# Patient Record
Sex: FEMALE | Race: WHITE | Employment: UNEMPLOYED | URBAN - METROPOLITAN AREA
[De-identification: names, ages, dates, MRNs, and addresses within clinical notes are randomized per-mention and may not be internally consistent; named-entity substitution may affect disease eponyms.]

---

## 2017-01-04 ENCOUNTER — OFFICE VISIT (OUTPATIENT)
Dept: INTERNAL MEDICINE CLINIC | Age: 62
End: 2017-01-04

## 2017-01-04 VITALS — SYSTOLIC BLOOD PRESSURE: 130 MMHG | DIASTOLIC BLOOD PRESSURE: 88 MMHG

## 2017-01-04 DIAGNOSIS — R05.8 NOCTURNAL COUGH: ICD-10-CM

## 2017-01-04 DIAGNOSIS — J06.9 ACUTE URI: Primary | ICD-10-CM

## 2017-01-04 RX ORDER — AZITHROMYCIN 250 MG/1
TABLET, FILM COATED ORAL
Qty: 6 TAB | Refills: 0 | Status: SHIPPED | OUTPATIENT
Start: 2017-01-04 | End: 2017-02-16 | Stop reason: ALTCHOICE

## 2017-01-04 RX ORDER — CODEINE PHOSPHATE AND GUAIFENESIN 10; 100 MG/5ML; MG/5ML
5 SOLUTION ORAL
Qty: 118 ML | Refills: 0 | Status: SHIPPED | OUTPATIENT
Start: 2017-01-04 | End: 2017-02-16 | Stop reason: ALTCHOICE

## 2017-01-25 ENCOUNTER — OFFICE VISIT (OUTPATIENT)
Dept: SURGERY | Age: 62
End: 2017-01-25

## 2017-01-25 VITALS
HEIGHT: 60 IN | SYSTOLIC BLOOD PRESSURE: 118 MMHG | DIASTOLIC BLOOD PRESSURE: 80 MMHG | WEIGHT: 165 LBS | RESPIRATION RATE: 18 BRPM | BODY MASS INDEX: 32.39 KG/M2 | TEMPERATURE: 97.5 F | HEART RATE: 80 BPM

## 2017-01-25 DIAGNOSIS — Z98.84 LAP-BAND SURGERY STATUS: Primary | ICD-10-CM

## 2017-01-25 DIAGNOSIS — K21.9 GASTROESOPHAGEAL REFLUX DISEASE, ESOPHAGITIS PRESENCE NOT SPECIFIED: ICD-10-CM

## 2017-01-29 ENCOUNTER — HOSPITAL ENCOUNTER (EMERGENCY)
Age: 62
Discharge: HOME | End: 2017-01-29
Attending: EMERGENCY MEDICINE
Payer: COMMERCIAL

## 2017-01-29 ENCOUNTER — APPOINTMENT (OUTPATIENT)
Dept: GENERAL RADIOLOGY | Age: 62
End: 2017-01-29
Attending: PHYSICIAN ASSISTANT
Payer: COMMERCIAL

## 2017-01-29 VITALS
WEIGHT: 160 LBS | SYSTOLIC BLOOD PRESSURE: 145 MMHG | RESPIRATION RATE: 12 BRPM | BODY MASS INDEX: 31.41 KG/M2 | OXYGEN SATURATION: 100 % | HEIGHT: 60 IN | TEMPERATURE: 98.1 F | DIASTOLIC BLOOD PRESSURE: 86 MMHG | HEART RATE: 71 BPM

## 2017-01-29 DIAGNOSIS — R07.9 ACUTE CHEST PAIN: Primary | ICD-10-CM

## 2017-01-29 LAB
ALBUMIN SERPL BCP-MCNC: 3.6 G/DL (ref 3.4–5)
ALBUMIN/GLOB SERPL: 1.1 {RATIO} (ref 0.8–1.7)
ALP SERPL-CCNC: 67 U/L (ref 45–117)
ALT SERPL-CCNC: 18 U/L (ref 13–56)
ANION GAP BLD CALC-SCNC: 9 MMOL/L (ref 3–18)
AST SERPL W P-5'-P-CCNC: 11 U/L (ref 15–37)
ATRIAL RATE: 80 BPM
BASOPHILS # BLD AUTO: 0.1 K/UL (ref 0–0.06)
BASOPHILS # BLD: 1 % (ref 0–2)
BILIRUB SERPL-MCNC: 0.3 MG/DL (ref 0.2–1)
BUN SERPL-MCNC: 19 MG/DL (ref 7–18)
BUN/CREAT SERPL: 21 (ref 12–20)
CALCIUM SERPL-MCNC: 8.7 MG/DL (ref 8.5–10.1)
CALCULATED P AXIS, ECG09: 70 DEGREES
CALCULATED R AXIS, ECG10: 68 DEGREES
CALCULATED T AXIS, ECG11: 49 DEGREES
CHLORIDE SERPL-SCNC: 104 MMOL/L (ref 100–108)
CK MB CFR SERPL CALC: 0.7 % (ref 0–4)
CK MB CFR SERPL CALC: 0.8 % (ref 0–4)
CK MB SERPL-MCNC: 0.9 NG/ML (ref 0.5–3.6)
CK MB SERPL-MCNC: 0.9 NG/ML (ref 0.5–3.6)
CK SERPL-CCNC: 114 U/L (ref 26–192)
CK SERPL-CCNC: 125 U/L (ref 26–192)
CO2 SERPL-SCNC: 27 MMOL/L (ref 21–32)
CREAT SERPL-MCNC: 0.9 MG/DL (ref 0.6–1.3)
DIAGNOSIS, 93000: NORMAL
DIFFERENTIAL METHOD BLD: ABNORMAL
EOSINOPHIL # BLD: 0.2 K/UL (ref 0–0.4)
EOSINOPHIL NFR BLD: 3 % (ref 0–5)
ERYTHROCYTE [DISTWIDTH] IN BLOOD BY AUTOMATED COUNT: 12.7 % (ref 11.6–14.5)
GLOBULIN SER CALC-MCNC: 3.3 G/DL (ref 2–4)
GLUCOSE SERPL-MCNC: 94 MG/DL (ref 74–99)
HCT VFR BLD AUTO: 36 % (ref 35–45)
HGB BLD-MCNC: 12.2 G/DL (ref 12–16)
LIPASE SERPL-CCNC: 211 U/L (ref 73–393)
LYMPHOCYTES # BLD AUTO: 38 % (ref 21–52)
LYMPHOCYTES # BLD: 2.6 K/UL (ref 0.9–3.6)
MAGNESIUM SERPL-MCNC: 2.2 MG/DL (ref 1.8–2.4)
MCH RBC QN AUTO: 28.8 PG (ref 24–34)
MCHC RBC AUTO-ENTMCNC: 33.9 G/DL (ref 31–37)
MCV RBC AUTO: 85.1 FL (ref 74–97)
MONOCYTES # BLD: 0.6 K/UL (ref 0.05–1.2)
MONOCYTES NFR BLD AUTO: 9 % (ref 3–10)
NEUTS SEG # BLD: 3.3 K/UL (ref 1.8–8)
NEUTS SEG NFR BLD AUTO: 49 % (ref 40–73)
P-R INTERVAL, ECG05: 150 MS
PLATELET # BLD AUTO: 258 K/UL (ref 135–420)
PMV BLD AUTO: 10 FL (ref 9.2–11.8)
POTASSIUM SERPL-SCNC: 3.8 MMOL/L (ref 3.5–5.5)
PROT SERPL-MCNC: 6.9 G/DL (ref 6.4–8.2)
Q-T INTERVAL, ECG07: 362 MS
QRS DURATION, ECG06: 84 MS
QTC CALCULATION (BEZET), ECG08: 417 MS
RBC # BLD AUTO: 4.23 M/UL (ref 4.2–5.3)
SODIUM SERPL-SCNC: 140 MMOL/L (ref 136–145)
TROPONIN I SERPL-MCNC: <0.02 NG/ML (ref 0–0.04)
TROPONIN I SERPL-MCNC: <0.02 NG/ML (ref 0–0.04)
VENTRICULAR RATE, ECG03: 80 BPM
WBC # BLD AUTO: 6.7 K/UL (ref 4.6–13.2)

## 2017-01-29 PROCEDURE — 82550 ASSAY OF CK (CPK): CPT | Performed by: PHYSICIAN ASSISTANT

## 2017-01-29 PROCEDURE — 74011250636 HC RX REV CODE- 250/636: Performed by: EMERGENCY MEDICINE

## 2017-01-29 PROCEDURE — 74011250637 HC RX REV CODE- 250/637: Performed by: EMERGENCY MEDICINE

## 2017-01-29 PROCEDURE — 93005 ELECTROCARDIOGRAM TRACING: CPT

## 2017-01-29 PROCEDURE — 99284 EMERGENCY DEPT VISIT MOD MDM: CPT

## 2017-01-29 PROCEDURE — 71020 XR CHEST PA LAT: CPT

## 2017-01-29 PROCEDURE — 80053 COMPREHEN METABOLIC PANEL: CPT | Performed by: PHYSICIAN ASSISTANT

## 2017-01-29 PROCEDURE — 83690 ASSAY OF LIPASE: CPT | Performed by: PHYSICIAN ASSISTANT

## 2017-01-29 PROCEDURE — 83735 ASSAY OF MAGNESIUM: CPT | Performed by: PHYSICIAN ASSISTANT

## 2017-01-29 PROCEDURE — 96375 TX/PRO/DX INJ NEW DRUG ADDON: CPT

## 2017-01-29 PROCEDURE — 85025 COMPLETE CBC W/AUTO DIFF WBC: CPT | Performed by: PHYSICIAN ASSISTANT

## 2017-01-29 PROCEDURE — 96374 THER/PROPH/DIAG INJ IV PUSH: CPT

## 2017-01-29 RX ORDER — KETOROLAC TROMETHAMINE 30 MG/ML
30 INJECTION, SOLUTION INTRAMUSCULAR; INTRAVENOUS
Status: COMPLETED | OUTPATIENT
Start: 2017-01-29 | End: 2017-01-29

## 2017-01-29 RX ORDER — ONDANSETRON 2 MG/ML
4 INJECTION INTRAMUSCULAR; INTRAVENOUS
Status: COMPLETED | OUTPATIENT
Start: 2017-01-29 | End: 2017-01-29

## 2017-01-29 RX ORDER — GUAIFENESIN 100 MG/5ML
324 LIQUID (ML) ORAL
Status: COMPLETED | OUTPATIENT
Start: 2017-01-29 | End: 2017-01-29

## 2017-01-29 RX ADMIN — KETOROLAC TROMETHAMINE 30 MG: 30 INJECTION, SOLUTION INTRAMUSCULAR at 19:30

## 2017-01-29 RX ADMIN — ONDANSETRON 4 MG: 2 INJECTION INTRAMUSCULAR; INTRAVENOUS at 18:16

## 2017-01-29 RX ADMIN — ASPIRIN 81 MG 324 MG: 81 TABLET ORAL at 18:16

## 2017-01-29 NOTE — DISCHARGE INSTRUCTIONS
Chest Pain: Care Instructions  Your Care Instructions  There are many things that can cause chest pain. Some are not serious and will get better on their own in a few days. But some kinds of chest pain need more testing and treatment. Your doctor may have recommended a follow-up visit in the next 8 to 12 hours. If you are not getting better, you may need more tests or treatment. Even though your doctor has released you, you still need to watch for any problems. The doctor carefully checked you, but sometimes problems can develop later. If you have new symptoms or if your symptoms do not get better, get medical care right away. If you have worse or different chest pain or pressure that lasts more than 5 minutes or you passed out (lost consciousness), call 911 or seek other emergency help right away. A medical visit is only one step in your treatment. Even if you feel better, you still need to do what your doctor recommends, such as going to all suggested follow-up appointments and taking medicines exactly as directed. This will help you recover and help prevent future problems. How can you care for yourself at home? · Rest until you feel better. · Take your medicine exactly as prescribed. Call your doctor if you think you are having a problem with your medicine. · Do not drive after taking a prescription pain medicine. When should you call for help? Call 911 if:  · You passed out (lost consciousness). · You have severe difficulty breathing. · You have symptoms of a heart attack. These may include:  ¨ Chest pain or pressure, or a strange feeling in your chest.  ¨ Sweating. ¨ Shortness of breath. ¨ Nausea or vomiting. ¨ Pain, pressure, or a strange feeling in your back, neck, jaw, or upper belly or in one or both shoulders or arms. ¨ Lightheadedness or sudden weakness. ¨ A fast or irregular heartbeat.   After you call 911, the  may tell you to chew 1 adult-strength or 2 to 4 low-dose aspirin. Wait for an ambulance. Do not try to drive yourself. Call your doctor today if:  · You have any trouble breathing. · Your chest pain gets worse. · You are dizzy or lightheaded, or you feel like you may faint. · You are not getting better as expected. · You are having new or different chest pain. Where can you learn more? Go to http://linda-slava.info/. Enter A120 in the search box to learn more about \"Chest Pain: Care Instructions. \"  Current as of: May 27, 2016  Content Version: 11.1  © 3912-5469 [employer]. Care instructions adapted under license by [employer] (which disclaims liability or warranty for this information). If you have questions about a medical condition or this instruction, always ask your healthcare professional. [address] any warranty or liability for your use of this information.

## 2017-01-29 NOTE — ED PROVIDER NOTES
HPI Comments: 4:44 PM Katie Hanks is a 63 y.o. female with a history of GERD, COPD, HTN, and PAD who presents to the ED complaining of chest pain onset this morning. Patient states she had some discomfort in her chest and back last night. Patient describes the pain as a \"tightening\" in the center of her chest and notes that it radiates to the back and gets worse when she moves after being still for a long period of time. Patient denies radiation to the jaw or arm, SOB, vomiting, cough, congestion, or any other symptoms. Denies leg swelling, recent travel, HRT or hx of VTE. Patient states she has a family history of cardiac problems in her sisters and parents. There are no other concerns at this time. PCP: Vi Ramirez MD    The history is provided by the patient. Past Medical History:   Diagnosis Date    Aorto-iliac duplex 09/17/2015     Patent abdom aorta endovascular graft w/o leak. Mod stenosis suggested in right limb.  Calculus of kidney 2014     stent/kidney     Carotid duplex 09/17/2015     Mild < 50% bilateral plaquing.  Cataract, left     Chronic obstructive pulmonary disease ([address]     Echocardiogram 11/04/2014     EF 56%. No WMA. Mild LAE. Echo is within normal limits.  Esophageal reflux     Generalized osteoarthrosis, involving multiple sites     GERD (gastroesophageal reflux disease)     Gout     Hypercholesteremia     Hypertension      resolved    Lower extremity arterial testing 03/27/2015     Normal perfusion at rest and w/treadmill bilaterally.   R KORI 1.05.  L KORI 1.03.    Murmur      patient reports that she is aware- has been told in the past    Nausea & vomiting     Renal artery stenosis (HCC)     Subclinical hyperthyroidism 8/2015    Unspecified sleep apnea      resolved       Past Surgical History:   Procedure Laterality Date    Hx appendectomy      Hx hysterectomy      Hx cholecystectomy      Hx gastric bypass       lap band 2012    Hx other surgical  10/27/14      Bilateral open femoral exposures, Placement of catheter and sheath in the aorta Endo repair of aortic aneurysm with modular bifurcated device  Interpretation of angiography, intravascular ultrasound (IVUS) catheter    Hx bladder suspension      Hx other surgical  10/27/2014     Endurant II abdominal stent graft implant    Adjustment gastric band N/A 11/10/2016     MAKAYLA Cullen    Hx urological       stent    Vascular surgery procedure unlist       surgery for abdominal aneurysm    Hx heent Right 1990s, 2016     Ear sx         Family History:   Problem Relation Age of Onset    Cancer Mother     Diabetes Father     Alcohol abuse Father     Heart Disease Maternal Grandmother     Alzheimer Maternal Grandmother     Diabetes Sister        Social History     Social History    Marital status:      Spouse name: N/A    Number of children: N/A    Years of education: N/A     Occupational History    Not on file. Social History Main Topics    Smoking status: Former Smoker     Packs/day: 0.00     Years: 0.00     Types: Cigarettes     Quit date: 1/25/2016    Smokeless tobacco: Never Used    Alcohol use No    Drug use: No    Sexual activity: Yes     Birth control/ protection: Surgical     Other Topics Concern    Not on file     Social History Narrative         ALLERGIES: Pcn [penicillins]; Tetracycline; and Sulfabenzamide    Review of Systems   Constitutional: Negative. Negative for appetite change and chills. HENT: Negative. Negative for congestion, sore throat and trouble swallowing. Eyes: Negative. Negative for visual disturbance. Respiratory: Negative. Negative for cough, shortness of breath and wheezing. Cardiovascular: Positive for chest pain. Negative for leg swelling. Gastrointestinal: Positive for nausea. Negative for blood in stool, diarrhea and vomiting. Genitourinary: Negative.   Negative for difficulty urinating, dysuria, frequency and vaginal discharge. Musculoskeletal: Positive for back pain and myalgias (shoulder pain, leg pain). Skin: Negative. Negative for rash and wound. Neurological: Negative. Negative for syncope, speech difficulty and light-headedness. Psychiatric/Behavioral: Negative. Negative for hallucinations, self-injury and suicidal ideas. All other systems reviewed and are negative. Vitals:    01/29/17 1715 01/29/17 1730 01/29/17 1745 01/29/17 1800   BP: 159/72 (!) 116/93 145/77 157/71   Pulse:  72 71 66   Resp:  17 15 13   Temp:       SpO2:  (!) 83% 96%    Weight:       Height:                Physical Exam   Constitutional: She is oriented to person, place, and time. She appears well-developed and well-nourished. No distress. HENT:   Head: Normocephalic and atraumatic. Mouth/Throat: Oropharynx is clear and moist.   Eyes: Conjunctivae and EOM are normal. Pupils are equal, round, and reactive to light. No scleral icterus. Neck: Trachea normal. Neck supple. No JVD present. No thyromegaly present. Cardiovascular: Normal rate, regular rhythm, S1 normal and S2 normal.  Exam reveals no gallop and no friction rub. No murmur heard. Pulmonary/Chest: Effort normal and breath sounds normal. No accessory muscle usage. No respiratory distress. She exhibits tenderness (anterior chest wall). Abdominal: Soft. Normal appearance. She exhibits no distension. There is no tenderness. There is no rigidity, no rebound and no guarding. Musculoskeletal: Normal range of motion. She exhibits no edema or tenderness. Neurological: She is alert and oriented to person, place, and time. She has normal strength. No cranial nerve deficit or sensory deficit. Coordination normal.   Skin: Skin is warm and intact. No rash noted. Psychiatric: She has a normal mood and affect. Her speech is normal and behavior is normal.   Vitals reviewed.        MDM  Number of Diagnoses or Management Options  Acute chest pain:   Diagnosis management comments: Antonieta Guan is a 63 y.o. Female coming in with CP since last night. Multiple RF for CAD, no previous cardiac w/u. EKG reassuring. Will get w/u to r/o ACS. Normal HR, O2 sats, RR and low risk for VTE. No suspicion for PE and no further w/u for PE indicated at this time. ED Course       Procedures    Vitals:  Patient Vitals for the past 12 hrs:   Temp Pulse Resp BP SpO2   01/29/17 1800 - 66 13 157/71 -   01/29/17 1745 - 71 15 145/77 96 %   01/29/17 1730 - 72 17 (!) 116/93 (!) 83 %   01/29/17 1715 - - - 159/72 -   01/29/17 1700 - 75 16 161/78 97 %   01/29/17 1645 - 86 14 150/72 98 %   01/29/17 1629 98.1 °F (36.7 °C) 85 18 134/87 97 %   97% on RA, indicating adequate oxygenation. Medications ordered:   Medications   aspirin chewable tablet 324 mg (324 mg Oral Given 1/29/17 1816)   ondansetron (ZOFRAN) injection 4 mg (4 mg IntraVENous Given 1/29/17 1816)         Lab findings:  Recent Results (from the past 12 hour(s))   EKG, 12 LEAD, INITIAL    Collection Time: 01/29/17  4:39 PM   Result Value Ref Range    Ventricular Rate 80 BPM    Atrial Rate 80 BPM    P-R Interval 150 ms    QRS Duration 84 ms    Q-T Interval 362 ms    QTC Calculation (Bezet) 417 ms    Calculated P Axis 70 degrees    Calculated R Axis 68 degrees    Calculated T Axis 49 degrees    Diagnosis       Normal sinus rhythm with sinus arrhythmia  Normal ECG  When compared with ECG of 22-APR-2016 10:03,  No significant change was found     CBC WITH AUTOMATED DIFF    Collection Time: 01/29/17  4:50 PM   Result Value Ref Range    WBC 6.7 4.6 - 13.2 K/uL    RBC 4.23 4.20 - 5.30 M/uL    HGB 12.2 12.0 - 16.0 g/dL    HCT 36.0 35.0 - 45.0 %    MCV 85.1 74.0 - 97.0 FL    MCH 28.8 24.0 - 34.0 PG    MCHC 33.9 31.0 - 37.0 g/dL    RDW 12.7 11.6 - 14.5 %    PLATELET 668 725 - 069 K/uL    MPV 10.0 9.2 - 11.8 FL    NEUTROPHILS 49 40 - 73 %    LYMPHOCYTES 38 21 - 52 %    MONOCYTES 9 3 - 10 %    EOSINOPHILS 3 0 - 5 %    BASOPHILS 1 0 - 2 %    ABS. NEUTROPHILS 3.3 1.8 - 8.0 K/UL    ABS. LYMPHOCYTES 2.6 0.9 - 3.6 K/UL    ABS. MONOCYTES 0.6 0.05 - 1.2 K/UL    ABS. EOSINOPHILS 0.2 0.0 - 0.4 K/UL    ABS. BASOPHILS 0.1 (H) 0.0 - 0.06 K/UL    DF AUTOMATED     METABOLIC PANEL, COMPREHENSIVE    Collection Time: 01/29/17  4:50 PM   Result Value Ref Range    Sodium 140 136 - 145 mmol/L    Potassium 3.8 3.5 - 5.5 mmol/L    Chloride 104 100 - 108 mmol/L    CO2 27 21 - 32 mmol/L    Anion gap 9 3.0 - 18 mmol/L    Glucose 94 74 - 99 mg/dL    BUN 19 (H) 7.0 - 18 MG/DL    Creatinine 0.90 0.6 - 1.3 MG/DL    BUN/Creatinine ratio 21 (H) 12 - 20      GFR est AA >60 >60 ml/min/1.73m2    GFR est non-AA >60 >60 ml/min/1.73m2    Calcium 8.7 8.5 - 10.1 MG/DL    Bilirubin, total 0.3 0.2 - 1.0 MG/DL    ALT 18 13 - 56 U/L    AST 11 (L) 15 - 37 U/L    Alk. phosphatase 67 45 - 117 U/L    Protein, total 6.9 6.4 - 8.2 g/dL    Albumin 3.6 3.4 - 5.0 g/dL    Globulin 3.3 2.0 - 4.0 g/dL    A-G Ratio 1.1 0.8 - 1.7     CARDIAC PANEL,(CK, CKMB & TROPONIN)    Collection Time: 01/29/17  4:50 PM   Result Value Ref Range     26 - 192 U/L    CK - MB 0.9 0.5 - 3.6 ng/ml    CK-MB Index 0.7 0.0 - 4.0 %    Troponin-I, Qt. <0.02 0.0 - 0.045 NG/ML   MAGNESIUM    Collection Time: 01/29/17  4:50 PM   Result Value Ref Range    Magnesium 2.2 1.8 - 2.4 mg/dL   LIPASE    Collection Time: 01/29/17  4:50 PM   Result Value Ref Range    Lipase 211 73 - 393 U/L       EKG interpretation by ED Physician:    4:55 PM Sinus rhythm rate 80 bpm. No ischemic changes. Interpreted by Dr. Jennie Bonilla. X-Ray, CT or other radiology findings or impressions:  XR CHEST PA LAT       No acute process. Progress notes, Consult notes or additional Procedure notes:   Patient has remained hemodynamically stable, patient care transferred to Dr. Fran Bailey pending second set of cardiac enzymes, re-evaluation and disposition. Disposition:  Diagnosis:   1.  Acute chest pain        Disposition: Pending    Follow-up Information     None Patient's Medications   Start Taking    No medications on file   Continue Taking    AZELASTINE HCL (AZELASTINE OP)    Apply 1 Drop to eye daily. AZITHROMYCIN (ZITHROMAX) 250 MG TABLET    Take 2 tablets today, then take 1 tablet daily    CLOPIDOGREL (PLAVIX) 75 MG TABLET    Take 1 Tab by mouth daily. DALIRESP TAB TABLET    Take 500 mcg by mouth daily. DICLOFENAC (VOLTAREN) 1 % GEL    Apply 2 g to affected area four (4) times daily. DOCUSATE SODIUM (COLACE) 100 MG CAPSULE    Take 100 mg by mouth two (2) times a day. FLUTICASONE-SALMETEROL (ADVAIR DISKUS) 250-50 MCG/DOSE DISKUS INHALER    Take 1 Puff by inhalation daily. GUAIFENESIN-CODEINE (ROBITUSSIN AC) 100-10 MG/5 ML SOLUTION    Take 5 mL by mouth three (3) times daily as needed for Cough. Max Daily Amount: 15 mL. Do not drive if taking this medication    HYDROCODONE-ACETAMINOPHEN (NORCO) 7.5-325 MG PER TABLET    1 tablet every four hours as needed for pain    LANSOPRAZOLE (PREVACID) 30 MG CAPSULE    Take 1 Cap by mouth daily. MONTELUKAST (SINGULAIR) 10 MG TABLET    Take 1 Tab by mouth daily. ONDANSETRON (ZOFRAN ODT) 4 MG DISINTEGRATING TABLET    PLACE 1 TABLET ON TONGUE TWICE A DAY IF NAUSEA    PRAVASTATIN (PRAVACHOL) 20 MG TABLET    Take 20 mg by mouth daily. SERTRALINE (ZOLOFT) 50 MG TABLET    50 mg po daily    SPIRIVA WITH HANDIHALER 18 MCG INHALATION CAPSULE    Take 1 Cap by inhalation daily. These Medications have changed    No medications on file   Stop Taking    No medications on file     Scribe Attestation:    Richard Turner, scribing for and in the presence of Ning Fuchs MD January 29, 2017 at 4:36 PM     Physician Attestation:   I personally performed the services described in this documentation, reviewed and edited the documentation which was dictated to the scribe in my presence, and it accurately records my words and actions.  Ning Fuchs MD  January 29, 2017 at 4:36 PM     Signed by: Abbie Patel Isacc Rodriguez, January 29, 2017,  4:36 PM

## 2017-01-30 LAB
ATRIAL RATE: 63 BPM
CALCULATED P AXIS, ECG09: 56 DEGREES
CALCULATED R AXIS, ECG10: 47 DEGREES
CALCULATED T AXIS, ECG11: 52 DEGREES
DIAGNOSIS, 93000: NORMAL
P-R INTERVAL, ECG05: 140 MS
Q-T INTERVAL, ECG07: 404 MS
QRS DURATION, ECG06: 80 MS
QTC CALCULATION (BEZET), ECG08: 413 MS
VENTRICULAR RATE, ECG03: 63 BPM

## 2017-01-30 NOTE — ED NOTES
I have reviewed discharge instructions with the patient. The patient verbalized understanding. Patient looks comfortable, left ED in stable condition. Vital signs stable at this time. Patient states she continues to have pressure to chest, no pain at this time. Denies any new complaints at this time.

## 2017-01-30 NOTE — ED NOTES
Received nursing report from [facility]. Patient ambulated to bathroom, steady gait noted. Complaining of chest pressure. Will administer pain medication upon return.

## 2017-01-30 NOTE — ED NOTES
ADDENDUM      Patient's care was signed over to me at 1900 on 1/29/17. Patient's care signed over to me pending second set cardiac enzymes. Pt resting comfortably. Respirations nonlabored. Work up reviewed and second set enzymes wnl. I have instructed pt to follow up with cardiology and pcp. Return precautions discussed. Patient stated verbal understanding and agrees with course and plan. IMPRESSION:   1.  Acute chest pain        DISPO: Discharged home in stable condition      Frantz Mejia MD

## 2017-01-30 NOTE — ED NOTES
Bedside and Verbal shift change report [facility] (oncoming nurse) by Chelsi Zhao RN (offgoing nurse). Report included the following information SBAR, Kardex, Intake/Output and MAR.

## 2017-02-03 RX ORDER — HYDROCODONE BITARTRATE AND ACETAMINOPHEN 7.5; 325 MG/1; MG/1
TABLET ORAL
Qty: 100 TAB | Refills: 0 | Status: SHIPPED | OUTPATIENT
Start: 2017-02-03 | End: 2017-03-02 | Stop reason: SDUPTHER

## 2017-02-03 NOTE — TELEPHONE ENCOUNTER
Requested Prescriptions     Pending Prescriptions Disp Refills    HYDROcodone-acetaminophen (NORCO) 7.5-325 mg per tablet 100 Tab 0     Si tablet every four hours as needed for pain     She would like to have by noon, leaving to go out of town today.

## 2017-02-06 ENCOUNTER — ANESTHESIA EVENT (OUTPATIENT)
Dept: ENDOSCOPY | Age: 62
End: 2017-02-06
Payer: COMMERCIAL

## 2017-02-07 ENCOUNTER — HOSPITAL ENCOUNTER (OUTPATIENT)
Age: 62
Setting detail: OUTPATIENT SURGERY
Discharge: HOME | End: 2017-02-07
Attending: SURGERY | Admitting: SURGERY
Payer: COMMERCIAL

## 2017-02-07 ENCOUNTER — ANESTHESIA (OUTPATIENT)
Dept: ENDOSCOPY | Age: 62
End: 2017-02-07
Payer: COMMERCIAL

## 2017-02-07 ENCOUNTER — SURGERY (OUTPATIENT)
Age: 62
End: 2017-02-07

## 2017-02-07 VITALS
OXYGEN SATURATION: 100 % | TEMPERATURE: 97 F | SYSTOLIC BLOOD PRESSURE: 133 MMHG | DIASTOLIC BLOOD PRESSURE: 84 MMHG | WEIGHT: 163.4 LBS | BODY MASS INDEX: 32.08 KG/M2 | HEART RATE: 85 BPM | RESPIRATION RATE: 12 BRPM | HEIGHT: 60 IN

## 2017-02-07 PROCEDURE — 76040000019: Performed by: SURGERY

## 2017-02-07 PROCEDURE — 74011000250 HC RX REV CODE- 250

## 2017-02-07 PROCEDURE — 77030008565 HC TBNG SUC IRR ERBE -B: Performed by: SURGERY

## 2017-02-07 PROCEDURE — 77030019988 HC FCPS ENDOSC DISP BSC -B: Performed by: SURGERY

## 2017-02-07 PROCEDURE — 76060000031 HC ANESTHESIA FIRST 0.5 HR: Performed by: SURGERY

## 2017-02-07 PROCEDURE — 74011000250 HC RX REV CODE- 250: Performed by: NURSE ANESTHETIST, CERTIFIED REGISTERED

## 2017-02-07 PROCEDURE — 88342 IMHCHEM/IMCYTCHM 1ST ANTB: CPT | Performed by: SURGERY

## 2017-02-07 PROCEDURE — 77030018846 HC SOL IRR STRL H20 ICUM -A: Performed by: SURGERY

## 2017-02-07 PROCEDURE — 74011250636 HC RX REV CODE- 250/636: Performed by: NURSE ANESTHETIST, CERTIFIED REGISTERED

## 2017-02-07 PROCEDURE — 88305 TISSUE EXAM BY PATHOLOGIST: CPT | Performed by: SURGERY

## 2017-02-07 PROCEDURE — 74011250636 HC RX REV CODE- 250/636

## 2017-02-07 RX ORDER — SODIUM CHLORIDE 0.9 % (FLUSH) 0.9 %
5-10 SYRINGE (ML) INJECTION AS NEEDED
Status: DISCONTINUED | OUTPATIENT
Start: 2017-02-07 | End: 2017-02-07 | Stop reason: HOSPADM

## 2017-02-07 RX ORDER — LIDOCAINE HYDROCHLORIDE 20 MG/ML
INJECTION, SOLUTION EPIDURAL; INFILTRATION; INTRACAUDAL; PERINEURAL AS NEEDED
Status: DISCONTINUED | OUTPATIENT
Start: 2017-02-07 | End: 2017-02-07 | Stop reason: HOSPADM

## 2017-02-07 RX ORDER — SODIUM CHLORIDE 0.9 % (FLUSH) 0.9 %
5-10 SYRINGE (ML) INJECTION EVERY 8 HOURS
Status: DISCONTINUED | OUTPATIENT
Start: 2017-02-07 | End: 2017-02-07 | Stop reason: HOSPADM

## 2017-02-07 RX ORDER — NALOXONE HYDROCHLORIDE 0.4 MG/ML
0.1 INJECTION, SOLUTION INTRAMUSCULAR; INTRAVENOUS; SUBCUTANEOUS AS NEEDED
Status: DISCONTINUED | OUTPATIENT
Start: 2017-02-07 | End: 2017-02-07 | Stop reason: HOSPADM

## 2017-02-07 RX ORDER — PROPOFOL 10 MG/ML
INJECTION, EMULSION INTRAVENOUS AS NEEDED
Status: DISCONTINUED | OUTPATIENT
Start: 2017-02-07 | End: 2017-02-07 | Stop reason: HOSPADM

## 2017-02-07 RX ORDER — SODIUM CHLORIDE, SODIUM LACTATE, POTASSIUM CHLORIDE, CALCIUM CHLORIDE 600; 310; 30; 20 MG/100ML; MG/100ML; MG/100ML; MG/100ML
75 INJECTION, SOLUTION INTRAVENOUS CONTINUOUS
Status: DISCONTINUED | OUTPATIENT
Start: 2017-02-07 | End: 2017-02-07 | Stop reason: HOSPADM

## 2017-02-07 RX ORDER — FAMOTIDINE 10 MG/ML
20 INJECTION INTRAVENOUS ONCE
Status: COMPLETED | OUTPATIENT
Start: 2017-02-07 | End: 2017-02-07

## 2017-02-07 RX ORDER — ONDANSETRON 2 MG/ML
INJECTION INTRAMUSCULAR; INTRAVENOUS AS NEEDED
Status: DISCONTINUED | OUTPATIENT
Start: 2017-02-07 | End: 2017-02-07 | Stop reason: HOSPADM

## 2017-02-07 RX ORDER — GLYCOPYRROLATE 0.2 MG/ML
INJECTION INTRAMUSCULAR; INTRAVENOUS AS NEEDED
Status: DISCONTINUED | OUTPATIENT
Start: 2017-02-07 | End: 2017-02-07 | Stop reason: HOSPADM

## 2017-02-07 RX ORDER — SODIUM CHLORIDE, SODIUM LACTATE, POTASSIUM CHLORIDE, CALCIUM CHLORIDE 600; 310; 30; 20 MG/100ML; MG/100ML; MG/100ML; MG/100ML
50 INJECTION, SOLUTION INTRAVENOUS CONTINUOUS
Status: DISCONTINUED | OUTPATIENT
Start: 2017-02-07 | End: 2017-02-07 | Stop reason: HOSPADM

## 2017-02-07 RX ADMIN — SODIUM CHLORIDE, SODIUM LACTATE, POTASSIUM CHLORIDE, AND CALCIUM CHLORIDE 50 ML/HR: 600; 310; 30; 20 INJECTION, SOLUTION INTRAVENOUS at 06:55

## 2017-02-07 RX ADMIN — ONDANSETRON 4 MG: 2 INJECTION INTRAMUSCULAR; INTRAVENOUS at 07:30

## 2017-02-07 RX ADMIN — PROPOFOL 100 MG: 10 INJECTION, EMULSION INTRAVENOUS at 07:45

## 2017-02-07 RX ADMIN — Medication 10 ML: at 06:56

## 2017-02-07 RX ADMIN — LIDOCAINE HYDROCHLORIDE 40 MG: 20 INJECTION, SOLUTION EPIDURAL; INFILTRATION; INTRACAUDAL; PERINEURAL at 07:45

## 2017-02-07 RX ADMIN — PROPOFOL 30 MG: 10 INJECTION, EMULSION INTRAVENOUS at 07:56

## 2017-02-07 RX ADMIN — PROPOFOL 50 MG: 10 INJECTION, EMULSION INTRAVENOUS at 07:50

## 2017-02-07 RX ADMIN — FAMOTIDINE 20 MG: 10 INJECTION, SOLUTION INTRAVENOUS at 06:55

## 2017-02-07 RX ADMIN — GLYCOPYRROLATE 0.2 MG: 0.2 INJECTION INTRAMUSCULAR; INTRAVENOUS at 07:30

## 2017-02-07 NOTE — PROGRESS NOTES
Hernia Evaluation      Subjective:     Sanket Currie is a 63 y.o. female with a history of lap banding over 5 years ago. She is having severe reflux despite full deflation of the band. Her symptoms are persistent and worsening for the last year. Patient Active Problem List    Diagnosis Date Noted    LAP-BAND surgery status 05/26/2016    Dysarthria 02/28/2016    Renal infarction ([address] 10/07/2015    Generalized osteoarthrosis, involving multiple sites     Esophageal reflux     Subclinical hyperthyroidism 08/28/2015    Essential hypertension 08/25/2015    Hyperlipidemia 08/25/2015    AAA (abdominal aortic aneurysm) ([address] 10/14/2014     Past Medical History   Diagnosis Date    Aorto-iliac duplex 09/17/2015     Patent abdom aorta endovascular graft w/o leak. Mod stenosis suggested in right limb.  Calculus of kidney 2014     stent/kidney     Carotid duplex 09/17/2015     Mild < 50% bilateral plaquing.  Cataract, left     Chronic obstructive pulmonary disease ([address]     Echocardiogram 11/04/2014     EF 56%. No WMA. Mild LAE. Echo is within normal limits.  Esophageal reflux     Generalized osteoarthrosis, involving multiple sites     GERD (gastroesophageal reflux disease)     Gout     Hypercholesteremia     Hypertension      resolved    Lower extremity arterial testing 03/27/2015     Normal perfusion at rest and w/treadmill bilaterally.   R KORI 1.05.  L KORI 1.03.    Murmur      patient reports that she is aware- has been told in the past    Nausea & vomiting     Renal artery stenosis ([address]     Subclinical hyperthyroidism 8/2015    Unspecified sleep apnea      resolved      Past Surgical History   Procedure Laterality Date    Hx appendectomy      Hx hysterectomy      Hx cholecystectomy      Hx gastric bypass       lap band 2012    Hx other surgical  10/27/14      Bilateral open femoral exposures, Placement of catheter and sheath in the aorta Endo repair of aortic aneurysm with modular bifurcated device  Interpretation of angiography, intravascular ultrasound (IVUS) catheter    Hx bladder suspension      Hx other surgical  10/27/2014     Endurant II abdominal stent graft implant    Adjustment gastric band N/A 11/10/2016     MAKAYLA Cullen    Hx urological       stent    Vascular surgery procedure unlist       surgery for abdominal aneurysm    Hx heent Right 1990s, 2016     Ear sx      Social History   Substance Use Topics    Smoking status: Former Smoker     Packs/day: 0.00     Years: 0.00     Types: Cigarettes     Quit date: 1/25/2016    Smokeless tobacco: Never Used    Alcohol use No      Family History   Problem Relation Age of Onset    Cancer Mother     Diabetes Father     Alcohol abuse Father     Heart Disease Maternal Grandmother     Alzheimer Maternal Grandmother     Diabetes Sister       No current facility-administered medications for this visit. No current outpatient prescriptions on file.      Facility-Administered Medications Ordered in Other Visits   Medication Dose Route Frequency    sodium chloride (NS) flush 5-10 mL  5-10 mL IntraVENous Q8H    sodium chloride (NS) flush 5-10 mL  5-10 mL IntraVENous PRN    lactated ringers infusion  50 mL/hr IntraVENous CONTINUOUS      Allergies   Allergen Reactions    Pcn [Penicillins] Anaphylaxis    Tetracycline Anaphylaxis    Sulfabenzamide Hives        Review of Systems:  Positive in BOLD    CONST: Fever, weight loss, fatigue or chills  GI: Nausea, vomiting, abdominal pain, change in bowel habits, hematochezia, melena, and GERD   INTEG: Dermatitis, abnormal moles  HEENT: Recent changes in vision, vertigo, epistaxis, dysphagia and hoarseness  CV: Chest pain, palpitations, HTN, edema and varicosities  RESP: Cough, shortness of breath, wheezing, hemoptysis, snoring and reactive airway disease  : Hematuria, dysuria, frequency, urgency, nocturia and stress urinary incontinence   MS: Weakness, joint pain and arthritis  ENDO: Diabetes, thyroid disease, polyuria, polydipsia, polyphagia, poor wound healing, heat intolerance, cold intolerance  LYMPH/HEME: Anemia, bruising and history of blood transfusions  NEURO: Dizziness, headache, fainting, seizures and stroke  PSYCH: Anxiety and depression    Objective:     Visit Vitals    /80    Pulse 80    Temp 97.5 °F (36.4 °C)    Resp 18    Ht 5' (1.524 m)    Wt 74.8 kg (165 lb)    BMI 32.22 kg/m2       Physical Exam:      GENERAL: alert, cooperative, no distress, appears stated age  EYE:conjunctivae and sclerae normal, pupils equal, round, reactive to light, extraocular movements intact without nystagmus  THROAT & NECK: no erythema or exudates noted and neck supple and symmetrical; no palpable masses  LUNG: clear to auscultation bilaterally  HEART: Regular rate and rhythm  ABDOMEN:abdomen is soft without significant tenderness, masses, organomegaly or guarding - healed scars  EXTREMITIES:  extremities normal, atraumatic, no cyanosis or edema  SKIN: Normal.    Imaging and Lab Review:     UGI  - esophageal dilation but no obstruction from band    Assessment:   Lap band status and esophageal dysfunction  Patient has significant symptoms and wishes to proceed with surgical intervention. Plan:   I explained the indications forEGD as well as the alternatives. I discussed the potential risks, benefits, and likely outcomes of this course of care, including but not limited to bleeding,  injury to surrounding structures, perforation, failure to improve or even worsen her pain, anesthetic risks and imponderables to include death. The patient indicates understanding of the risks and wishes to proceed.       Signed By: Trevon Powell MD     February 7, 2017

## 2017-02-07 NOTE — DISCHARGE INSTRUCTIONS
Upper GI Endoscopy: What to Expect at [address] [place] [address]  After you have an endoscopy, you will stay at the hospital or clinic for 1 to 2 hours. This will allow the medicine to wear off. You will be able to go home after your doctor or nurse checks to make sure you are not having any problems. You may have to stay overnight if you had treatment during the test. You may have a sore throat for a day or two after the test.  This care sheet gives you a general idea about what to expect after the test.  How can you care for yourself at home? Activity  · Rest as much as you need to after you go home. · You should be able to go back to your usual activities the day after the test.  Diet  · Follow your doctor's directions for eating after the test.  · Drink plenty of fluids (unless your doctor has told you not to). Medications  · If you have a sore throat the day after the test, use an over-the-counter spray to numb your throat. Follow-up care is a key part of your treatment and safety. Be sure to make and go to all appointments, and call your doctor if you are having problems. It's also a good idea to know your test results and keep a list of the medicines you take. When should you call for help? Call 911 anytime you think you may need emergency care. For example, call if:  · You passed out (lost consciousness). · You cough up blood. · You vomit blood or what looks like coffee grounds. · You pass maroon or very bloody stools. Call your doctor now or seek immediate medical care if:  · You have trouble swallowing. · You have belly pain. · Your stools are black and tarlike or have streaks of blood. · You are sick to your stomach or cannot keep fluids down. Watch closely for changes in your health, and be sure to contact your doctor if:  · Your throat still hurts after a day or two. · You do not get better as expected. Where can you learn more? Go to http://linda-slava.info/.   Enter L068 in the search box to learn more about \"Upper GI Endoscopy: What to Expect at Home. \"  Current as of: August 9, 2016  Content Version: 11.1  © 7567-0869 [employer]. Care instructions adapted under license by [employer] (which disclaims liability or warranty for this information). If you have questions about a medical condition or this instruction, always ask your healthcare professional. [address] any warranty or liability for your use of this information. Gastritis: Care Instructions  Your Care Instructions    Gastritis is a sore and upset stomach. It happens when something irritates the stomach lining. Many things can cause it. These include an infection such as the flu or something you ate or drank. Medicines or a sore on the lining of the stomach (ulcer) also can cause it. Your belly may bloat and ache. You may belch, vomit, and feel sick to your stomach. You should be able to relieve the problem by taking medicine. And it may help to change your diet. If gastritis lasts, your doctor may prescribe medicine. Follow-up care is a key part of your treatment and safety. Be sure to make and go to all appointments, and call your doctor if you are having problems. It's also a good idea to know your test results and keep a list of the medicines you take. How can you care for yourself at home? · If your doctor prescribed antibiotics, take them as directed. Do not stop taking them just because you feel better. You need to take the full course of antibiotics. · Be safe with medicines. If your doctor prescribed medicine to decrease stomach acid, take it as directed. Call your doctor if you think you are having a problem with your medicine.   · Do not take any other medicine, including over-the-counter pain relievers, without talking to your doctor first.  · If your doctor recommends over-the-counter medicine to reduce stomach acid, such as Pepcid AC, Prilosec, Tagamet HB, or Zantac 75, follow the directions on the label. · Drink plenty of fluids (enough so that your urine is light yellow or clear like water) to prevent dehydration. Choose water and other caffeine-free clear liquids. If you have kidney, heart, or liver disease and have to limit fluids, talk with your doctor before you increase the amount of fluids you drink. · Limit how much alcohol you drink. · Avoid coffee, tea, cola drinks, chocolate, and other foods with caffeine. They increase stomach acid. When should you call for help? Call 911 anytime you think you may need emergency care. For example, call if:  · You vomit blood or what looks like coffee grounds. · You pass maroon or very bloody stools. Call your doctor now or seek immediate medical care if:  · You start breathing fast and have not produced urine in the last 8 hours. · You cannot keep fluids down. Watch closely for changes in your health, and be sure to contact your doctor if:  · You do not get better as expected. Where can you learn more? Go to http://linda-slava.info/. Enter [id number] in the search box to learn more about \"Gastritis: Care Instructions. \"  Current as of: August 9, 2016  Content Version: 11.1  © 1821-1519 [employer]. Care instructions adapted under license by [employer] (which disclaims liability or warranty for this information). If you have questions about a medical condition or this instruction, always ask your healthcare professional. [place] [address] any warranty or liability for your use of this information.     DISCHARGE SUMMARY from Nurse    The following personal items are in your possession at time of discharge:    Dental Appliances: Uppers (given to sister)  Visual Aid: None                    PATIENT INSTRUCTIONS:    After general anesthesia or intravenous sedation, for 24 hours or while taking prescription Narcotics:  · Limit your activities  · Do not drive and operate hazardous machinery  · Do not make important personal or business decisions  · Do  not drink alcoholic beverages  · If you have not urinated within 8 hours after discharge, please contact your surgeon on call. Report the following to your surgeon:  · Excessive pain, swelling, redness or odor of or around the surgical area  · Temperature over 100.5  · Nausea and vomiting lasting longer than 4 hours or if unable to take medications  · Any signs of decreased circulation or nerve impairment to extremity: change in color, persistent  numbness, tingling, coldness or increase pain  · Any questions    *  Please give a list of your current medications to your Primary Care Provider. *  Please update this list whenever your medications are discontinued, doses are      changed, or new medications (including over-the-counter products) are added. *  Please carry medication information at all times in case of emergency situations. These are general instructions for a healthy lifestyle:    No smoking/ No tobacco products/ Avoid exposure to second hand smoke    Surgeon General's Warning:  Quitting smoking now greatly reduces serious risk to your health. Obesity, smoking, and sedentary lifestyle greatly increases your risk for illness    A healthy diet, regular physical exercise & weight monitoring are important for maintaining a healthy lifestyle    You may be retaining fluid if you have a history of heart failure or if you experience any of the following symptoms:  Weight gain of 3 pounds or more overnight or 5 pounds in a week, increased swelling in our hands or feet or shortness of breath while lying flat in bed. Please call your doctor as soon as you notice any of these symptoms; do not wait until your next office visit.     Recognize signs and symptoms of STROKE:    F-face looks uneven    A-arms unable to move or move unevenly    S-speech slurred or non-existent    T-time-call 911 as soon as signs and symptoms begin-DO NOT go       Back to bed or wait to see if you get better-TIME IS BRAIN. Warning Signs of HEART ATTACK     Call 911 if you have these symptoms:   Chest discomfort. Most heart attacks involve discomfort in the center of the chest that lasts more than a few minutes, or that goes away and comes back. It can feel like uncomfortable pressure, squeezing, fullness, or pain.  Discomfort in other areas of the upper body. Symptoms can include pain or discomfort in one or both arms, the back, neck, jaw, or stomach.  Shortness of breath with or without chest discomfort.  Other signs may include breaking out in a cold sweat, nausea, or lightheadedness. Don't wait more than five minutes to call 911 - MINUTES MATTER! Fast action can save your life. Calling 911 is almost always the fastest way to get lifesaving treatment. Emergency Medical Services staff can begin treatment when they arrive -- up to an hour sooner than if someone gets to the hospital by car. The discharge information has been reviewed with the patient. The patient verbalized understanding. Discharge medications reviewed with the patient and appropriate educational materials and side effects teaching were provided.

## 2017-02-07 NOTE — IP AVS SNAPSHOT
[address] Patient: Nacho Fischer MRN: [id number] You are allergic to the following Allergen Reactions Pcn (Penicillins) Anaphylaxis Tetracycline Anaphylaxis Sulfabenzamide Hives Recent Documentation Height Weight Breastfeeding? BMI OB Status Smoking Status 1.524 m 74.1 kg No 31.91 kg/m2 Hysterectomy Former Smoker Emergency Contacts Name Discharge Info Relation Home Work Mobile [facility] DISCHARGE CAREGIVER [3] Child [2] [phone] About your hospitalization You were admitted on:  February 7, 2017 You last received care in the:  [facility] PAC[facility] You were discharged on:  February 7, 2017 Unit phone number:  [phone] Why you were hospitalized Your primary diagnosis was:  Not on File Providers Seen During Your Hospitalizations Provider Role Specialty Primary office phone Mini Fatima MD Attending Provider General Surgery [phone] Your Primary Care Physician (PCP) Primary Care Physician Office Phone Office Fax [phone] [place] [address] [phone] Follow-up Information Follow up With Details Comments Contact Info Ping Duncan MD   [address] [facility] [address] [phone] Mini Fatima MD Schedule an appointment as soon as possible for a visit in 2 week(s)  [address] [place] [address] [address]  [facility] [place] [address] 
[phone] Your Appointments Thursday February 16, 2017 10:20 AM EST Follow Up with Yenny Schumacher MD  
[facility] [address] ([facility] [address] [address] [address] [phone] Thursday February 23, 2017  9:15 AM EST  
POST OP with MD Sophia Jones [facility]) [address] [facility] Diana Parrish [address] [facility]  
[phone] Wednesday March 22, 2017  9:00 AM EDT Follow Up with Celso Sawant NP  
[facility] [place] ([facility]) [address] [facility], [address] [place] [address] [phone] Current Discharge Medication List  
  
CONTINUE these medications which have NOT CHANGED Dose & Instructions Dispensing Information Comments Morning Noon Evening Bedtime AZELASTINE OP Your next dose is: Today, Tomorrow Other:  _________ Dose:  1 Drop Apply 1 Drop to eye daily. Refills:  0  
     
   
   
   
  
 azithromycin 250 mg tablet Commonly known as:  Era Brain Your next dose is: Today, Tomorrow Other:  _________ Take 2 tablets today, then take 1 tablet daily Quantity:  6 Tab Refills:  0  
     
   
   
   
  
 clopidogrel 75 mg Tab Commonly known as:  PLAVIX Your next dose is: Today, Tomorrow Other:  _________ Dose:  75 mg Take 1 Tab by mouth daily. Quantity:  90 Tab Refills:  3 COLACE 100 mg capsule Generic drug:  docusate sodium Your next dose is: Today, Tomorrow Other:  _________ Dose:  100 mg Take 100 mg by mouth two (2) times a day. Refills:  0  
     
   
   
   
  
 DALIRESP Tab tablet Generic drug:  roflumilast  
   
Your next dose is: Today, Tomorrow Other:  _________ Dose:  500 mcg Take 500 mcg by mouth daily. Refills:  0  
     
   
   
   
  
 diclofenac 1 % Gel Commonly known as:  VOLTAREN Your next dose is: Today, Tomorrow Other:  _________ Dose:  2 g Apply 2 g to affected area four (4) times daily. Quantity:  1 Each Refills:  0  
     
   
   
   
  
 fluticasone-salmeterol 250-50 mcg/dose diskus inhaler Commonly known as:  ADVAIR DISKUS  
   
 Your next dose is: Today, Tomorrow Other:  _________ Dose:  1 Puff Take 1 Puff by inhalation daily. Quantity:  1 Inhaler Refills:  5  
     
   
   
   
  
 guaiFENesin-codeine 100-10 mg/5 mL solution Commonly known as:  ROBITUSSIN AC Your next dose is: Today, Tomorrow Other:  _________ Dose:  5 mL Take 5 mL by mouth three (3) times daily as needed for Cough. Max Daily Amount: 15 mL. Do not drive if taking this medication Quantity:  118 mL Refills:  0 HYDROcodone-acetaminophen 7.5-325 mg per tablet Commonly known as:  David Julissa Your next dose is: Today, Tomorrow Other:  _________  
   
   
 1 tablet every four hours as needed for pain Quantity:  100 Tab Refills:  0 Please note:  New strength and instructions  
    
   
   
   
  
 lansoprazole 30 mg capsule Commonly known as:  PREVACID Your next dose is: Today, Tomorrow Other:  _________ Dose:  30 mg Take 1 Cap by mouth daily. Quantity:  90 Cap Refills:  3  
     
   
   
   
  
 montelukast 10 mg tablet Commonly known as:  SINGULAIR Your next dose is: Today, Tomorrow Other:  _________ Dose:  10 mg Take 1 Tab by mouth daily. Quantity:  90 Tab Refills:  3  
     
   
   
   
  
 ondansetron 4 mg disintegrating tablet Commonly known as:  ZOFRAN ODT Your next dose is: Today, Tomorrow Other:  _________ PLACE 1 TABLET ON TONGUE TWICE A DAY IF NAUSEA Quantity:  60 Tab Refills:  0  
     
   
   
   
  
 pravastatin 20 mg tablet Commonly known as:  PRAVACHOL Your next dose is: Today, Tomorrow Other:  _________ Dose:  20 mg Take 20 mg by mouth daily. Refills:  0  
     
   
   
   
  
 sertraline 50 mg tablet Commonly known as:  ZOLOFT Your next dose is: Today, Tomorrow Other:  _________ 50 mg po daily Quantity:  90 Tab Refills:  3 SPIRIVA WITH HANDIHALER 18 mcg inhalation capsule Generic drug:  tiotropium Your next dose is: Today, Tomorrow Other:  _________ Dose:  1 Cap Take 1 Cap by inhalation daily. Refills:  0 Discharge Instructions Upper GI Endoscopy: What to Expect at [facility] Your Recovery After you have an endoscopy, you will stay at the hospital or clinic for 1 to 2 hours. This will allow the medicine to wear off. You will be able to go home after your doctor or nurse checks to make sure you are not having any problems. You may have to stay overnight if you had treatment during the test. You may have a sore throat for a day or two after the test. 
This care sheet gives you a general idea about what to expect after the test. 
How can you care for yourself at home? Activity · Rest as much as you need to after you go home. · You should be able to go back to your usual activities the day after the test. 
Diet · Follow your doctor's directions for eating after the test. 
· Drink plenty of fluids (unless your doctor has told you not to). Medications · If you have a sore throat the day after the test, use an over-the-counter spray to numb your throat. Follow-up care is a key part of your treatment and safety. Be sure to make and go to all appointments, and call your doctor if you are having problems. It's also a good idea to know your test results and keep a list of the medicines you take. When should you call for help? Call 911 anytime you think you may need emergency care. For example, call if: 
· You passed out (lost consciousness). · You cough up blood. · You vomit blood or what looks like coffee grounds. · You pass maroon or very bloody stools. Call your doctor now or seek immediate medical care if: 
· You have trouble swallowing. · You have belly pain. · Your stools are black and tarlike or have streaks of blood. · You are sick to your stomach or cannot keep fluids down. Watch closely for changes in your health, and be sure to contact your doctor if: 
· Your throat still hurts after a day or two. · You do not get better as expected. Where can you learn more? Go to http://linda-slava.info/. Enter [phone] in the search box to learn more about \"Upper GI Endoscopy: What to Expect at Home. \" Current as of: August 9, 2016 Content Version: 11.1 © 4940-4099 [employer]. Care instructions adapted under license by [employer] (which disclaims liability or warranty for this information). If you have questions about a medical condition or this instruction, always ask your healthcare professional. [address] any warranty or liability for your use of this information. Gastritis: Care Instructions Your Care Instructions Gastritis is a sore and upset stomach. It happens when something irritates the stomach lining. Many things can cause it. These include an infection such as the flu or something you ate or drank. Medicines or a sore on the lining of the stomach (ulcer) also can cause it. Your belly may bloat and ache. You may belch, vomit, and feel sick to your stomach. You should be able to relieve the problem by taking medicine. And it may help to change your diet. If gastritis lasts, your doctor may prescribe medicine. Follow-up care is a key part of your treatment and safety. Be sure to make and go to all appointments, and call your doctor if you are having problems. It's also a good idea to know your test results and keep a list of the medicines you take. How can you care for yourself at home? · If your doctor prescribed antibiotics, take them as directed. Do not stop taking them just because you feel better. You need to take the full course of antibiotics. · Be safe with medicines. If your doctor prescribed medicine to decrease stomach acid, take it as directed. Call your doctor if you think you are having a problem with your medicine. · Do not take any other medicine, including over-the-counter pain relievers, without talking to your doctor first. 
· If your doctor recommends over-the-counter medicine to reduce stomach acid, such as Pepcid AC, Prilosec, Tagamet HB, or Zantac 75, follow the directions on the label. · Drink plenty of fluids (enough so that your urine is light yellow or clear like water) to prevent dehydration. Choose water and other caffeine-free clear liquids. If you have kidney, heart, or liver disease and have to limit fluids, talk with your doctor before you increase the amount of fluids you drink. · Limit how much alcohol you drink. · Avoid coffee, tea, cola drinks, chocolate, and other foods with caffeine. They increase stomach acid. When should you call for help? Call 911 anytime you think you may need emergency care. For example, call if: 
· You vomit blood or what looks like coffee grounds. · You pass maroon or very bloody stools. Call your doctor now or seek immediate medical care if: 
· You start breathing fast and have not produced urine in the last 8 hours. · You cannot keep fluids down. Watch closely for changes in your health, and be sure to contact your doctor if: 
· You do not get better as expected. Where can you learn more? Go to http://linda-slava.info/. Enter [id number] in the search box to learn more about \"Gastritis: Care Instructions. \" Current as of: August 9, 2016 Content Version: 11.1 © 8418-3621 [employer]. Care instructions adapted under license by [employer] (which disclaims liability or warranty for this information).  If you have questions about a medical condition or this instruction, always ask your healthcare professional. [employer] disclaims any warranty or liability for your use of this information. DISCHARGE SUMMARY from Nurse The following personal items are in your possession at time of discharge: 
 
Dental Appliances: Uppers (given to sister) Visual Aid: None PATIENT INSTRUCTIONS: 
 
 
F-face looks uneven A-arms unable to move or move unevenly S-speech slurred or non-existent T-time-call 911 as soon as signs and symptoms begin-DO NOT go Back to bed or wait to see if you get better-TIME IS BRAIN. Warning Signs of HEART ATTACK Call 911 if you have these symptoms: 
? Chest discomfort. Most heart attacks involve discomfort in the center of the chest that lasts more than a few minutes, or that goes away and comes back. It can feel like uncomfortable pressure, squeezing, fullness, or pain. ? Discomfort in other areas of the upper body. Symptoms can include pain or discomfort in one or both arms, the back, neck, jaw, or stomach. ? Shortness of breath with or without chest discomfort. ? Other signs may include breaking out in a cold sweat, nausea, or lightheadedness. Don't wait more than five minutes to call [address]! Fast action can save your life. Calling 911 is almost always the fastest way to get lifesaving treatment. Emergency Medical Services staff can begin treatment when they arrive  up to an hour sooner than if someone gets to the hospital by car. The discharge information has been reviewed with the patient. The patient verbalized understanding. Discharge medications reviewed with the patient and appropriate educational materials and side effects teaching were provided. Discharge Orders None General Information Please provide this summary of care documentation to your next provider.  
  
  
    
    
 Patient Signature: ____________________________________________________________ Date:  ____________________________________________________________  
  
[place] [facility] Provider Signature:  ____________________________________________________________ Date:  ____________________________________________________________

## 2017-02-07 NOTE — ANESTHESIA POSTPROCEDURE EVALUATION
Post-Anesthesia Evaluation and Assessment    Patient: Sonia Starr MRN: [id number]  SSN: [id number]    YOB: 1955  Age: 63 y.o. Sex: female       Cardiovascular Function/Vital Signs  Visit Vitals    /84    Pulse 85    Temp 36.1 °C (97 °F)    Resp 12    Ht 5' (1.524 m)    Wt 74.1 kg (163 lb 6.4 oz)    SpO2 100%    Breastfeeding No    BMI 31.91 kg/m2       Patient is status post MAC anesthesia for Procedure(s):  ESOPHAGOGASTRODUODENOSCOPY (EGD) w/ biopsies. Nausea/Vomiting: None    Postoperative hydration reviewed and adequate. Pain:  Pain Scale 1: Visual (02/07/17 0825)  Pain Intensity 1: 0 (02/07/17 0825)   Managed    Neurological Status: At baseline    Mental Status and Level of Consciousness: Arousable    Pulmonary Status:   O2 Device: Room air (02/07/17 0825)   Adequate oxygenation and airway patent    Complications related to anesthesia: None    Post-anesthesia assessment completed.  No concerns    Signed By: Kelsey Rosas MD     February 7, 2017

## 2017-02-07 NOTE — INTERVAL H&P NOTE
H&P Update:  Jazz Sykes was seen and examined. History and physical has been reviewed. The patient has been examined.  There have been no significant clinical changes since the completion of the originally dated History and Physical.    Signed By: Terrance Rivera MD     February 7, 2017 7:35 AM

## 2017-02-07 NOTE — H&P (VIEW-ONLY)
Hernia Evaluation      Subjective:     Lilia Dang is a 63 y.o. female with a history of lap banding over 5 years ago. She is having severe reflux despite full deflation of the band. Her symptoms are persistent and worsening for the last year. Patient Active Problem List    Diagnosis Date Noted    LAP-BAND surgery status 05/26/2016    Dysarthria 02/28/2016    Renal infarction ([address] 10/07/2015    Generalized osteoarthrosis, involving multiple sites     Esophageal reflux     Subclinical hyperthyroidism 08/28/2015    Essential hypertension 08/25/2015    Hyperlipidemia 08/25/2015    AAA (abdominal aortic aneurysm) ([address] 10/14/2014     Past Medical History   Diagnosis Date    Aorto-iliac duplex 09/17/2015     Patent abdom aorta endovascular graft w/o leak. Mod stenosis suggested in right limb.  Calculus of kidney 2014     stent/kidney     Carotid duplex 09/17/2015     Mild < 50% bilateral plaquing.  Cataract, left     Chronic obstructive pulmonary disease ([address]     Echocardiogram 11/04/2014     EF 56%. No WMA. Mild LAE. Echo is within normal limits.  Esophageal reflux     Generalized osteoarthrosis, involving multiple sites     GERD (gastroesophageal reflux disease)     Gout     Hypercholesteremia     Hypertension      resolved    Lower extremity arterial testing 03/27/2015     Normal perfusion at rest and w/treadmill bilaterally.   R KORI 1.05.  L KORI 1.03.    Murmur      patient reports that she is aware- has been told in the past    Nausea & vomiting     Renal artery stenosis ([address]     Subclinical hyperthyroidism 8/2015    Unspecified sleep apnea      resolved      Past Surgical History   Procedure Laterality Date    Hx appendectomy      Hx hysterectomy      Hx cholecystectomy      Hx gastric bypass       lap band 2012    Hx other surgical  10/27/14      Bilateral open femoral exposures, Placement of catheter and sheath in the aorta Endo repair of aortic aneurysm with modular bifurcated device  Interpretation of angiography, intravascular ultrasound (IVUS) catheter    Hx bladder suspension      Hx other surgical  10/27/2014     Endurant II abdominal stent graft implant    Adjustment gastric band N/A 11/10/2016     MAKAYLA Cullen    Hx urological       stent    Vascular surgery procedure unlist       surgery for abdominal aneurysm    Hx heent Right 1990s, 2016     Ear sx      Social History   Substance Use Topics    Smoking status: Former Smoker     Packs/day: 0.00     Years: 0.00     Types: Cigarettes     Quit date: 1/25/2016    Smokeless tobacco: Never Used    Alcohol use No      Family History   Problem Relation Age of Onset    Cancer Mother     Diabetes Father     Alcohol abuse Father     Heart Disease Maternal Grandmother     Alzheimer Maternal Grandmother     Diabetes Sister       No current facility-administered medications for this visit. No current outpatient prescriptions on file.      Facility-Administered Medications Ordered in Other Visits   Medication Dose Route Frequency    sodium chloride (NS) flush 5-10 mL  5-10 mL IntraVENous Q8H    sodium chloride (NS) flush 5-10 mL  5-10 mL IntraVENous PRN    lactated ringers infusion  50 mL/hr IntraVENous CONTINUOUS      Allergies   Allergen Reactions    Pcn [Penicillins] Anaphylaxis    Tetracycline Anaphylaxis    Sulfabenzamide Hives        Review of Systems:  Positive in BOLD    CONST: Fever, weight loss, fatigue or chills  GI: Nausea, vomiting, abdominal pain, change in bowel habits, hematochezia, melena, and GERD   INTEG: Dermatitis, abnormal moles  HEENT: Recent changes in vision, vertigo, epistaxis, dysphagia and hoarseness  CV: Chest pain, palpitations, HTN, edema and varicosities  RESP: Cough, shortness of breath, wheezing, hemoptysis, snoring and reactive airway disease  : Hematuria, dysuria, frequency, urgency, nocturia and stress urinary incontinence   MS: Weakness, joint pain and arthritis  ENDO: Diabetes, thyroid disease, polyuria, polydipsia, polyphagia, poor wound healing, heat intolerance, cold intolerance  LYMPH/HEME: Anemia, bruising and history of blood transfusions  NEURO: Dizziness, headache, fainting, seizures and stroke  PSYCH: Anxiety and depression    Objective:     Visit Vitals    /80    Pulse 80    Temp 97.5 °F (36.4 °C)    Resp 18    Ht 5' (1.524 m)    Wt 74.8 kg (165 lb)    BMI 32.22 kg/m2       Physical Exam:      GENERAL: alert, cooperative, no distress, appears stated age  EYE:conjunctivae and sclerae normal, pupils equal, round, reactive to light, extraocular movements intact without nystagmus  THROAT & NECK: no erythema or exudates noted and neck supple and symmetrical; no palpable masses  LUNG: clear to auscultation bilaterally  HEART: Regular rate and rhythm  ABDOMEN:abdomen is soft without significant tenderness, masses, organomegaly or guarding - healed scars  EXTREMITIES:  extremities normal, atraumatic, no cyanosis or edema  SKIN: Normal.    Imaging and Lab Review:     UGI  - esophageal dilation but no obstruction from band    Assessment:   Lap band status and esophageal dysfunction  Patient has significant symptoms and wishes to proceed with surgical intervention. Plan:   I explained the indications forEGD as well as the alternatives. I discussed the potential risks, benefits, and likely outcomes of this course of care, including but not limited to bleeding,  injury to surrounding structures, perforation, failure to improve or even worsen her pain, anesthetic risks and imponderables to include death. The patient indicates understanding of the risks and wishes to proceed.       Signed By: Kelly Wasserman MD     February 7, 2017

## 2017-02-07 NOTE — ANESTHESIA PREPROCEDURE EVALUATION
Anesthetic History     PONV          Review of Systems / Medical History  Patient summary reviewed, nursing notes reviewed and pertinent labs reviewed    Pulmonary    COPD: moderate          Pertinent negatives: No sleep apnea     Neuro/Psych              Cardiovascular    Hypertension: well controlled                Comments: AAA, s/p repair. GI/Hepatic/Renal     GERD: poorly controlled          Comments: dysphagia Endo/Other      Hypothyroidism: well controlled  Arthritis  Pertinent negatives: No blood dyscrasia   Other Findings   Comments: Current Smoker? NO       Elective Surgery? Yes       Abstained from smoking 24 hours prior to anesthesia? N/A    Risk Factors for Postoperative nausea/vomiting:       History of postoperative nausea/vomiting? YES       Female? YES       Motion sickness? NO       Intended opioid administration for postoperative analgesia?   NO         Physical Exam    Airway  Mallampati: III  TM Distance: > 6 cm  Neck ROM: normal range of motion   Mouth opening: Normal     Cardiovascular    Rhythm: regular  Rate: normal         Dental    Dentition: Poor dentition     Pulmonary  Breath sounds clear to auscultation               Abdominal  GI exam deferred       Other Findings            Anesthetic Plan    ASA: 3  Anesthesia type: MAC          Induction: Intravenous  Anesthetic plan and risks discussed with: Patient

## 2017-02-16 ENCOUNTER — OFFICE VISIT (OUTPATIENT)
Dept: CARDIOLOGY CLINIC | Age: 62
End: 2017-02-16

## 2017-02-16 VITALS
WEIGHT: 166 LBS | HEART RATE: 84 BPM | HEIGHT: 60 IN | BODY MASS INDEX: 32.59 KG/M2 | OXYGEN SATURATION: 98 % | DIASTOLIC BLOOD PRESSURE: 72 MMHG | SYSTOLIC BLOOD PRESSURE: 118 MMHG

## 2017-02-16 DIAGNOSIS — I10 ESSENTIAL HYPERTENSION: ICD-10-CM

## 2017-02-16 DIAGNOSIS — R01.1 HEART MURMUR: Primary | ICD-10-CM

## 2017-02-16 DIAGNOSIS — I73.9 PVD (PERIPHERAL VASCULAR DISEASE) (HCC): ICD-10-CM

## 2017-02-16 DIAGNOSIS — R07.9 CHEST PAIN, UNSPECIFIED TYPE: ICD-10-CM

## 2017-02-16 DIAGNOSIS — E78.5 HYPERLIPIDEMIA, UNSPECIFIED HYPERLIPIDEMIA TYPE: ICD-10-CM

## 2017-02-16 NOTE — PROGRESS NOTES
1. Have you been to the ER, urgent care clinic since your last visit? Hospitalized since your last visit? No    2. Have you seen or consulted any other health care providers outside of the [address] [place] [address] since your last visit? Include any pap smears or colon screening.  No

## 2017-02-16 NOTE — MR AVS SNAPSHOT
Visit Information Date & Time Provider Department Dept. Phone Encounter #  
 2/16/2017 10:20 AM Erinn Stinson MD [facility] [address] [id number] Your Appointments 2/23/2017  9:15 AM  
POST OP with Fabian Poe MD  
[facility] [address]) Appt Note: post op band removal  
 [address] [place] [address], [address] [phone]  
  
    
 3/3/2017  8:40 AM  
Follow Up with Erinn Stinson MD  
[facility] ([address]) Appt Note: f/up after all testing. Julianne [address] [place], [place] [id number] [phone] Pedro Tellez  
  
    
 3/22/2017  9:00 AM  
Follow Up with Shaina Quiles NP  
[facility] [place] ([facility]) Appt Note: 5 mo f/u  
 [address], [address] [place] [address]  
  
   
 [address], [address] [place] [address]  
  
    
 10/27/2017  8:00 AM  
PROCEDURE with [facility]  
[facility] ([facility] SCHEDUL[facility]) Appt Note: evar 1 yr [place] [address] [address]  
[phone] [address] [address]  
  
    
 11/9/2017 10:00 AM  
Follow Up with AMKAYLA Sanon  
[facility] ([facility] SCHEDULING) [address] [facility] [address] [place] [address]  
[phone]  
  
   
 [facility] [address]  
  
    
 11/27/2017 10:10 AM  
Nurse Visit with [facility] ([facility]) Appt Note: Return in about 1 year (around 12/5/2017) for UA  
 [address] 
[address] [place] [address]  
[phone]  
  
   
 [address] [place] [address]  
  
    
  
 12/5/2017 10:00 AM  
Any with Olivia Kelly MD  
 [facility] ([facility]) Appt Note: Return in about 1 year [address] [place] [address]  
[address] [facility] [address] [place] [address] Upcoming Health Maintenance Date Due ZOSTER VACCINE AGE 60> 2/11/2017 FOBT Q 1 YEAR AGE 50-75 7/1/2017 BREAST CANCER SCRN MAMMOGRAM 6/1/2018 PAP AKA CERVICAL CYTOLOGY 8/12/2019 DTaP/Tdap/Td series (2 - Td) 9/1/2026 Allergies as of 2/16/2017  Review Complete On: 2/16/2017 By: Erin Evans LPN Severity Noted Reaction Type Reactions Pcn [Penicillins] High 09/18/2014    Anaphylaxis Tetracycline High 09/18/2014    Anaphylaxis Sulfabenzamide  09/18/2014    Hives Current Immunizations  Reviewed on 9/1/2016 Name Date Influenza Vaccine (Quad) PF 9/1/2016 Pneumococcal Vaccine (Unspecified Type) 3/5/2013 Tdap 9/1/2016 Not reviewed this visit You Were Diagnosed With   
  
 Codes Comments Heart murmur    -  Primary ICD-10-CM: R01.1 ICD-9-CM: 785.2 Essential hypertension     ICD-10-CM: I10 
ICD-9-CM: 401.9 Hyperlipidemia, unspecified hyperlipidemia type     ICD-10-CM: E78.5 ICD-9-CM: 272.4 Chest pain, unspecified type     ICD-10-CM: R07.9 ICD-9-CM: 786.50 PVD (peripheral vascular disease) ([facility]     ICD-10-CM: I73.9 ICD-9-CM: 443. 9 Vitals BP Pulse Height(growth percentile) Weight(growth percentile) SpO2 BMI  
 118/72 84 5' (1.524 m) 166 lb (75.3 kg) 98% 32.42 kg/m2 OB Status Smoking Status Hysterectomy Former Smoker Vitals History BMI and BSA Data Body Mass Index Body Surface Area  
 32.42 kg/m 2 1.79 m 2 Preferred Pharmacy Pharmacy Name Phone [address] [address] [facility] 530 [phone] Your Updated Medication List  
  
   
This list is accurate as of: 2/16/17 11:05 AM.  Always use your most recent med list.  
  
  
  
 AZELASTINE OP Apply 1 Drop to eye daily. clopidogrel 75 mg Tab Commonly known as:  PLAVIX Take 1 Tab by mouth daily. COLACE 100 mg capsule Generic drug:  docusate sodium Take 100 mg by mouth two (2) times a day. DALIRESP Tab tablet Generic drug:  roflumilast  
Take 500 mcg by mouth daily. diclofenac 1 % Gel Commonly known as:  VOLTAREN Apply 2 g to affected area four (4) times daily. fluticasone-salmeterol 250-50 mcg/dose diskus inhaler Commonly known as:  ADVAIR DISKUS Take 1 Puff by inhalation daily. HYDROcodone-acetaminophen 7.5-325 mg per tablet Commonly known as:  NORCO  
1 tablet every four hours as needed for pain  
  
 lansoprazole 30 mg capsule Commonly known as:  PREVACID Take 1 Cap by mouth daily. montelukast 10 mg tablet Commonly known as:  SINGULAIR Take 1 Tab by mouth daily. ondansetron 4 mg disintegrating tablet Commonly known as:  ZOFRAN ODT PLACE 1 TABLET ON TONGUE TWICE A DAY IF NAUSEA  
  
 pravastatin 20 mg tablet Commonly known as:  PRAVACHOL Take 20 mg by mouth daily. sertraline 50 mg tablet Commonly known as:  ZOLOFT  
50 mg po daily We Performed the Following AMB POC EKG ROUTINE W/ 12 LEADS, INTER & REP [74129 CPT(R)] To-Do List   
 02/16/2017 ECG:  CARDIAC SPECT REST AND STRESS   
  
 02/16/2017 ECG:  NUCLEAR STRESS TEST   
  
 02/27/2017 7:15 AM  
  Appointment with [address] [place] [address] at [facility] NON-INVASIVE CARD ([phone]) This is a 2-part test which takes approximately 4 hours to complete. Please see part 2 of exam below for full instructions 02/27/2017 7:45 AM  
  Appointment with [facility] NUC CARD/TREADMILL ROOM at [facility] NON-INVASIVE CARD ([phone]) 1-No eating or coffee after midnight  2-Do not take diabetic meds (bring with) 3-Please take all other meds unless specified by cardiology Referral Information Referral ID Referred By Referred To [id number] Maurice Velez Not Available Visits Status Start Date End Date 1 New Request 2/16/17 2/16/18 If your referral has a status of pending review or denied, additional information will be sent to support the outcome of this decision. Referral ID Referred By Referred To  
 [id number] Maurice Agustin Not Available Visits Status Start Date End Date 1 New Request 2/16/17 2/16/18 If your referral has a status of pending review or denied, additional information will be sent to support the outcome of this decision. Please provide this summary of care documentation to your next provider. Your primary care clinician is listed as Alton Kirk. If you have any questions after today's visit, please call [phone].

## 2017-02-20 NOTE — PROGRESS NOTES
PATIENT NAME: George Orlando         63 y.o.      1955              DATE:2/16/2017    REASON FOR VISIT: Chest pain    HISTORY OF PRESENT ILLNESS: Seen in an emergency room recently because of chest discomfort. Substernal tightness radiating to the back. This was not accompanied by dyspnea or diaphoresis. It persisted for several minutes at a time and recurred. She was evaluated in the emergency room with an EKG and enzymes and released. The patient is status post endovascular repair of an aortic aneurysm. There is a stenosis in the right limb of the prosthesis. Her activity is limited by claudication in the right lower extremity. The patient has experienced substernal tightness intermittently over the past few months in association with \"colds\". It is unclear as to whether the chest tightness is associated with exertion. There is some shortness of breath on exertion. Denies orthopnea and paroxysmal nocturnal dyspnea. Denies palpitation, syncope, presyncope. PAST MEDICAL HISTORY:   Past Medical History: Aorto-iliac duplex                              09/17/2015      Comment:Patent abdom aorta endovascular graft w/o leak. Mod stenosis suggested in right limb. Calculus of kidney                              2014            Comment:stent/kidney     Carotid duplex                                  09/17/2015      Comment:Mild < 50% bilateral plaquing. Cataract, left                                                Chronic obstructive pulmonary disease ([address]                   Echocardiogram                                  11/04/2014      Comment:EF 56%. No WMA. Mild LAE. Echo is within                normal limits.     Esophageal reflux                                             Generalized osteoarthrosis, involving multiple*               GERD (gastroesophageal reflux disease)                        Gout Hypercholesteremia                                            Hypertension                                                    Comment:resolved    Lower extremity arterial testing                03/27/2015      Comment:Normal perfusion at rest and w/treadmill                bilaterally.   R KORI 1.05.  L KORI 1.03.    Murmur                                                          Comment:patient reports that she is aware- has been                told in the past    Nausea & vomiting                                             Renal artery stenosis ([address]                                   Subclinical hyperthyroidism                     8/2015        Unspecified sleep apnea                                         Comment:resolved    PAST SURGICAL HISTORY:   Past Surgical History:    HX APPENDECTOMY                                                HX HYSTERECTOMY                                                HX CHOLECYSTECTOMY                                             HX GASTRIC BYPASS                                                Comment:lap band 2012    HX OTHER SURGICAL                                10/27/14        Comment: Bilateral open femoral exposures, Placement of               catheter and sheath in the aorta Endo repair of               aortic aneurysm with modular bifurcated device                Interpretation of angiography, intravascular                ultrasound (IVUS) catheter    HX BLADDER SUSPENSION                                          HX OTHER SURGICAL                                10/27/2014      Comment:Endurant II abdominal stent graft implant    ADJUSTMENT GASTRIC BAND                         N/A 11/10/2016      Comment:MAKAYLA Cullen    HX UROLOGICAL                                                    Comment:stent    VASCULAR SURGERY PROCEDURE UNLIST                                Comment:surgery for abdominal aneurysm    HX HEENT                                        Right 1990s, 20* Comment:Ear sx      SOCIAL HISTORY:  Social History    Marital status:             Spouse name:                       Years of education:                 Number of children:               Social History Main Topics    Smoking status: Former Smoker                                                                Packs/day: 0.00      Years: 0.00           Types: Cigarettes       Quit date: 1/25/2016    Smokeless status: Never Used                        Alcohol use: No              Drug use: No              Sexual activity: Yes                     Birth control/protection: Surgical        ALLERGIES:    -- Pcn [Penicillins] -- Anaphylaxis   -- Tetracycline -- Anaphylaxis   -- Sulfabenzamide -- Hives     CURRENT MEDICATIONS:   Current Outpatient Prescriptions:  HYDROcodone-acetaminophen (NORCO) 7.5-325 mg per tablet, 1 tablet every four hours as needed for pain  DALIRESP tab tablet, Take 500 mcg by mouth daily. docusate sodium (COLACE) 100 mg capsule, Take 100 mg by mouth two (2) times a day. AZELASTINE HCL (AZELASTINE OP), Apply 1 Drop to eye daily. diclofenac (VOLTAREN) 1 % gel, Apply 2 g to affected area four (4) times daily. pravastatin (PRAVACHOL) 20 mg tablet, Take 20 mg by mouth daily. ondansetron (ZOFRAN ODT) 4 mg disintegrating tablet, PLACE 1 TABLET ON TONGUE TWICE A DAY IF NAUSEA  sertraline (ZOLOFT) 50 mg tablet, 50 mg po daily  montelukast (SINGULAIR) 10 mg tablet, Take 1 Tab by mouth daily. lansoprazole (PREVACID) 30 mg capsule, Take 1 Cap by mouth daily. clopidogrel (PLAVIX) 75 mg tablet, Take 1 Tab by mouth daily. fluticasone-salmeterol (ADVAIR DISKUS) 250-50 mcg/dose diskus inhaler, Take 1 Puff by inhalation daily. No current facility-administered medications for this visit.        REVIEW of SYSTEMS:History obtained from chart review and the patient  General ROS: negative for - weight gain or weight loss  Hematological and Lymphatic ROS: negative for - bleeding problems  Respiratory ROS: Please see history of present illness  Cardiovascular ROS: Please see history of present illness  Gastrointestinal ROS: no abdominal pain, change in bowel habits, or black or bloody stools  Neurological ROS: no TIA or stroke symptoms     PHYSICAL EXAMINATION:   /72  Pulse 84  Ht 5' (1.524 m)  Wt 75.3 kg (166 lb)  SpO2 98%  BMI 32.42 kg/m2  BP Readings from Last 3 Encounters:  02/16/17 : 118/72  02/07/17 : 133/84  01/29/17 : 145/86    Pulse Readings from Last 3 Encounters:  02/16/17 : 84  02/07/17 : 85  01/29/17 : 71    Wt Readings from Last 3 Encounters:  02/16/17 : 75.3 kg (166 lb)  02/07/17 : 74.1 kg (163 lb 6.4 oz)  01/29/17 : 72.6 kg (160 lb)    General well-developed white female no apparent distress. HEENT: Sclera clear. Mucous membranes pink and moist.  Neck: No jugular venous distention. Carotid upstrokes 2+ with left carotid bruit. Chest: Clear to percussion and auscultation. Heart: PMI not palpable. Regular rhythm. No murmur or gallop. Abdomen: Nontender without masses or organomegaly. Extremities: No edema. .  Skin: Warm and dry. No stasis changes. Neuro: Alert, oriented, speech WNL, no facial asymmetry. Gait WNL. EKG: Within normal limits    IMPRESSION:   Chest pain unknown etiology  Exertional shortness of breath unknown etiology  Risk factors for coronary artery disease to include hypertension, hyperlipidemia, and recent history of tobacco abuse  Peripheral vascular disease with claudication    PLAN:  The patient will not be able to ambulate on a treadmill. Pharmacologic stress testing with Cardiolite. The diagnoses and plan were discussed with patient. All questions answered. Plan of care agreed to by all concerned. Liz Donovan.  MD Brandy       ,

## 2017-02-23 ENCOUNTER — OFFICE VISIT (OUTPATIENT)
Dept: SURGERY | Age: 62
End: 2017-02-23

## 2017-02-23 VITALS
TEMPERATURE: 97.7 F | RESPIRATION RATE: 20 BRPM | DIASTOLIC BLOOD PRESSURE: 72 MMHG | HEIGHT: 60 IN | SYSTOLIC BLOOD PRESSURE: 118 MMHG | WEIGHT: 167 LBS | HEART RATE: 80 BPM | BODY MASS INDEX: 32.79 KG/M2

## 2017-02-23 DIAGNOSIS — K21.9 GASTROESOPHAGEAL REFLUX DISEASE WITHOUT ESOPHAGITIS: ICD-10-CM

## 2017-02-23 DIAGNOSIS — E66.09 NON MORBID OBESITY DUE TO EXCESS CALORIES: ICD-10-CM

## 2017-02-23 DIAGNOSIS — Z98.84 LAP-BAND SURGERY STATUS: Primary | ICD-10-CM

## 2017-02-23 NOTE — PROGRESS NOTES
Bandar Martinez is a 63 y.o. female who presents today with   Chief Complaint   Patient presents with    Morbid Obesity     Pt here to follow up on EGD 2/7/2017                1. Have you been to the ER, urgent care clinic since your last visit? Hospitalized since your last visit? No    2. Have you seen or consulted any other health care providers outside of the [facility] since your last visit? Include any pap smears or colon screening.  No

## 2017-02-23 NOTE — MR AVS SNAPSHOT
Visit Information Date & Time Provider Department Dept. Phone Encounter #  
 2/23/2017 10:15 AM Sonya Vasquez MD [facility] [phone] [id number] Your Appointments 3/3/2017  8:40 AM  
Follow Up with Merlinda Forget, MD  
[facility] Appt Note: f/up after all testing. [place] [address] [address] [address] [phone] [place]  [id number]  
  
    
 3/7/2017  1:00 PM  
Follow Up with FARIBA Cowart 33 ([address]) Appt Note: Bariatric f/up [address] [facility] [address] [address]  
  
    
 3/22/2017  9:00 AM  
Follow Up with Myranda Westbrook NP  
[facility] INTER[facility]ICINE OF [place] ([address]) Appt Note: 5 mo f/u  
 [address] [place] [facility] [address]  
  
   
 [address] [place] [place] [address]  
  
    
 10/27/2017  8:00 AM  
PROCEDURE with BSVVS IMAGING 2  
[facility] (FERNANDO SCHEDULING) Appt Note: evar 3 yr [place] [address] [address]  
[phone] [address] [address]  
  
    
 11/9/2017 10:00 AM  
Follow Up with MAKAYLA Talavera  
[facility] (FERNANDO SCHEDULING) [address] [address]  
[phone]  
  
   
 [facility] [address]  
  
    
 11/27/2017 10:10 AM  
[facility] with [facility] N[facility]SE [facility] ([address]) Appt Note: Return in about 1 year (around 12/5/2017) for UA  
 [address] 
[address] [facility] [address]  
[phone]  
  
   
 [address] [facility] [address] [facility] [address]  
  
    
  
 12/5/2017 10:00 AM  
 Any with Madie Pena MD  
[facility] ([address]) Appt Note: Return in about 1 year [address] [place] [address]  
[address] [facility] [address] [place] [address] Upcoming Health Maintenance Date Due ZOSTER VACCINE AGE 60> 2/11/2017 FOBT Q 1 YEAR AGE 50-75 7/1/2017 BREAST CANCER SCRN MAMMOGRAM 6/1/2018 PAP AKA CERVICAL CYTOLOGY 8/12/2019 DTaP/Tdap/Td series (2 - Td) 9/1/2026 Allergies as of 2/23/2017  Review Complete On: 2/20/2017 By: Tanesha Vernon MD  
  
 Severity Noted Reaction Type Reactions Pcn [Penicillins] High 09/18/2014    Anaphylaxis Tetracycline High 09/18/2014    Anaphylaxis Sulfabenzamide  09/18/2014    Hives Current Immunizations  Reviewed on 9/1/2016 Name Date Influenza Vaccine (Quad) PF 9/1/2016 Pneumococcal Vaccine (Unspecified Type) 3/5/2013 Tdap 9/1/2016 Not reviewed this visit You Were Diagnosed With   
  
 Codes Comments LAP-BAND surgery status    -  Primary ICD-10-CM: F71.33 ICD-9-CM: V45.86 Gastroesophageal reflux disease without esophagitis     ICD-10-CM: K21.9 ICD-9-CM: 530.81 Non morbid obesity due to excess calories     ICD-10-CM: E66.09 
ICD-9-CM: 278.00 Vitals BP  
  
  
  
  
  
 118/72 (BP 1 Location: Left arm, BP Patient Position: At rest) BMI and BSA Data Body Mass Index Body Surface Area  
 32.61 kg/m 2 1.79 m 2 Preferred Pharmacy Pharmacy Name Phone [address] [facility] [phone] Your Updated Medication List  
  
   
This list is accurate as of: 2/23/17 10:28 AM.  Always use your most recent med list.  
  
  
  
  
 AZELASTINE OP Apply 1 Drop to eye daily. clopidogrel 75 mg Tab Commonly known as:  PLAVIX Take 1 Tab by mouth daily. COLACE 100 mg capsule Generic drug:  docusate sodium Take 100 mg by mouth two (2) times a day. DALIRESP Tab tablet Generic drug:  roflumilast  
Take 500 mcg by mouth daily. diclofenac 1 % Gel Commonly known as:  VOLTAREN Apply 2 g to affected area four (4) times daily. fluticasone-salmeterol 250-50 mcg/dose diskus inhaler Commonly known as:  ADVAIR DISKUS Take 1 Puff by inhalation daily. HYDROcodone-acetaminophen 7.5-325 mg per tablet Commonly known as:  NORCO  
1 tablet every four hours as needed for pain  
  
 lansoprazole 30 mg capsule Commonly known as:  PREVACID Take 1 Cap by mouth daily. montelukast 10 mg tablet Commonly known as:  SINGULAIR Take 1 Tab by mouth daily. ondansetron 4 mg disintegrating tablet Commonly known as:  ZOFRAN ODT PLACE 1 TABLET ON TONGUE TWICE A DAY IF NAUSEA  
  
 pravastatin 20 mg tablet Commonly known as:  PRAVACHOL Take 20 mg by mouth daily. sertraline 50 mg tablet Commonly known as:  ZOLOFT  
50 mg po daily To-Do List   
 02/27/2017 7:15 AM  
  Appointment with [address] [place] [address] at [facility] NON-INVASIVE [address] [place] [address] ([phone]) This is a 2-part test which takes approximately 4 hours to complete. Please see part 2 of exam below for full instructions 02/27/2017 7:45 AM  
  Appointment with [facility] NUC CARD/TREADMILL ROOM at [facility] NON-INVASIVE CARD ([phone]) 1-No eating or coffee after midnight  2-Do not take diabetic meds (bring with) 3-Please take all other meds unless specified by cardiology Please provide this summary of care documentation to your next provider. Your primary care clinician is listed as Nicholas Bowens. If you have any questions after today's visit, please call [phone].

## 2017-02-23 NOTE — PROGRESS NOTES
Subjective:      Roge Louie is a 63 y.o. female is now 2 weeks status post EGD for band evaluation after persistent reflux after band deflation. Doing well overall. Currently on a stage 4 diet without difficulty. EGD was normal without slip or erosion. She is having less dysphagia but is regaining weight. She is interested in re-inflation of the band versus medical weight loss options. Weight Loss Metrics 2/23/2017 2/16/2017 2/7/2017 1/29/2017 1/25/2017 1/25/2017 12/5/2016   Pre op / Initial Wt - - - - 165 - -   Today's Wt 167 lb 166 lb 163 lb 6.4 oz 160 lb - 165 lb 157 lb   BMI 32.61 kg/m2 32.42 kg/m2 31.91 kg/m2 31.25 kg/m2 - 32.22 kg/m2 29.66 kg/m2   Ideal Body Wt - - - - 120 - -   Excess Body Wt - - - - 45 - -   Goal Wt - - - - 129 - -   Wt loss to date - - - - 0 - -   % Wt Loss - - - - 0 - -   80% EBW - - - - 36 - -       Body mass index is 32.61 kg/(m^2). Comorbidities:    Hypertension: not applicable  Diabetes: not applicable  Obstructive Sleep Apnea: not applicable  Hyperlipidemia: not applicable  Stress Urinary Incontinence: not applicable  Gastroesophageal Reflux: improved  Weight related arthropathy:not applicable        Past Medical History:   Diagnosis Date    Aorto-iliac duplex 09/17/2015    Patent abdom aorta endovascular graft w/o leak. Mod stenosis suggested in right limb.  Calculus of kidney 2014    stent/kidney     Carotid duplex 09/17/2015    Mild < 50% bilateral plaquing.  Cataract, left     Chronic obstructive pulmonary disease ([address]     Echocardiogram 11/04/2014    EF 56%. No WMA. Mild LAE. Echo is within normal limits.  Esophageal reflux     Generalized osteoarthrosis, involving multiple sites     GERD (gastroesophageal reflux disease)     Gout     Hypercholesteremia     Hypertension     resolved    Lower extremity arterial testing 03/27/2015    Normal perfusion at rest and w/treadmill bilaterally.   R KORI 1.05.  L KORI 1.03.    Murmur     patient reports that she is aware- has been told in the past    Nausea & vomiting     Renal artery stenosis ([address]     Subclinical hyperthyroidism 8/2015    Unspecified sleep apnea     resolved       Past Surgical History:   Procedure Laterality Date    ADJUSTMENT GASTRIC BAND N/A 11/10/2016    MAKAYLA Cullen    HX APPENDECTOMY      HX BLADDER SUSPENSION      HX CHOLECYSTECTOMY      HX GASTRIC BYPASS      lap band 2012    HX HEENT Right 1990s, 2016    Ear sx    HX HYSTERECTOMY      HX OTHER SURGICAL  10/27/14     Bilateral open femoral exposures, Placement of catheter and sheath in the aorta Endo repair of aortic aneurysm with modular bifurcated device  Interpretation of angiography, intravascular ultrasound (IVUS) catheter    HX OTHER SURGICAL  10/27/2014    Endurant II abdominal stent graft implant    HX UROLOGICAL      stent    VASCULAR SURGERY PROCEDURE UNLIST      surgery for abdominal aneurysm       Current Outpatient Prescriptions   Medication Sig Dispense Refill    HYDROcodone-acetaminophen (NORCO) 7.5-325 mg per tablet 1 tablet every four hours as needed for pain 100 Tab 0    DALIRESP tab tablet Take 500 mcg by mouth daily.  docusate sodium (COLACE) 100 mg capsule Take 100 mg by mouth two (2) times a day.  AZELASTINE HCL (AZELASTINE OP) Apply 1 Drop to eye daily.  diclofenac (VOLTAREN) 1 % gel Apply 2 g to affected area four (4) times daily. 1 Each 0    pravastatin (PRAVACHOL) 20 mg tablet Take 20 mg by mouth daily.  ondansetron (ZOFRAN ODT) 4 mg disintegrating tablet PLACE 1 TABLET ON TONGUE TWICE A DAY IF NAUSEA 60 Tab 0    sertraline (ZOLOFT) 50 mg tablet 50 mg po daily 90 Tab 3    montelukast (SINGULAIR) 10 mg tablet Take 1 Tab by mouth daily. 90 Tab 3    lansoprazole (PREVACID) 30 mg capsule Take 1 Cap by mouth daily. 90 Cap 3    clopidogrel (PLAVIX) 75 mg tablet Take 1 Tab by mouth daily.  90 Tab 3    fluticasone-salmeterol (ADVAIR DISKUS) 250-50 mcg/dose diskus inhaler Take 1 Puff by inhalation daily. 1 Inhaler 5       Allergies   Allergen Reactions    Pcn [Penicillins] Anaphylaxis    Tetracycline Anaphylaxis    Sulfabenzamide Hives         Objective:     Visit Vitals    /72 (BP 1 Location: Left arm, BP Patient Position: At rest)    Pulse 80    Temp 97.7 °F (36.5 °C) (Oral)    Resp 20    Ht 5' (1.524 m)    Wt 75.8 kg (167 lb)    BMI 32.61 kg/m2       General:  alert, cooperative, no distress, appears stated age   Chest: no accessory muscle use   Cor:   Regular rate and rhythm   Abdomen: soft, bowel sounds active, non-tender           Labs: None    Assessment:     Band related reflux without active esophagitis or dilation at this time. Plan:     After discussing option, she leans toward trying medical weight loss options first and then if not effective, re-inflating the band after that. I will have her see Latonya to work on those issues including band inflation if needed.

## 2017-02-27 ENCOUNTER — HOSPITAL ENCOUNTER (OUTPATIENT)
Dept: NON INVASIVE DIAGNOSTICS | Age: 62
Discharge: HOME | End: 2017-02-27

## 2017-02-27 DIAGNOSIS — I73.9 PVD (PERIPHERAL VASCULAR DISEASE) (HCC): ICD-10-CM

## 2017-02-27 DIAGNOSIS — R07.9 CHEST PAIN, UNSPECIFIED TYPE: ICD-10-CM

## 2017-03-02 RX ORDER — HYDROCODONE BITARTRATE AND ACETAMINOPHEN 7.5; 325 MG/1; MG/1
TABLET ORAL
Qty: 100 TAB | Refills: 0 | Status: SHIPPED | OUTPATIENT
Start: 2017-03-02 | End: 2017-04-10 | Stop reason: SDUPTHER

## 2017-03-02 NOTE — TELEPHONE ENCOUNTER
Requested Prescriptions     Pending Prescriptions Disp Refills    HYDROcodone-acetaminophen (NORCO) 7.5-325 mg per tablet 100 Tab 0     Si tablet every four hours as needed for pain

## 2017-03-03 ENCOUNTER — HOSPITAL ENCOUNTER (OUTPATIENT)
Dept: NON INVASIVE DIAGNOSTICS | Age: 62
Discharge: HOME | End: 2017-03-03
Payer: COMMERCIAL

## 2017-03-03 LAB
ATTENDING PHYSICIAN, CST07: NORMAL
DIAGNOSIS, 93000: NORMAL
DUKE TM SCORE RESULT, CST14: NORMAL
DUKE TREADMILL SCORE, CST13: NORMAL
ECG INTERP BEFORE EX, CST11: NORMAL
ECG INTERP DURING EX, CST12: NORMAL
FUNCTIONAL CAPACITY, CST17: NORMAL
KNOWN CARDIAC CONDITION, CST08: NORMAL
MAX. DIASTOLIC BP, CST04: 82 MMHG
MAX. HEART RATE, CST05: 123 BPM
MAX. SYSTOLIC BP, CST03: 140 MMHG
OVERALL BP RESPONSE TO EXERCISE, CST16: NORMAL
OVERALL HR RESPONSE TO EXERCISE, CST15: NORMAL
PEAK EX METS, CST10: 1.6 METS
PROTOCOL NAME, CST01: NORMAL
TEST INDICATION, CST09: NORMAL

## 2017-03-03 PROCEDURE — 74011250636 HC RX REV CODE- 250/636

## 2017-03-03 PROCEDURE — 78452 HT MUSCLE IMAGE SPECT MULT: CPT

## 2017-03-03 PROCEDURE — 93017 CV STRESS TEST TRACING ONLY: CPT

## 2017-03-03 PROCEDURE — A9500 TC99M SESTAMIBI: HCPCS

## 2017-03-03 RX ORDER — SODIUM CHLORIDE 9 MG/ML
250 INJECTION, SOLUTION INTRAVENOUS ONCE
Status: COMPLETED | OUTPATIENT
Start: 2017-03-03 | End: 2017-03-03

## 2017-03-03 RX ADMIN — REGADENOSON 0.4 MG: 0.08 INJECTION, SOLUTION INTRAVENOUS at 08:15

## 2017-03-03 RX ADMIN — SODIUM CHLORIDE 250 ML: 900 INJECTION, SOLUTION INTRAVENOUS at 08:10

## 2017-03-03 NOTE — PROGRESS NOTES
Patient was given 10.59 mCi of Sestamibi for the Resting pictures. Patient received 0.4 mg of Lexiscan for the exercise portion of the Stress test. Patient was then given 33 mCi of Sestamibi for the Stress pictures. Armband was removed and disposed of before the patient left.

## 2017-03-03 NOTE — PROCEDURES
[address] STRESS    Name:  Suzie Sauceda  MR#:  [id number]  :  1955  Account #:  [de-identified]  Date of Adm:  2017  Date of Service:  2017      PROCEDURE: Pharmacological cardiac nuclear stress test.    INDICATIONS: Chest pain. BASELINE ELECTROCARDIOGRAM: Sinus rhythm, normal EKG. PROTOCOL: The patient was given Lexiscan infusion through a right  antecubital IV; 10 mCi of sestamibi was injected before rest and 33  mCi of sestamibi was injected before stress. Gated processing was  performed. Resting blood pressure 124/70 mmHg, increased to 140/82  mmHg. Gated processing was performed. ELECTROCARDIOGRAM FINDINGS: No changes beyond baseline,  no arrhythmias. NUCLEAR FINDINGS: Left ventricular systolic function is normal and  calculated at 66%. No transient ischemic dilatation or regional wall  motion abnormalities. There is no obvious ischemia based on  perfusion imaging. No evidence of previous infarct. IMPRESSION  1. Low risk pharmacological cardiac nuclear stress test.  2. Normal left ventricular systolic function calculated at 66%. No  regional wall motion abnormalities. 3. No evidence of ischemia or previous infarct based on perfusion  imaging.   4. Normal EKG portion of stress test.        NORMAN Pritchard Junior, MP  D:  2017   11:06  T:  2017   17:43  Job #:  [id number]

## 2017-03-06 NOTE — PROGRESS NOTES
Per last office note:    IMPRESSION:   Chest pain unknown etiology  Exertional shortness of breath unknown etiology  Risk factors for coronary artery disease to include hypertension, hyperlipidemia, and recent history of tobacco abuse  Peripheral vascular disease with claudication     PLAN:  The patient will not be able to ambulate on a treadmill. Pharmacologic stress testing with Cardiolite.

## 2017-03-07 ENCOUNTER — OFFICE VISIT (OUTPATIENT)
Dept: SURGERY | Age: 62
End: 2017-03-07

## 2017-03-07 VITALS
BODY MASS INDEX: 32.98 KG/M2 | WEIGHT: 168 LBS | OXYGEN SATURATION: 99 % | SYSTOLIC BLOOD PRESSURE: 128 MMHG | TEMPERATURE: 97.4 F | HEIGHT: 60 IN | HEART RATE: 77 BPM | DIASTOLIC BLOOD PRESSURE: 80 MMHG | RESPIRATION RATE: 17 BRPM

## 2017-03-07 DIAGNOSIS — Z98.84 LAP-BAND SURGERY STATUS: ICD-10-CM

## 2017-03-07 DIAGNOSIS — E66.9 OBESITY (BMI 30.0-34.9): ICD-10-CM

## 2017-03-07 DIAGNOSIS — K21.9 GASTROESOPHAGEAL REFLUX DISEASE WITHOUT ESOPHAGITIS: Primary | ICD-10-CM

## 2017-03-07 NOTE — PROGRESS NOTES
Patient explains she needs help with her weight loss journey. Patient does dry heave and gag occasionally while eating. She does get to the point where she feels full. She is not taking any vitamins or supplements. She states she has never done well with taking them. She has gained 1 pound since her last visit.

## 2017-03-07 NOTE — MR AVS SNAPSHOT
Visit Information Date & Time Provider Department Dept. Phone Encounter #  
 3/7/2017  1:00 PM Latonya Soto PA-C [address] [id number] Your Appointments 3/10/2017  9:40 AM  
Follow Up with Jaye Fleischer, MD  
[facility] ([facility]) Appt Note: f/up after all testing.; f/up after all testing/Test date on 03.03.17  
 [address] [address] [address] [id number] [phone] Pedro Tellez  
  
    
 3/22/2017  9:00 AM  
Follow Up with Hunter Drew NP  
[facility] MEDICINE OF [place] ([facility]) Appt Note: 5 mo f/u  
 [address] [facility]  
[phone]  
  
   
 [address] [facility] [address]  
  
    
 3/28/2017  1:30 PM  
Follow Up with FARIBA Lozada [facility] ([facility]) Appt Note: 3wk F/U  
 [address] [facility] [address] [facility]  
  
    
 10/27/2017  8:00 AM  
PROCEDURE with BSVVS IMAGING 2  
[facility] (FERNANDO SCHEDULING) Appt Note: evar 3 yr [place] [address] [address]  
[phone] [address] [address]  
  
    
 11/9/2017 10:00 AM  
Follow Up with MAKAYLA Rosario  
[facility] (FERNANDO SCHEDULING) [address] [facility] [address]  
[phone]  
  
   
 [facility] [address]  
  
    
 11/27/2017 10:10 AM  
Nurse Visit with [facility] WB NURSE [facility] ([facility]) Appt Note: Return in about 1 year (around 12/5/2017) for UA  
 [address] 
[address] [place] [address]  
[address] [place] [address] 12/5/2017 10:00 AM  
Any with Cr Washington MD  
[facility] ([facility]) Appt Note: Return in about 1 year [address] [place] [address]  
[address] [place] [address] Upcoming Health Maintenance Date Due ZOSTER VACCINE AGE 60> 2/11/2017 FOBT Q 1 YEAR AGE 50-75 7/1/2017 BREAST CANCER SCRN MAMMOGRAM 6/1/2018 PAP AKA CERVICAL CYTOLOGY 8/12/2019 DTaP/Tdap/Td series (2 - Td) 9/1/2026 Allergies as of 3/7/2017  Review Complete On: 3/7/2017 By: Laury Angelo PA-C Severity Noted Reaction Type Reactions Pcn [Penicillins] High 09/18/2014    Anaphylaxis Tetracycline High 09/18/2014    Anaphylaxis Sulfabenzamide  09/18/2014    Hives Current Immunizations  Reviewed on 9/1/2016 Name Date Influenza Vaccine (Quad) PF 9/1/2016 Pneumococcal Vaccine (Unspecified Type) 3/5/2013 Tdap 9/1/2016 Not reviewed this visit You Were Diagnosed With   
  
 Codes Comments Gastroesophageal reflux disease without esophagitis    -  Primary ICD-10-CM: K21.9 ICD-9-CM: 530.81 LAP-BAND surgery status     ICD-10-CM: Z98.84 ICD-9-CM: V45.86 Obesity (BMI 30.0-34.9)     ICD-10-CM: D99.2 ICD-9-CM: 278.00 Vitals BP Pulse Temp Resp Height(growth percentile) Weight(growth percentile) 128/80 (BP 1 Location: Left arm, BP Patient Position: Sitting) 77 97.4 °F (36.3 °C) (Oral) 17 5' (1.524 m) 168 lb (76.2 kg) SpO2 BMI OB Status Smoking Status 99% 32.81 kg/m2 Hysterectomy Former Smoker Vitals History BMI and BSA Data Body Mass Index Body Surface Area  
 32.81 kg/m 2 1.8 m 2 Preferred Pharmacy Pharmacy Name Phone [address] [facility] [phone] Your Updated Medication List  
  
   
This list is accurate as of: 3/7/17  1:52 PM.  Always use your most recent med list.  
  
  
 AZELASTINE OP Apply 1 Drop to eye daily. clopidogrel 75 mg Tab Commonly known as:  PLAVIX Take 1 Tab by mouth daily. COLACE 100 mg capsule Generic drug:  docusate sodium Take 100 mg by mouth two (2) times a day. DALIRESP Tab tablet Generic drug:  roflumilast  
Take 500 mcg by mouth daily. diclofenac 1 % Gel Commonly known as:  VOLTAREN Apply 2 g to affected area four (4) times daily. fluticasone-salmeterol 250-50 mcg/dose diskus inhaler Commonly known as:  ADVAIR DISKUS Take 1 Puff by inhalation daily. HYDROcodone-acetaminophen 7.5-325 mg per tablet Commonly known as:  NORCO  
1 tablet every four hours as needed for pain  
  
 lansoprazole 30 mg capsule Commonly known as:  PREVACID Take 1 Cap by mouth daily. montelukast 10 mg tablet Commonly known as:  SINGULAIR Take 1 Tab by mouth daily. ondansetron 4 mg disintegrating tablet Commonly known as:  ZOFRAN ODT PLACE 1 TABLET ON TONGUE TWICE A DAY IF NAUSEA  
  
 pravastatin 20 mg tablet Commonly known as:  PRAVACHOL Take 20 mg by mouth daily. sertraline 50 mg tablet Commonly known as:  ZOLOFT  
50 mg po daily Patient Instructions Please order Premier Protein pre-made shakes or tub of powder you mix with milk or water. Drink 2-3 shakes per day + small meal of meat and veggies only. Drink plenty of water between meals. Return for weight check in 3 weeks, sooner if needed [address] [facility] [place]- Please provide this summary of care documentation to your next provider. Your primary care clinician is listed as Mansoor Lopez. If you have any questions after today's visit, please call [phone].

## 2017-03-07 NOTE — PATIENT INSTRUCTIONS
Please order Premier Protein pre-made shakes or tub of powder you mix with milk or water. Drink 2-3 shakes per day + small meal of meat and veggies only. Drink plenty of water between meals.   Return for weight check in 3 weeks, sooner if needed  Latonya Guzman PA-C

## 2017-03-08 ENCOUNTER — TELEPHONE (OUTPATIENT)
Dept: INTERNAL MEDICINE CLINIC | Age: 62
End: 2017-03-08

## 2017-03-08 DIAGNOSIS — K21.9 GASTROESOPHAGEAL REFLUX DISEASE WITHOUT ESOPHAGITIS: Primary | ICD-10-CM

## 2017-03-08 NOTE — TELEPHONE ENCOUNTER
Patient states she is a member of the [employer] and is interested in taking classes for arthritis, but they require a letter from her PCP stating she does have arthritis.

## 2017-03-09 DIAGNOSIS — M15.9 GENERALIZED OSTEOARTHROSIS, INVOLVING MULTIPLE SITES: Primary | ICD-10-CM

## 2017-03-09 NOTE — TELEPHONE ENCOUNTER
Called and left message on personal voice mail informing that letter has been completed for patient and is available for  or can be faxed if patient returns call with a fax number she would like for it to go to.

## 2017-03-10 ENCOUNTER — OFFICE VISIT (OUTPATIENT)
Dept: CARDIOLOGY CLINIC | Age: 62
End: 2017-03-10

## 2017-03-10 VITALS
WEIGHT: 169 LBS | HEART RATE: 77 BPM | HEIGHT: 60 IN | DIASTOLIC BLOOD PRESSURE: 88 MMHG | SYSTOLIC BLOOD PRESSURE: 132 MMHG | OXYGEN SATURATION: 97 % | BODY MASS INDEX: 33.18 KG/M2

## 2017-03-10 DIAGNOSIS — R07.2 PRECORDIAL PAIN: Primary | ICD-10-CM

## 2017-03-10 DIAGNOSIS — E78.5 HYPERLIPIDEMIA, UNSPECIFIED HYPERLIPIDEMIA TYPE: ICD-10-CM

## 2017-03-10 DIAGNOSIS — I10 ESSENTIAL HYPERTENSION: ICD-10-CM

## 2017-03-10 DIAGNOSIS — I77.9 RIGHT-SIDED CAROTID ARTERY DISEASE (HCC): ICD-10-CM

## 2017-03-10 DIAGNOSIS — I73.9 PERIPHERAL VASCULAR DISEASE (HCC): ICD-10-CM

## 2017-03-10 NOTE — MR AVS SNAPSHOT
Visit Information Date & Time Provider Department Dept. Phone Encounter #  
 3/10/2017  9:40 AM Min Coleman MD [facility] [id number] Follow-up Instructions Return if symptoms worsen or fail to improve. Your Appointments 3/22/2017  9:00 AM  
Follow Up with Rob Valdez NP  
[facility] INTERNAL MEDICINE OF [place] ([facility]) Appt Note: 5 mo f/u  
 [address], [address] [facility]  
[phone]  
  
   
 [address], [address] [facility] [address]  
  
    
 3/28/2017  1:30 PM  
Follow Up with FARIBA [facility] ([facility]) Appt Note: 3wk F/U  
 [address] [facility] [address] [facility]  
  
    
 10/27/2017  8:00 AM  
PROCEDURE with BSVVS IMAGING 2  
[facility] (FERNANDO SCHEDULING) Appt Note: evar 1 yr [place] [address] [address]  
[phone] [address] [address]  
  
    
 11/9/2017 10:00 AM  
Follow Up with MAKAYLA Blas  
[facility] (FERNANDO SCHEDULING) [address] [address]  
[phone]  
  
   
 [facility] [address]  
  
    
 11/27/2017 10:10 AM  
Nurse Visit with [facility] WB NURSE [facility] ([facility]) Appt Note: Return in about 1 year (around 12/5/2017) for UA  
 [address] 
[address] [facility] [address]  
[phone]  
  
   
 [address] [facility] [address] [place] [address]  
  
    
  
 12/5/2017 10:00 AM  
Any with Bassam Souza MD  
[facility] ([facility]) Appt Note: Return in about 1 year [address] [facility] [address] [facility] [address]  
[address] [facility] [address] Upcoming Health Maintenance Date Due ZOSTER VACCINE AGE 60> 2/11/2017 FOBT Q 1 YEAR AGE 50-75 7/1/2017 BREAST CANCER SCRN MAMMOGRAM 6/1/2018 PAP AKA CERVICAL CYTOLOGY 8/12/2019 DTaP/Tdap/Td series (2 - Td) 9/1/2026 Allergies as of 3/10/2017  Review Complete On: 3/10/2017 By: Marcelina Crowley MD  
  
 Severity Noted Reaction Type Reactions Pcn [Penicillins] High 09/18/2014    Anaphylaxis Tetracycline High 09/18/2014    Anaphylaxis Sulfabenzamide  09/18/2014    Hives Current Immunizations  Reviewed on 9/1/2016 Name Date Influenza Vaccine (Quad) PF 9/1/2016 Pneumococcal Vaccine (Unspecified Type) 3/5/2013 Tdap 9/1/2016 Not reviewed this visit You Were Diagnosed With   
  
 Codes Comments Essential hypertension    -  Primary ICD-10-CM: I10 
ICD-9-CM: 401.9 Hyperlipidemia, unspecified hyperlipidemia type     ICD-10-CM: E78.5 ICD-9-CM: 272.4 Renal infarction ([facility] [address]     ICD-10-CM: N28.0 ICD-9-CM: 593.81 Vitals BP Pulse Height(growth percentile) Weight(growth percentile) SpO2 BMI  
 132/88 (BP 1 Location: Left arm, BP Patient Position: Sitting) 77 5' (1.524 m) 169 lb (76.7 kg) 97% 33.01 kg/m2 OB Status Smoking Status Hysterectomy Former Smoker Vitals History BMI and BSA Data Body Mass Index Body Surface Area 33.01 kg/m 2 1.8 m 2 Preferred Pharmacy Pharmacy Name Phone [facility] [address] [facility] [phone] Your Updated Medication List  
  
   
This list is accurate as of: 3/10/17 10:08 AM.  Always use your most recent med list.  
  
  
  
  
 AZELASTINE OP Apply 1 Drop to eye daily. clopidogrel 75 mg Tab Commonly known as:  PLAVIX Take 1 Tab by mouth daily. COLACE 100 mg capsule Generic drug:  docusate sodium Take 100 mg by mouth two (2) times a day. DALIRESP Tab tablet Generic drug:  roflumilast  
 Take 500 mcg by mouth daily. diclofenac 1 % Gel Commonly known as:  VOLTAREN Apply 2 g to affected area four (4) times daily. fluticasone-salmeterol 250-50 mcg/dose diskus inhaler Commonly known as:  ADVAIR DISKUS Take 1 Puff by inhalation daily. HYDROcodone-acetaminophen 7.5-325 mg per tablet Commonly known as:  NORCO  
1 tablet every four hours as needed for pain  
  
 lansoprazole 30 mg capsule Commonly known as:  PREVACID Take 1 Cap by mouth daily. montelukast 10 mg tablet Commonly known as:  SINGULAIR Take 1 Tab by mouth daily. ondansetron 4 mg disintegrating tablet Commonly known as:  ZOFRAN ODT PLACE 1 TABLET ON TONGUE TWICE A DAY IF NAUSEA  
  
 pravastatin 20 mg tablet Commonly known as:  PRAVACHOL Take 20 mg by mouth daily. sertraline 50 mg tablet Commonly known as:  ZOLOFT  
50 mg po daily We Performed the Following AMB POC EKG ROUTINE W/ 12 LEADS, INTER & REP [83897 CPT(R)] Follow-up Instructions Return if symptoms worsen or fail to improve. Please provide this summary of care documentation to your next provider. Your primary care clinician is listed as Mansoor Lopez. If you have any questions after today's visit, please call [phone].

## 2017-03-10 NOTE — PROGRESS NOTES
1. Have you been to the ER, urgent care clinic since your last visit? Hospitalized since your last visit? No    2. Have you seen or consulted any other health care providers outside of the [address] [place] [address] since your last visit? Include any pap smears or colon screening.  Yes When: 3/9/2017 Dr. Lorena Reynolds orthopedics hip pain

## 2017-03-10 NOTE — PROGRESS NOTES
PATIENT NAME: Katie Hanks         63 y.o.      1955              DATE:3/10/2017    REASON FOR VISIT: Chest pain    HISTORY OF PRESENT ILLNESS: The patient's Cardiolite scan was negative for ischemia. She has not experienced chest tightness since her last visit. The chest tightness is not necessarily related to exertion. The patient has peripheral vascular disease with claudication. Has carotid artery disease. She is followed regularly by a vascular surgeon for these problems. PAST MEDICAL HISTORY:   Past Medical History:  09/17/2015: Aorto-iliac duplex      Comment: Patent abdom aorta endovascular graft w/o                leak. Mod stenosis suggested in right limb. 2014: Calculus of kidney      Comment: stent/kidney   09/17/2015: Carotid duplex      Comment: Mild < 50% bilateral plaquing. No date: Cataract, left  No date: Chronic obstructive pulmonary disease ([facility] [address]  11/04/2014: Echocardiogram      Comment: EF 56%. No WMA. Mild LAE. Echo is within                normal limits. No date: Esophageal reflux  No date: Generalized osteoarthrosis, involving multiple*  No date: GERD (gastroesophageal reflux disease)  No date: Gout  No date: Hypercholesteremia  No date: Hypertension      Comment: resolved  03/27/2015: Lower extremity arterial testing      Comment: Normal perfusion at rest and w/treadmill                bilaterally. R KORI 1.05.  L KORI 1.03.   No date: Murmur      Comment: patient reports that she is aware- has been                told in the past  No date: Nausea & vomiting  No date: Renal artery stenosis ([facility] [address]  8/2015: Subclinical hyperthyroidism  No date: Unspecified sleep apnea      Comment: resolved    PAST SURGICAL HISTORY:   Past Surgical History:  11/10/2016: ADJUSTMENT GASTRIC BAND N/A      Comment: MAKAYLA Cullen  No date: HX APPENDECTOMY  No date: HX BLADDER SUSPENSION  No date: HX CHOLECYSTECTOMY  No date: HX GASTRIC BYPASS      Comment: lap band 2012 1990s, 2016: HX HEENT Right      Comment: Ear sx  No date: HX HYSTERECTOMY  10/27/14: HX OTHER SURGICAL      Comment:  Bilateral open femoral exposures, Placement                of catheter and sheath in the aorta Endo repair               of aortic aneurysm with modular bifurcated                device  Interpretation of angiography,                intravascular ultrasound (IVUS) catheter  10/27/2014: HX OTHER SURGICAL      Comment: Endurant II abdominal stent graft implant  No date: HX UROLOGICAL      Comment: stent  No date: VASCULAR SURGERY PROCEDURE UNLIST      Comment: surgery for abdominal aneurysm      SOCIAL HISTORY:  Social History    Marital status:             Spouse name:                       Years of education:                 Number of children:               Social History Main Topics    Smoking status: Former Smoker                                                                Packs/day: 0.00      Years: 0.00           Types: Cigarettes       Quit date: 1/25/2016    Smokeless status: Never Used                        Alcohol use: No              Drug use: No              Sexual activity: Yes                     Birth control/protection: Surgical        ALLERGIES:    -- Pcn [Penicillins] -- Anaphylaxis   -- Tetracycline -- Anaphylaxis   -- Sulfabenzamide -- Hives     CURRENT MEDICATIONS:   Current Outpatient Prescriptions:  HYDROcodone-acetaminophen (NORCO) 7.5-325 mg per tablet, 1 tablet every four hours as needed for pain  DALIRESP tab tablet, Take 500 mcg by mouth daily. docusate sodium (COLACE) 100 mg capsule, Take 100 mg by mouth two (2) times a day. AZELASTINE HCL (AZELASTINE OP), Apply 1 Drop to eye daily. diclofenac (VOLTAREN) 1 % gel, Apply 2 g to affected area four (4) times daily. pravastatin (PRAVACHOL) 20 mg tablet, Take 20 mg by mouth daily.   ondansetron (ZOFRAN ODT) 4 mg disintegrating tablet, PLACE 1 TABLET ON TONGUE TWICE A DAY IF NAUSEA  sertraline (ZOLOFT) 50 mg tablet, 50 mg po daily  montelukast (SINGULAIR) 10 mg tablet, Take 1 Tab by mouth daily. lansoprazole (PREVACID) 30 mg capsule, Take 1 Cap by mouth daily. clopidogrel (PLAVIX) 75 mg tablet, Take 1 Tab by mouth daily. fluticasone-salmeterol (ADVAIR DISKUS) 250-50 mcg/dose diskus inhaler, Take 1 Puff by inhalation daily. No current facility-administered medications for this visit. REVIEW of SYSTEMS:Respiratory ROS: Please see history of present illness  Cardiovascular ROS: Please see history of present illness     PHYSICAL EXAMINATION:   /88 (BP 1 Location: Left arm, BP Patient Position: Sitting)  Pulse 77  Ht 5' (1.524 m)  Wt 76.7 kg (169 lb)  SpO2 97%  BMI 33.01 kg/m2  BP Readings from Last 3 Encounters:  03/10/17 : 132/88  03/07/17 : 128/80  02/23/17 : 118/72    Pulse Readings from Last 3 Encounters:  03/10/17 : 77  03/07/17 : 77  02/23/17 : 80    Wt Readings from Last 3 Encounters:  03/10/17 : 76.7 kg (169 lb)  03/07/17 : 76.2 kg (168 lb)  02/23/17 : 75.8 kg (167 lb)    Neck: No jugular venous distention. Right carotid bruit. Chest: Clear to auscultation. Heart: Regular rhythm no gallop. Extremities: No edema    EKG: Within normal limits    IMPRESSION:   Chest discomfort noncardiac. No evidence for myocardial ischemia on recent Cardiolite scan. Peripheral vascular disease  Carotid artery disease, asymptomatic    PLAN:  Patient does not require additional cardiac testing. Return to office as needed    The diagnoses and plan were discussed with patient. All questions answered. Plan of care agreed to by all concerned. Liz Donovan.  MD Brandy       ,

## 2017-03-13 NOTE — PROGRESS NOTES
Pt here to discuss weight loss options now that her insurance has declined Lap-Band explant x2. She is still considering self pay for this, but in the meantime has been continuing to gain weight as band is drained. She desires assistance with weight loss. She is unable to tolerate dense meats without nausea and vomiting. Past Medical History:   Diagnosis Date    Aorto-iliac duplex 09/17/2015    Patent abdom aorta endovascular graft w/o leak. Mod stenosis suggested in right limb.  Calculus of kidney 2014    stent/kidney     Carotid duplex 09/17/2015    Mild < 50% bilateral plaquing.  Cataract, left     Chronic obstructive pulmonary disease ([facility] [address]     Echocardiogram 11/04/2014    EF 56%. No WMA. Mild LAE. Echo is within normal limits.  Esophageal reflux     Generalized osteoarthrosis, involving multiple sites     GERD (gastroesophageal reflux disease)     Gout     Hypercholesteremia     Hypertension     resolved    Lower extremity arterial testing 03/27/2015    Normal perfusion at rest and w/treadmill bilaterally. R KORI 1.05.  L KORI 1.03.    Murmur     patient reports that she is aware- has been told in the past    Nausea & vomiting     Renal artery stenosis (HCC)     Subclinical hyperthyroidism 8/2015    Unspecified sleep apnea     resolved     Current Outpatient Prescriptions:     HYDROcodone-acetaminophen (NORCO) 7.5-325 mg per tablet, 1 tablet every four hours as needed for pain, Disp: 100 Tab, Rfl: 0    DALIRESP tab tablet, Take 500 mcg by mouth daily. , Disp: , Rfl:     docusate sodium (COLACE) 100 mg capsule, Take 100 mg by mouth two (2) times a day., Disp: , Rfl:     AZELASTINE HCL (AZELASTINE OP), Apply 1 Drop to eye daily. , Disp: , Rfl:     diclofenac (VOLTAREN) 1 % gel, Apply 2 g to affected area four (4) times daily. , Disp: 1 Each, Rfl: 0    pravastatin (PRAVACHOL) 20 mg tablet, Take 20 mg by mouth daily. , Disp: , Rfl:     ondansetron (ZOFRAN ODT) 4 mg disintegrating tablet, PLACE 1 TABLET ON TONGUE TWICE A DAY IF NAUSEA, Disp: 60 Tab, Rfl: 0    sertraline (ZOLOFT) 50 mg tablet, 50 mg po daily, Disp: 90 Tab, Rfl: 3    montelukast (SINGULAIR) 10 mg tablet, Take 1 Tab by mouth daily. , Disp: 90 Tab, Rfl: 3    lansoprazole (PREVACID) 30 mg capsule, Take 1 Cap by mouth daily. , Disp: 90 Cap, Rfl: 3    clopidogrel (PLAVIX) 75 mg tablet, Take 1 Tab by mouth daily. , Disp: 90 Tab, Rfl: 3    fluticasone-salmeterol (ADVAIR DISKUS) 250-50 mcg/dose diskus inhaler, Take 1 Puff by inhalation daily. , Disp: 1 Inhaler, Rfl: 5   Weight Metrics 3/10/2017 3/7/2017 2/23/2017 2/16/2017 2/7/2017 1/29/2017 1/25/2017   Weight 169 lb 168 lb 167 lb 166 lb 163 lb 6.4 oz 160 lb 165 lb   BMI 33.01 kg/m2 32.81 kg/m2 32.61 kg/m2 32.42 kg/m2 31.91 kg/m2 31.25 kg/m2 32.22 kg/m2     Visit Vitals    /80 (BP 1 Location: Left arm, BP Patient Position: Sitting)    Pulse 77    Temp 97.4 °F (36.3 °C) (Oral)    Resp 17    Ht 5' (1.524 m)    Wt 76.2 kg (168 lb)    SpO2 99%    BMI 32.81 kg/m2     Appears well, teary    A/P: continued weight regain s/p Lap-Band, not currently functional. Pt may be pharmacological candidate in future, but for now agrees to start partial meals replacements (LCD). Rec start Premier protein shakes 2-3/day + 1 meal of soft protein/veggies. Will explore self pay for band explant only.   Counseling>50% of 25 minute visit  F/u 1 month, sooner prn  Latonya Guzman PA-C

## 2017-03-22 ENCOUNTER — OFFICE VISIT (OUTPATIENT)
Dept: INTERNAL MEDICINE CLINIC | Age: 62
End: 2017-03-22

## 2017-03-22 VITALS
HEIGHT: 60 IN | DIASTOLIC BLOOD PRESSURE: 76 MMHG | TEMPERATURE: 97.7 F | OXYGEN SATURATION: 97 % | RESPIRATION RATE: 18 BRPM | WEIGHT: 170 LBS | HEART RATE: 89 BPM | BODY MASS INDEX: 33.38 KG/M2 | SYSTOLIC BLOOD PRESSURE: 120 MMHG

## 2017-03-22 DIAGNOSIS — E78.2 MIXED HYPERLIPIDEMIA: ICD-10-CM

## 2017-03-22 DIAGNOSIS — E66.9 OBESITY (BMI 30.0-34.9): ICD-10-CM

## 2017-03-22 DIAGNOSIS — M54.40 LOW BACK PAIN WITH SCIATICA, SCIATICA LATERALITY UNSPECIFIED, UNSPECIFIED BACK PAIN LATERALITY, UNSPECIFIED CHRONICITY: ICD-10-CM

## 2017-03-22 DIAGNOSIS — I10 ESSENTIAL HYPERTENSION: Primary | ICD-10-CM

## 2017-03-22 DIAGNOSIS — Z23 ENCOUNTER FOR IMMUNIZATION: ICD-10-CM

## 2017-03-22 DIAGNOSIS — I10 ESSENTIAL HYPERTENSION: ICD-10-CM

## 2017-03-22 NOTE — MR AVS SNAPSHOT
Visit Information Date & Time Provider Department Dept. Phone Encounter #  
 3/22/2017  9:00 AM Kristine Godoy NP [facility] INTERNAL MEDICINE OF [address] [id number] Follow-up Instructions Return in about 4 months (around 7/22/2017), or if symptoms worsen or fail to improve. Your Appointments 3/28/2017  1:30 PM  
Follow Up with FARIBA [facility] ([address]) Appt Note: 3wk F/U  
 [address] [facility] hospital [address] [facility]  
  
    
 10/27/2017  8:00 AM  
PROCEDURE with BSVVS IMAGING 2  
[facility] (FERNANDO SCHEDULING) Appt Note: evar 3 yr [place] [address] [address]  
[phone] [address] [address]  
  
    
 11/9/2017 10:00 AM  
Follow Up with MAKAYLA Munoz  
[facility] (FERNANDO SCHEDULING) [address] [facility] [address] [facility] [address]  
[phone]  
  
   
 [address] [address]  
  
    
 11/27/2017 10:10 AM  
Nurse Visit with [facility] WB NURSE [facility] ([address] [place] [address]) Appt Note: Return in about 1 year (around 12/5/2017) for UA  
 [address] 
[address] [place] [address]  
[phone]  
  
   
 [address] [facility] [address] [place] [address]  
  
    
  
 12/5/2017 10:00 AM  
Any with Diane Matthews MD  
[facility] ([address] [place] [address]) Appt Note: Return in about 1 year [address] [facility] [address] [place] [address]  
[address] [place] [address] Upcoming Health Maintenance Date Due ZOSTER VACCINE AGE 60> 2/11/2017 FOBT Q 1 YEAR AGE 50-75 7/1/2017 BREAST CANCER SCRN MAMMOGRAM 6/1/2018 PAP AKA CERVICAL CYTOLOGY 8/12/2019 DTaP/Tdap/Td series (2 - Td) 9/1/2026 Allergies as of 3/22/2017  Review Complete On: 3/22/2017 By: Terry Ortiz NP Severity Noted Reaction Type Reactions Pcn [Penicillins] High 09/18/2014    Anaphylaxis Tetracycline High 09/18/2014    Anaphylaxis Sulfabenzamide  09/18/2014    Hives Current Immunizations  Reviewed on 9/1/2016 Name Date Influenza Vaccine (Quad) PF 9/1/2016 Pneumococcal Vaccine (Unspecified Type) 3/5/2013 Tdap 9/1/2016 Not reviewed this visit You Were Diagnosed With   
  
 Codes Comments Essential hypertension    -  Primary ICD-10-CM: I10 
ICD-9-CM: 401.9 Mixed hyperlipidemia     ICD-10-CM: E78.2 ICD-9-CM: 272.2 Obesity (BMI 30.0-34.9)     ICD-10-CM: D77.3 ICD-9-CM: 278.00 Encounter for immunization     ICD-10-CM: I25 ICD-9-CM: V03.89 Low back pain with sciatica, sciatica laterality unspecified, unspecified back pain laterality, unspecified chronicity     ICD-10-CM: M54.40 ICD-9-CM: 724.3 Vitals BP Pulse Temp Resp Height(growth percentile) 120/76 (BP 1 Location: Left arm, BP Patient Position: Sitting) 89 97.7 °F (36.5 °C) (Tympanic) 18 5' (1.524 m) Weight(growth percentile) SpO2 BMI OB Status Smoking Status 170 lb (77.1 kg) 97% 33.2 kg/m2 Hysterectomy Former Smoker BMI and BSA Data Body Mass Index Body Surface Area  
 33.2 kg/m 2 1.81 m 2 Preferred Pharmacy Pharmacy Name Phone [address] [facility] [phone] Your Updated Medication List  
  
   
This list is accurate as of: 3/22/17 10:33 AM.  Always use your most recent med list.  
  
  
  
  
 AZELASTINE OP Apply 1 Drop to eye daily. clopidogrel 75 mg Tab Commonly known as:  PLAVIX Take 1 Tab by mouth daily. COLACE 100 mg capsule Generic drug:  docusate sodium Take 100 mg by mouth two (2) times a day. DALIRESP Tab tablet Generic drug:  roflumilast  
 Take 500 mcg by mouth daily. diclofenac 1 % Gel Commonly known as:  VOLTAREN Apply 2 g to affected area four (4) times daily. fluticasone-salmeterol 250-50 mcg/dose diskus inhaler Commonly known as:  ADVAIR DISKUS Take 1 Puff by inhalation daily. HYDROcodone-acetaminophen 7.5-325 mg per tablet Commonly known as:  NORCO  
1 tablet every four hours as needed for pain  
  
 lansoprazole 30 mg capsule Commonly known as:  PREVACID Take 1 Cap by mouth daily. montelukast 10 mg tablet Commonly known as:  SINGULAIR Take 1 Tab by mouth daily. ondansetron 4 mg disintegrating tablet Commonly known as:  ZOFRAN ODT PLACE 1 TABLET ON TONGUE TWICE A DAY IF NAUSEA  
  
 pravastatin 20 mg tablet Commonly known as:  PRAVACHOL Take 20 mg by mouth daily. sertraline 50 mg tablet Commonly known as:  ZOLOFT  
50 mg po daily  
  
 varicella zoster vacine live 19,400 unit/0.65 mL Susr injection Commonly known as:  ZOSTAVAX  
1 Vial by SubCUTAneous route once for 1 dose. Prescriptions Printed Refills  
 varicella zoster vacine live (ZOSTAVAX) 19,400 unit/0.65 mL susr injection 0 Si Vial by SubCUTAneous route once for 1 dose. Class: Print Route: SubCUTAneous We Performed the Following REFERRAL TO ORTHOPEDIC SURGERY [REF62 Custom] Comments:  
 Please evaluate patient for chronic lumbar pain. Follow-up Instructions Return in about 4 months (around 2017), or if symptoms worsen or fail to improve. To-Do List   
 2017 Lab:  TSH 3RD GENERATION Referral Information Referral ID Referred By Referred To  
  
 [id number] [address] Phone: [phone] Fax: [phone] Visits Status Start Date End Date 1 New Request 3/22/17 3/22/18 If your referral has a status of pending review or denied, additional information will be sent to support the outcome of this decision. Please provide this summary of care documentation to your next provider. Your primary care clinician is listed as Adolph Hogan. If you have any questions after today's visit, please call [phone].

## 2017-03-22 NOTE — PROGRESS NOTES
Niall Carter is a 63 y.o. female presenting today for Well Woman (5 month follow )  . HPI:  Niall Carter presents to the office today for hyperlipidemia and hypertension follow-up care. Patient states she continues to have back pain with radiculopathy with decrease ROM and painful ambulation. She has had multiple steroid injection without success and at this point does not want any more injections. Patient continues to complain of abdominal pain secondary to failed gastric band. Patient was scheduled for repair of Lap Band but the surgery was not approved by the insurance company. Patient is very concerned about her inability to loose weight despite her diet and exercise plan. Review of Systems   Constitutional: Negative for malaise/fatigue and weight loss. HENT: Negative for congestion. Respiratory: Negative for cough, sputum production and shortness of breath. Cardiovascular: Negative for chest pain, palpitations and leg swelling. Gastrointestinal: Positive for abdominal pain (intermittent). Negative for constipation, diarrhea and nausea. Musculoskeletal: Positive for back pain (lumbar region). Neurological: Negative for headaches. Allergies   Allergen Reactions    Pcn [Penicillins] Anaphylaxis    Tetracycline Anaphylaxis    Sulfabenzamide Hives       Current Outpatient Prescriptions   Medication Sig Dispense Refill    varicella zoster vacine live (ZOSTAVAX) 19,400 unit/0.65 mL susr injection 1 Vial by SubCUTAneous route once for 1 dose. 0.65 mL 0    HYDROcodone-acetaminophen (NORCO) 7.5-325 mg per tablet 1 tablet every four hours as needed for pain 100 Tab 0    DALIRESP tab tablet Take 500 mcg by mouth daily.  docusate sodium (COLACE) 100 mg capsule Take 100 mg by mouth two (2) times a day.  AZELASTINE HCL (AZELASTINE OP) Apply 1 Drop to eye daily.  diclofenac (VOLTAREN) 1 % gel Apply 2 g to affected area four (4) times daily.  1 Each 0    pravastatin (PRAVACHOL) 20 mg tablet Take 20 mg by mouth daily.  ondansetron (ZOFRAN ODT) 4 mg disintegrating tablet PLACE 1 TABLET ON TONGUE TWICE A DAY IF NAUSEA 60 Tab 0    sertraline (ZOLOFT) 50 mg tablet 50 mg po daily 90 Tab 3    montelukast (SINGULAIR) 10 mg tablet Take 1 Tab by mouth daily. 90 Tab 3    lansoprazole (PREVACID) 30 mg capsule Take 1 Cap by mouth daily. 90 Cap 3    clopidogrel (PLAVIX) 75 mg tablet Take 1 Tab by mouth daily. 90 Tab 3    fluticasone-salmeterol (ADVAIR DISKUS) 250-50 mcg/dose diskus inhaler Take 1 Puff by inhalation daily. 1 Inhaler 5       Past Medical History:   Diagnosis Date    Aorto-iliac duplex 09/17/2015    Patent abdom aorta endovascular graft w/o leak. Mod stenosis suggested in right limb.  Calculus of kidney 2014    stent/kidney     Carotid duplex 09/17/2015    Mild < 50% bilateral plaquing.  Cataract, left     Chronic obstructive pulmonary disease ([address]     Echocardiogram 11/04/2014    EF 56%. No WMA. Mild LAE. Echo is within normal limits.  Esophageal reflux     Generalized osteoarthrosis, involving multiple sites     GERD (gastroesophageal reflux disease)     Gout     Hypercholesteremia     Hypertension     resolved    Lower extremity arterial testing 03/27/2015    Normal perfusion at rest and w/treadmill bilaterally.   R KORI 1.05.  L KORI 1.03.    Murmur     patient reports that she is aware- has been told in the past    Nausea & vomiting     Renal artery stenosis ([address]     Subclinical hyperthyroidism 8/2015    Unspecified sleep apnea     resolved       Past Surgical History:   Procedure Laterality Date    ADJUSTMENT GASTRIC BAND N/A 11/10/2016    MAKAYLA Cullen    HX APPENDECTOMY      HX BLADDER SUSPENSION      HX CHOLECYSTECTOMY      HX GASTRIC BYPASS      lap band 2012    HX HEENT Right 1990s, 2016    Ear sx    HX HYSTERECTOMY      HX OTHER SURGICAL  10/27/14     Bilateral open femoral exposures, Placement of catheter and sheath in the aorta Endo repair of aortic aneurysm with modular bifurcated device  Interpretation of angiography, intravascular ultrasound (IVUS) catheter    HX OTHER SURGICAL  10/27/2014    Endurant II abdominal stent graft implant    HX UROLOGICAL      stent    VASCULAR SURGERY PROCEDURE UNLIST      surgery for abdominal aneurysm       Social History     Social History    Marital status:      Spouse name: N/A    Number of children: N/A    Years of education: N/A     Occupational History    Not on file. Social History Main Topics    Smoking status: Former Smoker     Packs/day: 0.00     Years: 0.00     Types: Cigarettes     Quit date: 1/25/2016    Smokeless tobacco: Never Used    Alcohol use No    Drug use: No    Sexual activity: Yes     Birth control/ protection: Surgical     Other Topics Concern    Not on file     Social History Narrative       Patient does not have an advanced directive on file    Vitals:    03/22/17 0920   BP: 120/76   Pulse: 89   Resp: 18   Temp: 97.7 °F (36.5 °C)   TempSrc: Tympanic   SpO2: 97%   Weight: 170 lb (77.1 kg)   Height: 5' (1.524 m)   PainSc:   7   PainLoc: Back       Physical Exam   Constitutional: No distress. Cardiovascular: Normal rate, regular rhythm and normal heart sounds. No murmur heard. Pulmonary/Chest: Effort normal and breath sounds normal. No respiratory distress. Abdominal: Soft. Bowel sounds are normal. She exhibits no distension. Musculoskeletal: She exhibits tenderness (lumbar 4-5 and right hip area). She exhibits no edema. Lymphadenopathy:     She has no cervical adenopathy. Neurological: She is alert. Skin: Skin is warm. Nursing note and vitals reviewed.       Hospital Outpatient Visit on 03/03/2017   Component Date Value Ref Range Status    Test indication 03/03/2017 Chest Pain   Preliminary    Overall HR response to exercise 03/03/2017 appropriate   Preliminary    Overall BP response to exercise 03/03/2017 normal resting BP - appropriate response   Preliminary    Max. Systolic BP 45/52/6175 950  mmHg Preliminary    Max. Diastolic BP 62/56/5077 82  mmHg Preliminary    Max. Heart rate 03/03/2017 123  BPM Preliminary    Peak Ex METs 03/03/2017 1.6  METS Preliminary    Protocol name 03/03/2017 [facility]       Preliminary   Admission on 01/29/2017, Discharged on 01/29/2017   Component Date Value Ref Range Status    WBC 01/29/2017 6.7  4.6 - 13.2 K/uL Final    RBC 01/29/2017 4.23  4.20 - 5.30 M/uL Final    HGB 01/29/2017 12.2  12.0 - 16.0 g/dL Final    HCT 01/29/2017 36.0  35.0 - 45.0 % Final    MCV 01/29/2017 85.1  74.0 - 97.0 FL Final    MCH 01/29/2017 28.8  24.0 - 34.0 PG Final    MCHC 01/29/2017 33.9  31.0 - 37.0 g/dL Final    RDW 01/29/2017 12.7  11.6 - 14.5 % Final    PLATELET 60/23/1058 793  135 - 420 K/uL Final    MPV 01/29/2017 10.0  9.2 - 11.8 FL Final    NEUTROPHILS 01/29/2017 49  40 - 73 % Final    LYMPHOCYTES 01/29/2017 38  21 - 52 % Final    MONOCYTES 01/29/2017 9  3 - 10 % Final    EOSINOPHILS 01/29/2017 3  0 - 5 % Final    BASOPHILS 01/29/2017 1  0 - 2 % Final    ABS. NEUTROPHILS 01/29/2017 3.3  1.8 - 8.0 K/UL Final    ABS. LYMPHOCYTES 01/29/2017 2.6  0.9 - 3.6 K/UL Final    ABS. MONOCYTES 01/29/2017 0.6  0.05 - 1.2 K/UL Final    ABS. EOSINOPHILS 01/29/2017 0.2  0.0 - 0.4 K/UL Final    ABS.  BASOPHILS 01/29/2017 0.1* 0.0 - 0.06 K/UL Final    DF 01/29/2017 AUTOMATED    Final    Sodium 01/29/2017 140  136 - 145 mmol/L Final    Potassium 01/29/2017 3.8  3.5 - 5.5 mmol/L Final    Chloride 01/29/2017 104  100 - 108 mmol/L Final    CO2 01/29/2017 27  21 - 32 mmol/L Final    Anion gap 01/29/2017 9  3.0 - 18 mmol/L Final    Glucose 01/29/2017 94  74 - 99 mg/dL Final    BUN 01/29/2017 19* 7.0 - 18 MG/DL Final    Creatinine 01/29/2017 0.90  0.6 - 1.3 MG/DL Final    BUN/Creatinine ratio 01/29/2017 21* 12 - 20   Final    GFR est AA 01/29/2017 >60  >60 ml/min/1.73m2 Final    GFR est non-AA 01/29/2017 >60  >60 ml/min/1.73m2 Final    Comment: (NOTE)  Estimated GFR is calculated using the Modification of Diet in Renal   Disease (MDRD) Study equation, reported for both  Americans   (GFRAA) and non- Americans (GFRNA), and normalized to 1.73m2   body surface area. The physician must decide which value applies to   the patient. The MDRD study equation should only be used in   individuals age 25 or older. It has not been validated for the   following: pregnant women, patients with serious comorbid conditions,   or on certain medications, or persons with extremes of body size,   muscle mass, or nutritional status.  Calcium 01/29/2017 8.7  8.5 - 10.1 MG/DL Final    Bilirubin, total 01/29/2017 0.3  0.2 - 1.0 MG/DL Final    ALT (SGPT) 01/29/2017 18  13 - 56 U/L Final    AST (SGOT) 01/29/2017 11* 15 - 37 U/L Final    Alk. phosphatase 01/29/2017 67  45 - 117 U/L Final    Protein, total 01/29/2017 6.9  6.4 - 8.2 g/dL Final    Albumin 01/29/2017 3.6  3.4 - 5.0 g/dL Final    Globulin 01/29/2017 3.3  2.0 - 4.0 g/dL Final    A-G Ratio 01/29/2017 1.1  0.8 - 1.7   Final    CK 01/29/2017 125  26 - 192 U/L Final    CK - MB 01/29/2017 0.9  0.5 - 3.6 ng/ml Final    CK-MB Index 01/29/2017 0.7  0.0 - 4.0 % Final    Troponin-I, Qt. 01/29/2017 <0.02  0.0 - 0.045 NG/ML Final    Comment: The presence of detectable troponin above the reference range indicates myocardial injury which may be due to ischemia, myocarditis, trauma, etc.  Clinical correlation is necessary to establish the significance of this finding. Sequential testing is recommended to determine if the typical rise and fall of cTnI is demonstrated. Note:  Cardiac troponin I has a relatively long half life and may be present well after the CK MB has returned to baseline. The reference range is based on the 99th percentile of the referent population.       Ventricular Rate 01/29/2017 80  BPM Final    Atrial Rate 01/29/2017 80  BPM Final  P-R Interval 01/29/2017 150  ms Final    QRS Duration 01/29/2017 84  ms Final    Q-T Interval 01/29/2017 362  ms Final    QTC Calculation (Bezet) 01/29/2017 417  ms Final    Calculated P Axis 01/29/2017 70  degrees Final    Calculated R Axis 01/29/2017 68  degrees Final    Calculated T Axis 01/29/2017 49  degrees Final    Diagnosis 01/29/2017    Final                    Value:Normal sinus rhythm with sinus arrhythmia  Normal ECG  When compared with ECG of 22-APR-2016 10:03,  No significant change was found  Confirmed by Kaylan Carlson MD, Bernice Land ([id number]) on 1/29/2017 7:09:40 PM      Magnesium 01/29/2017 2.2  1.8 - 2.4 mg/dL Final    Lipase 01/29/2017 211  73 - 393 U/L Final    Ventricular Rate 01/29/2017 63  BPM Final    Atrial Rate 01/29/2017 63  BPM Final    P-R Interval 01/29/2017 140  ms Final    QRS Duration 01/29/2017 80  ms Final    Q-T Interval 01/29/2017 404  ms Final    QTC Calculation (Bezet) 01/29/2017 413  ms Final    Calculated P Axis 01/29/2017 56  degrees Final    Calculated R Axis 01/29/2017 47  degrees Final    Calculated T Axis 01/29/2017 52  degrees Final    Diagnosis 01/29/2017    Final                    Value:Normal sinus rhythm  Low voltage QRS  Borderline ECG  When compared with ECG of 29-JAN-2017 16:39,  No significant change was found  Confirmed by Vikas Woods ([id number]) on 1/30/2017 3:18:09 PM      CK 01/29/2017 114  26 - 192 U/L Final    CK - MB 01/29/2017 0.9  0.5 - 3.6 ng/ml Final    CK-MB Index 01/29/2017 0.8  0.0 - 4.0 % Final    Troponin-I, Qt. 01/29/2017 <0.02  0.0 - 0.045 NG/ML Final    Comment: The presence of detectable troponin above the reference range indicates myocardial injury which may be due to ischemia, myocarditis, trauma, etc.  Clinical correlation is necessary to establish the significance of this finding. Sequential testing is recommended to determine if the typical rise and fall of cTnI is demonstrated.   Note:  Cardiac troponin I has a relatively long half life and may be present well after the CK MB has returned to baseline. The reference range is based on the 99th percentile of the referent population. .No results found for any visits on 03/22/17. Assessment / Plan:      ICD-10-CM ICD-9-CM    1. Essential hypertension I10 401.9 TSH 3RD GENERATION   2. Mixed hyperlipidemia E78.2 272.2 TSH 3RD GENERATION   3. Obesity (BMI 30.0-34. 9) E66.9 278.00 TSH 3RD GENERATION   4. Encounter for immunization Z23 V03.89 varicella zoster vacine live (ZOSTAVAX) 19,400 unit/0.65 mL susr injection   5. Low back pain with sciatica, sciatica laterality unspecified, unspecified back pain laterality, unspecified chronicity M54.40 724.3 REFERRAL TO ORTHOPEDIC SURGERY     B/p @ goal  Lipids- continue Pravastatin  Fasting labs next ov  Zostavax rx given  Referral to the Ortho for chronic back pain   Weight gain- TSH ordered    Follow-up Disposition:  Return in about 4 months (around 7/22/2017), or if symptoms worsen or fail to improve. I asked the patient if she  had any questions and answered her  questions.   The patient stated that she understands the treatment plan and agrees with the treatment plan

## 2017-03-22 NOTE — PROGRESS NOTES
Patient presents for   Chief Complaint   Patient presents with    Well Woman     5 month follow      Fall risk assessment was not indicated. Depression screening was not indicated Follow up questions were not indicated. 1. Have you been to the ER, urgent care clinic since your last visit? Hospitalized since your last visit? No    2. Have you seen or consulted any other health care providers outside of the [facility] [place] since your last visit? Include any pap smears or colon screening.  No

## 2017-03-23 LAB — TSH SERPL DL<=0.005 MIU/L-ACNC: 0.42 UIU/ML (ref 0.45–4.5)

## 2017-03-28 ENCOUNTER — OFFICE VISIT (OUTPATIENT)
Dept: SURGERY | Age: 62
End: 2017-03-28

## 2017-03-28 VITALS
DIASTOLIC BLOOD PRESSURE: 68 MMHG | SYSTOLIC BLOOD PRESSURE: 138 MMHG | WEIGHT: 170 LBS | HEART RATE: 86 BPM | OXYGEN SATURATION: 99 % | TEMPERATURE: 97.8 F | BODY MASS INDEX: 33.38 KG/M2 | RESPIRATION RATE: 16 BRPM | HEIGHT: 60 IN

## 2017-03-28 DIAGNOSIS — E66.9 OBESITY (BMI 30.0-34.9): ICD-10-CM

## 2017-03-28 DIAGNOSIS — E16.2 HYPOGLYCEMIA: ICD-10-CM

## 2017-03-28 DIAGNOSIS — Z98.84 LAP-BAND SURGERY STATUS: ICD-10-CM

## 2017-03-28 DIAGNOSIS — R53.82 CHRONIC FATIGUE: ICD-10-CM

## 2017-03-28 DIAGNOSIS — K21.9 GASTROESOPHAGEAL REFLUX DISEASE WITHOUT ESOPHAGITIS: Primary | ICD-10-CM

## 2017-03-28 NOTE — MR AVS SNAPSHOT
Visit Information Date & Time Provider Department Dept. Phone Encounter #  
 3/28/2017  1:30 PM Latonya Louie PA-C [address] [id number] Your Appointments 4/13/2017  8:55 AM  
New Patient with Karie Palmer MD  
[address] [facility], [address] [facility] ([facility]) Appt Note: LUMBAR PAIN  
 [address] [place] [address]  
[address]  
  
    
 6/30/2017  1:00 PM  
Follow Up with FARIBA [facility] ([facility]) Appt Note: 3 month f/up [address] [facility] [address] [facility]  
  
    
 10/27/2017  8:00 AM  
PROCEDURE with [facility]  
[facility] ([facility]) Appt Note: evar 3 yr [place] [address] [address]  
[phone] [address] [address]  
  
    
 11/9/2017 10:00 AM  
Follow Up with MAKAYLA Kearney  
[facility] ([facility]ED[facility]) [address] [place] [address]  
[phone]  
  
   
 [facility] [address]  
  
    
 11/27/2017 10:10 AM  
Nurse Visit with [facility] N[facility]SE [facility] ([facility]) Appt Note: Return in about 1 year (around 12/5/2017) for UA  
 [address] 
[address] [place] [address]  
[phone]  
  
   
 [address] [place] [address]  
  
    
  
 12/5/2017 10:00 AM  
Any with Javier Almazan MD  
[facility] ([facility]) Appt Note: Return in about 1 year [address] [place] [address]  
[address] [place] [address] Upcoming Health Maintenance  Date Due  
 ZOSTER VACCINE AGE 60> 2/11/2017 FOBT Q 1 YEAR AGE 50-75 7/1/2017 BREAST CANCER SCRN MAMMOGRAM 6/1/2018 PAP AKA CERVICAL CYTOLOGY 8/12/2019 DTaP/Tdap/Td series (2 - Td) 9/1/2026 Allergies as of 3/28/2017  Review Complete On: 3/28/2017 By: Kecia Wilburn PA-C Severity Noted Reaction Type Reactions Pcn [Penicillins] High 09/18/2014    Anaphylaxis Tetracycline High 09/18/2014    Anaphylaxis Sulfabenzamide  09/18/2014    Hives Current Immunizations  Reviewed on 9/1/2016 Name Date Influenza Vaccine (Quad) PF 9/1/2016 Pneumococcal Vaccine (Unspecified Type) 3/5/2013 Tdap 9/1/2016 Not reviewed this visit You Were Diagnosed With   
  
 Codes Comments Gastroesophageal reflux disease without esophagitis    -  Primary ICD-10-CM: K21.9 ICD-9-CM: 530.81 Obesity (BMI 30.0-34.9)     ICD-10-CM: Q79.2 ICD-9-CM: 278.00 LAP-BAND surgery status     ICD-10-CM: Z98.84 ICD-9-CM: V45.86 Hypoglycemia     ICD-10-CM: E16.2 ICD-9-CM: 551. 2 Chronic fatigue     ICD-10-CM: R53.82 
ICD-9-CM: 780.79 Vitals BP Pulse Temp Resp Height(growth percentile) Weight(growth percentile)  
 138/68 (BP 1 Location: Left arm, BP Patient Position: Sitting) 86 97.8 °F (36.6 °C) (Oral) 16 5' (1.524 m) 170 lb (77.1 kg) SpO2 BMI OB Status Smoking Status 99% 33.2 kg/m2 Hysterectomy Former Smoker BMI and BSA Data Body Mass Index Body Surface Area  
 33.2 kg/m 2 1.81 m 2 Preferred Pharmacy Pharmacy Name Phone [facility] [address] [facility] [phone] Your Updated Medication List  
  
   
This list is accurate as of: 3/28/17  2:38 PM.  Always use your most recent med list.  
  
  
  
  
 AZELASTINE OP Apply 1 Drop to eye daily. clopidogrel 75 mg Tab Commonly known as:  PLAVIX Take 1 Tab by mouth daily. COLACE 100 mg capsule Generic drug:  docusate sodium Take 100 mg by mouth two (2) times a day. DALIRESP Tab tablet Generic drug:  roflumilast  
Take 500 mcg by mouth daily. diclofenac 1 % Gel Commonly known as:  VOLTAREN Apply 2 g to affected area four (4) times daily. fluticasone-salmeterol 250-50 mcg/dose diskus inhaler Commonly known as:  ADVAIR DISKUS Take 1 Puff by inhalation daily. HYDROcodone-acetaminophen 7.5-325 mg per tablet Commonly known as:  NORCO  
1 tablet every four hours as needed for pain  
  
 lansoprazole 30 mg capsule Commonly known as:  PREVACID Take 1 Cap by mouth daily. montelukast 10 mg tablet Commonly known as:  SINGULAIR Take 1 Tab by mouth daily. ondansetron 4 mg disintegrating tablet Commonly known as:  ZOFRAN ODT PLACE 1 TABLET ON TONGUE TWICE A DAY IF NAUSEA  
  
 pravastatin 20 mg tablet Commonly known as:  PRAVACHOL Take 20 mg by mouth daily. sertraline 50 mg tablet Commonly known as:  ZOLOFT  
50 mg po daily To-Do List   
 03/28/2017 Lab:  CBC WITH AUTOMATED DIFF   
  
 03/28/2017 Lab:  FERRITIN   
  
 03/28/2017 Lab:  INSULIN   
  
 03/28/2017 Lab:  IRON   
  
 03/28/2017 Lab:  METABOLIC PANEL, COMPREHENSIVE   
  
 03/28/2017 Lab:  VITAMIN B1, WHOLE BLOOD   
  
 03/28/2017 Lab:  VITAMIN B12 & FOLATE   
  
 03/28/2017 Lab:  VITAMIN D, 25 HYDROXY Please provide this summary of care documentation to your next provider. Your primary care clinician is listed as Mansoor Lopez. If you have any questions after today's visit, please call [phone].

## 2017-03-29 ENCOUNTER — TELEPHONE (OUTPATIENT)
Dept: INTERNAL MEDICINE CLINIC | Age: 62
End: 2017-03-29

## 2017-03-29 NOTE — PROGRESS NOTES
Pt with Lap-Band, drained due to esophageal dysmotility and food intolerance, desires explant but denied by her insurance plan. She is seen today for weight check as she has been distressed by recent weight gain. She has been on LCD with 2-3 protein shakes/day + 1 meal of chicken and salads. She states that she has been following the plan but she has gained 2lbs since last visit. She reports feeling \"sick\" on the protein shakes, with drops in BS to 50's-pt has glucometer at home despite no h/o DM. She \"drinks orange juice and eats peanut butter\" to bring blood sugar up. She does not wish to continue protein drinks at this time. She has not had bariatric labs in several months. Past Medical History:   Diagnosis Date    Aorto-iliac duplex 09/17/2015    Patent abdom aorta endovascular graft w/o leak. Mod stenosis suggested in right limb.  Calculus of kidney 2014    stent/kidney     Carotid duplex 09/17/2015    Mild < 50% bilateral plaquing.  Cataract, left     Chronic obstructive pulmonary disease ([address]     Echocardiogram 11/04/2014    EF 56%. No WMA. Mild LAE. Echo is within normal limits.  Esophageal reflux     Generalized osteoarthrosis, involving multiple sites     GERD (gastroesophageal reflux disease)     Gout     Hypercholesteremia     Hypertension     resolved    Lower extremity arterial testing 03/27/2015    Normal perfusion at rest and w/treadmill bilaterally. R KORI 1.05.  L KORI 1.03.    Murmur     patient reports that she is aware- has been told in the past    Nausea & vomiting     Renal artery stenosis (HCC)     Subclinical hyperthyroidism 8/2015    Unspecified sleep apnea     resolved     Current Outpatient Prescriptions on File Prior to Visit   Medication Sig Dispense Refill    HYDROcodone-acetaminophen (NORCO) 7.5-325 mg per tablet 1 tablet every four hours as needed for pain 100 Tab 0    DALIRESP tab tablet Take 500 mcg by mouth daily.       docusate sodium (COLACE) 100 mg capsule Take 100 mg by mouth two (2) times a day.  AZELASTINE HCL (AZELASTINE OP) Apply 1 Drop to eye daily.  diclofenac (VOLTAREN) 1 % gel Apply 2 g to affected area four (4) times daily. 1 Each 0    pravastatin (PRAVACHOL) 20 mg tablet Take 20 mg by mouth daily.  ondansetron (ZOFRAN ODT) 4 mg disintegrating tablet PLACE 1 TABLET ON TONGUE TWICE A DAY IF NAUSEA 60 Tab 0    sertraline (ZOLOFT) 50 mg tablet 50 mg po daily 90 Tab 3    montelukast (SINGULAIR) 10 mg tablet Take 1 Tab by mouth daily. 90 Tab 3    lansoprazole (PREVACID) 30 mg capsule Take 1 Cap by mouth daily. 90 Cap 3    clopidogrel (PLAVIX) 75 mg tablet Take 1 Tab by mouth daily. 90 Tab 3    fluticasone-salmeterol (ADVAIR DISKUS) 250-50 mcg/dose diskus inhaler Take 1 Puff by inhalation daily. 1 Inhaler 5     No current facility-administered medications on file prior to visit. Weight Metrics 3/28/2017 3/22/2017 3/10/2017 3/7/2017 2/23/2017 2/16/2017 2/7/2017   Weight 170 lb 170 lb 169 lb 168 lb 167 lb 166 lb 163 lb 6.4 oz   BMI 33.2 kg/m2 33.2 kg/m2 33.01 kg/m2 32.81 kg/m2 32.61 kg/m2 32.42 kg/m2 31.91 kg/m2     Visit Vitals    /68 (BP 1 Location: Left arm, BP Patient Position: Sitting)    Pulse 86    Temp 97.8 °F (36.6 °C) (Oral)    Resp 16    Ht 5' (1.524 m)    Wt 77.1 kg (170 lb)    SpO2 99%    BMI 33.2 kg/m2     Appears well    A/P: weight gain, h/o obstructive sx with Lap-Band, desires explant but no insurance coverage--reviewed self pay costs of band explant--prohibitive to pt. Pt with hypoglycemia of unknown etiology on LCD--advise change to meats/veggies only.  Will send for full set bariatric labs today  Consider endocrine referral  Counseling>50% of 30 minute visit  F/u 2-3 months, sooner prn  Latonya Guzman PA-C

## 2017-03-31 ENCOUNTER — HOSPITAL ENCOUNTER (OUTPATIENT)
Dept: LAB | Age: 62
Discharge: HOME | End: 2017-03-31

## 2017-03-31 PROCEDURE — 99001 SPECIMEN HANDLING PT-LAB: CPT | Performed by: PHYSICIAN ASSISTANT

## 2017-04-03 LAB
25(OH)D3+25(OH)D2 SERPL-MCNC: 26.6 NG/ML (ref 30–100)
ALBUMIN SERPL-MCNC: 4.3 G/DL (ref 3.6–4.8)
ALBUMIN/GLOB SERPL: 1.9 {RATIO} (ref 1.2–2.2)
ALP SERPL-CCNC: 70 IU/L (ref 39–117)
ALT SERPL-CCNC: 10 IU/L (ref 0–32)
AST SERPL-CCNC: 16 IU/L (ref 0–40)
BASOPHILS # BLD AUTO: 0.1 X10E3/UL (ref 0–0.2)
BASOPHILS NFR BLD AUTO: 1 %
BILIRUB SERPL-MCNC: 0.5 MG/DL (ref 0–1.2)
BUN SERPL-MCNC: 12 MG/DL (ref 8–27)
BUN/CREAT SERPL: 17 (ref 11–26)
CALCIUM SERPL-MCNC: 9.2 MG/DL (ref 8.7–10.3)
CHLORIDE SERPL-SCNC: 103 MMOL/L (ref 96–106)
CO2 SERPL-SCNC: 23 MMOL/L (ref 18–29)
CREAT SERPL-MCNC: 0.69 MG/DL (ref 0.57–1)
EOSINOPHIL # BLD AUTO: 0.1 X10E3/UL (ref 0–0.4)
EOSINOPHIL NFR BLD AUTO: 3 %
ERYTHROCYTE [DISTWIDTH] IN BLOOD BY AUTOMATED COUNT: 14.2 % (ref 12.3–15.4)
FERRITIN SERPL-MCNC: 50 NG/ML (ref 15–150)
FOLATE SERPL-MCNC: >20 NG/ML
GLOBULIN SER CALC-MCNC: 2.3 G/DL (ref 1.5–4.5)
GLUCOSE SERPL-MCNC: 97 MG/DL (ref 65–99)
HCT VFR BLD AUTO: 38.3 % (ref 34–46.6)
HGB BLD-MCNC: 13 G/DL (ref 11.1–15.9)
IMM GRANULOCYTES # BLD: 0 X10E3/UL (ref 0–0.1)
IMM GRANULOCYTES NFR BLD: 0 %
INSULIN SERPL-ACNC: 8.7 UIU/ML (ref 2.6–24.9)
IRON SERPL-MCNC: 74 UG/DL (ref 27–159)
LYMPHOCYTES # BLD AUTO: 1.5 X10E3/UL (ref 0.7–3.1)
LYMPHOCYTES NFR BLD AUTO: 27 %
MCH RBC QN AUTO: 28.4 PG (ref 26.6–33)
MCHC RBC AUTO-ENTMCNC: 33.9 G/DL (ref 31.5–35.7)
MCV RBC AUTO: 84 FL (ref 79–97)
MONOCYTES # BLD AUTO: 0.4 X10E3/UL (ref 0.1–0.9)
MONOCYTES NFR BLD AUTO: 7 %
NEUTROPHILS # BLD AUTO: 3.5 X10E3/UL (ref 1.4–7)
NEUTROPHILS NFR BLD AUTO: 62 %
PLATELET # BLD AUTO: 285 X10E3/UL (ref 150–379)
POTASSIUM SERPL-SCNC: 4.2 MMOL/L (ref 3.5–5.2)
PROT SERPL-MCNC: 6.6 G/DL (ref 6–8.5)
RBC # BLD AUTO: 4.57 X10E6/UL (ref 3.77–5.28)
SODIUM SERPL-SCNC: 143 MMOL/L (ref 134–144)
VIT B1 BLD-SCNC: 141.2 NMOL/L (ref 66.5–200)
VIT B12 SERPL-MCNC: 602 PG/ML (ref 211–946)
WBC # BLD AUTO: 5.6 X10E3/UL (ref 3.4–10.8)

## 2017-04-07 RX ORDER — CODEINE PHOSPHATE AND GUAIFENESIN 10; 100 MG/5ML; MG/5ML
SOLUTION ORAL
Qty: 240 ML | Refills: 0 | Status: SHIPPED | OUTPATIENT
Start: 2017-04-07 | End: 2017-06-30 | Stop reason: ALTCHOICE

## 2017-04-07 RX ORDER — AZITHROMYCIN 250 MG/1
TABLET, FILM COATED ORAL
Qty: 6 TAB | Refills: 0 | Status: SHIPPED | OUTPATIENT
Start: 2017-04-07 | End: 2017-04-12

## 2017-04-07 RX ORDER — PREDNISONE 20 MG/1
TABLET ORAL
Qty: 8 TAB | Refills: 0 | Status: SHIPPED | OUTPATIENT
Start: 2017-04-07 | End: 2017-05-10 | Stop reason: ALTCHOICE

## 2017-04-07 NOTE — TELEPHONE ENCOUNTER
Started with christen HERNANDEZ 3 days ago, wants Dr. Cornelio Craig to call in steroids, antibiotic, and cough syrup with codeine to [facility] on [address].

## 2017-04-10 NOTE — TELEPHONE ENCOUNTER
Requested Prescriptions     Pending Prescriptions Disp Refills    HYDROcodone-acetaminophen (NORCO) 7.5-325 mg per tablet 100 Tab 0     Si tablet every four hours as needed for pain    [phone]

## 2017-04-12 RX ORDER — HYDROCODONE BITARTRATE AND ACETAMINOPHEN 7.5; 325 MG/1; MG/1
TABLET ORAL
Qty: 100 TAB | Refills: 0 | Status: SHIPPED | OUTPATIENT
Start: 2017-04-12 | End: 2017-05-18 | Stop reason: SDUPTHER

## 2017-04-13 ENCOUNTER — OFFICE VISIT (OUTPATIENT)
Dept: ORTHOPEDIC SURGERY | Age: 62
End: 2017-04-13

## 2017-04-13 ENCOUNTER — TELEPHONE (OUTPATIENT)
Dept: INTERNAL MEDICINE CLINIC | Age: 62
End: 2017-04-13

## 2017-04-13 VITALS
OXYGEN SATURATION: 100 % | BODY MASS INDEX: 33.38 KG/M2 | HEIGHT: 60 IN | HEART RATE: 75 BPM | DIASTOLIC BLOOD PRESSURE: 84 MMHG | SYSTOLIC BLOOD PRESSURE: 147 MMHG | RESPIRATION RATE: 18 BRPM | TEMPERATURE: 97.8 F | WEIGHT: 170 LBS

## 2017-04-13 DIAGNOSIS — M25.551 RIGHT HIP PAIN: ICD-10-CM

## 2017-04-13 DIAGNOSIS — M54.16 LUMBAR NEURITIS: ICD-10-CM

## 2017-04-13 DIAGNOSIS — M46.1 SACROILIITIS (HCC): Primary | ICD-10-CM

## 2017-04-13 DIAGNOSIS — M47.816 SPONDYLOSIS OF LUMBAR REGION WITHOUT MYELOPATHY OR RADICULOPATHY: ICD-10-CM

## 2017-04-13 RX ORDER — TOPIRAMATE 25 MG/1
TABLET ORAL
Qty: 90 TAB | Refills: 1 | Status: SHIPPED | OUTPATIENT
Start: 2017-04-13 | End: 2017-06-30 | Stop reason: ALTCHOICE

## 2017-04-13 NOTE — MR AVS SNAPSHOT
Visit Information Date & Time Provider Department Dept. Phone Encounter #  
 4/13/2017  8:55 AM Ibeth Edmond MD [facility] [phone] [id number] Follow-up Instructions Return in about 4 weeks (around 5/11/2017). Your Appointments 6/30/2017  1:00 PM  
Follow Up with FARIBA Aguilar 33 ([facility]) Appt Note: 3 month f/up [address] [facility] access hospital [address] [facility]  
  
    
 10/27/2017  8:00 AM  
PROCEDURE with [facility] IMAGING 2  
[facility] (FERNANDO SCHEDULING) Appt Note: evar 1 yr [place] [address] [address]  
[phone] [address] [address]  
  
    
 11/9/2017 10:00 AM  
Follow Up with [facility]LA [facility] ([facility]) Appt Note: 1 yr fu after endograft at [facility] on 10/27/17 with prep; MAILED APPT CARDS AND PREP TO PATIENT; UNABLE TO REACH VERBALLY TO GIVE APPT DATES; .  
 [address] [phone] [place] [address]  
  
    
 11/27/2017 10:10 AM  
Nurse Visit with [facility] ([facility]) Appt Note: Return in about 1 year (around 12/5/2017) for UA  
 [address] 
[address] [place] [address]  
[phone]  
  
   
 [address] [place] [address]  
  
    
  
 12/5/2017 10:00 AM  
Any with Jordan Frank MD  
[facility] ([facility]) Appt Note: Return in about 1 year [address] [place] [address]  
[address] [facility] [address] [place] [address] Upcoming Health Maintenance Date Due ZOSTER VACCINE AGE 60> 2/11/2017 FOBT Q 1 YEAR AGE 50-75 7/1/2017 BREAST CANCER SCRN MAMMOGRAM 6/1/2018 PAP AKA CERVICAL CYTOLOGY 8/12/2019 DTaP/Tdap/Td series (2 - Td) 9/1/2026 Allergies as of 4/13/2017  Review Complete On: 4/13/2017 By: Zina Villasenor MD  
  
 Severity Noted Reaction Type Reactions Pcn [Penicillins] High 09/18/2014    Anaphylaxis Tetracycline High 09/18/2014    Anaphylaxis Sulfabenzamide  09/18/2014    Hives Current Immunizations  Reviewed on 9/1/2016 Name Date Influenza Vaccine (Quad) PF 9/1/2016 Pneumococcal Vaccine (Unspecified Type) 3/5/2013 Tdap 9/1/2016 Not reviewed this visit You Were Diagnosed With   
  
 Codes Comments [facility] ([facility]    -  Primary ICD-10-CM: M46.1 ICD-9-CM: 720.2 Right hip pain     ICD-10-CM: M25.551 ICD-9-CM: 719.45 Spondylosis of lumbar region without myelopathy or radiculopathy     ICD-10-CM: M47.816 ICD-9-CM: 721.3 Lumbar neuritis     ICD-10-CM: M54.16 
ICD-9-CM: 724.4 Vitals BP Pulse Temp Resp Height(growth percentile) Weight(growth percentile) 147/84 75 97.8 °F (36.6 °C) (Oral) 18 5' (1.524 m) 170 lb (77.1 kg) SpO2 BMI OB Status Smoking Status 100% 33.2 kg/m2 Hysterectomy Former Smoker BMI and BSA Data Body Mass Index Body Surface Area  
 33.2 kg/m 2 1.81 m 2 Preferred Pharmacy Pharmacy Name Phone [address] [facility] [phone] Your Updated Medication List  
  
   
This list is accurate as of: 4/13/17  9:32 AM.  Always use your most recent med list.  
  
  
  
  
 AZELASTINE OP Apply 1 Drop to eye daily. clopidogrel 75 mg Tab Commonly known as:  PLAVIX Take 1 Tab by mouth daily. COLACE 100 mg capsule Generic drug:  docusate sodium Take 100 mg by mouth two (2) times a day. DALIRESP Tab tablet Generic drug:  roflumilast  
Take 500 mcg by mouth daily. diclofenac 1 % Gel Commonly known as:  VOLTAREN Apply 2 g to affected area four (4) times daily. fluticasone-salmeterol 250-50 mcg/dose diskus inhaler Commonly known as:  ADVAIR DISKUS Take 1 Puff by inhalation daily. guaiFENesin-codeine 100-10 mg/5 mL solution Commonly known as:  Sean Milder Take 2 tsp by mouth every 6 hours as needed for cough HYDROcodone-acetaminophen 7.5-325 mg per tablet Commonly known as:  NORCO  
1 tablet every four hours as needed for pain  
  
 lansoprazole 30 mg capsule Commonly known as:  PREVACID Take 1 Cap by mouth daily. montelukast 10 mg tablet Commonly known as:  SINGULAIR Take 1 Tab by mouth daily. ondansetron 4 mg disintegrating tablet Commonly known as:  ZOFRAN ODT PLACE 1 TABLET ON TONGUE TWICE A DAY IF NAUSEA  
  
 pravastatin 20 mg tablet Commonly known as:  PRAVACHOL Take 20 mg by mouth daily. predniSONE 20 mg tablet Commonly known as:  DELTASONE  
2 tabs daily x 2 days, 1 tab daily x 2 days, 1/2 tab daily x 4 days  
  
 sertraline 50 mg tablet Commonly known as:  ZOLOFT  
50 mg po daily  
  
 topiramate 25 mg tablet Commonly known as:  TOPAMAX TAKE 3 PO Q HS  
  
  
  
  
Prescriptions Sent to Pharmacy Refills  
 topiramate (TOPAMAX) 25 mg tablet 1 Sig: TAKE 3 PO Q HS  
 Class: Normal  
 Pharmacy: [facility] [address] [place] [address] #: [phone] Follow-up Instructions Return in about 4 weeks (around 5/11/2017). Please provide this summary of care documentation to your next provider. Your primary care clinician is listed as Mundo García. If you have any questions after today's visit, please call [phone].
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
## 2017-05-03 ENCOUNTER — OFFICE VISIT (OUTPATIENT)
Dept: INTERNAL MEDICINE CLINIC | Age: 62
End: 2017-05-03

## 2017-05-03 VITALS
HEIGHT: 60 IN | SYSTOLIC BLOOD PRESSURE: 138 MMHG | TEMPERATURE: 98.1 F | OXYGEN SATURATION: 99 % | WEIGHT: 170 LBS | DIASTOLIC BLOOD PRESSURE: 80 MMHG | RESPIRATION RATE: 18 BRPM | HEART RATE: 92 BPM | BODY MASS INDEX: 33.38 KG/M2

## 2017-05-03 DIAGNOSIS — I10 ESSENTIAL HYPERTENSION: ICD-10-CM

## 2017-05-03 DIAGNOSIS — R10.9 RIGHT FLANK PAIN: Primary | ICD-10-CM

## 2017-05-03 DIAGNOSIS — R31.29 HEMATURIA, MICROSCOPIC: ICD-10-CM

## 2017-05-03 NOTE — MR AVS SNAPSHOT
Visit Information Date & Time Provider Department Dept. Phone Encounter #  
 5/3/2017 11:45 AM Liss Erickson NP [facility] INTERNAL MEDICINE OF [place] [phone] [id number] Follow-up Instructions Return in about 1 week (around 5/10/2017), or if symptoms worsen or fail to improve. Follow-up and Disposition History Your Appointments 5/10/2017  9:30 AM  
Follow Up with Liss Erickson NP  
[facility] INTERNAL MEDICINE OF [place] ([address] [place] [address]) Appt Note: 1 week/f/u CT  
 [address] [place] [address]  
  
   
 [address] [place] [address] 5/25/2017  9:20 AM  
Follow Up with Andrew Andino MD  
[facility] ([address] [place] [address]) Appt Note: 1 mo f/u low back [address] [place] [address]  
[address]  
  
    
 6/30/2017  1:00 PM  
Follow Up with MAKAYLA [facility] [place] [address] ([address] [place] [address]) Appt Note: 3 month f/up [address] [place] [address] Drive [facility] 240 [facility] [address]  [facility]  
  
    
 10/27/2017  8:00 AM  
PROCEDURE with [facility] IMAGING 2 [facility] ([address] [place] [address]) Appt Note: evar 1 yr [place]; MAILED APPT CARDS AND PREP TO PATIENT; UNABLE TO REACH VERBALLY TO GIVE APPT DATES; .  
 [address] [place], [place] [address] [address] [facility] [address]  
[phone] [address] [address] [facility]  
  
    
 11/9/2017 10:00 AM  
Follow Up with MAKAYLA [facility] ([address] [place] [address]) Appt Note: 1 yr fu after endograft at [facility] on 10/27/17 with prep; MAILED APPT CARDS AND PREP TO PATIENT; UNABLE TO REACH VERBALLY TO GIVE APPT DATES; .  
 [facility] [address] [place] [address]  
[phone] [address] [place] [address]  
  
    
 11/27/2017 10:10 AM  
Nurse Visit with [facility] ([facility]) Appt Note: Return in about 1 year (around 12/5/2017) for UA  
 [address] 
[address] [facility] [id number] [phone]  
  
   
 [address] [facility] [address]  
  
    
  
 12/5/2017 10:00 AM  
Any with Slime Laird MD  
[facility] ([facility]) Appt Note: Return in about 1 year [address] [facility] [address]  
[address] [facility] [address] [facility] [address] Upcoming Health Maintenance Date Due ZOSTER VACCINE AGE 60> 2/11/2017 FOBT Q 1 YEAR AGE 50-75 7/1/2017 INFLUENZA AGE 9 TO ADULT 8/1/2017 BREAST CANCER SCRN MAMMOGRAM 6/1/2018 PAP AKA CERVICAL CYTOLOGY 8/12/2019 DTaP/Tdap/Td series (2 - Td) 9/1/2026 Allergies as of 5/3/2017  Review Complete On: 5/3/2017 By: Lai Ponce NP Severity Noted Reaction Type Reactions Pcn [Penicillins] High 09/18/2014    Anaphylaxis Tetracycline High 09/18/2014    Anaphylaxis Sulfabenzamide  09/18/2014    Hives Current Immunizations  Reviewed on 9/1/2016 Name Date Influenza Vaccine (Quad) PF 9/1/2016 Pneumococcal Vaccine (Unspecified Type) 3/5/2013 Tdap 9/1/2016 Not reviewed this visit You Were Diagnosed With   
  
 Codes Comments Right flank pain    -  Primary ICD-10-CM: R10.9 ICD-9-CM: 789.09 Hematuria, microscopic     ICD-10-CM: R31.29 ICD-9-CM: 599.72 Essential hypertension     ICD-10-CM: I10 
ICD-9-CM: 401.9 Vitals BP Pulse Temp Resp Height(growth percentile) 138/80 (BP 1 Location: Left arm, BP Patient Position: Sitting) 92 98.1 °F (36.7 °C) (Tympanic) 18 5' (1.524 m) Weight(growth percentile) SpO2 BMI OB Status Smoking Status 170 lb (77.1 kg) 99% 33.2 kg/m2 Hysterectomy Former Smoker BMI and BSA Data Body Mass Index Body Surface Area  
 33.2 kg/m 2 1.81 m 2 Preferred Pharmacy Pharmacy Name Phone [address] [facility] [phone] Your Updated Medication List  
  
   
This list is accurate as of: 5/3/17 12:39 PM.  Always use your most recent med list.  
  
  
  
  
 AZELASTINE OP Apply 1 Drop to eye daily. clopidogrel 75 mg Tab Commonly known as:  PLAVIX Take 1 Tab by mouth daily. COLACE 100 mg capsule Generic drug:  docusate sodium Take 100 mg by mouth two (2) times a day. DALIRESP Tab tablet Generic drug:  roflumilast  
Take 500 mcg by mouth daily. diclofenac 1 % Gel Commonly known as:  VOLTAREN Apply 2 g to affected area four (4) times daily. fluticasone-salmeterol 250-50 mcg/dose diskus inhaler Commonly known as:  ADVAIR DISKUS Take 1 Puff by inhalation daily. guaiFENesin-codeine 100-10 mg/5 mL solution Commonly known as:  Rulon Kaminski Take 2 tsp by mouth every 6 hours as needed for cough HYDROcodone-acetaminophen 7.5-325 mg per tablet Commonly known as:  NORCO  
1 tablet every four hours as needed for pain  
  
 lansoprazole 30 mg capsule Commonly known as:  PREVACID Take 1 Cap by mouth daily. montelukast 10 mg tablet Commonly known as:  SINGULAIR Take 1 Tab by mouth daily. ondansetron 4 mg disintegrating tablet Commonly known as:  ZOFRAN ODT PLACE 1 TABLET ON TONGUE TWICE A DAY IF NAUSEA  
  
 pravastatin 20 mg tablet Commonly known as:  PRAVACHOL Take 20 mg by mouth daily. predniSONE 20 mg tablet Commonly known as:  DELTASONE  
2 tabs daily x 2 days, 1 tab daily x 2 days, 1/2 tab daily x 4 days  
  
 sertraline 50 mg tablet Commonly known as:  ZOLOFT  
50 mg po daily  
  
 topiramate 25 mg tablet Commonly known as:  TOPAMAX TAKE 3 PO Q HS  
  
  
 Follow-up Instructions Return in about 1 week (around 5/10/2017), or if symptoms worsen or fail to improve. To-Do List   
 05/03/2017 Imaging:  CT ABD PELV WO CONT   
  
 05/04/2017 2:30 PM  
  Appointment with [facility] RM 2 at [facility] RAD [address] ([phone]) ORAL CONTRAST/PREP  No oral contrast is needed for this exam.  DIET RESTRICTIONS  Nothing to eat or drink, 4 hours prior to study  May have water to take meds  GENERAL INSTRUCTIONS  If you were given premedications for IV contrast to take prior to having your study, please arrange to have someone drive you to your appointment. If you have had a creatinine level drawn within the past 30 days, please bring most recent results to your appt. MEDICATIONS  Bring a complete list of all medications you are currently taking to include prescriptions, over-the-counter meds, herbals, vitamins & any dietary supplements. RELATED STUDY INFORMATION  Bring any films, CDs, and reports related with you on the day of your exam.  This only includes studies done outside of [address] [place] [address], [place] [address], [place], and [facility]. QUESTIONS  Notify the CT Department if you have any questions concerning your study. [place] - [phone] [facility] - [phone] Referral Information Referral ID Referred By Referred To  
  
 [id number] Chris LEDEZMA Not Available Visits Status Start Date End Date 1 New Request 5/3/17 5/3/18 If your referral has a status of pending review or denied, additional information will be sent to support the outcome of this decision. Please provide this summary of care documentation to your next provider. Your primary care clinician is listed as Joleen Taveras. If you have any questions after today's visit, please call [phone].

## 2017-05-03 NOTE — PROGRESS NOTES
Blue Haider is a 63 y.o. female presenting today for Side Pain (right side)  . HPI:  Blue Haider presents to the office today for right flank pain x 2-3 weeks with urinary symptoms. Patient noted when she is voiding she thinks she done but she continues to dribble. She noted she has a right renal stent x 2 years. Review of Systems   Respiratory: Negative for cough. Cardiovascular: Negative for chest pain and palpitations. Genitourinary: Positive for flank pain and frequency. Negative for dysuria, hematuria and urgency. Allergies   Allergen Reactions    Pcn [Penicillins] Anaphylaxis    Tetracycline Anaphylaxis    Sulfabenzamide Hives       Current Outpatient Prescriptions   Medication Sig Dispense Refill    topiramate (TOPAMAX) 25 mg tablet TAKE 3 PO Q HS 90 Tab 1    HYDROcodone-acetaminophen (NORCO) 7.5-325 mg per tablet 1 tablet every four hours as needed for pain 100 Tab 0    DALIRESP tab tablet Take 500 mcg by mouth daily.  docusate sodium (COLACE) 100 mg capsule Take 100 mg by mouth two (2) times a day.  AZELASTINE HCL (AZELASTINE OP) Apply 1 Drop to eye daily.  diclofenac (VOLTAREN) 1 % gel Apply 2 g to affected area four (4) times daily. 1 Each 0    pravastatin (PRAVACHOL) 20 mg tablet Take 20 mg by mouth daily.  ondansetron (ZOFRAN ODT) 4 mg disintegrating tablet PLACE 1 TABLET ON TONGUE TWICE A DAY IF NAUSEA 60 Tab 0    sertraline (ZOLOFT) 50 mg tablet 50 mg po daily 90 Tab 3    montelukast (SINGULAIR) 10 mg tablet Take 1 Tab by mouth daily. 90 Tab 3    lansoprazole (PREVACID) 30 mg capsule Take 1 Cap by mouth daily. 90 Cap 3    clopidogrel (PLAVIX) 75 mg tablet Take 1 Tab by mouth daily. 90 Tab 3    fluticasone-salmeterol (ADVAIR DISKUS) 250-50 mcg/dose diskus inhaler Take 1 Puff by inhalation daily.  1 Inhaler 5    guaiFENesin-codeine (CHERATUSSIN AC) 100-10 mg/5 mL solution Take 2 tsp by mouth every 6 hours as needed for cough 240 mL 0    predniSONE (DELTASONE) 20 mg tablet 2 tabs daily x 2 days, 1 tab daily x 2 days, 1/2 tab daily x 4 days 8 Tab 0       Past Medical History:   Diagnosis Date    Aorto-iliac duplex 09/17/2015    Patent abdom aorta endovascular graft w/o leak. Mod stenosis suggested in right limb.  Calculus of kidney 2014    stent/kidney     Carotid duplex 09/17/2015    Mild < 50% bilateral plaquing.  Cataract, left     Chronic obstructive pulmonary disease ([address]     Echocardiogram 11/04/2014    EF 56%. No WMA. Mild LAE. Echo is within normal limits.  Esophageal reflux     Generalized osteoarthrosis, involving multiple sites     GERD (gastroesophageal reflux disease)     Gout     Hypercholesteremia     Hypertension     resolved    Lower extremity arterial testing 03/27/2015    Normal perfusion at rest and w/treadmill bilaterally.   R KORI 1.05.  L KORI 1.03.    Murmur     patient reports that she is aware- has been told in the past    Nausea & vomiting     Renal artery stenosis ([address]     Subclinical hyperthyroidism 8/2015    Unspecified sleep apnea     resolved       Past Surgical History:   Procedure Laterality Date    ADJUSTMENT GASTRIC BAND N/A 11/10/2016    MAKAYLA Cullen    HX APPENDECTOMY      HX BLADDER SUSPENSION      HX CHOLECYSTECTOMY      HX GASTRIC BYPASS      lap band 2012    HX HEENT Right 1990s, 2016    Ear sx    HX HYSTERECTOMY      HX OTHER SURGICAL  10/27/14     Bilateral open femoral exposures, Placement of catheter and sheath in the aorta Endo repair of aortic aneurysm with modular bifurcated device  Interpretation of angiography, intravascular ultrasound (IVUS) catheter    HX OTHER SURGICAL  10/27/2014    Endurant II abdominal stent graft implant    HX UROLOGICAL      stent    VASCULAR SURGERY PROCEDURE UNLIST      surgery for abdominal aneurysm       Social History     Social History    Marital status:      Spouse name: N/A    Number of children: N/A    Years of education: N/A     Occupational History    Not on file. Social History Main Topics    Smoking status: Former Smoker     Packs/day: 0.00     Years: 0.00     Types: Cigarettes     Quit date: 1/25/2016    Smokeless tobacco: Never Used    Alcohol use No    Drug use: No    Sexual activity: Yes     Birth control/ protection: Surgical     Other Topics Concern    Not on file     Social History Narrative       Patient does not have an advanced directive on file    Vitals:    05/03/17 1154   BP: 138/80   Pulse: 92   Resp: 18   Temp: 98.1 °F (36.7 °C)   TempSrc: Tympanic   SpO2: 99%   Weight: 170 lb (77.1 kg)   Height: 5' (1.524 m)   PainSc:   8   PainLoc: Flank       Physical Exam   Constitutional: No distress. Cardiovascular: Normal rate, regular rhythm and normal heart sounds. Pulmonary/Chest: Effort normal and breath sounds normal.   Abdominal: Soft. Bowel sounds are normal.       Office Visit on 03/28/2017   Component Date Value Ref Range Status    WBC 03/31/2017 5.6  3.4 - 10.8 x10E3/uL Final    RBC 03/31/2017 4.57  3.77 - 5.28 x10E6/uL Final    HGB 03/31/2017 13.0  11.1 - 15.9 g/dL Final    HCT 03/31/2017 38.3  34.0 - 46.6 % Final    MCV 03/31/2017 84  79 - 97 fL Final    MCH 03/31/2017 28.4  26.6 - 33.0 pg Final    MCHC 03/31/2017 33.9  31.5 - 35.7 g/dL Final    RDW 03/31/2017 14.2  12.3 - 15.4 % Final    PLATELET 35/70/0001 705  150 - 379 x10E3/uL Final    NEUTROPHILS 03/31/2017 62  % Final    Lymphocytes 03/31/2017 27  % Final    MONOCYTES 03/31/2017 7  % Final    EOSINOPHILS 03/31/2017 3  % Final    BASOPHILS 03/31/2017 1  % Final    ABS. NEUTROPHILS 03/31/2017 3.5  1.4 - 7.0 x10E3/uL Final    Abs Lymphocytes 03/31/2017 1.5  0.7 - 3.1 x10E3/uL Final    ABS. MONOCYTES 03/31/2017 0.4  0.1 - 0.9 x10E3/uL Final    ABS. EOSINOPHILS 03/31/2017 0.1  0.0 - 0.4 x10E3/uL Final    ABS.  BASOPHILS 03/31/2017 0.1  0.0 - 0.2 x10E3/uL Final    IMMATURE GRANULOCYTES 03/31/2017 0  % Final    ABS. IMM. GRANS. 03/31/2017 0.0  0.0 - 0.1 x10E3/uL Final    Glucose 03/31/2017 97  65 - 99 mg/dL Final    BUN 03/31/2017 12  8 - 27 mg/dL Final    Creatinine 03/31/2017 0.69  0.57 - 1.00 mg/dL Final    GFR est non-AA 03/31/2017 95  >59 mL/min/1.73 Final    GFR est AA 03/31/2017 109  >59 mL/min/1.73 Final    BUN/Creatinine ratio 03/31/2017 17  11 - 26 Final                  **Please note reference interval change**    Sodium 03/31/2017 143  134 - 144 mmol/L Final    Potassium 03/31/2017 4.2  3.5 - 5.2 mmol/L Final    Chloride 03/31/2017 103  96 - 106 mmol/L Final    CO2 03/31/2017 23  18 - 29 mmol/L Final    Calcium 03/31/2017 9.2  8.7 - 10.3 mg/dL Final    Protein, total 03/31/2017 6.6  6.0 - 8.5 g/dL Final    Albumin 03/31/2017 4.3  3.6 - 4.8 g/dL Final    GLOBULIN, TOTAL 03/31/2017 2.3  1.5 - 4.5 g/dL Final    A-G Ratio 03/31/2017 1.9  1.2 - 2.2 Final                  **Please note reference interval change**    Bilirubin, total 03/31/2017 0.5  0.0 - 1.2 mg/dL Final    Alk. phosphatase 03/31/2017 70  39 - 117 IU/L Final    AST (SGOT) 03/31/2017 16  0 - 40 IU/L Final    ALT (SGPT) 03/31/2017 10  0 - 32 IU/L Final    Vitamin B12 03/31/2017 602  211 - 946 pg/mL Final    Folate 03/31/2017 >20.0  >3.0 ng/mL Final    Comment: A serum folate concentration of less than 3.1 ng/mL is  considered to represent clinical deficiency.  Vitamin B1 03/31/2017 141.2  66.5 - 200.0 nmol/L Final    VITAMIN D, 25-HYDROXY 03/31/2017 26.6* 30.0 - 100.0 ng/mL Final    Comment: Vitamin D deficiency has been defined by the [facility] practice guideline as a  level of serum 25-OH vitamin D less than 20 ng/mL (1,2). The Endocrine Society went on to further define vitamin D  insufficiency as a level between 21 and 29 ng/mL (2). 1. IOM ([place] of [employer]). 2010. Dietary reference     intakes for calcium and D. [employer]: The     [employer].   2. Alex PALACIOS, Sheyla BRADSHAW, Jaqueline WILSON, et al.     Evaluation, treatment, and prevention of vitamin D     deficiency: an Endocrine Society clinical practice     guideline. JCEM. 2011 Jul; 96(7):1911-30.  Iron 03/31/2017 74  27 - 159 ug/dL Final    Ferritin 03/31/2017 50  15 - 150 ng/mL Final    Insulin 03/31/2017 8.7  2.6 - 24.9 uIU/mL Final   Office Visit on 03/22/2017   Component Date Value Ref Range Status    TSH 03/22/2017 0.416* 0.450 - 4.500 uIU/mL Final   Hospital Outpatient Visit on 03/03/2017   Component Date Value Ref Range Status    Test indication 03/03/2017 Chest Pain   Preliminary    Overall HR response to exercise 03/03/2017 appropriate   Preliminary    Overall BP response to exercise 03/03/2017 normal resting BP - appropriate response   Preliminary    Max. Systolic BP 97/23/9960 050  mmHg Preliminary    Max. Diastolic BP 46/22/8779 82  mmHg Preliminary    Max. Heart rate 03/03/2017 123  BPM Preliminary    Peak Ex METs 03/03/2017 1.6  METS Preliminary    Protocol name 03/03/2017 [facility]       Preliminary       . No results found for any visits on 05/03/17. Assessment / Plan:      ICD-10-CM ICD-9-CM    1. Right flank pain R10.9 789.09 CT ABD PELV WO CONT   2. Hematuria, microscopic R31.29 599.72 CT ABD PELV WO CONT   3. Essential hypertension I10 401.9      POC UA- Hematuria 1+  HTN- Controlled  CT- r/o kidney stone  F/u in 1 week      Follow-up Disposition:  Return in about 1 week (around 5/10/2017), or if symptoms worsen or fail to improve. I asked the patient if she  had any questions and answered her  questions.   The patient stated that she understands the treatment plan and agrees with the treatment plan

## 2017-05-04 ENCOUNTER — HOSPITAL ENCOUNTER (OUTPATIENT)
Dept: ULTRASOUND IMAGING | Age: 62
Discharge: HOME | End: 2017-05-04
Attending: NURSE PRACTITIONER
Payer: COMMERCIAL

## 2017-05-04 DIAGNOSIS — R10.9 RIGHT FLANK PAIN: ICD-10-CM

## 2017-05-04 DIAGNOSIS — R10.9 RIGHT FLANK PAIN: Primary | ICD-10-CM

## 2017-05-04 PROCEDURE — 76770 US EXAM ABDO BACK WALL COMP: CPT

## 2017-05-04 NOTE — PROGRESS NOTES
Please let patient know the xray results were negative for stone, mass or excess fluid in the kidney

## 2017-05-09 ENCOUNTER — TELEPHONE (OUTPATIENT)
Dept: INTERNAL MEDICINE CLINIC | Age: 62
End: 2017-05-09

## 2017-05-09 NOTE — TELEPHONE ENCOUNTER
----- Message from Jeff Condon NP sent at 5/4/2017  2:28 PM EDT -----  Please let patient know the xray results were negative for stone, mass or excess fluid in the kidney

## 2017-05-09 NOTE — TELEPHONE ENCOUNTER
Contacted Charan Villalpando and informed Her of lab results per Rosmery Mayberry NP's request. Charan Villalpando expressed understanding.

## 2017-05-10 ENCOUNTER — OFFICE VISIT (OUTPATIENT)
Dept: INTERNAL MEDICINE CLINIC | Age: 62
End: 2017-05-10

## 2017-05-10 VITALS
TEMPERATURE: 98.2 F | BODY MASS INDEX: 33.57 KG/M2 | WEIGHT: 171 LBS | RESPIRATION RATE: 18 BRPM | HEART RATE: 79 BPM | HEIGHT: 60 IN | OXYGEN SATURATION: 99 % | DIASTOLIC BLOOD PRESSURE: 78 MMHG | SYSTOLIC BLOOD PRESSURE: 140 MMHG

## 2017-05-10 DIAGNOSIS — M54.50 LUMBOSACRAL PAIN: Primary | ICD-10-CM

## 2017-05-10 DIAGNOSIS — K21.9 GASTROESOPHAGEAL REFLUX DISEASE, ESOPHAGITIS PRESENCE NOT SPECIFIED: ICD-10-CM

## 2017-05-10 RX ORDER — PREDNISONE 20 MG/1
TABLET ORAL
Qty: 8 TAB | Refills: 0 | Status: SHIPPED | OUTPATIENT
Start: 2017-05-10 | End: 2017-09-13

## 2017-05-10 RX ORDER — RANITIDINE 150 MG/1
150 TABLET, FILM COATED ORAL 2 TIMES DAILY
Qty: 60 TAB | Refills: 1 | Status: SHIPPED | OUTPATIENT
Start: 2017-05-10 | End: 2017-07-05 | Stop reason: SDUPTHER

## 2017-05-10 NOTE — PROGRESS NOTES
Shabana Jacobsen is a 63 y.o. female presenting today for Flank Pain (follow up CT)  . HPI:  Shabana Jacobsen presents to the office today for right flank pain. Patient states the right flank pain has improved by she has been have severe right lumbosacral pain without radiculopathy. She also noted increased epigastric pain and burning sensation. She states she has been eating TUMS several times/day. Review of Systems   Constitutional: Negative for chills and fever. Respiratory: Negative for cough. Cardiovascular: Negative for chest pain and palpitations. Genitourinary: Negative for flank pain. Musculoskeletal: Positive for back pain (lumbosacral). Allergies   Allergen Reactions    Pcn [Penicillins] Anaphylaxis    Tetracycline Anaphylaxis    Sulfabenzamide Hives       Current Outpatient Prescriptions   Medication Sig Dispense Refill    predniSONE (DELTASONE) 20 mg tablet 2 tabs daily x 2 days, 1 tab daily x 2 days, 1/2 tab daily x 4 days 8 Tab 0    raNITIdine (ZANTAC) 150 mg tablet Take 1 Tab by mouth two (2) times a day. 60 Tab 1    topiramate (TOPAMAX) 25 mg tablet TAKE 3 PO Q HS 90 Tab 1    HYDROcodone-acetaminophen (NORCO) 7.5-325 mg per tablet 1 tablet every four hours as needed for pain 100 Tab 0    guaiFENesin-codeine (CHERATUSSIN AC) 100-10 mg/5 mL solution Take 2 tsp by mouth every 6 hours as needed for cough 240 mL 0    DALIRESP tab tablet Take 500 mcg by mouth daily.  docusate sodium (COLACE) 100 mg capsule Take 100 mg by mouth two (2) times a day.  AZELASTINE HCL (AZELASTINE OP) Apply 1 Drop to eye daily.  diclofenac (VOLTAREN) 1 % gel Apply 2 g to affected area four (4) times daily. 1 Each 0    pravastatin (PRAVACHOL) 20 mg tablet Take 20 mg by mouth daily.       ondansetron (ZOFRAN ODT) 4 mg disintegrating tablet PLACE 1 TABLET ON TONGUE TWICE A DAY IF NAUSEA 60 Tab 0    sertraline (ZOLOFT) 50 mg tablet 50 mg po daily 90 Tab 3    montelukast (SINGULAIR) 10 mg tablet Take 1 Tab by mouth daily. 90 Tab 3    lansoprazole (PREVACID) 30 mg capsule Take 1 Cap by mouth daily. 90 Cap 3    clopidogrel (PLAVIX) 75 mg tablet Take 1 Tab by mouth daily. 90 Tab 3    fluticasone-salmeterol (ADVAIR DISKUS) 250-50 mcg/dose diskus inhaler Take 1 Puff by inhalation daily. 1 Inhaler 5       Past Medical History:   Diagnosis Date    Aorto-iliac duplex 09/17/2015    Patent abdom aorta endovascular graft w/o leak. Mod stenosis suggested in right limb.  Calculus of kidney 2014    stent/kidney     Carotid duplex 09/17/2015    Mild < 50% bilateral plaquing.  Cataract, left     Chronic obstructive pulmonary disease ([address]     Echocardiogram 11/04/2014    EF 56%. No WMA. Mild LAE. Echo is within normal limits.  Esophageal reflux     Generalized osteoarthrosis, involving multiple sites     GERD (gastroesophageal reflux disease)     Gout     Hypercholesteremia     Hypertension     resolved    Lower extremity arterial testing 03/27/2015    Normal perfusion at rest and w/treadmill bilaterally.   R KORI 1.05.  L KORI 1.03.    Murmur     patient reports that she is aware- has been told in the past    Nausea & vomiting     Renal artery stenosis ([address]     Subclinical hyperthyroidism 8/2015    Unspecified sleep apnea     resolved       Past Surgical History:   Procedure Laterality Date    ADJUSTMENT GASTRIC BAND N/A 11/10/2016    MAKAYLA Cullen    HX APPENDECTOMY      HX BLADDER SUSPENSION      HX CHOLECYSTECTOMY      HX GASTRIC BYPASS      lap band 2012    HX HEENT Right 1990s, 2016    Ear sx    HX HYSTERECTOMY      HX OTHER SURGICAL  10/27/14     Bilateral open femoral exposures, Placement of catheter and sheath in the aorta Endo repair of aortic aneurysm with modular bifurcated device  Interpretation of angiography, intravascular ultrasound (IVUS) catheter    HX OTHER SURGICAL  10/27/2014    Endurant II abdominal stent graft implant    HX UROLOGICAL stent    VASCULAR SURGERY PROCEDURE UNLIST      surgery for abdominal aneurysm       Social History     Social History    Marital status:      Spouse name: N/A    Number of children: N/A    Years of education: N/A     Occupational History    Not on file. Social History Main Topics    Smoking status: Former Smoker     Packs/day: 0.00     Years: 0.00     Types: Cigarettes     Quit date: 1/25/2016    Smokeless tobacco: Never Used    Alcohol use No    Drug use: No    Sexual activity: Yes     Birth control/ protection: Surgical     Other Topics Concern    Not on file     Social History Narrative       Patient does not have an advanced directive on file    Vitals:    05/10/17 0933   BP: 140/78   Pulse: 79   Resp: 18   Temp: 98.2 °F (36.8 °C)   TempSrc: Tympanic   SpO2: 99%   Weight: 171 lb (77.6 kg)   Height: 5' (1.524 m)   PainSc:   6   PainLoc: Flank       Physical Exam   Constitutional: No distress. Cardiovascular: Normal rate and regular rhythm. No murmur heard. Pulmonary/Chest: Effort normal and breath sounds normal.   Abdominal: Soft. Musculoskeletal: She exhibits tenderness (right sacral s1-s2 region). She exhibits no edema. Nursing note and vitals reviewed. Office Visit on 03/28/2017   Component Date Value Ref Range Status    WBC 03/31/2017 5.6  3.4 - 10.8 x10E3/uL Final    RBC 03/31/2017 4.57  3.77 - 5.28 x10E6/uL Final    HGB 03/31/2017 13.0  11.1 - 15.9 g/dL Final    HCT 03/31/2017 38.3  34.0 - 46.6 % Final    MCV 03/31/2017 84  79 - 97 fL Final    MCH 03/31/2017 28.4  26.6 - 33.0 pg Final    MCHC 03/31/2017 33.9  31.5 - 35.7 g/dL Final    RDW 03/31/2017 14.2  12.3 - 15.4 % Final    PLATELET 46/48/9225 677  150 - 379 x10E3/uL Final    NEUTROPHILS 03/31/2017 62  % Final    Lymphocytes 03/31/2017 27  % Final    MONOCYTES 03/31/2017 7  % Final    EOSINOPHILS 03/31/2017 3  % Final    BASOPHILS 03/31/2017 1  % Final    ABS.  NEUTROPHILS 03/31/2017 3.5  1.4 - 7.0 x10E3/uL Final    Abs Lymphocytes 03/31/2017 1.5  0.7 - 3.1 x10E3/uL Final    ABS. MONOCYTES 03/31/2017 0.4  0.1 - 0.9 x10E3/uL Final    ABS. EOSINOPHILS 03/31/2017 0.1  0.0 - 0.4 x10E3/uL Final    ABS. BASOPHILS 03/31/2017 0.1  0.0 - 0.2 x10E3/uL Final    IMMATURE GRANULOCYTES 03/31/2017 0  % Final    ABS. IMM. GRANS. 03/31/2017 0.0  0.0 - 0.1 x10E3/uL Final    Glucose 03/31/2017 97  65 - 99 mg/dL Final    BUN 03/31/2017 12  8 - 27 mg/dL Final    Creatinine 03/31/2017 0.69  0.57 - 1.00 mg/dL Final    GFR est non-AA 03/31/2017 95  >59 mL/min/1.73 Final    GFR est AA 03/31/2017 109  >59 mL/min/1.73 Final    BUN/Creatinine ratio 03/31/2017 17  11 - 26 Final                  **Please note reference interval change**    Sodium 03/31/2017 143  134 - 144 mmol/L Final    Potassium 03/31/2017 4.2  3.5 - 5.2 mmol/L Final    Chloride 03/31/2017 103  96 - 106 mmol/L Final    CO2 03/31/2017 23  18 - 29 mmol/L Final    Calcium 03/31/2017 9.2  8.7 - 10.3 mg/dL Final    Protein, total 03/31/2017 6.6  6.0 - 8.5 g/dL Final    Albumin 03/31/2017 4.3  3.6 - 4.8 g/dL Final    GLOBULIN, TOTAL 03/31/2017 2.3  1.5 - 4.5 g/dL Final    A-G Ratio 03/31/2017 1.9  1.2 - 2.2 Final                  **Please note reference interval change**    Bilirubin, total 03/31/2017 0.5  0.0 - 1.2 mg/dL Final    Alk. phosphatase 03/31/2017 70  39 - 117 IU/L Final    AST (SGOT) 03/31/2017 16  0 - 40 IU/L Final    ALT (SGPT) 03/31/2017 10  0 - 32 IU/L Final    Vitamin B12 03/31/2017 602  211 - 946 pg/mL Final    Folate 03/31/2017 >20.0  >3.0 ng/mL Final    Comment: A serum folate concentration of less than 3.1 ng/mL is  considered to represent clinical deficiency.       Vitamin B1 03/31/2017 141.2  66.5 - 200.0 nmol/L Final    VITAMIN D, 25-HYDROXY 03/31/2017 26.6* 30.0 - 100.0 ng/mL Final    Comment: Vitamin D deficiency has been defined by the [facility] practice guideline as a  level of serum 25-OH vitamin D less than 20 ng/mL (1,2). The Endocrine Society went on to further define vitamin D  insufficiency as a level between 21 and 29 ng/mL (2). 1. IOM ([place] of [employer]). 2010. Dietary reference     intakes for calcium and D. [facility]: The     [employer]. 2. Alex MF, Sheyla NC, Jaqueline WILSON, et al.     Evaluation, treatment, and prevention of vitamin D     deficiency: an Endocrine Society clinical practice     guideline. JCEM. 2011 Jul; 96(2):1911-30.  Iron 03/31/2017 74  27 - 159 ug/dL Final    Ferritin 03/31/2017 50  15 - 150 ng/mL Final    Insulin 03/31/2017 8.7  2.6 - 24.9 uIU/mL Final   Office Visit on 03/22/2017   Component Date Value Ref Range Status    TSH 03/22/2017 0.416* 0.450 - 4.500 uIU/mL Final   Hospital Outpatient Visit on 03/03/2017   Component Date Value Ref Range Status    Test indication 03/03/2017 Chest Pain   Preliminary    Overall HR response to exercise 03/03/2017 appropriate   Preliminary    Overall BP response to exercise 03/03/2017 normal resting BP - appropriate response   Preliminary    Max. Systolic BP 85/30/0900 963  mmHg Preliminary    Max. Diastolic BP 10/35/7579 82  mmHg Preliminary    Max. Heart rate 03/03/2017 123  BPM Preliminary    Peak Ex METs 03/03/2017 1.6  METS Preliminary    Protocol name 03/03/2017 [facility]       Preliminary       . No results found for any visits on 05/10/17. Assessment / Plan:      ICD-10-CM ICD-9-CM    1. Lumbosacral pain M54.5 724.2 predniSONE (DELTASONE) 20 mg tablet     724.6    2. Gastroesophageal reflux disease, esophagitis presence not specified K21.9 530.81 raNITIdine (ZANTAC) 150 mg tablet     Prednisone rx  Zantac rx   Avoid using TUMS (hx of stones)  F/u if no improvemetn    Follow-up Disposition:  Return if symptoms worsen or fail to improve. I asked the patient if she  had any questions and answered her  questions.   The patient stated that she understands the treatment plan and agrees with the treatment plan

## 2017-05-17 ENCOUNTER — OFFICE VISIT (OUTPATIENT)
Dept: INTERNAL MEDICINE CLINIC | Age: 62
End: 2017-05-17

## 2017-05-17 VITALS
RESPIRATION RATE: 16 BRPM | OXYGEN SATURATION: 99 % | HEART RATE: 78 BPM | SYSTOLIC BLOOD PRESSURE: 132 MMHG | WEIGHT: 171 LBS | TEMPERATURE: 97.3 F | BODY MASS INDEX: 33.57 KG/M2 | DIASTOLIC BLOOD PRESSURE: 76 MMHG | HEIGHT: 60 IN

## 2017-05-17 DIAGNOSIS — I10 ESSENTIAL HYPERTENSION: ICD-10-CM

## 2017-05-17 DIAGNOSIS — H00.033 CELLULITIS OF EYELID, RIGHT: Primary | ICD-10-CM

## 2017-05-17 RX ORDER — RIFAMPIN 150 MG/1
150 CAPSULE ORAL
Qty: 20 CAP | Refills: 0 | Status: SHIPPED | OUTPATIENT
Start: 2017-05-17 | End: 2017-05-27

## 2017-05-17 RX ORDER — CIPROFLOXACIN HYDROCHLORIDE 3.5 MG/ML
SOLUTION/ DROPS TOPICAL
Qty: 5 ML | Refills: 0 | Status: SHIPPED | OUTPATIENT
Start: 2017-05-17 | End: 2017-06-30 | Stop reason: ALTCHOICE

## 2017-05-17 RX ORDER — CLINDAMYCIN HYDROCHLORIDE 300 MG/1
300 CAPSULE ORAL 3 TIMES DAILY
Qty: 30 CAP | Refills: 0 | Status: SHIPPED | OUTPATIENT
Start: 2017-05-17 | End: 2017-05-27

## 2017-05-17 NOTE — PROGRESS NOTES
Mehul Diaz is a 63 y.o. female presenting today for Eye Problem (right eye x 4 days)  . HPI:  Mehul Diaz presents to the office today for right eye irritation x 5 days and 3 days of right eye erythema, edema and visual changes (blurred vision). Patient noted yesterday she put pressure on the eye and she had moderate amount of yellowish-green drainage from a pinpoint size opening in the lower lid. Review of Systems   Constitutional: Negative for chills and fever. Eyes: Positive for blurred vision (\"per patient due to edematous lower eye lid\") and discharge. Negative for pain. Respiratory: Negative for cough. Cardiovascular: Positive for palpitations. Negative for chest pain. Allergies   Allergen Reactions    Pcn [Penicillins] Anaphylaxis    Tetracycline Anaphylaxis    Sulfabenzamide Hives       Current Outpatient Prescriptions   Medication Sig Dispense Refill    clindamycin (CLEOCIN) 300 mg capsule Take 1 Cap by mouth three (3) times daily for 10 days. 30 Cap 0    ciprofloxacin HCl (CILOXIN) 0.3 % ophthalmic solution 2 drops every 2 hours while awake x 2 days then 1 drop every 4 hours x 5 days OU. 5 mL 0    rifAMPin (RIFADIN) 150 mg capsule Take 1 Cap by mouth Before breakfast and dinner for 10 days. 20 Cap 0    predniSONE (DELTASONE) 20 mg tablet 2 tabs daily x 2 days, 1 tab daily x 2 days, 1/2 tab daily x 4 days 8 Tab 0    raNITIdine (ZANTAC) 150 mg tablet Take 1 Tab by mouth two (2) times a day. 60 Tab 1    topiramate (TOPAMAX) 25 mg tablet TAKE 3 PO Q HS 90 Tab 1    HYDROcodone-acetaminophen (NORCO) 7.5-325 mg per tablet 1 tablet every four hours as needed for pain 100 Tab 0    guaiFENesin-codeine (CHERATUSSIN AC) 100-10 mg/5 mL solution Take 2 tsp by mouth every 6 hours as needed for cough 240 mL 0    DALIRESP tab tablet Take 500 mcg by mouth daily.  docusate sodium (COLACE) 100 mg capsule Take 100 mg by mouth two (2) times a day.       AZELASTINE HCL (AZELASTINE OP) Apply 1 Drop to eye daily.  diclofenac (VOLTAREN) 1 % gel Apply 2 g to affected area four (4) times daily. 1 Each 0    pravastatin (PRAVACHOL) 20 mg tablet Take 20 mg by mouth daily.  ondansetron (ZOFRAN ODT) 4 mg disintegrating tablet PLACE 1 TABLET ON TONGUE TWICE A DAY IF NAUSEA 60 Tab 0    sertraline (ZOLOFT) 50 mg tablet 50 mg po daily 90 Tab 3    montelukast (SINGULAIR) 10 mg tablet Take 1 Tab by mouth daily. 90 Tab 3    lansoprazole (PREVACID) 30 mg capsule Take 1 Cap by mouth daily. 90 Cap 3    clopidogrel (PLAVIX) 75 mg tablet Take 1 Tab by mouth daily. 90 Tab 3    fluticasone-salmeterol (ADVAIR DISKUS) 250-50 mcg/dose diskus inhaler Take 1 Puff by inhalation daily. 1 Inhaler 5       Past Medical History:   Diagnosis Date    Aorto-iliac duplex 09/17/2015    Patent abdom aorta endovascular graft w/o leak. Mod stenosis suggested in right limb.  Calculus of kidney 2014    stent/kidney     Carotid duplex 09/17/2015    Mild < 50% bilateral plaquing.  Cataract, left     Chronic obstructive pulmonary disease ([address]     Echocardiogram 11/04/2014    EF 56%. No WMA. Mild LAE. Echo is within normal limits.  Esophageal reflux     Generalized osteoarthrosis, involving multiple sites     GERD (gastroesophageal reflux disease)     Gout     Hypercholesteremia     Hypertension     resolved    Lower extremity arterial testing 03/27/2015    Normal perfusion at rest and w/treadmill bilaterally.   R KORI 1.05.  L KORI 1.03.    Murmur     patient reports that she is aware- has been told in the past    Nausea & vomiting     Renal artery stenosis ([address]     Subclinical hyperthyroidism 8/2015    Unspecified sleep apnea     resolved       Past Surgical History:   Procedure Laterality Date    ADJUSTMENT GASTRIC BAND N/A 11/10/2016    MAKAYLA Cullen    HX APPENDECTOMY      HX BLADDER SUSPENSION      HX CHOLECYSTECTOMY      HX GASTRIC BYPASS      lap band 2012    HX HEENT Right 1990s, 2016    Ear sx    HX HYSTERECTOMY      HX OTHER SURGICAL  10/27/14     Bilateral open femoral exposures, Placement of catheter and sheath in the aorta Endo repair of aortic aneurysm with modular bifurcated device  Interpretation of angiography, intravascular ultrasound (IVUS) catheter    HX OTHER SURGICAL  10/27/2014    Endurant II abdominal stent graft implant    HX UROLOGICAL      stent    VASCULAR SURGERY PROCEDURE UNLIST      surgery for abdominal aneurysm       Social History     Social History    Marital status:      Spouse name: N/A    Number of children: N/A    Years of education: N/A     Occupational History    Not on file. Social History Main Topics    Smoking status: Former Smoker     Packs/day: 0.00     Years: 0.00     Types: Cigarettes     Quit date: 1/25/2016    Smokeless tobacco: Never Used    Alcohol use No    Drug use: No    Sexual activity: Yes     Birth control/ protection: Surgical     Other Topics Concern    Not on file     Social History Narrative       Patient does not have an advanced directive on file    Vitals:    05/17/17 1040   BP: 132/76   Pulse: 78   Resp: 16   Temp: 97.3 °F (36.3 °C)   TempSrc: Tympanic   SpO2: 99%   Weight: 171 lb (77.6 kg)   Height: 5' (1.524 m)   PainSc:   7   PainLoc: Eye       Physical Exam   Constitutional: No distress. Eyes: Right eye exhibits discharge. Right conjunctiva is injected. Right pupil is round and reactive. Pupils are equal.   Edematous and mild-moderately erythematous right lower eye lid    Cardiovascular: Normal rate, regular rhythm and normal heart sounds. No murmur heard. Pulmonary/Chest: Effort normal and breath sounds normal.   Nursing note and vitals reviewed.       Office Visit on 03/28/2017   Component Date Value Ref Range Status    WBC 03/31/2017 5.6  3.4 - 10.8 x10E3/uL Final    RBC 03/31/2017 4.57  3.77 - 5.28 x10E6/uL Final    HGB 03/31/2017 13.0  11.1 - 15.9 g/dL Final    HCT 03/31/2017 38.3 34.0 - 46.6 % Final    MCV 03/31/2017 84  79 - 97 fL Final    MCH 03/31/2017 28.4  26.6 - 33.0 pg Final    MCHC 03/31/2017 33.9  31.5 - 35.7 g/dL Final    RDW 03/31/2017 14.2  12.3 - 15.4 % Final    PLATELET 43/08/7326 090  150 - 379 x10E3/uL Final    NEUTROPHILS 03/31/2017 62  % Final    Lymphocytes 03/31/2017 27  % Final    MONOCYTES 03/31/2017 7  % Final    EOSINOPHILS 03/31/2017 3  % Final    BASOPHILS 03/31/2017 1  % Final    ABS. NEUTROPHILS 03/31/2017 3.5  1.4 - 7.0 x10E3/uL Final    Abs Lymphocytes 03/31/2017 1.5  0.7 - 3.1 x10E3/uL Final    ABS. MONOCYTES 03/31/2017 0.4  0.1 - 0.9 x10E3/uL Final    ABS. EOSINOPHILS 03/31/2017 0.1  0.0 - 0.4 x10E3/uL Final    ABS. BASOPHILS 03/31/2017 0.1  0.0 - 0.2 x10E3/uL Final    IMMATURE GRANULOCYTES 03/31/2017 0  % Final    ABS. IMM. GRANS. 03/31/2017 0.0  0.0 - 0.1 x10E3/uL Final    Glucose 03/31/2017 97  65 - 99 mg/dL Final    BUN 03/31/2017 12  8 - 27 mg/dL Final    Creatinine 03/31/2017 0.69  0.57 - 1.00 mg/dL Final    GFR est non-AA 03/31/2017 95  >59 mL/min/1.73 Final    GFR est AA 03/31/2017 109  >59 mL/min/1.73 Final    BUN/Creatinine ratio 03/31/2017 17  11 - 26 Final                  **Please note reference interval change**    Sodium 03/31/2017 143  134 - 144 mmol/L Final    Potassium 03/31/2017 4.2  3.5 - 5.2 mmol/L Final    Chloride 03/31/2017 103  96 - 106 mmol/L Final    CO2 03/31/2017 23  18 - 29 mmol/L Final    Calcium 03/31/2017 9.2  8.7 - 10.3 mg/dL Final    Protein, total 03/31/2017 6.6  6.0 - 8.5 g/dL Final    Albumin 03/31/2017 4.3  3.6 - 4.8 g/dL Final    GLOBULIN, TOTAL 03/31/2017 2.3  1.5 - 4.5 g/dL Final    A-G Ratio 03/31/2017 1.9  1.2 - 2.2 Final                  **Please note reference interval change**    Bilirubin, total 03/31/2017 0.5  0.0 - 1.2 mg/dL Final    Alk.  phosphatase 03/31/2017 70  39 - 117 IU/L Final    AST (SGOT) 03/31/2017 16  0 - 40 IU/L Final    ALT (SGPT) 03/31/2017 10  0 - 32 IU/L Final    Vitamin B12 03/31/2017 602  211 - 946 pg/mL Final    Folate 03/31/2017 >20.0  >3.0 ng/mL Final    Comment: A serum folate concentration of less than 3.1 ng/mL is  considered to represent clinical deficiency.  Vitamin B1 03/31/2017 141.2  66.5 - 200.0 nmol/L Final    VITAMIN D, 25-HYDROXY 03/31/2017 26.6* 30.0 - 100.0 ng/mL Final    Comment: Vitamin D deficiency has been defined by the [facility] practice guideline as a  level of serum 25-OH vitamin D less than 20 ng/mL (1,2). The Endocrine Society went on to further define vitamin D  insufficiency as a level between 21 and 29 ng/mL (2). 1. IOM ([place] of [employer]). 2010. Dietary reference     intakes for calcium and D. [facility]: The     [employer]. 2. Alex MF, Sheyla NC, Jaqueline WILSON, et al.     Evaluation, treatment, and prevention of vitamin D     deficiency: an Endocrine Society clinical practice     guideline. JCEM. 2011 Jul; 96(7):1911-30.  Iron 03/31/2017 74  27 - 159 ug/dL Final    Ferritin 03/31/2017 50  15 - 150 ng/mL Final    Insulin 03/31/2017 8.7  2.6 - 24.9 uIU/mL Final   Office Visit on 03/22/2017   Component Date Value Ref Range Status    TSH 03/22/2017 0.416* 0.450 - 4.500 uIU/mL Final   Hospital Outpatient Visit on 03/03/2017   Component Date Value Ref Range Status    Test indication 03/03/2017 Chest Pain   Preliminary    Overall HR response to exercise 03/03/2017 appropriate   Preliminary    Overall BP response to exercise 03/03/2017 normal resting BP - appropriate response   Preliminary    Max. Systolic BP 86/09/8369 634  mmHg Preliminary    Max. Diastolic BP 55/09/1138 82  mmHg Preliminary    Max. Heart rate 03/03/2017 123  BPM Preliminary    Peak Ex METs 03/03/2017 1.6  METS Preliminary    Protocol name 03/03/2017 [facility]       Preliminary       . No results found for any visits on 05/17/17.     Assessment / Plan:      ICD-10-CM ICD-9-CM 1. Cellulitis of eyelid, right H00.033 373.13 clindamycin (CLEOCIN) 300 mg capsule      ciprofloxacin HCl (CILOXIN) 0.3 % ophthalmic solution      rifAMPin (RIFADIN) 150 mg capsule   2. Essential hypertension I10 401.9      Clindamycin  Rifampin rx  HTN- controlled      Follow-up Disposition:  Return in about 2 days (around 5/19/2017), or if symptoms worsen or fail to improve. I asked the patient if she  had any questions and answered her  questions.   The patient stated that she understands the treatment plan and agrees with the treatment plan

## 2017-05-17 NOTE — PROGRESS NOTES
Patient presents for   Chief Complaint   Patient presents with    Eye Problem     right eye x 4 days     Fall risk assessment was not indicated. Depression screening was not indicated Follow up questions were not indicated. 1. Have you been to the ER, urgent care clinic since your last visit? Hospitalized since your last visit? No    2. Have you seen or consulted any other health care providers outside of the [address] [place] [address] since your last visit? Include any pap smears or colon screening.  No

## 2017-05-18 RX ORDER — HYDROCODONE BITARTRATE AND ACETAMINOPHEN 7.5; 325 MG/1; MG/1
TABLET ORAL
Qty: 100 TAB | Refills: 0 | Status: SHIPPED | OUTPATIENT
Start: 2017-05-18 | End: 2017-06-19 | Stop reason: SDUPTHER

## 2017-05-19 ENCOUNTER — OFFICE VISIT (OUTPATIENT)
Dept: INTERNAL MEDICINE CLINIC | Age: 62
End: 2017-05-19

## 2017-05-19 VITALS
WEIGHT: 169 LBS | RESPIRATION RATE: 16 BRPM | HEIGHT: 60 IN | HEART RATE: 76 BPM | SYSTOLIC BLOOD PRESSURE: 120 MMHG | TEMPERATURE: 97.8 F | DIASTOLIC BLOOD PRESSURE: 70 MMHG | BODY MASS INDEX: 33.18 KG/M2 | OXYGEN SATURATION: 99 %

## 2017-05-19 DIAGNOSIS — H00.033 CELLULITIS OF RIGHT EYELID: Primary | ICD-10-CM

## 2017-05-19 NOTE — PROGRESS NOTES
1. Have you been to the ER, urgent care clinic since your last visit? Hospitalized since your last visit? No    2. Have you seen or consulted any other health care providers outside of the [address] [place] [address] since your last visit? Include any pap smears or colon screening.  No

## 2017-05-19 NOTE — PROGRESS NOTES
Sakina Sims is a 63 y.o. female presenting today for No chief complaint on file. Chales Minus HPI:  Sakina Sims presents to the office today for right eye swelling and discharge. Patient noted her right eye continues to drain and it feels like a foreign body is in her eye. Review of Systems   HENT: Positive for ear discharge and ear pain. Respiratory: Negative for cough and sputum production. Cardiovascular: Negative for chest pain and palpitations. Neurological: Negative for headaches. Allergies   Allergen Reactions    Pcn [Penicillins] Anaphylaxis    Tetracycline Anaphylaxis    Sulfabenzamide Hives       Current Outpatient Prescriptions   Medication Sig Dispense Refill    HYDROcodone-acetaminophen (NORCO) 7.5-325 mg per tablet 1 tablet every four hours as needed for pain 100 Tab 0    clindamycin (CLEOCIN) 300 mg capsule Take 1 Cap by mouth three (3) times daily for 10 days. 30 Cap 0    ciprofloxacin HCl (CILOXIN) 0.3 % ophthalmic solution 2 drops every 2 hours while awake x 2 days then 1 drop every 4 hours x 5 days OU. 5 mL 0    predniSONE (DELTASONE) 20 mg tablet 2 tabs daily x 2 days, 1 tab daily x 2 days, 1/2 tab daily x 4 days 8 Tab 0    docusate sodium (COLACE) 100 mg capsule Take 100 mg by mouth two (2) times a day.  AZELASTINE HCL (AZELASTINE OP) Apply 1 Drop to eye daily.  diclofenac (VOLTAREN) 1 % gel Apply 2 g to affected area four (4) times daily. 1 Each 0    pravastatin (PRAVACHOL) 20 mg tablet Take 20 mg by mouth daily.  ondansetron (ZOFRAN ODT) 4 mg disintegrating tablet PLACE 1 TABLET ON TONGUE TWICE A DAY IF NAUSEA 60 Tab 0    montelukast (SINGULAIR) 10 mg tablet Take 1 Tab by mouth daily. 90 Tab 3    lansoprazole (PREVACID) 30 mg capsule Take 1 Cap by mouth daily. 90 Cap 3    clopidogrel (PLAVIX) 75 mg tablet Take 1 Tab by mouth daily. 90 Tab 3    fluticasone-salmeterol (ADVAIR DISKUS) 250-50 mcg/dose diskus inhaler Take 1 Puff by inhalation daily. 1 Inhaler 5    rifAMPin (RIFADIN) 150 mg capsule Take 1 Cap by mouth Before breakfast and dinner for 10 days. 20 Cap 0    raNITIdine (ZANTAC) 150 mg tablet Take 1 Tab by mouth two (2) times a day. 60 Tab 1    topiramate (TOPAMAX) 25 mg tablet TAKE 3 PO Q HS 90 Tab 1    guaiFENesin-codeine (CHERATUSSIN AC) 100-10 mg/5 mL solution Take 2 tsp by mouth every 6 hours as needed for cough 240 mL 0    DALIRESP tab tablet Take 500 mcg by mouth daily.  sertraline (ZOLOFT) 50 mg tablet 50 mg po daily 90 Tab 3       Past Medical History:   Diagnosis Date    Aorto-iliac duplex 09/17/2015    Patent abdom aorta endovascular graft w/o leak. Mod stenosis suggested in right limb.  Calculus of kidney 2014    stent/kidney     Carotid duplex 09/17/2015    Mild < 50% bilateral plaquing.  Cataract, left     Chronic obstructive pulmonary disease ([address]     Echocardiogram 11/04/2014    EF 56%. No WMA. Mild LAE. Echo is within normal limits.  Esophageal reflux     Generalized osteoarthrosis, involving multiple sites     GERD (gastroesophageal reflux disease)     Gout     Hypercholesteremia     Hypertension     resolved    Lower extremity arterial testing 03/27/2015    Normal perfusion at rest and w/treadmill bilaterally.   R KORI 1.05.  L KORI 1.03.    Murmur     patient reports that she is aware- has been told in the past    Nausea & vomiting     Renal artery stenosis ([address]     Subclinical hyperthyroidism 8/2015    Unspecified sleep apnea     resolved       Past Surgical History:   Procedure Laterality Date    ADJUSTMENT GASTRIC BAND N/A 11/10/2016    MAKAYLA Cullen    HX APPENDECTOMY      HX BLADDER SUSPENSION      HX CHOLECYSTECTOMY      HX GASTRIC BYPASS      lap band 2012    HX HEENT Right 1990s, 2016    Ear sx    HX HYSTERECTOMY      HX OTHER SURGICAL  10/27/14     Bilateral open femoral exposures, Placement of catheter and sheath in the aorta Endo repair of aortic aneurysm with modular bifurcated device  Interpretation of angiography, intravascular ultrasound (IVUS) catheter    HX OTHER SURGICAL  10/27/2014    Endurant II abdominal stent graft implant    HX UROLOGICAL      stent    VASCULAR SURGERY PROCEDURE UNLIST      surgery for abdominal aneurysm       Social History     Social History    Marital status:      Spouse name: N/A    Number of children: N/A    Years of education: N/A     Occupational History    Not on file. Social History Main Topics    Smoking status: Former Smoker     Packs/day: 0.00     Years: 0.00     Types: Cigarettes     Quit date: 1/25/2016    Smokeless tobacco: Never Used    Alcohol use No    Drug use: No    Sexual activity: Yes     Birth control/ protection: Surgical     Other Topics Concern    Not on file     Social History Narrative       Patient does not have an advanced directive on file    Vitals:    05/19/17 1035   BP: 120/70   Pulse: 76   Resp: 16   Temp: 97.8 °F (36.6 °C)   TempSrc: Tympanic   SpO2: 99%   Weight: 169 lb (76.7 kg)   Height: 5' (1.524 m)   PainSc:   6   PainLoc: Back       Physical Exam   Eyes: Right eye exhibits discharge. Right eye foreign body: ? Right conjunctiva is injected. Cardiovascular: Normal rate, regular rhythm and normal heart sounds. Pulmonary/Chest: Effort normal and breath sounds normal.   Nursing note and vitals reviewed.       Office Visit on 03/28/2017   Component Date Value Ref Range Status    WBC 03/31/2017 5.6  3.4 - 10.8 x10E3/uL Final    RBC 03/31/2017 4.57  3.77 - 5.28 x10E6/uL Final    HGB 03/31/2017 13.0  11.1 - 15.9 g/dL Final    HCT 03/31/2017 38.3  34.0 - 46.6 % Final    MCV 03/31/2017 84  79 - 97 fL Final    MCH 03/31/2017 28.4  26.6 - 33.0 pg Final    MCHC 03/31/2017 33.9  31.5 - 35.7 g/dL Final    RDW 03/31/2017 14.2  12.3 - 15.4 % Final    PLATELET 74/21/9944 686  150 - 379 x10E3/uL Final    NEUTROPHILS 03/31/2017 62  % Final    Lymphocytes 03/31/2017 27  % Final    MONOCYTES 03/31/2017 7  % Final    EOSINOPHILS 03/31/2017 3  % Final    BASOPHILS 03/31/2017 1  % Final    ABS. NEUTROPHILS 03/31/2017 3.5  1.4 - 7.0 x10E3/uL Final    Abs Lymphocytes 03/31/2017 1.5  0.7 - 3.1 x10E3/uL Final    ABS. MONOCYTES 03/31/2017 0.4  0.1 - 0.9 x10E3/uL Final    ABS. EOSINOPHILS 03/31/2017 0.1  0.0 - 0.4 x10E3/uL Final    ABS. BASOPHILS 03/31/2017 0.1  0.0 - 0.2 x10E3/uL Final    IMMATURE GRANULOCYTES 03/31/2017 0  % Final    ABS. IMM. GRANS. 03/31/2017 0.0  0.0 - 0.1 x10E3/uL Final    Glucose 03/31/2017 97  65 - 99 mg/dL Final    BUN 03/31/2017 12  8 - 27 mg/dL Final    Creatinine 03/31/2017 0.69  0.57 - 1.00 mg/dL Final    GFR est non-AA 03/31/2017 95  >59 mL/min/1.73 Final    GFR est AA 03/31/2017 109  >59 mL/min/1.73 Final    BUN/Creatinine ratio 03/31/2017 17  11 - 26 Final                  **Please note reference interval change**    Sodium 03/31/2017 143  134 - 144 mmol/L Final    Potassium 03/31/2017 4.2  3.5 - 5.2 mmol/L Final    Chloride 03/31/2017 103  96 - 106 mmol/L Final    CO2 03/31/2017 23  18 - 29 mmol/L Final    Calcium 03/31/2017 9.2  8.7 - 10.3 mg/dL Final    Protein, total 03/31/2017 6.6  6.0 - 8.5 g/dL Final    Albumin 03/31/2017 4.3  3.6 - 4.8 g/dL Final    GLOBULIN, TOTAL 03/31/2017 2.3  1.5 - 4.5 g/dL Final    A-G Ratio 03/31/2017 1.9  1.2 - 2.2 Final                  **Please note reference interval change**    Bilirubin, total 03/31/2017 0.5  0.0 - 1.2 mg/dL Final    Alk. phosphatase 03/31/2017 70  39 - 117 IU/L Final    AST (SGOT) 03/31/2017 16  0 - 40 IU/L Final    ALT (SGPT) 03/31/2017 10  0 - 32 IU/L Final    Vitamin B12 03/31/2017 602  211 - 946 pg/mL Final    Folate 03/31/2017 >20.0  >3.0 ng/mL Final    Comment: A serum folate concentration of less than 3.1 ng/mL is  considered to represent clinical deficiency.       Vitamin B1 03/31/2017 141.2  66.5 - 200.0 nmol/L Final    VITAMIN D, 25-HYDROXY 03/31/2017 26.6* 30.0 - 100.0 ng/mL Final Comment: Vitamin D deficiency has been defined by the [facility] practice guideline as a  level of serum 25-OH vitamin D less than 20 ng/mL (1,2). The Endocrine Society went on to further define vitamin D  insufficiency as a level between 21 and 29 ng/mL (2). 1. IOM ([place] of Medicine). 2010. Dietary reference     intakes for calcium and D. [facility]: The     [employer]. 2. Alex MF, Sheyla BRADSHAW, Jaqueline WILSON, et al.     Evaluation, treatment, and prevention of vitamin D     deficiency: an Endocrine Society clinical practice     guideline. JCEM. 2011 Jul; 96(7):1911-30.  Iron 03/31/2017 74  27 - 159 ug/dL Final    Ferritin 03/31/2017 50  15 - 150 ng/mL Final    Insulin 03/31/2017 8.7  2.6 - 24.9 uIU/mL Final   Office Visit on 03/22/2017   Component Date Value Ref Range Status    TSH 03/22/2017 0.416* 0.450 - 4.500 uIU/mL Final   Hospital Outpatient Visit on 03/03/2017   Component Date Value Ref Range Status    Test indication 03/03/2017 Chest Pain   Preliminary    Overall HR response to exercise 03/03/2017 appropriate   Preliminary    Overall BP response to exercise 03/03/2017 normal resting BP - appropriate response   Preliminary    Max. Systolic BP 90/52/0249 410  mmHg Preliminary    Max. Diastolic BP 91/16/5608 82  mmHg Preliminary    Max. Heart rate 03/03/2017 123  BPM Preliminary    Peak Ex METs 03/03/2017 1.6  METS Preliminary    Protocol name 03/03/2017 [facility]       Preliminary       . No results found for any visits on 05/19/17. Assessment / Plan:      ICD-10-CM ICD-9-CM    1. Cellulitis of right eyelid H00.033 373.13 REFERRAL TO OPHTHALMOLOGY     Referral to Ophthamology        Follow-up Disposition:  Return if symptoms worsen or fail to improve. I asked the patient if she  had any questions and answered her  questions.   The patient stated that she understands the treatment plan and agrees with the treatment plan

## 2017-05-25 ENCOUNTER — OFFICE VISIT (OUTPATIENT)
Dept: ORTHOPEDIC SURGERY | Age: 62
End: 2017-05-25

## 2017-05-25 VITALS
SYSTOLIC BLOOD PRESSURE: 118 MMHG | BODY MASS INDEX: 33.69 KG/M2 | HEIGHT: 60 IN | WEIGHT: 171.6 LBS | RESPIRATION RATE: 18 BRPM | DIASTOLIC BLOOD PRESSURE: 69 MMHG | HEART RATE: 84 BPM

## 2017-05-25 DIAGNOSIS — M47.816 SPONDYLOSIS OF LUMBAR REGION WITHOUT MYELOPATHY OR RADICULOPATHY: Primary | ICD-10-CM

## 2017-05-25 DIAGNOSIS — M46.1 SACROILIITIS (HCC): ICD-10-CM

## 2017-05-25 DIAGNOSIS — M54.16 RADICULOPATHY, LUMBAR REGION: ICD-10-CM

## 2017-05-25 DIAGNOSIS — M25.551 RIGHT HIP PAIN: ICD-10-CM

## 2017-05-25 RX ORDER — TIAGABINE HYDROCHLORIDE 2 MG/1
2 TABLET, FILM COATED ORAL 2 TIMES DAILY WITH MEALS
Qty: 60 TAB | Refills: 1 | Status: SHIPPED | OUTPATIENT
Start: 2017-05-25 | End: 2017-06-30 | Stop reason: ALTCHOICE

## 2017-05-25 RX ORDER — TOBRAMYCIN AND DEXAMETHASONE 3; 1 MG/ML; MG/ML
SUSPENSION/ DROPS OPHTHALMIC
COMMUNITY
Start: 2017-05-20 | End: 2017-09-13

## 2017-05-25 NOTE — MR AVS SNAPSHOT
Visit Information Date & Time Provider Department Dept. Phone Encounter #  
 5/25/2017  9:20 AM Kim Roberto MD [facility] [phone] [id number] Follow-up Instructions Return in about 4 weeks (around 6/22/2017). Your Appointments 6/30/2017  1:00 PM  
Follow Up with FARIBA Aguilar 33 ([facility]) Appt Note: 3 month f/up [address] [facility] [address] [facility]  
  
    
 10/27/2017  8:00 AM  
PROCEDURE with [facility] 2 Steve [facility] ([facility]) Appt Note: evar 1 yr knaak; MAILED APPT CARDS AND PREP TO PATIENT; UNABLE TO REACH VERBALLY TO GIVE APPT DATES; .  
 27 [facility] 356 200 [facility] [address]  
[phone] [address]  
  
    
 11/9/2017 10:00 AM  
[facility] Appt Note: 1 yr fu after endograft at [facility] on 10/27/17 with prep; MAILED APPT CARDS AND PREP TO PATIENT; UNABLE TO REACH VERBALLY TO GIVE APPT DATES; .  
 27 [facility] 223 200 [facility]  
[phone] [address]  
  
    
 11/27/2017 10:10 AM  
Nurse Visit with [facility] N[facility]SE [facility] ([facility]) Appt Note: Return in about 1 year (around 12/5/2017) for UA  
 [address] 
[address] [facility] [address]  
[phone]  
  
   
 [address] [address]  
  
    
  
 12/5/2017 10:00 AM  
Any with Theresa Gold MD  
[facility] ([facility]) Appt Note: Return in about 1 year [address]  
[phone] Arlene Chaves [address] [facility] [address] Upcoming Health Maintenance Date Due ZOSTER VACCINE AGE 60> 2/11/2017 FOBT Q 1 YEAR AGE 50-75 7/1/2017 INFLUENZA AGE 9 TO ADULT 8/1/2017 Bone Densitometry 9/24/2017 BREAST CANCER SCRN MAMMOGRAM 6/1/2018 PAP AKA CERVICAL CYTOLOGY 8/12/2019 DTaP/Tdap/Td series (2 - Td) 9/1/2026 Allergies as of 5/25/2017  Review Complete On: 5/25/2017 By: Angélica Moeller MD  
  
 Severity Noted Reaction Type Reactions Pcn [Penicillins] High 09/18/2014    Anaphylaxis Tetracycline High 09/18/2014    Anaphylaxis Sulfabenzamide  09/18/2014    Hives Current Immunizations  Reviewed on 9/1/2016 Name Date Influenza Vaccine (Quad) PF 9/1/2016 Pneumococcal Vaccine (Unspecified Type) 3/5/2013 Tdap 9/1/2016 Not reviewed this visit You Were Diagnosed With   
  
 Codes Comments Spondylosis of lumbar region without myelopathy or radiculopathy    -  Primary ICD-10-CM: M47.816 ICD-9-CM: 721.3 Radiculopathy, lumbar region     ICD-10-CM: M54.16 
ICD-9-CM: 724.4 Right hip pain     ICD-10-CM: M25.551 ICD-9-CM: 719.45 Sacroiliitis ([facility] [address]     ICD-10-CM: M46.1 ICD-9-CM: 720.2 Vitals BP Pulse Resp Height(growth percentile) Weight(growth percentile) BMI  
 118/69 (BP 1 Location: Left arm, BP Patient Position: Sitting) 84 18 5' (1.524 m) 171 lb 9.6 oz (77.8 kg) 33.51 kg/m2 OB Status Smoking Status Hysterectomy Former Smoker BMI and BSA Data Body Mass Index Body Surface Area  
 33.51 kg/m 2 1.81 m 2 Preferred Pharmacy Pharmacy Name Phone [facility] [address] [facility] [phone] Your Updated Medication List  
  
   
This list is accurate as of: 5/25/17 10:44 AM.  Always use your most recent med list.  
  
  
  
  
 AZELASTINE OP Apply 1 Drop to eye daily. ciprofloxacin HCl 0.3 % ophthalmic solution Commonly known as:  CILOXIN  
2 drops every 2 hours while awake x 2 days then 1 drop every 4 hours x 5 days OU.  
  
 clindamycin 300 mg capsule Commonly known as:  CLEOCIN Take 1 Cap by mouth three (3) times daily for 10 days. clopidogrel 75 mg Tab Commonly known as:  PLAVIX Take 1 Tab by mouth daily. COLACE 100 mg capsule Generic drug:  docusate sodium Take 100 mg by mouth two (2) times a day. DALIRESP Tab tablet Generic drug:  roflumilast  
Take 500 mcg by mouth daily. diclofenac 1 % Gel Commonly known as:  VOLTAREN Apply 2 g to affected area four (4) times daily. fluticasone-salmeterol 250-50 mcg/dose diskus inhaler Commonly known as:  ADVAIR DISKUS Take 1 Puff by inhalation daily. guaiFENesin-codeine 100-10 mg/5 mL solution Commonly known as:  Suszanne Sandip Take 2 tsp by mouth every 6 hours as needed for cough HYDROcodone-acetaminophen 7.5-325 mg per tablet Commonly known as:  NORCO  
1 tablet every four hours as needed for pain  
  
 lansoprazole 30 mg capsule Commonly known as:  PREVACID Take 1 Cap by mouth daily. montelukast 10 mg tablet Commonly known as:  SINGULAIR Take 1 Tab by mouth daily. ondansetron 4 mg disintegrating tablet Commonly known as:  ZOFRAN ODT PLACE 1 TABLET ON TONGUE TWICE A DAY IF NAUSEA  
  
 pravastatin 20 mg tablet Commonly known as:  PRAVACHOL Take 20 mg by mouth daily. predniSONE 20 mg tablet Commonly known as:  DELTASONE  
2 tabs daily x 2 days, 1 tab daily x 2 days, 1/2 tab daily x 4 days  
  
 raNITIdine 150 mg tablet Commonly known as:  ZANTAC Take 1 Tab by mouth two (2) times a day. rifAMPin 150 mg capsule Commonly known as:  RIFADIN Take 1 Cap by mouth Before breakfast and dinner for 10 days. sertraline 50 mg tablet Commonly known as:  ZOLOFT  
50 mg po daily  
  
 tiaGABine 2 mg tablet Commonly known as:  GABITRIL  
 Take 1 Tab by mouth two (2) times daily (with meals). tobramycin-dexamethasone ophthalmic suspension Commonly known as:  TOBRADEX  
  
 topiramate 25 mg tablet Commonly known as:  TOPAMAX TAKE 3 PO Q HS  
  
  
  
  
Prescriptions Sent to Pharmacy Refills  
 tiaGABine (GABITRIL) 2 mg tablet 1 Sig: Take 1 Tab by mouth two (2) times daily (with meals). Class: Normal  
 Pharmacy: [address] #: [phone] Route: Oral  
  
We Performed the Following REFERRAL TO ORTHOPEDICS [YSV187 Custom] Comments:  
 Mechanical right hip pain negative MRI per Pastora Jay Follow-up Instructions Return in about 4 weeks (around 6/22/2017). Referral Information Referral ID Referred By Referred To  
  
 [id number] Bharti Villarreal, [address] [place] [address], [address] Phone: [phone] Fax: [phone] Visits Status Start Date End Date 1 New Request 5/25/17 5/25/18 If your referral has a status of pending review or denied, additional information will be sent to support the outcome of this decision. Introducing [facility]! Chaya Reynolds introduces iTMan patient portal. Now you can access parts of your medical record, email your doctor's office, and request medication refills online. 1. In your internet browser, go to [email or web address] 2. Click on the First Time User? Click Here link in the Sign In box. You will see the New Member Sign Up page. 3. Enter your iTMan Access Code exactly as it appears below. You will not need to use this code after youve completed the sign-up process. If you do not sign up before the expiration date, you must request a new code. · iTMan Access Code: [facility] Expires: 8/23/2017 10:42 AM 
 
 4. Enter the last four digits of your Social Security Number (xxxx) and Date of Birth (mm/dd/yyyy) as indicated and click Submit. You will be taken to the next sign-up page. 5. Create a Zakazaka ID. This will be your Zakazaka login ID and cannot be changed, so think of one that is secure and easy to remember. 6. Create a Zakazaka password. You can change your password at any time. 7. Enter your Password Reset Question and Answer. This can be used at a later time if you forget your password. 8. Enter your e-mail address. You will receive e-mail notification when new information is available in [address] 9. Click Sign Up. You can now view and download portions of your medical record. 10. Click the Download Summary menu link to download a portable copy of your medical information. If you have questions, please visit the Frequently Asked Questions section of the Zakazaka website. Remember, Zakazaka is NOT to be used for urgent needs. For medical emergencies, dial 911. Now available from your iPhone and Android! Please provide this summary of care documentation to your next provider. Your primary care clinician is listed as Dashawn Lobato. If you have any questions after today's visit, please call [phone].
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
## 2017-06-19 RX ORDER — HYDROCODONE BITARTRATE AND ACETAMINOPHEN 7.5; 325 MG/1; MG/1
TABLET ORAL
Qty: 100 TAB | Refills: 0 | Status: SHIPPED | OUTPATIENT
Start: 2017-06-19 | End: 2017-07-24 | Stop reason: SDUPTHER

## 2017-06-22 ENCOUNTER — LAB ONLY (OUTPATIENT)
Dept: INTERNAL MEDICINE CLINIC | Age: 62
End: 2017-06-22

## 2017-06-22 ENCOUNTER — OFFICE VISIT (OUTPATIENT)
Dept: ORTHOPEDIC SURGERY | Facility: CLINIC | Age: 62
End: 2017-06-22

## 2017-06-22 VITALS
TEMPERATURE: 98 F | SYSTOLIC BLOOD PRESSURE: 126 MMHG | DIASTOLIC BLOOD PRESSURE: 71 MMHG | BODY MASS INDEX: 33.79 KG/M2 | WEIGHT: 173 LBS | HEART RATE: 70 BPM

## 2017-06-22 DIAGNOSIS — M25.551 CHRONIC RIGHT HIP PAIN: ICD-10-CM

## 2017-06-22 DIAGNOSIS — M16.11 PRIMARY OSTEOARTHRITIS OF RIGHT HIP: Primary | ICD-10-CM

## 2017-06-22 DIAGNOSIS — Z02.83 ENCOUNTER FOR DRUG SCREENING: Primary | ICD-10-CM

## 2017-06-22 DIAGNOSIS — G89.29 CHRONIC RIGHT HIP PAIN: ICD-10-CM

## 2017-06-22 DIAGNOSIS — M25.551 PELVIC JOINT PAIN, RIGHT: ICD-10-CM
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
## 2017-06-30 ENCOUNTER — OFFICE VISIT (OUTPATIENT)
Dept: SURGERY | Age: 62
End: 2017-06-30

## 2017-06-30 ENCOUNTER — TELEPHONE (OUTPATIENT)
Dept: ORTHOPEDIC SURGERY | Facility: CLINIC | Age: 62
End: 2017-06-30

## 2017-06-30 VITALS
BODY MASS INDEX: 33.57 KG/M2 | HEIGHT: 60 IN | DIASTOLIC BLOOD PRESSURE: 74 MMHG | TEMPERATURE: 97.9 F | OXYGEN SATURATION: 98 % | HEART RATE: 84 BPM | WEIGHT: 171 LBS | RESPIRATION RATE: 16 BRPM | SYSTOLIC BLOOD PRESSURE: 118 MMHG

## 2017-06-30 DIAGNOSIS — E66.9 OBESITY (BMI 30.0-34.9): ICD-10-CM

## 2017-06-30 DIAGNOSIS — E55.9 HYPOVITAMINOSIS D: ICD-10-CM

## 2017-06-30 DIAGNOSIS — K21.9 GASTROESOPHAGEAL REFLUX DISEASE, ESOPHAGITIS PRESENCE NOT SPECIFIED: ICD-10-CM

## 2017-06-30 DIAGNOSIS — R10.12 LEFT UPPER QUADRANT PAIN: ICD-10-CM

## 2017-06-30 DIAGNOSIS — Z98.84 LAP-BAND SURGERY STATUS: Primary | ICD-10-CM

## 2017-06-30 NOTE — TELEPHONE ENCOUNTER
MESSAGE PLACED FOR YVON:    MRI ARTHROGRAM OF HIP IS APPROVED AND SCHEDULED. THE MRI OF THE PELVIS WAS DENIED BY INSURANCE AS DX CODES DO NOT MEET THEIR CRITERIA. PLEASE ADJUST DX CODE AND NOTIFY Marisol Duran Click AT [id number].

## 2017-07-01 LAB — DRUGS UR: NORMAL

## 2017-07-05 DIAGNOSIS — K21.9 GASTROESOPHAGEAL REFLUX DISEASE, ESOPHAGITIS PRESENCE NOT SPECIFIED: ICD-10-CM

## 2017-07-05 NOTE — PROGRESS NOTES
Pt returns for ongoing care/management of disease of obesity and complications of Lap-Band. She was last seen 3 months ago and remains weight stable since that time. She reports worsening of chronic nausea lately. In addition, she had an episode of severe mid abdominal pain recently after bumping abdomen into a bed frame. She says that this pain brought her to her knees. Today pain isn't as severe, but is still present. She continues to sleep in a recliner chair due to reflux sx. She does not yet have the funds to afford band explant. Labs on 3/31/17: vit D 26, all others wnl    Past Medical History:   Diagnosis Date    Aorto-iliac duplex 09/17/2015    Patent abdom aorta endovascular graft w/o leak. Mod stenosis suggested in right limb.  Calculus of kidney 2014    stent/kidney     Carotid duplex 09/17/2015    Mild < 50% bilateral plaquing.  Cataract, left     Chronic obstructive pulmonary disease ([address]     Echocardiogram 11/04/2014    EF 56%. No WMA. Mild LAE. Echo is within normal limits.  Esophageal reflux     Generalized osteoarthrosis, involving multiple sites     GERD (gastroesophageal reflux disease)     Gout     Hypercholesteremia     Hypertension     resolved    Lower extremity arterial testing 03/27/2015    Normal perfusion at rest and w/treadmill bilaterally.   R KORI 1.05.  L KORI 1.03.    Murmur     patient reports that she is aware- has been told in the past    Nausea & vomiting     Renal artery stenosis (HCC)     Subclinical hyperthyroidism 8/2015    Unspecified sleep apnea     resolved     Current Outpatient Prescriptions on File Prior to Visit   Medication Sig Dispense Refill    HYDROcodone-acetaminophen (NORCO) 7.5-325 mg per tablet 1 tablet every four hours as needed for pain 100 Tab 0    tobramycin-dexamethasone (TOBRADEX) ophthalmic suspension       predniSONE (DELTASONE) 20 mg tablet 2 tabs daily x 2 days, 1 tab daily x 2 days, 1/2 tab daily x 4 days 8 Tab 0  raNITIdine (ZANTAC) 150 mg tablet Take 1 Tab by mouth two (2) times a day. 60 Tab 1    DALIRESP tab tablet Take 500 mcg by mouth daily.  docusate sodium (COLACE) 100 mg capsule Take 100 mg by mouth two (2) times a day.  AZELASTINE HCL (AZELASTINE OP) Apply 1 Drop to eye daily.  diclofenac (VOLTAREN) 1 % gel Apply 2 g to affected area four (4) times daily. 1 Each 0    pravastatin (PRAVACHOL) 20 mg tablet Take 20 mg by mouth daily.  ondansetron (ZOFRAN ODT) 4 mg disintegrating tablet PLACE 1 TABLET ON TONGUE TWICE A DAY IF NAUSEA 60 Tab 0    montelukast (SINGULAIR) 10 mg tablet Take 1 Tab by mouth daily. 90 Tab 3    lansoprazole (PREVACID) 30 mg capsule Take 1 Cap by mouth daily. 90 Cap 3    clopidogrel (PLAVIX) 75 mg tablet Take 1 Tab by mouth daily. 90 Tab 3    fluticasone-salmeterol (ADVAIR DISKUS) 250-50 mcg/dose diskus inhaler Take 1 Puff by inhalation daily. 1 Inhaler 5     No current facility-administered medications on file prior to visit. Weight Metrics 6/30/2017 6/22/2017 5/25/2017 5/19/2017 5/17/2017 5/10/2017 5/3/2017   Weight 171 lb 173 lb 171 lb 9.6 oz 169 lb 171 lb 171 lb 170 lb   BMI 33.4 kg/m2 33.79 kg/m2 33.51 kg/m2 33.01 kg/m2 33.4 kg/m2 33.4 kg/m2 33.2 kg/m2     Visit Vitals    /74    Pulse 84    Temp 97.9 °F (36.6 °C) (Oral)    Resp 16    Ht 5' (1.524 m)    Wt 77.6 kg (171 lb)    SpO2 98%    BMI 33.4 kg/m2     Appears well  Abd: palpable port in mid abdomen, very tender to palpation    A/P: Ongoing nausea, GERD, and port pain s/p Lap-Band--advised pt be seen in ED if sx such as severe, debilitating pain recur. Otherwise, cont PPI every day, low CHO diet, and add vit D3 5000IU every day to regimen.   F/u 3-6 months, sooner prn  Counseling>50% of 15 minute visit  Latonya Guzman PA-C

## 2017-07-06 RX ORDER — RANITIDINE 150 MG/1
TABLET, FILM COATED ORAL
Qty: 60 TAB | Refills: 0 | Status: SHIPPED | OUTPATIENT
Start: 2017-07-06 | End: 2017-07-31 | Stop reason: SDUPTHER

## 2017-07-10 ENCOUNTER — HOSPITAL ENCOUNTER (OUTPATIENT)
Dept: MRI IMAGING | Age: 62
Discharge: HOME | End: 2017-07-10
Attending: ORTHOPAEDIC SURGERY
Payer: COMMERCIAL

## 2017-07-10 ENCOUNTER — HOSPITAL ENCOUNTER (OUTPATIENT)
Dept: GENERAL RADIOLOGY | Age: 62
Discharge: HOME | End: 2017-07-10
Attending: ORTHOPAEDIC SURGERY
Payer: COMMERCIAL

## 2017-07-10 DIAGNOSIS — M25.551 CHRONIC RIGHT HIP PAIN: ICD-10-CM

## 2017-07-10 DIAGNOSIS — G89.29 CHRONIC RIGHT HIP PAIN: ICD-10-CM

## 2017-07-10 PROCEDURE — A9579 GAD-BASE MR CONTRAST NOS,1ML: HCPCS | Performed by: ORTHOPAEDIC SURGERY

## 2017-07-10 PROCEDURE — 74011636320 HC RX REV CODE- 636/320: Performed by: ORTHOPAEDIC SURGERY

## 2017-07-10 PROCEDURE — 74011000250 HC RX REV CODE- 250: Performed by: ORTHOPAEDIC SURGERY

## 2017-07-10 PROCEDURE — 77002 NEEDLE LOCALIZATION BY XRAY: CPT

## 2017-07-10 PROCEDURE — 73722 MRI JOINT OF LWR EXTR W/DYE: CPT

## 2017-07-10 RX ORDER — SODIUM CHLORIDE 9 MG/ML
10 INJECTION INTRAMUSCULAR; INTRAVENOUS; SUBCUTANEOUS
Status: COMPLETED | OUTPATIENT
Start: 2017-07-10 | End: 2017-07-10

## 2017-07-10 RX ORDER — LIDOCAINE HYDROCHLORIDE 10 MG/ML
30 INJECTION, SOLUTION EPIDURAL; INFILTRATION; INTRACAUDAL; PERINEURAL
Status: COMPLETED | OUTPATIENT
Start: 2017-07-10 | End: 2017-07-10

## 2017-07-10 RX ADMIN — SODIUM CHLORIDE 10 ML: 9 INJECTION, SOLUTION INTRAMUSCULAR; INTRAVENOUS; SUBCUTANEOUS at 11:00

## 2017-07-10 RX ADMIN — LIDOCAINE HYDROCHLORIDE 15 ML: 10 INJECTION, SOLUTION EPIDURAL; INFILTRATION; INTRACAUDAL; PERINEURAL at 11:00

## 2017-07-10 RX ADMIN — GADOPENTETATE DIMEGLUMINE 0.1 ML: 469.01 INJECTION INTRAVENOUS at 11:00

## 2017-07-10 RX ADMIN — IOPAMIDOL 7 ML: 408 INJECTION, SOLUTION INTRATHECAL at 11:00

## 2017-07-17 ENCOUNTER — TELEPHONE (OUTPATIENT)
Dept: ORTHOPEDIC SURGERY | Age: 62
End: 2017-07-17

## 2017-07-20 RX ORDER — SERTRALINE HYDROCHLORIDE 50 MG/1
1 TABLET, FILM COATED ORAL DAILY
COMMUNITY
Start: 2017-04-20 | End: 2017-07-20 | Stop reason: SDUPTHER

## 2017-07-21 RX ORDER — SERTRALINE HYDROCHLORIDE 50 MG/1
50 TABLET, FILM COATED ORAL DAILY
Qty: 90 TAB | Refills: 3 | Status: SHIPPED | OUTPATIENT
Start: 2017-07-21 | End: 2018-02-13 | Stop reason: ALTCHOICE

## 2017-07-24 RX ORDER — TIAGABINE HYDROCHLORIDE 2 MG/1
TABLET, FILM COATED ORAL
Qty: 60 TAB | Refills: 0 | Status: SHIPPED | OUTPATIENT
Start: 2017-07-24 | End: 2018-02-21

## 2017-07-25 RX ORDER — HYDROCODONE BITARTRATE AND ACETAMINOPHEN 7.5; 325 MG/1; MG/1
TABLET ORAL
Qty: 100 TAB | Refills: 0 | Status: SHIPPED | OUTPATIENT
Start: 2017-07-25 | End: 2017-08-25 | Stop reason: SDUPTHER

## 2017-07-25 NOTE — TELEPHONE ENCOUNTER
Called patient to let her know that her RX is ready for  in the office. Patient has already signed agreement and did drug screening.

## 2017-07-31 DIAGNOSIS — J30.9 ALLERGIC RHINITIS: ICD-10-CM

## 2017-07-31 DIAGNOSIS — K21.9 GASTROESOPHAGEAL REFLUX DISEASE, ESOPHAGITIS PRESENCE NOT SPECIFIED: ICD-10-CM

## 2017-07-31 RX ORDER — LANSOPRAZOLE 30 MG/1
CAPSULE, DELAYED RELEASE ORAL
Qty: 90 CAP | Refills: 2 | Status: SHIPPED | OUTPATIENT
Start: 2017-07-31 | End: 2017-09-13 | Stop reason: CLARIF

## 2017-07-31 RX ORDER — PRAVASTATIN SODIUM 20 MG/1
TABLET ORAL
Qty: 90 TAB | Refills: 2 | Status: SHIPPED | OUTPATIENT
Start: 2017-07-31 | End: 2018-02-13

## 2017-07-31 RX ORDER — ROFLUMILAST 500 UG/1
TABLET ORAL
Qty: 90 TAB | Refills: 2 | Status: SHIPPED | OUTPATIENT
Start: 2017-07-31 | End: 2018-04-05 | Stop reason: SDUPTHER

## 2017-07-31 RX ORDER — CLOPIDOGREL BISULFATE 75 MG/1
TABLET ORAL
Qty: 90 TAB | Refills: 2 | Status: SHIPPED | OUTPATIENT
Start: 2017-07-31 | End: 2018-03-01 | Stop reason: SDUPTHER

## 2017-07-31 RX ORDER — RANITIDINE 150 MG/1
TABLET, FILM COATED ORAL
Qty: 60 TAB | Refills: 3 | Status: SHIPPED | OUTPATIENT
Start: 2017-07-31 | End: 2017-12-04 | Stop reason: SDUPTHER

## 2017-07-31 RX ORDER — MONTELUKAST SODIUM 10 MG/1
TABLET ORAL
Qty: 90 TAB | Refills: 2 | Status: SHIPPED | OUTPATIENT
Start: 2017-07-31 | End: 2018-04-05 | Stop reason: SDUPTHER

## 2017-07-31 NOTE — TELEPHONE ENCOUNTER
Requested Prescriptions     Pending Prescriptions Disp Refills    raNITIdine (ZANTAC) 150 mg tablet 60 Tab 3     Sig: TAKE ONE TABLET BY MOUTH TWICE A DAY

## 2017-08-03 ENCOUNTER — TELEPHONE (OUTPATIENT)
Dept: INTERNAL MEDICINE CLINIC | Age: 62
End: 2017-08-03

## 2017-08-03 NOTE — TELEPHONE ENCOUNTER
Prior Auth request received from Pharmacy for Hydrocodone-acetaminophen. PA initiated on [email or web address]. Approval received from insurance. Patient contacted and pharmacy faxed insurance decision.

## 2017-08-09 ENCOUNTER — OFFICE VISIT (OUTPATIENT)
Dept: ORTHOPEDIC SURGERY | Facility: CLINIC | Age: 62
End: 2017-08-09

## 2017-08-09 VITALS
WEIGHT: 171.4 LBS | HEIGHT: 60 IN | SYSTOLIC BLOOD PRESSURE: 134 MMHG | BODY MASS INDEX: 33.65 KG/M2 | HEART RATE: 78 BPM | RESPIRATION RATE: 16 BRPM | TEMPERATURE: 97.6 F | OXYGEN SATURATION: 100 % | DIASTOLIC BLOOD PRESSURE: 88 MMHG

## 2017-08-09 DIAGNOSIS — M25.551 CHRONIC RIGHT HIP PAIN: ICD-10-CM

## 2017-08-09 DIAGNOSIS — G89.29 CHRONIC RIGHT HIP PAIN: ICD-10-CM

## 2017-08-09 DIAGNOSIS — M70.61 TROCHANTERIC BURSITIS OF RIGHT HIP: Primary | ICD-10-CM

## 2017-08-09 RX ORDER — BETAMETHASONE SODIUM PHOSPHATE AND BETAMETHASONE ACETATE 3; 3 MG/ML; MG/ML
6 INJECTION, SUSPENSION INTRA-ARTICULAR; INTRALESIONAL; INTRAMUSCULAR; SOFT TISSUE ONCE
Qty: 1 ML | Refills: 0
Start: 2017-08-09 | End: 2017-08-09

## 2017-08-09 NOTE — PROGRESS NOTES
Patient: Vito Richards                MRN: [id number]       SSN: [id number]  YOB: 1955        AGE: 63 y.o. SEX: female  Body mass index is 33.47 kg/(m^2). PCP: Liliana Paula MD  08/09/17    HISTORY: I had the pleasure of reviewing Fred Bob. I sent Fred Bob for a scan of her hip. She does have a chronic, partial, minimus tear and possible fraying of the labrum, but I do not believe it is bad enough to do any surgery. There is an old gluteus minimus tendon tear. They also noticed some edema inflammation at the adductor fede insertion with a 1.6 x 1.2 fluid structure that they are considering a ganglion cyst or vascular lesion and, as such, we are going to obtain an MRI with contrast just to confirm the benigness of this. There is no AVN or fracture and just mild arthritis involving the hip itself. I asked her where she is hurting today. She actually points to the low back. PHYSICAL EXAMINATION:  Her physical exam is consistent with low back pain. Mainly it is the low back. Her hip actually rotates fairly well. There is not too much groin pain. She has fairly well-preserved motion including internal rotation and flexion, and there is some tenderness over the greater trochanter laterally. She walks mainly holding her back stiffly, but really, the hip is not that involved compared to the back. RADIOGRAPHS:  Review of her MRI is above-mentioned. PROCEDURE:  Under aseptic conditions and after informed, written consent with a time out, the right trochanteric bursa was injected with 1 cc of the Celestone preparation, i.e. 6 mg, which was well tolerated. PLAN:  She is going to return to see us in about one months time to check on her progress, but I think we can manage her hip nonoperatively. We will be reviewing the MRI with contrast at the next visit.            REVIEW OF SYSTEMS:      CON: negative for weight loss, fever  EYE: negative for double vision  ENT: negative for hoarseness  RS:   negative for Tb  GI:    negative for blood in stool  :  negative for blood in urine  Other systems reviewed and noted below. Past Medical History:   Diagnosis Date    Aorto-iliac duplex 09/17/2015    Patent abdom aorta endovascular graft w/o leak. Mod stenosis suggested in right limb.  Calculus of kidney 2014    stent/kidney     Carotid duplex 09/17/2015    Mild < 50% bilateral plaquing.  Cataract, left     Chronic obstructive pulmonary disease ([address]     Echocardiogram 11/04/2014    EF 56%. No WMA. Mild LAE. Echo is within normal limits.  Esophageal reflux     Generalized osteoarthrosis, involving multiple sites     GERD (gastroesophageal reflux disease)     Gout     Hypercholesteremia     Hypertension     resolved    Lower extremity arterial testing 03/27/2015    Normal perfusion at rest and w/treadmill bilaterally.   R KORI 1.05.  L KORI 1.03.    Murmur     patient reports that she is aware- has been told in the past    Nausea & vomiting     Renal artery stenosis (HCC)     Subclinical hyperthyroidism 8/2015    Unspecified sleep apnea     resolved       Family History   Problem Relation Age of Onset    Cancer Mother     Diabetes Father     Alcohol abuse Father     Heart Disease Maternal Grandmother     Alzheimer Maternal Grandmother     Diabetes Sister        Current Outpatient Prescriptions   Medication Sig Dispense Refill    lansoprazole (PREVACID) 30 mg capsule TAKE 1 CAPSULE DAILY 90 Cap 2    montelukast (SINGULAIR) 10 mg tablet TAKE 1 TABLET DAILY 90 Tab 2    clopidogrel (PLAVIX) 75 mg tab TAKE 1 TABLET DAILY 90 Tab 2    DALIRESP tab tablet TAKE 1 TABLET DAILY 90 Tab 2    pravastatin (PRAVACHOL) 20 mg tablet TAKE 1 TABLET DAILY 90 Tab 2    raNITIdine (ZANTAC) 150 mg tablet TAKE ONE TABLET BY MOUTH TWICE A DAY 60 Tab 3    HYDROcodone-acetaminophen (NORCO) 7.5-325 mg per tablet 1 tablet every four hours as needed for pain 100 Tab 0    sertraline (ZOLOFT) 50 mg tablet Take 1 Tab by mouth daily. 90 Tab 3    DOC-Q-LACE 100 mg capsule TAKE ONE CAPSULE BY MOUTH DAILY 90 Cap 2    tobramycin-dexamethasone (TOBRADEX) ophthalmic suspension       docusate sodium (COLACE) 100 mg capsule Take 100 mg by mouth two (2) times a day.  AZELASTINE HCL (AZELASTINE OP) Apply 1 Drop to eye daily.  diclofenac (VOLTAREN) 1 % gel Apply 2 g to affected area four (4) times daily. 1 Each 0    ondansetron (ZOFRAN ODT) 4 mg disintegrating tablet PLACE 1 TABLET ON TONGUE TWICE A DAY IF NAUSEA 60 Tab 0    fluticasone-salmeterol (ADVAIR DISKUS) 250-50 mcg/dose diskus inhaler Take 1 Puff by inhalation daily. 1 Inhaler 5    tiaGABine (GABITRIL) 2 mg tablet TAKE ONE TABLET BY MOUTH TWICE A DAY WITH MEALS 60 Tab 0    predniSONE (DELTASONE) 20 mg tablet 2 tabs daily x 2 days, 1 tab daily x 2 days, 1/2 tab daily x 4 days 8 Tab 0       Allergies   Allergen Reactions    Pcn [Penicillins] Anaphylaxis    Tetracycline Anaphylaxis    Sulfabenzamide Hives       Past Surgical History:   Procedure Laterality Date    ADJUSTMENT GASTRIC BAND N/A 11/10/2016    MAKAYLA Cullen    HX APPENDECTOMY      HX BLADDER SUSPENSION      HX CHOLECYSTECTOMY      HX GASTRIC BYPASS      lap band 2012    HX HEENT Right 1990s, 2016    Ear sx    HX HYSTERECTOMY      HX OTHER SURGICAL  10/27/14     Bilateral open femoral exposures, Placement of catheter and sheath in the aorta Endo repair of aortic aneurysm with modular bifurcated device  Interpretation of angiography, intravascular ultrasound (IVUS) catheter    HX OTHER SURGICAL  10/27/2014    Endurant II abdominal stent graft implant    HX UROLOGICAL      stent    VASCULAR SURGERY PROCEDURE UNLIST      surgery for abdominal aneurysm       Social History     Social History    Marital status:      Spouse name: N/A    Number of children: N/A    Years of education: N/A     Occupational History    Not on file. Social History Main Topics    Smoking status: Former Smoker     Packs/day: 0.00     Years: 0.00     Types: Cigarettes     Quit date: 1/25/2016    Smokeless tobacco: Never Used    Alcohol use No    Drug use: No    Sexual activity: Yes     Birth control/ protection: Surgical     Other Topics Concern    Not on file     Social History Narrative       Visit Vitals    /88    Pulse 78    Temp 97.6 °F (36.4 °C)    Resp 16    Ht 5' (1.524 m)    Wt 171 lb 6.4 oz (77.7 kg)    SpO2 100%    BMI 33.47 kg/m2         PHYSICAL EXAMINATION:  GENERAL: Alert and oriented x3, in no acute distress, well-developed, well-nourished, afebrile. HEART: No JVD. EYES: No scleral icterus   NECK: No significant lymphadenopathy   LUNGS: No respiratory compromise or indrawing  ABDOMEN: Soft, non-tender, non-distended. Electronically signed by:  Phyliss Habermann, MD

## 2017-08-17 ENCOUNTER — TELEPHONE (OUTPATIENT)
Dept: ORTHOPEDIC SURGERY | Facility: CLINIC | Age: 62
End: 2017-08-17

## 2017-08-17 NOTE — TELEPHONE ENCOUNTER
This message was entered on behalf of Jenna Flores, "[employer]", [phone]. [employer] has approved our MRI request of the Right Hip, however, the MRI of the Pelvis DID NOT meet medical criteria. A peer to peer review may be obtained by calling [phone], Patient ID [id number]. Please complete no later than 08/22/17 and notify [facility] via e-mail @ [email or web address] or phone regarding the results of the review. Thank you.

## 2017-08-25 ENCOUNTER — APPOINTMENT (OUTPATIENT)
Dept: MRI IMAGING | Age: 62
End: 2017-08-25
Attending: ORTHOPAEDIC SURGERY
Payer: COMMERCIAL

## 2017-08-25 ENCOUNTER — HOSPITAL ENCOUNTER (OUTPATIENT)
Dept: MRI IMAGING | Age: 62
Discharge: HOME | End: 2017-08-25
Attending: ORTHOPAEDIC SURGERY
Payer: COMMERCIAL

## 2017-08-25 DIAGNOSIS — M25.551 CHRONIC RIGHT HIP PAIN: ICD-10-CM

## 2017-08-25 DIAGNOSIS — G89.29 CHRONIC RIGHT HIP PAIN: ICD-10-CM

## 2017-08-25 PROCEDURE — 73723 MRI JOINT LWR EXTR W/O&W/DYE: CPT

## 2017-08-25 PROCEDURE — A9585 GADOBUTROL INJECTION: HCPCS | Performed by: ORTHOPAEDIC SURGERY

## 2017-08-25 PROCEDURE — 74011250636 HC RX REV CODE- 250/636: Performed by: ORTHOPAEDIC SURGERY

## 2017-08-25 RX ORDER — HYDROCODONE BITARTRATE AND ACETAMINOPHEN 7.5; 325 MG/1; MG/1
TABLET ORAL
Qty: 100 TAB | Refills: 0 | Status: SHIPPED | OUTPATIENT
Start: 2017-08-25 | End: 2017-10-04 | Stop reason: SDUPTHER

## 2017-08-25 RX ADMIN — GADOBUTROL 7 ML: 604.72 INJECTION INTRAVENOUS at 15:43

## 2017-08-29 ENCOUNTER — TELEPHONE (OUTPATIENT)
Dept: INTERNAL MEDICINE CLINIC | Age: 62
End: 2017-08-29

## 2017-08-29 NOTE — TELEPHONE ENCOUNTER
Patient came in and picked up her Norco prescription. Zuhair Altman out on [facility], could not make a copy for patient to sign.

## 2017-08-31 ENCOUNTER — OFFICE VISIT (OUTPATIENT)
Dept: ORTHOPEDIC SURGERY | Age: 62
End: 2017-08-31

## 2017-08-31 VITALS
SYSTOLIC BLOOD PRESSURE: 144 MMHG | DIASTOLIC BLOOD PRESSURE: 80 MMHG | HEART RATE: 89 BPM | BODY MASS INDEX: 34.12 KG/M2 | HEIGHT: 60 IN | WEIGHT: 173.8 LBS | RESPIRATION RATE: 18 BRPM

## 2017-08-31 DIAGNOSIS — M25.551 RIGHT HIP PAIN: ICD-10-CM

## 2017-08-31 DIAGNOSIS — M54.16 LUMBAR RADICULOPATHY: ICD-10-CM

## 2017-08-31 DIAGNOSIS — M46.1 SACROILIITIS (HCC): ICD-10-CM

## 2017-08-31 DIAGNOSIS — M47.816 SPONDYLOSIS OF LUMBAR REGION WITHOUT MYELOPATHY OR RADICULOPATHY: Primary | ICD-10-CM
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
## 2017-08-31 NOTE — MR AVS SNAPSHOT
Visit Information Date & Time Provider Department Dept. Phone Encounter #  
 8/31/2017  9:20 AM Kenji Solis MD [facility] [phone] [id number] Follow-up Instructions Return for in December 2017. Your Appointments 9/5/2017  2:45 PM  
Follow Up with Nico Escobar MD  
[facility] Appt Note: FU KODAK HIPS  
 [address], [address] [place]  
[phone]  
  
   
 [address] [facility], [address] [facility] [address]  
  
    
 10/27/2017  8:00 AM  
PROCEDURE with [facility] ([facility]) Appt Note: evar 1 yr knaak; MAILED APPT CARDS AND PREP TO PATIENT; UNABLE TO REACH VERBALLY TO GIVE APPT DATES; .  
 [phone] [facility]  
[phone] [address]  
  
    
 11/9/2017 10:00 AM  
Follow Up with [facility] ([facility]) Appt Note: 1 yr fu after endograft at [facility] on 10/27/17 with prep; MAILED APPT CARDS AND PREP TO PATIENT; UNABLE TO REACH VERBALLY TO GIVE APPT DATES; .  
 [phone] [facility]  
[phone] [address]  
  
    
 11/27/2017 10:10 AM  
Nurse Visit with [facility] WB NURSE [facility] ([facility]) Appt Note: Return in about 1 year (around 12/5/2017) for UA  
 [address] 
[address] [place] [address]  
[phone]  
  
   
 [address] [place] [address]  
  
    
  
 12/5/2017 10:00 AM  
Any with Tawny Bee MD  
[facility] ([facility]) Appt Note: Return in about 1 year [address] [place] [address]  
[address] [facility] [address] [facility] [address] Upcoming Health Maintenance Date Due ZOSTER VACCINE AGE 60> 12/11/2016 FOBT Q 1 YEAR AGE 50-75 7/1/2017 INFLUENZA AGE 9 TO ADULT 8/1/2017 Bone Densitometry 9/24/2017 BREAST CANCER SCRN MAMMOGRAM 6/1/2018 PAP AKA CERVICAL CYTOLOGY 8/12/2019 DTaP/Tdap/Td series (2 - Td) 9/1/2026 Allergies as of 8/31/2017  Review Complete On: 8/31/2017 By: Bere Jackson MD  
  
 Severity Noted Reaction Type Reactions Pcn [Penicillins] High 09/18/2014    Anaphylaxis Tetracycline High 09/18/2014    Anaphylaxis Sulfabenzamide  09/18/2014    Hives Current Immunizations  Reviewed on 9/1/2016 Name Date Influenza Vaccine (Quad) PF 9/1/2016 Pneumococcal Vaccine (Unspecified Type) 3/5/2013 Tdap 9/1/2016 Not reviewed this visit You Were Diagnosed With   
  
 Codes Comments Spondylosis of lumbar region without myelopathy or radiculopathy    -  Primary ICD-10-CM: M47.816 ICD-9-CM: 721.3 Sacroiliitis ([address]     ICD-10-CM: M46.1 ICD-9-CM: 720.2 Right hip pain     ICD-10-CM: M25.551 ICD-9-CM: 719.45 Lumbar radiculopathy     ICD-10-CM: M54.16 
ICD-9-CM: 724.4 Vitals BP Pulse Resp Height(growth percentile) Weight(growth percentile) BMI  
 144/80 89 18 5' (1.524 m) 173 lb 12.8 oz (78.8 kg) 33.94 kg/m2 OB Status Smoking Status Hysterectomy Former Smoker BMI and BSA Data Body Mass Index Body Surface Area 33.94 kg/m 2 1.83 m 2 Preferred Pharmacy Pharmacy Name Phone [facility]le[facility]pasture [address] [facility] [phone] Your Updated Medication List  
  
   
This list is accurate as of: 8/31/17 10:47 AM.  Always use your most recent med list.  
  
  
  
  
 AZELASTINE OP Apply 1 Drop to eye daily. clopidogrel 75 mg Tab Commonly known as:  PLAVIX TAKE 1 TABLET DAILY * COLACE 100 mg capsule Generic drug:  docusate sodium Take 100 mg by mouth two (2) times a day. * DOC-Q-LACE 100 mg capsule Generic drug:  docusate sodium TAKE ONE CAPSULE BY MOUTH DAILY DALIRESP Tab tablet Generic drug:  roflumilast  
TAKE 1 TABLET DAILY  
  
 diclofenac 1 % Gel Commonly known as:  VOLTAREN Apply 2 g to affected area four (4) times daily. fluticasone-salmeterol 250-50 mcg/dose diskus inhaler Commonly known as:  ADVAIR DISKUS Take 1 Puff by inhalation daily. HYDROcodone-acetaminophen 7.5-325 mg per tablet Commonly known as:  NORCO  
1 tablet every four hours as needed for pain  
  
 lansoprazole 30 mg capsule Commonly known as:  PREVACID TAKE 1 CAPSULE DAILY  
  
 montelukast 10 mg tablet Commonly known as:  SINGULAIR  
TAKE 1 TABLET DAILY  
  
 ondansetron 4 mg disintegrating tablet Commonly known as:  ZOFRAN ODT PLACE 1 TABLET ON TONGUE TWICE A DAY IF NAUSEA  
  
 pravastatin 20 mg tablet Commonly known as:  PRAVACHOL  
TAKE 1 TABLET DAILY predniSONE 20 mg tablet Commonly known as:  DELTASONE  
2 tabs daily x 2 days, 1 tab daily x 2 days, 1/2 tab daily x 4 days  
  
 raNITIdine 150 mg tablet Commonly known as:  ZANTAC TAKE ONE TABLET BY MOUTH TWICE A DAY  
  
 sertraline 50 mg tablet Commonly known as:  ZOLOFT Take 1 Tab by mouth daily. tiaGABine 2 mg tablet Commonly known as:  GABITRIL  
TAKE ONE TABLET BY MOUTH TWICE A DAY WITH MEALS  
  
 tobramycin-dexamethasone ophthalmic suspension Commonly known as:  Cris Diaz * Notice: This list has 2 medication(s) that are the same as other medications prescribed for you. Read the directions carefully, and ask your doctor or other care provider to review them with you. Follow-up Instructions Return for in December 2017. Please provide this summary of care documentation to your next provider. Your primary care clinician is listed as Lit Dhaliwal.  If you have any questions after today's visit, please call [phone].

## 2017-09-01 NOTE — TELEPHONE ENCOUNTER
Patient called in to see if she could get results over the phone because she will not be able to make her 09/05/17 appt and next opening is not until October, patient states that she can not wait that long. Please advise patient at [phone].

## 2017-09-05 ENCOUNTER — OFFICE VISIT (OUTPATIENT)
Dept: ORTHOPEDIC SURGERY | Facility: CLINIC | Age: 62
End: 2017-09-05

## 2017-09-05 VITALS
SYSTOLIC BLOOD PRESSURE: 128 MMHG | RESPIRATION RATE: 17 BRPM | BODY MASS INDEX: 33.96 KG/M2 | TEMPERATURE: 95.8 F | HEART RATE: 75 BPM | WEIGHT: 173 LBS | DIASTOLIC BLOOD PRESSURE: 80 MMHG | OXYGEN SATURATION: 98 % | HEIGHT: 60 IN

## 2017-09-05 DIAGNOSIS — S73.191A ACETABULAR LABRUM TEAR, RIGHT, INITIAL ENCOUNTER: ICD-10-CM

## 2017-09-05 DIAGNOSIS — M25.551 RIGHT HIP PAIN: Primary | ICD-10-CM

## 2017-09-05 DIAGNOSIS — R20.0 NUMBNESS AND TINGLING OF RIGHT LOWER EXTREMITY: ICD-10-CM

## 2017-09-05 DIAGNOSIS — R20.2 NUMBNESS AND TINGLING OF RIGHT LOWER EXTREMITY: ICD-10-CM

## 2017-09-05 NOTE — PROGRESS NOTES
Patient: Negro Martin                MRN: [id number]       SSN: [id number]  YOB: 1955        AGE: 63 y.o. SEX: female  Body mass index is 33.79 kg/(m^2). PCP: Yahaira Rodriguez MD  09/05/17    HISTORY: Ms. Madhav Lima returns in followup, and I think she has three ongoing problems, low back pain and a numb lower tibia, recurrent bursitis with a small tear of the abductors, which is chronic, and likely a labral tear causing some groin discomfort. I do not think she needs a hip replacement currently. PHYSICAL EXAMINATION:  On examination today, she holds her back very stiffly, and that seems to be the most tender of things. She has decreased sensation to L4-L5. EHL is a -5/5. Tib/ant seems to be 5/5. Straight leg raise is just equivocal.  MRI of the lumbar spine really does not show a lot. We are going to get an EMG nerve conduction study for her numb right lower extremity. With regards to her hip, she still has tenderness over the greater trochanter laterally and with internal rotation, flexion, and adduction, it reproduces some groin discomfort. RADIOGRAPHS:  The MRI with contrast did indicate the possibility of a labral tear with only mild arthritis involving the hip itself. PLAN:    At this point, I would recommend an intra-articular injection with Cortisone and Lidocaine for both diagnostic and therapeutic reasons. If it takes pain away in the groin area, then I think an arthroscopy would be indicated. Conversely, if there is no pain relief, I would not recommend surgery for the hip, and I think we need an EMG nerve conduction study, but certainly, today, on the exam, she is most tender in the low lumbar spine from my point of view. We will certainly address all three issues, and I will see her back in the not too distant future.            REVIEW OF SYSTEMS:      CON: negative for weight loss, fever  EYE: negative for double vision  ENT: negative for hoarseness  RS: negative for Tb  GI:    negative for blood in stool  :  negative for blood in urine  Other systems reviewed and noted below. Past Medical History:   Diagnosis Date    Aorto-iliac duplex 09/17/2015    Patent abdom aorta endovascular graft w/o leak. Mod stenosis suggested in right limb.  Calculus of kidney 2014    stent/kidney     Carotid duplex 09/17/2015    Mild < 50% bilateral plaquing.  Cataract, left     Chronic obstructive pulmonary disease ([address]     Echocardiogram 11/04/2014    EF 56%. No WMA. Mild LAE. Echo is within normal limits.  Esophageal reflux     Generalized osteoarthrosis, involving multiple sites     GERD (gastroesophageal reflux disease)     Gout     Hypercholesteremia     Hypertension     resolved    Lower extremity arterial testing 03/27/2015    Normal perfusion at rest and w/treadmill bilaterally.   R KORI 1.05.  L KORI 1.03.    Murmur     patient reports that she is aware- has been told in the past    Nausea & vomiting     Renal artery stenosis (HCC)     Subclinical hyperthyroidism 8/2015    Unspecified sleep apnea     resolved       Family History   Problem Relation Age of Onset    Cancer Mother     Diabetes Father     Alcohol abuse Father     Heart Disease Maternal Grandmother     Alzheimer Maternal Grandmother     Diabetes Sister        Current Outpatient Prescriptions   Medication Sig Dispense Refill    HYDROcodone-acetaminophen (Jenni Leyland) 7.5-325 mg per tablet 1 tablet every four hours as needed for pain 100 Tab 0    lansoprazole (PREVACID) 30 mg capsule TAKE 1 CAPSULE DAILY 90 Cap 2    montelukast (SINGULAIR) 10 mg tablet TAKE 1 TABLET DAILY 90 Tab 2    clopidogrel (PLAVIX) 75 mg tab TAKE 1 TABLET DAILY 90 Tab 2    DALIRESP tab tablet TAKE 1 TABLET DAILY 90 Tab 2    pravastatin (PRAVACHOL) 20 mg tablet TAKE 1 TABLET DAILY 90 Tab 2    raNITIdine (ZANTAC) 150 mg tablet TAKE ONE TABLET BY MOUTH TWICE A DAY 60 Tab 3    tiaGABine (GABITRIL) 2 mg tablet TAKE ONE TABLET BY MOUTH TWICE A DAY WITH MEALS 60 Tab 0    sertraline (ZOLOFT) 50 mg tablet Take 1 Tab by mouth daily. 90 Tab 3    DOC-Q-LACE 100 mg capsule TAKE ONE CAPSULE BY MOUTH DAILY 90 Cap 2    tobramycin-dexamethasone (TOBRADEX) ophthalmic suspension       predniSONE (DELTASONE) 20 mg tablet 2 tabs daily x 2 days, 1 tab daily x 2 days, 1/2 tab daily x 4 days 8 Tab 0    AZELASTINE HCL (AZELASTINE OP) Apply 1 Drop to eye daily.  diclofenac (VOLTAREN) 1 % gel Apply 2 g to affected area four (4) times daily. 1 Each 0    ondansetron (ZOFRAN ODT) 4 mg disintegrating tablet PLACE 1 TABLET ON TONGUE TWICE A DAY IF NAUSEA 60 Tab 0    fluticasone-salmeterol (ADVAIR DISKUS) 250-50 mcg/dose diskus inhaler Take 1 Puff by inhalation daily. 1 Inhaler 5    docusate sodium (COLACE) 100 mg capsule Take 100 mg by mouth two (2) times a day. Allergies   Allergen Reactions    Pcn [Penicillins] Anaphylaxis    Tetracycline Anaphylaxis    Sulfabenzamide Hives       Past Surgical History:   Procedure Laterality Date    ADJUSTMENT GASTRIC BAND N/A 11/10/2016    MAKAYLA Cullen    HX APPENDECTOMY      HX BLADDER SUSPENSION      HX CHOLECYSTECTOMY      HX GASTRIC BYPASS      lap band 2012    HX HEENT Right 1990s, 2016    Ear sx    HX HYSTERECTOMY      HX OTHER SURGICAL  10/27/14     Bilateral open femoral exposures, Placement of catheter and sheath in the aorta Endo repair of aortic aneurysm with modular bifurcated device  Interpretation of angiography, intravascular ultrasound (IVUS) catheter    HX OTHER SURGICAL  10/27/2014    Endurant II abdominal stent graft implant    HX UROLOGICAL      stent    VASCULAR SURGERY PROCEDURE UNLIST      surgery for abdominal aneurysm       Social History     Social History    Marital status:      Spouse name: N/A    Number of children: N/A    Years of education: N/A     Occupational History    Not on file.      Social History Main Topics    Smoking status: Former Smoker     Packs/day: 0.00     Years: 0.00     Types: Cigarettes     Quit date: 1/25/2016    Smokeless tobacco: Never Used    Alcohol use No    Drug use: No    Sexual activity: Yes     Birth control/ protection: Surgical     Other Topics Concern    Not on file     Social History Narrative       Visit Vitals    /80    Pulse 75    Temp 95.8 °F (35.4 °C) (Oral)    Resp 17    Ht 5' (1.524 m)    Wt 173 lb (78.5 kg)    SpO2 98%    BMI 33.79 kg/m2         PHYSICAL EXAMINATION:  GENERAL: Alert and oriented x3, in no acute distress, well-developed, well-nourished, afebrile. HEART: No JVD. EYES: No scleral icterus   NECK: No significant lymphadenopathy   LUNGS: No respiratory compromise or indrawing  ABDOMEN: Soft, non-tender, non-distended. Electronically signed by:  Angelica Crooks MD

## 2017-09-05 NOTE — TELEPHONE ENCOUNTER
Ok to provide results    1. Cystic process seen on prior MRI arthrogram is most consistent with a varix.     2.  Partial tear of the gluteus minimus and tendinopathy of the gluteus medius    No surgical intervention at this time. Can monitor.

## 2017-09-06 DIAGNOSIS — M25.551 RIGHT HIP PAIN: ICD-10-CM

## 2017-09-06 RX ORDER — DICLOFENAC SODIUM 10 MG/G
2 GEL TOPICAL 4 TIMES DAILY
Qty: 1 EACH | Refills: 2 | Status: SHIPPED | OUTPATIENT
Start: 2017-09-06 | End: 2018-05-25 | Stop reason: SDUPTHER

## 2017-09-06 NOTE — TELEPHONE ENCOUNTER
Requested Prescriptions     Pending Prescriptions Disp Refills    diclofenac (VOLTAREN) 1 % gel 1 Each 0     Sig: Apply 2 g to affected area four (4) times daily.

## 2017-09-13 ENCOUNTER — OFFICE VISIT (OUTPATIENT)
Dept: INTERNAL MEDICINE CLINIC | Age: 62
End: 2017-09-13

## 2017-09-13 VITALS
OXYGEN SATURATION: 99 % | HEART RATE: 70 BPM | SYSTOLIC BLOOD PRESSURE: 128 MMHG | HEIGHT: 60 IN | BODY MASS INDEX: 33.77 KG/M2 | DIASTOLIC BLOOD PRESSURE: 80 MMHG | RESPIRATION RATE: 16 BRPM | WEIGHT: 172 LBS | TEMPERATURE: 97.8 F

## 2017-09-13 DIAGNOSIS — G47.00 INSOMNIA, UNSPECIFIED TYPE: ICD-10-CM

## 2017-09-13 DIAGNOSIS — K21.9 GASTROESOPHAGEAL REFLUX DISEASE, ESOPHAGITIS PRESENCE NOT SPECIFIED: Primary | ICD-10-CM

## 2017-09-13 DIAGNOSIS — I10 ESSENTIAL HYPERTENSION: ICD-10-CM

## 2017-09-13 DIAGNOSIS — Z23 ENCOUNTER FOR IMMUNIZATION: ICD-10-CM

## 2017-09-13 RX ORDER — HYDROGEN PEROXIDE 3 %
20 SOLUTION, NON-ORAL MISCELLANEOUS DAILY
Qty: 30 CAP | Refills: 1 | Status: SHIPPED | OUTPATIENT
Start: 2017-09-13

## 2017-09-13 RX ORDER — MIRTAZAPINE 15 MG/1
15 TABLET, FILM COATED ORAL
Qty: 30 TAB | Refills: 1 | Status: SHIPPED | OUTPATIENT
Start: 2017-09-13 | End: 2017-11-08 | Stop reason: SDUPTHER

## 2017-09-13 NOTE — PROGRESS NOTES
Bernard Corbett is a 63 y.o. female presenting today for Heartburn and Sleep Problem  . HPI:  Bernard Corbett presents to the office today for increase GERD. Patient noted she consistently taste acid in throat and has burning. She continues to take her Zantac and Prevacid. Her last Endoscopy is unknown. She also noted she has poor sleep patterns. She admits to falling asleep easily but can't stay asleep. Review of Systems   Constitutional: Negative for chills and fever. Respiratory: Positive for cough. Negative for sputum production and shortness of breath. Cardiovascular: Negative for chest pain and palpitations. Gastrointestinal: Positive for heartburn. Psychiatric/Behavioral: The patient has insomnia. Allergies   Allergen Reactions    Pcn [Penicillins] Anaphylaxis    Tetracycline Anaphylaxis    Sulfabenzamide Hives       Current Outpatient Prescriptions   Medication Sig Dispense Refill    esomeprazole (NEXIUM) 20 mg capsule Take 1 Cap by mouth daily. STOP PREVACID  Indications: HEARTBURN 30 Cap 1    mirtazapine (REMERON) 15 mg tablet Take 1 Tab by mouth nightly. 30 Tab 1    diclofenac (VOLTAREN) 1 % gel Apply 2 g to affected area four (4) times daily. 1 Each 2    HYDROcodone-acetaminophen (NORCO) 7.5-325 mg per tablet 1 tablet every four hours as needed for pain 100 Tab 0    montelukast (SINGULAIR) 10 mg tablet TAKE 1 TABLET DAILY 90 Tab 2    clopidogrel (PLAVIX) 75 mg tab TAKE 1 TABLET DAILY 90 Tab 2    DALIRESP tab tablet TAKE 1 TABLET DAILY 90 Tab 2    pravastatin (PRAVACHOL) 20 mg tablet TAKE 1 TABLET DAILY 90 Tab 2    raNITIdine (ZANTAC) 150 mg tablet TAKE ONE TABLET BY MOUTH TWICE A DAY 60 Tab 3    sertraline (ZOLOFT) 50 mg tablet Take 1 Tab by mouth daily. 90 Tab 3    docusate sodium (COLACE) 100 mg capsule Take 100 mg by mouth two (2) times a day.       ondansetron (ZOFRAN ODT) 4 mg disintegrating tablet PLACE 1 TABLET ON TONGUE TWICE A DAY IF NAUSEA 60 Tab 0    fluticasone-salmeterol (ADVAIR DISKUS) 250-50 mcg/dose diskus inhaler Take 1 Puff by inhalation daily. 1 Inhaler 5    tiaGABine (GABITRIL) 2 mg tablet TAKE ONE TABLET BY MOUTH TWICE A DAY WITH MEALS 60 Tab 0    tobramycin-dexamethasone (TOBRADEX) ophthalmic suspension          Past Medical History:   Diagnosis Date    Aorto-iliac duplex 09/17/2015    Patent abdom aorta endovascular graft w/o leak. Mod stenosis suggested in right limb.  Calculus of kidney 2014    stent/kidney     Carotid duplex 09/17/2015    Mild < 50% bilateral plaquing.  Cataract, left     Chronic obstructive pulmonary disease ([address]     Echocardiogram 11/04/2014    EF 56%. No WMA. Mild LAE. Echo is within normal limits.  Esophageal reflux     Generalized osteoarthrosis, involving multiple sites     GERD (gastroesophageal reflux disease)     Gout     Hypercholesteremia     Hypertension     resolved    Lower extremity arterial testing 03/27/2015    Normal perfusion at rest and w/treadmill bilaterally.   R KORI 1.05.  L KORI 1.03.    Murmur     patient reports that she is aware- has been told in the past    Nausea & vomiting     Renal artery stenosis ([address]     Subclinical hyperthyroidism 8/2015    Unspecified sleep apnea     resolved       Past Surgical History:   Procedure Laterality Date    ADJUSTMENT GASTRIC BAND N/A 11/10/2016    MAKAYLA Cullen    HX APPENDECTOMY      HX BLADDER SUSPENSION      HX CHOLECYSTECTOMY      HX GASTRIC BYPASS      lap band 2012    HX HEENT Right 1990s, 2016    Ear sx    HX HYSTERECTOMY      HX OTHER SURGICAL  10/27/14     Bilateral open femoral exposures, Placement of catheter and sheath in the aorta Endo repair of aortic aneurysm with modular bifurcated device  Interpretation of angiography, intravascular ultrasound (IVUS) catheter    HX OTHER SURGICAL  10/27/2014    Endurant II abdominal stent graft implant    HX UROLOGICAL      stent    VASCULAR SURGERY PROCEDURE UNLIST surgery for abdominal aneurysm       Social History     Social History    Marital status:      Spouse name: N/A    Number of children: N/A    Years of education: N/A     Occupational History    Not on file. Social History Main Topics    Smoking status: Former Smoker     Packs/day: 0.00     Years: 0.00     Types: Cigarettes     Quit date: 1/25/2016    Smokeless tobacco: Never Used    Alcohol use No    Drug use: No    Sexual activity: Yes     Birth control/ protection: Surgical     Other Topics Concern    Not on file     Social History Narrative       Patient does not have an advanced directive on file    Vitals:    09/13/17 1018   BP: 128/80   Pulse: 70   Resp: 16   Temp: 97.8 °F (36.6 °C)   TempSrc: Tympanic   SpO2: 99%   Weight: 172 lb (78 kg)   Height: 5' (1.524 m)   PainSc:   8   PainLoc: Abdomen       Physical Exam   Constitutional: No distress. Cardiovascular: Normal rate, regular rhythm and normal heart sounds. Pulmonary/Chest: Effort normal and breath sounds normal.   Abdominal: Soft. Neurological: She is alert. Nursing note and vitals reviewed. Lab Only on 06/22/2017   Component Date Value Ref Range Status    Summary 06/22/2017 FINAL   Final    Comment: ====================================================================  [facility] COMP DRUG ANALYSIS,[facility]  ====================================================================  Specimen Alert  Note:  Urinary creatinine is low; ability to detect some  drugs may be compromised.   Interpret results  with caution.  ====================================================================  Test                             Result       Flag       Units  Drug Present and Declared for Prescription Verification    Hydrocodone                    1336         EXPECTED   ng/mg creat    Hydromorphone                  450          EXPECTED   ng/mg creat    Dihydrocodeine                 421          EXPECTED   ng/mg creat    Norhydrocodone 5321         EXPECTED   ng/mg creat     Sources of hydrocodone include scheduled prescription     medications. Hydromorphone, dihydrocodeine and norhydrocodone are     expected metabolites of hydrocodone. Hydromorphone and     dihydrocodeine are also availab                           le as scheduled prescription     medications. Drug Present not Declared for Prescription Verification    Acetaminophen                  PRESENT      UNEXPECTED  Drug Absent but Declared for Prescription Verification    Topiramate                     Not Detected UNEXPECTED    Sertraline                     Not Detected UNEXPECTED  ====================================================================  Test                      Result    Flag   Units      Ref Range    Creatinine              14        L      mg/dL      >=20  ====================================================================  Declared Medications: The flagging and interpretation on this report are based on the   following declared medications. Unexpected results may arise from   inaccuracies in the declared medications. **Note: The testing scope of this panel includes these medications:   Hydrocodone   Sertraline (Zoloft)   Topiramate (Topamax)  ====================================================================                             For clinical consultation, please call [phone].  ====================================================================         . No results found for any visits on 09/13/17. Assessment / Plan:      ICD-10-CM ICD-9-CM    1. Gastroesophageal reflux disease, esophagitis presence not specified K21.9 530.81 esomeprazole (NEXIUM) 20 mg capsule      REFERRAL TO GASTROENTEROLOGY   2. Insomnia, unspecified type G47.00 780.52 mirtazapine (REMERON) 15 mg tablet   3. Essential hypertension I10 401.9    4.  Encounter for immunization Z23 V03.89 INFLUENZA VIRUS VAC QUAD,SPLIT,PRESV FREE SYRINGE IM      MS IMMUNIZ ADMIN,1 SINGLE/COMB VAC/TOXOID       Stop Prevacid  Try Nexium  Referral to GI  Remeron rx   Influenza vaccine today  F/u prn    Follow-up Disposition:  Return if symptoms worsen or fail to improve. I asked the patient if she  had any questions and answered her  questions.   The patient stated that she understands the treatment plan and agrees with the treatment plan

## 2017-09-13 NOTE — PATIENT INSTRUCTIONS
Vaccine Information Statement    Influenza (Flu) Vaccine (Inactivated or Recombinant): What you need to know    Many Vaccine Information Statements are available in Yoruba and other languages. See www.immunize.org/vis  Hojas de Información Sobre Vacunas están disponibles en Español y en muchos otros idiomas. Visite www.immunize.org/vis    1. Why get vaccinated? Influenza (flu) is a contagious disease that spreads around the [place] every year, usually between October and May. Flu is caused by influenza viruses, and is spread mainly by coughing, sneezing, and close contact. Anyone can get flu. Flu strikes suddenly and can last several days. Symptoms vary by age, but can include:   fever/chills   sore throat   muscle aches   fatigue   cough   headache    runny or stuffy nose    Flu can also lead to pneumonia and blood infections, and cause diarrhea and seizures in children. If you have a medical condition, such as heart or lung disease, flu can make it worse. Flu is more dangerous for some people. Infants and young children, people 72years of age and older, pregnant women, and people with certain health conditions or a weakened immune system are at greatest risk. Each year thousands of people in the [facility] die from flu, and many more are hospitalized. Flu vaccine can:   keep you from getting flu,   make flu less severe if you do get it, and   keep you from spreading flu to your family and other people. 2. Inactivated and recombinant flu vaccines    A dose of flu vaccine is recommended every flu season. Children 6 months through 6years of age may need two doses during the same flu season. Everyone else needs only one dose each flu season.        Some inactivated flu vaccines contain a very small amount of a mercury-based preservative called thimerosal. Studies have not shown thimerosal in vaccines to be harmful, but flu vaccines that do not contain thimerosal are available. There is no live flu virus in flu shots. They cannot cause the flu. There are many flu viruses, and they are always changing. Each year a new flu vaccine is made to protect against three or four viruses that are likely to cause disease in the upcoming flu season. But even when the vaccine doesnt exactly match these viruses, it may still provide some protection    Flu vaccine cannot prevent:   flu that is caused by a virus not covered by the vaccine, or   illnesses that look like flu but are not. It takes about 2 weeks for protection to develop after vaccination, and protection lasts through the flu season. 3. Some people should not get this vaccine    Tell the person who is giving you the vaccine:     If you have any severe, life-threatening allergies. If you ever had a life-threatening allergic reaction after a dose of flu vaccine, or have a severe allergy to any part of this vaccine, you may be advised not to get vaccinated. Most, but not all, types of flu vaccine contain a small amount of egg protein.  If you ever had Guillain-Barré Syndrome (also called GBS). Some people with a history of GBS should not get this vaccine. This should be discussed with your doctor.  If you are not feeling well. It is usually okay to get flu vaccine when you have a mild illness, but you might be asked to come back when you feel better. 4. Risks of a vaccine reaction    With any medicine, including vaccines, there is a chance of reactions. These are usually mild and go away on their own, but serious reactions are also possible. Most people who get a flu shot do not have any problems with it.      Minor problems following a flu shot include:    soreness, redness, or swelling where the shot was given     hoarseness   sore, red or itchy eyes   cough   fever   aches   headache   itching   fatigue  If these problems occur, they usually begin soon after the shot and last 1 or 2 days. More serious problems following a flu shot can include the following:     There may be a small increased risk of Guillain-Barré Syndrome (GBS) after inactivated flu vaccine. This risk has been estimated at 1 or 2 additional cases per million people vaccinated. This is much lower than the risk of severe complications from flu, which can be prevented by flu vaccine.  Young children who get the flu shot along with pneumococcal vaccine (PCV13) and/or DTaP vaccine at the same time might be slightly more likely to have a seizure caused by fever. Ask your doctor for more information. Tell your doctor if a child who is getting flu vaccine has ever had a seizure. Problems that could happen after any injected vaccine:      People sometimes faint after a medical procedure, including vaccination. Sitting or lying down for about 15 minutes can help prevent fainting, and injuries caused by a fall. Tell your doctor if you feel dizzy, or have vision changes or ringing in the ears.  Some people get severe pain in the shoulder and have difficulty moving the arm where a shot was given. This happens very rarely.  Any medication can cause a severe allergic reaction. Such reactions from a vaccine are very rare, estimated at about 1 in a million doses, and would happen within a few minutes to a few hours after the vaccination. As with any medicine, there is a very remote chance of a vaccine causing a serious injury or death. The safety of vaccines is always being monitored. For more information, visit: www.cdc.gov/vaccinesafety/    5. What if there is a serious reaction? What should I look for?  Look for anything that concerns you, such as signs of a severe allergic reaction, very high fever, or unusual behavior.     Signs of a severe allergic reaction can include hives, swelling of the face and throat, difficulty breathing, a fast heartbeat, dizziness, and weakness - usually within a few minutes to a few hours after the vaccination. What should I do?  If you think it is a severe allergic reaction or other emergency that cant wait, call 9-1-1 and get the person to the nearest hospital. Otherwise, call your doctor.  Reactions should be reported to the Vaccine Adverse Event Reporting System (VAERS). Your doctor should file this report, or you can do it yourself through  the VAERS web site at www.vaers. [facility].gov, or by calling 4-161.148.9720. VAERS does not give medical advice. 6. The National Vaccine Injury Compensation Program    The [facility] Vaccine Injury Compensation Program (VICP) is a federal program that was created to compensate people who may have been injured by certain vaccines. Persons who believe they may have been injured by a vaccine can learn about the program and about filing a claim by calling 9-820.349.9098 or visiting the [employer] website at www.[facility].gov/vaccinecompensation. There is a time limit to file a claim for compensation. 7. How can I learn more?  Ask your healthcare provider. He or she can give you the vaccine package insert or suggest other sources of information.  Call your local or state health department.  Contact the Centers for Disease Control and Prevention (CDC):  - Call 0-475.727.3123 (1-800-CDC-INFO) or  - Visit CDCs website at www.cdc.gov/flu    Vaccine Information Statement   Inactivated Influenza Vaccine   8/7/2015  42 U[address]va [address]ajas [id number]    Department of Health and Human Services  Centers for Disease Control and Prevention    Office Use Only

## 2017-09-13 NOTE — PROGRESS NOTES
Patient presents for   Chief Complaint   Patient presents with    Heartburn    Sleep Problem     Fall risk assessment was not indicated. Depression screening was not indicated Follow up questions were not indicated. 1. Have you been to the ER, urgent care clinic since your last visit? Hospitalized since your last visit? No    2. Have you seen or consulted any other health care providers outside of the [address] [place] [address] since your last visit? Include any pap smears or colon screening. Ashley Sommers denies any symptoms , reactions or allergies that would exclude them from being immunized today. Influenza vaccine administered per order. Risks and adverse reactions were discussed and the VIS was given to them. All questions were addressed. She was observed for 15 min post injection. There were no reactions observed. Patient left office ambulatory.

## 2017-09-15 ENCOUNTER — HOSPITAL ENCOUNTER (OUTPATIENT)
Dept: GENERAL RADIOLOGY | Age: 62
Discharge: HOME | End: 2017-09-15
Attending: ORTHOPAEDIC SURGERY
Payer: COMMERCIAL

## 2017-09-15 DIAGNOSIS — S73.191A ACETABULAR LABRUM TEAR, RIGHT, INITIAL ENCOUNTER: ICD-10-CM

## 2017-09-15 DIAGNOSIS — M25.551 RIGHT HIP PAIN: ICD-10-CM

## 2017-09-15 PROCEDURE — 74011000250 HC RX REV CODE- 250: Performed by: ORTHOPAEDIC SURGERY

## 2017-09-15 PROCEDURE — 77002 NEEDLE LOCALIZATION BY XRAY: CPT

## 2017-09-15 PROCEDURE — 74011250636 HC RX REV CODE- 250/636: Performed by: ORTHOPAEDIC SURGERY

## 2017-09-15 PROCEDURE — 74011636320 HC RX REV CODE- 636/320: Performed by: ORTHOPAEDIC SURGERY

## 2017-09-15 RX ORDER — METHYLPREDNISOLONE ACETATE 40 MG/ML
40 INJECTION, SUSPENSION INTRA-ARTICULAR; INTRALESIONAL; INTRAMUSCULAR; SOFT TISSUE
Status: COMPLETED | OUTPATIENT
Start: 2017-09-15 | End: 2017-09-15

## 2017-09-15 RX ORDER — LIDOCAINE HYDROCHLORIDE 10 MG/ML
30 INJECTION, SOLUTION EPIDURAL; INFILTRATION; INTRACAUDAL; PERINEURAL
Status: COMPLETED | OUTPATIENT
Start: 2017-09-15 | End: 2017-09-15

## 2017-09-15 RX ADMIN — METHYLPREDNISOLONE ACETATE 40 MG: 40 INJECTION, SUSPENSION INTRA-ARTICULAR; INTRALESIONAL; INTRAMUSCULAR; SOFT TISSUE at 13:00

## 2017-09-15 RX ADMIN — IOPAMIDOL 2 ML: 408 INJECTION, SOLUTION INTRATHECAL at 13:00

## 2017-09-15 RX ADMIN — LIDOCAINE HYDROCHLORIDE 15 ML: 10 INJECTION, SOLUTION EPIDURAL; INFILTRATION; INTRACAUDAL; PERINEURAL at 13:00

## 2017-09-29 ENCOUNTER — HOSPITAL ENCOUNTER (OUTPATIENT)
Dept: ULTRASOUND IMAGING | Age: 62
Discharge: HOME | End: 2017-09-29
Attending: INTERNAL MEDICINE
Payer: COMMERCIAL

## 2017-09-29 ENCOUNTER — HOSPITAL ENCOUNTER (OUTPATIENT)
Dept: LAB | Age: 62
Discharge: HOME | End: 2017-09-29

## 2017-09-29 DIAGNOSIS — K21.9 ESOPHAGEAL REFLUX: ICD-10-CM

## 2017-09-29 LAB
ANION GAP SERPL CALC-SCNC: 3 MMOL/L (ref 3–18)
BUN SERPL-MCNC: 11 MG/DL (ref 7–18)
BUN/CREAT SERPL: 14 (ref 12–20)
CALCIUM SERPL-MCNC: 9.4 MG/DL (ref 8.5–10.1)
CHLORIDE SERPL-SCNC: 108 MMOL/L (ref 100–108)
CO2 SERPL-SCNC: 31 MMOL/L (ref 21–32)
CREAT SERPL-MCNC: 0.77 MG/DL (ref 0.6–1.3)
GLUCOSE SERPL-MCNC: 92 MG/DL (ref 74–99)
POTASSIUM SERPL-SCNC: 5.1 MMOL/L (ref 3.5–5.5)
SODIUM SERPL-SCNC: 142 MMOL/L (ref 136–145)

## 2017-09-29 PROCEDURE — 80048 BASIC METABOLIC PNL TOTAL CA: CPT | Performed by: PHYSICIAN ASSISTANT

## 2017-09-29 PROCEDURE — 36415 COLL VENOUS BLD VENIPUNCTURE: CPT | Performed by: PHYSICIAN ASSISTANT

## 2017-09-29 PROCEDURE — 76705 ECHO EXAM OF ABDOMEN: CPT

## 2017-10-03 ENCOUNTER — OFFICE VISIT (OUTPATIENT)
Dept: ORTHOPEDIC SURGERY | Facility: CLINIC | Age: 62
End: 2017-10-03

## 2017-10-03 VITALS
TEMPERATURE: 96.7 F | BODY MASS INDEX: 33.96 KG/M2 | WEIGHT: 173 LBS | SYSTOLIC BLOOD PRESSURE: 149 MMHG | OXYGEN SATURATION: 99 % | HEART RATE: 72 BPM | DIASTOLIC BLOOD PRESSURE: 81 MMHG | HEIGHT: 60 IN

## 2017-10-03 DIAGNOSIS — S73.191A ACETABULAR LABRUM TEAR, RIGHT, INITIAL ENCOUNTER: ICD-10-CM

## 2017-10-03 DIAGNOSIS — M47.816 SPONDYLOSIS OF LUMBAR REGION WITHOUT MYELOPATHY OR RADICULOPATHY: ICD-10-CM

## 2017-10-03 DIAGNOSIS — M25.551 RIGHT HIP PAIN: Primary | ICD-10-CM

## 2017-10-03 NOTE — PROGRESS NOTES
[address], [address] [facility]  [phone]           Patient: Jazz Sykes                MRN: [id number]       SSN: [id number]  YOB: 1955        AGE: 63 y.o. SEX: female  Body mass index is 33.79 kg/(m^2). PCP: Edvin Parikh MD  10/03/17      This office note has been dictated. REVIEW OF SYSTEMS:  Constitutional: Negative for fever, chills, weight loss and malaise/fatigue. HENT: Negative. Eyes: Negative. Respiratory: Negative. Cardiovascular: Negative. Gastrointestinal: No bowel incontinence or constipation. Genitourinary: No bladder incontinence or saddle anesthesia. Skin: Negative. Neurological: Negative. Endo/Heme/Allergies: Negative. Psychiatric/Behavioral: Negative. Musculoskeletal: As per HPI above. Past Medical History:   Diagnosis Date    Aorto-iliac duplex 09/17/2015    Patent abdom aorta endovascular graft w/o leak. Mod stenosis suggested in right limb.  Calculus of kidney 2014    stent/kidney     Carotid duplex 09/17/2015    Mild < 50% bilateral plaquing.  Cataract, left     Chronic obstructive pulmonary disease ([address] [facility] [address]     Echocardiogram 11/04/2014    EF 56%. No WMA. Mild LAE. Echo is within normal limits.  Esophageal reflux     Generalized osteoarthrosis, involving multiple sites     GERD (gastroesophageal reflux disease)     Gout     Hypercholesteremia     Hypertension     resolved    Lower extremity arterial testing 03/27/2015    Normal perfusion at rest and w/treadmill bilaterally. R KORI 1.05.  L KORI 1.03.    Murmur     patient reports that she is aware- has been told in the past    Nausea & vomiting     Renal artery stenosis ([facility])     Subclinical hyperthyroidism 8/2015    Unspecified sleep apnea     resolved         Current Outpatient Prescriptions:     esomeprazole (NEXIUM) 20 mg capsule, Take 1 Cap by mouth daily.  STOP PREVACID Indications: HEARTBURN, Disp: 30 Cap, Rfl: 1    mirtazapine (REMERON) 15 mg tablet, Take 1 Tab by mouth nightly., Disp: 30 Tab, Rfl: 1    diclofenac (VOLTAREN) 1 % gel, Apply 2 g to affected area four (4) times daily. , Disp: 1 Each, Rfl: 2    HYDROcodone-acetaminophen (NORCO) 7.5-325 mg per tablet, 1 tablet every four hours as needed for pain, Disp: 100 Tab, Rfl: 0    montelukast (SINGULAIR) 10 mg tablet, TAKE 1 TABLET DAILY, Disp: 90 Tab, Rfl: 2    clopidogrel (PLAVIX) 75 mg tab, TAKE 1 TABLET DAILY, Disp: 90 Tab, Rfl: 2    DALIRESP tab tablet, TAKE 1 TABLET DAILY, Disp: 90 Tab, Rfl: 2    pravastatin (PRAVACHOL) 20 mg tablet, TAKE 1 TABLET DAILY, Disp: 90 Tab, Rfl: 2    raNITIdine (ZANTAC) 150 mg tablet, TAKE ONE TABLET BY MOUTH TWICE A DAY, Disp: 60 Tab, Rfl: 3    tiaGABine (GABITRIL) 2 mg tablet, TAKE ONE TABLET BY MOUTH TWICE A DAY WITH MEALS, Disp: 60 Tab, Rfl: 0    sertraline (ZOLOFT) 50 mg tablet, Take 1 Tab by mouth daily. , Disp: 90 Tab, Rfl: 3    docusate sodium (COLACE) 100 mg capsule, Take 100 mg by mouth two (2) times a day., Disp: , Rfl:     ondansetron (ZOFRAN ODT) 4 mg disintegrating tablet, PLACE 1 TABLET ON TONGUE TWICE A DAY IF NAUSEA, Disp: 60 Tab, Rfl: 0    fluticasone-salmeterol (ADVAIR DISKUS) 250-50 mcg/dose diskus inhaler, Take 1 Puff by inhalation daily. , Disp: 1 Inhaler, Rfl: 5    Allergies   Allergen Reactions    Pcn [Penicillins] Anaphylaxis    Tetracycline Anaphylaxis    Sulfabenzamide Hives       Social History     Social History    Marital status:      Spouse name: N/A    Number of children: N/A    Years of education: N/A     Occupational History    Not on file.      Social History Main Topics    Smoking status: Former Smoker     Packs/day: 0.00     Years: 0.00     Types: Cigarettes     Quit date: 1/25/2016    Smokeless tobacco: Never Used    Alcohol use No    Drug use: No    Sexual activity: Yes     Birth control/ protection: Surgical     Other Topics Concern    Not on file     Social History Narrative       Past Surgical History:   Procedure Laterality Date    ADJUSTMENT GASTRIC BAND N/A 11/10/2016    MAKAYLA Cullen    HX APPENDECTOMY      HX BLADDER SUSPENSION      HX CHOLECYSTECTOMY      HX GASTRIC BYPASS      lap band 2012    HX HEENT Right 1990s, 2016    Ear sx    HX HYSTERECTOMY      HX OTHER SURGICAL  10/27/14     Bilateral open femoral exposures, Placement of catheter and sheath in the aorta Endo repair of aortic aneurysm with modular bifurcated device  Interpretation of angiography, intravascular ultrasound (IVUS) catheter    HX OTHER SURGICAL  10/27/2014    Endurant II abdominal stent graft implant    HX UROLOGICAL      stent    VASCULAR SURGERY PROCEDURE UNLIST      surgery for abdominal aneurysm             We did see Ms. Roberto for followup with regards to her right lower extremity. The patient does have some back problems being followed at The [address] She is scheduled to get epidural steroid injections in December. She was seen by Dr. Ferrell Snellen and had an arthrogram MRI consistent with a labral tear. She was sent for an intra-articular injection, and she returns today. She states that the hip is improved from the injection, as well as the bursal injection she had. She is still having mostly right-sided, low back pain with some radiation of symptoms down the right lower extremity. She does state that, however, after the injection, some of the burning pain in the leg has improved. She is getting occasional catching of the hip. She denies any change in her bowel or bladder habits. She has had no fevers, chills, systemic changes, and no injuries to report. PHYSICAL EXAMINATION:  In general, the patient is alert and oriented x 3 in no acute distress. The patient is well-developed, well-nourished, with a normal affect. The patient is afebrile. HEENT:  Head is normocephalic and atraumatic.   Pupils are equally round and reactive to light and accommodation. Extraocular eye movements are intact. Neck is supple. Trachea is midline. No JVD is present. Breathing is nonlabored. Examination of the lower extremities reveals pain-free range of motion of the left hip. The right hip has good range of motion today without real reproduction of symptoms. She does have pain to palpation to the right SI region. There is negative straight leg raise. There is negative calf tenderness. There is negative Radhas. There is no evidence of DVT present. ASSESSMENT:      1. 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 2. Low back pain. 3. Radiculopathy, right lower extremity. PLAN:  At this point, she did get benefit from the intra-articular injection of the right hip. She may benefit from arthroscopy of the right hip. We are going to refer her to Dr. Jennyfer Draper for further evaluation and treatment and possible arthroscopy of the right hip. She will continue with The [address] [place] [address] Avenue with regards to her back. She will call with any questions or concerns that shall arise.                JR Garcia MPAS, PA-C, ATC

## 2017-10-04 NOTE — TELEPHONE ENCOUNTER
Patient would also like her stool softner medication refilled. I did not see it on the med list.  Should be take 2 at bedtime.       Requested Prescriptions     Pending Prescriptions Disp Refills    HYDROcodone-acetaminophen (NORCO) 7.5-325 mg per tablet 100 Tab 0     Si tablet every four hours as needed for pain

## 2017-10-05 RX ORDER — DOCUSATE SODIUM 100 MG/1
100 CAPSULE, LIQUID FILLED ORAL 2 TIMES DAILY
Qty: 60 CAP | Refills: 5 | Status: SHIPPED | OUTPATIENT
Start: 2017-10-05 | End: 2019-07-23 | Stop reason: SDUPTHER

## 2017-10-05 RX ORDER — HYDROCODONE BITARTRATE AND ACETAMINOPHEN 7.5; 325 MG/1; MG/1
TABLET ORAL
Qty: 100 TAB | Refills: 0 | Status: SHIPPED | OUTPATIENT
Start: 2017-10-05 | End: 2018-01-29

## 2017-10-09 ENCOUNTER — ANESTHESIA EVENT (OUTPATIENT)
Dept: ENDOSCOPY | Age: 62
End: 2017-10-09
Payer: COMMERCIAL

## 2017-10-10 ENCOUNTER — HOSPITAL ENCOUNTER (OUTPATIENT)
Age: 62
Setting detail: OUTPATIENT SURGERY
Discharge: HOME | End: 2017-10-10
Attending: INTERNAL MEDICINE | Admitting: INTERNAL MEDICINE
Payer: COMMERCIAL

## 2017-10-10 ENCOUNTER — ANESTHESIA (OUTPATIENT)
Dept: ENDOSCOPY | Age: 62
End: 2017-10-10
Payer: COMMERCIAL

## 2017-10-10 VITALS
HEART RATE: 72 BPM | RESPIRATION RATE: 16 BRPM | WEIGHT: 173 LBS | HEIGHT: 60 IN | SYSTOLIC BLOOD PRESSURE: 114 MMHG | OXYGEN SATURATION: 100 % | TEMPERATURE: 97 F | DIASTOLIC BLOOD PRESSURE: 76 MMHG | BODY MASS INDEX: 33.96 KG/M2

## 2017-10-10 PROCEDURE — 74011250636 HC RX REV CODE- 250/636: Performed by: ANESTHESIOLOGY

## 2017-10-10 PROCEDURE — 74011250636 HC RX REV CODE- 250/636: Performed by: NURSE ANESTHETIST, CERTIFIED REGISTERED

## 2017-10-10 PROCEDURE — 74011250636 HC RX REV CODE- 250/636

## 2017-10-10 PROCEDURE — 74011000258 HC RX REV CODE- 258: Performed by: ANESTHESIOLOGY

## 2017-10-10 PROCEDURE — 76060000031 HC ANESTHESIA FIRST 0.5 HR: Performed by: INTERNAL MEDICINE

## 2017-10-10 PROCEDURE — 74011000250 HC RX REV CODE- 250: Performed by: ANESTHESIOLOGY

## 2017-10-10 PROCEDURE — 77030018846 HC SOL IRR STRL H20 ICUM -A: Performed by: INTERNAL MEDICINE

## 2017-10-10 PROCEDURE — 76040000019: Performed by: INTERNAL MEDICINE

## 2017-10-10 PROCEDURE — 77030008565 HC TBNG SUC IRR ERBE -B: Performed by: INTERNAL MEDICINE

## 2017-10-10 RX ORDER — PROMETHAZINE HYDROCHLORIDE 25 MG/ML
INJECTION, SOLUTION INTRAMUSCULAR; INTRAVENOUS
Status: DISCONTINUED
Start: 2017-10-10 | End: 2017-10-10 | Stop reason: HOSPADM

## 2017-10-10 RX ORDER — ALBUTEROL SULFATE 0.83 MG/ML
2.5 SOLUTION RESPIRATORY (INHALATION) AS NEEDED
Status: DISCONTINUED | OUTPATIENT
Start: 2017-10-10 | End: 2017-10-10 | Stop reason: HOSPADM

## 2017-10-10 RX ORDER — FENTANYL CITRATE 50 UG/ML
25 INJECTION, SOLUTION INTRAMUSCULAR; INTRAVENOUS
Status: DISCONTINUED | OUTPATIENT
Start: 2017-10-10 | End: 2017-10-10 | Stop reason: HOSPADM

## 2017-10-10 RX ORDER — SODIUM CHLORIDE 0.9 % (FLUSH) 0.9 %
5-10 SYRINGE (ML) INJECTION EVERY 8 HOURS
Status: DISCONTINUED | OUTPATIENT
Start: 2017-10-10 | End: 2017-10-10 | Stop reason: HOSPADM

## 2017-10-10 RX ORDER — FENTANYL CITRATE 50 UG/ML
25 INJECTION, SOLUTION INTRAMUSCULAR; INTRAVENOUS ONCE
Status: DISCONTINUED | OUTPATIENT
Start: 2017-10-10 | End: 2017-10-10 | Stop reason: CLARIF

## 2017-10-10 RX ORDER — PROPOFOL 10 MG/ML
INJECTION, EMULSION INTRAVENOUS AS NEEDED
Status: DISCONTINUED | OUTPATIENT
Start: 2017-10-10 | End: 2017-10-10 | Stop reason: HOSPADM

## 2017-10-10 RX ORDER — LIDOCAINE HYDROCHLORIDE 10 MG/ML
0.1 INJECTION, SOLUTION EPIDURAL; INFILTRATION; INTRACAUDAL; PERINEURAL AS NEEDED
Status: DISCONTINUED | OUTPATIENT
Start: 2017-10-10 | End: 2017-10-10 | Stop reason: HOSPADM

## 2017-10-10 RX ORDER — ONDANSETRON 2 MG/ML
4 INJECTION INTRAMUSCULAR; INTRAVENOUS ONCE
Status: COMPLETED | OUTPATIENT
Start: 2017-10-10 | End: 2017-10-10

## 2017-10-10 RX ORDER — DIPHENHYDRAMINE HYDROCHLORIDE 50 MG/ML
12.5 INJECTION, SOLUTION INTRAMUSCULAR; INTRAVENOUS
Status: DISCONTINUED | OUTPATIENT
Start: 2017-10-10 | End: 2017-10-10 | Stop reason: HOSPADM

## 2017-10-10 RX ORDER — SODIUM CHLORIDE 0.9 % (FLUSH) 0.9 %
5-10 SYRINGE (ML) INJECTION AS NEEDED
Status: DISCONTINUED | OUTPATIENT
Start: 2017-10-10 | End: 2017-10-10 | Stop reason: HOSPADM

## 2017-10-10 RX ORDER — FENTANYL CITRATE 50 UG/ML
INJECTION, SOLUTION INTRAMUSCULAR; INTRAVENOUS
Status: COMPLETED
Start: 2017-10-10 | End: 2017-10-10

## 2017-10-10 RX ORDER — SODIUM CHLORIDE, SODIUM LACTATE, POTASSIUM CHLORIDE, CALCIUM CHLORIDE 600; 310; 30; 20 MG/100ML; MG/100ML; MG/100ML; MG/100ML
75 INJECTION, SOLUTION INTRAVENOUS CONTINUOUS
Status: DISCONTINUED | OUTPATIENT
Start: 2017-10-10 | End: 2017-10-10 | Stop reason: HOSPADM

## 2017-10-10 RX ORDER — SODIUM CHLORIDE 900 MG/100ML
INJECTION INTRAVENOUS
Status: DISCONTINUED
Start: 2017-10-10 | End: 2017-10-10 | Stop reason: HOSPADM

## 2017-10-10 RX ORDER — SODIUM CHLORIDE, SODIUM LACTATE, POTASSIUM CHLORIDE, CALCIUM CHLORIDE 600; 310; 30; 20 MG/100ML; MG/100ML; MG/100ML; MG/100ML
50 INJECTION, SOLUTION INTRAVENOUS CONTINUOUS
Status: DISCONTINUED | OUTPATIENT
Start: 2017-10-10 | End: 2017-10-10 | Stop reason: HOSPADM

## 2017-10-10 RX ADMIN — ONDANSETRON 4 MG: 2 INJECTION INTRAMUSCULAR; INTRAVENOUS at 08:14

## 2017-10-10 RX ADMIN — PROPOFOL 20 MG: 10 INJECTION, EMULSION INTRAVENOUS at 07:58

## 2017-10-10 RX ADMIN — SODIUM CHLORIDE, SODIUM LACTATE, POTASSIUM CHLORIDE, AND CALCIUM CHLORIDE: 600; 310; 30; 20 INJECTION, SOLUTION INTRAVENOUS at 07:51

## 2017-10-10 RX ADMIN — FENTANYL CITRATE 25 MCG: 50 INJECTION, SOLUTION INTRAMUSCULAR; INTRAVENOUS at 08:49

## 2017-10-10 RX ADMIN — PROPOFOL 90 MG: 10 INJECTION, EMULSION INTRAVENOUS at 07:55

## 2017-10-10 RX ADMIN — FENTANYL CITRATE 25 MCG: 50 INJECTION, SOLUTION INTRAMUSCULAR; INTRAVENOUS at 08:39

## 2017-10-10 RX ADMIN — SODIUM CHLORIDE, SODIUM LACTATE, POTASSIUM CHLORIDE, AND CALCIUM CHLORIDE 75 ML/HR: 600; 310; 30; 20 INJECTION, SOLUTION INTRAVENOUS at 07:13

## 2017-10-10 RX ADMIN — FENTANYL CITRATE 25 MCG: 50 INJECTION INTRAMUSCULAR; INTRAVENOUS at 08:39

## 2017-10-10 RX ADMIN — PROMETHAZINE HYDROCHLORIDE 12.5 MG: 25 INJECTION INTRAMUSCULAR; INTRAVENOUS at 08:38

## 2017-10-10 RX ADMIN — FAMOTIDINE 20 MG: 10 INJECTION, SOLUTION INTRAVENOUS at 07:13

## 2017-10-10 NOTE — ANESTHESIA POSTPROCEDURE EVALUATION
Post-Anesthesia Evaluation and Assessment    Patient: Sonia Starr MRN: [id number]  SSN: [id number]    YOB: 1955  Age: 63 y.o. Sex: female       Cardiovascular Function/Vital Signs  Visit Vitals    /76 (BP Patient Position: At rest)    Pulse 72    Temp 36.1 °C (97 °F)    Resp 16    Ht 5' (1.524 m)    Wt 78.5 kg (173 lb)    SpO2 100%    Breastfeeding No    BMI 33.79 kg/m2       Patient is status post MAC anesthesia for Procedure(s):  ENDOSCOPY WITH DILATION 54 fr. Nausea/Vomiting: None    Postoperative hydration reviewed and adequate. Pain:  Pain Scale 1: Numeric (0 - 10) (10/10/17 0859)  Pain Intensity 1: 3 (10/10/17 0850)   Managed    Neurological Status: At baseline    Mental Status and Level of Consciousness: Alert and oriented     Pulmonary Status:   O2 Device: Room air (10/10/17 0859)   Adequate oxygenation and airway patent    Complications related to anesthesia: None    Post-anesthesia assessment completed.  No concerns    Signed By: Brook Crespo MD     October 10, 2017

## 2017-10-10 NOTE — PERIOP NOTES
Patient armband removed and shredded    Patient confirmed by two identifiers with discharge instructions prior to being provided to patient    Daughter Rosalina Orozco providing transportation home

## 2017-10-10 NOTE — H&P
H&P is updated and reviewed, physical exam was performed and medications/allergies were reviewed immediately prior to anesthesia.     Vika Maxwell MD

## 2017-10-10 NOTE — ANESTHESIA PREPROCEDURE EVALUATION
Anesthetic History     PONV          Review of Systems / Medical History  Patient summary reviewed and pertinent labs reviewed    Pulmonary    COPD    Sleep apnea (Resolved)           Neuro/Psych   Within defined limits           Cardiovascular    Hypertension: well controlled              Exercise tolerance: >4 METS     GI/Hepatic/Renal     GERD           Endo/Other      Hypothyroidism  Morbid obesity and arthritis     Other Findings   Comments:   Risk Factors for Postoperative nausea/vomiting:       History of postoperative nausea/vomiting? YES       Female? YES       Motion sickness? NO       Intended opioid administration for postoperative analgesia? NO      Smoking Abstinence  Current Smoker? NO  Elective Surgery? YES  Seen preoperatively by anesthesiologist or proxy prior to day of surgery? YES  Pt abstained from smoking 24 hours prior to anesthesia?  N/A           Physical Exam    Airway  Mallampati: II  TM Distance: 4 - 6 cm  Neck ROM: normal range of motion   Mouth opening: Normal     Cardiovascular  Regular rate and rhythm,  S1 and S2 normal,  no murmur, click, rub, or gallop             Dental    Dentition: Full upper dentures     Pulmonary  Breath sounds clear to auscultation               Abdominal  GI exam deferred       Other Findings            Anesthetic Plan    ASA: 2  Anesthesia type: MAC          Induction: Intravenous  Anesthetic plan and risks discussed with: Patient

## 2017-10-10 NOTE — DISCHARGE INSTRUCTIONS
Esophageal Dilation: What to Expect at [address] [place] [address]  After you have esophageal dilation, you will stay at the hospital or surgery center for 1 to 2 hours. This will allow the medicine to wear off. You will be able to go home after your doctor or nurse checks to make sure you are not having any problems. This care sheet gives you a general idea about how long it will take for you to recover. But each person recovers at a different pace. Follow the steps below to get better as quickly as possible. How can you care for yourself at home? Activity  · Rest as much as you need to after you go home. · You should be able to go back to your usual activities the day after the procedure. Diet  · Follow your doctor's directions for eating after the procedure. · Drink plenty of fluids (unless your doctor has told you not to). Medicines  · Your doctor will tell you if and when you can restart your medicines. He or she will also give you instructions about taking any new medicines. · If you take blood thinners, such as warfarin (Coumadin), clopidogrel (Plavix), or aspirin, be sure to talk to your doctor. He or she will tell you if and when to start taking those medicines again. Make sure that you understand exactly what your doctor wants you to do. · If you have a sore throat the day after the procedure, use an over-the-counter spray to numb your throat. Sucking on throat lozenges and gargling with warm salt water may also help relieve your symptoms. Follow-up care is a key part of your treatment and safety. Be sure to make and go to all appointments, and call your doctor if you are having problems. It's also a good idea to know your test results and keep a list of the medicines you take. When should you call for help? Call 911 anytime you think you may need emergency care. For example, call if:  · You passed out (lost consciousness). · You have sudden chest pain and shortness of breath.   · You cough up blood.  · You vomit blood or what looks like coffee grounds. · You pass maroon or very bloody stools. Call your doctor now or seek immediate medical care if:  · You have trouble swallowing. · You have belly pain. · Your stools are black and tarlike or have streaks of blood. · You are sick to your stomach or cannot keep fluids down. · You have signs of infection, such as:  ¨ Increased pain, swelling, warmth, or redness around your throat, neck, or belly. ¨ A fever. Watch closely for changes in your health, and be sure to contact your doctor if:  · Your throat still hurts after a day or two. · You do not get better as expected. Where can you learn more? Go to http://linda-slava.info/. Enter X424 in the search box to learn more about \"Esophageal Dilation: What to Expect at Home. \"  Current as of: August 9, 2016  Content Version: 11.3  © 8667-6807 [employer]. Care instructions adapted under license by [employer] (which disclaims liability or warranty for this information). If you have questions about a medical condition or this instruction, always ask your healthcare professional. [place] [address] any warranty or liability for your use of this information. DISCHARGE SUMMARY from Nurse    The following personal items are in your possession at time of discharge:    Dental Appliances: Uppers  Visual Aid: None  Hearing Aids/Status: Does not own                 PATIENT INSTRUCTIONS:    After general anesthesia or intravenous sedation, for 24 hours or while taking prescription Narcotics:  · Limit your activities  · Do not drive and operate hazardous machinery  · Do not make important personal or business decisions  · Do  not drink alcoholic beverages  · If you have not urinated within 8 hours after discharge, please contact your surgeon on call.     Report the following to your surgeon:  · Excessive pain, swelling, redness or odor of or around the surgical area  · Temperature over 100.5  · Nausea and vomiting lasting longer than 4 hours or if unable to take medications  · Any signs of decreased circulation or nerve impairment to extremity: change in color, persistent  numbness, tingling, coldness or increase pain  · Any questions    *  Please give a list of your current medications to your Primary Care Provider. *  Please update this list whenever your medications are discontinued, doses are      changed, or new medications (including over-the-counter products) are added. *  Please carry medication information at all times in case of emergency situations. These are general instructions for a healthy lifestyle:    No smoking/ No tobacco products/ Avoid exposure to second hand smoke    Surgeon General's Warning:  Quitting smoking now greatly reduces serious risk to your health. Obesity, smoking, and sedentary lifestyle greatly increases your risk for illness    A healthy diet, regular physical exercise & weight monitoring are important for maintaining a healthy lifestyle    You may be retaining fluid if you have a history of heart failure or if you experience any of the following symptoms:  Weight gain of 3 pounds or more overnight or 5 pounds in a week, increased swelling in our hands or feet or shortness of breath while lying flat in bed. Please call your doctor as soon as you notice any of these symptoms; do not wait until your next office visit. Recognize signs and symptoms of STROKE:    F-face looks uneven    A-arms unable to move or move unevenly    S-speech slurred or non-existent    T-time-call 911 as soon as signs and symptoms begin-DO NOT go       Back to bed or wait to see if you get better-TIME IS BRAIN. Warning Signs of HEART ATTACK     Call 911 if you have these symptoms:   Chest discomfort. Most heart attacks involve discomfort in the center of the chest that lasts more than a few minutes, or that goes away and comes back.  It can feel like uncomfortable pressure, squeezing, fullness, or pain.  Discomfort in other areas of the upper body. Symptoms can include pain or discomfort in one or both arms, the back, neck, jaw, or stomach.  Shortness of breath with or without chest discomfort.  Other signs may include breaking out in a cold sweat, nausea, or lightheadedness. Don't wait more than five minutes to call 911 - MINUTES MATTER! Fast action can save your life. Calling 911 is almost always the fastest way to get lifesaving treatment. Emergency Medical Services staff can begin treatment when they arrive -- up to an hour sooner than if someone gets to the hospital by car. The discharge information has been reviewed with the patient and daughter. The patient and daughter verbalized understanding. Discharge medications reviewed with the patient and daughter and appropriate educational materials and side effects teaching were provided.

## 2017-10-10 NOTE — IP AVS SNAPSHOT
Merissa Day 
 
 
 [address] Patient: Jazz Sykes MRN: [id number] LFL:1/85/2576 You are allergic to the following Allergen Reactions Pcn (Penicillins) Anaphylaxis Tetracycline Anaphylaxis Sulfabenzamide Hives Recent Documentation Height Weight Breastfeeding? BMI OB Status Smoking Status 1.524 m 78.5 kg No 33.79 kg/m2 Hysterectomy Former Smoker Emergency Contacts Name Discharge Info Relation Home Work Mobile [facility] DISCHARGE CAREGIVER [3] Daughter [21] [phone] About your hospitalization You were admitted on:  October 10, 2017 You last received care in the:  [facility] You were discharged on:  October 10, 2017 Unit phone number:  [phone] Why you were hospitalized Your primary diagnosis was:  Not on File Providers Seen During Your Hospitalizations Provider Role Specialty Primary office phone Lisa Wang MD Attending Provider Gastroenterology [phone] Your Primary Care Physician (PCP) Primary Care Physician Office Phone Office Fax [phone], [address] [place] [address] [phone] Follow-up Information Follow up With Details Comments Contact Info Edvin Parikh MD   [address] [facility] [address] [phone] Lisa Wang MD   [address] [place] [address] [address] [facility] [address] 
[phone] Your Appointments Friday October 13, 2017  9:00 AM EDT Follow Up with Edvin Parikh MD  
[facility] [address]) [address] [place] [address], [address] [facility] [address] [phone] Wednesday October 25, 2017  9:10 AM EDT New Patient with Dileep Godinez MD  
[address] [place] [address] ([address]) [address], [address] 200 [facility]  
[phone] Friday October 27, 2017  8:00 AM EDT PROCEDURE with [facility] ([address] [place] [address]) [address] [place] [address] 200 [facility]  
[phone] Thursday November 09, 2017 10:00 AM EST Follow Up with [facility] ([address] [place] [address]) [address] [place] [address] 200 [facility]  
[phone] Current Discharge Medication List  
  
CONTINUE these medications which have NOT CHANGED Dose & Instructions Dispensing Information Comments Morning Noon Evening Bedtime  
 clopidogrel 75 mg Tab Commonly known as:  PLAVIX Your last dose was: Your next dose is: TAKE 1 TABLET DAILY Quantity:  90 Tab Refills:  2 DALIRESP Tab tablet Generic drug:  roflumilast  
   
Your last dose was: Your next dose is: TAKE 1 TABLET DAILY Quantity:  90 Tab Refills:  2  
     
   
   
   
  
 diclofenac 1 % Gel Commonly known as:  VOLTAREN Your last dose was: Your next dose is:    
   
   
 Dose:  2 g Apply 2 g to affected area four (4) times daily. Quantity:  1 Each Refills:  2  
     
   
   
   
  
 docusate sodium 100 mg capsule Commonly known as:  Anabela Casisdy Your last dose was: Your next dose is:    
   
   
 Dose:  100 mg Take 1 Cap by mouth two (2) times a day. Quantity:  60 Cap Refills:  5  
     
   
   
   
  
 esomeprazole 20 mg capsule Commonly known as:  NexIUM Your last dose was: Your next dose is:    
   
   
 Dose:  20 mg Take 1 Cap by mouth daily. STOP PREVACID  Indications: HEARTBURN Quantity:  30 Cap Refills:  1  
     
   
   
   
  
 fluticasone-salmeterol 250-50 mcg/dose diskus inhaler Commonly known as:  ADVAIR DISKUS  
   
 Your last dose was: Your next dose is:    
   
   
 Dose:  1 Puff Take 1 Puff by inhalation daily. Quantity:  1 Inhaler Refills:  5 HYDROcodone-acetaminophen 7.5-325 mg per tablet Commonly known as:  Janeldarryn Richey Your last dose was: Your next dose is:    
   
   
 1 tablet every four hours as needed for pain Quantity:  100 Tab Refills:  0 Please note:  New strength and instructions  
    
   
   
   
  
 mirtazapine 15 mg tablet Commonly known as:  Patricia Dom Your last dose was: Your next dose is:    
   
   
 Dose:  15 mg Take 1 Tab by mouth nightly. Quantity:  30 Tab Refills:  1  
     
   
   
   
  
 montelukast 10 mg tablet Commonly known as:  SINGULAIR Your last dose was: Your next dose is: TAKE 1 TABLET DAILY Quantity:  90 Tab Refills:  2  
     
   
   
   
  
 ondansetron 4 mg disintegrating tablet Commonly known as:  ZOFRAN ODT Your last dose was: Your next dose is: PLACE 1 TABLET ON TONGUE TWICE A DAY IF NAUSEA Quantity:  60 Tab Refills:  0  
     
   
   
   
  
 pravastatin 20 mg tablet Commonly known as:  PRAVACHOL Your last dose was: Your next dose is: TAKE 1 TABLET DAILY Quantity:  90 Tab Refills:  2  
     
   
   
   
  
 raNITIdine 150 mg tablet Commonly known as:  ZANTAC Your last dose was: Your next dose is: TAKE ONE TABLET BY MOUTH TWICE A DAY Quantity:  60 Tab Refills:  3  
     
   
   
   
  
 sertraline 50 mg tablet Commonly known as:  ZOLOFT Your last dose was: Your next dose is:    
   
   
 Dose:  50 mg Take 1 Tab by mouth daily. Quantity:  90 Tab Refills:  3  
     
   
   
   
  
 tiaGABine 2 mg tablet Commonly known as:  GABITRIL Your last dose was: Your next dose is:  TAKE ONE TABLET BY MOUTH TWICE A DAY WITH MEALS  
 Quantity:  60 Tab Refills:  0 Discharge Instructions Esophageal Dilation: What to Expect at [facility] Your Recovery After you have esophageal dilation, you will stay at the hospital or surgery center for 1 to 2 hours. This will allow the medicine to wear off. You will be able to go home after your doctor or nurse checks to make sure you are not having any problems. This care sheet gives you a general idea about how long it will take for you to recover. But each person recovers at a different pace. Follow the steps below to get better as quickly as possible. How can you care for yourself at home? Activity · Rest as much as you need to after you go home. · You should be able to go back to your usual activities the day after the procedure. Diet · Follow your doctor's directions for eating after the procedure. · Drink plenty of fluids (unless your doctor has told you not to). Medicines · Your doctor will tell you if and when you can restart your medicines. He or she will also give you instructions about taking any new medicines. · If you take blood thinners, such as warfarin (Coumadin), clopidogrel (Plavix), or aspirin, be sure to talk to your doctor. He or she will tell you if and when to start taking those medicines again. Make sure that you understand exactly what your doctor wants you to do. · If you have a sore throat the day after the procedure, use an over-the-counter spray to numb your throat. Sucking on throat lozenges and gargling with warm salt water may also help relieve your symptoms. Follow-up care is a key part of your treatment and safety. Be sure to make and go to all appointments, and call your doctor if you are having problems. It's also a good idea to know your test results and keep a list of the medicines you take. When should you call for help? Call 911 anytime you think you may need emergency care. For example, call if: · You passed out (lost consciousness). · You have sudden chest pain and shortness of breath. · You cough up blood. · You vomit blood or what looks like coffee grounds. · You pass maroon or very bloody stools. Call your doctor now or seek immediate medical care if: 
· You have trouble swallowing. · You have belly pain. · Your stools are black and tarlike or have streaks of blood. · You are sick to your stomach or cannot keep fluids down. · You have signs of infection, such as: 
¨ Increased pain, swelling, warmth, or redness around your throat, neck, or belly. ¨ A fever. Watch closely for changes in your health, and be sure to contact your doctor if: 
· Your throat still hurts after a day or two. · You do not get better as expected. Where can you learn more? Go to http://linda-slava.info/. Enter L127 in the search box to learn more about \"Esophageal Dilation: What to Expect at Home. \" Current as of: August 9, 2016 Content Version: 11.3 © 4086-0474 [employer]. Care instructions adapted under license by [employer] (which disclaims liability or warranty for this information). If you have questions about a medical condition or this instruction, always ask your healthcare professional. [place] [address] any warranty or liability for your use of this information. DISCHARGE SUMMARY from Nurse The following personal items are in your possession at time of discharge: 
 
Dental Appliances: Uppers Visual Aid: None Hearing Aids/Status: Does not own PATIENT INSTRUCTIONS: 
 
 
F-face looks uneven A-arms unable to move or move unevenly S-speech slurred or non-existent T-time-call 911 as soon as signs and symptoms begin-DO NOT go Back to bed or wait to see if you get better-TIME IS BRAIN. Warning Signs of HEART ATTACK Call 911 if you have these symptoms: ? Chest discomfort. Most heart attacks involve discomfort in the center of the chest that lasts more than a few minutes, or that goes away and comes back. It can feel like uncomfortable pressure, squeezing, fullness, or pain. ? Discomfort in other areas of the upper body. Symptoms can include pain or discomfort in one or both arms, the back, neck, jaw, or stomach. ? Shortness of breath with or without chest discomfort. ? Other signs may include breaking out in a cold sweat, nausea, or lightheadedness. Don't wait more than five minutes to call [address]! Fast action can save your life. Calling 911 is almost always the fastest way to get lifesaving treatment. Emergency Medical Services staff can begin treatment when they arrive  up to an hour sooner than if someone gets to the hospital by car. The discharge information has been reviewed with the patient and daughter. The patient and daughter verbalized understanding. Discharge medications reviewed with the patient and daughter and appropriate educational materials and side effects teaching were provided. Discharge Orders None General Information Please provide this summary of care documentation to your next provider. Patient Signature:  ____________________________________________________________ Date:  ____________________________________________________________  
  
Madi Burgos Provider Signature:  ____________________________________________________________ Date:  ____________________________________________________________

## 2017-10-13 ENCOUNTER — OFFICE VISIT (OUTPATIENT)
Dept: INTERNAL MEDICINE CLINIC | Age: 62
End: 2017-10-13

## 2017-10-13 DIAGNOSIS — M25.551 RIGHT HIP PAIN: Primary | ICD-10-CM

## 2017-10-13 DIAGNOSIS — E78.2 MIXED HYPERLIPIDEMIA: ICD-10-CM

## 2017-10-13 DIAGNOSIS — I10 ESSENTIAL HYPERTENSION: ICD-10-CM

## 2017-10-13 NOTE — PATIENT INSTRUCTIONS
Health Maintenance Due   Topic Date Due    Shingles Vaccine  12/11/2016    Stool testing for trace blood  07/01/2017    Bone Densitometry  09/24/2017

## 2017-10-13 NOTE — MR AVS SNAPSHOT
Visit Information Date & Time Provider Department Dept. Phone Encounter #  
 10/13/2017  9:00 AM Js Foreman MD [facility] INTERNAL MEDICINE OF [facility] [phone] [id number] Your Appointments 10/25/2017  9:10 AM  
New Patient with Gauri Whaley MD  
[facility] [address] [facility] ([address] [place] [address]) Appt Note: RT HIP PAIN/REF TO ELP  [address] [facility] [address]  
[phone] 27 Jessica Almeida, [address]  
  
    
 10/27/2017  8:00 AM  
PROCEDURE with [facility] ([address] [place] [address]) Appt Note: evar 1 yr knaak; MAILED APPT CARDS AND PREP TO PATIENT; UNABLE TO REACH VERBALLY TO GIVE APPT DATES; .  
 [address] [facility] [address]  
[phone] [address]  
  
    
 11/9/2017 10:00 AM  
Follow Up with [facility] ([address] [place] [address]) Appt Note: 1 yr fu after endograft at [facility] on 10/27/17 with prep; MAILED APPT CARDS AND PREP TO PATIENT; UNABLE TO REACH VERBALLY TO GIVE APPT DATES; .  
 [address] Cardenas [facility] 382 200 [facility]  
[phone] [facility] [address]  
  
    
 11/27/2017 10:10 AM  
Nurse Visit with [facility] WB NURSE [facility] ([address] [place] [address]) Appt Note: Return in about 1 year (around 12/5/2017) for UA  
 [address] 
[address] [place] [address]  
1400 [facility] [address] [facility] [address]  
  
    
 12/14/2017 10:20 AM  
Follow Up with Telly De La Fuente MD  
[facility] ([address] [place] [address]) Appt Note: 4MO FU  
 [address] [place] [address]  
[phone]  
  
   
 [address]  
  
    
  
 12/5/2017 10:00 AM  
 Any with Angie Rocha MD  
[facility] ([facility]) Appt Note: Return in about 1 year [address]lene [address]aves [address] [place] [address]  
[address] [place] [address] Upcoming Health Maintenance Date Due ZOSTER VACCINE AGE 60> 12/11/2016 FOBT Q 1 YEAR AGE 50-75 7/1/2017 Bone Densitometry 9/24/2017 BREAST CANCER SCRN MAMMOGRAM 6/1/2018 PAP AKA CERVICAL CYTOLOGY 8/12/2019 DTaP/Tdap/Td series (2 - Td) 9/1/2026 Allergies as of 10/13/2017  Review Complete On: 10/13/2017 By: Mindi Wade LPN Severity Noted Reaction Type Reactions Pcn [Penicillins] High 09/18/2014    Anaphylaxis Tetracycline High 09/18/2014    Anaphylaxis Sulfabenzamide  09/18/2014    Hives Current Immunizations  Reviewed on 9/13/2017 Name Date Influenza Vaccine (Quad) PF 9/13/2017, 9/1/2016 Pneumococcal Vaccine (Unspecified Type) 3/5/2013 Tdap 9/1/2016 Not reviewed this visit Vitals BP Pulse Temp Resp Height(growth percentile) 140/80 (BP 1 Location: Left arm, BP Patient Position: Sitting) 84 97.8 °F (36.6 °C) (Tympanic) 16 5' (1.524 m) Weight(growth percentile) SpO2 BMI OB Status Smoking Status 177 lb (80.3 kg) 100% 34.57 kg/m2 Hysterectomy Former Smoker Vitals History BMI and BSA Data Body Mass Index Body Surface Area 34.57 kg/m 2 1.84 m 2 Preferred Pharmacy Pharmacy Name Phone [facility] Campbell [address] [facility] [phone] Your Updated Medication List  
  
   
This list is accurate as of: 10/13/17 11:01 AM.  Always use your most recent med list.  
  
  
  
  
 clopidogrel 75 mg Tab Commonly known as:  PLAVIX TAKE 1 TABLET DAILY DALIRESP Tab tablet Generic drug:  roflumilast  
TAKE 1 TABLET DAILY  
  
 diclofenac 1 % Gel Commonly known as:  VOLTAREN Apply 2 g to affected area four (4) times daily. docusate sodium 100 mg capsule Commonly known as:  Temple Lecher Take 1 Cap by mouth two (2) times a day. esomeprazole 20 mg capsule Commonly known as:  NexIUM Take 1 Cap by mouth daily. STOP PREVACID  Indications: HEARTBURN  
  
 fluticasone-salmeterol 250-50 mcg/dose diskus inhaler Commonly known as:  ADVAIR DISKUS Take 1 Puff by inhalation daily. HYDROcodone-acetaminophen 7.5-325 mg per tablet Commonly known as:  NORCO  
1 tablet every four hours as needed for pain  
  
 mirtazapine 15 mg tablet Commonly known as:  Fonnie Riches Take 1 Tab by mouth nightly. montelukast 10 mg tablet Commonly known as:  SINGULAIR  
TAKE 1 TABLET DAILY  
  
 ondansetron 4 mg disintegrating tablet Commonly known as:  ZOFRAN ODT PLACE 1 TABLET ON TONGUE TWICE A DAY IF NAUSEA  
  
 pravastatin 20 mg tablet Commonly known as:  PRAVACHOL  
TAKE 1 TABLET DAILY  
  
 raNITIdine 150 mg tablet Commonly known as:  ZANTAC TAKE ONE TABLET BY MOUTH TWICE A DAY  
  
 sertraline 50 mg tablet Commonly known as:  ZOLOFT Take 1 Tab by mouth daily. tiaGABine 2 mg tablet Commonly known as:  GABITRIL  
TAKE ONE TABLET BY MOUTH TWICE A DAY WITH MEALS Patient Instructions Health Maintenance Due Topic Date Due  Shingles Vaccine  12/11/2016  Stool testing for trace blood  07/01/2017  Bone Densitometry  09/24/2017 Please provide this summary of care documentation to your next provider. Your primary care clinician is listed as Landy Judd. If you have any questions after today's visit, please call [phone].

## 2017-10-13 NOTE — PROGRESS NOTES
1. Have you been to the ER, urgent care clinic since your last visit? Hospitalized since your last visit? No    2. Have you seen or consulted any other health care providers outside of the [address] [place] [address] since your last visit? Include any pap smears or colon screening.  No

## 2017-10-16 VITALS
WEIGHT: 177 LBS | OXYGEN SATURATION: 100 % | BODY MASS INDEX: 34.75 KG/M2 | RESPIRATION RATE: 16 BRPM | DIASTOLIC BLOOD PRESSURE: 80 MMHG | SYSTOLIC BLOOD PRESSURE: 140 MMHG | HEART RATE: 84 BPM | HEIGHT: 60 IN | TEMPERATURE: 97.8 F

## 2017-10-16 DIAGNOSIS — M25.551 RIGHT HIP PAIN: Primary | ICD-10-CM

## 2017-10-16 NOTE — PROGRESS NOTES
The patient presents to the office today with the chief complaint of right hip pain    HPI    The patient complains fo many months of severe pain in her right hip. The pain is present at rest but it is intolerable with movement. The patient has undergone an extensive work up with complex abnormalities found in the right hip. The patient remains on medications for hypertension and hyperlipidemia. The patient is tolerating the medications well. Review of Systems   Respiratory: Negative for shortness of breath. Cardiovascular: Negative for chest pain and leg swelling. Musculoskeletal: Positive for joint pain. Allergies   Allergen Reactions    Pcn [Penicillins] Anaphylaxis    Tetracycline Anaphylaxis    Sulfabenzamide Hives       Current Outpatient Prescriptions   Medication Sig Dispense Refill    HYDROcodone-acetaminophen (NORCO) 7.5-325 mg per tablet 1 tablet every four hours as needed for pain 100 Tab 0    docusate sodium (COLACE) 100 mg capsule Take 1 Cap by mouth two (2) times a day. 60 Cap 5    esomeprazole (NEXIUM) 20 mg capsule Take 1 Cap by mouth daily. STOP PREVACID  Indications: HEARTBURN 30 Cap 1    mirtazapine (REMERON) 15 mg tablet Take 1 Tab by mouth nightly. 30 Tab 1    diclofenac (VOLTAREN) 1 % gel Apply 2 g to affected area four (4) times daily. 1 Each 2    montelukast (SINGULAIR) 10 mg tablet TAKE 1 TABLET DAILY 90 Tab 2    clopidogrel (PLAVIX) 75 mg tab TAKE 1 TABLET DAILY 90 Tab 2    DALIRESP tab tablet TAKE 1 TABLET DAILY 90 Tab 2    pravastatin (PRAVACHOL) 20 mg tablet TAKE 1 TABLET DAILY 90 Tab 2    raNITIdine (ZANTAC) 150 mg tablet TAKE ONE TABLET BY MOUTH TWICE A DAY 60 Tab 3    tiaGABine (GABITRIL) 2 mg tablet TAKE ONE TABLET BY MOUTH TWICE A DAY WITH MEALS 60 Tab 0    sertraline (ZOLOFT) 50 mg tablet Take 1 Tab by mouth daily.  90 Tab 3    ondansetron (ZOFRAN ODT) 4 mg disintegrating tablet PLACE 1 TABLET ON TONGUE TWICE A DAY IF NAUSEA 60 Tab 0    fluticasone-salmeterol (ADVAIR DISKUS) 250-50 mcg/dose diskus inhaler Take 1 Puff by inhalation daily. 1 Inhaler 5       Past Medical History:   Diagnosis Date    Aorto-iliac duplex 09/17/2015    Patent abdom aorta endovascular graft w/o leak. Mod stenosis suggested in right limb.  Calculus of kidney 2014    stent/kidney     Carotid duplex 09/17/2015    Mild < 50% bilateral plaquing.  Cataract, left     Chronic obstructive pulmonary disease ([address]     Echocardiogram 11/04/2014    EF 56%. No WMA. Mild LAE. Echo is within normal limits.  Esophageal reflux     Generalized osteoarthrosis, involving multiple sites     GERD (gastroesophageal reflux disease)     Gout     History of renal stent     right side    Hypercholesteremia     Hypertension     resolved    Lower extremity arterial testing 03/27/2015    Normal perfusion at rest and w/treadmill bilaterally.   R KORI 1.05.  L KORI 1.03.    Murmur     patient reports that she is aware- has been told in the past    Nausea & vomiting     Renal artery stenosis ([address]     Subclinical hyperthyroidism 8/2015    Unspecified sleep apnea     resolved       Past Surgical History:   Procedure Laterality Date    ADJUSTMENT GASTRIC BAND N/A 11/10/2016    MAKAYLA Cullen    HX APPENDECTOMY      HX BLADDER SUSPENSION      HX CHOLECYSTECTOMY      HX GASTRIC BYPASS      lap band 2012    HX HEENT Right 1990s, 2016    Ear sx    HX HYSTERECTOMY      HX OTHER SURGICAL  10/27/14     Bilateral open femoral exposures, Placement of catheter and sheath in the aorta Endo repair of aortic aneurysm with modular bifurcated device  Interpretation of angiography, intravascular ultrasound (IVUS) catheter    HX OTHER SURGICAL  10/27/2014    Endurant II abdominal stent graft implant    HX UROLOGICAL      stent    VASCULAR SURGERY PROCEDURE UNLIST      surgery for abdominal aneurysm       Social History     Social History    Marital status:      Spouse name: N/A    Number of children: N/A    Years of education: N/A     Occupational History    Not on file. Social History Main Topics    Smoking status: Former Smoker     Packs/day: 0.00     Years: 0.00     Types: Cigarettes     Quit date: 1/25/2016    Smokeless tobacco: Never Used    Alcohol use No    Drug use: No    Sexual activity: Yes     Birth control/ protection: Surgical     Other Topics Concern    Not on file     Social History Narrative       Patient does not have an advanced directive on file    Visit Vitals    /80 (BP 1 Location: Left arm, BP Patient Position: Sitting)    Pulse 84    Temp 97.8 °F (36.6 °C) (Tympanic)    Resp 16    Ht 5' (1.524 m)    Wt 177 lb (80.3 kg)    SpO2 100%    BMI 34.57 kg/m2       Physical Exam   No Cervical Lymphadenopathy  No Supraclavicular Lymphadenopathy  Thyroid is Normal  Lungs are clear to ausculation and percussion  Heart:  S1 S2 are normal, No gallops, No mummers  No Carotid Bruits  Abdomen:  Normal Bowel Sounds. No tenderness. No masses. No Hepatomegaly or Splenomegly  LE:  Strong Pedal Pulses.   No Edema  Limited range of motion of rotation of the right hip with marked pain with rotation      BMI:  [facility] Outpatient Visit on 09/29/2017   Component Date Value Ref Range Status    Sodium 09/29/2017 142  136 - 145 mmol/L Final    Potassium 09/29/2017 5.1  3.5 - 5.5 mmol/L Final    Chloride 09/29/2017 108  100 - 108 mmol/L Final    CO2 09/29/2017 31  21 - 32 mmol/L Final    Anion gap 09/29/2017 3  3.0 - 18 mmol/L Final    Glucose 09/29/2017 92  74 - 99 mg/dL Final    BUN 09/29/2017 11  7.0 - 18 MG/DL Final    Creatinine 09/29/2017 0.77  0.6 - 1.3 MG/DL Final    BUN/Creatinine ratio 09/29/2017 14  12 - 20   Final    GFR est AA 09/29/2017 >60  >60 ml/min/1.73m2 Final    GFR est non-AA 09/29/2017 >60  >60 ml/min/1.73m2 Final    Comment: (NOTE)  Estimated GFR is calculated using the Modification of Diet in Renal   Disease (MDRD) Study equation, reported for both  Americans   (GFRAA) and non- Americans (GFRNA), and normalized to 1.73m2   body surface area. The physician must decide which value applies to   the patient. The MDRD study equation should only be used in   individuals age 25 or older. It has not been validated for the   following: pregnant women, patients with serious comorbid conditions,   or on certain medications, or persons with extremes of body size,   muscle mass, or nutritional status.  Calcium 09/29/2017 9.4  8.5 - 10.1 MG/DL Final       .No results found for any visits on 10/13/17. Assessment / Plan      ICD-10-CM ICD-9-CM    1. Right hip pain M25.551 719.45    2. Essential hypertension I10 401.9    3. Mixed hyperlipidemia E78.2 272.2        For second opinion regarding options for the hip  she was advised to continue her maintenance medications      Follow-up Disposition:  Return in about 4 weeks (around 11/10/2017). I asked Marck Roberto if she has any questions and I answered the questions. Marck Posada states that she understands the treatment plan and agrees with the treatment plan

## 2017-10-23 RX ORDER — HYDROCODONE BITARTRATE AND ACETAMINOPHEN 10; 325 MG/1; MG/1
1 TABLET ORAL
Qty: 90 TAB | Refills: 0 | Status: SHIPPED | OUTPATIENT
Start: 2017-10-23 | End: 2017-11-27 | Stop reason: SDUPTHER

## 2017-10-23 RX ORDER — HYDROCODONE BITARTRATE AND ACETAMINOPHEN 7.5; 325 MG/1; MG/1
TABLET ORAL
Qty: 100 TAB | Refills: 0 | Status: CANCELLED | OUTPATIENT
Start: 2017-10-23

## 2017-10-23 NOTE — TELEPHONE ENCOUNTER
Requested Prescriptions     Pending Prescriptions Disp Refills    HYDROcodone-acetaminophen (NORCO) 7.5-325 mg per tablet 100 Tab 0     Si tablet every four hours as needed for pain          Patient stated Dr Lalita Diaz is going to up the strength. call when ready to  [phone]

## 2017-10-24 ENCOUNTER — OFFICE VISIT (OUTPATIENT)
Dept: ORTHOPEDIC SURGERY | Age: 62
End: 2017-10-24

## 2017-10-24 VITALS
HEART RATE: 82 BPM | RESPIRATION RATE: 18 BRPM | TEMPERATURE: 96.5 F | HEIGHT: 60 IN | OXYGEN SATURATION: 100 % | BODY MASS INDEX: 34.87 KG/M2 | DIASTOLIC BLOOD PRESSURE: 74 MMHG | WEIGHT: 177.6 LBS | SYSTOLIC BLOOD PRESSURE: 137 MMHG

## 2017-10-24 DIAGNOSIS — S73.191A ACETABULAR LABRUM TEAR, RIGHT, INITIAL ENCOUNTER: Primary | ICD-10-CM

## 2017-10-24 DIAGNOSIS — M47.816 LUMBAR SPONDYLOSIS: ICD-10-CM
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
## 2017-10-24 NOTE — PATIENT INSTRUCTIONS
Hip Sprain: Care Instructions  Your Care Instructions    A hip sprain occurs when you stretch or tear ligaments around your hip. Ligaments are tough tissues that connect one bone to another. You can injure your hip in a fall, when you run, or during sports that involve twisting or sudden direction changes, such as basketball or soccer. Most minor hip sprains get better with treatment at home. Follow-up care is a key part of your treatment and safety. Be sure to make and go to all appointments, and call your doctor if you are having problems. It's also a good idea to know your test results and keep a list of the medicines you take. How can you care for yourself at home? · If your doctor gave you crutches or a walker, use them as directed. · Rest and protect your hip. Try to stop or reduce any action that causes pain. · Put ice or a cold pack on your hip for 10 to 20 minutes at a time. Try to do this every 1 to 2 hours for the next 3 days (when you are awake) or until the swelling goes down. Put a thin cloth between the ice and your skin. · Be safe with medicines. Read and follow all instructions on the label. ¨ If the doctor gave you a prescription medicine for pain, take it as prescribed. ¨ If you are not taking a prescription pain medicine, ask your doctor if you can take an over-the-counter medicine. · For the first day or two after an injury, avoid things that might increase swelling, such as hot showers, hot tubs, or hot packs. · After 2 to 3 days, if swelling is gone, put a heating pad (set on low) or warm moist cloth on your hip before you do light stretches. The warmth will help you move your hip. · Do exercises to make your hip stronger, as directed by your doctor or physical therapist.  · Return to your usual level of activity slowly. When should you call for help? Call your doctor now or seek immediate medical care if:  · Your pain is worse.   · You cannot walk or stand without help.  · You have signs of infection, such as a fever or increased pain, swelling, redness, or warmth in your hip. · You have signs of a blood clot, such as:  ¨ Pain in your calf, back of the knee, thigh, or groin. ¨ Redness and swelling in your leg or groin. · You have tingling, weakness, or numbness in your leg, foot, or toes. Watch closely for changes in your health, and be sure to contact your doctor if:  · Your pain does not get better in 2 or 3 days. · You still have pain after 2 weeks. Where can you learn more? Go to http://linda-slava.info/. Enter B883 in the search box to learn more about \"Hip Sprain: Care Instructions. \"  Current as of: March 21, 2017  Content Version: 11.3  © 8810-6676 [employer]. Care instructions adapted under license by [employer] (which disclaims liability or warranty for this information). If you have questions about a medical condition or this instruction, always ask your healthcare professional. [address] any warranty or liability for your use of this information.

## 2017-10-25 ENCOUNTER — TELEPHONE (OUTPATIENT)
Dept: INTERNAL MEDICINE CLINIC | Age: 62
End: 2017-10-25

## 2017-10-25 NOTE — TELEPHONE ENCOUNTER
Patient called to see if her prescription for Norco would be ready to  tomorrow. She also wanted Dr. Agnieszka Peters to know she saw Dr. Emry Siemens yesterday and he feels like he can help her with her hip pain and referring her back to Dr. Neisha Vieyra for her back pain. She felt more confident that they may be able to help her  after seeing Dr. Emry Siemens.

## 2017-10-26 NOTE — TELEPHONE ENCOUNTER
Patient has picked up rx--Dr Kathleen rTejo ok with patient's requests in regards to physicians. Will do new referrals if needed.

## 2017-10-27 ENCOUNTER — OFFICE VISIT (OUTPATIENT)
Dept: VASCULAR SURGERY | Age: 62
End: 2017-10-27

## 2017-10-27 DIAGNOSIS — N28.0 RENAL INFARCT (HCC): ICD-10-CM

## 2017-10-27 DIAGNOSIS — I71.40 ABDOMINAL AORTIC ANEURYSM (AAA) WITHOUT RUPTURE: ICD-10-CM

## 2017-10-27 NOTE — PROCEDURES
[facility]   *** FINAL REPORT ***    Name: Adriane Webb  MRN: [id number]       Outpatient  : 1955  HIS Order #: [id number]  [address] [facility] Visit #: [id number]  Date: 27 Oct 2017    TYPE OF TEST: Aorto-Iliac Duplex    REASON FOR TEST  AAA, Post EVAR 10-27-14. [address] Stent 10-8-15. B-Mode:-                 (cm)   1     2     3  Aortic diameter:         AP:     2.4   2.7                           TV:     2.9   2.9  Common iliac diameter:   Right:                           Left:    Duplex:-                           PSV  Stenosis                           ----- --------------------  Aorta: (1)                55.0 Normal         (2)                51.0         (3)    Right common iliac:       46.0 Normal  Right external iliac:    142.0 Normal    Left common iliac:        38.0 Normal  Left external iliac:      34.0 Normal    INTERPRETATION/FINDINGS  Duplex images were obtained using 2-D gray scale, color flow and  spectral doppler analysis. EVAR:  1. Patent abdominal aorta bi-iliac endovascular graft with no evidence   of leak or limb dysfunction. 2. Residual sac measures 2.44 x 2.52cm compared to 2.6 x 2.6cm on  16 duplex. 3. Patent limbs bilaterally with no evidence of significant stenosis  with multiphasic flow. Elevated velocity at the distal right  attachment could be due to size change of vessel. 4. Patent proximal external iliac arteries bilaterally with  multiphasic flow. 5. Patent celiac, superior mesenteric, left renal and right renal  artery stent. ADDITIONAL COMMENTS  Graft Body 26c/s. Sup Attach 45c/s. Celiac 149c/s. SMA 223c/s. LRA  57c/s. RRA Stent 144c/s, 173c/s. Lt Limb Prox 38c/s, mid 40c/s, dst  52c/s, dst attach 45c/s, outflow 34c/s. Rt Limb prox 46c/s, mid 36c/s,   dst 39c/s, dst attach 131c/s, Rt IIA 149c/s, OF 142c/s. I have personally reviewed the data relevant to the interpretation of  this  study. TECHNOLOGIST: Jamari Christensen RVT, RDMS  Signed: 10/27/2017 09:30 AM    PHYSICIAN: Negro Woodruff D.O.   Signed: 10/27/2017 10:00 AM

## 2017-11-08 DIAGNOSIS — G47.00 INSOMNIA, UNSPECIFIED TYPE: ICD-10-CM

## 2017-11-08 RX ORDER — MIRTAZAPINE 15 MG/1
TABLET, FILM COATED ORAL
Qty: 30 TAB | Refills: 0 | Status: SHIPPED | OUTPATIENT
Start: 2017-11-08 | End: 2017-12-04 | Stop reason: SDUPTHER

## 2017-11-09 ENCOUNTER — OFFICE VISIT (OUTPATIENT)
Dept: VASCULAR SURGERY | Age: 62
End: 2017-11-09

## 2017-11-09 VITALS
HEIGHT: 60 IN | WEIGHT: 177 LBS | DIASTOLIC BLOOD PRESSURE: 78 MMHG | SYSTOLIC BLOOD PRESSURE: 138 MMHG | HEART RATE: 80 BPM | BODY MASS INDEX: 34.75 KG/M2

## 2017-11-09 DIAGNOSIS — I71.40 ABDOMINAL AORTIC ANEURYSM (AAA) WITHOUT RUPTURE: Primary | ICD-10-CM

## 2017-11-09 DIAGNOSIS — I83.811 VARICOSE VEINS OF LEG WITH PAIN, RIGHT: ICD-10-CM

## 2017-11-09 DIAGNOSIS — I65.23 CAROTID ARTERY STENOSIS, ASYMPTOMATIC, BILATERAL: ICD-10-CM

## 2017-11-09 DIAGNOSIS — I70.1 RENAL ARTERY STENOSIS (HCC): ICD-10-CM

## 2017-11-09 NOTE — MR AVS SNAPSHOT
Visit Information Date & Time Provider Department Dept. Phone Encounter #  
 11/9/2017 10:00 AM Sammi Gonzalezs, [address] [facility] [phone] [id number] Follow-up Instructions Return in about 1 year (around 11/9/2018). Your Appointments 11/20/2017  9:45 AM  
Follow Up with Viktor Roper MD  
[address] [place] [address]) Appt Note: 1 month [address] [facility], [address] [facility]  
[phone]  
  
   
 [address] [facility], [address] [facility] [address]  
  
    
 11/27/2017 10:10 AM  
Nurse Visit with [facility] ([facility]) Appt Note: Return in about 1 year (around 12/5/2017) for UA  
 [address] 
[address] [facility] [address]  
[address] [facility] [address] [facility] [address]  
  
    
 12/14/2017 10:20 AM  
Follow Up with Kevin Zuluaga MD  
[facility] ([facility]) Appt Note: 4MO FU  
 [address] [facility] [address]  
[address]  
  
    
 11/12/2018  9:00 AM  
PROCEDURE with [facility] ([facility]) Appt Note: amber brown 1 year [address] [facility] [address] [facility] [address]  
[phone] [address] [place] [address] [id number]  
  
    
 11/12/2018 10:00 AM  
PROCEDURE with [facility] ([facility]) Appt Note: huy brown 1 year [address] [facility] [address] [facility] [address]  
[phone]  
  
    
  
 12/5/2017 10:00 AM  
Any with Octavio Smith MD  
[facility] ([facility]) Appt Note: Return in about 1 year [address] [facility] [address]  
[address] [facility] [address] Upcoming Health Maintenance Date Due ZOSTER VACCINE AGE 60> 12/11/2016 FOBT Q 1 YEAR AGE 50-75 7/1/2017 Bone Densitometry 9/24/2017 BREAST CANCER SCRN MAMMOGRAM 6/1/2018 PAP AKA CERVICAL CYTOLOGY 8/12/2019 DTaP/Tdap/Td series (2 - Td) 9/1/2026 Allergies as of 11/9/2017  Review Complete On: 11/9/2017 By: Bijan Reardon LPN Severity Noted Reaction Type Reactions Pcn [Penicillins] High 09/18/2014    Anaphylaxis Tetracycline High 09/18/2014    Anaphylaxis Sulfabenzamide  09/18/2014    Hives Current Immunizations  Reviewed on 9/13/2017 Name Date Influenza Vaccine (Quad) PF 9/13/2017, 9/1/2016 Pneumococcal Vaccine (Unspecified Type) 3/5/2013 Tdap 9/1/2016 Not reviewed this visit You Were Diagnosed With   
  
 Codes Comments Abdominal aortic aneurysm (AAA) without rupture ([facility]    -  Primary ICD-10-CM: I71.4 ICD-9-CM: 441.4 Renal artery stenosis (HCC)     ICD-10-CM: I70.1 ICD-9-CM: 440.1 Carotid artery stenosis, asymptomatic, bilateral     ICD-10-CM: I65.23 ICD-9-CM: 433.10, 433.30 Vitals BP Pulse Height(growth percentile) Weight(growth percentile) BMI OB Status 138/78 (BP 1 Location: Left arm, BP Patient Position: Sitting) 80 5' (1.524 m) 177 lb (80.3 kg) 34.57 kg/m2 Hysterectomy Smoking Status Former Smoker BMI and BSA Data Body Mass Index Body Surface Area 34.57 kg/m 2 1.84 m 2 Preferred Pharmacy Pharmacy Name Phone [address] [facility] [phone] Your Updated Medication List  
  
   
This list is accurate as of: 11/9/17 10:45 AM.  Always use your most recent med list.  
  
  
  
  
 clopidogrel 75 mg Tab Commonly known as:  PLAVIX TAKE 1 TABLET DAILY DALIRESP Tab tablet Generic drug:  roflumilast  
TAKE 1 TABLET DAILY  
  
 diclofenac 1 % Gel Commonly known as:  VOLTAREN  
 Apply 2 g to affected area four (4) times daily. docusate sodium 100 mg capsule Commonly known as:  Luwana Khanh Take 1 Cap by mouth two (2) times a day. esomeprazole 20 mg capsule Commonly known as:  NexIUM Take 1 Cap by mouth daily. STOP PREVACID  Indications: HEARTBURN  
  
 fluticasone-salmeterol 250-50 mcg/dose diskus inhaler Commonly known as:  ADVAIR DISKUS Take 1 Puff by inhalation daily. * HYDROcodone-acetaminophen 7.5-325 mg per tablet Commonly known as:  NORCO  
1 tablet every four hours as needed for pain  
  
 * HYDROcodone-acetaminophen  mg tablet Commonly known as:  Chaya Surinder Take 1 Tab by mouth every six (6) hours as needed for Pain. Max Daily Amount: 4 Tabs. mirtazapine 15 mg tablet Commonly known as:  REMERON  
TAKE ONE TABLET BY MOUTH EVERY NIGHT  
  
 montelukast 10 mg tablet Commonly known as:  SINGULAIR  
TAKE 1 TABLET DAILY  
  
 ondansetron 4 mg disintegrating tablet Commonly known as:  ZOFRAN ODT PLACE 1 TABLET ON TONGUE TWICE A DAY IF NAUSEA  
  
 pravastatin 20 mg tablet Commonly known as:  PRAVACHOL  
TAKE 1 TABLET DAILY  
  
 raNITIdine 150 mg tablet Commonly known as:  ZANTAC TAKE ONE TABLET BY MOUTH TWICE A DAY  
  
 sertraline 50 mg tablet Commonly known as:  ZOLOFT Take 1 Tab by mouth daily. tiaGABine 2 mg tablet Commonly known as:  GABITRIL  
TAKE ONE TABLET BY MOUTH TWICE A DAY WITH MEALS * Notice: This list has 2 medication(s) that are the same as other medications prescribed for you. Read the directions carefully, and ask your doctor or other care provider to review them with you. Follow-up Instructions Return in about 1 year (around 11/9/2018). To-Do List   
 11/09/2018 Imaging:  DUPLEX AORTA ILIAC GRAFT COMPLETE AMB   
  
 11/09/2018 Imaging:  DUPLEX CAROTID BILATERAL AMB Please provide this summary of care documentation to your next provider. Your primary care clinician is listed as Janessa Hahn. If you have any questions after today's visit, please call [phone].

## 2017-11-09 NOTE — PROGRESS NOTES
Fran Leary    Chief Complaint   Patient presents with    Abdominal Aortic Aneurysm       History and Physical    Ms Yajaira Posada is here for what is a scheduled one year follow up after she had emergent renal artery stenting over one year ago after acute occlusion caused a renal infarct  She had prior EVAR as well for AAA 3 years ago    No issues related to the above  She is having complications from an old lap band procedure  She is also having issues with right sided lower back and hip pain and is seeing ortho    She did quit smoking completely, but she has unfortunately put on weight since she quit smoking       Past Medical History:   Diagnosis Date    Aorto-iliac duplex 09/17/2015    Patent abdom aorta endovascular graft w/o leak. Mod stenosis suggested in right limb.  Calculus of kidney 2014    stent/kidney     Carotid duplex 09/17/2015    Mild < 50% bilateral plaquing.  Cataract, left     Chronic obstructive pulmonary disease ([facility]     Echocardiogram 11/04/2014    EF 56%. No WMA. Mild LAE. Echo is within normal limits.  Esophageal reflux     Generalized osteoarthrosis, involving multiple sites     GERD (gastroesophageal reflux disease)     Gout     History of renal stent     right side    Hypercholesteremia     Hypertension     resolved    Lower extremity arterial testing 03/27/2015    Normal perfusion at rest and w/treadmill bilaterally.   R KORI 1.05.  L KORI 1.03.    Murmur     patient reports that she is aware- has been told in the past    Nausea & vomiting     Renal artery stenosis (HCC)     Subclinical hyperthyroidism 8/2015    Unspecified sleep apnea     resolved     Patient Active Problem List   Diagnosis Code    AAA (abdominal aortic aneurysm) ([facility] I71.4    Essential hypertension I10    Hyperlipidemia E78.5    Subclinical hyperthyroidism E05.90    Generalized osteoarthrosis, involving multiple sites M15.9    Esophageal reflux K21.9    Renal infarction ([address] N28.0  Dysarthria R47.1    LAP-BAND surgery status Z98.84    Non morbid obesity due to excess calories E66.09    Gastroesophageal reflux disease without esophagitis K21.9    Obesity (BMI 30.0-34. 9) E66.9    Peripheral vascular disease (HCC) I73.9    Right-sided carotid artery disease (HCC) I77.9    Sacroiliitis (HCC) M46.1    Right hip pain M25.551    Spondylosis of lumbar region without myelopathy or radiculopathy M47.816    Lumbar neuritis M54.16    Left upper quadrant pain R10.12    Hypovitaminosis D E55.9    Lumbar radiculopathy M54.16     Past Surgical History:   Procedure Laterality Date    ADJUSTMENT GASTRIC BAND N/A 11/10/2016    MAKAYLA Cullen    HX APPENDECTOMY      HX BLADDER SUSPENSION      HX CHOLECYSTECTOMY      HX GASTRIC BYPASS      lap band 2012    HX HEENT Right 1990s, 2016    Ear sx    HX HYSTERECTOMY      HX OTHER SURGICAL  10/27/14     Bilateral open femoral exposures, Placement of catheter and sheath in the aorta Endo repair of aortic aneurysm with modular bifurcated device  Interpretation of angiography, intravascular ultrasound (IVUS) catheter    HX OTHER SURGICAL  10/27/2014    Endurant II abdominal stent graft implant    HX UROLOGICAL      stent    VASCULAR SURGERY PROCEDURE UNLIST      surgery for abdominal aneurysm     Current Outpatient Prescriptions   Medication Sig Dispense Refill    mirtazapine (REMERON) 15 mg tablet TAKE ONE TABLET BY MOUTH EVERY NIGHT 30 Tab 0    HYDROcodone-acetaminophen (NORCO)  mg tablet Take 1 Tab by mouth every six (6) hours as needed for Pain. Max Daily Amount: 4 Tabs. 90 Tab 0    HYDROcodone-acetaminophen (NORCO) 7.5-325 mg per tablet 1 tablet every four hours as needed for pain 100 Tab 0    docusate sodium (COLACE) 100 mg capsule Take 1 Cap by mouth two (2) times a day. 60 Cap 5    esomeprazole (NEXIUM) 20 mg capsule Take 1 Cap by mouth daily.  STOP PREVACID  Indications: HEARTBURN 30 Cap 1    diclofenac (VOLTAREN) 1 % gel Apply 2 g to affected area four (4) times daily. 1 Each 2    montelukast (SINGULAIR) 10 mg tablet TAKE 1 TABLET DAILY 90 Tab 2    clopidogrel (PLAVIX) 75 mg tab TAKE 1 TABLET DAILY 90 Tab 2    DALIRESP tab tablet TAKE 1 TABLET DAILY 90 Tab 2    pravastatin (PRAVACHOL) 20 mg tablet TAKE 1 TABLET DAILY 90 Tab 2    raNITIdine (ZANTAC) 150 mg tablet TAKE ONE TABLET BY MOUTH TWICE A DAY 60 Tab 3    tiaGABine (GABITRIL) 2 mg tablet TAKE ONE TABLET BY MOUTH TWICE A DAY WITH MEALS 60 Tab 0    sertraline (ZOLOFT) 50 mg tablet Take 1 Tab by mouth daily. 90 Tab 3    ondansetron (ZOFRAN ODT) 4 mg disintegrating tablet PLACE 1 TABLET ON TONGUE TWICE A DAY IF NAUSEA 60 Tab 0    fluticasone-salmeterol (ADVAIR DISKUS) 250-50 mcg/dose diskus inhaler Take 1 Puff by inhalation daily. 1 Inhaler 5     Allergies   Allergen Reactions    Pcn [Penicillins] Anaphylaxis    Tetracycline Anaphylaxis    Sulfabenzamide Hives       Review of Systems    Review of Systems - History obtained from the patient  General ROS: positive for  - sleep disturbance  Psychological ROS: positive for - anxiety  Ophthalmic ROS: positive for - uses glasses  Respiratory ROS: negative  Cardiovascular ROS: negative  Gastrointestinal ROS: negative  Genito-Urinary ROS: negative  Musculoskeletal ROS: right sided hip/back pain  Neurological ROS: negative  Dermatological ROS: negative  Vascular ROS: varicose veins, no claudication     Physical   Visit Vitals    /78 (BP 1 Location: Left arm, BP Patient Position: Sitting)    Pulse 80    Ht 5' (1.524 m)    Wt 177 lb (80.3 kg)    BMI 34.57 kg/m2     General:  Alert, cooperative, no distress. Head:  Normocephalic, without obvious abnormality, atraumatic. Eyes:     Conjunctivae/corneas clear. Pupils equal, round, reactive to light. Extraocular movements intact. Neck:         No bruits   Lungs:   Clear to auscultation bilaterally.    Heart:  Regular rate and rhythm, S1, S2 normal   Abdomen:   Right flank pain   Extremities: Extremities normal, atraumatic, no cyanosis or edema. Some prominent small varicosities near medial right knee   Pulses: 1+ and symmetric all extremities. Multiphasic with doppler   Skin: Skin color, texture, turgor normal. No rashes or lesions. Vascular studies:  EVAR:  1. Patent abdominal aorta bi-iliac endovascular graft with no evidence   of leak or limb dysfunction. 2. Residual sac measures 2.44 x 2.52cm compared to 2.6 x 2.6cm on  4-5-16 duplex. 3. Patent limbs bilaterally with no evidence of significant stenosis  with multiphasic flow. Elevated velocity at the distal right  attachment could be due to size change of vessel. 4. Patent proximal external iliac arteries bilaterally with  multiphasic flow. 5. Patent celiac, superior mesenteric, left renal and right renal  artery stent. Impression/Plan:     ICD-10-CM ICD-9-CM    1. Abdominal aortic aneurysm (AAA) without rupture (HCC) I71.4 441.4 DUPLEX AORTA ILIAC GRAFT COMPLETE AMB   2. Renal artery stenosis (HCC) I70.1 440.1 DUPLEX AORTA ILIAC GRAFT COMPLETE AMB   3. Carotid artery stenosis, asymptomatic, bilateral I65.23 433.10 DUPLEX CAROTID BILATERAL AMB     433.30    4. Varicose veins of leg with pain, right I83.811 454.8      Orders Placed This Encounter    ENDOGRAFT    DUPLEX CAROTID BILATERAL AMB     She will do one year follow up for EVAR and renal stent, which I did explain patency of the renal stent  Due for repeat carotid as well  Made suggestions for some efforts for relief of the varicose veins, to include preparation H cream topically, and/or horse chestnut seen extract, compression stocking    Follow-up Disposition:  Return in about 1 year (around 11/9/2018). MAKAYLA Hernandez    Portions of this note have been entered using voice recognition software.

## 2017-11-27 RX ORDER — HYDROCODONE BITARTRATE AND ACETAMINOPHEN 10; 325 MG/1; MG/1
1 TABLET ORAL
Qty: 90 TAB | Refills: 0 | Status: SHIPPED | OUTPATIENT
Start: 2017-11-27 | End: 2017-12-27 | Stop reason: SDUPTHER

## 2017-11-27 NOTE — TELEPHONE ENCOUNTER
Requested Prescriptions     Pending Prescriptions Disp Refills    HYDROcodone-acetaminophen (NORCO)  mg tablet 90 Tab 0     Sig: Take 1 Tab by mouth every six (6) hours as needed for Pain. Max Daily Amount: 4 Tabs.      Patient [phone]

## 2017-12-04 DIAGNOSIS — G47.00 INSOMNIA, UNSPECIFIED TYPE: ICD-10-CM

## 2017-12-04 DIAGNOSIS — K21.9 GASTROESOPHAGEAL REFLUX DISEASE, ESOPHAGITIS PRESENCE NOT SPECIFIED: ICD-10-CM

## 2017-12-05 DIAGNOSIS — N28.0 RENAL INFARCTION (HCC): Primary | ICD-10-CM

## 2017-12-05 NOTE — PROGRESS NOTES
Duplex renal artery ultrasound ordered for upcoming appointment per verbal order Dr. Sheila Dos Santos.

## 2017-12-06 RX ORDER — RANITIDINE 150 MG/1
TABLET, FILM COATED ORAL
Qty: 60 TAB | Refills: 2 | Status: SHIPPED | OUTPATIENT
Start: 2017-12-06 | End: 2018-02-02

## 2017-12-06 RX ORDER — MIRTAZAPINE 15 MG/1
TABLET, FILM COATED ORAL
Qty: 30 TAB | Refills: 0 | Status: SHIPPED | OUTPATIENT
Start: 2017-12-06 | End: 2018-01-02 | Stop reason: SDUPTHER

## 2017-12-12 ENCOUNTER — OFFICE VISIT (OUTPATIENT)
Dept: VASCULAR SURGERY | Age: 62
End: 2017-12-12

## 2017-12-12 ENCOUNTER — OFFICE VISIT (OUTPATIENT)
Dept: INTERNAL MEDICINE CLINIC | Age: 62
End: 2017-12-12

## 2017-12-12 VITALS
SYSTOLIC BLOOD PRESSURE: 138 MMHG | OXYGEN SATURATION: 99 % | HEART RATE: 87 BPM | HEIGHT: 60 IN | BODY MASS INDEX: 33.77 KG/M2 | WEIGHT: 172 LBS | DIASTOLIC BLOOD PRESSURE: 88 MMHG | TEMPERATURE: 98.3 F | RESPIRATION RATE: 18 BRPM

## 2017-12-12 VITALS
SYSTOLIC BLOOD PRESSURE: 124 MMHG | DIASTOLIC BLOOD PRESSURE: 84 MMHG | WEIGHT: 173 LBS | HEIGHT: 60 IN | HEART RATE: 70 BPM | BODY MASS INDEX: 33.96 KG/M2 | RESPIRATION RATE: 20 BRPM

## 2017-12-12 DIAGNOSIS — I71.40 ABDOMINAL AORTIC ANEURYSM (AAA) WITHOUT RUPTURE: Primary | ICD-10-CM

## 2017-12-12 DIAGNOSIS — N28.0 RENAL INFARCTION (HCC): ICD-10-CM

## 2017-12-12 DIAGNOSIS — Z86.79 HX OF AORTIC ANEURYSM REPAIR: ICD-10-CM

## 2017-12-12 DIAGNOSIS — Z98.890 HX OF AORTIC ANEURYSM REPAIR: ICD-10-CM

## 2017-12-12 DIAGNOSIS — I83.811 VARICOSE VEINS OF LEG WITH PAIN, RIGHT: ICD-10-CM

## 2017-12-12 DIAGNOSIS — I10 ESSENTIAL HYPERTENSION: ICD-10-CM

## 2017-12-12 DIAGNOSIS — J06.9 ACUTE URI: Primary | ICD-10-CM

## 2017-12-12 RX ORDER — CODEINE PHOSPHATE AND GUAIFENESIN 10; 100 MG/5ML; MG/5ML
5 SOLUTION ORAL
Qty: 118 ML | Refills: 0 | Status: SHIPPED | OUTPATIENT
Start: 2017-12-12 | End: 2018-02-05 | Stop reason: ALTCHOICE

## 2017-12-12 RX ORDER — AZITHROMYCIN 250 MG/1
TABLET, FILM COATED ORAL
Qty: 6 TAB | Refills: 0 | Status: SHIPPED | OUTPATIENT
Start: 2017-12-12 | End: 2018-02-02

## 2017-12-12 NOTE — PROCEDURES
[facility]   *** FINAL REPORT ***    Name: Remberto Ko  MRN: [id number]       Outpatient  : 1955  HIS Order #: [id number]  [address] [facility] Visit #: [id number]  Date: 12 Dec 2017    TYPE OF TEST: Visceral Arterial Duplex    REASON FOR TEST  Abdominal pain, Renal artery stenosis    Aortic PSV:  67.0 cm/s  Diameter AP:     cm   TV:     cm                   Right          Left  Renal Artery:- -------------  -------------  Proximal  PSV: 206.0          155.0  Mid       PSV:  83.0          101.0  Distal    PSV: 110.0           78.0  Aortic ratio :   3.1            2.3    Medullary PSV:            EDV:            EDR:            SDR:    Cortical  PSV:  23.0           25.0            EDV:   8.0           11.0            EDR:   0.3            0.4            SDR:   2.9            2.3  Stenosis:      Mild < 60%     Mild < 60%  Kidney size:    8.7 cm        10.8 cm               x  3.6 cm      x  4.4 cm    Hilar:-        Right          Left  Acc. Time  AT:   6 secs         4 secs  Acc. Index AI:             RI: 0.65           0.50    Mesenteric:-                  Prox   Mid   Dist Ratio Stenosis          Aneurysm                  ----- ----- ----- ----- ----------------- ------------  SMA:            205.0              3.1 Normal  Celiac:         173.0               2.6 Normal  Hepatic:  Splenic:  JOSE:  :    INTERPRETATION/FINDINGS  Duplex images were obtained using 2-D gray scale, color flow and  spectral doppler analysis. RENAL:  1. Patent right renal artery stent without evidence of a significant  stenosis. 2. Patent left renal artery without evidence of significant stenosis. 3. The right kidney is smaller in size measuring 8.7 cm in length and  the left kidney measures 10.8 cm. 4. Patent renal veins with normal hemodynamics. 5. Acceleration times and resistive indices are within normal limits  bilaterally. MESENTERIC:  1. No significant stenosis identified in the superior mesenteric  artery.   2. No significant stenosis identified in the celiac artery. ADDITIONAL COMMENTS    I have personally reviewed the data relevant to the interpretation of  this  study. TECHNOLOGIST: Kat Oliveira RDMS  Signed: 12/12/2017 10:10 AM    PHYSICIAN: Chapnicito Song D.O.   Signed: 12/12/2017 01:04 PM

## 2017-12-12 NOTE — PROGRESS NOTES
Stefania Jameson is a 63 y.o. female presenting today for Nasal Congestion (x3 days)  . HPI:  Stefania Jameson presents to the office today for nasal congestion, non-productive cough, wheezing at night, headache and sore throat. Patient noted she has seen an increase in B/p with coughing and headache. Review of Systems   Constitutional: Negative for chills and fever. HENT: Positive for congestion and sore throat. Facial pain   Respiratory: Positive for cough. Negative for sputum production. Cardiovascular: Negative for chest pain and palpitations. Neurological: Positive for headaches. Allergies   Allergen Reactions    Pcn [Penicillins] Anaphylaxis    Tetracycline Anaphylaxis    Sulfabenzamide Hives       Current Outpatient Prescriptions   Medication Sig Dispense Refill    azithromycin (ZITHROMAX) 250 mg tablet Take 2 tablets today, then take 1 tablet daily 6 Tab 0    guaiFENesin-codeine (ROBITUSSIN AC) 100-10 mg/5 mL solution Take 5 mL by mouth three (3) times daily as needed for Cough. Max Daily Amount: 15 mL. Do not drive if taking this medication 118 mL 0    raNITIdine (ZANTAC) 150 mg tablet TAKE ONE TABLET BY MOUTH TWICE A DAY 60 Tab 2    mirtazapine (REMERON) 15 mg tablet TAKE ONE TABLET BY MOUTH EVERY EVENING 30 Tab 0    pantoprazole (PROTONIX) 40 mg tablet       HYDROcodone-acetaminophen (NORCO)  mg tablet Take 1 Tab by mouth every six (6) hours as needed for Pain. Max Daily Amount: 4 Tabs. 90 Tab 0    docusate sodium (COLACE) 100 mg capsule Take 1 Cap by mouth two (2) times a day. 60 Cap 5    diclofenac (VOLTAREN) 1 % gel Apply 2 g to affected area four (4) times daily.  1 Each 2    montelukast (SINGULAIR) 10 mg tablet TAKE 1 TABLET DAILY 90 Tab 2    DALIRESP tab tablet TAKE 1 TABLET DAILY 90 Tab 2    pravastatin (PRAVACHOL) 20 mg tablet TAKE 1 TABLET DAILY 90 Tab 2    tiaGABine (GABITRIL) 2 mg tablet TAKE ONE TABLET BY MOUTH TWICE A DAY WITH MEALS 60 Tab 0    sertraline (ZOLOFT) 50 mg tablet Take 1 Tab by mouth daily. 90 Tab 3    ondansetron (ZOFRAN ODT) 4 mg disintegrating tablet PLACE 1 TABLET ON TONGUE TWICE A DAY IF NAUSEA 60 Tab 0    fluticasone-salmeterol (ADVAIR DISKUS) 250-50 mcg/dose diskus inhaler Take 1 Puff by inhalation daily. (Patient taking differently: Take 2 Puffs by inhalation daily. ) 1 Inhaler 5    HYDROcodone-acetaminophen (NORCO) 7.5-325 mg per tablet 1 tablet every four hours as needed for pain 100 Tab 0    esomeprazole (NEXIUM) 20 mg capsule Take 1 Cap by mouth daily. STOP PREVACID  Indications: HEARTBURN 30 Cap 1    clopidogrel (PLAVIX) 75 mg tab TAKE 1 TABLET DAILY 90 Tab 2       Past Medical History:   Diagnosis Date    Aorto-iliac duplex 09/17/2015    Patent abdom aorta endovascular graft w/o leak. Mod stenosis suggested in right limb.  Calculus of kidney 2014    stent/kidney     Carotid duplex 09/17/2015    Mild < 50% bilateral plaquing.  Cataract, left     Chronic obstructive pulmonary disease ([address]     Echocardiogram 11/04/2014    EF 56%. No WMA. Mild LAE. Echo is within normal limits.  Esophageal reflux     Generalized osteoarthrosis, involving multiple sites     GERD (gastroesophageal reflux disease)     Gout     History of renal stent     right side    Hypercholesteremia     Hypertension     resolved    Lower extremity arterial testing 03/27/2015    Normal perfusion at rest and w/treadmill bilaterally.   R KORI 1.05.  L KORI 1.03.    Murmur     patient reports that she is aware- has been told in the past    Nausea & vomiting     Renal artery stenosis ([address]     Subclinical hyperthyroidism 8/2015    Unspecified sleep apnea     resolved       Past Surgical History:   Procedure Laterality Date    ADJUSTMENT GASTRIC BAND N/A 11/10/2016    MAKAYLA Cullen    HX APPENDECTOMY      HX BLADDER SUSPENSION      HX CHOLECYSTECTOMY      HX GASTRIC BYPASS      lap band 2012    HX HEENT Right 1990s, 2016    Ear sx    HX HYSTERECTOMY      HX OTHER SURGICAL  10/27/14     Bilateral open femoral exposures, Placement of catheter and sheath in the aorta Endo repair of aortic aneurysm with modular bifurcated device  Interpretation of angiography, intravascular ultrasound (IVUS) catheter    HX OTHER SURGICAL  10/27/2014    Endurant II abdominal stent graft implant    HX UROLOGICAL      stent    VASCULAR SURGERY PROCEDURE UNLIST      surgery for abdominal aneurysm       Social History     Social History    Marital status:      Spouse name: N/A    Number of children: N/A    Years of education: N/A     Occupational History    Not on file. Social History Main Topics    Smoking status: Former Smoker     Packs/day: 0.00     Years: 0.00     Types: Cigarettes     Quit date: 1/25/2016    Smokeless tobacco: Never Used    Alcohol use No    Drug use: No    Sexual activity: Yes     Birth control/ protection: Surgical     Other Topics Concern    Not on file     Social History Narrative       Patient does not have an advanced directive on file    Vitals:    12/12/17 1602   BP: 138/88   Pulse: 87   Resp: 18   Temp: 98.3 °F (36.8 °C)   TempSrc: Tympanic   SpO2: 99%   Weight: 172 lb (78 kg)   Height: 5' (1.524 m)   PainSc:   8   PainLoc: Back       Physical Exam   HENT:   Right Ear: External ear normal.   Left Ear: External ear normal.   Mouth/Throat: No oropharyngeal exudate. Cardiovascular: Normal rate and regular rhythm. Pulmonary/Chest: Effort normal. She has rhonchi in the right lower field and the left lower field. Lymphadenopathy:     She has no cervical adenopathy. Nursing note and vitals reviewed.       Hospital Outpatient Visit on 09/29/2017   Component Date Value Ref Range Status    Sodium 09/29/2017 142  136 - 145 mmol/L Final    Potassium 09/29/2017 5.1  3.5 - 5.5 mmol/L Final    Chloride 09/29/2017 108  100 - 108 mmol/L Final    CO2 09/29/2017 31  21 - 32 mmol/L Final    Anion gap 09/29/2017 3 3.0 - 18 mmol/L Final    Glucose 09/29/2017 92  74 - 99 mg/dL Final    BUN 09/29/2017 11  7.0 - 18 MG/DL Final    Creatinine 09/29/2017 0.77  0.6 - 1.3 MG/DL Final    BUN/Creatinine ratio 09/29/2017 14  12 - 20   Final    GFR est AA 09/29/2017 >60  >60 ml/min/1.73m2 Final    GFR est non-AA 09/29/2017 >60  >60 ml/min/1.73m2 Final    Comment: (NOTE)  Estimated GFR is calculated using the Modification of Diet in Renal   Disease (MDRD) Study equation, reported for both  Americans   (GFRAA) and non- Americans (GFRNA), and normalized to 1.73m2   body surface area. The physician must decide which value applies to   the patient. The MDRD study equation should only be used in   individuals age 25 or older. It has not been validated for the   following: pregnant women, patients with serious comorbid conditions,   or on certain medications, or persons with extremes of body size,   muscle mass, or nutritional status.  Calcium 09/29/2017 9.4  8.5 - 10.1 MG/DL Final       .No results found for any visits on 12/12/17. Assessment / Plan:      ICD-10-CM ICD-9-CM    1. Acute URI J06.9 465.9 azithromycin (ZITHROMAX) 250 mg tablet      guaiFENesin-codeine (ROBITUSSIN AC) 100-10 mg/5 mL solution   2. Essential hypertension I10 401.9      Zpak rx  Cheratussin rx  HTN- controlled  F/u prn      Follow-up Disposition:  Return if symptoms worsen or fail to improve. I asked the patient if she  had any questions and answered her  questions.   The patient stated that she understands the treatment plan and agrees with the treatment plan

## 2017-12-12 NOTE — MR AVS SNAPSHOT
Visit Information Date & Time Provider Department Dept. Phone Encounter #  
 12/12/2017  1:00 PM Negro Woodruff  [facility] [phone] Your Appointments 12/12/2017  3:15 PM  
SAME DAY SICK with Jammie Winkler NP  
[facility] [place] ([facility]) Appt Note: coughing  congestion same day appt   syb 12/12  
 [address], [address] [facility]  
[phone]  
  
   
 [address] [facility] [address], [address] [facility] [address]  
  
    
 12/14/2017 10:20 AM  
Follow Up with Mack Francisco MD  
[facility] ([facility]) Appt Note: 4MO FU  
 [address] [facility] [address]  
[address]  
  
    
 11/12/2018  9:00 AM  
PROCEDURE with [facility] 1 [facility] ([facility]) Appt Note: amber brown 1 year [address] [facility] [address] [facility] [address] [facility] [address]  
[phone] [facility],[address]  
  
    
 11/12/2018 10:00 AM  
PROCEDURE with [facility] 1 [facility] ([facility]) Appt Note: cv stephanie 1 year [address] [facility] [address] [facility] [place] [address] [facility] [address]  
[phone]  
  
    
 11/27/2018 10:00 AM  
Follow Up with MAKAYLA Hooker [facility] ([facility]) Appt Note: 1 year follow up after studies with prep [address] [facility] [address] [facility] [address] [facility] [address]  
[phone] [address] [place] [address],[address]  
  
    
  
 6/5/2018 10:45 AM  
Any with Pola York MD  
[facility] ([facility]) Appt Note: ep- 6 month follow up [address] [place] [address]  
[address] [place] [address] Upcoming Health Maintenance Date Due ZOSTER VACCINE AGE 60> 12/11/2016 FOBT Q 1 YEAR AGE 50-75 7/1/2017 Bone Densitometry 9/24/2017 PAP AKA CERVICAL CYTOLOGY 8/12/2019 DTaP/Tdap/Td series (2 - Td) 9/1/2026 Allergies as of 12/12/2017  Review Complete On: 12/12/2017 By: Len Tan LPN Severity Noted Reaction Type Reactions Pcn [Penicillins] High 09/18/2014    Anaphylaxis Tetracycline High 09/18/2014    Anaphylaxis Sulfabenzamide  09/18/2014    Hives Current Immunizations  Reviewed on 11/16/2017 Name Date Influenza Vaccine (Quad) PF 9/13/2017, 9/1/2016 Pneumococcal Vaccine (Unspecified Type) 3/5/2013 Tdap 9/1/2016 Not reviewed this visit Vitals BP Pulse Resp Height(growth percentile) Weight(growth percentile) BMI  
 124/84 (BP 1 Location: Left arm, BP Patient Position: Sitting) 70 20 5' (1.524 m) 173 lb (78.5 kg) 33.79 kg/m2 OB Status Smoking Status Hysterectomy Former Smoker Vitals History BMI and BSA Data Body Mass Index Body Surface Area 33.79 kg/m 2 1.82 m 2 Preferred Pharmacy Pharmacy Name Phone [facility] [facility] [phone] Your Updated Medication List  
  
   
This list is accurate as of: 12/12/17  1:44 PM.  Always use your most recent med list.  
  
  
  
  
 clopidogrel 75 mg Tab Commonly known as:  PLAVIX TAKE 1 TABLET DAILY DALIRESP Tab tablet Generic drug:  roflumilast  
TAKE 1 TABLET DAILY  
  
 diclofenac 1 % Gel Commonly known as:  VOLTAREN Apply 2 g to affected area four (4) times daily. docusate sodium 100 mg capsule Commonly known as:  Temple Lecher Take 1 Cap by mouth two (2) times a day. esomeprazole 20 mg capsule Commonly known as:  NexIUM Take 1 Cap by mouth daily. STOP PREVACID  Indications: HEARTBURN  
  
 fluticasone-salmeterol 250-50 mcg/dose diskus inhaler Commonly known as:  ADVAIR DISKUS Take 1 Puff by inhalation daily. * HYDROcodone-acetaminophen 7.5-325 mg per tablet Commonly known as:  NORCO  
1 tablet every four hours as needed for pain  
  
 * HYDROcodone-acetaminophen  mg tablet Commonly known as:  Gloria Aris Take 1 Tab by mouth every six (6) hours as needed for Pain. Max Daily Amount: 4 Tabs. mirtazapine 15 mg tablet Commonly known as:  REMERON  
TAKE ONE TABLET BY MOUTH EVERY EVENING  
  
 montelukast 10 mg tablet Commonly known as:  SINGULAIR  
TAKE 1 TABLET DAILY  
  
 ondansetron 4 mg disintegrating tablet Commonly known as:  ZOFRAN ODT PLACE 1 TABLET ON TONGUE TWICE A DAY IF NAUSEA  
  
 pantoprazole 40 mg tablet Commonly known as:  PROTONIX  
  
 pravastatin 20 mg tablet Commonly known as:  PRAVACHOL  
TAKE 1 TABLET DAILY  
  
 raNITIdine 150 mg tablet Commonly known as:  ZANTAC TAKE ONE TABLET BY MOUTH TWICE A DAY  
  
 sertraline 50 mg tablet Commonly known as:  ZOLOFT Take 1 Tab by mouth daily. tiaGABine 2 mg tablet Commonly known as:  GABITRIL  
TAKE ONE TABLET BY MOUTH TWICE A DAY WITH MEALS * Notice: This list has 2 medication(s) that are the same as other medications prescribed for you. Read the directions carefully, and ask your doctor or other care provider to review them with you. Please provide this summary of care documentation to your next provider. Your primary care clinician is listed as Nicholas Bowens. If you have any questions after today's visit, please call [phone].

## 2017-12-12 NOTE — PROGRESS NOTES
Stefania Jamesno    Chief Complaint   Patient presents with    Back Pain       History and Physical    Delightful 46-year-old patient here today for evaluation of her renal artery stents. She has had blood in her urine she tells me for 6 years now. There was concern of blood in her urine by her urologist who would send to evaluate for the renal artery stents. There was concern of renal artery stents being the source. These were placed 2 years ago as a salvage procedure for renal artery occlusion acute on chronic. Her creatinine is normalized her right kidney is a little smaller at 8.6 cm in the left 10 cm. She has an aortic Endostat for treatment of aneurysm which is completely patent the old aneurysm sac has shrunk itself around the stent appropriately. She has chronic pain in her right flank it is unclear at this time if this is flank back or hip pain    Past Medical History:   Diagnosis Date    Aorto-iliac duplex 09/17/2015    Patent abdom aorta endovascular graft w/o leak. Mod stenosis suggested in right limb.  Calculus of kidney 2014    stent/kidney     Carotid duplex 09/17/2015    Mild < 50% bilateral plaquing.  Cataract, left     Chronic obstructive pulmonary disease ([address]     Echocardiogram 11/04/2014    EF 56%. No WMA. Mild LAE. Echo is within normal limits.  Esophageal reflux     Generalized osteoarthrosis, involving multiple sites     GERD (gastroesophageal reflux disease)     Gout     History of renal stent     right side    Hypercholesteremia     Hypertension     resolved    Lower extremity arterial testing 03/27/2015    Normal perfusion at rest and w/treadmill bilaterally.   R KORI 1.05.  L KORI 1.03.    Murmur     patient reports that she is aware- has been told in the past    Nausea & vomiting     Renal artery stenosis ([address]     Subclinical hyperthyroidism 8/2015    Unspecified sleep apnea     resolved     Patient Active Problem List   Diagnosis Code    AAA (abdominal aortic aneurysm) (HCC) I71.4    Essential hypertension I10    Hyperlipidemia E78.5    Subclinical hyperthyroidism E05.90    Generalized osteoarthrosis, involving multiple sites M15.9    Esophageal reflux K21.9    Renal infarction ([address] N28.0    Dysarthria R47.1    LAP-BAND surgery status Z98.84    Non morbid obesity due to excess calories E66.09    Gastroesophageal reflux disease without esophagitis K21.9    Obesity (BMI 30.0-34. 9) E66.9    Peripheral vascular disease (HCC) I73.9    Right-sided carotid artery disease (HCC) I77.9    Sacroiliitis (HCC) M46.1    Right hip pain M25.551    Spondylosis of lumbar region without myelopathy or radiculopathy M47.816    Lumbar neuritis M54.16    Left upper quadrant pain R10.12    Hypovitaminosis D E55.9    Lumbar radiculopathy M54.16    Hx of aortic aneurysm repair Z98.890, Z86.79    History of renal stent Z98.890     Past Surgical History:   Procedure Laterality Date    ADJUSTMENT GASTRIC BAND N/A 11/10/2016    MAKAYLA Cullen    HX APPENDECTOMY      HX BLADDER SUSPENSION      HX CHOLECYSTECTOMY      HX GASTRIC BYPASS      lap band 2012    HX HEENT Right 1990s, 2016    Ear sx    HX HYSTERECTOMY      HX OTHER SURGICAL  10/27/14     Bilateral open femoral exposures, Placement of catheter and sheath in the aorta Endo repair of aortic aneurysm with modular bifurcated device  Interpretation of angiography, intravascular ultrasound (IVUS) catheter    HX OTHER SURGICAL  10/27/2014    Endurant II abdominal stent graft implant    HX UROLOGICAL      stent    VASCULAR SURGERY PROCEDURE UNLIST      surgery for abdominal aneurysm     Current Outpatient Prescriptions   Medication Sig Dispense Refill    raNITIdine (ZANTAC) 150 mg tablet TAKE ONE TABLET BY MOUTH TWICE A DAY 60 Tab 2    mirtazapine (REMERON) 15 mg tablet TAKE ONE TABLET BY MOUTH EVERY EVENING 30 Tab 0    pantoprazole (PROTONIX) 40 mg tablet       HYDROcodone-acetaminophen (NORCO)  mg tablet Take 1 Tab by mouth every six (6) hours as needed for Pain. Max Daily Amount: 4 Tabs. 90 Tab 0    HYDROcodone-acetaminophen (NORCO) 7.5-325 mg per tablet 1 tablet every four hours as needed for pain 100 Tab 0    docusate sodium (COLACE) 100 mg capsule Take 1 Cap by mouth two (2) times a day. 60 Cap 5    esomeprazole (NEXIUM) 20 mg capsule Take 1 Cap by mouth daily. STOP PREVACID  Indications: HEARTBURN 30 Cap 1    diclofenac (VOLTAREN) 1 % gel Apply 2 g to affected area four (4) times daily. 1 Each 2    montelukast (SINGULAIR) 10 mg tablet TAKE 1 TABLET DAILY 90 Tab 2    clopidogrel (PLAVIX) 75 mg tab TAKE 1 TABLET DAILY 90 Tab 2    DALIRESP tab tablet TAKE 1 TABLET DAILY 90 Tab 2    pravastatin (PRAVACHOL) 20 mg tablet TAKE 1 TABLET DAILY 90 Tab 2    tiaGABine (GABITRIL) 2 mg tablet TAKE ONE TABLET BY MOUTH TWICE A DAY WITH MEALS 60 Tab 0    sertraline (ZOLOFT) 50 mg tablet Take 1 Tab by mouth daily. 90 Tab 3    ondansetron (ZOFRAN ODT) 4 mg disintegrating tablet PLACE 1 TABLET ON TONGUE TWICE A DAY IF NAUSEA 60 Tab 0    fluticasone-salmeterol (ADVAIR DISKUS) 250-50 mcg/dose diskus inhaler Take 1 Puff by inhalation daily. 1 Inhaler 5     Allergies   Allergen Reactions    Pcn [Penicillins] Anaphylaxis    Tetracycline Anaphylaxis    Sulfabenzamide Hives       Review of Systems    A full review of systems was completed times ten organ systems and was deemed negative unless otherwise mentioned in the HPI.     Physical   Visit Vitals    /84 (BP 1 Location: Left arm, BP Patient Position: Sitting)    Pulse 70    Resp 20    Ht 5' (1.524 m)    Wt 173 lb (78.5 kg)    BMI 33.79 kg/m2       Appears well today she is distressed about this chronic pain  Head is normocephalic pupils are reactive  Neck no JVD  Chest is clear  Cardiac is regular  Abdomen soft nondistended  Groins intact  Peripheral pulses intact no signs of arterial ischemia  No skin ulcerations notable  No edema notable  She does have pronounced branch varicosities on the saphenous on the right leg  Doppler shows bilateral patent renal arteries  Last abdominal ultrasound shows patent and the stent with aneurysm regression or remodeling  Carotid CT done this year with 0% carotid stenosis    Impression/Plan:     ICD-10-CM ICD-9-CM    1. Abdominal aortic aneurysm (AAA) without rupture (HCC) I71.4 441.4    2. Hx of aortic aneurysm repair Z98.890 V45.89     Z86.79     3. History of renal stent Z98.890 V45.89    4. Varicose veins of leg with pain, right I83.811 454.8 DUPLEX LOWER EXT VENOUS RIGHT AMB     As she has well incorporated stents in the renal center 3years old and a well incorporated endograft 1years old I have asked her to stop her Plavix  She does not have any other stents that she is aware of  She tells me she is worried about her carotid stenosis but CT and Doppler showed no stenosis  She has no peripheral arterial insufficiency she does have venous insufficiency I will check a reflux Doppler  Regarding this pain in the right flank does not seem to be vascular related  The blood in the urine highly unlikely bleeding from a stent, stent is 3years old bleeding proceeds this at 6 years  Possibly will improve by stopping her Plavix  She is to follow-up with her orthopedic team regarding this hip versus back pain  She was scheduled by her urologist for a CT urogram this is pending  Orders Placed This Encounter    DUPLEX LOWER EXT VENOUS RIGHT AMB (Reflux)       Follow-up Disposition:  Return in about 4 weeks (around 1/9/2018). Abdullahi Fajardo MD    PLEASE NOTE:  This document has been produced using voice recognition software. Unrecognized errors in transcription may be present.

## 2017-12-14 ENCOUNTER — OFFICE VISIT (OUTPATIENT)
Dept: ORTHOPEDIC SURGERY | Age: 62
End: 2017-12-14

## 2017-12-14 VITALS
WEIGHT: 181.4 LBS | HEART RATE: 86 BPM | SYSTOLIC BLOOD PRESSURE: 144 MMHG | HEIGHT: 60 IN | BODY MASS INDEX: 35.61 KG/M2 | DIASTOLIC BLOOD PRESSURE: 69 MMHG | RESPIRATION RATE: 16 BRPM

## 2017-12-14 DIAGNOSIS — M54.16 LUMBAR RADICULOPATHY: ICD-10-CM

## 2017-12-14 DIAGNOSIS — M47.816 SPONDYLOSIS OF LUMBAR REGION WITHOUT MYELOPATHY OR RADICULOPATHY: Primary | ICD-10-CM

## 2017-12-14 DIAGNOSIS — M46.1 SACROILIITIS (HCC): ICD-10-CM

## 2017-12-14 DIAGNOSIS — M25.551 RIGHT HIP PAIN: ICD-10-CM
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
## 2017-12-20 ENCOUNTER — HOSPITAL ENCOUNTER (OUTPATIENT)
Dept: CT IMAGING | Age: 62
Discharge: HOME | End: 2017-12-20
Attending: UROLOGY
Payer: COMMERCIAL

## 2017-12-20 DIAGNOSIS — N23 RENAL COLIC ON RIGHT SIDE: ICD-10-CM

## 2017-12-20 LAB — CREAT UR-MCNC: 0.8 MG/DL (ref 0.6–1.3)

## 2017-12-20 PROCEDURE — 74178 CT ABD&PLV WO CNTR FLWD CNTR: CPT

## 2017-12-20 PROCEDURE — 74011636320 HC RX REV CODE- 636/320: Performed by: UROLOGY

## 2017-12-20 PROCEDURE — 82565 ASSAY OF CREATININE: CPT

## 2017-12-20 RX ADMIN — IOPAMIDOL 100 ML: 755 INJECTION, SOLUTION INTRAVENOUS at 07:54

## 2017-12-21 ENCOUNTER — HOSPITAL ENCOUNTER (OUTPATIENT)
Age: 62
Setting detail: OUTPATIENT SURGERY
Discharge: HOME | End: 2017-12-21
Attending: PHYSICAL MEDICINE & REHABILITATION | Admitting: PHYSICAL MEDICINE & REHABILITATION
Payer: COMMERCIAL

## 2017-12-21 ENCOUNTER — APPOINTMENT (OUTPATIENT)
Dept: GENERAL RADIOLOGY | Age: 62
End: 2017-12-21
Attending: PHYSICAL MEDICINE & REHABILITATION
Payer: COMMERCIAL

## 2017-12-21 VITALS
RESPIRATION RATE: 18 BRPM | BODY MASS INDEX: 35.53 KG/M2 | OXYGEN SATURATION: 100 % | DIASTOLIC BLOOD PRESSURE: 88 MMHG | WEIGHT: 181 LBS | SYSTOLIC BLOOD PRESSURE: 165 MMHG | HEART RATE: 80 BPM | HEIGHT: 60 IN | TEMPERATURE: 98.3 F

## 2017-12-21 PROCEDURE — 74011000250 HC RX REV CODE- 250

## 2017-12-21 PROCEDURE — 74011250636 HC RX REV CODE- 250/636

## 2017-12-21 PROCEDURE — 74011636320 HC RX REV CODE- 636/320: Performed by: PHYSICAL MEDICINE & REHABILITATION

## 2017-12-21 PROCEDURE — 74011250636 HC RX REV CODE- 250/636: Performed by: PHYSICAL MEDICINE & REHABILITATION

## 2017-12-21 PROCEDURE — 74011250637 HC RX REV CODE- 250/637: Performed by: PHYSICAL MEDICINE & REHABILITATION

## 2017-12-21 PROCEDURE — 76010000009 HC PAIN MGT 0 TO 30 MIN PROC: Performed by: PHYSICAL MEDICINE & REHABILITATION

## 2017-12-21 PROCEDURE — 77030003676 HC NDL SPN MPRI -A: Performed by: PHYSICAL MEDICINE & REHABILITATION

## 2017-12-21 PROCEDURE — 74011636320 HC RX REV CODE- 636/320

## 2017-12-21 PROCEDURE — 77030003669 HC NDL SPN COOK -B: Performed by: PHYSICAL MEDICINE & REHABILITATION

## 2017-12-21 RX ORDER — LIDOCAINE HYDROCHLORIDE 10 MG/ML
INJECTION, SOLUTION EPIDURAL; INFILTRATION; INTRACAUDAL; PERINEURAL AS NEEDED
Status: DISCONTINUED | OUTPATIENT
Start: 2017-12-21 | End: 2017-12-21 | Stop reason: HOSPADM

## 2017-12-21 RX ORDER — DIAZEPAM 5 MG/1
5-20 TABLET ORAL ONCE
Status: COMPLETED | OUTPATIENT
Start: 2017-12-21 | End: 2017-12-21

## 2017-12-21 RX ORDER — DEXAMETHASONE SODIUM PHOSPHATE 100 MG/10ML
INJECTION INTRAMUSCULAR; INTRAVENOUS AS NEEDED
Status: DISCONTINUED | OUTPATIENT
Start: 2017-12-21 | End: 2017-12-21 | Stop reason: HOSPADM

## 2017-12-21 RX ADMIN — DIAZEPAM 10 MG: 5 TABLET ORAL at 12:41

## 2017-12-21 NOTE — DISCHARGE INSTRUCTIONS
[facility]   ([place])  Dr. Jigar Yoon, Dr. Kodak Victor, Dr. aTtyana Cardoso not drive a car, operate heavy machinery or dangerous equipment for 24 hours. * Activity as tolerated; rest for the remainder of the day. * Resume pre-block medications including those for your family doctor. * Do not drink alcoholic beverages for 24 hours. Alcohol and the medications you have received may interact and cause an adverse reaction. * You may feel better this evening and worse tomorrow, as the numbing medications wears off and the steroid has yet to begin to work. After 48 hrs the steroid should begin to release bringing you relief. * You may shower this evening and remove any bandages. * Avoid hot tubs and heating pads for 24 hours. You may use cold packs on the procedure site as tolerated for the first 24 hours. * If a headache develops, drink plenty of fluids and rest.  Take over the counter medications for headache if needed. If the headache continues longer than 24 hours, call MD at the [address] [place] [address]. [phone]    * Continue taking pain medications as needed. * You may resume your regular diet if tolerated. Otherwise, start with sips of water and advance slowly. * If Diabetic: check your blood sugar three times a day for the next 3 days. If your sugar is greater than 300 call your family doctor. If your sugar is greater than 400, have someone transport you to the nearest Emergency Room. * If you experience any of the following problems, Please Call the [address] [place] [address] at [phone].         * Shortness of Breath    * Fever of 101 or higher    * Nausea / Vomiting    * Severe Headache    * Weakness or numbness in arms or legs that is not      resolving    * Prolonged increase in pain greater than 4 days      DISCHARGE SUMMARY from Nurse      PATIENT INSTRUCTIONS:    After oral sedation, for 24 hours or while taking prescription Narcotics:  · Limit your activities  · Do not drive and operate hazardous machinery  · Do not make important personal or business decisions  · Do  not drink alcoholic beverages  · If you have not urinated within 8 hours after discharge, please contact your surgeon on call. Report the following to your surgeon:  · Excessive pain, swelling, redness or odor of or around the surgical area  · Temperature over 101  · Nausea and vomiting lasting longer than 4 hours or if unable to take medications  · Any signs of decreased circulation or nerve impairment to extremity: change in color, persistent  numbness, tingling, coldness or increase pain  · Any questions            What to do at Home:  Recommended activity: Activity as tolerated, NO DRIVING FOR 12 Hours post injection          *  Please give a list of your current medications to your Primary Care Provider. *  Please update this list whenever your medications are discontinued, doses are      changed, or new medications (including over-the-counter products) are added. *  Please carry medication information at all times in case of emergency situations. These are general instructions for a healthy lifestyle:    No smoking/ No tobacco products/ Avoid exposure to second hand smoke    Surgeon General's Warning:  Quitting smoking now greatly reduces serious risk to your health. Obesity, smoking, and sedentary lifestyle greatly increases your risk for illness    A healthy diet, regular physical exercise & weight monitoring are important for maintaining a healthy lifestyle    You may be retaining fluid if you have a history of heart failure or if you experience any of the following symptoms:  Weight gain of 3 pounds or more overnight or 5 pounds in a week, increased swelling in our hands or feet or shortness of breath while lying flat in bed.   Please call your doctor as soon as you notice any of these symptoms; do not wait until your next office visit. Recognize signs and symptoms of STROKE:    F-face looks uneven    A-arms unable to move or move unevenly    S-speech slurred or non-existent    T-time-call 911 as soon as signs and symptoms begin-DO NOT go       Back to bed or wait to see if you get better-TIME IS BRAIN.

## 2017-12-21 NOTE — PROCEDURES
PROCEDURE NOTE      Patient Name: Cecelia Wells    Date of Procedure: December 21, 2017    Preoperative Diagnosis:  Lumbar radiculopathy [M54.16]    Post Operative Diagnosis:  Lumbar radiculopathy [M54.16]    Procedure :    right L5 Selective Nerve Root Block  right S1 Selective Nerve Root Block      Consent:  Informed consent was obtained prior to the procedure. The patient was given the opportunity to ask questions regarding the procedure and its associated risks. In addition to the potential risks associated with the procedure itself, the patient was informed both verbally and in writing of the potential side effects of the use of glucocorticoid. The patient appeared to comprehend the informed consent and desired to have the procedure performed. Procedure: The patient was placed in the prone position on the fluoroscopy table and the back was prepped and draped in the usual sterile manner. The exact spinal level was  identified using fluoroscopy, and Lidocaine 1 % was injected locally, a # 22 gauge spinal needle was passed to the transverse process. The depth was noted and the needle redirected to pass inferior and approximately one cm anterior to the transverse process. YES  1 cc of Isovue M-200 was used to verify positioning in the epidural and paravertebral space and outlined the course of the spinal nerve into the epidural space. The same procedure was repeated at each spinal level indicated above. A total of 30 mg of preservative free Dexamethasone and 4 cc of Lidocaine was slowly injected. The patient tolerated the procedure well. The injection area was cleaned and bandaids applied. Not excessive bleeding was noted. Patient dressed and discharged to home with instructions. Discussion: The patient tolerated the procedure well.                                               Thang Stacy MD  December 21, 2017

## 2017-12-27 DIAGNOSIS — M46.1 SACROILIITIS (HCC): ICD-10-CM

## 2017-12-27 DIAGNOSIS — I73.9 PERIPHERAL VASCULAR DISEASE (HCC): Primary | ICD-10-CM

## 2017-12-27 NOTE — TELEPHONE ENCOUNTER
Requested Prescriptions     Pending Prescriptions Disp Refills    HYDROcodone-acetaminophen (NORCO)  mg tablet 90 Tab 0     Sig: Take 1 Tab by mouth every six (6) hours as needed for Pain. Max Daily Amount: 4 Tabs.

## 2017-12-28 RX ORDER — HYDROCODONE BITARTRATE AND ACETAMINOPHEN 10; 325 MG/1; MG/1
1 TABLET ORAL
Qty: 90 TAB | Refills: 0 | Status: SHIPPED | OUTPATIENT
Start: 2017-12-28 | End: 2018-01-25 | Stop reason: SDUPTHER

## 2018-01-01 NOTE — PROGRESS NOTES
Problem: Oxygenation/Respiratory Function  Goal: Optimized respiratory function and gas exchange  Outcome: Outcome Met, Continue evaluating goal progress toward completion  NC discontinued at 0830 today, has been doing well on Room Air       This is an Initial Medicare Annual Wellness Exam (AWV) (Performed 12 months after IPPE or effective date of Medicare Part B enrollment, Once in a lifetime)    I have reviewed the patient's medical history in detail and updated the computerized patient record. History     Past Medical History:   Diagnosis Date    Aorto-iliac duplex 09/17/2015    Patent abdom aorta endovascular graft w/o leak. Mod stenosis suggested in right limb.  Calculus of kidney 2014    stent/kidney     Carotid duplex 09/17/2015    Mild < 50% bilateral plaquing.  Cataract, left     Chronic obstructive pulmonary disease ([address]     Echocardiogram 11/04/2014    EF 56%. No WMA. Mild LAE. Echo is within normal limits.  Esophageal reflux     Generalized osteoarthrosis, involving multiple sites     GERD (gastroesophageal reflux disease)     Gout     Hypercholesteremia     Hypertension     resolved    Lower extremity arterial testing 03/27/2015    Normal perfusion at rest and w/treadmill bilaterally.   R KORI 1.05.  L KORI 1.03.    Murmur     patient reports that she is aware- has been told in the past    Nausea & vomiting     Renal artery stenosis ([address]     Subclinical hyperthyroidism 8/2015    Unspecified sleep apnea     resolved      Past Surgical History:   Procedure Laterality Date    ADJUSTMENT GASTRIC BAND N/A 11/10/2016    MAKAYLA Cullen    HX APPENDECTOMY      HX BLADDER SUSPENSION      HX CHOLECYSTECTOMY      HX GASTRIC BYPASS      lap band 2012    HX HEENT Right 1990s, 2016    Ear sx    HX HYSTERECTOMY      HX OTHER SURGICAL  10/27/14     Bilateral open femoral exposures, Placement of catheter and sheath in the aorta Endo repair of aortic aneurysm with modular bifurcated device  Interpretation of angiography, intravascular ultrasound (IVUS) catheter    HX OTHER SURGICAL  10/27/2014    Endurant II abdominal stent graft implant    HX UROLOGICAL      stent    VASCULAR SURGERY PROCEDURE UNLIST surgery for abdominal aneurysm     Current Outpatient Prescriptions   Medication Sig Dispense Refill    HYDROcodone-acetaminophen (NORCO) 7.5-325 mg per tablet 1 tablet every four hours as needed for pain 100 Tab 0    DALIRESP tab tablet Take 500 mcg by mouth daily.  docusate sodium (COLACE) 100 mg capsule Take 100 mg by mouth two (2) times a day.  AZELASTINE HCL (AZELASTINE OP) Apply 1 Drop to eye daily.  diclofenac (VOLTAREN) 1 % gel Apply 2 g to affected area four (4) times daily. 1 Each 0    pravastatin (PRAVACHOL) 20 mg tablet Take 20 mg by mouth daily.  ondansetron (ZOFRAN ODT) 4 mg disintegrating tablet PLACE 1 TABLET ON TONGUE TWICE A DAY IF NAUSEA 60 Tab 0    sertraline (ZOLOFT) 50 mg tablet 50 mg po daily 90 Tab 3    montelukast (SINGULAIR) 10 mg tablet Take 1 Tab by mouth daily. 90 Tab 3    lansoprazole (PREVACID) 30 mg capsule Take 1 Cap by mouth daily. 90 Cap 3    clopidogrel (PLAVIX) 75 mg tablet Take 1 Tab by mouth daily. 90 Tab 3    fluticasone-salmeterol (ADVAIR DISKUS) 250-50 mcg/dose diskus inhaler Take 1 Puff by inhalation daily.  1 Inhaler 5     Allergies   Allergen Reactions    Pcn [Penicillins] Anaphylaxis    Tetracycline Anaphylaxis    Sulfabenzamide Hives     Family History   Problem Relation Age of Onset    Cancer Mother     Diabetes Father     Alcohol abuse Father     Heart Disease Maternal Grandmother     Alzheimer Maternal Grandmother     Diabetes Sister      Social History   Substance Use Topics    Smoking status: Former Smoker     Packs/day: 0.00     Years: 0.00     Types: Cigarettes     Quit date: 1/25/2016    Smokeless tobacco: Never Used    Alcohol use No     Patient Active Problem List   Diagnosis Code    AAA (abdominal aortic aneurysm) ([facility] I71.4    Essential hypertension I10    Hyperlipidemia E78.5    Subclinical hyperthyroidism E05.90    Generalized osteoarthrosis, involving multiple sites M15.9    Esophageal reflux K21.9    Renal infarction ([facility] N28.0    Dysarthria R47.1    LAP-BAND surgery status Z98.84    Non morbid obesity due to excess calories E66.09    Gastroesophageal reflux disease without esophagitis K21.9    Obesity (BMI 30.0-34. 9) E66.9    Peripheral vascular disease (HCC) I73.9    Right-sided carotid artery disease (HCC) I77.9         Depression Risk Factor Screening:     PHQ 2 / 9, over the last two weeks 10/26/2016   Little interest or pleasure in doing things Not at all   Feeling down, depressed or hopeless Not at all   Total Score PHQ 2 0     Alcohol Risk Factor Screening: On any occasion during the past 3 months, have you had more than 3 drinks containing alcohol? No    Do you average more than 7 drinks per week? Yes    Functional Ability and Level of Safety:     Hearing Loss   mild-to-moderate    Activities of Daily Living   Self-care. Requires assistance with: no ADLs    Fall Risk   No flowsheet data found. Abuse Screen   Patient is not abused    Review of Systems   Pertinent items are noted in HPI. Physical Examination     No exam data present    Evaluation of Cognitive Function:  Mood/affect:  Depressed due not be able to loose weight  Appearance: age appropriate and casually dressed  Family member/caregiver input: None present    Visit Vitals    /76 (BP 1 Location: Left arm, BP Patient Position: Sitting)    Pulse 89    Temp 97.7 °F (36.5 °C) (Tympanic)    Resp 18    Ht 5' (1.524 m)    Wt 170 lb (77.1 kg)    SpO2 97%    BMI 33.2 kg/m2     General appearance: alert, cooperative, no distress, appears stated age  Head: Normocephalic, without obvious abnormality, atraumatic  Eyes: conjunctivae/corneas clear. PERRL, EOM's intact. Fundi benign  Ears: {Exam; ear:5207::\"normal TM's and external ear canals AU\"}  Nose: {Exam; nose:21008::\"Nares normal. Septum midline. Mucosa normal. No drainage or sinus tenderness. \"}  Throat: {:33181::\"Lips, mucosa, and tongue normal. Teeth and gums normal\"}  Neck: {exam; neck:61283::\"supple, symmetrical, trachea midline\",\"no adenopathy\",\"thyroid: not enlarged, symmetric, no tenderness/mass/nodules\",\"no carotid bruit\",\"no JVD\"}  Back: {Exam; back:801::\"symmetric, no curvature. ROM normal. No CVA tenderness. \"}  Lungs: {Exam; lun::\"clear to auscultation bilaterally\"}  Heart: {Exam; heart:5510::\"regular rate and rhythm, S1, S2 normal, no murmur, click, rub or gallop\"}  Abdomen: {Exam; abdomen:67043::\"soft, non-tender. Bowel sounds normal. No masses,  no organomegaly\"}  Extremities: {Exam; extremity:5109::\"extremities normal, atraumatic, no cyanosis or edema\"}  Pulses: {Pulse exam:68663::\"2+ and symmetric\"}  Skin: {Skin exam-one line:30448::\"Skin color, texture, turgor normal. No rashes or lesions\"}  Lymph nodes: {Exam; lymph nodes:15489::\"Cervical, supraclavicular, and axillary nodes normal.\"}  Neurologic: {Exam; neuro:61616::\"Grossly normal\"}    Patient Care Team:  Vi Ramirez MD as PCP - General (Internal Medicine)  MAKAYLA Blas (Vascular Surgery)  Jyoti Robles MD as Surgeon (Surgery)  Miguel A Bruce MD as Surgeon (General Surgery)    Advice/Referrals/Counseling   Education and counseling provided:  Are appropriate based on today's review and evaluation      Assessment/Plan       ICD-10-CM ICD-9-CM    1. Essential hypertension I10 401.9 TSH 3RD GENERATION   2. Mixed hyperlipidemia E78.2 272.2 TSH 3RD GENERATION   3. Obesity (BMI 30.0-34. 9) E66.9 278.00 TSH 3RD GENERATION   4. Encounter for immunization Z23 V03.89 varicella zoster vacine live (ZOSTAVAX) 19,400 unit/0.65 mL susr injection     the following changes in treatment are made: ***  lab results and schedule of future lab studies reviewed with patient  reviewed diet, exercise and weight control. Katie Hanks is a 63 y.o. female presenting today for [facility] (5 month follow )  .        HPI:  Katie Hanks presents to the office today for ***    ROS    Allergies   Allergen Reactions    Pcn [Penicillins] Anaphylaxis    Tetracycline Anaphylaxis    Sulfabenzamide Hives       Current Outpatient Prescriptions   Medication Sig Dispense Refill    varicella zoster vacine live (ZOSTAVAX) 19,400 unit/0.65 mL susr injection 1 Vial by SubCUTAneous route once for 1 dose. 0.65 mL 0    HYDROcodone-acetaminophen (NORCO) 7.5-325 mg per tablet 1 tablet every four hours as needed for pain 100 Tab 0    DALIRESP tab tablet Take 500 mcg by mouth daily.  docusate sodium (COLACE) 100 mg capsule Take 100 mg by mouth two (2) times a day.  AZELASTINE HCL (AZELASTINE OP) Apply 1 Drop to eye daily.  diclofenac (VOLTAREN) 1 % gel Apply 2 g to affected area four (4) times daily. 1 Each 0    pravastatin (PRAVACHOL) 20 mg tablet Take 20 mg by mouth daily.  ondansetron (ZOFRAN ODT) 4 mg disintegrating tablet PLACE 1 TABLET ON TONGUE TWICE A DAY IF NAUSEA 60 Tab 0    sertraline (ZOLOFT) 50 mg tablet 50 mg po daily 90 Tab 3    montelukast (SINGULAIR) 10 mg tablet Take 1 Tab by mouth daily. 90 Tab 3    lansoprazole (PREVACID) 30 mg capsule Take 1 Cap by mouth daily. 90 Cap 3    clopidogrel (PLAVIX) 75 mg tablet Take 1 Tab by mouth daily. 90 Tab 3    fluticasone-salmeterol (ADVAIR DISKUS) 250-50 mcg/dose diskus inhaler Take 1 Puff by inhalation daily. 1 Inhaler 5       Past Medical History:   Diagnosis Date    Aorto-iliac duplex 09/17/2015    Patent abdom aorta endovascular graft w/o leak. Mod stenosis suggested in right limb.  Calculus of kidney 2014    stent/kidney     Carotid duplex 09/17/2015    Mild < 50% bilateral plaquing.  Cataract, left     Chronic obstructive pulmonary disease ([address]     Echocardiogram 11/04/2014    EF 56%. No WMA. Mild LAE. Echo is within normal limits.     Esophageal reflux     Generalized osteoarthrosis, involving multiple sites     GERD (gastroesophageal reflux disease)     Gout     Hypercholesteremia     Hypertension     resolved    Lower extremity arterial testing 03/27/2015    Normal perfusion at rest and w/treadmill bilaterally. R KORI 1.05.  L KORI 1.03.    Murmur     patient reports that she is aware- has been told in the past    Nausea & vomiting     Renal artery stenosis ([address]     Subclinical hyperthyroidism 8/2015    Unspecified sleep apnea     resolved       Past Surgical History:   Procedure Laterality Date    ADJUSTMENT GASTRIC BAND N/A 11/10/2016    MAKAYLA Cullen    HX APPENDECTOMY      HX BLADDER SUSPENSION      HX CHOLECYSTECTOMY      HX GASTRIC BYPASS      lap band 2012    HX HEENT Right 1990s, 2016    Ear sx    HX HYSTERECTOMY      HX OTHER SURGICAL  10/27/14     Bilateral open femoral exposures, Placement of catheter and sheath in the aorta Endo repair of aortic aneurysm with modular bifurcated device  Interpretation of angiography, intravascular ultrasound (IVUS) catheter    HX OTHER SURGICAL  10/27/2014    Endurant II abdominal stent graft implant    HX UROLOGICAL      stent    VASCULAR SURGERY PROCEDURE UNLIST      surgery for abdominal aneurysm       Social History     Social History    Marital status:      Spouse name: N/A    Number of children: N/A    Years of education: N/A     Occupational History    Not on file.      Social History Main Topics    Smoking status: Former Smoker     Packs/day: 0.00     Years: 0.00     Types: Cigarettes     Quit date: 1/25/2016    Smokeless tobacco: Never Used    Alcohol use No    Drug use: No    Sexual activity: Yes     Birth control/ protection: Surgical     Other Topics Concern    Not on file     Social History Narrative       Patient does not have an advanced directive on file    Vitals:    03/22/17 0920   BP: 120/76   Pulse: 89   Resp: 18   Temp: 97.7 °F (36.5 °C)   TempSrc: Tympanic   SpO2: 97%   Weight: 170 lb (77.1 kg)   Height: 5' (1.524 m)   PainSc:   7   PainLoc: Back       Physical Exam    Hospital Outpatient Visit on 03/03/2017   Component Date Value Ref Range Status    Test indication 03/03/2017 Chest Pain   Preliminary    Overall HR response to exercise 03/03/2017 appropriate   Preliminary    Overall BP response to exercise 03/03/2017 normal resting BP - appropriate response   Preliminary    Max. Systolic BP 98/53/6747 399  mmHg Preliminary    Max. Diastolic BP 76/29/9315 82  mmHg Preliminary    Max. Heart rate 03/03/2017 123  BPM Preliminary    Peak Ex METs 03/03/2017 1.6  METS Preliminary    Protocol name 03/03/2017 [facility]       Preliminary   Admission on 01/29/2017, Discharged on 01/29/2017   Component Date Value Ref Range Status    WBC 01/29/2017 6.7  4.6 - 13.2 K/uL Final    RBC 01/29/2017 4.23  4.20 - 5.30 M/uL Final    HGB 01/29/2017 12.2  12.0 - 16.0 g/dL Final    HCT 01/29/2017 36.0  35.0 - 45.0 % Final    MCV 01/29/2017 85.1  74.0 - 97.0 FL Final    MCH 01/29/2017 28.8  24.0 - 34.0 PG Final    MCHC 01/29/2017 33.9  31.0 - 37.0 g/dL Final    RDW 01/29/2017 12.7  11.6 - 14.5 % Final    PLATELET 13/20/0793 127  135 - 420 K/uL Final    MPV 01/29/2017 10.0  9.2 - 11.8 FL Final    NEUTROPHILS 01/29/2017 49  40 - 73 % Final    LYMPHOCYTES 01/29/2017 38  21 - 52 % Final    MONOCYTES 01/29/2017 9  3 - 10 % Final    EOSINOPHILS 01/29/2017 3  0 - 5 % Final    BASOPHILS 01/29/2017 1  0 - 2 % Final    ABS. NEUTROPHILS 01/29/2017 3.3  1.8 - 8.0 K/UL Final    ABS. LYMPHOCYTES 01/29/2017 2.6  0.9 - 3.6 K/UL Final    ABS. MONOCYTES 01/29/2017 0.6  0.05 - 1.2 K/UL Final    ABS. EOSINOPHILS 01/29/2017 0.2  0.0 - 0.4 K/UL Final    ABS.  BASOPHILS 01/29/2017 0.1* 0.0 - 0.06 K/UL Final    DF 01/29/2017 AUTOMATED    Final    Sodium 01/29/2017 140  136 - 145 mmol/L Final    Potassium 01/29/2017 3.8  3.5 - 5.5 mmol/L Final    Chloride 01/29/2017 104  100 - 108 mmol/L Final    CO2 01/29/2017 27  21 - 32 mmol/L Final    Anion gap 01/29/2017 9  3.0 - 18 mmol/L Final    Glucose 01/29/2017 94  74 - 99 mg/dL Final    BUN 01/29/2017 19* 7.0 - 18 MG/DL Final    Creatinine 01/29/2017 0.90  0.6 - 1.3 MG/DL Final    BUN/Creatinine ratio 01/29/2017 21* 12 - 20   Final    GFR est AA 01/29/2017 >60  >60 ml/min/1.73m2 Final    GFR est non-AA 01/29/2017 >60  >60 ml/min/1.73m2 Final    Comment: (NOTE)  Estimated GFR is calculated using the Modification of Diet in Renal   Disease (MDRD) Study equation, reported for both  Americans   (GFRAA) and non- Americans (GFRNA), and normalized to 1.73m2   body surface area. The physician must decide which value applies to   the patient. The MDRD study equation should only be used in   individuals age 25 or older. It has not been validated for the   following: pregnant women, patients with serious comorbid conditions,   or on certain medications, or persons with extremes of body size,   muscle mass, or nutritional status.  Calcium 01/29/2017 8.7  8.5 - 10.1 MG/DL Final    Bilirubin, total 01/29/2017 0.3  0.2 - 1.0 MG/DL Final    ALT (SGPT) 01/29/2017 18  13 - 56 U/L Final    AST (SGOT) 01/29/2017 11* 15 - 37 U/L Final    Alk. phosphatase 01/29/2017 67  45 - 117 U/L Final    Protein, total 01/29/2017 6.9  6.4 - 8.2 g/dL Final    Albumin 01/29/2017 3.6  3.4 - 5.0 g/dL Final    Globulin 01/29/2017 3.3  2.0 - 4.0 g/dL Final    A-G Ratio 01/29/2017 1.1  0.8 - 1.7   Final    CK 01/29/2017 125  26 - 192 U/L Final    CK - MB 01/29/2017 0.9  0.5 - 3.6 ng/ml Final    CK-MB Index 01/29/2017 0.7  0.0 - 4.0 % Final    Troponin-I, Qt. 01/29/2017 <0.02  0.0 - 0.045 NG/ML Final    Comment: The presence of detectable troponin above the reference range indicates myocardial injury which may be due to ischemia, myocarditis, trauma, etc.  Clinical correlation is necessary to establish the significance of this finding. Sequential testing is recommended to determine if the typical rise and fall of cTnI is demonstrated.   Note:  Cardiac troponin I has a relatively long half life and may be present well after the CK MB has returned to baseline. The reference range is based on the 99th percentile of the referent population.       Ventricular Rate 01/29/2017 80  BPM Final    Atrial Rate 01/29/2017 80  BPM Final    P-R Interval 01/29/2017 150  ms Final    QRS Duration 01/29/2017 84  ms Final    Q-T Interval 01/29/2017 362  ms Final    QTC Calculation (Bezet) 01/29/2017 417  ms Final    Calculated P Axis 01/29/2017 70  degrees Final    Calculated R Axis 01/29/2017 68  degrees Final    Calculated T Axis 01/29/2017 49  degrees Final    Diagnosis 01/29/2017    Final                    Value:Normal sinus rhythm with sinus arrhythmia  Normal ECG  When compared with ECG of 22-APR-2016 10:03,  No significant change was found  Confirmed by Forrest Maguire MD, Joe ([id number]) on 1/29/2017 7:09:40 PM      Magnesium 01/29/2017 2.2  1.8 - 2.4 mg/dL Final    Lipase 01/29/2017 211  73 - 393 U/L Final    Ventricular Rate 01/29/2017 63  BPM Final    Atrial Rate 01/29/2017 63  BPM Final    P-R Interval 01/29/2017 140  ms Final    QRS Duration 01/29/2017 80  ms Final    Q-T Interval 01/29/2017 404  ms Final    QTC Calculation (Bezet) 01/29/2017 413  ms Final    Calculated P Axis 01/29/2017 56  degrees Final    Calculated R Axis 01/29/2017 47  degrees Final    Calculated T Axis 01/29/2017 52  degrees Final    Diagnosis 01/29/2017    Final                    Value:Normal sinus rhythm  Low voltage QRS  Borderline ECG  When compared with ECG of 29-JAN-2017 16:39,  No significant change was found  Confirmed by Vikas Tate ([id number]) on 1/30/2017 3:18:09 PM      CK 01/29/2017 114  26 - 192 U/L Final    CK - MB 01/29/2017 0.9  0.5 - 3.6 ng/ml Final    CK-MB Index 01/29/2017 0.8  0.0 - 4.0 % Final    Troponin-I, Qt. 01/29/2017 <0.02  0.0 - 0.045 NG/ML Final    Comment: The presence of detectable troponin above the reference range indicates myocardial injury which may be due to ischemia, myocarditis, trauma, etc.  Clinical correlation is necessary to establish the significance of this finding. Sequential testing is recommended to determine if the typical rise and fall of cTnI is demonstrated. Note:  Cardiac troponin I has a relatively long half life and may be present well after the CK MB has returned to baseline. The reference range is based on the 99th percentile of the referent population. .No results found for any visits on 03/22/17. Assessment / Plan:      ICD-10-CM ICD-9-CM    1. Essential hypertension I10 401.9 TSH 3RD GENERATION   2. Mixed hyperlipidemia E78.2 272.2 TSH 3RD GENERATION   3. Obesity (BMI 30.0-34. 9) E66.9 278.00 TSH 3RD GENERATION   4. Encounter for immunization Z23 V03.89 varicella zoster vacine live (ZOSTAVAX) 19,400 unit/0.65 mL susr injection         BMI (> 27.5)      Follow-up Disposition: Not on File    I asked the patient if she  had any questions and answered her  questions.   The patient stated that she understands the treatment plan and agrees with the treatment plan

## 2018-01-02 DIAGNOSIS — G47.00 INSOMNIA, UNSPECIFIED TYPE: ICD-10-CM

## 2018-01-02 RX ORDER — MIRTAZAPINE 15 MG/1
TABLET, FILM COATED ORAL
Qty: 30 TAB | Refills: 0 | Status: ON HOLD | OUTPATIENT
Start: 2018-01-02 | End: 2018-01-28 | Stop reason: SDUPTHER

## 2018-01-10 RX ORDER — ONDANSETRON 4 MG/1
TABLET, ORALLY DISINTEGRATING ORAL
Qty: 60 TAB | Refills: 0 | Status: SHIPPED | OUTPATIENT
Start: 2018-01-10 | End: 2018-01-29

## 2018-01-10 NOTE — TELEPHONE ENCOUNTER
Requested Prescriptions     Pending Prescriptions Disp Refills    ondansetron (ZOFRAN ODT) 4 mg disintegrating tablet 60 Tab 0

## 2018-01-24 ENCOUNTER — HOSPITAL ENCOUNTER (EMERGENCY)
Age: 63
Discharge: HOME | End: 2018-01-25
Attending: EMERGENCY MEDICINE
Payer: COMMERCIAL

## 2018-01-24 ENCOUNTER — APPOINTMENT (OUTPATIENT)
Dept: CT IMAGING | Age: 63
End: 2018-01-24
Attending: PHYSICIAN ASSISTANT
Payer: COMMERCIAL

## 2018-01-24 DIAGNOSIS — R10.84 ABDOMINAL PAIN, GENERALIZED: Primary | ICD-10-CM

## 2018-01-24 DIAGNOSIS — N26.1 ATROPHIC KIDNEY: ICD-10-CM

## 2018-01-24 DIAGNOSIS — R11.0 NAUSEA WITHOUT VOMITING: ICD-10-CM

## 2018-01-24 LAB
ALBUMIN SERPL-MCNC: 4 G/DL (ref 3.4–5)
ALBUMIN/GLOB SERPL: 1.1 {RATIO} (ref 0.8–1.7)
ALP SERPL-CCNC: 86 U/L (ref 45–117)
ALT SERPL-CCNC: 22 U/L (ref 13–56)
ANION GAP SERPL CALC-SCNC: 10 MMOL/L (ref 3–18)
AST SERPL-CCNC: 12 U/L (ref 15–37)
BASOPHILS # BLD: 0.1 K/UL (ref 0–0.1)
BASOPHILS NFR BLD: 1 % (ref 0–2)
BILIRUB SERPL-MCNC: 0.5 MG/DL (ref 0.2–1)
BUN SERPL-MCNC: 16 MG/DL (ref 7–18)
BUN/CREAT SERPL: 16 (ref 12–20)
CALCIUM SERPL-MCNC: 9 MG/DL (ref 8.5–10.1)
CHLORIDE SERPL-SCNC: 99 MMOL/L (ref 100–108)
CO2 SERPL-SCNC: 28 MMOL/L (ref 21–32)
CREAT SERPL-MCNC: 1.02 MG/DL (ref 0.6–1.3)
DIFFERENTIAL METHOD BLD: ABNORMAL
EOSINOPHIL # BLD: 0.2 K/UL (ref 0–0.4)
EOSINOPHIL NFR BLD: 2 % (ref 0–5)
ERYTHROCYTE [DISTWIDTH] IN BLOOD BY AUTOMATED COUNT: 13 % (ref 11.6–14.5)
GLOBULIN SER CALC-MCNC: 3.8 G/DL (ref 2–4)
GLUCOSE SERPL-MCNC: 97 MG/DL (ref 74–99)
HCT VFR BLD AUTO: 38.1 % (ref 35–45)
HGB BLD-MCNC: 13.4 G/DL (ref 12–16)
LIPASE SERPL-CCNC: 705 U/L (ref 73–393)
LYMPHOCYTES # BLD: 2.4 K/UL (ref 0.9–3.6)
LYMPHOCYTES NFR BLD: 26 % (ref 21–52)
MCH RBC QN AUTO: 28.8 PG (ref 24–34)
MCHC RBC AUTO-ENTMCNC: 35.2 G/DL (ref 31–37)
MCV RBC AUTO: 81.8 FL (ref 74–97)
MONOCYTES # BLD: 1.2 K/UL (ref 0.05–1.2)
MONOCYTES NFR BLD: 13 % (ref 3–10)
NEUTS SEG # BLD: 5.4 K/UL (ref 1.8–8)
NEUTS SEG NFR BLD: 58 % (ref 40–73)
PLATELET # BLD AUTO: 275 K/UL (ref 135–420)
PMV BLD AUTO: 10 FL (ref 9.2–11.8)
POTASSIUM SERPL-SCNC: 3.5 MMOL/L (ref 3.5–5.5)
PROT SERPL-MCNC: 7.8 G/DL (ref 6.4–8.2)
RBC # BLD AUTO: 4.66 M/UL (ref 4.2–5.3)
SODIUM SERPL-SCNC: 137 MMOL/L (ref 136–145)
WBC # BLD AUTO: 9.2 K/UL (ref 4.6–13.2)

## 2018-01-24 PROCEDURE — 74011636320 HC RX REV CODE- 636/320: Performed by: EMERGENCY MEDICINE

## 2018-01-24 PROCEDURE — 96375 TX/PRO/DX INJ NEW DRUG ADDON: CPT

## 2018-01-24 PROCEDURE — 96374 THER/PROPH/DIAG INJ IV PUSH: CPT

## 2018-01-24 PROCEDURE — 74177 CT ABD & PELVIS W/CONTRAST: CPT

## 2018-01-24 PROCEDURE — 99284 EMERGENCY DEPT VISIT MOD MDM: CPT

## 2018-01-24 PROCEDURE — 85025 COMPLETE CBC W/AUTO DIFF WBC: CPT | Performed by: EMERGENCY MEDICINE

## 2018-01-24 PROCEDURE — 83690 ASSAY OF LIPASE: CPT | Performed by: EMERGENCY MEDICINE

## 2018-01-24 PROCEDURE — 81001 URINALYSIS AUTO W/SCOPE: CPT | Performed by: EMERGENCY MEDICINE

## 2018-01-24 PROCEDURE — 74011250636 HC RX REV CODE- 250/636: Performed by: PHYSICIAN ASSISTANT

## 2018-01-24 PROCEDURE — 80053 COMPREHEN METABOLIC PANEL: CPT | Performed by: EMERGENCY MEDICINE

## 2018-01-24 RX ORDER — METOCLOPRAMIDE HYDROCHLORIDE 5 MG/ML
5 INJECTION INTRAMUSCULAR; INTRAVENOUS
Status: COMPLETED | OUTPATIENT
Start: 2018-01-24 | End: 2018-01-24

## 2018-01-24 RX ORDER — MORPHINE SULFATE 2 MG/ML
4 INJECTION, SOLUTION INTRAMUSCULAR; INTRAVENOUS
Status: COMPLETED | OUTPATIENT
Start: 2018-01-24 | End: 2018-01-24

## 2018-01-24 RX ADMIN — IOPAMIDOL 100 ML: 612 INJECTION, SOLUTION INTRAVENOUS at 22:45

## 2018-01-24 RX ADMIN — Medication 4 MG: at 23:34

## 2018-01-24 RX ADMIN — METOCLOPRAMIDE 5 MG: 5 INJECTION, SOLUTION INTRAMUSCULAR; INTRAVENOUS at 23:34

## 2018-01-25 ENCOUNTER — TELEPHONE (OUTPATIENT)
Dept: VASCULAR SURGERY | Age: 63
End: 2018-01-25

## 2018-01-25 ENCOUNTER — APPOINTMENT (OUTPATIENT)
Dept: ULTRASOUND IMAGING | Age: 63
End: 2018-01-25
Attending: PHYSICIAN ASSISTANT
Payer: COMMERCIAL

## 2018-01-25 VITALS
OXYGEN SATURATION: 96 % | TEMPERATURE: 97.7 F | HEIGHT: 65 IN | RESPIRATION RATE: 17 BRPM | HEART RATE: 84 BPM | WEIGHT: 172 LBS | DIASTOLIC BLOOD PRESSURE: 81 MMHG | SYSTOLIC BLOOD PRESSURE: 145 MMHG | BODY MASS INDEX: 28.66 KG/M2

## 2018-01-25 DIAGNOSIS — M46.1 SACROILIITIS (HCC): ICD-10-CM

## 2018-01-25 DIAGNOSIS — I73.9 PERIPHERAL VASCULAR DISEASE (HCC): ICD-10-CM

## 2018-01-25 LAB
APPEARANCE UR: CLEAR
BACTERIA URNS QL MICRO: NEGATIVE /HPF
BILIRUB UR QL: NEGATIVE
COLOR UR: YELLOW
EPITH CASTS URNS QL MICRO: NORMAL /LPF (ref 0–5)
GLUCOSE UR STRIP.AUTO-MCNC: NEGATIVE MG/DL
HGB UR QL STRIP: ABNORMAL
KETONES UR QL STRIP.AUTO: ABNORMAL MG/DL
LEUKOCYTE ESTERASE UR QL STRIP.AUTO: ABNORMAL
NITRITE UR QL STRIP.AUTO: NEGATIVE
PH UR STRIP: 5.5 [PH] (ref 5–8)
PROT UR STRIP-MCNC: ABNORMAL MG/DL
RBC #/AREA URNS HPF: NORMAL /HPF (ref 0–5)
SP GR UR REFRACTOMETRY: >1.03 (ref 1–1.03)
UROBILINOGEN UR QL STRIP.AUTO: 0.2 EU/DL (ref 0.2–1)
WBC URNS QL MICRO: NORMAL /HPF (ref 0–4)

## 2018-01-25 PROCEDURE — 76770 US EXAM ABDO BACK WALL COMP: CPT

## 2018-01-25 RX ORDER — HYDROCODONE BITARTRATE AND ACETAMINOPHEN 10; 325 MG/1; MG/1
1 TABLET ORAL
Qty: 90 TAB | Refills: 0 | Status: SHIPPED | OUTPATIENT
Start: 2018-01-25 | End: 2018-01-29

## 2018-01-25 RX ORDER — PROMETHAZINE HYDROCHLORIDE 25 MG/1
25 TABLET ORAL
Qty: 15 TAB | Refills: 0 | Status: SHIPPED | OUTPATIENT
Start: 2018-01-25 | End: 2018-02-02

## 2018-01-25 NOTE — ED TRIAGE NOTES
Pt. States \"I've had pain for 5 days upper in here and I'm so sick in the morning\" refers to upper abdominal pain. Pt. Also c/o \"dry heaves and nausea\".

## 2018-01-25 NOTE — TELEPHONE ENCOUNTER
Patient scheduled tomorrow 10am with Dr. Karen Askew, patient informed and verbalized understanding.

## 2018-01-25 NOTE — ED NOTES
Assumed care of patient at this time. VSS. Patient has no additional wants or needs family present at the bedside.  IV is in place currently waiting for CT

## 2018-01-25 NOTE — ED PROVIDER NOTES
HPI Comments: Patient is a 64 y/o female w/ PMH Lap Band Surgery, GERD, Renal Artery Stenosis s/p stent placement, COPD, Cataracts, who presents to the ER c/o abdominal pain, nausea and dry heaving. Patient states her symptoms began about 4-5 days ago, and have continued to persist.  She rates her pain 8/10, mid-epigastric and RUQ location. She also has had some nausea, but no vomiting due to her lap band surgery. She reports regular bowel movements, and last passed flatus this morning. She denied any fevers, chills, SOB, chest pain, palpitations, constipation, blood in stool, dysuria, and has no other complaints. Lap Band surgery in [place]    Urologist:  Wing Abide    Surgery:  Lucy Peña        Patient is a 63 y.o. female presenting with abdominal pain and nausea. The history is provided by the patient. Abdominal Pain    Associated symptoms include nausea. Pertinent negatives include no fever, no vomiting, no dysuria, no headaches and no chest pain. Nausea    Associated symptoms include abdominal pain. Pertinent negatives include no chills, no fever, no headaches, no cough and no headaches. Past Medical History:   Diagnosis Date    Aorto-iliac duplex 09/17/2015    Patent abdom aorta endovascular graft w/o leak. Mod stenosis suggested in right limb.  Calculus of kidney 2014    stent/kidney     Carotid duplex 09/17/2015    Mild < 50% bilateral plaquing.  Cataract, left     Chronic obstructive pulmonary disease ([address]     Echocardiogram 11/04/2014    EF 56%. No WMA. Mild LAE. Echo is within normal limits.  Esophageal reflux     Generalized osteoarthrosis, involving multiple sites     GERD (gastroesophageal reflux disease)     Gout     History of renal stent     right side    Hypercholesteremia     Hypertension     resolved    Lower extremity arterial testing 03/27/2015    Normal perfusion at rest and w/treadmill bilaterally.   R KORI 1.05.  L KORI 1.03.    Murmur     patient reports that she is aware- has been told in the past    Nausea & vomiting     Renal artery stenosis ([address]     Subclinical hyperthyroidism 8/2015    Unspecified sleep apnea     resolved       Past Surgical History:   Procedure Laterality Date    ADJUSTMENT GASTRIC BAND N/A 11/10/2016    MAKAYLA Cullen    HX APPENDECTOMY      HX BLADDER SUSPENSION      HX CHOLECYSTECTOMY      HX GASTRIC BYPASS      lap band 2012    HX HEENT Right 1990s, 2016    Ear sx    HX HYSTERECTOMY      HX OTHER SURGICAL  10/27/14     Bilateral open femoral exposures, Placement of catheter and sheath in the aorta Endo repair of aortic aneurysm with modular bifurcated device  Interpretation of angiography, intravascular ultrasound (IVUS) catheter    HX OTHER SURGICAL  10/27/2014    Endurant II abdominal stent graft implant    HX UROLOGICAL      stent    VASCULAR SURGERY PROCEDURE UNLIST      surgery for abdominal aneurysm         Family History:   Problem Relation Age of Onset    Cancer Mother     Diabetes Father     Alcohol abuse Father     Heart Disease Maternal Grandmother     Alzheimer Maternal Grandmother     Diabetes Sister        Social History     Social History    Marital status:      Spouse name: N/A    Number of children: N/A    Years of education: N/A     Occupational History    Not on file. Social History Main Topics    Smoking status: Former Smoker     Packs/day: 0.00     Years: 0.00     Types: Cigarettes     Quit date: 1/25/2016    Smokeless tobacco: Never Used    Alcohol use No    Drug use: No    Sexual activity: Yes     Birth control/ protection: Surgical     Other Topics Concern    Not on file     Social History Narrative         ALLERGIES: Pcn [penicillins]; Tetracycline; and Sulfabenzamide    Review of Systems   Constitutional: Negative for chills, fatigue and fever. HENT: Negative. Negative for sore throat. Eyes: Negative.     Respiratory: Negative for cough and shortness of breath. Cardiovascular: Negative for chest pain and palpitations. Gastrointestinal: Positive for abdominal pain and nausea. Negative for vomiting. Genitourinary: Negative for dysuria. Musculoskeletal: Negative. Skin: Negative. Neurological: Negative for dizziness, weakness, light-headedness and headaches. Psychiatric/Behavioral: Negative. All other systems reviewed and are negative. Vitals:    01/24/18 2315 01/24/18 2330 01/24/18 2345 01/25/18 0000   BP: (!) 193/91 180/83 156/76 135/66   Pulse:       Resp:       Temp:       SpO2: 100% 98% 96% 95%   Weight:       Height:                Physical Exam   Constitutional: She is oriented to person, place, and time. She appears well-developed and well-nourished. She appears distressed. HENT:   Head: Normocephalic and atraumatic. Mouth/Throat: Oropharynx is clear and moist.   Eyes: Conjunctivae are normal. No scleral icterus. Neck: Neck supple. No JVD present. No tracheal deviation present. Cardiovascular: Normal rate, regular rhythm and normal heart sounds. Pulmonary/Chest: Effort normal and breath sounds normal. No respiratory distress. She has no wheezes. She has no rales. She exhibits no tenderness. Abdominal: Soft. Bowel sounds are normal. She exhibits no distension and no mass. There is tenderness in the right upper quadrant and epigastric area. There is no rebound, no guarding and no CVA tenderness. Musculoskeletal: Normal range of motion. Neurological: She is alert and oriented to person, place, and time. She has normal strength. Gait normal. GCS eye subscore is 4. GCS verbal subscore is 5. GCS motor subscore is 6. Skin: Skin is warm and dry. She is not diaphoretic. Psychiatric: She has a normal mood and affect. Nursing note and vitals reviewed. MDM  Number of Diagnoses or Management Options  Diagnosis management comments: 10:57 PM  64 y/o female c/o abd pain, nausea onset 4-5 days prior.   Unable to vomit due to lap band surgery 4 years prior in [place]. Denied any fevers. Will plan on labs, UA, ct abd/pelvis. Pt reports mild relief with pain and nausea medication. Gideon Kemp PA-C    12:47 AM  Ct abd pelvis concerning for right kidney atrophic and diminished enhancement; discussed results with Dr. Yari Gallardo, radiology. Recommends Retroperitoneal US. Discussed results with pt and awaiting US at this time. Gideon Kemp PA-C    1:17 AM  Based on US results and review of previous serial imaging, size of kidney and ischemia somewhat chronic. Discussed pt with Dr. Leslie Keenan, and advised since there is no obstruction, pt should follow up with urology as outpatient. Pt already established with Dr. Phill Westbrook. Will have call tomorrow, as well as follow up with surgery, Dr. Pepe Villegas or Dr. Bere Martinez. Explained all results to pt. Will plan on prescription for nausea meds as pt has pain mgmt contract on file. All questions answered and patient in agreement with plan of care. Will plan for discharge.   Gideon Kemp PA-C    Clinical Impression:  abd pain, nausea, atrophic right kidney       Amount and/or Complexity of Data Reviewed  Clinical lab tests: ordered and reviewed  Tests in the radiology section of CPT®: ordered and reviewed    Risk of Complications, Morbidity, and/or Mortality  Presenting problems: moderate  Diagnostic procedures: moderate  Management options: moderate    Patient Progress  Patient progress: stable    ED Course       Procedures           Vitals:  Patient Vitals for the past 12 hrs:   Temp Pulse Resp BP SpO2   01/25/18 0000 - - - 135/66 95 %   01/24/18 2345 - - - 156/76 96 %   01/24/18 2330 - - - 180/83 98 %   01/24/18 2315 - - - (!) 193/91 100 %   01/24/18 2300 - - - 178/90 98 %   01/24/18 2230 - - - 174/84 98 %   01/24/18 2127 97.7 °F (36.5 °C) 96 16 168/83 97 %         Medications ordered:   Medications   morphine injection 4 mg (4 mg IntraVENous Given 1/24/18 2944) metoclopramide HCl (REGLAN) injection 5 mg (5 mg IntraVENous Given 1/24/18 4229)   iopamidol (ISOVUE 300) 61 % contrast injection  mL (100 mL IntraVENous Given 1/24/18 2173)         Lab findings:  Recent Results (from the past 12 hour(s))   URINALYSIS W/ RFLX MICROSCOPIC    Collection Time: 01/24/18  9:32 PM   Result Value Ref Range    Color YELLOW      Appearance CLEAR      Specific gravity >1.030 (H) 1.005 - 1.030    pH (UA) 5.5 5.0 - 8.0      Protein TRACE (A) NEG mg/dL    Glucose NEGATIVE  NEG mg/dL    Ketone TRACE (A) NEG mg/dL    Bilirubin NEGATIVE  NEG      Blood MODERATE (A) NEG      Urobilinogen 0.2 0.2 - 1.0 EU/dL    Nitrites NEGATIVE  NEG      Leukocyte Esterase SMALL (A) NEG     URINE MICROSCOPIC ONLY    Collection Time: 01/24/18  9:32 PM   Result Value Ref Range    WBC 4 to 10 0 - 4 /hpf    RBC 4 to 10 0 - 5 /hpf    Epithelial cells FEW 0 - 5 /lpf    Bacteria NEGATIVE  NEG /hpf   CBC WITH AUTOMATED DIFF    Collection Time: 01/24/18 10:09 PM   Result Value Ref Range    WBC 9.2 4.6 - 13.2 K/uL    RBC 4.66 4.20 - 5.30 M/uL    HGB 13.4 12.0 - 16.0 g/dL    HCT 38.1 35.0 - 45.0 %    MCV 81.8 74.0 - 97.0 FL    MCH 28.8 24.0 - 34.0 PG    MCHC 35.2 31.0 - 37.0 g/dL    RDW 13.0 11.6 - 14.5 %    PLATELET 992 954 - 847 K/uL    MPV 10.0 9.2 - 11.8 FL    NEUTROPHILS 58 40 - 73 %    LYMPHOCYTES 26 21 - 52 %    MONOCYTES 13 (H) 3 - 10 %    EOSINOPHILS 2 0 - 5 %    BASOPHILS 1 0 - 2 %    ABS. NEUTROPHILS 5.4 1.8 - 8.0 K/UL    ABS. LYMPHOCYTES 2.4 0.9 - 3.6 K/UL    ABS. MONOCYTES 1.2 0.05 - 1.2 K/UL    ABS. EOSINOPHILS 0.2 0.0 - 0.4 K/UL    ABS.  BASOPHILS 0.1 0.0 - 0.1 K/UL    DF AUTOMATED     METABOLIC PANEL, COMPREHENSIVE    Collection Time: 01/24/18 10:09 PM   Result Value Ref Range    Sodium 137 136 - 145 mmol/L    Potassium 3.5 3.5 - 5.5 mmol/L    Chloride 99 (L) 100 - 108 mmol/L    CO2 28 21 - 32 mmol/L    Anion gap 10 3.0 - 18 mmol/L    Glucose 97 74 - 99 mg/dL    BUN 16 7.0 - 18 MG/DL    Creatinine 1.02 0.6 - 1.3 MG/DL    BUN/Creatinine ratio 16 12 - 20      GFR est AA >60 >60 ml/min/1.73m2    GFR est non-AA 55 (L) >60 ml/min/1.73m2    Calcium 9.0 8.5 - 10.1 MG/DL    Bilirubin, total 0.5 0.2 - 1.0 MG/DL    ALT (SGPT) 22 13 - 56 U/L    AST (SGOT) 12 (L) 15 - 37 U/L    Alk. phosphatase 86 45 - 117 U/L    Protein, total 7.8 6.4 - 8.2 g/dL    Albumin 4.0 3.4 - 5.0 g/dL    Globulin 3.8 2.0 - 4.0 g/dL    A-G Ratio 1.1 0.8 - 1.7     LIPASE    Collection Time: 01/24/18 10:09 PM   Result Value Ref Range    Lipase 705 (H) 73 - 393 U/L       EKG interpretation by ED Physician:      X-Ray, CT or other radiology findings or impressions:  US RETROPERITONEUM COMP   Final Result      CT ABD PELV W CONT   Final Result          Progress notes, Consult notes or additional Procedure notes:       Reevaluation of patient:       Disposition:  Diagnosis:   1. Abdominal pain, generalized    2. Nausea without vomiting    3. Atrophic kidney        Disposition: Discharged    Follow-up Information     Follow up With Details Comments Contact Info    [facility] EMERGENCY DEPT  If symptoms worsen [address] [place] [address] [address] [facility]    Spencer Jensen MD Call in 1 day ER follow up for right atrophic kidney [place] [phone] [place], [place] [address]mario Del Rio MD Call in 1 day general surgery ER follow up for lap band adjustment if abd pain persists [phone]  [phone]             Patient's Medications   Start Taking    PROMETHAZINE (PHENERGAN) 25 MG TABLET    Take 1 Tab by mouth every six (6) hours as needed. Continue Taking    AZITHROMYCIN (ZITHROMAX) 250 MG TABLET    Take 2 tablets today, then take 1 tablet daily    CLOPIDOGREL (PLAVIX) 75 MG TAB    TAKE 1 TABLET DAILY    DALIRESP TAB TABLET    TAKE 1 TABLET DAILY    DICLOFENAC (VOLTAREN) 1 % GEL    Apply 2 g to affected area four (4) times daily.     DOCUSATE SODIUM (COLACE) 100 MG CAPSULE    Take 1 Cap by mouth two (2) times a day. ESOMEPRAZOLE (NEXIUM) 20 MG CAPSULE    Take 1 Cap by mouth daily. STOP PREVACID  Indications: HEARTBURN    FLUTICASONE-SALMETEROL (ADVAIR DISKUS) 250-50 MCG/DOSE DISKUS INHALER    Take 1 Puff by inhalation daily. GUAIFENESIN-CODEINE (ROBITUSSIN AC) 100-10 MG/5 ML SOLUTION    Take 5 mL by mouth three (3) times daily as needed for Cough. Max Daily Amount: 15 mL. Do not drive if taking this medication    HYDROCODONE-ACETAMINOPHEN (NORCO)  MG TABLET    Take 1 Tab by mouth every six (6) hours as needed for Pain. Max Daily Amount: 4 Tabs. HYDROCODONE-ACETAMINOPHEN (NORCO) 7.5-325 MG PER TABLET    1 tablet every four hours as needed for pain    MIRTAZAPINE (REMERON) 15 MG TABLET    TAKE ONE TABLET BY MOUTH EVERY EVENING    MONTELUKAST (SINGULAIR) 10 MG TABLET    TAKE 1 TABLET DAILY    ONDANSETRON (ZOFRAN ODT) 4 MG DISINTEGRATING TABLET    PLACE 1 TABLET ON TONGUE TWICE A DAY IF NAUSEA    PANTOPRAZOLE (PROTONIX) 40 MG TABLET        PRAVASTATIN (PRAVACHOL) 20 MG TABLET    TAKE 1 TABLET DAILY    RANITIDINE (ZANTAC) 150 MG TABLET    TAKE ONE TABLET BY MOUTH TWICE A DAY    SERTRALINE (ZOLOFT) 50 MG TABLET    Take 1 Tab by mouth daily.     TIAGABINE (GABITRIL) 2 MG TABLET    TAKE ONE TABLET BY MOUTH TWICE A DAY WITH MEALS   These Medications have changed    No medications on file   Stop Taking    No medications on file

## 2018-01-25 NOTE — DISCHARGE INSTRUCTIONS
Nausea and Vomiting: Care Instructions  Your Care Instructions    When you are nauseated, you may feel weak and sweaty and notice a lot of saliva in your mouth. Nausea often leads to vomiting. Most of the time you do not need to worry about nausea and vomiting, but they can be signs of other illnesses. Two common causes of nausea and vomiting are stomach flu and food poisoning. Nausea and vomiting from viral stomach flu will usually start to improve within 24 hours. Nausea and vomiting from food poisoning may last from 12 to 48 hours. The doctor has checked you carefully, but problems can develop later. If you notice any problems or new symptoms, get medical treatment right away. Follow-up care is a key part of your treatment and safety. Be sure to make and go to all appointments, and call your doctor if you are having problems. It's also a good idea to know your test results and keep a list of the medicines you take. How can you care for yourself at home? · To prevent dehydration, drink plenty of fluids, enough so that your urine is light yellow or clear like water. Choose water and other caffeine-free clear liquids until you feel better. If you have kidney, heart, or liver disease and have to limit fluids, talk with your doctor before you increase the amount of fluids you drink. · Rest in bed until you feel better. · When you are able to eat, try clear soups, mild foods, and liquids until all symptoms are gone for 12 to 48 hours. Other good choices include dry toast, crackers, cooked cereal, and gelatin dessert, such as Jell-O. When should you call for help? Call 911 anytime you think you may need emergency care. For example, call if:  ? · You passed out (lost consciousness). ?Call your doctor now or seek immediate medical care if:  ? · You have symptoms of dehydration, such as:  ¨ Dry eyes and a dry mouth. ¨ Passing only a little dark urine.   ¨ Feeling thirstier than usual.   ? · You have new or worsening belly pain. ? · You have a new or higher fever. ? · You vomit blood or what looks like coffee grounds. ? Watch closely for changes in your health, and be sure to contact your doctor if:  ? · You have ongoing nausea and vomiting. ? · Your vomiting is getting worse. ? · Your vomiting lasts longer than 2 days. ? · You are not getting better as expected. Where can you learn more? Go to http://linda-slava.info/. Enter 25 [id number] in the search box to learn more about \"Nausea and Vomiting: Care Instructions. \"  Current as of: March 20, 2017  Content Version: 11.4  © 4743-2057 [employer]. Care instructions adapted under license by [employer] (which disclaims liability or warranty for this information). If you have questions about a medical condition or this instruction, always ask your healthcare professional. [place] [address] any warranty or liability for your use of this information. Abdominal Pain: Care Instructions  Your Care Instructions    Abdominal pain has many possible causes. Some aren't serious and get better on their own in a few days. Others need more testing and treatment. If your pain continues or gets worse, you need to be rechecked and may need more tests to find out what is wrong. You may need surgery to correct the problem. Don't ignore new symptoms, such as fever, nausea and vomiting, urination problems, pain that gets worse, and dizziness. These may be signs of a more serious problem. Your doctor may have recommended a follow-up visit in the next 8 to 12 hours. If you are not getting better, you may need more tests or treatment. The doctor has checked you carefully, but problems can develop later. If you notice any problems or new symptoms, get medical treatment right away. Follow-up care is a key part of your treatment and safety.  Be sure to make and go to all appointments, and call your doctor if you are having problems. It's also a good idea to know your test results and keep a list of the medicines you take. How can you care for yourself at home? · Rest until you feel better. · To prevent dehydration, drink plenty of fluids, enough so that your urine is light yellow or clear like water. Choose water and other caffeine-free clear liquids until you feel better. If you have kidney, heart, or liver disease and have to limit fluids, talk with your doctor before you increase the amount of fluids you drink. · If your stomach is upset, eat mild foods, such as rice, dry toast or crackers, bananas, and applesauce. Try eating several small meals instead of two or three large ones. · Wait until 48 hours after all symptoms have gone away before you have spicy foods, alcohol, and drinks that contain caffeine. · Do not eat foods that are high in fat. · Avoid anti-inflammatory medicines such as aspirin, ibuprofen (Advil, Motrin), and naproxen (Aleve). These can cause stomach upset. Talk to your doctor if you take daily aspirin for another health problem. When should you call for help? Call 911 anytime you think you may need emergency care. For example, call if:  ? · You passed out (lost consciousness). ? · You pass maroon or very bloody stools. ? · You vomit blood or what looks like coffee grounds. ? · You have new, severe belly pain. ?Call your doctor now or seek immediate medical care if:  ? · Your pain gets worse, especially if it becomes focused in one area of your belly. ? · You have a new or higher fever. ? · Your stools are black and look like tar, or they have streaks of blood. ? · You have unexpected vaginal bleeding. ? · You have symptoms of a urinary tract infection. These may include:  ¨ Pain when you urinate. ¨ Urinating more often than usual.  ¨ Blood in your urine. ? · You are dizzy or lightheaded, or you feel like you may faint. ? Watch closely for changes in your health, and be sure to contact your doctor if:  ? · You are not getting better after 1 day (24 hours). Where can you learn more? Go to http://linda-slava.info/. Enter T708 in the search box to learn more about \"Abdominal Pain: Care Instructions. \"  Current as of: March 20, 2017  Content Version: 11.4  © 6800-4198 [employer]. Care instructions adapted under license by [employer] (which disclaims liability or warranty for this information). If you have questions about a medical condition or this instruction, always ask your healthcare professional. [place] [address] any warranty or liability for your use of this information.

## 2018-01-25 NOTE — ED NOTES
Patient given medication at this time. Patient resting well with family at this time. No additional wants or needs noted call bell within reach. Will continue to monitor.

## 2018-01-25 NOTE — TELEPHONE ENCOUNTER
Patient went to the ER last night and they ran tests and told her that her kidneys were getting smaller and that she might need treatment. They told her to see Dr. Tere Amaya as soon as possible. She would like for someone to call her please.  She went to [facility]

## 2018-01-26 ENCOUNTER — OFFICE VISIT (OUTPATIENT)
Dept: VASCULAR SURGERY | Age: 63
End: 2018-01-26

## 2018-01-26 VITALS
DIASTOLIC BLOOD PRESSURE: 80 MMHG | HEART RATE: 62 BPM | SYSTOLIC BLOOD PRESSURE: 132 MMHG | HEIGHT: 65 IN | RESPIRATION RATE: 20 BRPM | WEIGHT: 172 LBS | BODY MASS INDEX: 28.66 KG/M2

## 2018-01-26 DIAGNOSIS — I70.1 RIGHT RENAL ARTERY STENOSIS (HCC): Primary | ICD-10-CM

## 2018-01-26 NOTE — MR AVS SNAPSHOT
Chandlerjono Cervantes 
 
 
 [address] [place], [place] [address] [address] [facility] [address] 
[phone] Patient: Kisha Jordan MRN: [id number] JKX:[id number] Visit Information Date & Time Provider Department Dept. Phone Encounter #  
 1/26/2018 10:00 AM MD [facility] [phone] [id number] Follow-up Instructions Return in about 1 week (around 2/2/2018). Your Appointments 1/30/2018  8:00 AM  
PROCEDURE with [facility] 2 [facility] ([address] [place] [address]) Appt Note: renal anni prep 1212 [facility] Dan 635 200 [facility]  
[phone] 12[address] [place] Melida Dandy 47 [facility]  
  
    
 2/2/2018  9:30 AM  
Follow Up with MD [facility] [address] [place] [address]) Appt Note: follow up after study with prep [facility]12 [facility]andy 016 200 [facility]  
[phone] 1212 [facility] Dandy 47 [facility]  
  
    
 2/16/2018  8:45 AM  
COMPLETE PHYSICAL with Evette Traylor MD  
[address] [place] [address]) Appt Note: [address], [address] [place] [address]  
[phone]  
  
   
 [address] [place] [address] [address] [place] [address]  
  
    
 11/12/2018  9:00 AM  
PROCEDURE with [facility] IMAGING 1 [facility] ([address] [place] [address] Road) Appt Note: amber brown 1 year 1212 [facility] Susie Dandy 588 200 [facility]  
[phone] [address]  
  
    
 11/12/2018 10:00 AM  
PROCEDURE with [facility] IMAGING 1 [facility] ([address]) Appt Note: cv stephanie 1 year 1212 [facility] 189 200 [facility]  
[phone]  
  
    
 11/27/2018 10:00 AM  
Follow Up with MAKAYLA Howard  
 [facility] ([address]) Appt Note: 1 year follow up after studies with prep 2300 [facility] Melida Dandy 391 200 [facility]  
[phone] 2630 [facility]  
  
    
  
 6/5/2018 10:45 AM  
Any with Vikram Judge MD  
[facility] ([address]) Appt Note: ep- 6 month follow up Arlene Chaves [address] [place] [address]  
[address] [place] [address] Upcoming Health Maintenance Date Due ZOSTER VACCINE AGE 60> 12/11/2016 FOBT Q 1 YEAR AGE 50-75 7/1/2017 Bone Densitometry 9/24/2017 BREAST CANCER SCRN MAMMOGRAM 6/1/2018 PAP AKA CERVICAL CYTOLOGY 8/12/2019 DTaP/Tdap/Td series (2 - Td) 9/1/2026 Allergies as of 1/26/2018  Review Complete On: 1/26/2018 By: 20 Jessica Barry MD  
  
 Severity Noted Reaction Type Reactions Pcn [Penicillins] High 09/18/2014    Anaphylaxis Tetracycline High 09/18/2014    Anaphylaxis Sulfabenzamide  09/18/2014    Hives Current Immunizations  Reviewed on 11/16/2017 Name Date Influenza Vaccine (Quad) PF 9/13/2017, 9/1/2016 Pneumococcal Vaccine (Unspecified Type) 3/5/2013 Tdap 9/1/2016 Not reviewed this visit You Were Diagnosed With   
  
 Codes Comments Right renal artery stenosis (HCC)    -  Primary ICD-10-CM: I70.1 ICD-9-CM: 440. 1 Vitals BP Pulse Resp Height(growth percentile) Weight(growth percentile) BMI  
 132/80 (BP 1 Location: Left arm, BP Patient Position: Sitting) 62 20 5' 5\" (1.651 m) 172 lb (78 kg) 28.62 kg/m2 OB Status Smoking Status Hysterectomy Former Smoker Vitals History BMI and BSA Data Body Mass Index Body Surface Area  
 28.62 kg/m 2 1.89 m 2 Preferred Pharmacy Pharmacy Name Phone [address] [place] [address]. [phone] Your Updated Medication List  
  
   
This list is accurate as of: 1/26/18 10:54 AM.  Always use your most recent med list.  
  
  
  
  
 azithromycin 250 mg tablet Commonly known as:  Kimmy Kelley Take 2 tablets today, then take 1 tablet daily  
  
 clopidogrel 75 mg Tab Commonly known as:  PLAVIX TAKE 1 TABLET DAILY DALIRESP Tab tablet Generic drug:  roflumilast  
TAKE 1 TABLET DAILY  
  
 diclofenac 1 % Gel Commonly known as:  VOLTAREN Apply 2 g to affected area four (4) times daily. docusate sodium 100 mg capsule Commonly known as:  Thedore Dial Take 1 Cap by mouth two (2) times a day. esomeprazole 20 mg capsule Commonly known as:  NexIUM Take 1 Cap by mouth daily. STOP PREVACID  Indications: HEARTBURN  
  
 fluticasone-salmeterol 250-50 mcg/dose diskus inhaler Commonly known as:  ADVAIR DISKUS Take 1 Puff by inhalation daily. guaiFENesin-codeine 100-10 mg/5 mL solution Commonly known as:  ROBITUSSIN AC Take 5 mL by mouth three (3) times daily as needed for Cough. Max Daily Amount: 15 mL. Do not drive if taking this medication * HYDROcodone-acetaminophen 7.5-325 mg per tablet Commonly known as:  NORCO  
1 tablet every four hours as needed for pain  
  
 * HYDROcodone-acetaminophen  mg tablet Commonly known as:  Clemetine Alba Take 1 Tab by mouth every six (6) hours as needed for Pain. Max Daily Amount: 4 Tabs. mirtazapine 15 mg tablet Commonly known as:  REMERON  
TAKE ONE TABLET BY MOUTH EVERY EVENING  
  
 montelukast 10 mg tablet Commonly known as:  SINGULAIR  
TAKE 1 TABLET DAILY  
  
 ondansetron 4 mg disintegrating tablet Commonly known as:  ZOFRAN ODT PLACE 1 TABLET ON TONGUE TWICE A DAY IF NAUSEA  
  
 pantoprazole 40 mg tablet Commonly known as:  PROTONIX  
  
 pravastatin 20 mg tablet Commonly known as:  PRAVACHOL  
TAKE 1 TABLET DAILY promethazine 25 mg tablet Commonly known as:  PHENERGAN  
 Take 1 Tab by mouth every six (6) hours as needed. raNITIdine 150 mg tablet Commonly known as:  ZANTAC TAKE ONE TABLET BY MOUTH TWICE A DAY  
  
 sertraline 50 mg tablet Commonly known as:  ZOLOFT Take 1 Tab by mouth daily. tiaGABine 2 mg tablet Commonly known as:  GABITRIL  
TAKE ONE TABLET BY MOUTH TWICE A DAY WITH MEALS * Notice: This list has 2 medication(s) that are the same as other medications prescribed for you. Read the directions carefully, and ask your doctor or other care provider to review them with you. Follow-up Instructions Return in about 1 week (around 2/2/2018). To-Do List   
 01/31/2018 Imaging:  DUPLEX RENAL ARTERY RIGHT AMB Please provide this summary of care documentation to your next provider. Your primary care clinician is listed as Stacy Day. If you have any questions after today's visit, please call [phone].

## 2018-01-26 NOTE — PROGRESS NOTES
Gillian Velasquez    Chief Complaint   Patient presents with    Surgical Follow-up     renal artery stenosis/ischemia       History and Physical    12year-old female here today for evaluation of her right kidney. She has had some recent epigastric pain that does radiate to the back. She was seen in the emergency department yesterday. She is a remote history of aortic endograft repair right renal artery stent. Her most recent Doppler shows a patent stent patent endograft December 2017. She has had a LAP-BAND procedure 5 years ago and her band was possibly fallen out of place. She does take care of 3 grandkids and has had the GI bug a week or 2 ago. Past Medical History:   Diagnosis Date    Aorto-iliac duplex 09/17/2015    Patent abdom aorta endovascular graft w/o leak. Mod stenosis suggested in right limb.  Calculus of kidney 2014    stent/kidney     Carotid duplex 09/17/2015    Mild < 50% bilateral plaquing.  Cataract, left     Chronic obstructive pulmonary disease ([address]     Echocardiogram 11/04/2014    EF 56%. No WMA. Mild LAE. Echo is within normal limits.  Esophageal reflux     Generalized osteoarthrosis, involving multiple sites     GERD (gastroesophageal reflux disease)     Gout     History of renal stent     right side    Hypercholesteremia     Hypertension     resolved    Lower extremity arterial testing 03/27/2015    Normal perfusion at rest and w/treadmill bilaterally.   R KORI 1.05.  L KORI 1.03.    Murmur     patient reports that she is aware- has been told in the past    Nausea & vomiting     Renal artery stenosis (HCC)     Subclinical hyperthyroidism 8/2015    Unspecified sleep apnea     resolved     Patient Active Problem List   Diagnosis Code    AAA (abdominal aortic aneurysm) ([facility] I71.4    Essential hypertension I10    Hyperlipidemia E78.5    Subclinical hyperthyroidism E05.90    Generalized osteoarthrosis, involving multiple sites M15.9    Esophageal reflux K21.9    Renal infarction ([facility] N28.0    Dysarthria R47.1    LAP-BAND surgery status Z98.84    Non morbid obesity due to excess calories E66.09    Gastroesophageal reflux disease without esophagitis K21.9    Obesity (BMI 30.0-34. 9) E66.9    Peripheral vascular disease (HCC) I73.9    Right-sided carotid artery disease (HCC) I77.9    Sacroiliitis (HCC) M46.1    Right hip pain M25.551    Spondylosis of lumbar region without myelopathy or radiculopathy M47.816    Lumbar neuritis M54.16    Left upper quadrant pain R10.12    Hypovitaminosis D E55.9    Lumbar radiculopathy M54.16    Hx of aortic aneurysm repair Z98.890, Z86.79    History of renal stent Z98.890    Right renal artery stenosis (HCC) I70.1     Past Surgical History:   Procedure Laterality Date    ADJUSTMENT GASTRIC BAND N/A 11/10/2016    MAKAYLA Cullen    HX APPENDECTOMY      HX BLADDER SUSPENSION      HX CHOLECYSTECTOMY      HX GASTRIC BYPASS      lap band 2012    HX HEENT Right 1990s, 2016    Ear sx    HX HYSTERECTOMY      HX OTHER SURGICAL  10/27/14     Bilateral open femoral exposures, Placement of catheter and sheath in the aorta Endo repair of aortic aneurysm with modular bifurcated device  Interpretation of angiography, intravascular ultrasound (IVUS) catheter    HX OTHER SURGICAL  10/27/2014    Endurant II abdominal stent graft implant    HX UROLOGICAL      stent    VASCULAR SURGERY PROCEDURE UNLIST      surgery for abdominal aneurysm     Current Outpatient Prescriptions   Medication Sig Dispense Refill    promethazine (PHENERGAN) 25 mg tablet Take 1 Tab by mouth every six (6) hours as needed. 15 Tab 0    HYDROcodone-acetaminophen (NORCO)  mg tablet Take 1 Tab by mouth every six (6) hours as needed for Pain. Max Daily Amount: 4 Tabs.  90 Tab 0    ondansetron (ZOFRAN ODT) 4 mg disintegrating tablet PLACE 1 TABLET ON TONGUE TWICE A DAY IF NAUSEA 60 Tab 0    mirtazapine (REMERON) 15 mg tablet TAKE ONE TABLET BY MOUTH EVERY EVENING 30 Tab 0    azithromycin (ZITHROMAX) 250 mg tablet Take 2 tablets today, then take 1 tablet daily 6 Tab 0    guaiFENesin-codeine (ROBITUSSIN AC) 100-10 mg/5 mL solution Take 5 mL by mouth three (3) times daily as needed for Cough. Max Daily Amount: 15 mL. Do not drive if taking this medication 118 mL 0    raNITIdine (ZANTAC) 150 mg tablet TAKE ONE TABLET BY MOUTH TWICE A DAY 60 Tab 2    pantoprazole (PROTONIX) 40 mg tablet       HYDROcodone-acetaminophen (NORCO) 7.5-325 mg per tablet 1 tablet every four hours as needed for pain 100 Tab 0    docusate sodium (COLACE) 100 mg capsule Take 1 Cap by mouth two (2) times a day. 60 Cap 5    esomeprazole (NEXIUM) 20 mg capsule Take 1 Cap by mouth daily. STOP PREVACID  Indications: HEARTBURN 30 Cap 1    diclofenac (VOLTAREN) 1 % gel Apply 2 g to affected area four (4) times daily. 1 Each 2    montelukast (SINGULAIR) 10 mg tablet TAKE 1 TABLET DAILY 90 Tab 2    clopidogrel (PLAVIX) 75 mg tab TAKE 1 TABLET DAILY 90 Tab 2    DALIRESP tab tablet TAKE 1 TABLET DAILY 90 Tab 2    pravastatin (PRAVACHOL) 20 mg tablet TAKE 1 TABLET DAILY 90 Tab 2    tiaGABine (GABITRIL) 2 mg tablet TAKE ONE TABLET BY MOUTH TWICE A DAY WITH MEALS 60 Tab 0    sertraline (ZOLOFT) 50 mg tablet Take 1 Tab by mouth daily. 90 Tab 3    fluticasone-salmeterol (ADVAIR DISKUS) 250-50 mcg/dose diskus inhaler Take 1 Puff by inhalation daily. (Patient taking differently: Take 2 Puffs by inhalation daily. ) 1 Inhaler 5     Allergies   Allergen Reactions    Pcn [Penicillins] Anaphylaxis    Tetracycline Anaphylaxis    Sulfabenzamide Hives       Review of Systems    A full review of systems was completed times ten organ systems and was deemed negative unless otherwise mentioned in the HPI.     Physical   Visit Vitals    /80 (BP 1 Location: Left arm, BP Patient Position: Sitting)    Pulse 62    Resp 20    Ht 5' 5\" (1.651 m)    Wt 172 lb (78 kg)    BMI 28.62 kg/m2 Composed and okay today she does have some epigastric pain  Head is normocephalic pupils are reactive  Neck no JVD  Chest clear  Cardiac regular  Abdomen soft nondistended non-guarding  Extremities without edema  Range of motion strength are equal  No signs of acute signs of peripheral arterial  insufficiency  last Doppler December 17 patent renal artery stent    Impression/Plan:     ICD-10-CM ICD-9-CM    1. Right renal artery stenosis (HCC) I70.1 440.1 DUPLEX RENAL ARTERY RIGHT AMB     we will send for renal artery ultrasound to rule out renal artery occlusion  Orders Placed This Encounter    DUPLEX RENAL ARTERY RIGHT AMB       Follow-up Disposition:  Return in about 1 week (around 2/2/2018). Amara Craig MD    PLEASE NOTE:  This document has been produced using voice recognition software. Unrecognized errors in transcription may be present.

## 2018-01-28 ENCOUNTER — HOSPITAL ENCOUNTER (EMERGENCY)
Age: 63
Discharge: HOME | End: 2018-01-28
Attending: EMERGENCY MEDICINE
Payer: COMMERCIAL

## 2018-01-28 ENCOUNTER — APPOINTMENT (OUTPATIENT)
Dept: GENERAL RADIOLOGY | Age: 63
End: 2018-01-28
Attending: EMERGENCY MEDICINE
Payer: COMMERCIAL

## 2018-01-28 VITALS
HEIGHT: 60 IN | OXYGEN SATURATION: 96 % | DIASTOLIC BLOOD PRESSURE: 98 MMHG | TEMPERATURE: 98.8 F | SYSTOLIC BLOOD PRESSURE: 175 MMHG | WEIGHT: 170 LBS | BODY MASS INDEX: 33.38 KG/M2 | RESPIRATION RATE: 22 BRPM | HEART RATE: 86 BPM

## 2018-01-28 DIAGNOSIS — G47.00 INSOMNIA, UNSPECIFIED TYPE: ICD-10-CM

## 2018-01-28 DIAGNOSIS — R03.0 ELEVATED BLOOD PRESSURE READING: ICD-10-CM

## 2018-01-28 DIAGNOSIS — R10.84 ABDOMINAL PAIN, GENERALIZED: Primary | ICD-10-CM

## 2018-01-28 DIAGNOSIS — R19.7 DIARRHEA, UNSPECIFIED TYPE: ICD-10-CM

## 2018-01-28 LAB
ALBUMIN SERPL-MCNC: 3.5 G/DL (ref 3.4–5)
ALBUMIN/GLOB SERPL: 1 {RATIO} (ref 0.8–1.7)
ALP SERPL-CCNC: 75 U/L (ref 45–117)
ALT SERPL-CCNC: 21 U/L (ref 13–56)
ANION GAP SERPL CALC-SCNC: 8 MMOL/L (ref 3–18)
APPEARANCE UR: CLEAR
AST SERPL-CCNC: 18 U/L (ref 15–37)
BASOPHILS # BLD: 0 K/UL (ref 0–0.06)
BASOPHILS NFR BLD: 1 % (ref 0–2)
BILIRUB SERPL-MCNC: 0.3 MG/DL (ref 0.2–1)
BILIRUB UR QL: NEGATIVE
BUN SERPL-MCNC: 15 MG/DL (ref 7–18)
BUN/CREAT SERPL: 15 (ref 12–20)
CALCIUM SERPL-MCNC: 8.7 MG/DL (ref 8.5–10.1)
CHLORIDE SERPL-SCNC: 103 MMOL/L (ref 100–108)
CO2 SERPL-SCNC: 28 MMOL/L (ref 21–32)
COLOR UR: YELLOW
CREAT SERPL-MCNC: 1.01 MG/DL (ref 0.6–1.3)
DIFFERENTIAL METHOD BLD: NORMAL
EOSINOPHIL # BLD: 0.3 K/UL (ref 0–0.4)
EOSINOPHIL NFR BLD: 4 % (ref 0–5)
ERYTHROCYTE [DISTWIDTH] IN BLOOD BY AUTOMATED COUNT: 13 % (ref 11.6–14.5)
GLOBULIN SER CALC-MCNC: 3.6 G/DL (ref 2–4)
GLUCOSE SERPL-MCNC: 82 MG/DL (ref 74–99)
GLUCOSE UR STRIP.AUTO-MCNC: NEGATIVE MG/DL
HCT VFR BLD AUTO: 37.7 % (ref 35–45)
HGB BLD-MCNC: 12.5 G/DL (ref 12–16)
HGB UR QL STRIP: NEGATIVE
KETONES UR QL STRIP.AUTO: NEGATIVE MG/DL
LEUKOCYTE ESTERASE UR QL STRIP.AUTO: NEGATIVE
LIPASE SERPL-CCNC: 207 U/L (ref 73–393)
LYMPHOCYTES # BLD: 2.6 K/UL (ref 0.9–3.6)
LYMPHOCYTES NFR BLD: 37 % (ref 21–52)
MCH RBC QN AUTO: 27.5 PG (ref 24–34)
MCHC RBC AUTO-ENTMCNC: 33.2 G/DL (ref 31–37)
MCV RBC AUTO: 82.9 FL (ref 74–97)
MONOCYTES # BLD: 0.7 K/UL (ref 0.05–1.2)
MONOCYTES NFR BLD: 10 % (ref 3–10)
NEUTS SEG # BLD: 3.4 K/UL (ref 1.8–8)
NEUTS SEG NFR BLD: 48 % (ref 40–73)
NITRITE UR QL STRIP.AUTO: NEGATIVE
PH UR STRIP: 7 [PH] (ref 5–8)
PLATELET # BLD AUTO: 300 K/UL (ref 135–420)
PMV BLD AUTO: 10.3 FL (ref 9.2–11.8)
POTASSIUM SERPL-SCNC: 3.5 MMOL/L (ref 3.5–5.5)
PROT SERPL-MCNC: 7.1 G/DL (ref 6.4–8.2)
PROT UR STRIP-MCNC: NEGATIVE MG/DL
RBC # BLD AUTO: 4.55 M/UL (ref 4.2–5.3)
SODIUM SERPL-SCNC: 139 MMOL/L (ref 136–145)
SP GR UR REFRACTOMETRY: 1.01 (ref 1–1.03)
UROBILINOGEN UR QL STRIP.AUTO: 0.2 EU/DL (ref 0.2–1)
WBC # BLD AUTO: 7 K/UL (ref 4.6–13.2)

## 2018-01-28 PROCEDURE — 96375 TX/PRO/DX INJ NEW DRUG ADDON: CPT

## 2018-01-28 PROCEDURE — 83690 ASSAY OF LIPASE: CPT | Performed by: EMERGENCY MEDICINE

## 2018-01-28 PROCEDURE — 96361 HYDRATE IV INFUSION ADD-ON: CPT

## 2018-01-28 PROCEDURE — 74011250637 HC RX REV CODE- 250/637: Performed by: EMERGENCY MEDICINE

## 2018-01-28 PROCEDURE — 96374 THER/PROPH/DIAG INJ IV PUSH: CPT

## 2018-01-28 PROCEDURE — 80053 COMPREHEN METABOLIC PANEL: CPT | Performed by: EMERGENCY MEDICINE

## 2018-01-28 PROCEDURE — 74018 RADEX ABDOMEN 1 VIEW: CPT

## 2018-01-28 PROCEDURE — 85025 COMPLETE CBC W/AUTO DIFF WBC: CPT | Performed by: EMERGENCY MEDICINE

## 2018-01-28 PROCEDURE — 93005 ELECTROCARDIOGRAM TRACING: CPT

## 2018-01-28 PROCEDURE — 74011000250 HC RX REV CODE- 250: Performed by: EMERGENCY MEDICINE

## 2018-01-28 PROCEDURE — 99285 EMERGENCY DEPT VISIT HI MDM: CPT

## 2018-01-28 PROCEDURE — 81003 URINALYSIS AUTO W/O SCOPE: CPT | Performed by: EMERGENCY MEDICINE

## 2018-01-28 PROCEDURE — 74011250636 HC RX REV CODE- 250/636: Performed by: EMERGENCY MEDICINE

## 2018-01-28 RX ORDER — DEXLANSOPRAZOLE 60 MG/1
CAPSULE, DELAYED RELEASE ORAL DAILY
COMMUNITY
End: 2018-02-21

## 2018-01-28 RX ORDER — ONDANSETRON 2 MG/ML
4 INJECTION INTRAMUSCULAR; INTRAVENOUS
Status: COMPLETED | OUTPATIENT
Start: 2018-01-28 | End: 2018-01-28

## 2018-01-28 RX ORDER — MORPHINE SULFATE 2 MG/ML
8 INJECTION, SOLUTION INTRAMUSCULAR; INTRAVENOUS ONCE
Status: COMPLETED | OUTPATIENT
Start: 2018-01-28 | End: 2018-01-28

## 2018-01-28 RX ORDER — LIDOCAINE HYDROCHLORIDE 20 MG/ML
15 SOLUTION OROPHARYNGEAL
Status: COMPLETED | OUTPATIENT
Start: 2018-01-28 | End: 2018-01-28

## 2018-01-28 RX ADMIN — SODIUM CHLORIDE 1000 ML: 9 INJECTION, SOLUTION INTRAVENOUS at 17:39

## 2018-01-28 RX ADMIN — LIDOCAINE HYDROCHLORIDE 15 ML: 20 SOLUTION ORAL; TOPICAL at 19:21

## 2018-01-28 RX ADMIN — Medication 8 MG: at 17:39

## 2018-01-28 RX ADMIN — ONDANSETRON 4 MG: 2 INJECTION INTRAMUSCULAR; INTRAVENOUS at 17:39

## 2018-01-28 RX ADMIN — PHENOBARBITAL 30 ML: 16.2; .1037; .0065; .0194 ELIXIR ORAL at 19:20

## 2018-01-28 NOTE — ED TRIAGE NOTES
C/o intense upper abdominal pain/epigastic region pain, constant for 10 days. Believes it is related to her gastric sleeve which she has been experiencing difficulty and pain with.

## 2018-01-28 NOTE — ED PROVIDER NOTES
EMERGENCY DEPARTMENT HISTORY AND PHYSICAL EXAM    5:18 PM      Date: 1/28/2018  Patient Name: Uche Shane    History of Presenting Illness     Chief Complaint   Patient presents with    Abdominal Pain         History Provided By: Patient and Patient's Daughter    Chief Complaint: Abdominal Pain  Duration:  Days  Timing:  Constant  Location: Diffuse   Quality: N/A  Severity: 9 out of 10  Modifying Factors: No relief with Norco or Phenergan   Associated Symptoms: Diarrhea; dry heaves; hematuria        Additional History (Context): Uche Shane is a 63 y.o. female with PMHx of COPD, GERD, Lap Band surgery 2012, presenting to the ED c/o constant diffuse abdominal pain for the past 10 days. Describes pain as 9/10 in severity. Associated symptoms include dry heaves and intermittent hematuria. Pt also reports diarrhea and notes watery and runny stools recently. Daughter states pt takes Norco and Phenergan at home with no relief in her symptoms. Pt states she had gastric sleeve 4.5 years ago and has similar symptoms since the surgery. PSHx includes appendectomy and cholecystectomy. Pt also had esophageal stretching less than one year ago. Pt was seen at [facility] ED 4 days ago for similar symptoms. Denies any recent antibiotics or recent travel. Denies alcohol use, PMHx of pancreatitis, and any other symptoms or complaints. PCP: Tea Mobley MD      Past History     Past Medical History:  Past Medical History:   Diagnosis Date    Aorto-iliac duplex 09/17/2015    Patent abdom aorta endovascular graft w/o leak. Mod stenosis suggested in right limb.  Calculus of kidney 2014    stent/kidney     Carotid duplex 09/17/2015    Mild < 50% bilateral plaquing.  Cataract, left     Chronic obstructive pulmonary disease ([address]     Echocardiogram 11/04/2014    EF 56%. No WMA. Mild LAE. Echo is within normal limits.     Esophageal reflux     Generalized osteoarthrosis, involving multiple sites     GERD (gastroesophageal reflux disease)     Gout     History of renal stent     right side    Hypercholesteremia     Hypertension     resolved    Lower extremity arterial testing 03/27/2015    Normal perfusion at rest and w/treadmill bilaterally. R KORI 1.05.  L KORI 1.03.    Murmur     patient reports that she is aware- has been told in the past    Nausea & vomiting     Renal artery stenosis ([address]     Subclinical hyperthyroidism 8/2015    Unspecified sleep apnea     resolved       Past Surgical History:  Past Surgical History:   Procedure Laterality Date    ADJUSTMENT GASTRIC BAND N/A 11/10/2016    MAKAYLA Cullen    HX APPENDECTOMY      HX BLADDER SUSPENSION      HX CHOLECYSTECTOMY      HX GASTRIC BYPASS      lap band 2012    HX HEENT Right 1990s, 2016    Ear sx    HX HYSTERECTOMY      HX OTHER SURGICAL  10/27/14     Bilateral open femoral exposures, Placement of catheter and sheath in the aorta Endo repair of aortic aneurysm with modular bifurcated device  Interpretation of angiography, intravascular ultrasound (IVUS) catheter    HX OTHER SURGICAL  10/27/2014    Endurant II abdominal stent graft implant    HX UROLOGICAL      stent    VASCULAR SURGERY PROCEDURE UNLIST      surgery for abdominal aneurysm       Family History:  Family History   Problem Relation Age of Onset    Cancer Mother     Diabetes Father     Alcohol abuse Father     Heart Disease Maternal Grandmother     Alzheimer Maternal Grandmother     Diabetes Sister        Social History:  Social History   Substance Use Topics    Smoking status: Former Smoker     Packs/day: 0.00     Years: 0.00     Types: Cigarettes     Quit date: 1/25/2016    Smokeless tobacco: Never Used    Alcohol use No       Allergies: Allergies   Allergen Reactions    Pcn [Penicillins] Anaphylaxis    Tetracycline Anaphylaxis    Sulfabenzamide Hives         Review of Systems     Review of Systems   Constitutional: Negative for fever.    HENT: Negative for sore throat. Eyes: Negative for redness. Respiratory: Negative for shortness of breath and wheezing. Gastrointestinal: Positive for abdominal pain, diarrhea, nausea and vomiting (\"dry heaves\"). Genitourinary: Positive for hematuria. Musculoskeletal: Negative for neck stiffness. Skin: Negative for pallor. Neurological: Negative for headaches. Hematological: Does not bruise/bleed easily. All other systems reviewed and are negative. Physical Exam     Visit Vitals    BP (!) 175/98    Pulse 86    Temp 98.8 °F (37.1 °C)    Resp 22    Ht 5' (1.524 m)    Wt 77.1 kg (170 lb)    SpO2 96%    BMI 33.2 kg/m2       Physical Exam   Constitutional: She is oriented to person, place, and time. She appears well-developed and well-nourished. No distress. Appears in pain. HENT:   Head: Normocephalic and atraumatic. Mouth/Throat: Oropharynx is clear and moist.   Eyes: Conjunctivae are normal. Pupils are equal, round, and reactive to light. No scleral icterus. Neck: Normal range of motion. Neck supple. Cardiovascular: Intact distal pulses. Capillary refill < 3 seconds   Pulmonary/Chest: Effort normal and breath sounds normal. No respiratory distress. She has no wheezes. Abdominal: Soft. Bowel sounds are normal. She exhibits no distension and no mass. There is generalized tenderness (diffuse). There is guarding. There is no rebound. No flank tenderness. Has generalized tenderness  Good bowel sounds     Musculoskeletal: Normal range of motion. She exhibits no edema. Lymphadenopathy:     She has no cervical adenopathy. Neurological: She is alert and oriented to person, place, and time. No cranial nerve deficit. Skin: Skin is warm and dry. She is not diaphoretic. Psychiatric: She has a normal mood and affect. Nursing note and vitals reviewed.         Diagnostic Study Results     Labs -  Recent Results (from the past 12 hour(s))   METABOLIC PANEL, BASIC    Collection Time: 01/30/18  4:35 AM   Result Value Ref Range    Sodium 139 136 - 145 mmol/L    Potassium 3.7 3.5 - 5.5 mmol/L    Chloride 103 100 - 108 mmol/L    CO2 24 21 - 32 mmol/L    Anion gap 12 3.0 - 18 mmol/L    Glucose 130 (H) 74 - 99 mg/dL    BUN 13 7.0 - 18 MG/DL    Creatinine 0.83 0.6 - 1.3 MG/DL    BUN/Creatinine ratio 16 12 - 20      GFR est AA >60 >60 ml/min/1.73m2    GFR est non-AA >60 >60 ml/min/1.73m2    Calcium 9.1 8.5 - 10.1 MG/DL   CBC W/O DIFF    Collection Time: 01/30/18  4:35 AM   Result Value Ref Range    WBC 13.4 (H) 4.6 - 13.2 K/uL    RBC 4.63 4.20 - 5.30 M/uL    HGB 13.4 12.0 - 16.0 g/dL    HCT 38.6 35.0 - 45.0 %    MCV 83.4 74.0 - 97.0 FL    MCH 28.9 24.0 - 34.0 PG    MCHC 34.7 31.0 - 37.0 g/dL    RDW 13.1 11.6 - 14.5 %    PLATELET 990 460 - 514 K/uL    MPV 10.2 9.2 - 11.8 FL       Radiologic Studies -   XR ABD (KUB)   Final Result      6:50 PM No free air. No obstruction. Nonspecific bowel gas pattern. Preliminary Read by Gisele Ellington DO     CT abd pelvis on 1/24/18: IMPRESSION  Impression:     Status post lap band. The stomach appears fairly narrow as it passes through the  lap band, but CT is a suboptimal modality for assessing the lap band. Consider  correlation with nonemergent upper GI. Component of overly tight lap band is  possible.     Atrophic right kidney with delayed enhancement but no hydronephrosis. This  appears worsened in the interval from prior since December 2017. There is a  right renal artery stent noted as well as prior endovascular repair of an  abdominal aortic aneurysm. Recommend correlation with renal Doppler ultrasound  as the progressive atrophy and diminished enhancement without hydronephrosis or  renal vein occlusion is concerning for occlusion of the right renal artery  stent. This could potentially also be assessed with CTA although evaluation may  be limited due to streak from adjacent metallic stents.     Prior cholecystectomy.     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 Retroperotineal US on 1/24/18:   FINDINGS:   Real time transverse and sagittal images have been obtained. The right kidney measures 6.7 x 3.2 x 2.7 cm. The echogenicity of the right  kidney is increased. There is no hydronephrosis. The left kidney measures 10.7 x 4.2 x 4.4 cm. The echogenicity of the left  kidney is normal. There is no hydronephrosis. Portions of the liver and spleen are demonstrated and are normal. The urinary  bladder is moderately distended. The wall of the urinary bladder is not  thickened.     IMPRESSION  IMPRESSION:     Small echogenic right kidney consistent with chronic ischemia. Medical Decision Making   I am the first provider for this patient. I reviewed the vital signs, available nursing notes, past medical history, past surgical history, family history and social history. Vital Signs-Reviewed the patient's vital signs. Pulse Oximetry Analysis -  98% on room air, normal       EKG: Interpreted by the EP. Time Interpreted: 4:50 PM    Rate: 85    Rhythm: Normal Sinus Rhythm    Interpretation: Normal QRS. Normal QTC. No ST elevation. No ST depression. No T-wave inversion. No ectopy. Records Reviewed: Nursing Notes and Old Medical Records (Time of Review: 5:18 PM)    Provider Notes (Medical Decision Making): ddx: chronic pain, gerd, constipation, infectious, irritable bowel, sbo    Pt had recent CT abd and renal US. Will check KUB, labs, UA, give analgesia    MDM    Medications   ondansetron (ZOFRAN) injection 4 mg (4 mg IntraVENous Given 1/28/18 1739)   morphine injection 8 mg (8 mg IntraVENous Given 1/28/18 1739)   sodium chloride 0.9 % bolus infusion 1,000 mL (0 mL IntraVENous IV Completed 1/28/18 1834)   maalox/donnatal (GI COCKTAIL COMPOUND) oral liquid (30 mL Oral Given 1/28/18 1920)   lidocaine (XYLOCAINE) 2 % viscous solution 15 mL (15 mL Mouth/Throat Given 1/28/18 1921)       ED Course: Progress Notes, Reevaluation, and Consults:  Labs reassuring. No alarming labs. WBC 13.2 likely stress phase from pain. Nothing acute on KUB. Pain improved after analgesia, but returned, will try gi cocktail. GI gave some improvement. Pt is on narcotics at home. She had no diarrhea at all during ED stay. I have reassessed the patient. I have discussed the workup, results and plan with the patient and patient is in agreement. Patient is feeling better. Patient was discharge in stable condition. Patient was given outpatient follow up. Patient is to return to emergency department if any new or worsening condition. Diagnosis     Clinical Impression:   1. Abdominal pain, generalized    2. Diarrhea, unspecified type    3. Elevated blood pressure reading        Disposition: dc    Follow-up Information     Follow up With Details Comments Contact Info    Airam Askew MD Call in 2 days  [address]      Your general surgeon Call in 2 days      Tyrone Toro MD Call in 2 days  [address] [facility]  [address]  [address] [facility]  [phone]             Discharge Medication List as of 1/28/2018  8:55 PM      CONTINUE these medications which have NOT CHANGED    Details   fluticasone-salmeterol (ADVAIR DISKUS) 250-50 mcg/dose diskus inhaler Take 1 Puff by inhalation daily. , Normal, Disp-1 Inhaler, R-5      Dexlansoprazole (DEXILANT) 60 mg CpDB Take  by mouth daily. , Historical Med      promethazine (PHENERGAN) 25 mg tablet Take 1 Tab by mouth every six (6) hours as needed. , Print, Disp-15 Tab, R-0      HYDROcodone-acetaminophen (NORCO)  mg tablet Take 1 Tab by mouth every six (6) hours as needed for Pain.  Max Daily Amount: 4 Tabs., Print, Disp-90 Tab, R-0      ondansetron (ZOFRAN ODT) 4 mg disintegrating tablet PLACE 1 TABLET ON TONGUE TWICE A DAY IF NAUSEA, Normal, Disp-60 Tab, R-0      mirtazapine (REMERON) 15 mg tablet TAKE ONE TABLET BY MOUTH EVERY EVENING, Normal, Disp-30 Tab, R-0      azithromycin (ZITHROMAX) 250 mg tablet Take 2 tablets today, then take 1 tablet daily, Normal, Disp-6 Tab, R-0      guaiFENesin-codeine (ROBITUSSIN AC) 100-10 mg/5 mL solution Take 5 mL by mouth three (3) times daily as needed for Cough. Max Daily Amount: 15 mL. Do not drive if taking this medication, Print, Disp-118 mL, R-0      raNITIdine (ZANTAC) 150 mg tablet TAKE ONE TABLET BY MOUTH TWICE A DAY, Normal, Disp-60 Tab, R-2      pantoprazole (PROTONIX) 40 mg tablet Historical Med      docusate sodium (COLACE) 100 mg capsule Take 1 Cap by mouth two (2) times a day., Normal, Disp-60 Cap, R-5      HYDROcodone-acetaminophen (NORCO) 7.5-325 mg per tablet 1 tablet every four hours as needed for pain, PrintPlease note:  New strength and instructionsDisp-100 Tab, R-0      esomeprazole (NEXIUM) 20 mg capsule Take 1 Cap by mouth daily. STOP PREVACID  Indications: HEARTBURN, Normal, Disp-30 Cap, R-1      diclofenac (VOLTAREN) 1 % gel Apply 2 g to affected area four (4) times daily. , Normal, Disp-1 Each, R-2      montelukast (SINGULAIR) 10 mg tablet TAKE 1 TABLET DAILY, Normal, Disp-90 Tab, R-2      clopidogrel (PLAVIX) 75 mg tab TAKE 1 TABLET DAILY, Normal, Disp-90 Tab, R-2      DALIRESP tab tablet TAKE 1 TABLET DAILY, Normal, Disp-90 Tab, R-2      pravastatin (PRAVACHOL) 20 mg tablet TAKE 1 TABLET DAILY, Normal, Disp-90 Tab, R-2      tiaGABine (GABITRIL) 2 mg tablet TAKE ONE TABLET BY MOUTH TWICE A DAY WITH MEALS, Normal, Disp-60 Tab, R-0      sertraline (ZOLOFT) 50 mg tablet Take 1 Tab by mouth daily. , Normal, Disp-90 Tab, R-3           _______________________________    Attestations:  Scribe Attestation     Sage Myers acting as a scribe for and in the presence of Jamilah Black, DO      January 28, 2018 at [facility]       Provider Attestation:      I personally performed the services described in the documentation, reviewed the documentation, as recorded by the scribe in my presence, and it accurately and completely records my words and actions.  January 28, 2018 at 5:18 PM - Luis Mcdaniel, DO    _______________________________

## 2018-01-29 ENCOUNTER — HOSPITAL ENCOUNTER (INPATIENT)
Age: 63
LOS: 2 days | Discharge: HOME | DRG: 392 | End: 2018-02-02
Attending: EMERGENCY MEDICINE | Admitting: INTERNAL MEDICINE
Payer: COMMERCIAL

## 2018-01-29 ENCOUNTER — APPOINTMENT (OUTPATIENT)
Dept: GENERAL RADIOLOGY | Age: 63
DRG: 392 | End: 2018-01-29
Attending: INTERNAL MEDICINE
Payer: COMMERCIAL

## 2018-01-29 DIAGNOSIS — R11.2 NAUSEA AND VOMITING IN ADULT PATIENT: ICD-10-CM

## 2018-01-29 DIAGNOSIS — R19.7 DIARRHEA, UNSPECIFIED TYPE: ICD-10-CM

## 2018-01-29 DIAGNOSIS — R03.0 ELEVATED BLOOD PRESSURE READING: ICD-10-CM

## 2018-01-29 DIAGNOSIS — R10.84 ABDOMINAL PAIN, GENERALIZED: Primary | ICD-10-CM

## 2018-01-29 PROBLEM — R10.9 ABDOMINAL PAIN: Status: ACTIVE | Noted: 2018-01-29

## 2018-01-29 LAB
ALBUMIN SERPL-MCNC: 3.8 G/DL (ref 3.4–5)
ALBUMIN SERPL-MCNC: 3.9 G/DL (ref 3.4–5)
ALBUMIN/GLOB SERPL: 1 {RATIO} (ref 0.8–1.7)
ALBUMIN/GLOB SERPL: 1.1 {RATIO} (ref 0.8–1.7)
ALP SERPL-CCNC: 83 U/L (ref 45–117)
ALP SERPL-CCNC: 86 U/L (ref 45–117)
ALT SERPL-CCNC: 15 U/L (ref 13–56)
ALT SERPL-CCNC: 19 U/L (ref 13–56)
ANION GAP SERPL CALC-SCNC: 5 MMOL/L (ref 3–18)
APPEARANCE UR: CLEAR
AST SERPL-CCNC: 16 U/L (ref 15–37)
AST SERPL-CCNC: 19 U/L (ref 15–37)
ATRIAL RATE: 85 BPM
BACTERIA URNS QL MICRO: NEGATIVE /HPF
BASOPHILS # BLD: 0.1 K/UL (ref 0–0.06)
BASOPHILS NFR BLD: 1 % (ref 0–2)
BILIRUB DIRECT SERPL-MCNC: 0.2 MG/DL (ref 0–0.2)
BILIRUB SERPL-MCNC: 0.6 MG/DL (ref 0.2–1)
BILIRUB SERPL-MCNC: 0.6 MG/DL (ref 0.2–1)
BILIRUB UR QL: NEGATIVE
BUN SERPL-MCNC: 10 MG/DL (ref 7–18)
BUN/CREAT SERPL: 11 (ref 12–20)
CALCIUM SERPL-MCNC: 9 MG/DL (ref 8.5–10.1)
CALCULATED P AXIS, ECG09: 76 DEGREES
CALCULATED R AXIS, ECG10: 74 DEGREES
CALCULATED T AXIS, ECG11: 71 DEGREES
CHLORIDE SERPL-SCNC: 103 MMOL/L (ref 100–108)
CO2 SERPL-SCNC: 30 MMOL/L (ref 21–32)
COLOR UR: YELLOW
CREAT SERPL-MCNC: 0.9 MG/DL (ref 0.6–1.3)
DIAGNOSIS, 93000: NORMAL
DIFFERENTIAL METHOD BLD: ABNORMAL
EOSINOPHIL # BLD: 0.2 K/UL (ref 0–0.4)
EOSINOPHIL NFR BLD: 3 % (ref 0–5)
EPITH CASTS URNS QL MICRO: NORMAL /LPF (ref 0–5)
ERYTHROCYTE [DISTWIDTH] IN BLOOD BY AUTOMATED COUNT: 12.9 % (ref 11.6–14.5)
GLOBULIN SER CALC-MCNC: 3.4 G/DL (ref 2–4)
GLOBULIN SER CALC-MCNC: 3.9 G/DL (ref 2–4)
GLUCOSE SERPL-MCNC: 96 MG/DL (ref 74–99)
GLUCOSE UR STRIP.AUTO-MCNC: NEGATIVE MG/DL
HCT VFR BLD AUTO: 40.1 % (ref 35–45)
HGB BLD-MCNC: 13.6 G/DL (ref 12–16)
HGB UR QL STRIP: ABNORMAL
KETONES UR QL STRIP.AUTO: NEGATIVE MG/DL
LEUKOCYTE ESTERASE UR QL STRIP.AUTO: NEGATIVE
LIPASE SERPL-CCNC: 134 U/L (ref 73–393)
LYMPHOCYTES # BLD: 1.5 K/UL (ref 0.9–3.6)
LYMPHOCYTES NFR BLD: 23 % (ref 21–52)
MCH RBC QN AUTO: 28.2 PG (ref 24–34)
MCHC RBC AUTO-ENTMCNC: 33.9 G/DL (ref 31–37)
MCV RBC AUTO: 83.2 FL (ref 74–97)
MONOCYTES # BLD: 0.6 K/UL (ref 0.05–1.2)
MONOCYTES NFR BLD: 10 % (ref 3–10)
NEUTS SEG # BLD: 4.2 K/UL (ref 1.8–8)
NEUTS SEG NFR BLD: 63 % (ref 40–73)
NITRITE UR QL STRIP.AUTO: NEGATIVE
P-R INTERVAL, ECG05: 148 MS
PH UR STRIP: 7.5 [PH] (ref 5–8)
PLATELET # BLD AUTO: 271 K/UL (ref 135–420)
PMV BLD AUTO: 9.7 FL (ref 9.2–11.8)
POTASSIUM SERPL-SCNC: 4.5 MMOL/L (ref 3.5–5.5)
PROT SERPL-MCNC: 7.3 G/DL (ref 6.4–8.2)
PROT SERPL-MCNC: 7.7 G/DL (ref 6.4–8.2)
PROT UR STRIP-MCNC: NEGATIVE MG/DL
Q-T INTERVAL, ECG07: 368 MS
QRS DURATION, ECG06: 86 MS
QTC CALCULATION (BEZET), ECG08: 437 MS
RBC # BLD AUTO: 4.82 M/UL (ref 4.2–5.3)
RBC #/AREA URNS HPF: NORMAL /HPF (ref 0–5)
SODIUM SERPL-SCNC: 138 MMOL/L (ref 136–145)
SP GR UR REFRACTOMETRY: 1.01 (ref 1–1.03)
UROBILINOGEN UR QL STRIP.AUTO: 0.2 EU/DL (ref 0.2–1)
VENTRICULAR RATE, ECG03: 85 BPM
WBC # BLD AUTO: 6.6 K/UL (ref 4.6–13.2)
WBC URNS QL MICRO: NEGATIVE /HPF (ref 0–4)

## 2018-01-29 PROCEDURE — 74011000258 HC RX REV CODE- 258: Performed by: NURSE PRACTITIONER

## 2018-01-29 PROCEDURE — 74011250636 HC RX REV CODE- 250/636: Performed by: INTERNAL MEDICINE

## 2018-01-29 PROCEDURE — 74011250636 HC RX REV CODE- 250/636: Performed by: NURSE PRACTITIONER

## 2018-01-29 PROCEDURE — 74011000250 HC RX REV CODE- 250: Performed by: INTERNAL MEDICINE

## 2018-01-29 PROCEDURE — 96375 TX/PRO/DX INJ NEW DRUG ADDON: CPT

## 2018-01-29 PROCEDURE — C9113 INJ PANTOPRAZOLE SODIUM, VIA: HCPCS | Performed by: INTERNAL MEDICINE

## 2018-01-29 PROCEDURE — 77030020263 HC SOL INJ SOD CL0.9% LFCR 1000ML

## 2018-01-29 PROCEDURE — 80053 COMPREHEN METABOLIC PANEL: CPT | Performed by: EMERGENCY MEDICINE

## 2018-01-29 PROCEDURE — 96361 HYDRATE IV INFUSION ADD-ON: CPT

## 2018-01-29 PROCEDURE — 96374 THER/PROPH/DIAG INJ IV PUSH: CPT

## 2018-01-29 PROCEDURE — 85025 COMPLETE CBC W/AUTO DIFF WBC: CPT | Performed by: EMERGENCY MEDICINE

## 2018-01-29 PROCEDURE — 74011000258 HC RX REV CODE- 258: Performed by: INTERNAL MEDICINE

## 2018-01-29 PROCEDURE — 81001 URINALYSIS AUTO W/SCOPE: CPT | Performed by: NURSE PRACTITIONER

## 2018-01-29 PROCEDURE — 74011250636 HC RX REV CODE- 250/636: Performed by: EMERGENCY MEDICINE

## 2018-01-29 PROCEDURE — 99218 HC RM OBSERVATION: CPT

## 2018-01-29 PROCEDURE — 83690 ASSAY OF LIPASE: CPT | Performed by: EMERGENCY MEDICINE

## 2018-01-29 PROCEDURE — 99284 EMERGENCY DEPT VISIT MOD MDM: CPT

## 2018-01-29 PROCEDURE — 80076 HEPATIC FUNCTION PANEL: CPT | Performed by: NURSE PRACTITIONER

## 2018-01-29 RX ORDER — HYDRALAZINE HYDROCHLORIDE 20 MG/ML
20 INJECTION INTRAMUSCULAR; INTRAVENOUS ONCE
Status: COMPLETED | OUTPATIENT
Start: 2018-01-29 | End: 2018-01-29

## 2018-01-29 RX ORDER — HYDROMORPHONE HYDROCHLORIDE 2 MG/ML
2 INJECTION, SOLUTION INTRAMUSCULAR; INTRAVENOUS; SUBCUTANEOUS
Status: COMPLETED | OUTPATIENT
Start: 2018-01-29 | End: 2018-01-29

## 2018-01-29 RX ORDER — HYDRALAZINE HYDROCHLORIDE 20 MG/ML
20 INJECTION INTRAMUSCULAR; INTRAVENOUS
Status: DISCONTINUED | OUTPATIENT
Start: 2018-01-29 | End: 2018-01-31

## 2018-01-29 RX ORDER — MORPHINE SULFATE 10 MG/ML
6 INJECTION, SOLUTION INTRAMUSCULAR; INTRAVENOUS
Status: COMPLETED | OUTPATIENT
Start: 2018-01-29 | End: 2018-01-29

## 2018-01-29 RX ORDER — SODIUM CHLORIDE 9 MG/ML
125 INJECTION, SOLUTION INTRAVENOUS CONTINUOUS
Status: DISCONTINUED | OUTPATIENT
Start: 2018-01-29 | End: 2018-01-30

## 2018-01-29 RX ORDER — ONDANSETRON 2 MG/ML
4 INJECTION INTRAMUSCULAR; INTRAVENOUS
Status: DISCONTINUED | OUTPATIENT
Start: 2018-01-29 | End: 2018-01-31

## 2018-01-29 RX ORDER — ENOXAPARIN SODIUM 100 MG/ML
40 INJECTION SUBCUTANEOUS EVERY 24 HOURS
Status: DISCONTINUED | OUTPATIENT
Start: 2018-01-29 | End: 2018-02-02 | Stop reason: HOSPADM

## 2018-01-29 RX ORDER — ONDANSETRON 4 MG/1
4 TABLET, ORALLY DISINTEGRATING ORAL
Qty: 20 TAB | Refills: 0 | Status: SHIPPED | OUTPATIENT
Start: 2018-01-29 | End: 2018-01-29

## 2018-01-29 RX ORDER — HYDROCODONE BITARTRATE AND ACETAMINOPHEN 5; 325 MG/1; MG/1
1 TABLET ORAL
Qty: 20 TAB | Refills: 0 | Status: SHIPPED | OUTPATIENT
Start: 2018-01-29 | End: 2018-01-29

## 2018-01-29 RX ORDER — MORPHINE SULFATE 2 MG/ML
6 INJECTION, SOLUTION INTRAMUSCULAR; INTRAVENOUS
Status: DISCONTINUED | OUTPATIENT
Start: 2018-01-29 | End: 2018-01-29 | Stop reason: CLARIF

## 2018-01-29 RX ORDER — HYDROMORPHONE HYDROCHLORIDE 1 MG/ML
1 INJECTION, SOLUTION INTRAMUSCULAR; INTRAVENOUS; SUBCUTANEOUS
Status: DISCONTINUED | OUTPATIENT
Start: 2018-01-29 | End: 2018-01-30

## 2018-01-29 RX ORDER — SODIUM CHLORIDE 9 MG/ML
100 INJECTION, SOLUTION INTRAVENOUS CONTINUOUS
Status: DISCONTINUED | OUTPATIENT
Start: 2018-01-29 | End: 2018-02-02 | Stop reason: HOSPADM

## 2018-01-29 RX ORDER — ONDANSETRON 2 MG/ML
4 INJECTION INTRAMUSCULAR; INTRAVENOUS
Status: COMPLETED | OUTPATIENT
Start: 2018-01-29 | End: 2018-01-29

## 2018-01-29 RX ADMIN — HYDRALAZINE HYDROCHLORIDE 20 MG: 20 INJECTION INTRAMUSCULAR; INTRAVENOUS at 20:32

## 2018-01-29 RX ADMIN — SODIUM CHLORIDE 100 ML/HR: 900 INJECTION, SOLUTION INTRAVENOUS at 16:00

## 2018-01-29 RX ADMIN — SODIUM CHLORIDE 40 MG: 9 INJECTION, SOLUTION INTRAMUSCULAR; INTRAVENOUS; SUBCUTANEOUS at 17:30

## 2018-01-29 RX ADMIN — ONDANSETRON 4 MG: 2 INJECTION INTRAMUSCULAR; INTRAVENOUS at 16:57

## 2018-01-29 RX ADMIN — MORPHINE SULFATE 6 MG: 10 INJECTION INTRAMUSCULAR; INTRAVENOUS; SUBCUTANEOUS at 12:49

## 2018-01-29 RX ADMIN — ONDANSETRON 4 MG: 2 INJECTION INTRAMUSCULAR; INTRAVENOUS at 10:52

## 2018-01-29 RX ADMIN — ONDANSETRON 4 MG: 2 INJECTION INTRAMUSCULAR; INTRAVENOUS at 22:47

## 2018-01-29 RX ADMIN — ENOXAPARIN SODIUM 40 MG: 40 INJECTION SUBCUTANEOUS at 16:52

## 2018-01-29 RX ADMIN — HYDRALAZINE HYDROCHLORIDE 20 MG: 20 INJECTION INTRAMUSCULAR; INTRAVENOUS at 12:04

## 2018-01-29 RX ADMIN — PROMETHAZINE HYDROCHLORIDE 25 MG: 25 INJECTION, SOLUTION INTRAMUSCULAR; INTRAVENOUS at 20:37

## 2018-01-29 RX ADMIN — PROMETHAZINE HYDROCHLORIDE 25 MG: 25 INJECTION INTRAMUSCULAR; INTRAVENOUS at 12:49

## 2018-01-29 RX ADMIN — SODIUM CHLORIDE 125 ML/HR: 0.9 INJECTION, SOLUTION INTRAVENOUS at 10:52

## 2018-01-29 RX ADMIN — HYDROMORPHONE HYDROCHLORIDE 2 MG: 2 INJECTION INTRAMUSCULAR; INTRAVENOUS; SUBCUTANEOUS at 10:52

## 2018-01-29 NOTE — ED PROVIDER NOTES
EMERGENCY DEPARTMENT HISTORY AND PHYSICAL EXAM    Date: 1/29/2018  Patient Name: Eldon Fajardo    History of Presenting Illness     Chief Complaint   Patient presents with    Abdominal Pain    Vomiting          History Provided By: Patient and Pt relative     Chief Complaint: Severe abdominal pain    Duration:  3 Weeks  Timing:  Acute  Location: abdomen    Quality: Sharp  Severity: Severe  Modifying Factors:  Pain medication help make it a little beter, nothing makes it worse. Associated Symptoms: denies any other associated signs or symptoms      Additional History (Context): Eldon Fajardo is a 63 y.o. female with who presents with severe abdominal pain. Pt states the pain has been ongoing for 10 days. She is currently having dry heaves and diarrhea. She has a Hx of Lap Band Abdominal surgery. She also has a Hx of renal artery stenosis requiring stent placement, COPD, and cataracts.                     PCP: Catherine Jaramillo MD    Current Outpatient Prescriptions   Medication Sig Dispense Refill    Dexlansoprazole (DEXILANT) 60 mg CpDB Take  by mouth daily.  promethazine (PHENERGAN) 25 mg tablet Take 1 Tab by mouth every six (6) hours as needed. 15 Tab 0    HYDROcodone-acetaminophen (NORCO)  mg tablet Take 1 Tab by mouth every six (6) hours as needed for Pain. Max Daily Amount: 4 Tabs. 90 Tab 0    ondansetron (ZOFRAN ODT) 4 mg disintegrating tablet PLACE 1 TABLET ON TONGUE TWICE A DAY IF NAUSEA 60 Tab 0    mirtazapine (REMERON) 15 mg tablet TAKE ONE TABLET BY MOUTH EVERY EVENING 30 Tab 0    azithromycin (ZITHROMAX) 250 mg tablet Take 2 tablets today, then take 1 tablet daily 6 Tab 0    guaiFENesin-codeine (ROBITUSSIN AC) 100-10 mg/5 mL solution Take 5 mL by mouth three (3) times daily as needed for Cough. Max Daily Amount: 15 mL.  Do not drive if taking this medication 118 mL 0    raNITIdine (ZANTAC) 150 mg tablet TAKE ONE TABLET BY MOUTH TWICE A DAY 60 Tab 2    pantoprazole (PROTONIX) 40 mg tablet       HYDROcodone-acetaminophen (NORCO) 7.5-325 mg per tablet 1 tablet every four hours as needed for pain 100 Tab 0    docusate sodium (COLACE) 100 mg capsule Take 1 Cap by mouth two (2) times a day. 60 Cap 5    esomeprazole (NEXIUM) 20 mg capsule Take 1 Cap by mouth daily. STOP PREVACID  Indications: HEARTBURN 30 Cap 1    diclofenac (VOLTAREN) 1 % gel Apply 2 g to affected area four (4) times daily. 1 Each 2    montelukast (SINGULAIR) 10 mg tablet TAKE 1 TABLET DAILY 90 Tab 2    clopidogrel (PLAVIX) 75 mg tab TAKE 1 TABLET DAILY 90 Tab 2    DALIRESP tab tablet TAKE 1 TABLET DAILY 90 Tab 2    pravastatin (PRAVACHOL) 20 mg tablet TAKE 1 TABLET DAILY 90 Tab 2    tiaGABine (GABITRIL) 2 mg tablet TAKE ONE TABLET BY MOUTH TWICE A DAY WITH MEALS 60 Tab 0    sertraline (ZOLOFT) 50 mg tablet Take 1 Tab by mouth daily. 90 Tab 3    fluticasone-salmeterol (ADVAIR DISKUS) 250-50 mcg/dose diskus inhaler Take 1 Puff by inhalation daily. (Patient taking differently: Take 2 Puffs by inhalation daily. ) 1 Inhaler 5       Past History     Past Medical History:  Past Medical History:   Diagnosis Date    Aorto-iliac duplex 09/17/2015    Patent abdom aorta endovascular graft w/o leak. Mod stenosis suggested in right limb.  Calculus of kidney 2014    stent/kidney     Carotid duplex 09/17/2015    Mild < 50% bilateral plaquing.  Cataract, left     Chronic obstructive pulmonary disease ([address]     Echocardiogram 11/04/2014    EF 56%. No WMA. Mild LAE. Echo is within normal limits.  Esophageal reflux     Generalized osteoarthrosis, involving multiple sites     GERD (gastroesophageal reflux disease)     Gout     History of renal stent     right side    Hypercholesteremia     Hypertension     resolved    Lower extremity arterial testing 03/27/2015    Normal perfusion at rest and w/treadmill bilaterally.   R KORI 1.05.  L KORI 1.03.    Murmur     patient reports that she is aware- has been told in the past    Nausea & vomiting     Renal artery stenosis (HCC)     Subclinical hyperthyroidism 8/2015    Unspecified sleep apnea     resolved       Past Surgical History:  Past Surgical History:   Procedure Laterality Date    ADJUSTMENT GASTRIC BAND N/A 11/10/2016    MAKAYLA Cullen    HX APPENDECTOMY      HX BLADDER SUSPENSION      HX CHOLECYSTECTOMY      HX GASTRIC BYPASS      lap band 2012    HX HEENT Right 1990s, 2016    Ear sx    HX HYSTERECTOMY      HX OTHER SURGICAL  10/27/14     Bilateral open femoral exposures, Placement of catheter and sheath in the aorta Endo repair of aortic aneurysm with modular bifurcated device  Interpretation of angiography, intravascular ultrasound (IVUS) catheter    HX OTHER SURGICAL  10/27/2014    Endurant II abdominal stent graft implant    HX UROLOGICAL      stent    VASCULAR SURGERY PROCEDURE UNLIST      surgery for abdominal aneurysm       Family History:  Family History   Problem Relation Age of Onset    Cancer Mother     Diabetes Father     Alcohol abuse Father     Heart Disease Maternal Grandmother     Alzheimer Maternal Grandmother     Diabetes Sister        Social History:  Social History   Substance Use Topics    Smoking status: Former Smoker     Packs/day: 0.00     Years: 0.00     Types: Cigarettes     Quit date: 1/25/2016    Smokeless tobacco: Never Used    Alcohol use No       Allergies: Allergies   Allergen Reactions    Pcn [Penicillins] Anaphylaxis    Tetracycline Anaphylaxis    Sulfabenzamide Hives         Review of Systems   Review of Systems   Constitutional: Negative. HENT: Negative. Eyes: Negative. Respiratory: Negative. Cardiovascular: Negative. Gastrointestinal: Positive for abdominal pain. Endocrine: Negative. Genitourinary: Negative. Musculoskeletal: Negative. Skin: Negative. Allergic/Immunologic: Negative. Neurological: Negative. Hematological: Negative. Psychiatric/Behavioral: Negative. All Other Systems Negative  Physical Exam     Vitals:    01/29/18 0941   BP: (!) 198/125   Pulse: 91   Resp: 18   Temp: 98.6 °F (37 °C)   SpO2: 100%     Physical Exam   Constitutional: She is oriented to person, place, and time. She appears well-developed and well-nourished. No distress. HENT:   Head: Normocephalic and atraumatic. Eyes: EOM are normal. Pupils are equal, round, and reactive to light. Neck: Normal range of motion. Neck supple. Cardiovascular: Normal rate, regular rhythm and normal heart sounds. Pulmonary/Chest: Effort normal and breath sounds normal. No respiratory distress. She has no wheezes. She has no rales. Abdominal: Soft. Bowel sounds are normal. She exhibits no distension. There is tenderness. There is guarding (voluntary guarding). There is no rebound. Genitourinary:   Genitourinary Comments: NE   Musculoskeletal: Normal range of motion. Neurological: She is alert and oriented to person, place, and time. Skin: Skin is warm and dry. Psychiatric: She has a normal mood and affect. Nursing note and vitals reviewed. Diagnostic Study Results     Labs -     Recent Results (from the past 12 hour(s))   CBC WITH AUTOMATED DIFF    Collection Time: 01/29/18 10:00 AM   Result Value Ref Range    WBC 6.6 4.6 - 13.2 K/uL    RBC 4.82 4.20 - 5.30 M/uL    HGB 13.6 12.0 - 16.0 g/dL    HCT 40.1 35.0 - 45.0 %    MCV 83.2 74.0 - 97.0 FL    MCH 28.2 24.0 - 34.0 PG    MCHC 33.9 31.0 - 37.0 g/dL    RDW 12.9 11.6 - 14.5 %    PLATELET 151 602 - 398 K/uL    MPV 9.7 9.2 - 11.8 FL    NEUTROPHILS 63 40 - 73 %    LYMPHOCYTES 23 21 - 52 %    MONOCYTES 10 3 - 10 %    EOSINOPHILS 3 0 - 5 %    BASOPHILS 1 0 - 2 %    ABS. NEUTROPHILS 4.2 1.8 - 8.0 K/UL    ABS. LYMPHOCYTES 1.5 0.9 - 3.6 K/UL    ABS. MONOCYTES 0.6 0.05 - 1.2 K/UL    ABS. EOSINOPHILS 0.2 0.0 - 0.4 K/UL    ABS.  BASOPHILS 0.1 (H) 0.0 - 0.06 K/UL    DF AUTOMATED     METABOLIC PANEL, COMPREHENSIVE    Collection Time: 01/29/18 10:00 AM Result Value Ref Range    Sodium 138 136 - 145 mmol/L    Potassium 4.5 3.5 - 5.5 mmol/L    Chloride 103 100 - 108 mmol/L    CO2 30 21 - 32 mmol/L    Anion gap 5 3.0 - 18 mmol/L    Glucose 96 74 - 99 mg/dL    BUN 10 7.0 - 18 MG/DL    Creatinine 0.90 0.6 - 1.3 MG/DL    BUN/Creatinine ratio 11 (L) 12 - 20      GFR est AA >60 >60 ml/min/1.73m2    GFR est non-AA >60 >60 ml/min/1.73m2    Calcium 9.0 8.5 - 10.1 MG/DL    Bilirubin, total 0.6 0.2 - 1.0 MG/DL    ALT (SGPT) 15 13 - 56 U/L    AST (SGOT) 19 15 - 37 U/L    Alk. phosphatase 83 45 - 117 U/L    Protein, total 7.7 6.4 - 8.2 g/dL    Albumin 3.8 3.4 - 5.0 g/dL    Globulin 3.9 2.0 - 4.0 g/dL    A-G Ratio 1.0 0.8 - 1.7     LIPASE    Collection Time: 01/29/18 10:00 AM   Result Value Ref Range    Lipase 134 73 - 393 U/L       Radiologic Studies -   No orders to display     CT Results  (Last 48 hours)    None        CXR Results  (Last 48 hours)    None        CONSULT:  Discussed the PT with Dr Pepe Villegas. Advised him that the lab studies today were unremarkable. Pt had a normal XR of the abdomen last night, and was imaged using CT on 01-24-18. He advise to have the Pt follow up with their GI specialist for an non emergent upper GI. Aaron Griggs NP  12:15 PM      MDM:  I will refer the Pt to a GI specialist on the DC summary for her to call if she doesn't have one that she works with. Aaron Griggs NP  12:18 PM              Medical Decision Making   I am the first provider for this patient. I reviewed the vital signs, available nursing notes, past medical history, past surgical history, family history and social history. Vital Signs-Reviewed the patient's vital signs. Pulse Oximetry Analysis -  100 % on RA    Records Reviewed: Nursing Notes and Old Medical Records    Procedures:  Procedures    Provider Notes (Medical Decision Making):   Plan to run current laboratory studies and then consult with DR Pepe Villegas for further direction.       MED RECONCILIATION:  No current facility-administered medications for this encounter. Current Outpatient Prescriptions   Medication Sig    Dexlansoprazole (DEXILANT) 60 mg CpDB Take  by mouth daily.  promethazine (PHENERGAN) 25 mg tablet Take 1 Tab by mouth every six (6) hours as needed.  HYDROcodone-acetaminophen (NORCO)  mg tablet Take 1 Tab by mouth every six (6) hours as needed for Pain. Max Daily Amount: 4 Tabs.  ondansetron (ZOFRAN ODT) 4 mg disintegrating tablet PLACE 1 TABLET ON TONGUE TWICE A DAY IF NAUSEA    mirtazapine (REMERON) 15 mg tablet TAKE ONE TABLET BY MOUTH EVERY EVENING    azithromycin (ZITHROMAX) 250 mg tablet Take 2 tablets today, then take 1 tablet daily    guaiFENesin-codeine (ROBITUSSIN AC) 100-10 mg/5 mL solution Take 5 mL by mouth three (3) times daily as needed for Cough. Max Daily Amount: 15 mL. Do not drive if taking this medication    raNITIdine (ZANTAC) 150 mg tablet TAKE ONE TABLET BY MOUTH TWICE A DAY    pantoprazole (PROTONIX) 40 mg tablet     HYDROcodone-acetaminophen (NORCO) 7.5-325 mg per tablet 1 tablet every four hours as needed for pain    docusate sodium (COLACE) 100 mg capsule Take 1 Cap by mouth two (2) times a day.  esomeprazole (NEXIUM) 20 mg capsule Take 1 Cap by mouth daily. STOP PREVACID  Indications: HEARTBURN    diclofenac (VOLTAREN) 1 % gel Apply 2 g to affected area four (4) times daily.  montelukast (SINGULAIR) 10 mg tablet TAKE 1 TABLET DAILY    clopidogrel (PLAVIX) 75 mg tab TAKE 1 TABLET DAILY    DALIRESP tab tablet TAKE 1 TABLET DAILY    pravastatin (PRAVACHOL) 20 mg tablet TAKE 1 TABLET DAILY    tiaGABine (GABITRIL) 2 mg tablet TAKE ONE TABLET BY MOUTH TWICE A DAY WITH MEALS    sertraline (ZOLOFT) 50 mg tablet Take 1 Tab by mouth daily.  fluticasone-salmeterol (ADVAIR DISKUS) 250-50 mcg/dose diskus inhaler Take 1 Puff by inhalation daily.  (Patient taking differently: Take 2 Puffs by inhalation daily.)       PROGRESS NOTE:  One or more blood pressure readings were noted elevated during the Pt's presentation in the emergency department this date. This abnormal reading has been cited in the Pt's diagnosis, and they have been encouraged to follow up with their primary care physician, or referred to a consultant for further evaluation and treatment. Sheyla Gonzalez NP   12:20 PM    CONSULT:   Discussed the PT with Dr Zen Ortiz, who agreed to admit the Pt. Sheyla Gonzalez NP  1:09 PM    PROGRESS NOTE:  Could not control the Pt pain or emesis dry heaves. She is not a candidate for DC. Sheyla Gonzalez NP  1:09 PM          Disposition:   ADMITTED - DR MORATAYA        Pt has been reexamined. Patient has no new complaints, changes, or physical findings. Care plan outlined and precautions discussed. Results of the labs and consultations were reviewed with the patient. All of pt's questions and concerns were addressed. Follow-up Information     None          Current Discharge Medication List            Diagnosis     Clinical Impression: No diagnosis found. Diagnosis:   1. Abdominal pain, generalized    2. Elevated blood pressure reading    3. Nausea and vomiting in adult patient    4. Diarrhea, unspecified type          Disposition: ADMITTED - DR ALVARENGA    Follow-up Information     Follow up With Details Comments Contact Info    Tea Mobley MD Call for primary care follow up.   [place] [address]            Patient's Medications   Start Taking    HYDROCODONE-ACETAMINOPHEN (NORCO) 5-325 MG PER TABLET    Take 1 Tab by mouth every four (4) hours as needed for Pain. Max Daily Amount: 6 Tabs. ONDANSETRON (ZOFRAN ODT) 4 MG DISINTEGRATING TABLET    Take 1 Tab by mouth every eight (8) hours as needed for Nausea.    Continue Taking    AZITHROMYCIN (ZITHROMAX) 250 MG TABLET    Take 2 tablets today, then take 1 tablet daily    CLOPIDOGREL (PLAVIX) 75 MG TAB    TAKE 1 TABLET DAILY    DALIRESP TAB TABLET    TAKE 1 TABLET DAILY    DEXLANSOPRAZOLE (DEXILANT) 60 MG CPDB    Take  by mouth daily. DICLOFENAC (VOLTAREN) 1 % GEL    Apply 2 g to affected area four (4) times daily. DOCUSATE SODIUM (COLACE) 100 MG CAPSULE    Take 1 Cap by mouth two (2) times a day. ESOMEPRAZOLE (NEXIUM) 20 MG CAPSULE    Take 1 Cap by mouth daily. STOP PREVACID  Indications: HEARTBURN    FLUTICASONE-SALMETEROL (ADVAIR DISKUS) 250-50 MCG/DOSE DISKUS INHALER    Take 1 Puff by inhalation daily. GUAIFENESIN-CODEINE (ROBITUSSIN AC) 100-10 MG/5 ML SOLUTION    Take 5 mL by mouth three (3) times daily as needed for Cough. Max Daily Amount: 15 mL. Do not drive if taking this medication    MIRTAZAPINE (REMERON) 15 MG TABLET    TAKE ONE TABLET BY MOUTH EVERY EVENING    MONTELUKAST (SINGULAIR) 10 MG TABLET    TAKE 1 TABLET DAILY    PANTOPRAZOLE (PROTONIX) 40 MG TABLET        PRAVASTATIN (PRAVACHOL) 20 MG TABLET    TAKE 1 TABLET DAILY    PROMETHAZINE (PHENERGAN) 25 MG TABLET    Take 1 Tab by mouth every six (6) hours as needed. RANITIDINE (ZANTAC) 150 MG TABLET    TAKE ONE TABLET BY MOUTH TWICE A DAY    SERTRALINE (ZOLOFT) 50 MG TABLET    Take 1 Tab by mouth daily. TIAGABINE (GABITRIL) 2 MG TABLET    TAKE ONE TABLET BY MOUTH TWICE A DAY WITH MEALS   These Medications have changed    No medications on file   Stop Taking    HYDROCODONE-ACETAMINOPHEN (NORCO)  MG TABLET    Take 1 Tab by mouth every six (6) hours as needed for Pain. Max Daily Amount: 4 Tabs. HYDROCODONE-ACETAMINOPHEN (NORCO) 7.5-325 MG PER TABLET    1 tablet every four hours as needed for pain    ONDANSETRON (ZOFRAN ODT) 4 MG DISINTEGRATING TABLET    PLACE 1 TABLET ON TONGUE TWICE A DAY IF NAUSEA     CONSULT:  Spoke with Dr Vani Byrne, who advised that he would consult on this Pt.    Yakelin Castellanos NP  3:38 PM

## 2018-01-29 NOTE — PROGRESS NOTES
1500 Received pt from ED, awake and oriented x 4. No skin breakdown, IV site no sign of infiltration. No nausea no vomiting at this time. Denies any pain at this time. 1530 Pt feeling nauseous when moving, goes away on its own.     1600 Seen by Dr. Ambrocio Kelly. No new order given but mentioned about pt going to Endo tomorrow. 1640 Pt vomited with yellow greenish output, 10cc. Zofran given. 1820 Pt sleeping, easily awaken. Pt seems to be nauseous every time she's awaken, or moving from side to side of the bed. Denies any pain. No skin breakdown. IV site no sign of infiltration. No difficulty breathing. Pt is on NPO after midnight.

## 2018-01-29 NOTE — IP AVS SNAPSHOT
[address] Patient: Lucy Kelley MRN: [id number] KPL:[id number] About your hospitalization You were admitted on:  January 29, 2018 You last received care in the:  [address] [place] [address],[address] You were discharged on:  February 2, 2018 Why you were hospitalized Your primary diagnosis was:  Not on File Your diagnoses also included:  Abdominal Pain, Intractable Nausea And Vomiting Follow-up Information Follow up With Details Comments Contact Info Rajesh Smith MD Call for primary care follow up.   [place] [address] 6 [facility] [address] 
[phone] Neel Victoria MD  [address] week [address] [place] [address] [address] [facility] [address] 
[phone] Your Scheduled Appointments Friday February 16, 2018  8:45 AM EST COMPLETE PHYSICAL with Rajesh Smith MD  
[facility] [place] [address] [place] [address] 6 [facility] [address] [phone] Discharge Orders None A check shivam indicates which time of day the medication should be taken. My Medications START taking these medications Instructions Each Dose to Equal  
 Morning Noon Evening Bedtime  
 amLODIPine 10 mg tablet Commonly known as:  Gladstone Sandhoff Your last dose was: Your next dose is: Take 1 Tab by mouth daily. 10 mg  
    
   
   
   
  
 cloNIDine 0.2 mg/24 hr patch Commonly known as:  CATAPRES Start taking on:  2/8/2018 Your last dose was: Your next dose is:    
   
   
 1 Patch by TransDERmal route every seven (7) days. 1 Patch  
    
   
   
   
  
 metoclopramide HCl 5 mg tablet Commonly known as:  REGLAN Your last dose was: Your next dose is: Take 1 Tab by mouth Before breakfast, lunch, and dinner for 10 days. 5 mg CHANGE how you take these medications Instructions Each Dose to Equal  
 Morning Noon Evening Bedtime  
 fluticasone-salmeterol 250-50 mcg/dose diskus inhaler Commonly known as:  ADVAIR DISKUS What changed:  how much to take Your last dose was: Your next dose is: Take 1 Puff by inhalation daily. 1 Puff  
    
   
   
   
  
 ondansetron 8 mg disintegrating tablet Commonly known as:  ZOFRAN ODT What changed:   
- medication strength 
- how much to take 
- how to take this - when to take this 
- reasons to take this 
- additional instructions Your last dose was: Your next dose is: Take 1 Tab by mouth every eight (8) hours as needed for Nausea. 8 mg  
    
   
   
   
  
 pantoprazole 40 mg tablet Commonly known as:  PROTONIX What changed:   
- how much to take 
- how to take this - when to take this Your last dose was: Your next dose is: Take 1 Tab by mouth Before breakfast and dinner. 40 mg CONTINUE taking these medications Instructions Each Dose to Equal  
 Morning Noon Evening Bedtime  
 clopidogrel 75 mg Tab Commonly known as:  PLAVIX Your last dose was: Your next dose is: TAKE 1 TABLET DAILY DALIRESP Tab tablet Generic drug:  roflumilast  
   
Your last dose was: Your next dose is: TAKE 1 TABLET DAILY DEXILANT 60 mg Cpdb Generic drug:  Dexlansoprazole Your last dose was: Your next dose is: Take  by mouth daily. diclofenac 1 % Gel Commonly known as:  VOLTAREN Your last dose was: Your next dose is:    
   
   
 Apply 2 g to affected area four (4) times daily. 2 g  
    
   
   
   
  
 docusate sodium 100 mg capsule Commonly known as:  Tim Dahl Your last dose was: Your next dose is: Take 1 Cap by mouth two (2) times a day. 100 mg  
    
   
   
   
  
 esomeprazole 20 mg capsule Commonly known as:  NexIUM Your last dose was: Your next dose is: Take 1 Cap by mouth daily. STOP PREVACID  Indications: HEARTBURN  
 20 mg  
    
   
   
   
  
 guaiFENesin-codeine 100-10 mg/5 mL solution Commonly known as:  ROBITUSSIN AC Your last dose was: Your next dose is: Take 5 mL by mouth three (3) times daily as needed for Cough. Max Daily Amount: 15 mL. Do not drive if taking this medication 5 mL  
    
   
   
   
  
 mirtazapine 15 mg tablet Commonly known as:  Micheal Comerker Your last dose was: Your next dose is: TAKE ONE TABLET BY MOUTH EVERY EVENING  
     
   
   
   
  
 montelukast 10 mg tablet Commonly known as:  SINGULAIR Your last dose was: Your next dose is: TAKE 1 TABLET DAILY pravastatin 20 mg tablet Commonly known as:  PRAVACHOL Your last dose was: Your next dose is: TAKE 1 TABLET DAILY  
     
   
   
   
  
 sertraline 50 mg tablet Commonly known as:  ZOLOFT Your last dose was: Your next dose is: Take 1 Tab by mouth daily. 50 mg  
    
   
   
   
  
 tiaGABine 2 mg tablet Commonly known as:  GABITRIL Your last dose was: Your next dose is: TAKE ONE TABLET BY MOUTH TWICE A DAY WITH MEALS  
     
   
   
   
  
  
STOP taking these medications   
 azithromycin 250 mg tablet Commonly known as:  Venus Brock HYDROcodone-acetaminophen  mg tablet Commonly known as:  Clearnce Savage HYDROcodone-acetaminophen 7.5-325 mg per tablet Commonly known as:  NORCO  
   
  
 promethazine 25 mg tablet Commonly known as:  PHENERGAN  
   
  
 raNITIdine 150 mg tablet Commonly known as:  ZANTAC Where to Get Your Medications These medications were sent to [facility] [address] [facility], [place] [address] Phone:  [phone]  
  mirtazapine 15 mg tablet Information on where to get these meds will be given to you by the nurse or doctor. ! Ask your nurse or doctor about these medications  
  amLODIPine 10 mg tablet  
 cloNIDine 0.2 mg/24 hr patch  
 metoclopramide HCl 5 mg tablet  
 ondansetron 8 mg disintegrating tablet  
 pantoprazole 40 mg tablet Discharge Instructions DISCHARGE SUMMARY from Nurse PATIENT INSTRUCTIONS: 
 
 
F-face looks uneven A-arms unable to move or move unevenly S-speech slurred or non-existent T-time-call 911 as soon as signs and symptoms begin-DO NOT go Back to bed or wait to see if you get better-TIME IS BRAIN. Warning Signs of HEART ATTACK Call 911 if you have these symptoms: 
? Chest discomfort. Most heart attacks involve discomfort in the center of the chest that lasts more than a few minutes, or that goes away and comes back. It can feel like uncomfortable pressure, squeezing, fullness, or pain. ? Discomfort in other areas of the upper body. Symptoms can include pain or discomfort in one or both arms, the back, neck, jaw, or stomach. ? Shortness of breath with or without chest discomfort. ? Other signs may include breaking out in a cold sweat, nausea, or lightheadedness. Don't wait more than five minutes to call [address]! Fast action can save your life. Calling 911 is almost always the fastest way to get lifesaving treatment. Emergency Medical Services staff can begin treatment when they arrive  up to an hour sooner than if someone gets to the hospital by car. The discharge information has been reviewed with the patient.   The patient verbalized understanding. Discharge medications reviewed with the patient and appropriate educational materials and side effects teaching were provided. ___________________________________________________________________________________________________________________________________ Elevated Blood Pressure: Care Instructions Your Care Instructions Blood pressure is a measure of how hard the blood pushes against the walls of your arteries. It's normal for blood pressure to go up and down throughout the day. But if it stays up over time, you have high blood pressure. Two numbers tell you your blood pressure. The first number is the systolic pressure. It shows how hard the blood pushes when your heart is pumping. The second number is the diastolic pressure. It shows how hard the blood pushes between heartbeats, when your heart is relaxed and filling with blood. An ideal blood pressure in adults is less than 120/80 (say \"120 over 80\"). High blood pressure is 140/90 or higher. You have high blood pressure if your top number is 140 or higher or your bottom number is 90 or higher, or both. The main test for high blood pressure is simple, fast, and painless. To diagnose high blood pressure, your doctor will test your blood pressure at different times. After testing your blood pressure, your doctor may ask you to test it again when you are home. If you are diagnosed with high blood pressure, you can work with your doctor to make a long-term plan to manage it. Follow-up care is a key part of your treatment and safety. Be sure to make and go to all appointments, and call your doctor if you are having problems. It's also a good idea to know your test results and keep a list of the medicines you take. How can you care for yourself at home? · Do not smoke. Smoking increases your risk for heart attack and stroke.  If you need help quitting, talk to your doctor about stop-smoking programs and medicines. These can increase your chances of quitting for good. · Stay at a healthy weight. · Try to limit how much sodium you eat to less than 2,300 milligrams (mg) a day. Your doctor may ask you to try to eat less than 1,500 mg a day. · Be physically active. Get at least 30 minutes of exercise on most days of the week. Walking is a good choice. You also may want to do other activities, such as running, swimming, cycling, or playing tennis or team sports. · Avoid or limit alcohol. Talk to your doctor about whether you can drink any alcohol. · Eat plenty of fruits, vegetables, and low-fat dairy products. Eat less saturated and total fats. · Learn how to check your blood pressure at home. When should you call for help? Call your doctor now or seek immediate medical care if: 
? · Your blood pressure is much higher than normal (such as 180/110 or higher). ? · You think high blood pressure is causing symptoms such as: ¨ Severe headache. ¨ Blurry vision. ? Watch closely for changes in your health, and be sure to contact your doctor if: 
? · You do not get better as expected. Where can you learn more? Go to http://linda-slava.info/. Enter L192 in the search box to learn more about \"Elevated Blood Pressure: Care Instructions. \" Current as of: September 21, 2016 Content Version: 11.4 © 2926-6075 [email or web address]. Care instructions adapted under license by [employer] (which disclaims liability or warranty for this information). If you have questions about a medical condition or this instruction, always ask your healthcare professional. [place] [address] any warranty or liability for your use of this information. Call Dr Chavez Ashraf or your Gastro Intestinal Specialist for further evaluation including an Upper GI series. You should call today for an appointment. Unresulted Labs-Please follow up with your PCP about these lab tests Order Current Status VITAMIN B1, WHOLE BLOOD In process Providers Seen During Your Hospitalization Provider Specialty Primary office phone Ciro Montilla MD Emergency Medicine [phone] Aundra Boxer, [address] [facility] Internal Medicine [phone] Reese Freeman MD Internal Medicine [phone] Your Primary Care Physician (PCP) Primary Care Physician Office Phone Office Fax [address], [address] [place] [address] Road [facility] [phone] You are allergic to the following Allergen Reactions Pcn (Penicillins) Anaphylaxis Tetracycline Anaphylaxis Sulfabenzamide Hives Recent Documentation Height Weight BMI OB Status Smoking Status 1.651 m 78 kg 28.62 kg/m2 Hysterectomy Former Smoker Emergency Contacts Name Discharge Info Relation Home Work Mobile [facility] DISCHARGE CAREGIVER [3] Daughter [21] [phone] [facility] DECLINED CAREGIVER [4] Child [2] [phone] Patient Belongings The following personal items are in your possession at time of discharge: 
  Dental Appliances: At bedside  Visual Aid: None      Home Medications: Sent home   Jewelry: Sent home  Clothing: Pajamas, Shorts, Shirt, Undergarments, Footwear    Other Valuables: [employer] Discharge Instructions Attachments/References GASTRITIS (ENGLISH) Patient Handouts Gastritis: Care Instructions Your Care Instructions Gastritis is a sore and upset stomach. It happens when something irritates the stomach lining. Many things can cause it. These include an infection such as the flu or something you ate or drank. Medicines or a sore on the lining of the stomach (ulcer) also can cause it. Your belly may bloat and ache. You may belch, vomit, and feel sick to your stomach. You should be able to relieve the problem by taking medicine.  And it may help to change your diet. If gastritis lasts, your doctor may prescribe medicine. Follow-up care is a key part of your treatment and safety. Be sure to make and go to all appointments, and call your doctor if you are having problems. It's also a good idea to know your test results and keep a list of the medicines you take. How can you care for yourself at home? · If your doctor prescribed antibiotics, take them as directed. Do not stop taking them just because you feel better. You need to take the full course of antibiotics. · Be safe with medicines. If your doctor prescribed medicine to decrease stomach acid, take it as directed. Call your doctor if you think you are having a problem with your medicine. · Do not take any other medicine, including over-the-counter pain relievers, without talking to your doctor first. 
· If your doctor recommends over-the-counter medicine to reduce stomach acid, such as Pepcid AC, Prilosec, Tagamet HB, or Zantac 75, follow the directions on the label. · Drink plenty of fluids (enough so that your urine is light yellow or clear like water) to prevent dehydration. Choose water and other caffeine-free clear liquids. If you have kidney, heart, or liver disease and have to limit fluids, talk with your doctor before you increase the amount of fluids you drink. · Limit how much alcohol you drink. · Avoid coffee, tea, cola drinks, chocolate, and other foods with caffeine. They increase stomach acid. When should you call for help? Call 911 anytime you think you may need emergency care. For example, call if: 
? · You vomit blood or what looks like coffee grounds. ? · You pass maroon or very bloody stools. ?Call your doctor now or seek immediate medical care if: 
? · You start breathing fast and have not produced urine in the last 8 hours. ? · You cannot keep fluids down. ? Watch closely for changes in your health, and be sure to contact your doctor if: ? · You do not get better as expected. Where can you learn more? Go to http://linda-slava.info/. Enter [id number] in the search box to learn more about \"Gastritis: Care Instructions. \" Current as of: May 12, 2017 Content Version: 11.4 © 2006-2017 [employer]. Care instructions adapted under license by [employer] (which disclaims liability or warranty for this information). If you have questions about a medical condition or this instruction, always ask your healthcare professional. [address] any warranty or liability for your use of this information. Please provide this summary of care documentation to your next provider. Signatures-by signing, you are acknowledging that this After Visit Summary has been reviewed with you and you have received a copy. Patient Signature:  ____________________________________________________________ Date:  ____________________________________________________________  
  
Hernan Stoner Provider Signature:  ____________________________________________________________ Date:  ____________________________________________________________

## 2018-01-29 NOTE — ED NOTES
TRANSFER - OUT REPORT:    SBAR given on David Acosta  being transferred to 2207(unit) for routine progression of care       Report consisted of patients Situation, Background, Assessment and   Recommendations(SBAR). Information from the following report(s) SBAR, ED Summary, Intake/Output, MAR, Recent Results and Med Rec Status was reviewed with the receiving nurse. Lines:   Peripheral IV 01/29/18 Right Antecubital (Active)   Site Assessment Clean, dry, & intact 1/29/2018 10:06 AM   Phlebitis Assessment 0 1/29/2018 10:06 AM   Infiltration Assessment 0 1/29/2018 10:06 AM   Dressing Status Clean, dry, & intact 1/29/2018 10:06 AM        Opportunity for questions and clarification was provided.       Patient transported with:   [employer]

## 2018-01-29 NOTE — ED TRIAGE NOTES
N/V x 10 days. Patient has a gastric band which was placed in [place]   Lives here now. Dr. Butterfield office sent her here for surgical consult.

## 2018-01-29 NOTE — H&P
History and Physical    Patient: Luis Finn               Sex: female          DOA: 1/29/2018       YOB: 1955      Age:  63 y.o. Assessment/Plan     Recurrent Abd pain -   -Discussed with Dr Rehan Garrett  -possible gastric band complication, need GI consultation for EGD to evaluate for band erosion  -CT Abd con 1/24 with IV con proximal stomach narrowing  -IV dilaudid 1mg prn  -NPO w/ice  Recurrent N/V - Phenergen/zofran. IV thiaminex1   S/p Gastric banding 2014 - [place] -   Hypertensive urgency - IV hydralazine prn  Obesity  PAD with hx of TORY s/p stenting  Atrophic R kidney - chronic. possible congenital or from TORY. Renal US 1/25    Code status: Full  PPX:  DVT: LMWH   GI: IV pepcid    HPI:     Chief Complaint   Patient presents with    Abdominal Pain    Vomiting       Luis Finn is a 63 y.o. female with PMHX HTN, PAD/TORY, Obesity s/p gastric banding in [place] 2014 who presents with   Recurrent and worsening N/V and Abd pain. Pain reported as constant and worse with vomiting in the supraumbilical and R upper side of abdomen. Reports continued N/V for over 1 week now. Has been unable to hold down some tea and bread at times. She was seen at [place] ER 1/24 evening with CT scan showing proximal stomach stenosis from gastric band and chronic atrophic R kidney. Patient went home and came back to [facility] ER and reportedly had a AXR showing gastric band placement. She has seen Dr Merline Jim as an outpatient. ROS+ for some loose stools. Discussed case at length with Dr Rehan Garrett, her gastric band appears to be in place and unchanged from prior studies and unless erosion is present, is not the cause of her abdominal pain. Past Medical History:   Diagnosis Date    Aorto-iliac duplex 09/17/2015    Patent abdom aorta endovascular graft w/o leak. Mod stenosis suggested in right limb.       Calculus of kidney 2014    stent/kidney     Carotid duplex 09/17/2015    Mild < 50% bilateral lynetteg.  Cataract, left     Chronic obstructive pulmonary disease ([facility] [address]     Echocardiogram 11/04/2014    EF 56%. No WMA. Mild LAE. Echo is within normal limits.  Esophageal reflux     Generalized osteoarthrosis, involving multiple sites     GERD (gastroesophageal reflux disease)     Gout     History of renal stent     right side    Hypercholesteremia     Hypertension     resolved    Lower extremity arterial testing 03/27/2015    Normal perfusion at rest and w/treadmill bilaterally. R KORI 1.05.  L KORI 1.03.    Murmur     patient reports that she is aware- has been told in the past    Nausea & vomiting     Renal artery stenosis ([facility] [address]     Subclinical hyperthyroidism 8/2015    Unspecified sleep apnea     resolved       Prior to Admission Medications   Prescriptions Last Dose Informant Patient Reported? Taking? DALIRESP tab tablet   No No   Sig: TAKE 1 TABLET DAILY   Dexlansoprazole (DEXILANT) 60 mg CpDB   Yes No   Sig: Take  by mouth daily. azithromycin (ZITHROMAX) 250 mg tablet   No No   Sig: Take 2 tablets today, then take 1 tablet daily   clopidogrel (PLAVIX) 75 mg tab   No No   Sig: TAKE 1 TABLET DAILY   diclofenac (VOLTAREN) 1 % gel   No No   Sig: Apply 2 g to affected area four (4) times daily. docusate sodium (COLACE) 100 mg capsule   No No   Sig: Take 1 Cap by mouth two (2) times a day. esomeprazole (NEXIUM) 20 mg capsule   No No   Sig: Take 1 Cap by mouth daily. STOP PREVACID  Indications: HEARTBURN   fluticasone-salmeterol (ADVAIR DISKUS) 250-50 mcg/dose diskus inhaler   No No   Sig: Take 1 Puff by inhalation daily. Patient taking differently: Take 2 Puffs by inhalation daily. guaiFENesin-codeine (ROBITUSSIN AC) 100-10 mg/5 mL solution   No No   Sig: Take 5 mL by mouth three (3) times daily as needed for Cough. Max Daily Amount: 15 mL.  Do not drive if taking this medication   mirtazapine (REMERON) 15 mg tablet   No No   Sig: TAKE ONE TABLET BY MOUTH EVERY EVENING montelukast (SINGULAIR) 10 mg tablet   No No   Sig: TAKE 1 TABLET DAILY   pantoprazole (PROTONIX) 40 mg tablet   Yes No   pravastatin (PRAVACHOL) 20 mg tablet   No No   Sig: TAKE 1 TABLET DAILY   promethazine (PHENERGAN) 25 mg tablet   No No   Sig: Take 1 Tab by mouth every six (6) hours as needed. raNITIdine (ZANTAC) 150 mg tablet   No No   Sig: TAKE ONE TABLET BY MOUTH TWICE A DAY   sertraline (ZOLOFT) 50 mg tablet   No No   Sig: Take 1 Tab by mouth daily. tiaGABine (GABITRIL) 2 mg tablet   No No   Sig: TAKE ONE TABLET BY MOUTH TWICE A DAY WITH MEALS      Facility-Administered Medications: None       Social History:  Social History     Social History    Marital status:      Spouse name: N/A    Number of children: N/A    Years of education: N/A     Occupational History    Not on file.      Social History Main Topics    Smoking status: Former Smoker     Packs/day: 0.00     Years: 0.00     Types: Cigarettes     Quit date: 1/25/2016    Smokeless tobacco: Never Used    Alcohol use No    Drug use: No    Sexual activity: Yes     Birth control/ protection: Surgical     Other Topics Concern    Not on file     Social History Narrative       Family History:  Family History   Problem Relation Age of Onset    Cancer Mother     Diabetes Father     Alcohol abuse Father     Heart Disease Maternal Grandmother     Alzheimer Maternal Grandmother     Diabetes Sister        Surgical History:  Past Surgical History:   Procedure Laterality Date    ADJUSTMENT GASTRIC BAND N/A 11/10/2016    MAKAYLA Cullen    HX APPENDECTOMY      HX BLADDER SUSPENSION      HX CHOLECYSTECTOMY      HX GASTRIC BYPASS      lap band 2012    HX HEENT Right 1990s, 2016    Ear sx    HX HYSTERECTOMY      HX OTHER SURGICAL  10/27/14     Bilateral open femoral exposures, Placement of catheter and sheath in the aorta Endo repair of aortic aneurysm with modular bifurcated device  Interpretation of angiography, intravascular ultrasound (IVUS) catheter    HX OTHER SURGICAL  10/27/2014    Endurant II abdominal stent graft implant    HX UROLOGICAL      stent    VASCULAR SURGERY PROCEDURE UNLIST      surgery for abdominal aneurysm       Review of Systems  Constitutional:  No fever, trace weight loss  HEENT:  No headache or visual changes  Cardiovascular:  No chest pain or diaphoresis  Respiratory:  No coughing, wheezing, or shortness of breath. GI:  HPI  :  No hematuria or dysuria  Skin:  No rashes or moles  Neuro:  No seizures or syncope  Hematological:  No bruising or bleeding  Endocrine:  No diabetes or thyroid disease    Physical Exam:      Visit Vitals    /67    Pulse (!) 105    Temp 98.6 °F (37 °C)    Resp 16    SpO2 96%       Physical Exam:  Gen:  No distress, alert  HEENT:  Normal cephalic atraumatic, extra-occular movements are intact. Neck:  Supple, No JVD  Lungs:  Clear bilaterally, no wheeze, no rales, normal effort  Heart:  Regular Rate and Rhythm, normal S1 and S2, no edema  Abdomen:  Soft, very tender RUQ/superior abdomen, hypoactive bowel sounds, no guarding. Extremities:  Well perfused, no cyanosis or edema  Neurological:  Awake and alert, CN's are intact, normal strength throughout extremities  Skin:  No rashes or moles  Psych:  Normal thought process, does not appear anxious    Laboratory Studies: All lab results for the last 24 hours reviewed. Recent Results (from the past 12 hour(s))   CBC WITH AUTOMATED DIFF    Collection Time: 01/29/18 10:00 AM   Result Value Ref Range    WBC 6.6 4.6 - 13.2 K/uL    RBC 4.82 4.20 - 5.30 M/uL    HGB 13.6 12.0 - 16.0 g/dL    HCT 40.1 35.0 - 45.0 %    MCV 83.2 74.0 - 97.0 FL    MCH 28.2 24.0 - 34.0 PG    MCHC 33.9 31.0 - 37.0 g/dL    RDW 12.9 11.6 - 14.5 %    PLATELET 168 380 - 613 K/uL    MPV 9.7 9.2 - 11.8 FL    NEUTROPHILS 63 40 - 73 %    LYMPHOCYTES 23 21 - 52 %    MONOCYTES 10 3 - 10 %    EOSINOPHILS 3 0 - 5 %    BASOPHILS 1 0 - 2 %    ABS.  NEUTROPHILS 4.2 1.8 - 8.0 K/UL    ABS. LYMPHOCYTES 1.5 0.9 - 3.6 K/UL    ABS. MONOCYTES 0.6 0.05 - 1.2 K/UL    ABS. EOSINOPHILS 0.2 0.0 - 0.4 K/UL    ABS. BASOPHILS 0.1 (H) 0.0 - 0.06 K/UL    DF AUTOMATED     METABOLIC PANEL, COMPREHENSIVE    Collection Time: 01/29/18 10:00 AM   Result Value Ref Range    Sodium 138 136 - 145 mmol/L    Potassium 4.5 3.5 - 5.5 mmol/L    Chloride 103 100 - 108 mmol/L    CO2 30 21 - 32 mmol/L    Anion gap 5 3.0 - 18 mmol/L    Glucose 96 74 - 99 mg/dL    BUN 10 7.0 - 18 MG/DL    Creatinine 0.90 0.6 - 1.3 MG/DL    BUN/Creatinine ratio 11 (L) 12 - 20      GFR est AA >60 >60 ml/min/1.73m2    GFR est non-AA >60 >60 ml/min/1.73m2    Calcium 9.0 8.5 - 10.1 MG/DL    Bilirubin, total 0.6 0.2 - 1.0 MG/DL    ALT (SGPT) 15 13 - 56 U/L    AST (SGOT) 19 15 - 37 U/L    Alk.  phosphatase 83 45 - 117 U/L    Protein, total 7.7 6.4 - 8.2 g/dL    Albumin 3.8 3.4 - 5.0 g/dL    Globulin 3.9 2.0 - 4.0 g/dL    A-G Ratio 1.0 0.8 - 1.7     LIPASE    Collection Time: 01/29/18 10:00 AM   Result Value Ref Range    Lipase 134 73 - 393 U/L   URINALYSIS W/ RFLX MICROSCOPIC    Collection Time: 01/29/18 10:00 AM   Result Value Ref Range    Color YELLOW      Appearance CLEAR      Specific gravity 1.007 1.005 - 1.030      pH (UA) 7.5 5.0 - 8.0      Protein NEGATIVE  NEG mg/dL    Glucose NEGATIVE  NEG mg/dL    Ketone NEGATIVE  NEG mg/dL    Bilirubin NEGATIVE  NEG      Blood TRACE (A) NEG      Urobilinogen 0.2 0.2 - 1.0 EU/dL    Nitrites NEGATIVE  NEG      Leukocyte Esterase NEGATIVE  NEG     URINE MICROSCOPIC ONLY    Collection Time: 01/29/18 10:00 AM   Result Value Ref Range    WBC NEGATIVE  0 - 4 /hpf    RBC 0 to 1 0 - 5 /hpf    Epithelial cells FEW 0 - 5 /lpf    Bacteria NEGATIVE  NEG /hpf       Rad:  CT Abd 1/24

## 2018-01-29 NOTE — ED NOTES
I have reviewed discharge instructions with the patient. The patient  verbalized understanding.  Patient armband removed and shredded

## 2018-01-29 NOTE — CONSULTS
Gastroenterology Consult    Patient: Primo Mello MRN: [id number]  SSN: [id number]    YOB: 1955  Age: 63 y.o. Sex: female        Assessment:   -Refractory nausea with PO intolerance  -Upper abdominal pain; acute on chronic  -History of laparoscopic gastric band placement 2012  -Recent lipase elevation (705 on 1/24/18 which is <2 x ULN), now normal  -Recent diarrhea, now resolved?  -Peripheral vascular disease  -History of renal artery stenosis s/p stent placement; progressive atrophy of R kidney on recent CT scan    Has had problems with intermittent nausea and upper abdominal pain chronically over the past several years but clear acute worsening over the past 10 days with diarrhea which has resolved. Agree with admission given inability to tolerate PO. No clear explanation for symptoms on recent imaging or labwork. Had mild lipase elevation to <2 x ULN without evidence of pancreatic inflammation on CT and now back in the normal range. Gastric band erosion is a consideration but not seen on EGD's in Feb or Oct2017. Possible that she has had persistent symptoms related to recent gastroenteritis which are slow to resolve in the setting of chronic abdominal pain and nausea. Will plan to repeat EGD tomorrow to evaluate for gastric band erosion, ulcer, obstructive process. For now, will treat with IV PPI and antiemetics. Gastric emptying scan would be a reasonable next step but don't believe she would tolerate this acutely and if EGD is unrevealing, will consider trial of reglan. Plan:   -NPO for EGD tomorrow  -IV fluids  -IV PPI and antiemetics prn  -Consider trial of reglan if EGD is unremarkable  -Renal doppler US to evaluate renal stent    Subjective:      Primo Mello is a 63 y.o. female who is being seen for abdominal pain and nausea. She has a history of laparoscopic gastric band placement in 2012 with about 100# initial weight loss though some recent re-gain after stopping smoking. For the past 1-2 years, she has had intermittent upper abdominal pain, dysphagia, nausea and reflux despite having her band deflated about 3 years ago. She has been seen by Dr Humphrey Diaz as well as Dr Anthony Romano for her GI symptoms. She had EGD with Dr Humphrey Diaz in [id number] showing a single esophageal erosion at 40cm and mild antritis/gastritis with biopsies showing minimal reactive changes without Hpylori. Consideration has been made for removal of the band but she reports that there has been some difficulty with insurance coverage. SHe underwent EGD with Dr Anthony Romano in [phone] with empiric 54Fr dilation and normal stomach and duodenum noted. She has been on several PPI's recently without benefit, currently on dexilant 60mg daily. She reports for the past 10 days, she has had more acute symptoms. Felt like it started with a GI bug with nausea, dry heaving and retching, diarrhea and abdominal pain. She has had no diarrhea for a couple days but continues to have retching and abdominal pain and has had limited PO tolerance. SHe was seen in the ER 1/24 with CT A/P showing some narrowing of the proximal stomach above the lap band, atrophic pancreas, progressive atrophy of the right kidney with comment to consider doppler US of the renal stent. Labwork was unrevealing other than mild lipase elevation to 705. She was discharged but returned to the ER last night for similar symptoms with unremarkable labs again and KUB showing appropriate position of the band on KUB and nonobstructive bowel gas pattern. She came to the ER today for persistent symptoms and case was discussed with Dr Humphrey Diaz and patient is admitted with request to consider EGD for lap band erosion. Labwork in the ER here was unremarkable. She has had no fever. Reports 5# weight loss recently. Pain is constant and across the upper abd. She vomits only phlegm and bubbles but retches frequently. No recent NSAID use. Does use narcotics prn for arthritis pain.       Past Medical History  Past Medical History:   Diagnosis Date    Aorto-iliac duplex 09/17/2015    Patent abdom aorta endovascular graft w/o leak. Mod stenosis suggested in right limb.  Calculus of kidney 2014    stent/kidney     Carotid duplex 09/17/2015    Mild < 50% bilateral plaquing.  Cataract, left     Chronic obstructive pulmonary disease ([address]     Echocardiogram 11/04/2014    EF 56%. No WMA. Mild LAE. Echo is within normal limits.  Esophageal reflux     Generalized osteoarthrosis, involving multiple sites     GERD (gastroesophageal reflux disease)     Gout     History of renal stent     right side    Hypercholesteremia     Hypertension     resolved    Lower extremity arterial testing 03/27/2015    Normal perfusion at rest and w/treadmill bilaterally.   R KORI 1.05.  L KORI 1.03.    Murmur     patient reports that she is aware- has been told in the past    Nausea & vomiting     Renal artery stenosis ([address]     Subclinical hyperthyroidism 8/2015    Unspecified sleep apnea     resolved       Past Surgical History  Past Surgical History:   Procedure Laterality Date    ADJUSTMENT GASTRIC BAND N/A 11/10/2016    MAKAYLA Cullen    HX APPENDECTOMY      HX BLADDER SUSPENSION      HX CHOLECYSTECTOMY      HX GASTRIC BYPASS      lap band 2012    HX HEENT Right 1990s, 2016    Ear sx    HX HYSTERECTOMY      HX OTHER SURGICAL  10/27/14     Bilateral open femoral exposures, Placement of catheter and sheath in the aorta Endo repair of aortic aneurysm with modular bifurcated device  Interpretation of angiography, intravascular ultrasound (IVUS) catheter    HX OTHER SURGICAL  10/27/2014    Endurant II abdominal stent graft implant    HX UROLOGICAL      stent    VASCULAR SURGERY PROCEDURE UNLIST      surgery for abdominal aneurysm       Family History  Family History   Problem Relation Age of Onset    Cancer Mother     Diabetes Father     Alcohol abuse Father     Heart Disease Maternal Grandmother     Alzheimer Maternal Grandmother     Diabetes Sister          Social History  Social History     Social History    Marital status:      Spouse name: N/A    Number of children: N/A    Years of education: N/A     Occupational History    Not on file. Social History Main Topics    Smoking status: Former Smoker     Packs/day: 0.00     Years: 0.00     Types: Cigarettes     Quit date: 1/25/2016    Smokeless tobacco: Never Used    Alcohol use No    Drug use: No    Sexual activity: Yes     Birth control/ protection: Surgical     Other Topics Concern    Not on file     Social History Narrative         Medications  Current Facility-Administered Medications   Medication Dose Route Frequency    0.9% sodium chloride infusion  125 mL/hr IntraVENous CONTINUOUS    hydrALAZINE (APRESOLINE) 20 mg/mL injection 20 mg  20 mg IntraVENous Q6H PRN    promethazine (PHENERGAN) 25 mg in 0.9% sodium chloride 50 mL IVPB  25 mg IntraVENous Q6H PRN    ondansetron (ZOFRAN) injection 4 mg  4 mg IntraVENous Q6H PRN    HYDROmorphone (PF) (DILAUDID) injection 1 mg  1 mg SubCUTAneous Q4H PRN    0.9% sodium chloride infusion  100 mL/hr IntraVENous CONTINUOUS    acetaminophen (TYLENOL) solution 650 mg  650 mg Oral Q6H PRN    enoxaparin (LOVENOX) injection 40 mg  40 mg SubCUTAneous Q24H    famotidine (PF) (PEPCID) 20 mg injection  20 mg IntraVENous Q12H        Hospital Problems  Date Reviewed: 1/26/2018          Codes Class Noted POA    Abdominal pain ICD-10-CM: R10.9  ICD-9-CM: 789.00  1/29/2018 Unknown            Allergies   Allergen Reactions    Pcn [Penicillins] Anaphylaxis    Tetracycline Anaphylaxis    Sulfabenzamide Hives       Review of Systems:  Pertinent items are noted in the History of Present Illness.     Objective:     Physical Exam:  Visit Vitals    /86 (BP 1 Location: Left arm, BP Patient Position: At rest)    Pulse 98    Temp 97.6 °F (36.4 °C)    Resp 16    SpO2 96%     General appearance: snoring in bed, but when woken up would have retching and spit up phlegm. cooperative, appears stated age  Head: Normocephalic, without obvious abnormality, atraumatic  Eyes: negative for icterus/pallor  Throat: Lips, mucosa, and tongue normal  Neck: supple, symmetrical, trachea midline, no adenopathy, thyroid: not enlarged  Lungs: clear to auscultation bilaterally  Heart: regular rate and rhythm, S1, S2 normal, no murmur, click, rub or gallop  Abdomen: soft, diffuse upper abdominal tenderness with voluntary guarding without rebound. Bowel sounds normal. No masses,  no organomegaly  Extremities: extremities normal, atraumatic, no cyanosis or edema  Skin: Skin color, texture, turgor normal. No rashes or lesions  Neurologic: Grossly normal    Recent Results (from the past 24 hour(s))   CBC WITH AUTOMATED DIFF    Collection Time: 01/28/18  5:23 PM   Result Value Ref Range    WBC 7.0 4.6 - 13.2 K/uL    RBC 4.55 4.20 - 5.30 M/uL    HGB 12.5 12.0 - 16.0 g/dL    HCT 37.7 35.0 - 45.0 %    MCV 82.9 74.0 - 97.0 FL    MCH 27.5 24.0 - 34.0 PG    MCHC 33.2 31.0 - 37.0 g/dL    RDW 13.0 11.6 - 14.5 %    PLATELET 685 539 - 813 K/uL    MPV 10.3 9.2 - 11.8 FL    NEUTROPHILS 48 40 - 73 %    LYMPHOCYTES 37 21 - 52 %    MONOCYTES 10 3 - 10 %    EOSINOPHILS 4 0 - 5 %    BASOPHILS 1 0 - 2 %    ABS. NEUTROPHILS 3.4 1.8 - 8.0 K/UL    ABS. LYMPHOCYTES 2.6 0.9 - 3.6 K/UL    ABS. MONOCYTES 0.7 0.05 - 1.2 K/UL    ABS. EOSINOPHILS 0.3 0.0 - 0.4 K/UL    ABS.  BASOPHILS 0.0 0.0 - 0.06 K/UL    DF AUTOMATED     METABOLIC PANEL, COMPREHENSIVE    Collection Time: 01/28/18  5:23 PM   Result Value Ref Range    Sodium 139 136 - 145 mmol/L    Potassium 3.5 3.5 - 5.5 mmol/L    Chloride 103 100 - 108 mmol/L    CO2 28 21 - 32 mmol/L    Anion gap 8 3.0 - 18 mmol/L    Glucose 82 74 - 99 mg/dL    BUN 15 7.0 - 18 MG/DL    Creatinine 1.01 0.6 - 1.3 MG/DL    BUN/Creatinine ratio 15 12 - 20      GFR est AA >60 >60 ml/min/1.73m2    GFR est non-AA 56 (L) >60 ml/min/1.73m2    Calcium 8.7 8.5 - 10.1 MG/DL    Bilirubin, total 0.3 0.2 - 1.0 MG/DL    ALT (SGPT) 21 13 - 56 U/L    AST (SGOT) 18 15 - 37 U/L    Alk. phosphatase 75 45 - 117 U/L    Protein, total 7.1 6.4 - 8.2 g/dL    Albumin 3.5 3.4 - 5.0 g/dL    Globulin 3.6 2.0 - 4.0 g/dL    A-G Ratio 1.0 0.8 - 1.7     LIPASE    Collection Time: 01/28/18  5:23 PM   Result Value Ref Range    Lipase 207 73 - 393 U/L   URINALYSIS W/ RFLX MICROSCOPIC    Collection Time: 01/28/18  6:40 PM   Result Value Ref Range    Color YELLOW      Appearance CLEAR      Specific gravity 1.009 1.005 - 1.030      pH (UA) 7.0 5.0 - 8.0      Protein NEGATIVE  NEG mg/dL    Glucose NEGATIVE  NEG mg/dL    Ketone NEGATIVE  NEG mg/dL    Bilirubin NEGATIVE  NEG      Blood NEGATIVE  NEG      Urobilinogen 0.2 0.2 - 1.0 EU/dL    Nitrites NEGATIVE  NEG      Leukocyte Esterase NEGATIVE  NEG     CBC WITH AUTOMATED DIFF    Collection Time: 01/29/18 10:00 AM   Result Value Ref Range    WBC 6.6 4.6 - 13.2 K/uL    RBC 4.82 4.20 - 5.30 M/uL    HGB 13.6 12.0 - 16.0 g/dL    HCT 40.1 35.0 - 45.0 %    MCV 83.2 74.0 - 97.0 FL    MCH 28.2 24.0 - 34.0 PG    MCHC 33.9 31.0 - 37.0 g/dL    RDW 12.9 11.6 - 14.5 %    PLATELET 501 453 - 722 K/uL    MPV 9.7 9.2 - 11.8 FL    NEUTROPHILS 63 40 - 73 %    LYMPHOCYTES 23 21 - 52 %    MONOCYTES 10 3 - 10 %    EOSINOPHILS 3 0 - 5 %    BASOPHILS 1 0 - 2 %    ABS. NEUTROPHILS 4.2 1.8 - 8.0 K/UL    ABS. LYMPHOCYTES 1.5 0.9 - 3.6 K/UL    ABS. MONOCYTES 0.6 0.05 - 1.2 K/UL    ABS. EOSINOPHILS 0.2 0.0 - 0.4 K/UL    ABS.  BASOPHILS 0.1 (H) 0.0 - 0.06 K/UL    DF AUTOMATED     METABOLIC PANEL, COMPREHENSIVE    Collection Time: 01/29/18 10:00 AM   Result Value Ref Range    Sodium 138 136 - 145 mmol/L    Potassium 4.5 3.5 - 5.5 mmol/L    Chloride 103 100 - 108 mmol/L    CO2 30 21 - 32 mmol/L    Anion gap 5 3.0 - 18 mmol/L    Glucose 96 74 - 99 mg/dL    BUN 10 7.0 - 18 MG/DL    Creatinine 0.90 0.6 - 1.3 MG/DL    BUN/Creatinine ratio 11 (L) 12 - 20 GFR est AA >60 >60 ml/min/1.73m2    GFR est non-AA >60 >60 ml/min/1.73m2    Calcium 9.0 8.5 - 10.1 MG/DL    Bilirubin, total 0.6 0.2 - 1.0 MG/DL    ALT (SGPT) 15 13 - 56 U/L    AST (SGOT) 19 15 - 37 U/L    Alk. phosphatase 83 45 - 117 U/L    Protein, total 7.7 6.4 - 8.2 g/dL    Albumin 3.8 3.4 - 5.0 g/dL    Globulin 3.9 2.0 - 4.0 g/dL    A-G Ratio 1.0 0.8 - 1.7     LIPASE    Collection Time: 01/29/18 10:00 AM   Result Value Ref Range    Lipase 134 73 - 393 U/L   HEPATIC FUNCTION PANEL    Collection Time: 01/29/18 10:00 AM   Result Value Ref Range    Protein, total 7.3 6.4 - 8.2 g/dL    Albumin 3.9 3.4 - 5.0 g/dL    Globulin 3.4 2.0 - 4.0 g/dL    A-G Ratio 1.1 0.8 - 1.7      Bilirubin, total 0.6 0.2 - 1.0 MG/DL    Bilirubin, direct 0.2 0.0 - 0.2 MG/DL    Alk.  phosphatase 86 45 - 117 U/L    AST (SGOT) 16 15 - 37 U/L    ALT (SGPT) 19 13 - 56 U/L   URINALYSIS W/ RFLX MICROSCOPIC    Collection Time: 01/29/18 10:00 AM   Result Value Ref Range    Color YELLOW      Appearance CLEAR      Specific gravity 1.007 1.005 - 1.030      pH (UA) 7.5 5.0 - 8.0      Protein NEGATIVE  NEG mg/dL    Glucose NEGATIVE  NEG mg/dL    Ketone NEGATIVE  NEG mg/dL    Bilirubin NEGATIVE  NEG      Blood TRACE (A) NEG      Urobilinogen 0.2 0.2 - 1.0 EU/dL    Nitrites NEGATIVE  NEG      Leukocyte Esterase NEGATIVE  NEG     URINE MICROSCOPIC ONLY    Collection Time: 01/29/18 10:00 AM   Result Value Ref Range    WBC NEGATIVE  0 - 4 /hpf    RBC 0 to 1 0 - 5 /hpf    Epithelial cells FEW 0 - 5 /lpf    Bacteria NEGATIVE  NEG /hpf         Signed By: Inocente Hernandes MD     January 29, 2018

## 2018-01-29 NOTE — PROGRESS NOTES
completed the initial Spiritual Assessment of the patient in bed f2 of the emergency room and offered Pastoral Care support. Patient does not have any [employer]ism/cultural needs that will affect patients preferences in health care.  Chaplains will continue to follow and will provide pastoral care on an as needed/requested basis     Chaplain Henrique Whyte   Board Certified [address] [place] [address]   [phone]

## 2018-01-29 NOTE — DISCHARGE INSTRUCTIONS
Elevated Blood Pressure: Care Instructions  Your Care Instructions    Blood pressure is a measure of how hard the blood pushes against the walls of your arteries. It's normal for blood pressure to go up and down throughout the day. But if it stays up over time, you have high blood pressure. Two numbers tell you your blood pressure. The first number is the systolic pressure. It shows how hard the blood pushes when your heart is pumping. The second number is the diastolic pressure. It shows how hard the blood pushes between heartbeats, when your heart is relaxed and filling with blood. An ideal blood pressure in adults is less than 120/80 (say \"120 over 80\"). High blood pressure is 140/90 or higher. You have high blood pressure if your top number is 140 or higher or your bottom number is 90 or higher, or both. The main test for high blood pressure is simple, fast, and painless. To diagnose high blood pressure, your doctor will test your blood pressure at different times. After testing your blood pressure, your doctor may ask you to test it again when you are home. If you are diagnosed with high blood pressure, you can work with your doctor to make a long-term plan to manage it. Follow-up care is a key part of your treatment and safety. Be sure to make and go to all appointments, and call your doctor if you are having problems. It's also a good idea to know your test results and keep a list of the medicines you take. How can you care for yourself at home? · Do not smoke. Smoking increases your risk for heart attack and stroke. If you need help quitting, talk to your doctor about stop-smoking programs and medicines. These can increase your chances of quitting for good. · Stay at a healthy weight. · Try to limit how much sodium you eat to less than 2,300 milligrams (mg) a day. Your doctor may ask you to try to eat less than 1,500 mg a day. · Be physically active.  Get at least 30 minutes of exercise on most days of the week. Walking is a good choice. You also may want to do other activities, such as running, swimming, cycling, or playing tennis or team sports. · Avoid or limit alcohol. Talk to your doctor about whether you can drink any alcohol. · Eat plenty of fruits, vegetables, and low-fat dairy products. Eat less saturated and total fats. · Learn how to check your blood pressure at home. When should you call for help? Call your doctor now or seek immediate medical care if:  ? · Your blood pressure is much higher than normal (such as 180/110 or higher). ? · You think high blood pressure is causing symptoms such as:  ¨ Severe headache. ¨ Blurry vision. ? Watch closely for changes in your health, and be sure to contact your doctor if:  ? · You do not get better as expected. Where can you learn more? Go to http://lindaLittle Black Bagslava.info/. Enter W245 in the search box to learn more about \"Elevated Blood Pressure: Care Instructions. \"  Current as of: September 21, 2016  Content Version: 11.4  © 4028-9137 [employer]. Care instructions adapted under license by [employer] (which disclaims liability or warranty for this information). If you have questions about a medical condition or this instruction, always ask your healthcare professional. [address] any warranty or liability for your use of this information. Diarrhea: Care Instructions  Your Care Instructions    Diarrhea is loose, watery stools (bowel movements). The exact cause is often hard to find. Sometimes diarrhea is your body's way of getting rid of what caused an upset stomach. Viruses, food poisoning, and many medicines can cause diarrhea. Some people get diarrhea in response to emotional stress, anxiety, or certain foods. Almost everyone has diarrhea now and then. It usually isn't serious, and your stools will return to normal soon.  The important thing to do is replace the fluids you have lost, so you can prevent dehydration. The doctor has checked you carefully, but problems can develop later. If you notice any problems or new symptoms, get medical treatment right away. Follow-up care is a key part of your treatment and safety. Be sure to make and go to all appointments, and call your doctor if you are having problems. It's also a good idea to know your test results and keep a list of the medicines you take. How can you care for yourself at home? · Watch for signs of dehydration, which means your body has lost too much water. Dehydration is a serious condition and should be treated right away. Signs of dehydration are:  ¨ Increasing thirst and dry eyes and mouth. ¨ Feeling faint or lightheaded. ¨ Darker urine, and a smaller amount of urine than normal.  · To prevent dehydration, drink plenty of fluids, enough so that your urine is light yellow or clear like water. Choose water and other caffeine-free clear liquids until you feel better. If you have kidney, heart, or liver disease and have to limit fluids, talk with your doctor before you increase the amount of fluids you drink. · Begin eating small amounts of mild foods the next day, if you feel like it. ¨ Try yogurt that has live cultures of Lactobacillus. (Check the label.)  ¨ Avoid spicy foods, fruits, alcohol, and caffeine until 48 hours after all symptoms are gone. ¨ Avoid chewing gum that contains sorbitol. ¨ Avoid dairy products (except for yogurt with Lactobacillus) while you have diarrhea and for 3 days after symptoms are gone. · The doctor may recommend that you take over-the-counter medicine, such as loperamide (Imodium), if you still have diarrhea after 6 hours. Read and follow all instructions on the label. Do not use this medicine if you have bloody diarrhea, a high fever, or other signs of serious illness. Call your doctor if you think you are having a problem with your medicine.   When should you call for help?  Call 911 anytime you think you may need emergency care. For example, call if:  ? · You passed out (lost consciousness). ? · Your stools are maroon or very bloody. ?Call your doctor now or seek immediate medical care if:  ? · You are dizzy or lightheaded, or you feel like you may faint. ? · Your stools are black and look like tar, or they have streaks of blood. ? · You have new or worse belly pain. ? · You have symptoms of dehydration, such as:  ¨ Dry eyes and a dry mouth. ¨ Passing only a little dark urine. ¨ Feeling thirstier than usual.   ? · You have a new or higher fever. ? Watch closely for changes in your health, and be sure to contact your doctor if:  ? · Your diarrhea is getting worse. ? · You see pus in the diarrhea. ? · You are not getting better after 2 days (48 hours). Where can you learn more? Go to http://linda-slava.info/. Enter Q110 in the search box to learn more about \"Diarrhea: Care Instructions. \"  Current as of: March 20, 2017  Content Version: 11.4  © 9517-6631 [employer]. Care instructions adapted under license by [employer] (which disclaims liability or warranty for this information). If you have questions about a medical condition or this instruction, always ask your healthcare professional. [address] any warranty or liability for your use of this information. Abdominal Pain: Care Instructions  Your Care Instructions    Abdominal pain has many possible causes. Some aren't serious and get better on their own in a few days. Others need more testing and treatment. If your pain continues or gets worse, you need to be rechecked and may need more tests to find out what is wrong. You may need surgery to correct the problem. Don't ignore new symptoms, such as fever, nausea and vomiting, urination problems, pain that gets worse, and dizziness. These may be signs of a more serious problem.   Your doctor may have recommended a follow-up visit in the next 8 to 12 hours. If you are not getting better, you may need more tests or treatment. The doctor has checked you carefully, but problems can develop later. If you notice any problems or new symptoms, get medical treatment right away. Follow-up care is a key part of your treatment and safety. Be sure to make and go to all appointments, and call your doctor if you are having problems. It's also a good idea to know your test results and keep a list of the medicines you take. How can you care for yourself at home? · Rest until you feel better. · To prevent dehydration, drink plenty of fluids, enough so that your urine is light yellow or clear like water. Choose water and other caffeine-free clear liquids until you feel better. If you have kidney, heart, or liver disease and have to limit fluids, talk with your doctor before you increase the amount of fluids you drink. · If your stomach is upset, eat mild foods, such as rice, dry toast or crackers, bananas, and applesauce. Try eating several small meals instead of two or three large ones. · Wait until 48 hours after all symptoms have gone away before you have spicy foods, alcohol, and drinks that contain caffeine. · Do not eat foods that are high in fat. · Avoid anti-inflammatory medicines such as aspirin, ibuprofen (Advil, Motrin), and naproxen (Aleve). These can cause stomach upset. Talk to your doctor if you take daily aspirin for another health problem. When should you call for help? Call 911 anytime you think you may need emergency care. For example, call if:  ? · You passed out (lost consciousness). ? · You pass maroon or very bloody stools. ? · You vomit blood or what looks like coffee grounds. ? · You have new, severe belly pain. ?Call your doctor now or seek immediate medical care if:  ? · Your pain gets worse, especially if it becomes focused in one area of your belly.    ? · You have a new or higher fever. ? · Your stools are black and look like tar, or they have streaks of blood. ? · You have unexpected vaginal bleeding. ? · You have symptoms of a urinary tract infection. These may include:  ¨ Pain when you urinate. ¨ Urinating more often than usual.  ¨ Blood in your urine. ? · You are dizzy or lightheaded, or you feel like you may faint. ? Watch closely for changes in your health, and be sure to contact your doctor if:  ? · You are not getting better after 1 day (24 hours). Where can you learn more? Go to http://linda-slava.info/. Enter Y440 in the search box to learn more about \"Abdominal Pain: Care Instructions. \"  Current as of: March 20, 2017  Content Version: 11.4  © 7060-4884 [employer]. Care instructions adapted under license by [employer] (which disclaims liability or warranty for this information). If you have questions about a medical condition or this instruction, always ask your healthcare professional. [address] any warranty or liability for your use of this information.

## 2018-01-30 ENCOUNTER — ANESTHESIA EVENT (OUTPATIENT)
Dept: ENDOSCOPY | Age: 63
DRG: 392 | End: 2018-01-30
Payer: COMMERCIAL

## 2018-01-30 ENCOUNTER — ANESTHESIA (OUTPATIENT)
Dept: ENDOSCOPY | Age: 63
DRG: 392 | End: 2018-01-30
Payer: COMMERCIAL

## 2018-01-30 LAB
ANION GAP SERPL CALC-SCNC: 12 MMOL/L (ref 3–18)
BUN SERPL-MCNC: 13 MG/DL (ref 7–18)
BUN/CREAT SERPL: 16 (ref 12–20)
CALCIUM SERPL-MCNC: 9.1 MG/DL (ref 8.5–10.1)
CHLORIDE SERPL-SCNC: 103 MMOL/L (ref 100–108)
CO2 SERPL-SCNC: 24 MMOL/L (ref 21–32)
CREAT SERPL-MCNC: 0.83 MG/DL (ref 0.6–1.3)
ERYTHROCYTE [DISTWIDTH] IN BLOOD BY AUTOMATED COUNT: 13.1 % (ref 11.6–14.5)
GLUCOSE SERPL-MCNC: 130 MG/DL (ref 74–99)
HCT VFR BLD AUTO: 38.6 % (ref 35–45)
HGB BLD-MCNC: 13.4 G/DL (ref 12–16)
LACTATE SERPL-SCNC: 0.6 MMOL/L (ref 0.4–2)
MCH RBC QN AUTO: 28.9 PG (ref 24–34)
MCHC RBC AUTO-ENTMCNC: 34.7 G/DL (ref 31–37)
MCV RBC AUTO: 83.4 FL (ref 74–97)
PLATELET # BLD AUTO: 323 K/UL (ref 135–420)
PMV BLD AUTO: 10.2 FL (ref 9.2–11.8)
POTASSIUM SERPL-SCNC: 3.7 MMOL/L (ref 3.5–5.5)
RBC # BLD AUTO: 4.63 M/UL (ref 4.2–5.3)
SODIUM SERPL-SCNC: 139 MMOL/L (ref 136–145)
WBC # BLD AUTO: 13.4 K/UL (ref 4.6–13.2)

## 2018-01-30 PROCEDURE — C9113 INJ PANTOPRAZOLE SODIUM, VIA: HCPCS | Performed by: INTERNAL MEDICINE

## 2018-01-30 PROCEDURE — 77030020263 HC SOL INJ SOD CL0.9% LFCR 1000ML

## 2018-01-30 PROCEDURE — 0DB98ZX EXCISION OF DUODENUM, VIA NATURAL OR ARTIFICIAL OPENING ENDOSCOPIC, DIAGNOSTIC: ICD-10-PCS | Performed by: INTERNAL MEDICINE

## 2018-01-30 PROCEDURE — 83605 ASSAY OF LACTIC ACID: CPT | Performed by: INTERNAL MEDICINE

## 2018-01-30 PROCEDURE — 0DB68ZX EXCISION OF STOMACH, VIA NATURAL OR ARTIFICIAL OPENING ENDOSCOPIC, DIAGNOSTIC: ICD-10-PCS | Performed by: INTERNAL MEDICINE

## 2018-01-30 PROCEDURE — 74011000250 HC RX REV CODE- 250: Performed by: INTERNAL MEDICINE

## 2018-01-30 PROCEDURE — 84425 ASSAY OF VITAMIN B-1: CPT | Performed by: INTERNAL MEDICINE

## 2018-01-30 PROCEDURE — 74011250637 HC RX REV CODE- 250/637: Performed by: INTERNAL MEDICINE

## 2018-01-30 PROCEDURE — 36415 COLL VENOUS BLD VENIPUNCTURE: CPT | Performed by: INTERNAL MEDICINE

## 2018-01-30 PROCEDURE — 99218 HC RM OBSERVATION: CPT

## 2018-01-30 PROCEDURE — 76040000007: Performed by: INTERNAL MEDICINE

## 2018-01-30 PROCEDURE — 74011250636 HC RX REV CODE- 250/636: Performed by: INTERNAL MEDICINE

## 2018-01-30 PROCEDURE — 85027 COMPLETE CBC AUTOMATED: CPT | Performed by: INTERNAL MEDICINE

## 2018-01-30 PROCEDURE — 88305 TISSUE EXAM BY PATHOLOGIST: CPT | Performed by: INTERNAL MEDICINE

## 2018-01-30 PROCEDURE — 74011000258 HC RX REV CODE- 258: Performed by: INTERNAL MEDICINE

## 2018-01-30 PROCEDURE — 76060000032 HC ANESTHESIA 0.5 TO 1 HR: Performed by: INTERNAL MEDICINE

## 2018-01-30 PROCEDURE — 80048 BASIC METABOLIC PNL TOTAL CA: CPT | Performed by: INTERNAL MEDICINE

## 2018-01-30 PROCEDURE — 77030009426 HC FCPS BIOP ENDOSC BSC -B: Performed by: INTERNAL MEDICINE

## 2018-01-30 PROCEDURE — 74011250636 HC RX REV CODE- 250/636

## 2018-01-30 RX ORDER — MIRTAZAPINE 15 MG/1
TABLET, FILM COATED ORAL
Qty: 30 TAB | Refills: 0 | Status: SHIPPED | OUTPATIENT
Start: 2018-01-30 | End: 2018-02-28 | Stop reason: SDUPTHER

## 2018-01-30 RX ORDER — FLUMAZENIL 0.1 MG/ML
0.2 INJECTION INTRAVENOUS
Status: CANCELLED | OUTPATIENT
Start: 2018-01-30 | End: 2018-01-30

## 2018-01-30 RX ORDER — HYDROMORPHONE HYDROCHLORIDE 2 MG/ML
1 INJECTION, SOLUTION INTRAMUSCULAR; INTRAVENOUS; SUBCUTANEOUS
Status: DISCONTINUED | OUTPATIENT
Start: 2018-01-30 | End: 2018-01-31

## 2018-01-30 RX ORDER — ATROPINE SULFATE 0.1 MG/ML
0.5 INJECTION INTRAVENOUS
Status: CANCELLED | OUTPATIENT
Start: 2018-01-30 | End: 2018-01-30

## 2018-01-30 RX ORDER — EPINEPHRINE 0.1 MG/ML
1 INJECTION INTRACARDIAC; INTRAVENOUS
Status: CANCELLED | OUTPATIENT
Start: 2018-01-30 | End: 2018-01-30

## 2018-01-30 RX ORDER — PROPOFOL 10 MG/ML
INJECTION, EMULSION INTRAVENOUS AS NEEDED
Status: DISCONTINUED | OUTPATIENT
Start: 2018-01-30 | End: 2018-01-30 | Stop reason: HOSPADM

## 2018-01-30 RX ORDER — SODIUM CHLORIDE 0.9 % (FLUSH) 0.9 %
5-10 SYRINGE (ML) INJECTION AS NEEDED
Status: CANCELLED | OUTPATIENT
Start: 2018-01-30 | End: 2018-01-30

## 2018-01-30 RX ORDER — ONDANSETRON 4 MG/1
4 TABLET, ORALLY DISINTEGRATING ORAL
Status: DISCONTINUED | OUTPATIENT
Start: 2018-01-30 | End: 2018-02-02 | Stop reason: HOSPADM

## 2018-01-30 RX ORDER — NALOXONE HYDROCHLORIDE 0.4 MG/ML
0.4 INJECTION, SOLUTION INTRAMUSCULAR; INTRAVENOUS; SUBCUTANEOUS
Status: CANCELLED | OUTPATIENT
Start: 2018-01-30 | End: 2018-01-30

## 2018-01-30 RX ORDER — SODIUM CHLORIDE 0.9 % (FLUSH) 0.9 %
5-10 SYRINGE (ML) INJECTION EVERY 8 HOURS
Status: CANCELLED | OUTPATIENT
Start: 2018-01-30 | End: 2018-01-30

## 2018-01-30 RX ORDER — DEXTROMETHORPHAN/PSEUDOEPHED 2.5-7.5/.8
1.2 DROPS ORAL
Status: CANCELLED | OUTPATIENT
Start: 2018-01-30

## 2018-01-30 RX ADMIN — PROPOFOL 30 MG: 10 INJECTION, EMULSION INTRAVENOUS at 12:33

## 2018-01-30 RX ADMIN — SODIUM CHLORIDE 40 MG: 9 INJECTION, SOLUTION INTRAMUSCULAR; INTRAVENOUS; SUBCUTANEOUS at 19:08

## 2018-01-30 RX ADMIN — PROPOFOL 50 MG: 10 INJECTION, EMULSION INTRAVENOUS at 12:18

## 2018-01-30 RX ADMIN — SODIUM CHLORIDE 100 ML/HR: 900 INJECTION, SOLUTION INTRAVENOUS at 19:34

## 2018-01-30 RX ADMIN — PROPOFOL 30 MG: 10 INJECTION, EMULSION INTRAVENOUS at 12:21

## 2018-01-30 RX ADMIN — ONDANSETRON 4 MG: 4 TABLET, ORALLY DISINTEGRATING ORAL at 17:13

## 2018-01-30 RX ADMIN — ONDANSETRON 4 MG: 2 INJECTION INTRAMUSCULAR; INTRAVENOUS at 10:09

## 2018-01-30 RX ADMIN — HYDROMORPHONE HYDROCHLORIDE 1 MG: 2 INJECTION INTRAMUSCULAR; INTRAVENOUS; SUBCUTANEOUS at 19:15

## 2018-01-30 RX ADMIN — PROPOFOL 30 MG: 10 INJECTION, EMULSION INTRAVENOUS at 12:27

## 2018-01-30 RX ADMIN — THIAMINE HYDROCHLORIDE 100 MG: 100 INJECTION, SOLUTION INTRAMUSCULAR; INTRAVENOUS at 19:35

## 2018-01-30 RX ADMIN — PROPOFOL 30 MG: 10 INJECTION, EMULSION INTRAVENOUS at 12:30

## 2018-01-30 RX ADMIN — ENOXAPARIN SODIUM 40 MG: 40 INJECTION SUBCUTANEOUS at 17:13

## 2018-01-30 RX ADMIN — PROPOFOL 30 MG: 10 INJECTION, EMULSION INTRAVENOUS at 12:25

## 2018-01-30 RX ADMIN — PROPOFOL 30 MG: 10 INJECTION, EMULSION INTRAVENOUS at 12:19

## 2018-01-30 NOTE — PROGRESS NOTES
Patient has designated her daughter to participate in his/her discharge plan and to receive any needed information.      Name:   Lamont Tate  daughter   [phone]      Jan 30, 2018     Address:  Phone number:

## 2018-01-30 NOTE — PERIOP NOTES
TRANSFER - OUT REPORT:    Verbal report given to [facility] on Anne Diaz  being transferred to [facility] for routine progression of care       Report consisted of patients Situation, Background, Assessment and   Recommendations(SBAR). Information from the following report(s) Procedure Summary and Recent Results was reviewed with the receiving nurse. Lines:   Peripheral IV 01/29/18 Right Antecubital (Active)   Site Assessment Clean, dry, & intact 1/30/2018 12:40 PM   Phlebitis Assessment 0 1/30/2018 12:40 PM   Infiltration Assessment 0 1/30/2018 12:40 PM   Dressing Status Clean, dry, & intact 1/30/2018 12:40 PM   Dressing Type Tape;Other (comments) 1/30/2018 12:40 PM   Hub Color/Line Status Green; Infusing 1/30/2018  9:13 AM   Action Taken Open ports on tubing capped 1/30/2018  9:13 AM   Alcohol Cap Used Yes 1/30/2018  9:13 AM        Opportunity for questions and clarification was provided.       Patient transported with:   [employer]

## 2018-01-30 NOTE — PROCEDURES
Endoscopy Procedure Note    Patient: Kathryn Frey MRN: [id number]  SSN: [id number]    YOB: 1955  Age: 63 y.o. Sex: female      Date/Time:  1/30/2018 12:39 PM    Esophagogastroduodenoscopy (EGD) Procedure Note    Procedure: Esophagogastroduodenoscopy with biopsy    IMPRESSION:   1. Mild nonspecific diffuse gastritis; biopsies taken for Hpylori  2. No evidence of band erosion  3. Inflammatory appearing 4mm duodenal nodule, biopsied  4. Mild healing esophagitis    RECOMMENDATIONS:  1. Liquid diet as tolerated   2. Continue protonix 40mg daily  3. Continue antiemetics, if no improvement, will start reglan tomorrow    Indication: Abdominal pain, epigastric, Vomiting, persitent of unclear etilogy  :  Donaldo Clark MD  Assistants: Endoscopy Technician-1: Anatoly Link  Endoscopy RN-1: Zander May RN    Referring Provider:   Taiwo Collier MD  History: The history and physical exam were reviewed and updated. Endoscope: Olympus GIF-190 diagnostic endoscope  Extent of Exam: fourth portion of the duodenum  ASA: See anesthesia report  Anethesia/Sedation:  MAC anesthesia    Description of the procedure: The procedure was discussed with the patient including risks, benefits, alternatives including risks of iv sedation, bleeding, perforation and aspiration. A safety timeout was performed. The patient was placed in the left lateral decubitus position. A bite block was placed. The patient was given incremental doses of intravenous sedation until moderate sedation was achieved. The patients vital signs were monitored at all times including heart rate/rhythm, blood pressure and oxygen saturation. The endoscope was then passed under direct visualization to the fourth portion of the duodenum. The endoscope was then slowly withdrawn while visualizing the mucosa. In the stomach a retroflexion was performed and gastric fundus and cardia visualized.   The endoscope was then slowly withdrawn. The patient was then transferred to recovery in stable condition. Findings:    Esophagus: There were 2 small partially healed linear erosions in the distal esophagus consistent with healing esophagitis. Otherwise, the esophageal mucosa was normal with no ulceration, mass or stricture. There was no evidence of Chavez's esophagus Stomach: There was fullness around the cardia consistent with known gastric band. The GE junction was located at 40cm from the incisors with some mild narrowing of the cardia at 44cm from the incisors. There was no evidence of gastric band erosion seen endoscopically. There were flecks of heme noted scattered throughout the stomach with mild friability of the mucosa noted and patchy erythema throughout the body and antrum. Biopsies were taken from the antrum, body, and incisura for Hpylori. No ulcer or mass was seen. Duodenum: There was an inflammatory appearing 4mm nodule noted in the duodenal bulb which was biopsied; Otherwise the duodenum mucosa was normal with no ulceration, mass, stricture and no evidence of villous atrophy. Therapies:  None    Specimens:   ID Type Source Tests Collected by Time Destination   1 : bx duodenal bulb nodule Preservative   Luis Ledbetter MD 1/30/2018 1221 Pathology   2 : bx gastric r/o hpylori Preservative Gastric  Luis Ledbetter MD 1/30/2018 1226 Pathology               Complications:   None; patient tolerated the procedure well.     EBL:Minimal    Discharge disposition:  Return to the floor    Luis Ledbetter MD  January 30, 2018  12:39 PM

## 2018-01-30 NOTE — ROUTINE PROCESS
TRANSFER - IN REPORT:    Verbal report received from Scott Chowdhury 23 RN(name) on David Acosta  being received from 2200(unit) for ordered procedure      Report consisted of patients Situation, Background, Assessment and   Recommendations(SBAR). Information from the following report(s) SBAR and Intake/Output was reviewed with the receiving nurse. Opportunity for questions and clarification was provided. Assessment completed upon patients arrival to unit and care assumed.

## 2018-01-30 NOTE — PROGRESS NOTES
0720 - Bedside and Verbal shift change report given to Vern Grissom RN (oncoming nurse) by Ethan Martinez RN (offgoing nurse). Report included the following information SBAR, Kardex, Intake/Output and MAR.     0913 - shift assessment performed. Pt c/o nausea w/no vomiting. Bin on bedside table had 200mL of emesis from overnight. Incontinence care performed. Daughter at bedside. 1055 - pt off floor to endoscopy. 1330 - pt back on floor from endoscopy. 1400 - pt c/o pain from IV site. IV site examined & infiltrated. Called ED for IV insertion d/t pt being hard stick. 1600 - pt c/o n/v & pain rated 6/10. No IV site. Staff RN attempted IV insertion & unsuccessful. 36 - paged Dr. Swann for PO zofran for n/v.    1640 - Dr. Eufemia Agarwal order for PO zofran confirmed. 1930 - Bedside and Verbal shift change report given to Jerri Vinson RN (oncoming nurse) by Vern Grissom RN (offgoing nurse). Report included the following information SBAR, Kardex, Intake/Output and MAR.

## 2018-01-30 NOTE — H&P
Date of Surgery Update:  Margareth Ackerman was seen and examined. History and physical has been reviewed. The patient has been examined.  There have been no significant clinical changes since the completion of the originally dated History and Physical.    Signed By: Talia Braun MD     January 30, 2018 12:15 PM

## 2018-01-30 NOTE — ROUTINE PROCESS
Bedside and Verbal shift change report given to Chepe ''SHANNON''  (oncoming nurse) by Debo Craig RN (offgoing nurse). Report included the following information SBAR, Kardex, Intake/Output and MAR.

## 2018-01-30 NOTE — ANESTHESIA PREPROCEDURE EVALUATION
Anesthetic History   No history of anesthetic complications            Review of Systems / Medical History  Patient summary reviewed and pertinent labs reviewed    Pulmonary    COPD: mild               Neuro/Psych   Within defined limits           Cardiovascular    Hypertension              Exercise tolerance: >4 METS     GI/Hepatic/Renal     GERD           Endo/Other      Hypothyroidism  Morbid obesity     Other Findings   Comments: Documentation of current medication  Current medications obtained, documented and obtained? YES      Risk Factors for Postoperative nausea/vomiting:       History of postoperative nausea/vomiting? NO       Female? YES       Motion sickness? NO       Intended opioid administration for postoperative analgesia? NO      Smoking Abstinence:  Current Smoker? NO  Elective Surgery? YES  Seen preoperatively by anesthesiologist or proxy prior to day of surgery? YES  Pt abstained from smoking 24 hours prior to anesthesia?  N/A    Preventive care/screening for High Blood Pressure:  Aged 18 years and older: YES  Screened for high blood pressure: YES  Patients with high blood pressure referred to primary care provider   for BP management: YES               Physical Exam    Airway  Mallampati: II  TM Distance: 4 - 6 cm  Neck ROM: normal range of motion   Mouth opening: Normal     Cardiovascular  Regular rate and rhythm,  S1 and S2 normal,  no murmur, click, rub, or gallop  Rhythm: regular  Rate: normal         Dental    Dentition: Lower dentition intact, Upper dentition intact and Lower partial plate     Pulmonary  Breath sounds clear to auscultation               Abdominal  GI exam deferred       Other Findings            Anesthetic Plan    ASA: 3  Anesthesia type: MAC          Induction: Intravenous  Anesthetic plan and risks discussed with: Patient

## 2018-01-30 NOTE — ROUTINE PROCESS
Bedside and Verbal shift change report given to Cam RN (oncoming nurse) by Candace Albrecht   (offgoing nurse). Report included the following information SBAR, Kardex, Intake/Output and MAR.

## 2018-01-30 NOTE — PROGRESS NOTES
Zander   Discharge Planning/ Assessment    Reasons for Intervention: Interviewed patient, she agrees to share her discharge information with her daughter, see below. Demographic information verified. She was independent prior to admission and sees Dr Castanon [place] for her primary care needs. Her discharge plan is to return home.      High Risk Criteria  [x] Yes  []No   Physician Referral  [] Yes  [x]No        Date    Nursing Referral  [] Yes  [x]No        Date    Patient/Family Request  [] Yes  [x]No        Date       Resources:    Medicare  [] Yes  [x]No   Medicaid  [] Yes  [x]No   No Resources  [] Yes  [x]No   Private Insurance  [x] Yes  []No optima    Name/Phone Number    Other  [] Yes  [x]No        (i.e. Workman's Comp)         Prior Services:    Prior Services  [] Yes  [x]No   Home Health  [] Yes  [x]No   [address]  [] Yes  [x]No        Number of [address]  [] Yes  [x]No       Meals on Wheels  [] Yes  [x]No   Office on Aging  [] Yes  [x]No   Transportation Services  [] Yes  [x]No   Nursing Home  [] Yes  [x]No        Nursing Home Name    [address]  [] Yes  [x]No        [address] Name    Other       Information Source:      Information obtained from  [x] Patient  [] Parent   [] [address]   [] Child  [] Spouse   [] Significant Other/Partner   [] Friend      [] EMS    [] Nursing Home Chart          [x] Other: chart   Chart Review  [x] Yes  []No     Family/Support System:    Patient lives with  [x] Alone    [] Spouse   [] Significant Other  [] Children  [] Caretaker   [] Parent  [] Sibling     [] Other       Other Support System:    Is the patient responsible for care of others  [] Yes  [x]No   Information of person caring for patient on  discharge self   Managers financial affairs independently  [x] Yes  []No   If no, explain:      Status Prior to Admission:    Mental Status  [x] Awake  [x] Alert  [x] Oriented  [] Quiet/Calm [] Lethargic/Sedated   [] Disoriented  [] Restless/Anxious  [] Combative   Personal Care  [] Dependent  [x] Independent Personal Care  [] Requires Assistance   Meal Preparation Ability  [x] Independent   [] Standby Assistance   [] Minimal Assistance   [] Moderate Assistance  [] Maximum Assistance     [] Total Assistance   Chores  [x] Independent with Chores   [] N/A Nursing Home Resident   [] Requires Assistance   Bowel/Bladder  [x] Continent  [] Catheter  [] Incontinent  [] Ostomy Self-Care    [] Urine Diversion Self-Care  [] Maximum Assistance     [] Total Assistance   Number of Persons needed for assistance    DME at home  [] Bandar Katz  [] Yenni Katz   [] Commode    [] Bathroom/Grab Bars  [] Hospital Bed  [] Nebulizer  [] Oxygen           [] Raised Toilet Seat  [] Shower Chair  [] Side Rails for Bed   [] Tub Transfer Bench   [] Rhina Limes  [] Saljessicae Peralta, Standard      [] Other:   Vendor      Treatment Presently Receiving:    Current Treatments  [] Chemotherapy  [] Dialysis  [] Insulin  [] IVAB [x] IVF   [] O2  [] PCA   [] PT   [] RT   [] Tube Feedings   [] Wound Care     Psychosocial Evaluation:    Verbalized Knowledge of Disease Process  [x] Patient  [x]Family   Coping with Disease Process  [x] Patient  [x]Family   Requires Further Counseling Coping with Disease Process  [] Patient  []Family     Identified Projected Needs:    Home Health Aid  [] Yes  [x]No   Transportation  [] Yes  [x]No   Education  [] Yes  [x]No        Specific Education     Financial Counseling  [] Yes  [x]No   Inability to Care for Self/Will Require 24 hour care  [] Yes  [x]No   Pain Management  [] Yes  [x]No   Home Infusion Therapy  [] Yes  [x]No   Oxygen Therapy  [] Yes  [x]No   DME  [] Yes  [x]No   Long Term Care Placement  [] Yes  [x]No   Rehab  [] Yes  [x]No   Physical Therapy  [] Yes  [x]No   Needs Anticipated At This Time  [] Yes  [x]No     Intra-Hospital Referral:    [address] [facility]  [] Yes  [x]No     [] Yes  [x]No Patient Representative  [] Yes  [x]No   Staff for Teaching Needs  [] Yes  [x]No   Specialty Teaching Needs     Diabetic Educator  [] Yes  [x]No   Referral for Diabetic Educator Needed  [] Yes  [x]No  If Yes, place order for Nutritionist or Diabetic Consult     Tentative Discharge Plan:    Home with No Services  [x] Yes  []No   Home with [address]  [] Yes  [x]No        If Yes, specify type    [address]  [] Yes  [x]No        If Yes, specify type    Meals on Wheels  [] Yes  [x]No   Office of Aging  [] Yes  [x]No   NHP  [] Yes  [x]No   Return to the Nursing Home  [] Yes  [x]No   Rehab Therapy  [] Yes  [x]No   Acute Rehab  [] Yes  [x]No   Subacute Rehab  [] Yes  [x]No   Private Care  [] Yes  [x]No   Substance Abuse Referral  [] Yes  [x]No   Transportation  [] Yes  [x]No   Chore Service  [] Yes  [x]No   Inpatient Hospice  [] Yes  [x]No   OP RT  [] Yes  [x] No   OP Hemo  [] Yes  [x] No   OP PT  [] Yes  [x]No   Support Group  [] Yes  [x]No   Reach to Recovery  [] Yes  [x]No   OP Oncology Clinic  [] Yes  [x]No   Clinic Appointment  [] Yes  [x]No   DME  [] Yes  [x]No   Comments    Name of D/C Planner or  Given to Patient or Family Meagan Pandey RN   Phone Number [phone] [facility]   Date Jan 30, 2018   Time 652 am   If you are discharged home, whom do you designate to participate in your discharge plan and receive any information needed?      Enter name of Nidia Alvares  daughter        Phone # of designee         Address of design         Updated Jan 30, 2018        Patient refused to designate any           individual

## 2018-01-30 NOTE — PROGRESS NOTES
Daily Progress Note: 1/30/2018 2:39 PM   Admit Date: 1/29/2018    Patient seen in follow up for multiple medical problems as listed below:  Patient Active Problem List   Diagnosis Code    AAA (abdominal aortic aneurysm) ([facility] I71.4    Essential hypertension I10    Hyperlipidemia E78.5    Subclinical hyperthyroidism E05.90    Generalized osteoarthrosis, involving multiple sites M15.9    Esophageal reflux K21.9    Renal infarction ([facility] N28.0    Dysarthria R47.1    LAP-BAND surgery status Z98.84    Non morbid obesity due to excess calories E66.09    Gastroesophageal reflux disease without esophagitis K21.9    Obesity (BMI 30.0-34. 9) E66.9    Peripheral vascular disease (HCC) I73.9    Right-sided carotid artery disease (HCC) I77.9    Sacroiliitis (HCC) M46.1    Right hip pain M25.551    Spondylosis of lumbar region without myelopathy or radiculopathy M47.816    Lumbar neuritis M54.16    Left upper quadrant pain R10.12    Hypovitaminosis D E55.9    Lumbar radiculopathy M54.16    Hx of aortic aneurysm repair Z98.890, Z86.79    History of renal stent Z98.890    Right renal artery stenosis (HCC) I70.1    Abdominal pain R10.9       Assesment     Recurrent Abd pain -   -Acute x10d on Chronic x years. -possible gastric band complication, GI consulting, no band erosion on EGD 1/30  -CT Abd con 1/24 with IV con proximal stomach narrowing c/w gastric band  -IV dilaudid 1mg prn  -NPO w/ice->clears  -Messenteric PVL for clot given PAD hx.  -consider Upper GI series per GI  Recurrent N/V - Phenergen/zofran. IV thiaminex1 ->clears  S/p Gastric banding 2014 in [place]  Hypertensive urgency - IV hydralazine prn  Obesity  PAD with hx of TORY s/p stenting  Atrophic R kidney - chronic. possible congenital or from TORY.  Renal US 1/25       DVT Protocol Active: yes  Code Status:  Full Code     Disposition: home wilbert    Subjective:     CC: Abdominal Pain and Vomiting    Interval History: N/V/Abd pain appear improved clinically and by history. For EGD today. ROS: 11 point ROS negative     Objective:     Visit Vitals    /78 (BP 1 Location: Left arm, BP Patient Position: At rest)    Pulse 91    Temp 98.3 °F (36.8 °C)    Resp 14    SpO2 99%       Temp (24hrs), Av.2 °F (36.8 °C), Min:97.6 °F (36.4 °C), Max:98.5 °F (36.9 °C)        Intake/Output Summary (Last 24 hours) at 18 1439  Last data filed at 18 0629   Gross per 24 hour   Intake          1931.66 ml   Output             1270 ml   Net           661.66 ml       Gen: AOx3, NAD  HEENT:  ANA LUISA, EOMI. Neck: No Bruits/JVD   Lungs:   CTAB. Good respiratory effort  Heart:   RR S1 S2 without M/R/G  Abdomen: mild distension, very soft mid R sided abd pain, BSX4 normo/hyper,   Extremities:   No LE edema. No cyanosis.   Skin:  no jaundice/lesions      Data Review:     Meds/Labs/Tests reviewed    Current Shift:     Last three shifts:   1901 -  0700  In: 1931.7 [I.V.:1931.7]  Out: 1270 [Urine:1250]  Recent Labs      18   0435  18   1000  18   1723   WBC  13.4*  6.6  7.0   RBC  4.63  4.82  4.55   HGB  13.4  13.6  12.5   HCT  38.6  40.1  37.7   PLT  323  271  300   GRANS   --   63  48   LYMPH   --   23  37   EOS   --   3  4       Recent Labs      18   0435  18   1000  18   1723   BUN  13  10  15   CREA  0.83  0.90  1.01   CA  9.1  9.0  8.7   ALB   --   3.9  3.8  3.5   K  3.7  4.5  3.5   NA  139  138  139   CL  103  103  103   CO2    30  28   GLU  130*  96  82        Lab Results   Component Value Date/Time    Glucose 130 2018 04:35 AM    Glucose 96 2018 10:00 AM    Glucose 82 2018 05:23 PM    Glucose 97 2018 10:09 PM    Glucose 92 2017 09:45 AM          Care coordination with Nursing/Consultants/staff: 15  Prior history, labs, and charting reviewed: 10    Procedures/Imaging:  CT Abd   EGD     Total time spent with chart review, patient examination/education, discussion with staff on case,documentation and medication management / adjustment  :  30 Minutes      Dr Yogesh Nguyễn  [phone]

## 2018-01-30 NOTE — PROGRESS NOTES
Problem: Falls - Risk of  Goal: *Absence of Falls  Document Jama Fall Risk and appropriate interventions in the flowsheet.    Outcome: Progressing Towards Goal  Fall Risk Interventions:  Mobility Interventions: Patient to call before getting OOB         Medication Interventions: Teach patient to arise slowly, Patient to call before getting OOB    Elimination Interventions: Call light in reach

## 2018-01-30 NOTE — ANESTHESIA POSTPROCEDURE EVALUATION
Post-Anesthesia Evaluation and Assessment    Patient: Brook Ojeda MRN: [id number]  SSN: [id number]    YOB: 1955  Age: 63 y.o. Sex: female       Cardiovascular Function/Vital Signs  Visit Vitals    /78 (BP 1 Location: Left arm, BP Patient Position: At rest)    Pulse 91    Temp 36.8 °C (98.3 °F)    Resp 14    SpO2 99%       Patient is status post MAC anesthesia for Procedure(s):  ESOPHAGOGASTRODUODENOSCOPY (EGD). Nausea/Vomiting: None    Postoperative hydration reviewed and adequate. Pain:  Pain Scale 1: Visual (01/30/18 0915)  Pain Intensity 1: 6 (01/30/18 0915)   Managed    Neurological Status: At baseline    Mental Status and Level of Consciousness: Arousable    Pulmonary Status:   O2 Device: Room air (01/30/18 1350)   Adequate oxygenation and airway patent    Complications related to anesthesia: None    Post-anesthesia assessment completed.  No concerns    Signed By: Prince Leticia MD     January 30, 2018

## 2018-01-31 ENCOUNTER — APPOINTMENT (OUTPATIENT)
Dept: MRI IMAGING | Age: 63
DRG: 392 | End: 2018-01-31
Attending: INTERNAL MEDICINE
Payer: COMMERCIAL

## 2018-01-31 PROBLEM — R11.2 INTRACTABLE NAUSEA AND VOMITING: Status: ACTIVE | Noted: 2018-01-31

## 2018-01-31 LAB
ANION GAP SERPL CALC-SCNC: 7 MMOL/L (ref 3–18)
BUN SERPL-MCNC: 15 MG/DL (ref 7–18)
BUN/CREAT SERPL: 22 (ref 12–20)
CALCIUM SERPL-MCNC: 8.1 MG/DL (ref 8.5–10.1)
CHLORIDE SERPL-SCNC: 107 MMOL/L (ref 100–108)
CO2 SERPL-SCNC: 26 MMOL/L (ref 21–32)
CREAT SERPL-MCNC: 0.67 MG/DL (ref 0.6–1.3)
ERYTHROCYTE [DISTWIDTH] IN BLOOD BY AUTOMATED COUNT: 13 % (ref 11.6–14.5)
GLUCOSE SERPL-MCNC: 80 MG/DL (ref 74–99)
HCT VFR BLD AUTO: 33.6 % (ref 35–45)
HCT VFR BLD AUTO: 39.3 % (ref 35–45)
HEMOCCULT STL QL: POSITIVE
HGB BLD-MCNC: 11.3 G/DL (ref 12–16)
HGB BLD-MCNC: 13.4 G/DL (ref 12–16)
MCH RBC QN AUTO: 28 PG (ref 24–34)
MCHC RBC AUTO-ENTMCNC: 33.6 G/DL (ref 31–37)
MCV RBC AUTO: 83.4 FL (ref 74–97)
PLATELET # BLD AUTO: 250 K/UL (ref 135–420)
PMV BLD AUTO: 9.8 FL (ref 9.2–11.8)
POTASSIUM SERPL-SCNC: 3.4 MMOL/L (ref 3.5–5.5)
RBC # BLD AUTO: 4.03 M/UL (ref 4.2–5.3)
SODIUM SERPL-SCNC: 140 MMOL/L (ref 136–145)
TSH SERPL DL<=0.05 MIU/L-ACNC: 0.04 UIU/ML (ref 0.36–3.74)
WBC # BLD AUTO: 9.1 K/UL (ref 4.6–13.2)

## 2018-01-31 PROCEDURE — 74011250636 HC RX REV CODE- 250/636: Performed by: INTERNAL MEDICINE

## 2018-01-31 PROCEDURE — 85027 COMPLETE CBC AUTOMATED: CPT | Performed by: INTERNAL MEDICINE

## 2018-01-31 PROCEDURE — 74011000250 HC RX REV CODE- 250: Performed by: INTERNAL MEDICINE

## 2018-01-31 PROCEDURE — 74011000258 HC RX REV CODE- 258: Performed by: INTERNAL MEDICINE

## 2018-01-31 PROCEDURE — 99218 HC RM OBSERVATION: CPT

## 2018-01-31 PROCEDURE — 77030020256 HC SOL INJ NACL 0.9%  500ML

## 2018-01-31 PROCEDURE — 36415 COLL VENOUS BLD VENIPUNCTURE: CPT | Performed by: INTERNAL MEDICINE

## 2018-01-31 PROCEDURE — 93005 ELECTROCARDIOGRAM TRACING: CPT

## 2018-01-31 PROCEDURE — 85018 HEMOGLOBIN: CPT | Performed by: INTERNAL MEDICINE

## 2018-01-31 PROCEDURE — 82272 OCCULT BLD FECES 1-3 TESTS: CPT | Performed by: INTERNAL MEDICINE

## 2018-01-31 PROCEDURE — 74011250637 HC RX REV CODE- 250/637: Performed by: INTERNAL MEDICINE

## 2018-01-31 PROCEDURE — C9113 INJ PANTOPRAZOLE SODIUM, VIA: HCPCS | Performed by: INTERNAL MEDICINE

## 2018-01-31 PROCEDURE — 84443 ASSAY THYROID STIM HORMONE: CPT | Performed by: INTERNAL MEDICINE

## 2018-01-31 PROCEDURE — 77030020263 HC SOL INJ SOD CL0.9% LFCR 1000ML

## 2018-01-31 PROCEDURE — 93975 VASCULAR STUDY: CPT

## 2018-01-31 PROCEDURE — 65270000029 HC RM PRIVATE

## 2018-01-31 PROCEDURE — 70551 MRI BRAIN STEM W/O DYE: CPT

## 2018-01-31 PROCEDURE — 77030032490 HC SLV COMPR SCD KNE COVD -B

## 2018-01-31 PROCEDURE — 80048 BASIC METABOLIC PNL TOTAL CA: CPT | Performed by: INTERNAL MEDICINE

## 2018-01-31 RX ORDER — SODIUM CHLORIDE 9 MG/ML
999 INJECTION, SOLUTION INTRAVENOUS ONCE
Status: COMPLETED | OUTPATIENT
Start: 2018-01-31 | End: 2018-01-31

## 2018-01-31 RX ORDER — METOCLOPRAMIDE HYDROCHLORIDE 5 MG/ML
10 INJECTION INTRAMUSCULAR; INTRAVENOUS EVERY 6 HOURS
Status: DISCONTINUED | OUTPATIENT
Start: 2018-01-31 | End: 2018-02-02 | Stop reason: HOSPADM

## 2018-01-31 RX ORDER — HYDRALAZINE HYDROCHLORIDE 20 MG/ML
20 INJECTION INTRAMUSCULAR; INTRAVENOUS
Status: DISCONTINUED | OUTPATIENT
Start: 2018-01-31 | End: 2018-02-02 | Stop reason: HOSPADM

## 2018-01-31 RX ORDER — LABETALOL HCL 20 MG/4 ML
20 SYRINGE (ML) INTRAVENOUS ONCE
Status: COMPLETED | OUTPATIENT
Start: 2018-01-31 | End: 2018-01-31

## 2018-01-31 RX ORDER — MORPHINE SULFATE 2 MG/ML
4 INJECTION, SOLUTION INTRAMUSCULAR; INTRAVENOUS
Status: DISCONTINUED | OUTPATIENT
Start: 2018-01-31 | End: 2018-02-02 | Stop reason: HOSPADM

## 2018-01-31 RX ORDER — PROCHLORPERAZINE EDISYLATE 5 MG/ML
10 INJECTION INTRAMUSCULAR; INTRAVENOUS
Status: DISCONTINUED | OUTPATIENT
Start: 2018-01-31 | End: 2018-01-31

## 2018-01-31 RX ORDER — CLONIDINE 0.1 MG/24H
1 PATCH, EXTENDED RELEASE TRANSDERMAL
Status: DISCONTINUED | OUTPATIENT
Start: 2018-01-31 | End: 2018-02-01

## 2018-01-31 RX ADMIN — HYDRALAZINE HYDROCHLORIDE 20 MG: 20 INJECTION INTRAMUSCULAR; INTRAVENOUS at 08:10

## 2018-01-31 RX ADMIN — METOCLOPRAMIDE 10 MG: 5 INJECTION, SOLUTION INTRAMUSCULAR; INTRAVENOUS at 17:58

## 2018-01-31 RX ADMIN — ONDANSETRON 4 MG: 2 INJECTION INTRAMUSCULAR; INTRAVENOUS at 04:47

## 2018-01-31 RX ADMIN — SODIUM CHLORIDE 999 ML/HR: 900 INJECTION, SOLUTION INTRAVENOUS at 15:12

## 2018-01-31 RX ADMIN — THIAMINE HYDROCHLORIDE 100 MG: 100 INJECTION, SOLUTION INTRAMUSCULAR; INTRAVENOUS at 17:56

## 2018-01-31 RX ADMIN — SODIUM CHLORIDE 40 MG: 9 INJECTION INTRAMUSCULAR; INTRAVENOUS; SUBCUTANEOUS at 17:59

## 2018-01-31 RX ADMIN — SODIUM CHLORIDE 100 ML/HR: 900 INJECTION, SOLUTION INTRAVENOUS at 04:53

## 2018-01-31 RX ADMIN — ONDANSETRON 4 MG: 2 INJECTION INTRAMUSCULAR; INTRAVENOUS at 13:33

## 2018-01-31 RX ADMIN — LABETALOL HYDROCHLORIDE 20 MG: 5 INJECTION, SOLUTION INTRAVENOUS at 18:06

## 2018-01-31 RX ADMIN — HYDROMORPHONE HYDROCHLORIDE 1 MG: 2 INJECTION INTRAMUSCULAR; INTRAVENOUS; SUBCUTANEOUS at 04:47

## 2018-01-31 RX ADMIN — SODIUM CHLORIDE 100 ML/HR: 900 INJECTION, SOLUTION INTRAVENOUS at 16:07

## 2018-01-31 RX ADMIN — PROMETHAZINE HYDROCHLORIDE 25 MG: 25 INJECTION, SOLUTION INTRAMUSCULAR; INTRAVENOUS at 09:23

## 2018-01-31 RX ADMIN — HYDRALAZINE HYDROCHLORIDE 20 MG: 20 INJECTION INTRAMUSCULAR; INTRAVENOUS at 12:53

## 2018-01-31 RX ADMIN — METOCLOPRAMIDE 10 MG: 5 INJECTION, SOLUTION INTRAMUSCULAR; INTRAVENOUS at 23:12

## 2018-01-31 NOTE — PROGRESS NOTES
Daily Progress Note: 1/31/2018 2:39 PM   Admit Date: 1/29/2018    Patient seen in follow up for multiple medical problems as listed below:  Patient Active Problem List   Diagnosis Code    AAA (abdominal aortic aneurysm) ([facility] I71.4    Essential hypertension I10    Hyperlipidemia E78.5    Subclinical hyperthyroidism E05.90    Generalized osteoarthrosis, involving multiple sites M15.9    Esophageal reflux K21.9    Renal infarction ([facility] N28.0    Dysarthria R47.1    LAP-BAND surgery status Z98.84    Non morbid obesity due to excess calories E66.09    Gastroesophageal reflux disease without esophagitis K21.9    Obesity (BMI 30.0-34. 9) E66.9    Peripheral vascular disease (HCC) I73.9    Right-sided carotid artery disease (HCC) I77.9    Sacroiliitis (HCC) M46.1    Right hip pain M25.551    Spondylosis of lumbar region without myelopathy or radiculopathy M47.816    Lumbar neuritis M54.16    Left upper quadrant pain R10.12    Hypovitaminosis D E55.9    Lumbar radiculopathy M54.16    Hx of aortic aneurysm repair Z98.890, Z86.79    History of renal stent Z98.890    Right renal artery stenosis (HCC) I70.1    Abdominal pain R10.9    Intractable nausea and vomiting R11.2       Assesment   63 y.o. female with PMHX HTN, PAD/TORY, Obesity s/p gastric banding in [place] 2014 who presents with   Recurrent and worsening N/V and Abd pain. Pain reported as constant and worse with vomiting in the supraumbilical and R upper side of abdomen x10d over her chronic abdominal pain. She has been unable to hold down some tea and bread the night prior. She was seen at [facility] ER 1/24 evening with CT scan showing proximal stomach stenosis from gastric band and chronic atrophic R kidney. Patient went home and came back to [facility] ER and reportedly had a AXR showing gastric band placement.   She has seen Dr Allison Conway as an outpatient, she has no definative reason for her gastric band to be removed and cannot affored the surgery on her own. ROS+ for some loose stools. GI was consulted, s/p EGD  with mild gastritis and no band erosion patient did have clinical improvement despite her report. Lactic acid and messenteric PVL were normal no sign of mesenteris ischemia. Possible reglan trial. She has no definitive cause for her pain and does not meet criteria for acute gastric band removal if that is her intention. Recurrent Abd pain -   -Acute x10d on Chronic x years. -possible gastric band complication, GI consulting, no band erosion on EGD   -CT Abd con  with IV con proximal stomach narrowing c/w gastric band  -IV dilaudid 1mg prn  -NPO w/ice->clears tolerated  vicky  -Messenteric aPVL for clot given PAD hx wnl   -consider Upper GI series or Reglan per GI  Recurrent N/V - Phenergen/zofran. IV thiaminex1 ->clears  S/p Gastric banding  in [place]  Hypertensive urgency - IV hydralazine prn, add clonidine patch. Up to 834'H systolic. Obesity  PAD with hx of TORY s/p stenting  Atrophic R kidney - chronic. possible congenital or from TORY. Renal US        DVT Protocol Active: yes  Code Status:  Full Code     Disposition: home wilbert    Subjective:     CC: Abdominal Pain and Vomiting    Interval History: N/V/Abd pain appear improved clinically however not per patient. Her abdomen is lest tender today than admission. Very hypertensive adding clonidine patch. Still no clear pain/N/V etiology.   1-2 loose BM last night     ROS: 11 point ROS negative     Objective:     Visit Vitals    BP (!) 189/104 (BP 1 Location: Left arm, BP Patient Position: Lying right side)    Pulse (!) 111    Temp 98.1 °F (36.7 °C)    Resp 18    SpO2 97%       Temp (24hrs), Av.3 °F (36.8 °C), Min:97.8 °F (36.6 °C), Max:98.5 °F (36.9 °C)        Intake/Output Summary (Last 24 hours) at 18 1219  Last data filed at 18 1007   Gross per 24 hour   Intake          2446.67 ml   Output             1050 ml   Net          1396.67 ml Gen: AOx3, NAD  HEENT:  ANA LUISA, EOMI. Neck: No Bruits/JVD   Lungs:   CTAB. Good respiratory effort  Heart:   RR S1 S2 without M/R/G  Abdomen: mild distension, very soft mid R sided abd pain, BSX4 normo/hyper,   Extremities:   No LE edema. No cyanosis.   Skin:  no jaundice/lesions      Data Review:     Meds/Labs/Tests reviewed    Current Shift:  01/31 0701 - 01/31 1900  In: -   Out: 350 [Urine:350]  Last three shifts:  01/29 1901 - 01/31 0700  In: 3631.7 [I.V.:3631.7]  Out: 1700 [Urine:1700]  Recent Labs      01/31/18   0420  01/30/18   0435  01/29/18   1000  01/28/18   1723   WBC  9.1  13.4*  6.6  7.0   RBC  4.03*  4.63  4.82  4.55   HGB  11.3*  13.4  13.6  12.5   HCT  33.6*  38.6  40.1  37.7   PLT  250  323  271  300   GRANS   --    --   63  48   LYMPH   --    --   23  37   EOS   --    --   3  4       Recent Labs      01/31/18   0420  01/30/18   0435  01/29/18   1000  01/28/18   1723   BUN  15  13  10  15   CREA  0.67  0.83  0.90  1.01   CA  8.1*  9.1  9.0  8.7   ALB   --    --   3.9  3.8  3.5   K  3.4*  3.7  4.5  3.5   NA  140  139  138  139   CL  107  103  103  103   CO2  26  24  30  28   GLU  80  130*  96  82        Lab Results   Component Value Date/Time    Glucose 80 01/31/2018 04:20 AM    Glucose 130 01/30/2018 04:35 AM    Glucose 96 01/29/2018 10:00 AM    Glucose 82 01/28/2018 05:23 PM    Glucose 97 01/24/2018 10:09 PM          Care coordination with Nursing/Consultants/staff: 15  Prior history, labs, and charting reviewed: 10    Procedures/Imaging:  CT Abd 1/24  EGD 1/30 no band erosion  Mesenteric aPVL 1/31 wnl    Total time spent with chart review, patient examination/education, discussion with staff on case,documentation and medication management / adjustment  :  30 Minutes      Dr Wild Teressa  [phone]

## 2018-01-31 NOTE — PROGRESS NOTES
Problem: Falls - Risk of  Goal: *Absence of Falls  Document Jama Fall Risk and appropriate interventions in the flowsheet.    Outcome: Progressing Towards Goal  Fall Risk Interventions:  Mobility Interventions: Patient to call before getting OOB         Medication Interventions: Patient to call before getting OOB    Elimination Interventions: Patient to call for help with toileting needs, Call light in reach

## 2018-01-31 NOTE — INTERDISCIPLINARY ROUNDS
Patient off floor for EGD during IDR's. Care Management to follow up for discharge planning.  Yokasta Baltazar, ROCKP-BC

## 2018-01-31 NOTE — PROGRESS NOTES
Interm update: Has remained hypertensive now SBP to 170, increasing Tachycardia attempting 500ccNS bolus, patient not taking pain medication due to nausea. EKG x1. H/H slight drop repeat this afternoon  FOBT x1    Update 17:00  BP still elevated.  [id number] 1x labetolol IV push, if not controlled will start on drip  Trial of compazine  Discussed with GI may consider CT abd with con, last scan 1/24

## 2018-01-31 NOTE — PROGRESS NOTES
Nutrition initial assessment/Plan of care      RECOMMENDATIONS:   1. Advance diet when medically indicated  2. Monitor weight, labs and PO intake  3. RD to follow     GOALS:   1. PO intake meets >75% of protein/calorie needs by 2/3  2. Weight Maintenance (+/- 1-2 lb) by  2/7    ASSESSMENT:   Wt status classified as overweight per BMI of 28.6. Pt currently NPO. Labs noted. Pt w/ hypokalemia. Nutrition recommendations listed. RD to follow. Nutrition Diagnoses:   Inadequate energy intake related to recent nausea and vomiting as evidenced by pt unable to tolerate and ordered for NPO. Nutrition Risk:  [x] High  [] Moderate []  Low    SUBJECTIVE/OBJECTIVE:    Pt admitted for ABD pain/N/V. PMHx including COPD, HTN, renal artery stenosis w/ stent placement and obesity s/p gastric banding in [place] 2014. Pt is s/p EGD on (1/30) showing mild diffuse gastritis and no evidence of band erosion. Plan is Liquid diet, Protonix and possible Reglan trial.  PVL normal so no sign of ischemia. Pt seen in bed still c/o of nausea. Denies having any food allergies or problems chewing and wt has been stable. Stated she sometimes has problems swallowing but has seen a medical professional about it. GI consult in. Will monitor diet advancement. Information Obtained from:    [x] Chart Review   [x] Patient   [] Family/Caregiver   [] Nurse/Physician   [] Interdisciplinary Meeting/Rounds      Diet: NPO  Medications: [x] Reviewed   IV: NS @100 mL/hr   Allergies: [x] Reviewed   Encounter Diagnoses     ICD-10-CM ICD-9-CM   1. Abdominal pain, generalized R10.84 789.07   2. Elevated blood pressure reading R03.0 796.2   3. Nausea and vomiting in adult patient R11.2 787.01   4. Diarrhea, unspecified type R19.7 787.91     Past Medical History:   Diagnosis Date    Aorto-iliac duplex 09/17/2015    Patent abdom aorta endovascular graft w/o leak. Mod stenosis suggested in right limb.       Calculus of kidney 2014    stent/kidney     Carotid duplex 09/17/2015    Mild < 50% bilateral plaquing.  Cataract, left     Chronic obstructive pulmonary disease ([facility]     Echocardiogram 11/04/2014    EF 56%. No WMA. Mild LAE. Echo is within normal limits.  Esophageal reflux     Generalized osteoarthrosis, involving multiple sites     GERD (gastroesophageal reflux disease)     Gout     History of renal stent     right side    Hypercholesteremia     Hypertension     resolved    Lower extremity arterial testing 03/27/2015    Normal perfusion at rest and w/treadmill bilaterally. R KORI 1.05.  L KORI 1.03.    Murmur     patient reports that she is aware- has been told in the past    Nausea & vomiting     Renal artery stenosis ([facility]     Subclinical hyperthyroidism 8/2015    Unspecified sleep apnea     resolved      Labs:    Lab Results   Component Value Date/Time    Sodium 140 01/31/2018 04:20 AM    Potassium 3.4 01/31/2018 04:20 AM    Chloride 107 01/31/2018 04:20 AM    CO2 26 01/31/2018 04:20 AM    Anion gap 7 01/31/2018 04:20 AM    Glucose 80 01/31/2018 04:20 AM    BUN 15 01/31/2018 04:20 AM    Creatinine 0.67 01/31/2018 04:20 AM    Calcium 8.1 01/31/2018 04:20 AM    Magnesium 2.2 01/29/2017 04:50 PM    Albumin 3.8 01/29/2018 10:00 AM    Albumin 3.9 01/29/2018 10:00 AM     Anthropometrics: BMI (calculated): 28.6  Last 3 Recorded Weights in this Encounter    01/31/18 1524   Weight: 78 kg (172 lb)      Ht Readings from Last 1 Encounters:   01/28/18 5' (1.524 m)     No data found. IBW: 125 lb %IBW: 138%   [] Weight Loss [] Weight Gain [x] Weight Stable    Estimated Nutrition Needs: [x] MSJ RMR: 1665 Kcal  Calories: 0243-4302 kcal Based on:   [x] Actual BW    Protein:   65-80 g Based on:   [x] Actual BW    Fluid:       8459-0083 ml Based on:   [x] Actual BW      [x] No Cultural, Oriental orthodox or ethnic dietary need identified.     [] Cultural, Oriental orthodox and ethnic food preferences identified and addressed     Wt Status:  [] Normal (18.6 - 24.9) [] Underweight (<18.5) [x] Overweight (25 - 29.9) [] Mild Obesity (30 - 34.9)  [] Moderate Obesity (35 - 39.9) [] Morbid Obesity (40+)       Nutrition Problems Identified:   [x] Suboptimal PO intake v/s NPO  [] Food Allergies  [] Difficulty chewing/swallowing/poor dentition  [] Constipation/Diarrhea   [] Nausea/Vomiting   [] None  [] Other:     Plan:   [] Therapeutic Diet  []  Obtained/adjusted food preferences/tolerances and/or snacks options   []  Supplements added   [] Occupational therapy following for feeding techniques  []  HS snack added   []  Modify diet texture   []  Modify diet for food allergies   []  Educate patient   []  Assist with menu selection   []  Monitor PO intake on meal rounds   [x]  Continue inpatient monitoring and intervention   []  Participated in discharge planning/Interdisciplinary rounds/Team meetings   []  Other:     Education Needs:   [x] Not appropriate for teaching at this time due to: NPO   [] Identified and addressed    Nutrition Monitoring and Evaluation:  [x] Continue ongoing monitoring and intervention  [] Jd De Santiago

## 2018-01-31 NOTE — PROCEDURES
[facility]  *** FINAL REPORT ***    Name: Zo Barboza  MRN: [id number]    Outpatient  : 1955  HIS Order #: [id number]  [address] [facility] Visit #: [id number]  Date: 2018    TYPE OF TEST: Visceral Arterial Duplex    REASON FOR TEST  Abdominal pain    Aortic PSV:  82.0 cm/s  Diameter AP:     cm   TV:     cm    Mesenteric:-                  Prox   Mid   Dist Ratio Stenosis          Aneurysm                  ----- ----- ----- ----- ----------------- ------------  SMA:            234.0 169.0 172.0  2.9 Normal  Celiac:         188.0               2.3 Normal  Hepatic:        121.0               1.5 Normal  Splenic:        122.0               1.5 Normal  JOSE:  SMA origin:     222.0               2.7 Normal    INTERPRETATION/FINDINGS  Duplex images were obtained using 2-D gray scale, color flow, and  spectral Doppler analysis. Technically difficult exam, with suboptimal imaging secondary to  overlying bowel gas. MESENTERIC:  1. No significant stenosis identified in the superior mesenteric  artery. 2. No significant stenosis identified in the celiac artery. 3. No significant stenosis identified in the hepatic artery. 4. No significant stenosis identified in the splenic artery. 5. No significant stenosis identified in the SMA origin artery. ADDITIONAL COMMENTS    I have personally reviewed the data relevant to the interpretation of  this  study. TECHNOLOGIST: Abhilash Zee. Cyndy Rosales  Signed: 2018 08:41 AM    PHYSICIAN: Roxann Hays.  Long Cheng MD  Signed: 2018 09:40 PM

## 2018-01-31 NOTE — PROGRESS NOTES
Gastrointestinal Progress Note    Patient Name: Katherine CHANG'A Date: 1/31/2018    Admit Date: 1/29/2018        Assessment:   1. Refractory nausea with PO intolerance; unclear etiology  2. Upper abdominal pain, actually improved today despite ongoing nausea  3. History of laparoscopic gastric band placement 2012; no erosion/slip by CT/KUB/EGD  4. Tachycardia and hypertension  5. Peripheral vascular disease, h/o renal artery stenosis  6. Recent lipase elevation; normalized on f/u  7. Report of black stool; Hb stable  8. Mild nonspecific gastritis and duodenal nodule on EGD 1/30; biopsies pending      Recommendation:   1. Will check MRI brain to exclude CNS lesion  2. Check TSH, AM cortisol, repeat liver enzymes/lipase  3. Will stop phenergan and start reglan IV  4. Continue zofran prn  5. Monitor H/H  6. Consider repeat CT Ab/Pelvis if she develops fever or worsening abdominal pain  7. Await gastric/duodenal biopsies    Subjective:     Katherine Rg is a 63 y.o. Female followed for persistent nausea/vomiting. Was feeling a bit better last night, but recurrent nausea and retching today. More tachycardic and hypertensive as well. Reports her abdominal is actually better than prior. Nursing reports black/pudding-like stool without red blood. Hb down slightly this AM but normalized on repeat.        Current Facility-Administered Medications   Medication Dose Route Frequency    hydrALAZINE (APRESOLINE) 20 mg/mL injection 20 mg  20 mg IntraVENous Q4H PRN    cloNIDine (CATAPRES) 0.1 mg/24 hr patch 1 Patch  1 Patch TransDERmal Q7D    morphine injection 4 mg  4 mg IntraVENous Q2H PRN    labetalol (NORMODYNE;TRANDATE) 20 mg/4 mL (5 mg/mL) injection 20 mg  20 mg IntraVENous ONCE    pantoprazole (PROTONIX) 40 mg in sodium chloride 0.9 % 10 mL injection  40 mg IntraVENous Q12H    metoclopramide HCl (REGLAN) 10 mg in 0.9% sodium chloride 50 mL IVPB  10 mg IntraVENous Q6H    thiamine (B-1) 100 mg in 0.9% sodium chloride 50 mL IVPB  100 mg IntraVENous DAILY    ondansetron (ZOFRAN ODT) tablet 4 mg  4 mg Oral Q6H PRN    0.9% sodium chloride infusion  100 mL/hr IntraVENous CONTINUOUS    acetaminophen (TYLENOL) solution 650 mg  650 mg Oral Q6H PRN    enoxaparin (LOVENOX) injection 40 mg  40 mg SubCUTAneous Q24H          Objective:     Physical Exam:    Visit Vitals    BP (!) 195/100 (BP 1 Location: Left arm, BP Patient Position: At rest)    Pulse (!) 117    Temp 98.1 °F (36.7 °C)    Resp 18    Ht 5' 5\" (1.651 m)    Wt 78 kg (172 lb)    SpO2 96%    BMI 28.62 kg/m2     Gen: Awake, responsive, lying in bed, fell asleep during my interview  Pulm: CTA B/L  CV: Mild tachycardia, no murmur  Abd: Soft, no significant tenderness, no rebound/guarding, + BS    Data Review:    Labs: Results:       Chemistry Recent Labs      01/31/18   0420  01/30/18   0435  01/29/18   1000  01/28/18   1723   GLU  80  130*  96  82   NA  140  139  138  139   K  3.4*  3.7  4.5  3.5   CL  107  103  103  103   CO2  26  24  30  28   BUN  15  13  10  15   CREA  0.67  0.83  0.90  1.01   CA  8.1*  9.1  9.0  8.7   AGAP  7  12  5  8   BUCR  22*  16  11*  15   AP   --    --   86  83  75   TP   --    --   7.3  7.7  7.1   ALB   --    --   3.9  3.8  3.5   GLOB   --    --   3.4  3.9  3.6   AGRAT   --    --   1.1  1.0  1.0      CBC w/Diff Recent Labs      01/31/18   1554  01/31/18   0420  01/30/18   0435  01/29/18   1000  01/28/18   1723   WBC   --   9.1  13.4*  6.6  7.0   RBC   --   4.03*  4.63  4.82  4.55   HGB  13.4  11.3*  13.4  13.6  12.5   HCT  39.3  33.6*  38.6  40.1  37.7   PLT   --   250  323  271  300   GRANS   --    --    --   63  48   LYMPH   --    --    --   23  37   EOS   --    --    --   3  4      Coagulation No results for input(s): PTP, INR, APTT in the last 72 hours.     No lab exists for component: INREXT    Liver Enzymes Recent Labs      01/29/18   1000   TP  7.3  7.7   ALB  3.9  3.8   AP  86  83   SGOT  16  19   ALT  19  15 Mallorie Barlow MD  January 31, 2018

## 2018-01-31 NOTE — ROUTINE PROCESS
Bedside and Verbal shift change report given to Vern Grissom RN (oncoming nurse) by Jerri Vinson RN (offgoing nurse). Report included the following information SBAR, Kardex, Intake/Output and MAR.

## 2018-01-31 NOTE — PROGRESS NOTES
2455 - performed shift assessment. Pt was complaining of palpitations. CNA was in room taking vitals. Pt was in distress d/t nausea & dry heaving. BP results were :192/108 w/HR 86. Administered hydrolazine per protocol. 1583 - pt off floor to radiology. 0900 - pt back on floor from radiology. Called CNA to take vitals. Administered PRN phenergan for continued nausea. 1000 - CNA phoned nurse that pt BP was 193/103. Paged Dr. Swann. 80 - Paged Dr. Swann regarding hypertension and tachycardia. 1150 - CNA too vs and pt continued w/tachycardia & hypertension. BP-189/104 HR-111    1200 - received phone call from Dr. Swann. Changed hydralazine to Q4 & MD will be placing order for catapress patch. 1250 - administered hydralazine IV d/t BP of 170/108 & HR - 135    1320 - pt still tachycardic 135 & BP of 170/108. Sent CNA to central pharmacy to retrieve catapress patch. Paged Dr. Bautista Arredondo    [phone] - applied catapress patch & received TELE orders from CHINTAN Mauricio. Pt cardiac rhythm verifies as sinus tachy w/TELE nurse. 1400 - paged Dr. Swann regarding pt still being tachycardic & hypertensive. 1430 - pt has black pastey stools, per daughter. Paged Dr. Swann    [phone] - received order for EKG, H&H, occult stool test & 500cc bolus of NS.     1549 - /106 &  post 500cc NS bolus. 1659 - repeat vs by CNA /100 & . Paged Dr. Swann. 1700 - orders received for IV reglan & IV labetolol 20mg push per Dr. Swann. 1620 - /84 &  post labetolol push. Will continue to monitor. 1915 - Bedside and Verbal shift change report given to TONG Caraballo (oncoming nurse) by Bruce Augustine RN (offgoing nurse). Report included the following information SBAR, Kardex, Intake/Output, MAR, Recent Results and Cardiac Rhythm Sinus Tachy.

## 2018-02-01 LAB
ALBUMIN SERPL-MCNC: 3.1 G/DL (ref 3.4–5)
ALBUMIN/GLOB SERPL: 1 {RATIO} (ref 0.8–1.7)
ALP SERPL-CCNC: 66 U/L (ref 45–117)
ALT SERPL-CCNC: 17 U/L (ref 13–56)
ANION GAP SERPL CALC-SCNC: 6 MMOL/L (ref 3–18)
AST SERPL-CCNC: 12 U/L (ref 15–37)
BILIRUB DIRECT SERPL-MCNC: 0.1 MG/DL (ref 0–0.2)
BILIRUB SERPL-MCNC: 0.7 MG/DL (ref 0.2–1)
BUN SERPL-MCNC: 16 MG/DL (ref 7–18)
BUN/CREAT SERPL: 18 (ref 12–20)
CALCIUM SERPL-MCNC: 8.3 MG/DL (ref 8.5–10.1)
CHLORIDE SERPL-SCNC: 107 MMOL/L (ref 100–108)
CO2 SERPL-SCNC: 25 MMOL/L (ref 21–32)
CORTIS AM PEAK SERPL-MCNC: 23.9 UG/DL (ref 4.3–22.4)
CREAT SERPL-MCNC: 0.87 MG/DL (ref 0.6–1.3)
ERYTHROCYTE [DISTWIDTH] IN BLOOD BY AUTOMATED COUNT: 13 % (ref 11.6–14.5)
GLOBULIN SER CALC-MCNC: 3.2 G/DL (ref 2–4)
GLUCOSE SERPL-MCNC: 84 MG/DL (ref 74–99)
HCT VFR BLD AUTO: 33.1 % (ref 35–45)
HCT VFR BLD AUTO: 35.6 % (ref 35–45)
HGB BLD-MCNC: 11 G/DL (ref 12–16)
HGB BLD-MCNC: 12.2 G/DL (ref 12–16)
LIPASE SERPL-CCNC: 208 U/L (ref 73–393)
MAGNESIUM SERPL-MCNC: 2.1 MG/DL (ref 1.6–2.6)
MCH RBC QN AUTO: 27.6 PG (ref 24–34)
MCHC RBC AUTO-ENTMCNC: 33.2 G/DL (ref 31–37)
MCV RBC AUTO: 83.2 FL (ref 74–97)
PLATELET # BLD AUTO: 271 K/UL (ref 135–420)
PMV BLD AUTO: 10.1 FL (ref 9.2–11.8)
POTASSIUM SERPL-SCNC: 3 MMOL/L (ref 3.5–5.5)
PROT SERPL-MCNC: 6.3 G/DL (ref 6.4–8.2)
RBC # BLD AUTO: 3.98 M/UL (ref 4.2–5.3)
SODIUM SERPL-SCNC: 138 MMOL/L (ref 136–145)
T4 FREE SERPL-MCNC: 1.3 NG/DL (ref 0.7–1.5)
WBC # BLD AUTO: 9.7 K/UL (ref 4.6–13.2)

## 2018-02-01 PROCEDURE — C9113 INJ PANTOPRAZOLE SODIUM, VIA: HCPCS | Performed by: INTERNAL MEDICINE

## 2018-02-01 PROCEDURE — 74011250637 HC RX REV CODE- 250/637: Performed by: INTERNAL MEDICINE

## 2018-02-01 PROCEDURE — 74011250636 HC RX REV CODE- 250/636: Performed by: INTERNAL MEDICINE

## 2018-02-01 PROCEDURE — 82533 TOTAL CORTISOL: CPT | Performed by: INTERNAL MEDICINE

## 2018-02-01 PROCEDURE — 80076 HEPATIC FUNCTION PANEL: CPT | Performed by: INTERNAL MEDICINE

## 2018-02-01 PROCEDURE — 74011250637 HC RX REV CODE- 250/637: Performed by: HOSPITALIST

## 2018-02-01 PROCEDURE — 80048 BASIC METABOLIC PNL TOTAL CA: CPT | Performed by: INTERNAL MEDICINE

## 2018-02-01 PROCEDURE — 36415 COLL VENOUS BLD VENIPUNCTURE: CPT | Performed by: INTERNAL MEDICINE

## 2018-02-01 PROCEDURE — 85018 HEMOGLOBIN: CPT | Performed by: INTERNAL MEDICINE

## 2018-02-01 PROCEDURE — 77030020263 HC SOL INJ SOD CL0.9% LFCR 1000ML

## 2018-02-01 PROCEDURE — 74011000258 HC RX REV CODE- 258: Performed by: INTERNAL MEDICINE

## 2018-02-01 PROCEDURE — 74011000250 HC RX REV CODE- 250: Performed by: INTERNAL MEDICINE

## 2018-02-01 PROCEDURE — 83690 ASSAY OF LIPASE: CPT | Performed by: INTERNAL MEDICINE

## 2018-02-01 PROCEDURE — 85027 COMPLETE CBC AUTOMATED: CPT | Performed by: INTERNAL MEDICINE

## 2018-02-01 PROCEDURE — 74011250636 HC RX REV CODE- 250/636: Performed by: HOSPITALIST

## 2018-02-01 PROCEDURE — 65270000029 HC RM PRIVATE

## 2018-02-01 PROCEDURE — 83735 ASSAY OF MAGNESIUM: CPT | Performed by: HOSPITALIST

## 2018-02-01 PROCEDURE — 84439 ASSAY OF FREE THYROXINE: CPT | Performed by: HOSPITALIST

## 2018-02-01 RX ORDER — POTASSIUM CHLORIDE 7.45 MG/ML
10 INJECTION INTRAVENOUS
Status: DISPENSED | OUTPATIENT
Start: 2018-02-01 | End: 2018-02-01

## 2018-02-01 RX ORDER — CLONIDINE 0.2 MG/24H
1 PATCH, EXTENDED RELEASE TRANSDERMAL
Status: DISCONTINUED | OUTPATIENT
Start: 2018-02-01 | End: 2018-02-02 | Stop reason: HOSPADM

## 2018-02-01 RX ADMIN — SODIUM CHLORIDE 40 MG: 9 INJECTION INTRAMUSCULAR; INTRAVENOUS; SUBCUTANEOUS at 10:51

## 2018-02-01 RX ADMIN — ACETAMINOPHEN 650 MG: 650 SOLUTION ORAL at 18:48

## 2018-02-01 RX ADMIN — SODIUM CHLORIDE 100 ML/HR: 900 INJECTION, SOLUTION INTRAVENOUS at 23:00

## 2018-02-01 RX ADMIN — POTASSIUM CHLORIDE 10 MEQ: 10 INJECTION, SOLUTION INTRAVENOUS at 12:00

## 2018-02-01 RX ADMIN — ENOXAPARIN SODIUM 40 MG: 40 INJECTION SUBCUTANEOUS at 17:27

## 2018-02-01 RX ADMIN — METOCLOPRAMIDE 10 MG: 5 INJECTION, SOLUTION INTRAMUSCULAR; INTRAVENOUS at 11:55

## 2018-02-01 RX ADMIN — POTASSIUM CHLORIDE 10 MEQ: 10 INJECTION, SOLUTION INTRAVENOUS at 11:00

## 2018-02-01 RX ADMIN — HYDRALAZINE HYDROCHLORIDE 20 MG: 20 INJECTION INTRAMUSCULAR; INTRAVENOUS at 11:55

## 2018-02-01 RX ADMIN — THIAMINE HYDROCHLORIDE 100 MG: 100 INJECTION, SOLUTION INTRAMUSCULAR; INTRAVENOUS at 17:42

## 2018-02-01 RX ADMIN — METOCLOPRAMIDE 10 MG: 5 INJECTION, SOLUTION INTRAMUSCULAR; INTRAVENOUS at 06:55

## 2018-02-01 RX ADMIN — SODIUM CHLORIDE 40 MG: 9 INJECTION INTRAMUSCULAR; INTRAVENOUS; SUBCUTANEOUS at 20:28

## 2018-02-01 RX ADMIN — METOCLOPRAMIDE 10 MG: 5 INJECTION, SOLUTION INTRAMUSCULAR; INTRAVENOUS at 17:27

## 2018-02-01 RX ADMIN — POTASSIUM CHLORIDE 10 MEQ: 10 INJECTION, SOLUTION INTRAVENOUS at 10:05

## 2018-02-01 RX ADMIN — SODIUM CHLORIDE 100 ML/HR: 900 INJECTION, SOLUTION INTRAVENOUS at 03:21

## 2018-02-01 RX ADMIN — METOCLOPRAMIDE 10 MG: 5 INJECTION, SOLUTION INTRAMUSCULAR; INTRAVENOUS at 23:00

## 2018-02-01 RX ADMIN — MORPHINE SULFATE 4 MG: 2 INJECTION, SOLUTION INTRAMUSCULAR; INTRAVENOUS at 07:30

## 2018-02-01 RX ADMIN — SODIUM CHLORIDE 100 ML/HR: 900 INJECTION, SOLUTION INTRAVENOUS at 12:05

## 2018-02-01 NOTE — PROGRESS NOTES
Spoke with Ms Pena nurse manager and requested staff document any emesis and characteristics. We will confirm with [place],  but they had stated that removal of the band would not be covered prior to this hospitalization. Genna Chavez

## 2018-02-01 NOTE — PROGRESS NOTES
Gastric emptying study will be completed tomorrow 2/2/2018. Patient needs to remain NPO after midnight for study.

## 2018-02-01 NOTE — ROUTINE PROCESS
1915-Received report from TONG Enriquez    1945-The patient is alert and oriented x 4. There is no apparent signs of distress. The patient is in stable condition. Daughter at the bedside. The patient has no complaints. Abdomen soft. Active bowel sounds. Pedal pulses are intact. Call-bell within reach. 2010-Patient taken to MRI. 2045-Patient arrived back to the floor in stable condition. 2100-Sleeping. 2245-Patient had bowel movement. The patient is in stable condition. 2336-Notified Dr. Noni Lloyd regarding positive stool occult. Received order to hold lovenox and place SCD's.    0100-The patient is sleeping. The patient is in stable condition. 0328-Patient is NPO, however patient had small sip of water. 0500-Sleeping.    0655-Meds given. 0805-Made Dr. Jean Vogel aware that the patient had a small sip of water, made MD aware of drop in H&H and potassium of 3.0.

## 2018-02-01 NOTE — PROGRESS NOTES
Gastrointestinal Progress Note    Patient Name: Lucy Kelley    OIAOC'D Date: 2/1/2018    Admit Date: 1/29/2018    Assessment-Recommendation:   1. Nausea - Patient had normal TSH, MRI has non specific findings, lipase and hepatic panel are normal. Since patient had symptoms since lap band placement then the most likely reason for her symptoms is the lap band.  Would have her contact her insurance company for approval of lap band removal.     Subjective:       Current Facility-Administered Medications   Medication Dose Route Frequency    potassium chloride 10 mEq in 100 ml IVPB  10 mEq IntraVENous Q1H    hydrALAZINE (APRESOLINE) 20 mg/mL injection 20 mg  20 mg IntraVENous Q4H PRN    cloNIDine (CATAPRES) 0.1 mg/24 hr patch 1 Patch  1 Patch TransDERmal Q7D    morphine injection 4 mg  4 mg IntraVENous Q2H PRN    pantoprazole (PROTONIX) 40 mg in sodium chloride 0.9 % 10 mL injection  40 mg IntraVENous Q12H    metoclopramide HCl (REGLAN) injection 10 mg  10 mg IntraVENous Q6H    thiamine (B-1) 100 mg in 0.9% sodium chloride 50 mL IVPB  100 mg IntraVENous DAILY    ondansetron (ZOFRAN ODT) tablet 4 mg  4 mg Oral Q6H PRN    0.9% sodium chloride infusion  100 mL/hr IntraVENous CONTINUOUS    acetaminophen (TYLENOL) solution 650 mg  650 mg Oral Q6H PRN    enoxaparin (LOVENOX) injection 40 mg  40 mg SubCUTAneous Q24H          Objective:     Physical Exam:    NAD  OP clear  MMM  CV RRR  Abdomen soft NT ND    Data Review:    Labs: Results:       Chemistry Recent Labs      02/01/18   0535  01/31/18   0420  01/30/18   0435   GLU  84  80  130*   NA  138  140  139   K  3.0*  3.4*  3.7   CL  107  107  103   CO2  25  26  24   BUN  16  15  13   CREA  0.87  0.67  0.83   CA  8.3*  8.1*  9.1   AGAP  6  7  12   BUCR  18  22*  16   AP  66   --    --    TP  6.3*   --    --    ALB  3.1*   --    --    GLOB  3.2   --    --    AGRAT  1.0   --    --       CBC w/Diff Recent Labs      02/01/18   0535  01/31/18   1554  01/31/18   0420 01/30/18   0435   WBC  9.7   --   9.1  13.4*   RBC  3.98*   --   4.03*  4.63   HGB  11.0*  13.4  11.3*  13.4   HCT  33.1*  39.3  33.6*  38.6   PLT  271   --   250  323      Coagulation No results for input(s): PTP, INR, APTT in the last 72 hours.     No lab exists for component: INREXT    Liver Enzymes Recent Labs      02/01/18   0535   TP  6.3*   ALB  3.1*   AP  66   SGOT  12*   ALT  Kaykay Crandall MD  February 1, 2018

## 2018-02-01 NOTE — ROUTINE PROCESS
Plan:  To provide an enjoyable diversion. Implementation:  Provided live bedside harp music, oldies per patient request.  Evaluation:  Patient crying with some songs, smiling and laughing with others. States thanks for music.

## 2018-02-01 NOTE — PROGRESS NOTES
Daily Progress Note: 2/1/2018 2:39 PM   Admit Date: 1/29/2018    Patient seen in follow up for multiple medical problems as listed below:  Patient Active Problem List   Diagnosis Code    AAA (abdominal aortic aneurysm) ([facility] [facility] I71.4    Essential hypertension I10    Hyperlipidemia E78.5    Subclinical hyperthyroidism E05.90    Generalized osteoarthrosis, involving multiple sites M15.9    Esophageal reflux K21.9    Renal infarction ([facility] N28.0    Dysarthria R47.1    LAP-BAND surgery status Z98.84    Non morbid obesity due to excess calories E66.09    Gastroesophageal reflux disease without esophagitis K21.9    Obesity (BMI 30.0-34. 9) E66.9    Peripheral vascular disease (HCC) I73.9    Right-sided carotid artery disease (HCC) I77.9    Sacroiliitis (HCC) M46.1    Right hip pain M25.551    Spondylosis of lumbar region without myelopathy or radiculopathy M47.816    Lumbar neuritis M54.16    Left upper quadrant pain R10.12    Hypovitaminosis D E55.9    Lumbar radiculopathy M54.16    Hx of aortic aneurysm repair Z98.890, Z86.79    History of renal stent Z98.890    Right renal artery stenosis (HCC) I70.1    Abdominal pain R10.9    Intractable nausea and vomiting R11.2       Assesment   63 y.o. female with PMHX HTN, PAD/TORY, Obesity s/p gastric banding in [place] 2014 who presents with   Recurrent and worsening N/V and Abd pain. Pain reported as constant and worse with vomiting in the supraumbilical and R upper side of abdomen x10d over her chronic abdominal pain. She has been unable to hold down some tea and bread the night prior. She was seen at [place] [facility] 1/24 evening with CT scan showing proximal stomach stenosis from gastric band and chronic atrophic R kidney. Patient went home and came back to [facility] ER and reportedly had a AXR showing gastric band placement.   She has seen Dr Juan Marino as an outpatient, she has no definative reason for her gastric band to be removed and cannot affored the surgery on her own. ROS+ for some loose stools. GI was consulted, s/p EGD  with mild gastritis and no band erosion patient did have clinical improvement despite her report. Lactic acid and messenteric PVL were normal no sign of mesenteris ischemia. reglan trial.    Recurrent Abd pain -   -Being followed by GI  - GI thinks Lap band may be cause and may need loosening I will speak to surgery  - reglan trial now. Further testing per GI , I have dced by gastric emptying study    Recurrent N/V - advance diet if no surgery or further testing required    S/p Gastric banding    Hypertensive urgency - IV hydralazine prn, increase clonidine patch ,  Change to PO when NPO discontinued    Obesity  PAD with hx of TORY s/p stenting  Atrophic R kidney - chronic. possible congenital or from TORY. Renal US        DVT Protocol Active: yes  Code Status:  Full Code     Disposition: home 1-2 days    Subjective:     CC: Abdominal Pain and Vomiting    N/V improving but NPO,     Objective:     Visit Vitals    /83 (BP 1 Location: Left arm, BP Patient Position: Lying left side)    Pulse (!) 103    Temp 98.2 °F (36.8 °C)    Resp 18    Ht 5' 5\" (1.651 m)    Wt 78 kg (172 lb)    SpO2 96%    BMI 28.62 kg/m2       Temp (24hrs), Av.8 °F (37.1 °C), Min:98.1 °F (36.7 °C), Max:100.8 °F (38.2 °C)        Intake/Output Summary (Last 24 hours) at 18 1441  Last data filed at 18 0734   Gross per 24 hour   Intake                0 ml   Output             1350 ml   Net            -1350 ml       Gen: AOx3, NAD  HEENT:  ANA LUISA, EOMI. Neck: No Bruits/JVD   Lungs:   CTAB. Good respiratory effort  Heart:   RR S1 S2 without M/R/G  Abdomen: mild distension, very soft mid R sided abd pain, BSX4 normo/hyper,   Extremities:   No LE edema. No cyanosis.   Skin:  no jaundice/lesions      Data Review:     Meds/Labs/Tests reviewed    Current Shift:   0701 -  1900  In: -   Out: 300 [Urine:300]  Last three shifts:   1901 - 02/01 0700  In: 1256.7 [I.V.:1256.7]  Out: 2100 [Urine:2100]  Recent Labs      02/01/18   0535  01/31/18   1554  01/31/18   0420  01/30/18   0435   WBC  9.7   --   9.1  13.4*   RBC  3.98*   --   4.03*  4.63   HGB  11.0*  13.4  11.3*  13.4   HCT  33.1*  39.3  33.6*  38.6   PLT  271   --   250  323       Recent Labs      02/01/18   0535  01/31/18   0420  01/30/18   0435   BUN  16  15  13   CREA  0.87  0.67  0.83   CA  8.3*  8.1*  9.1   ALB  3.1*   --    --    K  3.0*  3.4*  3.7   NA  138  140  139   CL  107  107  103   CO2  25  26  24   GLU  84  80  130*        Lab Results   Component Value Date/Time    Glucose 84 02/01/2018 05:35 AM    Glucose 80 01/31/2018 04:20 AM    Glucose 130 01/30/2018 04:35 AM    Glucose 96 01/29/2018 10:00 AM    Glucose 82 01/28/2018 05:23 PM          Procedures/Imaging:  CT Abd 1/24  EGD 1/30 no band erosion  Mesenteric aPVL 1/31 wnl

## 2018-02-01 NOTE — ROUTINE PROCESS
Bedside and Verbal shift change report given to Woody Romero RN (oncoming nurse) by Ilana Mead RN (offgoing nurse). Report included the following information SBAR, Kardex and MAR.

## 2018-02-02 VITALS
TEMPERATURE: 97.8 F | SYSTOLIC BLOOD PRESSURE: 167 MMHG | RESPIRATION RATE: 16 BRPM | WEIGHT: 172 LBS | DIASTOLIC BLOOD PRESSURE: 91 MMHG | BODY MASS INDEX: 28.66 KG/M2 | HEIGHT: 65 IN | OXYGEN SATURATION: 96 % | HEART RATE: 97 BPM

## 2018-02-02 LAB
ATRIAL RATE: 109 BPM
CALCULATED P AXIS, ECG09: 68 DEGREES
CALCULATED R AXIS, ECG10: 70 DEGREES
CALCULATED T AXIS, ECG11: 67 DEGREES
DIAGNOSIS, 93000: NORMAL
P-R INTERVAL, ECG05: 152 MS
Q-T INTERVAL, ECG07: 336 MS
QRS DURATION, ECG06: 76 MS
QTC CALCULATION (BEZET), ECG08: 452 MS
VENTRICULAR RATE, ECG03: 109 BPM
VIT B1 BLD-SCNC: 173.8 NMOL/L (ref 66.5–200)

## 2018-02-02 PROCEDURE — C9113 INJ PANTOPRAZOLE SODIUM, VIA: HCPCS | Performed by: INTERNAL MEDICINE

## 2018-02-02 PROCEDURE — 74011250636 HC RX REV CODE- 250/636: Performed by: INTERNAL MEDICINE

## 2018-02-02 PROCEDURE — 74011000250 HC RX REV CODE- 250: Performed by: INTERNAL MEDICINE

## 2018-02-02 PROCEDURE — 74011250637 HC RX REV CODE- 250/637: Performed by: INTERNAL MEDICINE

## 2018-02-02 PROCEDURE — 74011250637 HC RX REV CODE- 250/637: Performed by: HOSPITALIST

## 2018-02-02 RX ORDER — PANTOPRAZOLE SODIUM 40 MG/1
40 TABLET, DELAYED RELEASE ORAL
Qty: 60 TAB | Refills: 0 | Status: SHIPPED | OUTPATIENT
Start: 2018-02-02 | End: 2018-03-14

## 2018-02-02 RX ORDER — METOCLOPRAMIDE 5 MG/1
5 TABLET ORAL
Qty: 30 TAB | Refills: 0 | Status: SHIPPED | OUTPATIENT
Start: 2018-02-02 | End: 2018-02-12

## 2018-02-02 RX ORDER — CLONIDINE 0.2 MG/24H
1 PATCH, EXTENDED RELEASE TRANSDERMAL
Qty: 4 PATCH | Refills: 0 | Status: SHIPPED | OUTPATIENT
Start: 2018-02-08 | End: 2018-03-09 | Stop reason: SDUPTHER

## 2018-02-02 RX ORDER — AMLODIPINE BESYLATE 10 MG/1
10 TABLET ORAL DAILY
Status: DISCONTINUED | OUTPATIENT
Start: 2018-02-02 | End: 2018-02-02

## 2018-02-02 RX ORDER — AMLODIPINE BESYLATE 10 MG/1
10 TABLET ORAL
Status: COMPLETED | OUTPATIENT
Start: 2018-02-02 | End: 2018-02-02

## 2018-02-02 RX ORDER — ONDANSETRON 8 MG/1
8 TABLET, ORALLY DISINTEGRATING ORAL
Qty: 30 TAB | Refills: 0 | Status: SHIPPED | OUTPATIENT
Start: 2018-02-02 | End: 2018-10-05 | Stop reason: SDUPTHER

## 2018-02-02 RX ORDER — AMLODIPINE BESYLATE 10 MG/1
10 TABLET ORAL DAILY
Qty: 30 TAB | Refills: 0 | Status: SHIPPED | OUTPATIENT
Start: 2018-02-02 | End: 2018-03-06 | Stop reason: SDUPTHER

## 2018-02-02 RX ADMIN — METOCLOPRAMIDE 10 MG: 5 INJECTION, SOLUTION INTRAMUSCULAR; INTRAVENOUS at 05:08

## 2018-02-02 RX ADMIN — HYDRALAZINE HYDROCHLORIDE 20 MG: 20 INJECTION INTRAMUSCULAR; INTRAVENOUS at 00:06

## 2018-02-02 RX ADMIN — HYDRALAZINE HYDROCHLORIDE 20 MG: 20 INJECTION INTRAMUSCULAR; INTRAVENOUS at 08:58

## 2018-02-02 RX ADMIN — AMLODIPINE BESYLATE 10 MG: 10 TABLET ORAL at 10:44

## 2018-02-02 RX ADMIN — SODIUM CHLORIDE 40 MG: 9 INJECTION INTRAMUSCULAR; INTRAVENOUS; SUBCUTANEOUS at 09:05

## 2018-02-02 RX ADMIN — ACETAMINOPHEN 650 MG: 650 SOLUTION ORAL at 10:44

## 2018-02-02 NOTE — PROGRESS NOTES
Surgery    Approached by Dr. Jose D Beckford regarding band deflation. Explained it is fully deflated since 2/2017. Explained work-up thus far shows no acute erosion, no slippage, no infection that would justify emergent/urgent explant. I have appealed on her behalf for explant with her insurance company in 2017 and they declined to cover based on same work-up noted above. The basics of this work-up have been repeated again in October and again with this admission with the same results noted. I recommend fully evaluating all causes. 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 Will continue to be available if needed.

## 2018-02-02 NOTE — PROGRESS NOTES
If patient can take liquids PO I do not see a reason for continued hospitalization.   She needs to f/u with GI as OP and complete any other tests needed then reapply with insurance for coverage of band removal.      Clears today , if tolerating DC home

## 2018-02-02 NOTE — PROGRESS NOTES
0720 Received pt resting on bed, denies any pain at this time. No nausea no vomiting, IV site no sign of infiltration. Pt still on NPO. No skin breakdown, can turn to side by herself without feeling nauseated. 1540 Little redness around IV site, pt denies any pain, this site has blood flow. Cold compress applied. Denies any pain at this time, no nausea no vomiting. Pt walked in the hallway with one assist, no nausea no vomiting, denies any pain at this time. Activity well tolerated. 1600 Report given to [employer].

## 2018-02-02 NOTE — DISCHARGE INSTRUCTIONS
DISCHARGE SUMMARY from Nurse    PATIENT INSTRUCTIONS:    After general anesthesia or intravenous sedation, for 24 hours or while taking prescription Narcotics:  · Limit your activities  · Do not drive and operate hazardous machinery  · Do not make important personal or business decisions  · Do  not drink alcoholic beverages  · If you have not urinated within 8 hours after discharge, please contact your surgeon on call. Report the following to your surgeon:  · Excessive pain, swelling, redness or odor of or around the surgical area  · Temperature over 100.5  · Nausea and vomiting lasting longer than 4 hours or if unable to take medications  · Any signs of decreased circulation or nerve impairment to extremity: change in color, persistent  numbness, tingling, coldness or increase pain  · Any questions    What to do at Home:  Recommended activity: Activity as tolerated,        Please give a list of your current medications to your Primary Care Provider. *  Please update this list whenever your medications are discontinued, doses are      changed, or new medications (including over-the-counter products) are added. *  Please carry medication information at all times in case of emergency situations. These are general instructions for a healthy lifestyle:    No smoking/ No tobacco products/ Avoid exposure to second hand smoke  Surgeon General's Warning:  Quitting smoking now greatly reduces serious risk to your health. Obesity, smoking, and sedentary lifestyle greatly increases your risk for illness    A healthy diet, regular physical exercise & weight monitoring are important for maintaining a healthy lifestyle    You may be retaining fluid if you have a history of heart failure or if you experience any of the following symptoms:  Weight gain of 3 pounds or more overnight or 5 pounds in a week, increased swelling in our hands or feet or shortness of breath while lying flat in bed.   Please call your doctor as soon as you notice any of these symptoms; do not wait until your next office visit. Recognize signs and symptoms of STROKE:    F-face looks uneven    A-arms unable to move or move unevenly    S-speech slurred or non-existent    T-time-call 911 as soon as signs and symptoms begin-DO NOT go       Back to bed or wait to see if you get better-TIME IS BRAIN. Warning Signs of HEART ATTACK     Call 911 if you have these symptoms:   Chest discomfort. Most heart attacks involve discomfort in the center of the chest that lasts more than a few minutes, or that goes away and comes back. It can feel like uncomfortable pressure, squeezing, fullness, or pain.  Discomfort in other areas of the upper body. Symptoms can include pain or discomfort in one or both arms, the back, neck, jaw, or stomach.  Shortness of breath with or without chest discomfort.  Other signs may include breaking out in a cold sweat, nausea, or lightheadedness. Don't wait more than five minutes to call 911 - MINUTES MATTER! Fast action can save your life. Calling 911 is almost always the fastest way to get lifesaving treatment. Emergency Medical Services staff can begin treatment when they arrive -- up to an hour sooner than if someone gets to the hospital by car. The discharge information has been reviewed with the patient. The patient verbalized understanding. Discharge medications reviewed with the patient and appropriate educational materials and side effects teaching were provided. ___________________________________________________________________________________________________________________________________          Elevated Blood Pressure: Care Instructions  Your Care Instructions    Blood pressure is a measure of how hard the blood pushes against the walls of your arteries. It's normal for blood pressure to go up and down throughout the day. But if it stays up over time, you have high blood pressure.   Two numbers tell you your blood pressure. The first number is the systolic pressure. It shows how hard the blood pushes when your heart is pumping. The second number is the diastolic pressure. It shows how hard the blood pushes between heartbeats, when your heart is relaxed and filling with blood. An ideal blood pressure in adults is less than 120/80 (say \"120 over 80\"). High blood pressure is 140/90 or higher. You have high blood pressure if your top number is 140 or higher or your bottom number is 90 or higher, or both. The main test for high blood pressure is simple, fast, and painless. To diagnose high blood pressure, your doctor will test your blood pressure at different times. After testing your blood pressure, your doctor may ask you to test it again when you are home. If you are diagnosed with high blood pressure, you can work with your doctor to make a long-term plan to manage it. Follow-up care is a key part of your treatment and safety. Be sure to make and go to all appointments, and call your doctor if you are having problems. It's also a good idea to know your test results and keep a list of the medicines you take. How can you care for yourself at home? · Do not smoke. Smoking increases your risk for heart attack and stroke. If you need help quitting, talk to your doctor about stop-smoking programs and medicines. These can increase your chances of quitting for good. · Stay at a healthy weight. · Try to limit how much sodium you eat to less than 2,300 milligrams (mg) a day. Your doctor may ask you to try to eat less than 1,500 mg a day. · Be physically active. Get at least 30 minutes of exercise on most days of the week. Walking is a good choice. You also may want to do other activities, such as running, swimming, cycling, or playing tennis or team sports. · Avoid or limit alcohol. Talk to your doctor about whether you can drink any alcohol. · Eat plenty of fruits, vegetables, and low-fat dairy products.  Eat less saturated and total fats. · Learn how to check your blood pressure at home. When should you call for help? Call your doctor now or seek immediate medical care if:  ? · Your blood pressure is much higher than normal (such as 180/110 or higher). ? · You think high blood pressure is causing symptoms such as:  ¨ Severe headache. ¨ Blurry vision. ? Watch closely for changes in your health, and be sure to contact your doctor if:  ? · You do not get better as expected. Where can you learn more? Go to http://linda-slava.info/. Enter H155 in the search box to learn more about \"Elevated Blood Pressure: Care Instructions. \"  Current as of: September 21, 2016  Content Version: 11.4  © 5784-4173 [employer]. Care instructions adapted under license by [employer] (which disclaims liability or warranty for this information). If you have questions about a medical condition or this instruction, always ask your healthcare professional. [place] [address] any warranty or liability for your use of this information. Call Dr Kelly Smith or your Gastro Intestinal Specialist for further evaluation including an Upper GI series. You should call today for an appointment.

## 2018-02-02 NOTE — PROGRESS NOTES
7995 am meds given, patient in bed, patient denies pain, patient denies N&V, no acute distress noted. Patient made aware if tolerates liquid tray can be discharge today.        1040MD José Luis Finn made aware of patient's BP, instructed to give norvasc now, recheck BP, patient in can leave in approx 1 hour, MD José Luis Finn at bedside answering patient and daughters questions and concerns     1200 BP decreased to 161, patient updated, no c/o N&V    0800 entered patient's chart to document into the complex assessment, HIPPA maintained

## 2018-02-02 NOTE — PROGRESS NOTES
Gastrointestinal Progress Note    Patient Name: Eldon Fajardo    WQUMD'A Date: 2/2/2018    Admit Date: 1/29/2018    Assessment-Recommendation:   1. Chronic nausea and vomiting - Patient is not nauseous and had not had vomiting. Would proceed with liquid meal. If patient tolerates would be ok with discharge with follow up outpatient for further work up.      Subjective:     No vomiting  Current Facility-Administered Medications   Medication Dose Route Frequency    cloNIDine (CATAPRES) 0.2 mg/24 hr patch 1 Patch  1 Patch TransDERmal Q7D    hydrALAZINE (APRESOLINE) 20 mg/mL injection 20 mg  20 mg IntraVENous Q4H PRN    morphine injection 4 mg  4 mg IntraVENous Q2H PRN    pantoprazole (PROTONIX) 40 mg in sodium chloride 0.9 % 10 mL injection  40 mg IntraVENous Q12H    metoclopramide HCl (REGLAN) injection 10 mg  10 mg IntraVENous Q6H    thiamine (B-1) 100 mg in 0.9% sodium chloride 50 mL IVPB  100 mg IntraVENous DAILY    ondansetron (ZOFRAN ODT) tablet 4 mg  4 mg Oral Q6H PRN    0.9% sodium chloride infusion  100 mL/hr IntraVENous CONTINUOUS    acetaminophen (TYLENOL) solution 650 mg  650 mg Oral Q6H PRN    enoxaparin (LOVENOX) injection 40 mg  40 mg SubCUTAneous Q24H          Objective:     Physical Exam:    NAD  OP clear  MMM  CV RRR  Abdomen soft NT ND    Data Review:    Labs: Results:       Chemistry Recent Labs      02/01/18   0535  01/31/18   0420   GLU  84  80   NA  138  140   K  3.0*  3.4*   CL  107  107   CO2  25  26   BUN  16  15   CREA  0.87  0.67   CA  8.3*  8.1*   AGAP  6  7   BUCR  18  22*   AP  66   --    TP  6.3*   --    ALB  3.1*   --    GLOB  3.2   --    AGRAT  1.0   --       CBC w/Diff Recent Labs      02/01/18   1537  02/01/18   0535  01/31/18   1554  01/31/18   0420   WBC   --   9.7   --   9.1   RBC   --   3.98*   --   4.03*   HGB  12.2  11.0*  13.4  11.3*   HCT  35.6  33.1*  39.3  33.6*   PLT   --   271   --   250      Coagulation No results for input(s): PTP, INR, APTT in the last 72 hours.    No lab exists for component: INREXT    Liver Enzymes Recent Labs      02/01/18   0535   TP  6.3*   ALB  3.1*   AP  66   SGOT  12*   ALT  17          Gabriele Galaviz MD  February 2, 2018

## 2018-02-02 NOTE — ROUTINE PROCESS
I have reviewed discharge instructions with the patient. The patient verbalized understanding. Patient will have BP recheck in approx 1 hour and if all is well she will be wheeled downstairs.    Daughter is at bedside to transport her home

## 2018-02-02 NOTE — PROGRESS NOTES
1924: Bedside shift change report given to Ella RN (oncoming nurse) by Radha Ramsey RN (offgoing nurse). Report included the following information SBAR, Kardex, Intake/Output, MAR and Recent Results. Patient resting in bed, no IV access. Will attempt PIV. Patient denies pain, alert and oriented. Day shift RN attempted PIV prior to shift. 1949: Attempted PIV, unable to obtain vascular access. 2000: Informed nursing supervisor patient requires PIV, states she will assess. Patient's last run of Potassium hanging, residential completed from day shift. 2011: Patient now has 22 gauge in right forearm. Completing fluids held on day shift due to no vascular access. 2358: Alerted by CNA BP out of normal range. 0010: Hydralazine given for systolic BP greater than 604, systolic now 358. Patient resting in bed, denies pain, refusing SCDs. Educated patient regarding NPO status pending test in AM.     0320: Patient sleeping, stable condition, will continue to monitor. 0445: VS taken, patient tachycardic post ambulation, now WDL, BP elevated, 164/77, not within parameters for hydralazine administration. 0482: Patient sleeping, NAD noted, safety measures in place, ambulated in room once this AM, fairly tolerated, denies pain, newly placed PIV patent, patient denies discomfort, no signs of infiltration noted. Bedside shift change report given to Lisa Onofre RN (oncoming nurse) by Evelina De Santiago RN (offgoing nurse). Report included the following information SBAR, Kardex, Intake/Output, MAR and Recent Results.

## 2018-02-02 NOTE — PROGRESS NOTES
Discharge Planning:    Rock Larson  [phone]  obtained and entered into [address] for [address] [place] [address] of Entry  [phone]

## 2018-02-02 NOTE — PROGRESS NOTES
completed follow up visit with patient in room 2207 this morning and a Spiritual assessment of patient was done. and offered Pastoral care. Patient will be discharged today.   Chaplains will continue to follow and will provide pastoral care on an as needed/requested basis    rachel Whyte   Board Certified [address] [place] [address]   [phone]

## 2018-02-02 NOTE — PROGRESS NOTES
Problem: Falls - Risk of  Goal: *Absence of Falls  Document Jama Fall Risk and appropriate interventions in the flowsheet.    Outcome: Progressing Towards Goal  Fall Risk Interventions:  Mobility Interventions: Patient to call before getting OOB         Medication Interventions: Patient to call before getting OOB, Teach patient to arise slowly    Elimination Interventions: Call light in reach, Patient to call for help with toileting needs, Toileting schedule/hourly rounds

## 2018-02-05 ENCOUNTER — OFFICE VISIT (OUTPATIENT)
Dept: INTERNAL MEDICINE CLINIC | Age: 63
End: 2018-02-05

## 2018-02-05 VITALS
OXYGEN SATURATION: 99 % | RESPIRATION RATE: 16 BRPM | HEIGHT: 60 IN | HEART RATE: 84 BPM | BODY MASS INDEX: 34.75 KG/M2 | SYSTOLIC BLOOD PRESSURE: 142 MMHG | WEIGHT: 177 LBS | DIASTOLIC BLOOD PRESSURE: 84 MMHG | TEMPERATURE: 97.9 F

## 2018-02-05 DIAGNOSIS — N26.1 ATROPHY OF RIGHT KIDNEY: ICD-10-CM

## 2018-02-05 DIAGNOSIS — R19.7 DIARRHEA, UNSPECIFIED TYPE: ICD-10-CM

## 2018-02-05 DIAGNOSIS — R11.0 NAUSEA: Primary | ICD-10-CM

## 2018-02-05 RX ORDER — HYDROCODONE BITARTRATE AND ACETAMINOPHEN 10; 325 MG/1; MG/1
1 TABLET ORAL
COMMUNITY
End: 2018-02-05 | Stop reason: SDUPTHER

## 2018-02-05 RX ORDER — HYDROCODONE BITARTRATE AND ACETAMINOPHEN 10; 325 MG/1; MG/1
1 TABLET ORAL
Qty: 120 TAB | Refills: 0 | Status: SHIPPED | OUTPATIENT
Start: 2018-02-05 | End: 2018-03-01 | Stop reason: SDUPTHER

## 2018-02-05 NOTE — MR AVS SNAPSHOT
[facility] 
 
 
 [address], [address] [facility] [address] 
[phone] Patient: Daily Hill MRN: [id number] SXV:[id number] Visit Information Date & Time Provider Department Dept. Phone Encounter #  
 2/5/2018  9:45 AM Johnna Luna MD [facility] INTERNAL MEDICINE OF Ifeoma Lopez [phone] [id number] Your Appointments 2/12/2018  9:00 AM  
Follow Up with Johnna Luna MD  
[address] Erika Belt) Appt Note: 1 wk f/u  
 [address], [address] [facility] [address]  
[phone]  
  
   
 [address], [address] [facility] [address]  
  
    
 2/16/2018  8:45 AM  
COMPLETE PHYSICAL with Johnna Luna MD  
[address] Erika Belt) Appt Note:   
 [address] [place] [address], [address] [place] [address]  
[phone]  
  
   
 [address], [address] [facility] [address]  
  
    
 11/12/2018  9:00 AM  
PROCEDURE with [facility] 1 [facility] (Erika Belt) Appt Note: amber brown 1 year [address] [facility] [address] [facility]  
[phone] [address] [place], [place] [id number]  
  
    
 11/12/2018 10:00 AM  
PROCEDURE with [facility] 1 [facility] (Erika Belt) Appt Note: cv stephaniek 1 year [address] [facility] [address] [facility]  
[phone]  
  
    
 11/27/2018 10:00 AM  
Follow Up with MAKAYLA Coon [facility] (Erika Belt) Appt Note: 1 year follow up after studies with prep [address] [facility] [address] [facility]  
[phone] [address] [place], [place] [id number]  
  
    
  
 6/5/2018 10:45 AM  
Any with Meenakshi Smith MD  
[facility] (Erika Belt) Appt Note: ep- 6 month follow up  [address] 
 [place] [address]  
[address] [facility] [address] [place] [address] Upcoming Health Maintenance Date Due ZOSTER VACCINE AGE 60> 12/11/2016 Bone Densitometry 9/24/2017 BREAST CANCER SCRN MAMMOGRAM 6/1/2018 FOBT Q 1 YEAR AGE 50-75 1/31/2019 PAP AKA CERVICAL CYTOLOGY 8/12/2019 DTaP/Tdap/Td series (2 - Td) 9/1/2026 Allergies as of 2/5/2018  Review Complete On: 2/5/2018 By: Johanna Weinberg LPN Severity Noted Reaction Type Reactions Pcn [Penicillins] High 09/18/2014    Anaphylaxis Tetracycline High 09/18/2014    Anaphylaxis Sulfabenzamide  09/18/2014    Hives Current Immunizations  Reviewed on 11/16/2017 Name Date Influenza Vaccine 10/31/2017 Influenza Vaccine (Quad) PF 9/13/2017, 9/1/2016 Pneumococcal Vaccine (Unspecified Type) 3/5/2013 Tdap 9/1/2016 Not reviewed this visit You Were Diagnosed With   
  
 Codes Comments Nausea    -  Primary ICD-10-CM: R11.0 ICD-9-CM: 787.02 Diarrhea, unspecified type     ICD-10-CM: R19.7 ICD-9-CM: 787.91 Congenital atrophy of kidney     ICD-10-CM: Q60.5 ICD-9-CM: 753.0 Vitals BP Pulse Temp Resp Height(growth percentile) (!) 152/98 (BP 1 Location: Left arm, BP Patient Position: Sitting) 94 97.9 °F (36.6 °C) (Tympanic) 16 5' (1.524 m) Weight(growth percentile) SpO2 BMI OB Status Smoking Status 177 lb (80.3 kg) 99% 34.57 kg/m2 Hysterectomy Former Smoker BMI and BSA Data Body Mass Index Body Surface Area 34.57 kg/m 2 1.84 m 2 Preferred Pharmacy Pharmacy Name Phone [facility] [address], [address] [facility], [address] [phone] Your Updated Medication List  
  
   
This list is accurate as of: 2/5/18  7:08 PM.  Always use your most recent med list. amLODIPine 10 mg tablet Commonly known as:  iNdia Estrada Take 1 Tab by mouth daily. cloNIDine 0.2 mg/24 hr patch Commonly known as:  CATAPRES  
 1 Patch by TransDERmal route every seven (7) days. Start taking on:  2/8/2018  
  
 clopidogrel 75 mg Tab Commonly known as:  PLAVIX TAKE 1 TABLET DAILY DALIRESP Tab tablet Generic drug:  roflumilast  
TAKE 1 TABLET DAILY DEXILANT 60 mg Cpdb Generic drug:  Dexlansoprazole Take  by mouth daily. diclofenac 1 % Gel Commonly known as:  VOLTAREN Apply 2 g to affected area four (4) times daily. docusate sodium 100 mg capsule Commonly known as:  Jerre Carota Take 1 Cap by mouth two (2) times a day. esomeprazole 20 mg capsule Commonly known as:  NexIUM Take 1 Cap by mouth daily. STOP PREVACID  Indications: HEARTBURN  
  
 fluticasone-salmeterol 250-50 mcg/dose diskus inhaler Commonly known as:  ADVAIR DISKUS Take 1 Puff by inhalation daily. HYDROcodone-acetaminophen  mg tablet Commonly known as:  Hugo Lama Take 1 Tab by mouth every six (6) hours as needed for Pain. Max Daily Amount: 4 Tabs. metoclopramide HCl 5 mg tablet Commonly known as:  REGLAN Take 1 Tab by mouth Before breakfast, lunch, and dinner for 10 days. mirtazapine 15 mg tablet Commonly known as:  REMERON  
TAKE ONE TABLET BY MOUTH EVERY EVENING  
  
 montelukast 10 mg tablet Commonly known as:  SINGULAIR  
TAKE 1 TABLET DAILY  
  
 ondansetron 8 mg disintegrating tablet Commonly known as:  ZOFRAN ODT Take 1 Tab by mouth every eight (8) hours as needed for Nausea. pantoprazole 40 mg tablet Commonly known as:  PROTONIX Take 1 Tab by mouth Before breakfast and dinner. pravastatin 20 mg tablet Commonly known as:  PRAVACHOL  
TAKE 1 TABLET DAILY  
  
 sertraline 50 mg tablet Commonly known as:  ZOLOFT Take 1 Tab by mouth daily. tiaGABine 2 mg tablet Commonly known as:  GABITRIL  
TAKE ONE TABLET BY MOUTH TWICE A DAY WITH MEALS Prescriptions Printed Refills  HYDROcodone-acetaminophen (NORCO)  mg tablet 0  
 Sig: Take 1 Tab by mouth every six (6) hours as needed for Pain. Max Daily Amount: 4 Tabs. Class: Print Route: Oral  
  
We Performed the Following CBC WITH AUTOMATED DIFF [95551 CPT(R)] COLLECTION VENOUS BLOOD,VENIPUNCTURE Q0239617 CPT(R)] METABOLIC PANEL, COMPREHENSIVE [24133 CPT(R)] To-Do List   
 02/05/2018 Lab:  METABOLIC PANEL, COMPREHENSIVE   
  
 06/07/2018 Lab:  CBC WITH AUTOMATED DIFF Patient Instructions Health Maintenance Due Topic Date Due  Shingles Vaccine  12/11/2016  Bone Densitometry  09/24/2017  Breast Cancer Screening  06/01/2018 Please provide this summary of care documentation to your next provider. Your primary care clinician is listed as [facility]. If you have any questions after today's visit, please call [phone].

## 2018-02-05 NOTE — PROGRESS NOTES
1. Have you been to the ER, urgent care clinic since your last visit? Hospitalized since your last visit? Meagan Arteaga - Had a lap band 5 years ago - abdominal infection - DC from [facility] 2/2/18    2. Have you seen or consulted any other health care providers outside of the [address] [place] [address] since your last visit? Include any pap smears or colon screening.  No

## 2018-02-05 NOTE — PATIENT INSTRUCTIONS
Health Maintenance Due   Topic Date Due    Shingles Vaccine  12/11/2016    Bone Densitometry  09/24/2017    Breast Cancer Screening  06/01/2018

## 2018-02-05 NOTE — LETTER
18 RE:  Eldon Fajardo :  1955 To whom it may concern: This is the current list of medications for Eldon Fajardo. Current Outpatient Prescriptions Medication Sig Dispense Refill  
 HYDROcodone-acetaminophen (NORCO)  mg tablet Take 1 Tab by mouth every six (6) hours as needed for Pain.  ondansetron (ZOFRAN ODT) 8 mg disintegrating tablet Take 1 Tab by mouth every eight (8) hours as needed for Nausea. 30 Tab 0  
 amLODIPine (NORVASC) 10 mg tablet Take 1 Tab by mouth daily. 30 Tab 0  
 [START ON 2018] cloNIDine (CATAPRES) 0.2 mg/24 hr patch 1 Patch by TransDERmal route every seven (7) days. 4 Patch 0  
 Dexlansoprazole (DEXILANT) 60 mg CpDB Take  by mouth daily.  docusate sodium (COLACE) 100 mg capsule Take 1 Cap by mouth two (2) times a day. 60 Cap 5  
 diclofenac (VOLTAREN) 1 % gel Apply 2 g to affected area four (4) times daily. 1 Each 2  
 montelukast (SINGULAIR) 10 mg tablet TAKE 1 TABLET DAILY 90 Tab 2  
 DALIRESP tab tablet TAKE 1 TABLET DAILY 90 Tab 2  pravastatin (PRAVACHOL) 20 mg tablet TAKE 1 TABLET DAILY 90 Tab 2  
 sertraline (ZOLOFT) 50 mg tablet Take 1 Tab by mouth daily. 90 Tab 3  
 fluticasone-salmeterol (ADVAIR DISKUS) 250-50 mcg/dose diskus inhaler Take 1 Puff by inhalation daily. (Patient taking differently: Take 2 Puffs by inhalation daily. ) 1 Inhaler 5  pantoprazole (PROTONIX) 40 mg tablet Take 1 Tab by mouth Before breakfast and dinner. 60 Tab 0  
 metoclopramide HCl (REGLAN) 5 mg tablet Take 1 Tab by mouth Before breakfast, lunch, and dinner for 10 days. 30 Tab 0  
 mirtazapine (REMERON) 15 mg tablet TAKE ONE TABLET BY MOUTH EVERY EVENING 30 Tab 0  
 esomeprazole (NEXIUM) 20 mg capsule Take 1 Cap by mouth daily. STOP PREVACID  Indications: HEARTBURN 30 Cap 1  clopidogrel (PLAVIX) 75 mg tab TAKE 1 TABLET DAILY 90 Tab 2  
 tiaGABine (GABITRIL) 2 mg tablet TAKE ONE TABLET BY MOUTH TWICE A DAY WITH MEALS 60 Tab 0 Please contact us if you have any questions 
  
 
                                                                             ____________________________

## 2018-02-06 LAB
A-G RATIO,AGRAT: 1.8 RATIO (ref 1.1–2.6)
ABSOLUTE LYMPHOCYTE COUNT, 10803: 2.2 K/UL (ref 1–4.8)
ALBUMIN SERPL-MCNC: 4.1 G/DL (ref 3.5–5)
ALP SERPL-CCNC: 72 U/L (ref 40–120)
ALT SERPL-CCNC: 32 U/L (ref 5–40)
ANION GAP SERPL CALC-SCNC: 16 MMOL/L
AST SERPL W P-5'-P-CCNC: 25 U/L (ref 10–37)
BASOPHILS # BLD: 0.1 K/UL (ref 0–0.2)
BASOPHILS NFR BLD: 1 % (ref 0–2)
BILIRUB SERPL-MCNC: 0.5 MG/DL (ref 0.2–1.2)
BUN SERPL-MCNC: 13 MG/DL (ref 6–22)
CALCIUM SERPL-MCNC: 9.2 MG/DL (ref 8.4–10.5)
CHLORIDE SERPL-SCNC: 100 MMOL/L (ref 98–110)
CO2 SERPL-SCNC: 26 MMOL/L (ref 20–32)
CREAT SERPL-MCNC: 0.9 MG/DL (ref 0.8–1.4)
EOSINOPHIL # BLD: 0.3 K/UL (ref 0–0.5)
EOSINOPHIL NFR BLD: 4 % (ref 0–6)
ERYTHROCYTE [DISTWIDTH] IN BLOOD BY AUTOMATED COUNT: 13.2 % (ref 10–16)
GFRAA, 66117: >60
GFRNA, 66118: 60
GLOBULIN,GLOB: 2.3 G/DL (ref 2–4)
GLUCOSE SERPL-MCNC: 88 MG/DL (ref 70–99)
GRANULOCYTES,GRANS: 58 % (ref 40–75)
HCT VFR BLD AUTO: 36.9 % (ref 35.1–48)
HGB BLD-MCNC: 12.2 G/DL (ref 11.7–16)
LYMPHOCYTES, LYMLT: 27 % (ref 27–45)
MCH RBC QN AUTO: 28 PG (ref 26–34)
MCHC RBC AUTO-ENTMCNC: 33 G/DL (ref 32–36)
MCV RBC AUTO: 85 FL (ref 80–95)
MONOCYTES # BLD: 0.9 K/UL (ref 0.1–0.9)
MONOCYTES NFR BLD: 11 % (ref 3–9)
NEUTROPHILS # BLD AUTO: 4.6 K/UL (ref 1.8–7.7)
PLATELET # BLD AUTO: 325 K/UL (ref 140–440)
PMV BLD AUTO: 11.4 FL (ref 6–10.8)
POTASSIUM SERPL-SCNC: 4.1 MMOL/L (ref 3.5–5.5)
PROT SERPL-MCNC: 6.4 G/DL (ref 6.2–8.1)
RBC # BLD AUTO: 4.35 M/UL (ref 3.8–5.2)
SODIUM SERPL-SCNC: 142 MMOL/L (ref 133–145)
WBC # BLD AUTO: 7.9 K/UL (ref 4–11)

## 2018-02-06 NOTE — PROGRESS NOTES
The patient presents to the office today with the chief complaint of nausea    HPI    The patient is status post a hospitalization for nausea and diarrhea. This has improved but the patient has limited oral intake. The patient continues with chronic low back pain and abdominal pain. The patient has renal artery stenosis with worsening atrophy of her right kidney      Review of Systems   Respiratory: Negative for shortness of breath. Cardiovascular: Negative for chest pain and leg swelling. Gastrointestinal: Positive for diarrhea and nausea. Musculoskeletal: Positive for back pain. Allergies   Allergen Reactions    Pcn [Penicillins] Anaphylaxis    Tetracycline Anaphylaxis    Sulfabenzamide Hives       Current Outpatient Prescriptions   Medication Sig Dispense Refill    HYDROcodone-acetaminophen (NORCO)  mg tablet Take 1 Tab by mouth every six (6) hours as needed for Pain. Max Daily Amount: 4 Tabs. 120 Tab 0    ondansetron (ZOFRAN ODT) 8 mg disintegrating tablet Take 1 Tab by mouth every eight (8) hours as needed for Nausea. 30 Tab 0    amLODIPine (NORVASC) 10 mg tablet Take 1 Tab by mouth daily. 30 Tab 0    [START ON 2/8/2018] cloNIDine (CATAPRES) 0.2 mg/24 hr patch 1 Patch by TransDERmal route every seven (7) days. 4 Patch 0    Dexlansoprazole (DEXILANT) 60 mg CpDB Take  by mouth daily.  docusate sodium (COLACE) 100 mg capsule Take 1 Cap by mouth two (2) times a day. 60 Cap 5    diclofenac (VOLTAREN) 1 % gel Apply 2 g to affected area four (4) times daily. 1 Each 2    montelukast (SINGULAIR) 10 mg tablet TAKE 1 TABLET DAILY 90 Tab 2    DALIRESP tab tablet TAKE 1 TABLET DAILY 90 Tab 2    pravastatin (PRAVACHOL) 20 mg tablet TAKE 1 TABLET DAILY 90 Tab 2    sertraline (ZOLOFT) 50 mg tablet Take 1 Tab by mouth daily. 90 Tab 3    fluticasone-salmeterol (ADVAIR DISKUS) 250-50 mcg/dose diskus inhaler Take 1 Puff by inhalation daily.  (Patient taking differently: Take 2 Puffs by inhalation daily. ) 1 Inhaler 5    pantoprazole (PROTONIX) 40 mg tablet Take 1 Tab by mouth Before breakfast and dinner. 60 Tab 0    metoclopramide HCl (REGLAN) 5 mg tablet Take 1 Tab by mouth Before breakfast, lunch, and dinner for 10 days. 30 Tab 0    mirtazapine (REMERON) 15 mg tablet TAKE ONE TABLET BY MOUTH EVERY EVENING 30 Tab 0    esomeprazole (NEXIUM) 20 mg capsule Take 1 Cap by mouth daily. STOP PREVACID  Indications: HEARTBURN 30 Cap 1    clopidogrel (PLAVIX) 75 mg tab TAKE 1 TABLET DAILY 90 Tab 2    tiaGABine (GABITRIL) 2 mg tablet TAKE ONE TABLET BY MOUTH TWICE A DAY WITH MEALS 60 Tab 0       Past Medical History:   Diagnosis Date    Aorto-iliac duplex 09/17/2015    Patent abdom aorta endovascular graft w/o leak. Mod stenosis suggested in right limb.  Calculus of kidney 2014    stent/kidney     Carotid duplex 09/17/2015    Mild < 50% bilateral plaquing.  Cataract, left     Chronic obstructive pulmonary disease ([address]     Echocardiogram 11/04/2014    EF 56%. No WMA. Mild LAE. Echo is within normal limits.  Esophageal reflux     Generalized osteoarthrosis, involving multiple sites     GERD (gastroesophageal reflux disease)     Gout     History of renal stent     right side    Hypercholesteremia     Hypertension     resolved    Lower extremity arterial testing 03/27/2015    Normal perfusion at rest and w/treadmill bilaterally.   R KORI 1.05.  L KORI 1.03.    Murmur     patient reports that she is aware- has been told in the past    Nausea & vomiting     Renal artery stenosis (HCC)     Subclinical hyperthyroidism 8/2015    Unspecified sleep apnea     resolved       Past Surgical History:   Procedure Laterality Date    ADJUSTMENT GASTRIC BAND N/A 11/10/2016    MAKAYLA Cullen    HX APPENDECTOMY      HX BLADDER SUSPENSION      HX CHOLECYSTECTOMY      HX GASTRIC BYPASS      lap band 2012    HX HEENT Right 1990s, 2016    Ear sx    HX HYSTERECTOMY      HX OTHER SURGICAL 10/27/14     Bilateral open femoral exposures, Placement of catheter and sheath in the aorta Endo repair of aortic aneurysm with modular bifurcated device  Interpretation of angiography, intravascular ultrasound (IVUS) catheter    HX OTHER SURGICAL  10/27/2014    Endurant II abdominal stent graft implant    HX UROLOGICAL      stent    VASCULAR SURGERY PROCEDURE UNLIST      surgery for abdominal aneurysm       Social History     Social History    Marital status:      Spouse name: N/A    Number of children: N/A    Years of education: N/A     Occupational History    Not on file. Social History Main Topics    Smoking status: Former Smoker     Packs/day: 0.00     Years: 0.00     Types: Cigarettes     Quit date: 1/25/2016    Smokeless tobacco: Never Used    Alcohol use No    Drug use: No    Sexual activity: Yes     Birth control/ protection: Surgical     Other Topics Concern    Not on file     Social History Narrative       Patient does not have an advanced directive on file    Visit Vitals    /84 (BP 1 Location: Right arm, BP Patient Position: Sitting)    Pulse 84    Temp 97.9 °F (36.6 °C) (Tympanic)    Resp 16    Ht 5' (1.524 m)    Wt 177 lb (80.3 kg)    SpO2 99%    BMI 34.57 kg/m2       Physical Exam   No Cervical Lymphadenopathy  No Supraclavicular Lymphadenopathy  Thyroid is Normal  Lungs are normal to percussion. Clear to auscultation   Heart:  S1 S2 are normal, No gallops, No mummers  No Carotid Bruits  Abdomen:  Normal Bowel Sounds. No tenderness. No masses. No Hepatomegaly or Splenomegly  LE:  Strong Pedal Pulses.   No Edema      BMI:  BMI is high but it was not addressed during this visit due to an acute illness      Admission on 01/29/2018, Discharged on 02/02/2018   Component Date Value Ref Range Status    WBC 01/29/2018 6.6  4.6 - 13.2 K/uL Final    RBC 01/29/2018 4.82  4.20 - 5.30 M/uL Final    HGB 01/29/2018 13.6  12.0 - 16.0 g/dL Final    HCT 01/29/2018 40.1 35.0 - 45.0 % Final    MCV 01/29/2018 83.2  74.0 - 97.0 FL Final    MCH 01/29/2018 28.2  24.0 - 34.0 PG Final    MCHC 01/29/2018 33.9  31.0 - 37.0 g/dL Final    RDW 01/29/2018 12.9  11.6 - 14.5 % Final    PLATELET 88/35/8041 585  135 - 420 K/uL Final    MPV 01/29/2018 9.7  9.2 - 11.8 FL Final    NEUTROPHILS 01/29/2018 63  40 - 73 % Final    LYMPHOCYTES 01/29/2018 23  21 - 52 % Final    MONOCYTES 01/29/2018 10  3 - 10 % Final    EOSINOPHILS 01/29/2018 3  0 - 5 % Final    BASOPHILS 01/29/2018 1  0 - 2 % Final    ABS. NEUTROPHILS 01/29/2018 4.2  1.8 - 8.0 K/UL Final    ABS. LYMPHOCYTES 01/29/2018 1.5  0.9 - 3.6 K/UL Final    ABS. MONOCYTES 01/29/2018 0.6  0.05 - 1.2 K/UL Final    ABS. EOSINOPHILS 01/29/2018 0.2  0.0 - 0.4 K/UL Final    ABS. BASOPHILS 01/29/2018 0.1* 0.0 - 0.06 K/UL Final    DF 01/29/2018 AUTOMATED    Final    Sodium 01/29/2018 138  136 - 145 mmol/L Final    Potassium 01/29/2018 4.5  3.5 - 5.5 mmol/L Final    Chloride 01/29/2018 103  100 - 108 mmol/L Final    CO2 01/29/2018 30  21 - 32 mmol/L Final    Anion gap 01/29/2018 5  3.0 - 18 mmol/L Final    Glucose 01/29/2018 96  74 - 99 mg/dL Final    BUN 01/29/2018 10  7.0 - 18 MG/DL Final    Creatinine 01/29/2018 0.90  0.6 - 1.3 MG/DL Final    BUN/Creatinine ratio 01/29/2018 11* 12 - 20   Final    GFR est AA 01/29/2018 >60  >60 ml/min/1.73m2 Final    GFR est non-AA 01/29/2018 >60  >60 ml/min/1.73m2 Final    Comment: (NOTE)  Estimated GFR is calculated using the Modification of Diet in Renal   Disease (MDRD) Study equation, reported for both  Americans   (GFRAA) and non- Americans (GFRNA), and normalized to 1.73m2   body surface area. The physician must decide which value applies to   the patient. The MDRD study equation should only be used in   individuals age 25 or older.  It has not been validated for the   following: pregnant women, patients with serious comorbid conditions,   or on certain medications, or persons with extremes of body size,   muscle mass, or nutritional status.  Calcium 01/29/2018 9.0  8.5 - 10.1 MG/DL Final    Bilirubin, total 01/29/2018 0.6  0.2 - 1.0 MG/DL Final    ALT (SGPT) 01/29/2018 15  13 - 56 U/L Final    AST (SGOT) 01/29/2018 19  15 - 37 U/L Final    Alk. phosphatase 01/29/2018 83  45 - 117 U/L Final    Protein, total 01/29/2018 7.7  6.4 - 8.2 g/dL Final    Albumin 01/29/2018 3.8  3.4 - 5.0 g/dL Final    Globulin 01/29/2018 3.9  2.0 - 4.0 g/dL Final    A-G Ratio 01/29/2018 1.0  0.8 - 1.7   Final    Lipase 01/29/2018 134  73 - 393 U/L Final    Protein, total 01/29/2018 7.3  6.4 - 8.2 g/dL Final    Albumin 01/29/2018 3.9  3.4 - 5.0 g/dL Final    Globulin 01/29/2018 3.4  2.0 - 4.0 g/dL Final    A-G Ratio 01/29/2018 1.1  0.8 - 1.7   Final    Bilirubin, total 01/29/2018 0.6  0.2 - 1.0 MG/DL Final    Bilirubin, direct 01/29/2018 0.2  0.0 - 0.2 MG/DL Final    Alk.  phosphatase 01/29/2018 86  45 - 117 U/L Final    AST (SGOT) 01/29/2018 16  15 - 37 U/L Final    ALT (SGPT) 01/29/2018 19  13 - 56 U/L Final    Color 01/29/2018 YELLOW    Final    Appearance 01/29/2018 CLEAR    Final    Specific gravity 01/29/2018 1.007  1.005 - 1.030   Final    pH (UA) 01/29/2018 7.5  5.0 - 8.0   Final    Protein 01/29/2018 NEGATIVE   NEG mg/dL Final    Glucose 01/29/2018 NEGATIVE   NEG mg/dL Final    Ketone 01/29/2018 NEGATIVE   NEG mg/dL Final    Bilirubin 01/29/2018 NEGATIVE   NEG   Final    Blood 01/29/2018 TRACE* NEG   Final    Urobilinogen 01/29/2018 0.2  0.2 - 1.0 EU/dL Final    Nitrites 01/29/2018 NEGATIVE   NEG   Final    Leukocyte Esterase 01/29/2018 NEGATIVE   NEG   Final    WBC 01/29/2018 NEGATIVE   0 - 4 /hpf Final    RBC 01/29/2018 0 to 1  0 - 5 /hpf Final    Epithelial cells 01/29/2018 FEW  0 - 5 /lpf Final    Bacteria 01/29/2018 NEGATIVE   NEG /hpf Final    Sodium 01/30/2018 139  136 - 145 mmol/L Final    Potassium 01/30/2018 3.7  3.5 - 5.5 mmol/L Final    Chloride 01/30/2018 103  100 - 108 mmol/L Final    CO2 01/30/2018 24  21 - 32 mmol/L Final    Anion gap 01/30/2018 12  3.0 - 18 mmol/L Final    Glucose 01/30/2018 130* 74 - 99 mg/dL Final    BUN 01/30/2018 13  7.0 - 18 MG/DL Final    Creatinine 01/30/2018 0.83  0.6 - 1.3 MG/DL Final    BUN/Creatinine ratio 01/30/2018 16  12 - 20   Final    GFR est AA 01/30/2018 >60  >60 ml/min/1.73m2 Final    GFR est non-AA 01/30/2018 >60  >60 ml/min/1.73m2 Final    Calcium 01/30/2018 9.1  8.5 - 10.1 MG/DL Final    WBC 01/30/2018 13.4* 4.6 - 13.2 K/uL Final    RBC 01/30/2018 4.63  4.20 - 5.30 M/uL Final    HGB 01/30/2018 13.4  12.0 - 16.0 g/dL Final    HCT 01/30/2018 38.6  35.0 - 45.0 % Final    MCV 01/30/2018 83.4  74.0 - 97.0 FL Final    MCH 01/30/2018 28.9  24.0 - 34.0 PG Final    MCHC 01/30/2018 34.7  31.0 - 37.0 g/dL Final    RDW 01/30/2018 13.1  11.6 - 14.5 % Final    PLATELET 55/76/1218 488  135 - 420 K/uL Final    MPV 01/30/2018 10.2  9.2 - 11.8 FL Final    Vitamin B1 01/30/2018 173.8  66.5 - 200.0 nmol/L Final    Comment: (NOTE)  This test was developed and its performance characteristics  determined by [facility]. It has not been cleared or approved  by the Food and Drug Administration.   Performed At: [facility] [address] [facility] [id number]  Octavio SOLIS:[id number]      Lactic acid 01/30/2018 0.6  0.4 - 2.0 MMOL/L Final    Sodium 01/31/2018 140  136 - 145 mmol/L Final    Potassium 01/31/2018 3.4* 3.5 - 5.5 mmol/L Final    Chloride 01/31/2018 107  100 - 108 mmol/L Final    CO2 01/31/2018 26  21 - 32 mmol/L Final    Anion gap 01/31/2018 7  3.0 - 18 mmol/L Final    Glucose 01/31/2018 80  74 - 99 mg/dL Final    BUN 01/31/2018 15  7.0 - 18 MG/DL Final    Creatinine 01/31/2018 0.67  0.6 - 1.3 MG/DL Final    BUN/Creatinine ratio 01/31/2018 22* 12 - 20   Final    GFR est AA 01/31/2018 >60  >60 ml/min/1.73m2 Final    GFR est non-AA 01/31/2018 >60  >60 ml/min/1.73m2 Final    Calcium 01/31/2018 8.1* 8.5 - 10.1 MG/DL Final    WBC 01/31/2018 9.1  4.6 - 13.2 K/uL Final    RBC 01/31/2018 4.03* 4.20 - 5.30 M/uL Final    HGB 01/31/2018 11.3* 12.0 - 16.0 g/dL Final    HCT 01/31/2018 33.6* 35.0 - 45.0 % Final    MCV 01/31/2018 83.4  74.0 - 97.0 FL Final    MCH 01/31/2018 28.0  24.0 - 34.0 PG Final    MCHC 01/31/2018 33.6  31.0 - 37.0 g/dL Final    RDW 01/31/2018 13.0  11.6 - 14.5 % Final    PLATELET 19/68/3224 823  135 - 420 K/uL Final    MPV 01/31/2018 9.8  9.2 - 11.8 FL Final    HGB 01/31/2018 13.4  12.0 - 16.0 g/dL Final    HCT 01/31/2018 39.3  35.0 - 45.0 % Final    Occult blood, stool 01/31/2018 POSITIVE* NEG   Final    Ventricular Rate 01/31/2018 109  BPM Final    Atrial Rate 01/31/2018 109  BPM Final    P-R Interval 01/31/2018 152  ms Final    QRS Duration 01/31/2018 76  ms Final    Q-T Interval 01/31/2018 336  ms Final    QTC Calculation (Bezet) 01/31/2018 452  ms Final    Calculated P Axis 01/31/2018 68  degrees Final    Calculated R Axis 01/31/2018 70  degrees Final    Calculated T Axis 01/31/2018 67  degrees Final    Diagnosis 01/31/2018    Final                    Value:Sinus tachycardia  Otherwise normal ECG  When compared with ECG of 28-JAN-2018 16:50,  No significant change was found  Confirmed by Rahat Schneider ([id number]) on 2/2/2018 8:24:02 AM      TSH 01/31/2018 0.04* 0.36 - 3.74 uIU/mL Final    Sodium 02/01/2018 138  136 - 145 mmol/L Final    Potassium 02/01/2018 3.0* 3.5 - 5.5 mmol/L Final    Chloride 02/01/2018 107  100 - 108 mmol/L Final    CO2 02/01/2018 25  21 - 32 mmol/L Final    Anion gap 02/01/2018 6  3.0 - 18 mmol/L Final    Glucose 02/01/2018 84  74 - 99 mg/dL Final    BUN 02/01/2018 16  7.0 - 18 MG/DL Final    Creatinine 02/01/2018 0.87  0.6 - 1.3 MG/DL Final    BUN/Creatinine ratio 02/01/2018 18  12 - 20   Final    GFR est AA 02/01/2018 >60  >60 ml/min/1.73m2 Final    GFR est non-AA 02/01/2018 >60  >60 ml/min/1.73m2 Final    Calcium 02/01/2018 8.3* 8.5 - 10.1 MG/DL Final    WBC 02/01/2018 9.7  4.6 - 13.2 K/uL Final    RBC 02/01/2018 3.98* 4.20 - 5.30 M/uL Final    HGB 02/01/2018 11.0* 12.0 - 16.0 g/dL Final    HCT 02/01/2018 33.1* 35.0 - 45.0 % Final    MCV 02/01/2018 83.2  74.0 - 97.0 FL Final    MCH 02/01/2018 27.6  24.0 - 34.0 PG Final    MCHC 02/01/2018 33.2  31.0 - 37.0 g/dL Final    RDW 02/01/2018 13.0  11.6 - 14.5 % Final    PLATELET 24/47/4109 880  135 - 420 K/uL Final    MPV 02/01/2018 10.1  9.2 - 11.8 FL Final    Cortisol, a.m. 02/01/2018 23.9* 4.30 - 22.40 ug/dL Final    Protein, total 02/01/2018 6.3* 6.4 - 8.2 g/dL Final    Albumin 02/01/2018 3.1* 3.4 - 5.0 g/dL Final    Globulin 02/01/2018 3.2  2.0 - 4.0 g/dL Final    A-G Ratio 02/01/2018 1.0  0.8 - 1.7   Final    Bilirubin, total 02/01/2018 0.7  0.2 - 1.0 MG/DL Final    Bilirubin, direct 02/01/2018 0.1  0.0 - 0.2 MG/DL Final    Alk.  phosphatase 02/01/2018 66  45 - 117 U/L Final    AST (SGOT) 02/01/2018 12* 15 - 37 U/L Final    ALT (SGPT) 02/01/2018 17  13 - 56 U/L Final    Lipase 02/01/2018 208  73 - 393 U/L Final    T4, Free 02/01/2018 1.3  0.7 - 1.5 NG/DL Final    Magnesium 02/01/2018 2.1  1.6 - 2.6 mg/dL Final    HGB 02/01/2018 12.2  12.0 - 16.0 g/dL Final    HCT 02/01/2018 35.6  35.0 - 45.0 % Final   Admission on 01/28/2018, Discharged on 01/28/2018   Component Date Value Ref Range Status    Ventricular Rate 01/28/2018 85  BPM Final    Atrial Rate 01/28/2018 85  BPM Final    P-R Interval 01/28/2018 148  ms Final    QRS Duration 01/28/2018 86  ms Final    Q-T Interval 01/28/2018 368  ms Final    QTC Calculation (Bezet) 01/28/2018 437  ms Final    Calculated P Axis 01/28/2018 76  degrees Final    Calculated R Axis 01/28/2018 74  degrees Final    Calculated T Axis 01/28/2018 71  degrees Final    Diagnosis 01/28/2018    Final                    Value:Normal sinus rhythm  Normal ECG  When compared with ECG of 03-MAR-2017 07:55,  No significant change was found  Confirmed by Sita Erickson MD, Dinorah Bailon ([id number]) on 1/29/2018 4:54:57 PM      WBC 01/28/2018 7.0  4.6 - 13.2 K/uL Final    RBC 01/28/2018 4.55  4.20 - 5.30 M/uL Final    HGB 01/28/2018 12.5  12.0 - 16.0 g/dL Final    HCT 01/28/2018 37.7  35.0 - 45.0 % Final    MCV 01/28/2018 82.9  74.0 - 97.0 FL Final    MCH 01/28/2018 27.5  24.0 - 34.0 PG Final    MCHC 01/28/2018 33.2  31.0 - 37.0 g/dL Final    RDW 01/28/2018 13.0  11.6 - 14.5 % Final    PLATELET 11/82/8693 495  135 - 420 K/uL Final    MPV 01/28/2018 10.3  9.2 - 11.8 FL Final    NEUTROPHILS 01/28/2018 48  40 - 73 % Final    LYMPHOCYTES 01/28/2018 37  21 - 52 % Final    MONOCYTES 01/28/2018 10  3 - 10 % Final    EOSINOPHILS 01/28/2018 4  0 - 5 % Final    BASOPHILS 01/28/2018 1  0 - 2 % Final    ABS. NEUTROPHILS 01/28/2018 3.4  1.8 - 8.0 K/UL Final    ABS. LYMPHOCYTES 01/28/2018 2.6  0.9 - 3.6 K/UL Final    ABS. MONOCYTES 01/28/2018 0.7  0.05 - 1.2 K/UL Final    ABS. EOSINOPHILS 01/28/2018 0.3  0.0 - 0.4 K/UL Final    ABS. BASOPHILS 01/28/2018 0.0  0.0 - 0.06 K/UL Final    DF 01/28/2018 AUTOMATED    Final    Sodium 01/28/2018 139  136 - 145 mmol/L Final    Potassium 01/28/2018 3.5  3.5 - 5.5 mmol/L Final    Chloride 01/28/2018 103  100 - 108 mmol/L Final    CO2 01/28/2018 28  21 - 32 mmol/L Final    Anion gap 01/28/2018 8  3.0 - 18 mmol/L Final    Glucose 01/28/2018 82  74 - 99 mg/dL Final    BUN 01/28/2018 15  7.0 - 18 MG/DL Final    Creatinine 01/28/2018 1.01  0.6 - 1.3 MG/DL Final    BUN/Creatinine ratio 01/28/2018 15  12 - 20   Final    GFR est AA 01/28/2018 >60  >60 ml/min/1.73m2 Final    GFR est non-AA 01/28/2018 56* >60 ml/min/1.73m2 Final    Comment: (NOTE)  Estimated GFR is calculated using the Modification of Diet in Renal   Disease (MDRD) Study equation, reported for both  Americans   (GFRAA) and non- Americans (GFRNA), and normalized to 1.73m2   body surface area.  The physician must decide which value applies to   the patient. The MDRD study equation should only be used in   individuals age 25 or older. It has not been validated for the   following: pregnant women, patients with serious comorbid conditions,   or on certain medications, or persons with extremes of body size,   muscle mass, or nutritional status.  Calcium 01/28/2018 8.7  8.5 - 10.1 MG/DL Final    Bilirubin, total 01/28/2018 0.3  0.2 - 1.0 MG/DL Final    ALT (SGPT) 01/28/2018 21  13 - 56 U/L Final    AST (SGOT) 01/28/2018 18  15 - 37 U/L Final    Alk.  phosphatase 01/28/2018 75  45 - 117 U/L Final    Protein, total 01/28/2018 7.1  6.4 - 8.2 g/dL Final    Albumin 01/28/2018 3.5  3.4 - 5.0 g/dL Final    Globulin 01/28/2018 3.6  2.0 - 4.0 g/dL Final    A-G Ratio 01/28/2018 1.0  0.8 - 1.7   Final    Color 01/28/2018 YELLOW    Final    Appearance 01/28/2018 CLEAR    Final    Specific gravity 01/28/2018 1.009  1.005 - 1.030   Final    pH (UA) 01/28/2018 7.0  5.0 - 8.0   Final    Protein 01/28/2018 NEGATIVE   NEG mg/dL Final    Glucose 01/28/2018 NEGATIVE   NEG mg/dL Final    Ketone 01/28/2018 NEGATIVE   NEG mg/dL Final    Bilirubin 01/28/2018 NEGATIVE   NEG   Final    Blood 01/28/2018 NEGATIVE   NEG   Final    Urobilinogen 01/28/2018 0.2  0.2 - 1.0 EU/dL Final    Nitrites 01/28/2018 NEGATIVE   NEG   Final    Leukocyte Esterase 01/28/2018 NEGATIVE   NEG   Final    Lipase 01/28/2018 207  73 - 393 U/L Final   Admission on 01/24/2018, Discharged on 01/25/2018   Component Date Value Ref Range Status    WBC 01/24/2018 9.2  4.6 - 13.2 K/uL Final    RBC 01/24/2018 4.66  4.20 - 5.30 M/uL Final    HGB 01/24/2018 13.4  12.0 - 16.0 g/dL Final    HCT 01/24/2018 38.1  35.0 - 45.0 % Final    MCV 01/24/2018 81.8  74.0 - 97.0 FL Final    MCH 01/24/2018 28.8  24.0 - 34.0 PG Final    MCHC 01/24/2018 35.2  31.0 - 37.0 g/dL Final    RDW 01/24/2018 13.0  11.6 - 14.5 % Final    PLATELET 57/77/1009 962  135 - 420 K/uL Final    MPV 01/24/2018 10.0  9.2 - 11.8 FL Final    NEUTROPHILS 01/24/2018 58  40 - 73 % Final    LYMPHOCYTES 01/24/2018 26  21 - 52 % Final    MONOCYTES 01/24/2018 13* 3 - 10 % Final    EOSINOPHILS 01/24/2018 2  0 - 5 % Final    BASOPHILS 01/24/2018 1  0 - 2 % Final    ABS. NEUTROPHILS 01/24/2018 5.4  1.8 - 8.0 K/UL Final    ABS. LYMPHOCYTES 01/24/2018 2.4  0.9 - 3.6 K/UL Final    ABS. MONOCYTES 01/24/2018 1.2  0.05 - 1.2 K/UL Final    ABS. EOSINOPHILS 01/24/2018 0.2  0.0 - 0.4 K/UL Final    ABS. BASOPHILS 01/24/2018 0.1  0.0 - 0.1 K/UL Final    DF 01/24/2018 AUTOMATED    Final    Sodium 01/24/2018 137  136 - 145 mmol/L Final    Potassium 01/24/2018 3.5  3.5 - 5.5 mmol/L Final    Chloride 01/24/2018 99* 100 - 108 mmol/L Final    CO2 01/24/2018 28  21 - 32 mmol/L Final    Anion gap 01/24/2018 10  3.0 - 18 mmol/L Final    Glucose 01/24/2018 97  74 - 99 mg/dL Final    BUN 01/24/2018 16  7.0 - 18 MG/DL Final    Creatinine 01/24/2018 1.02  0.6 - 1.3 MG/DL Final    BUN/Creatinine ratio 01/24/2018 16  12 - 20   Final    GFR est AA 01/24/2018 >60  >60 ml/min/1.73m2 Final    GFR est non-AA 01/24/2018 55* >60 ml/min/1.73m2 Final    Comment: (NOTE)  Estimated GFR is calculated using the Modification of Diet in Renal   Disease (MDRD) Study equation, reported for both  Americans   (GFRAA) and non- Americans (GFRNA), and normalized to 1.73m2   body surface area. The physician must decide which value applies to   the patient. The MDRD study equation should only be used in   individuals age 25 or older. It has not been validated for the   following: pregnant women, patients with serious comorbid conditions,   or on certain medications, or persons with extremes of body size,   muscle mass, or nutritional status.       Calcium 01/24/2018 9.0  8.5 - 10.1 MG/DL Final    Bilirubin, total 01/24/2018 0.5  0.2 - 1.0 MG/DL Final    ALT (SGPT) 01/24/2018 22  13 - 56 U/L Final    AST (SGOT) 01/24/2018 12* 15 - 37 U/L Final  Alk. phosphatase 01/24/2018 86  45 - 117 U/L Final    Protein, total 01/24/2018 7.8  6.4 - 8.2 g/dL Final    Albumin 01/24/2018 4.0  3.4 - 5.0 g/dL Final    Globulin 01/24/2018 3.8  2.0 - 4.0 g/dL Final    A-G Ratio 01/24/2018 1.1  0.8 - 1.7   Final    Lipase 01/24/2018 705* 73 - 393 U/L Final    Color 01/24/2018 YELLOW    Final    Appearance 01/24/2018 CLEAR    Final    Specific gravity 01/24/2018 >1.030* 1.005 - 1.030 Final    pH (UA) 01/24/2018 5.5  5.0 - 8.0   Final    Protein 01/24/2018 TRACE* NEG mg/dL Final    Glucose 01/24/2018 NEGATIVE   NEG mg/dL Final    Ketone 01/24/2018 TRACE* NEG mg/dL Final    Bilirubin 01/24/2018 NEGATIVE   NEG   Final    Blood 01/24/2018 MODERATE* NEG   Final    Urobilinogen 01/24/2018 0.2  0.2 - 1.0 EU/dL Final    Nitrites 01/24/2018 NEGATIVE   NEG   Final    Leukocyte Esterase 01/24/2018 SMALL* NEG   Final    WBC 01/24/2018 4 to 10  0 - 4 /hpf Final    RBC 01/24/2018 4 to 10  0 - 5 /hpf Final    Epithelial cells 01/24/2018 FEW  0 - 5 /lpf Final    Bacteria 01/24/2018 NEGATIVE   NEG /hpf Final   Hospital Outpatient Visit on 12/20/2017   Component Date Value Ref Range Status    Creatinine, POC 12/20/2017 0.8  0.6 - 1.3 MG/DL Final    GFRAA, POC 12/20/2017 >60  >60 ml/min/1.73m2 Final    GFRNA, POC 12/20/2017 >60  >60 ml/min/1.73m2 Final    Comment: Estimated GFR is calculated using the IDMS-traceable Modification of Diet in Renal Disease (MDRD) Study equation, reported for both  Americans (GFRAA) and non- Americans (GFRNA), and normalized to 1.73m2 body surface area. The physician must decide which value applies to the patient. The MDRD study equation should only be used in individuals age 25 or older. It has not been validated for the following: pregnant women, patients with serious comorbid conditions, or on certain medications, or persons with extremes of body size, muscle mass, or nutritional status.          .No results found for any visits on 02/05/18. Assessment / Plan      ICD-10-CM ICD-9-CM    1. Nausea R11.0 787.02 CBC WITH AUTOMATED DIFF      METABOLIC PANEL, COMPREHENSIVE      CBC WITH AUTOMATED DIFF      METABOLIC PANEL, COMPREHENSIVE      HYDROcodone-acetaminophen (NORCO)  mg tablet      COLLECTION VENOUS BLOOD,VENIPUNCTURE      DISCONTINUED: HYDROcodone-acetaminophen (NORCO)  mg tablet   2. Diarrhea, unspecified type R19.7 787.91 CBC WITH AUTOMATED DIFF      METABOLIC PANEL, COMPREHENSIVE      CBC WITH AUTOMATED DIFF      METABOLIC PANEL, COMPREHENSIVE      HYDROcodone-acetaminophen (NORCO)  mg tablet      COLLECTION VENOUS BLOOD,VENIPUNCTURE      DISCONTINUED: HYDROcodone-acetaminophen (NORCO)  mg tablet   3. Atrophy of right kidney N26.1 587        Labs  Cipro  Metronidazole  Refill Norco - The Prescription Monitoring Program registry was checked prior to the issuing of this prescription for a controlled substance. she was advised to continue her maintenance medications    Follow-up Disposition:  Return in about 2 weeks (around 2/19/2018). I asked Elizabeth Roberto if she has any questions and I answered the questions. Elizabeth Kelly states that she understands the treatment plan and agrees with the treatment plan

## 2018-02-09 ENCOUNTER — OFFICE VISIT (OUTPATIENT)
Dept: VASCULAR SURGERY | Age: 63
End: 2018-02-09

## 2018-02-09 DIAGNOSIS — I70.1 RENAL ARTERY STENOSIS (HCC): Primary | ICD-10-CM

## 2018-02-09 NOTE — PROCEDURES
[facility]   *** FINAL REPORT ***    Name: Chico Walden  MRN: [id number]       Outpatient  : 1955  HIS Order #: [id number]  [address] [facility] Visit #: [id number]  Date: 2018    TYPE OF TEST: Visceral Arterial Duplex    REASON FOR TEST  Renal artery stenosis    Aortic PSV:       cm/s  Diameter AP:     cm   TV:     cm                   Right          Left  Renal Artery:- -------------  -------------  Proximal  PSV:   0.0          136.0  Mid       PSV:   0.0           99.0  Distal    PSV:   0.0          193.0  Aortic ratio :   0.0    Medullary PSV:                 36.0            EDV:                 11.0            EDR:                  0.3            SDR:                  3.3    Cortical  PSV:                 29.0            EDV:                 10.0            EDR:                  0.3            SDR:                  2.9  Stenosis:      Occluded       Mild < 60%  Kidney size:    6.2 cm        10.0 cm               x  2.4 cm      x  4.7 cm    Hilar:-        Right          Left  Acc. Time  AT:     secs         3 secs  Acc. Index AI:             RI:                0. 64    INTERPRETATION/FINDINGS  Duplex images were obtained using 2-D gray scale, color flow and  spectral doppler analysis. RENAL:  1. Occlusion of the right renal artery including the stent. 2. Parenchymal flow is not identified. 3. The right kidney measures 6.21 x 2.47cm. 4. Right  renal vein not identified. 5. The left renal artery is patent with an elevated velocity in the  distal segment of 193c/s. 6. The left kidney measures 10.49 x 4.79cm. 7. Patent left renal vein. 8. Normal resistive indices and acceleration time on the left. 9. Patent celiac and superior mesenteric arteries at the origins. 10. Compared to the previous study on 17 the right renal artery  is now occluded and the right kidney is smaller.     ADDITIONAL COMMENTS    I have personally reviewed the data relevant to the interpretation of  this  study. TECHNOLOGIST: Keren Habermann Darlys Blake, RVT, RADHA  Signed: 02/09/2018 12:35 PM    PHYSICIAN: Gloria Hernandes D.O.   Signed: 02/09/2018 02:20 PM

## 2018-02-12 ENCOUNTER — OFFICE VISIT (OUTPATIENT)
Dept: INTERNAL MEDICINE CLINIC | Age: 63
End: 2018-02-12

## 2018-02-12 VITALS
WEIGHT: 172 LBS | HEART RATE: 90 BPM | HEIGHT: 60 IN | TEMPERATURE: 98.9 F | OXYGEN SATURATION: 98 % | SYSTOLIC BLOOD PRESSURE: 158 MMHG | RESPIRATION RATE: 16 BRPM | BODY MASS INDEX: 33.77 KG/M2 | DIASTOLIC BLOOD PRESSURE: 108 MMHG

## 2018-02-12 DIAGNOSIS — R11.0 NAUSEA: Primary | ICD-10-CM

## 2018-02-12 DIAGNOSIS — I10 ESSENTIAL HYPERTENSION: ICD-10-CM

## 2018-02-12 DIAGNOSIS — R10.84 GENERALIZED ABDOMINAL PAIN: ICD-10-CM

## 2018-02-12 DIAGNOSIS — I70.1 RIGHT RENAL ARTERY STENOSIS (HCC): ICD-10-CM

## 2018-02-12 NOTE — PROGRESS NOTES
1. Have you been to the ER, urgent care clinic since your last visit? Hospitalized since your last visit? No    2. Have you seen or consulted any other health care providers outside of the [address] [place] [address] since your last visit? Include any pap smears or colon screening.  No

## 2018-02-12 NOTE — MR AVS SNAPSHOT
[address] [facility] 
 
 
 [address] [facility], [address] [facility] [address] [phone] Patient: Malgorzata Chambers MRN: [id number] PKS:4/70/2033 Visit Information Date & Time Provider Department Dept. Phone Encounter #  
 2/12/2018  9:00 AM Mo Sanders MD [facility] [place]  [phone] [id number] Follow-up Instructions Return in about 9 days (around 2/21/2018). Your Appointments 2/13/2018 10:00 AM  
Follow Up with Roly Walton MD  
[address] [place] [address] and [facility] [address] [place] [address]) Appt Note: follow up after study with prep; check with justine for ref; per Jie Summers pt is in the hospital did not have test on 01/30/18; pt in the hospital/pt will call next week to reschedule/per ana rosa pt needs test first and needs to see Dr. Layla Simon; pt r/s  
 [address]  
[phone] [address] [place] [address]  
  
    
 2/14/2018  9:00 AM  
Follow Up with Mo Sanders MD  
[facility] INTER[facility] MEDICINE OF [address]) Appt Note: 2 day f/u  
 [address] [place] [address], [address] [facility] [address]  
[phone]  
  
   
 [address] [place] [address], [address] [facility] [address]  
  
    
 2/16/2018  8:45 AM  
COMPLETE PHYSICAL with Mo Sanders MD  
[address]) Appt Note: cp  
 [address] [facility], [address] [facility]  
[phone]  
  
   
 [address] [facility], [address] [facility] [address]  
  
    
 2/20/2018 11:30 AM  
Follow Up with FARIBA Melendrez 33 ([address] [place] [address]) Appt Note: 3 month f/up [address] [facility] [address] [place] [address]  
  
    
 11/12/2018  9:00 AM  
PROCEDURE with [facility] 1 [facility] ([address] [place] [address]) Appt Note: evar stephanie 1 year 2300 [facility] Devan Julianer 414 706 [facility]  
[phone] [address] [place] [address],[address]  
  
    
 11/12/2018 10:00 AM  
PROCEDURE with [facility] 1 [facility] ([facility]) Appt Note: cv stephaniek 1 year 23[address] [facility] Devan Julianer 813 706 [facility]  
[phone]  
  
    
 11/27/2018 10:00 AM  
Follow Up with MAKAYLA Ames [facility] ([facility]) Appt Note: 1 year follow up after studies with prep [address] [facility] Devan Julianer 993 706 [facility]  
[phone] [address] [place] [address]  
  
    
  
 6/5/2018 10:45 AM  
Any with Bernard Cummings MD  
[facility] ([facility]) Appt Note: ep- 6 month follow up Arlene [address]ie [address] [place] [address]  
[address] [facility] [address] [place] [address] Upcoming Health Maintenance Date Due ZOSTER VACCINE AGE 60> 12/11/2016 Bone Densitometry 9/24/2017 BREAST CANCER SCRN MAMMOGRAM 6/1/2018 FOBT Q 1 YEAR AGE 50-75 1/31/2019 PAP AKA CERVICAL CYTOLOGY 8/12/2019 DTaP/Tdap/Td series (2 - Td) 9/1/2026 Allergies as of 2/12/2018  Review Complete On: 2/12/2018 By: Iain Chavira LPN Severity Noted Reaction Type Reactions Pcn [Penicillins] High 09/18/2014    Anaphylaxis Tetracycline High 09/18/2014    Anaphylaxis Sulfabenzamide  09/18/2014    Hives Current Immunizations  Reviewed on 2/9/2018 Name Date Influenza Vaccine 10/31/2017 Influenza Vaccine (Quad) PF 9/13/2017, 9/1/2016 Pneumococcal Vaccine (Unspecified Type) 3/5/2013 Tdap 9/1/2016 Not reviewed this visit Vitals BP Pulse Temp Resp Height(growth percentile) (!) 158/108 (BP 1 Location: Left arm, BP Patient Position: Sitting) 90 98.9 °F (37.2 °C) (Tympanic) 16 5' (1.524 m) Weight(growth percentile) SpO2 BMI OB Status Smoking Status 172 lb (78 kg) 98% 33.59 kg/m2 Hysterectomy Former Smoker BMI and BSA Data Body Mass Index Body Surface Area  
 33.59 kg/m 2 1.82 m 2 Preferred Pharmacy Pharmacy Name Phone STEPHANI MARIE [facility] [address] [place] [address] [phone] Your Updated Medication List  
  
   
This list is accurate as of: 2/12/18 10:12 AM.  Always use your most recent med list. amLODIPine 10 mg tablet Commonly known as:  Jocelin Monge Take 1 Tab by mouth daily. cloNIDine 0.2 mg/24 hr patch Commonly known as:  CATAPRES  
1 Patch by TransDERmal route every seven (7) days. clopidogrel 75 mg Tab Commonly known as:  PLAVIX TAKE 1 TABLET DAILY DALIRESP Tab tablet Generic drug:  roflumilast  
TAKE 1 TABLET DAILY DEXILANT 60 mg Cpdb Generic drug:  Dexlansoprazole Take  by mouth daily. diclofenac 1 % Gel Commonly known as:  VOLTAREN Apply 2 g to affected area four (4) times daily. docusate sodium 100 mg capsule Commonly known as:  Keo Contreras Take 1 Cap by mouth two (2) times a day. esomeprazole 20 mg capsule Commonly known as:  NexIUM Take 1 Cap by mouth daily. STOP PREVACID  Indications: HEARTBURN  
  
 fluticasone-salmeterol 250-50 mcg/dose diskus inhaler Commonly known as:  ADVAIR DISKUS Take 1 Puff by inhalation daily. HYDROcodone-acetaminophen  mg tablet Commonly known as:  Rajni Lopezens Take 1 Tab by mouth every six (6) hours as needed for Pain. Max Daily Amount: 4 Tabs. metoclopramide HCl 5 mg tablet Commonly known as:  REGLAN Take 1 Tab by mouth Before breakfast, lunch, and dinner for 10 days. mirtazapine 15 mg tablet Commonly known as:  REMERON  
TAKE ONE TABLET BY MOUTH EVERY EVENING  
  
 montelukast 10 mg tablet Commonly known as:  SINGULAIR  
TAKE 1 TABLET DAILY  
  
 ondansetron 8 mg disintegrating tablet Commonly known as:  ZOFRAN ODT Take 1 Tab by mouth every eight (8) hours as needed for Nausea. pantoprazole 40 mg tablet Commonly known as:  PROTONIX Take 1 Tab by mouth Before breakfast and dinner. pravastatin 20 mg tablet Commonly known as:  PRAVACHOL  
TAKE 1 TABLET DAILY  
  
 sertraline 50 mg tablet Commonly known as:  ZOLOFT Take 1 Tab by mouth daily. tiaGABine 2 mg tablet Commonly known as:  GABITRIL  
TAKE ONE TABLET BY MOUTH TWICE A DAY WITH MEALS Follow-up Instructions Return in about 9 days (around 2/21/2018). To-Do List   
 02/13/2018 7:30 AM  
  Appointment with [address] 1 at [facility]  [place] [facility] ([phone]) MEDICATIONS  - Patient must bring a complete list of all medications currently taking to include prescriptions, over-the-counter meds, herbals, vitamins & any dietary supplements  STUDY DETAILS  -  This study takes 4.5 hours to complete. -  Patient must be NPO (nothing to eat/drink, including meds and water) for 6 hours prior to study - No Reglan, Protonoix, or any stomach motility drugs for 48 hours prior to the exam.  
  
 02/13/2018 12:00 PM  
  Appointment with [address] 1 at [facility] RAD [address] ([phone]) MEDICATIONS  - Patient must bring a complete list of all medications currently taking to include prescriptions, over-the-counter meds, herbals, vitamins & any dietary supplements  STUDY DETAILS  - Patient must be NPO (nothing to eat/drink, including meds and water) for 6 hours prior to study - The exam consists of 4 pictures over a 4 hour time period. The patient will not be on table the entire time but will be required to stay in the hospital during the 4 hours. 02/14/2018  7:30 AM  
  Appointment with [facility] DX RM 1 at [address] ([phone]) OUTSIDE FILMS  - Any outside films related to the study being scheduled should be brought with you on the day of the exam.  If this cannot be done there may be a delay in the reading of the study. MEDICATIONS  - Patient must bring a complete list of all medications currently taking to include prescriptions, over-the-counter meds, herbals, vitamins & any dietary supplements  NPO  - Patient must be NPO (Nothing to eat/drink) after Midnight day prior to exam except water sips with meds. ORAL CONTRAST/PREP  - Oral contrast is administered at the time of service Patient Instructions Health Maintenance Due Topic  ZOSTER VACCINE AGE 60>   
 Bone Densitometry  BREAST CANCER SCRN MAMMOGRAM   
 
 
 
  
 Please provide this summary of care documentation to your next provider. Your primary care clinician is listed as Santiago Jasso. If you have any questions after today's visit, please call [phone].

## 2018-02-12 NOTE — PATIENT INSTRUCTIONS
Health Maintenance Due   Topic    ZOSTER VACCINE AGE 60>     Bone Densitometry     BREAST CANCER SCRN MAMMOGRAM

## 2018-02-13 ENCOUNTER — OFFICE VISIT (OUTPATIENT)
Dept: VASCULAR SURGERY | Age: 63
End: 2018-02-13

## 2018-02-13 ENCOUNTER — OFFICE VISIT (OUTPATIENT)
Dept: INTERNAL MEDICINE CLINIC | Age: 63
End: 2018-02-13

## 2018-02-13 VITALS
DIASTOLIC BLOOD PRESSURE: 102 MMHG | BODY MASS INDEX: 33.77 KG/M2 | WEIGHT: 172 LBS | HEIGHT: 60 IN | RESPIRATION RATE: 20 BRPM | HEART RATE: 74 BPM | SYSTOLIC BLOOD PRESSURE: 150 MMHG

## 2018-02-13 VITALS
BODY MASS INDEX: 33.77 KG/M2 | OXYGEN SATURATION: 98 % | WEIGHT: 172 LBS | DIASTOLIC BLOOD PRESSURE: 112 MMHG | HEART RATE: 111 BPM | SYSTOLIC BLOOD PRESSURE: 178 MMHG | TEMPERATURE: 98.8 F | RESPIRATION RATE: 16 BRPM | HEIGHT: 60 IN

## 2018-02-13 DIAGNOSIS — R06.09 DYSPNEA ON EXERTION: ICD-10-CM

## 2018-02-13 DIAGNOSIS — I71.40 ABDOMINAL AORTIC ANEURYSM (AAA) WITHOUT RUPTURE: Primary | ICD-10-CM

## 2018-02-13 DIAGNOSIS — I10 ESSENTIAL HYPERTENSION: ICD-10-CM

## 2018-02-13 DIAGNOSIS — Z98.890 HX OF AORTIC ANEURYSM REPAIR: ICD-10-CM

## 2018-02-13 DIAGNOSIS — N26.1 ATROPHY OF RIGHT KIDNEY: ICD-10-CM

## 2018-02-13 DIAGNOSIS — I70.1 RIGHT RENAL ARTERY STENOSIS (HCC): ICD-10-CM

## 2018-02-13 DIAGNOSIS — Z86.79 HX OF AORTIC ANEURYSM REPAIR: ICD-10-CM

## 2018-02-13 DIAGNOSIS — I73.9 PAD (PERIPHERAL ARTERY DISEASE) (HCC): ICD-10-CM

## 2018-02-13 DIAGNOSIS — I10 ESSENTIAL HYPERTENSION: Primary | ICD-10-CM

## 2018-02-13 RX ORDER — LABETALOL 100 MG/1
100 TABLET, FILM COATED ORAL 2 TIMES DAILY
Qty: 60 TAB | Refills: 0 | Status: SHIPPED | OUTPATIENT
Start: 2018-02-13 | End: 2018-02-16 | Stop reason: SDUPTHER

## 2018-02-13 RX ORDER — ATORVASTATIN CALCIUM 40 MG/1
40 TABLET, FILM COATED ORAL DAILY
Qty: 30 TAB | Refills: 3 | Status: SHIPPED | OUTPATIENT
Start: 2018-02-13 | End: 2018-06-09 | Stop reason: SDUPTHER

## 2018-02-13 NOTE — MR AVS SNAPSHOT
[address] [facility] 
 
 
 [address] 200 [facility] [address] 
[phone] Patient: Barbie Roth MRN: [id number] ERK:[birth year] Visit Information Date & Time Provider Department Dept. Phone Encounter #  
 2/13/2018 10:00 AM MD Matt Tomlin and [facility] [phone] Follow-up Instructions Return in about 3 months (around 5/13/2018). Your Appointments 2/13/2018  2:00 PM  
SAME DAY SICK with Goyo Ahumada NP  
[facility]INE OF [place] ([facility]) Appt Note: BP  
 [address] [facility], [address] [facility] [address] [phone]  
  
   
 [address] [facility], [address] [facility] [address]  
  
    
 2/16/2018  8:45 AM  
COMPLETE PHYSICAL with Fara Estes MD  
[address] [place] [address]) Appt Note: cp  
 [address] [facility], [address] [facility]  
[phone]  
  
   
 [address] [facility], [address] [facility] [address]  
  
    
 2/20/2018 11:30 AM  
Follow Up with FARIBA Nance 33 ([facility]) Appt Note: 3 month f/up [address] [facility] [address] [facility] [address] [address] [address] [facility] [address] [facility] 5/15/2018  8:00 AM  
PROCEDURE with [facility] 1 [facility] ([facility]) Appt Note: evar endografy anni 3 months 1212 [facility] Adrianna Pun 526 200 [facility] Se  
[phone] 1212 [facility] 200 [facility] Se 5/15/2018  9:00 AM  
PROCEDURE with [facility] IMAG[facility] 1 [facility] ([facility]) Appt Note: rt renal occlusion anni 3 months 1212 [facility] Adrianna Pun 206 200 [facility] [address]  
[phone]  5/29/2018 10:45 AM  
Follow Up with Maricel Oquendo MD  
 [facility] ([place]e S[facility]) Appt Note: 3 month after studies with prep [facility] 200 [facility] Se  
[phone] 2630 [facility],[address]  
  
    
 11/12/2018  9:00 AM  
PROCEDURE with [facility] IMAG[facility] 1 [facility] ([place]) Appt Note: evar knaak 1 year 1212 [facility] 267 200 [facility] Se  
[phone]  
  
    
 11/12/2018 10:00 AM  
PROCEDURE with [facility] IMAGING 1 [facility] ([place]) Appt Note: cv knaak 1 year 1212 [facility] 246 200 [facility] Se  
[phone]  
  
    
 11/27/2018 10:00 AM  
Follow Up with MAKAYLA Lewis [facility] ([place]) Appt Note: 1 year follow up after studies with prep 1212 [facility] 187 200 [facility] [address]  
[phone] 2630 [facility],[address]  
  
    
  
 6/5/2018 10:45 AM  
Any with Tamie Urrutia MD  
[facility] ([facility]) Appt Note: ep- 6 month follow up [address] [place] [address]  
39 [address] [facility] 301 [facility] [address] [place] [address] Upcoming Health Maintenance Date Due ZOSTER VACCINE AGE 60> 12/11/2016 Bone Densitometry 9/24/2017 BREAST CANCER SCRN MAMMOGRAM 6/1/2018 FOBT Q 1 YEAR AGE 50-75 1/31/2019 PAP AKA CERVICAL CYTOLOGY 8/12/2019 DTaP/Tdap/Td series (2 - Td) 9/1/2026 Allergies as of 2/13/2018  Review Complete On: 2/13/2018 By: Armando Higuera MD  
  
 Severity Noted Reaction Type Reactions Pcn [Penicillins] High 09/18/2014    Anaphylaxis Tetracycline High 09/18/2014    Anaphylaxis Sulfabenzamide  09/18/2014    Hives Current Immunizations  Reviewed on 2/9/2018 Name Date Influenza Vaccine 10/31/2017 Influenza Vaccine (Quad) PF 9/13/2017, 9/1/2016 Pneumococcal Vaccine (Unspecified Type) 3/5/2013 Tdap 9/1/2016 Not reviewed this visit You Were Diagnosed With   
  
 Codes Comments Abdominal aortic aneurysm (AAA) without rupture ([facility] [address]    -  Primary ICD-10-CM: I71.4 ICD-9-CM: 441.4 Essential hypertension     ICD-10-CM: I10 
ICD-9-CM: 401.9 Hx of aortic aneurysm repair     ICD-10-CM: Z98.890, Z86.79 
ICD-9-CM: V45.89 Atrophy of right kidney     ICD-10-CM: N26.1 ICD-9-CM: 5 Vitals BP Pulse Resp Height(growth percentile) Weight(growth percentile) BMI  
 (!) 150/102 (BP 1 Location: Left arm, BP Patient Position: Sitting) 74 20 5' (1.524 m) 172 lb (78 kg) 33.59 kg/m2 OB Status Smoking Status Hysterectomy Former Smoker Vitals History BMI and BSA Data Body Mass Index Body Surface Area  
 33.59 kg/m 2 1.82 m 2 Preferred Pharmacy Pharmacy Name Phone [facility] [address], [address] [facility] [phone] Your Updated Medication List  
  
   
This list is accurate as of: 2/13/18 10:54 AM.  Always use your most recent med list. amLODIPine 10 mg tablet Commonly known as:  Segura Agustin Take 1 Tab by mouth daily. cloNIDine 0.2 mg/24 hr patch Commonly known as:  CATAPRES  
1 Patch by TransDERmal route every seven (7) days. clopidogrel 75 mg Tab Commonly known as:  PLAVIX TAKE 1 TABLET DAILY DALIRESP Tab tablet Generic drug:  roflumilast  
TAKE 1 TABLET DAILY DEXILANT 60 mg Cpdb Generic drug:  Dexlansoprazole Take  by mouth daily. diclofenac 1 % Gel Commonly known as:  VOLTAREN Apply 2 g to affected area four (4) times daily. docusate sodium 100 mg capsule Commonly known as:  Bradley Peeling Take 1 Cap by mouth two (2) times a day. esomeprazole 20 mg capsule Commonly known as:  NexIUM Take 1 Cap by mouth daily. STOP PREVACID  Indications: HEARTBURN  
  
 fluticasone-salmeterol 250-50 mcg/dose diskus inhaler Commonly known as:  ADVAIR DISKUS Take 1 Puff by inhalation daily. HYDROcodone-acetaminophen  mg tablet Commonly known as:  Mishel Anand Take 1 Tab by mouth every six (6) hours as needed for Pain. Max Daily Amount: 4 Tabs. mirtazapine 15 mg tablet Commonly known as:  REMERON  
TAKE ONE TABLET BY MOUTH EVERY EVENING  
  
 montelukast 10 mg tablet Commonly known as:  SINGULAIR  
TAKE 1 TABLET DAILY  
  
 ondansetron 8 mg disintegrating tablet Commonly known as:  ZOFRAN ODT Take 1 Tab by mouth every eight (8) hours as needed for Nausea. pantoprazole 40 mg tablet Commonly known as:  PROTONIX Take 1 Tab by mouth Before breakfast and dinner. pravastatin 20 mg tablet Commonly known as:  PRAVACHOL  
TAKE 1 TABLET DAILY  
  
 sertraline 50 mg tablet Commonly known as:  ZOLOFT Take 1 Tab by mouth daily. tiaGABine 2 mg tablet Commonly known as:  GABITRIL  
TAKE ONE TABLET BY MOUTH TWICE A DAY WITH MEALS Follow-up Instructions Return in about 3 months (around 5/13/2018). To-Do List   
 02/14/2018  7:30 AM  
  Appointment with [facility] DX RM 1 at [address] ([phone]) OUTSIDE FILMS  - Any outside films related to the study being scheduled should be brought with you on the day of the exam.  If this cannot be done there may be a delay in the reading of the study. MEDICATIONS  - Patient must bring a complete list of all medications currently taking to include prescriptions, over-the-counter meds, herbals, vitamins & any dietary supplements  NPO  - Patient must be NPO (Nothing to eat/drink) after Midnight day prior to exam except water sips with meds. ORAL CONTRAST/PREP  - Oral contrast is administered at the time of service 02/19/2018 8:00 AM  
  Appointment with [address] 1 at [facility]  [facility] MED ([phone]) MEDICATIONS  - Patient must bring a complete list of all medications currently taking to include prescriptions, over-the-counter meds, herbals, vitamins & any dietary supplements  STUDY DETAILS  -  This study takes 4.5 hours to complete. -  Patient must be NPO (nothing to eat/drink, including meds and water) for 6 hours prior to study - No Reglan, Protonoix, or any stomach motility drugs for 48 hours prior to the exam.  
  
 02/19/2018 12:30 PM  
  Appointment with [address] at [facility]  [place] [facility] ([phone]) MEDICATIONS  - Patient must bring a complete list of all medications currently taking to include prescriptions, over-the-counter meds, herbals, vitamins & any dietary supplements  STUDY DETAILS  -  This study takes 4.5 hours to complete. -  Patient must be NPO (nothing to eat/drink, including meds and water) for 6 hours prior to study - No Reglan, Protonoix, or any stomach motility drugs for 48 hours prior to the exam.  
  
 05/13/2018 Imaging:  DUPLEX AORTA ILIAC GRAFT COMPLETE AMB   
  
 05/13/2018 Imaging:  DUPLEX RENAL ARTERY LEFT AMB Please provide this summary of care documentation to your next provider. Your primary care clinician is listed as Bneson Barnes. If you have any questions after today's visit, please call [phone].

## 2018-02-13 NOTE — PROGRESS NOTES
India Carrillo is a 66 y.o. female presenting today for Elevated Blood Pressure  . HPI:  India Carrillo presents to the office today for hypertension care. Patient was seen at vascular today and her B/p was elevated. She was instructed to come to her primary care office. The patient denies any chest pain, palpitations or headache. She was told her right kidney has failed. Review of Systems   Respiratory: Negative for cough. Cardiovascular: Negative for chest pain and palpitations. Neurological: Negative for headaches. Allergies   Allergen Reactions    Pcn [Penicillins] Anaphylaxis    Tetracycline Anaphylaxis    Sulfabenzamide Hives       Current Outpatient Prescriptions   Medication Sig Dispense Refill    atorvastatin (LIPITOR) 40 mg tablet Take 1 Tab by mouth daily. 30 Tab 3    labetalol (NORMODYNE) 100 mg tablet Take 1 Tab by mouth two (2) times a day. 60 Tab 0    HYDROcodone-acetaminophen (NORCO)  mg tablet Take 1 Tab by mouth every six (6) hours as needed for Pain. Max Daily Amount: 4 Tabs. 120 Tab 0    pantoprazole (PROTONIX) 40 mg tablet Take 1 Tab by mouth Before breakfast and dinner. 60 Tab 0    ondansetron (ZOFRAN ODT) 8 mg disintegrating tablet Take 1 Tab by mouth every eight (8) hours as needed for Nausea. 30 Tab 0    amLODIPine (NORVASC) 10 mg tablet Take 1 Tab by mouth daily. 30 Tab 0    cloNIDine (CATAPRES) 0.2 mg/24 hr patch 1 Patch by TransDERmal route every seven (7) days. 4 Patch 0    mirtazapine (REMERON) 15 mg tablet TAKE ONE TABLET BY MOUTH EVERY EVENING 30 Tab 0    docusate sodium (COLACE) 100 mg capsule Take 1 Cap by mouth two (2) times a day. (Patient taking differently: Take 100 mg by mouth daily.) 60 Cap 5    diclofenac (VOLTAREN) 1 % gel Apply 2 g to affected area four (4) times daily.  1 Each 2    montelukast (SINGULAIR) 10 mg tablet TAKE 1 TABLET DAILY 90 Tab 2    clopidogrel (PLAVIX) 75 mg tab TAKE 1 TABLET DAILY 90 Tab 2    DALIRESP tab tablet TAKE 1 TABLET DAILY 90 Tab 2    fluticasone-salmeterol (ADVAIR DISKUS) 250-50 mcg/dose diskus inhaler Take 1 Puff by inhalation daily. (Patient taking differently: Take 2 Puffs by inhalation daily. ) 1 Inhaler 5    Dexlansoprazole (DEXILANT) 60 mg CpDB Take  by mouth daily.  esomeprazole (NEXIUM) 20 mg capsule Take 1 Cap by mouth daily. STOP PREVACID  Indications: HEARTBURN 30 Cap 1    tiaGABine (GABITRIL) 2 mg tablet TAKE ONE TABLET BY MOUTH TWICE A DAY WITH MEALS 60 Tab 0       Past Medical History:   Diagnosis Date    Aorto-iliac duplex 09/17/2015    Patent abdom aorta endovascular graft w/o leak. Mod stenosis suggested in right limb.  Calculus of kidney 2014    stent/kidney     Carotid duplex 09/17/2015    Mild < 50% bilateral plaquing.  Cataract, left     Chronic obstructive pulmonary disease ([address]     Echocardiogram 11/04/2014    EF 56%. No WMA. Mild LAE. Echo is within normal limits.  Esophageal reflux     Generalized osteoarthrosis, involving multiple sites     GERD (gastroesophageal reflux disease)     Gout     History of renal stent     right side    Hypercholesteremia     Hypertension     resolved    Lower extremity arterial testing 03/27/2015    Normal perfusion at rest and w/treadmill bilaterally.   R KORI 1.05.  L KORI 1.03.    Murmur     patient reports that she is aware- has been told in the past    Nausea & vomiting     Renal artery stenosis ([address]     Subclinical hyperthyroidism 8/2015    Unspecified sleep apnea     resolved       Past Surgical History:   Procedure Laterality Date    ADJUSTMENT GASTRIC BAND N/A 11/10/2016    MAKAYLA Cullen    HX APPENDECTOMY      HX BLADDER SUSPENSION      HX CHOLECYSTECTOMY      HX GASTRIC BYPASS      lap band 2012    HX HEENT Right 1990s, 2016    Ear sx    HX HYSTERECTOMY      HX OTHER SURGICAL  10/27/14     Bilateral open femoral exposures, Placement of catheter and sheath in the aorta Endo repair of aortic aneurysm with modular bifurcated device  Interpretation of angiography, intravascular ultrasound (IVUS) catheter    HX OTHER SURGICAL  10/27/2014    Endurant II abdominal stent graft implant    HX UROLOGICAL      stent    VASCULAR SURGERY PROCEDURE UNLIST      surgery for abdominal aneurysm       Social History     Social History    Marital status:      Spouse name: N/A    Number of children: N/A    Years of education: N/A     Occupational History    Not on file. Social History Main Topics    Smoking status: Former Smoker     Packs/day: 0.00     Years: 0.00     Types: Cigarettes     Quit date: 1/25/2016    Smokeless tobacco: Never Used    Alcohol use No    Drug use: No    Sexual activity: Yes     Birth control/ protection: Surgical     Other Topics Concern    Not on file     Social History Narrative       Patient does not have an advanced directive on file    Vitals:    02/13/18 1428   BP: (!) 178/112   Pulse: (!) 111   Resp: 16   Temp: 98.8 °F (37.1 °C)   TempSrc: Tympanic   SpO2: 98%   Weight: 172 lb (78 kg)   Height: 5' (1.524 m)   PainSc:   6   PainLoc: Back       Physical Exam   Cardiovascular: Normal rate, regular rhythm and normal heart sounds. Pulmonary/Chest: Breath sounds normal.   Skin: Skin is warm. Nursing note and vitals reviewed. Office Visit on 02/05/2018   Component Date Value Ref Range Status    Glucose 02/05/2018 88  70 - 99 mg/dL Final    BUN 02/05/2018 13  6 - 22 mg/dL Final    Creatinine 02/05/2018 0.9  0.8 - 1.4 mg/dL Final    Sodium 02/05/2018 142  133 - 145 mmol/L Final    Potassium 02/05/2018 4.1  3.5 - 5.5 mmol/L Final    Chloride 02/05/2018 100  98 - 110 mmol/L Final    CO2 02/05/2018 26  20 - 32 mmol/L Final    AST (SGOT) 02/05/2018 25  10 - 37 U/L Final    ALT (SGPT) 02/05/2018 32  5 - 40 U/L Final    Alk.  phosphatase 02/05/2018 72  40 - 120 U/L Final    Bilirubin, total 02/05/2018 0.5  0.2 - 1.2 mg/dL Final    Calcium 02/05/2018 9.2  8.4 - 10.5 mg/dL Final    Protein, total 02/05/2018 6.4  6.2 - 8.1 g/dL Final    Albumin 02/05/2018 4.1  3.5 - 5.0 g/dL Final    A-G Ratio 02/05/2018 1.8  1.1 - 2.6 ratio Final    Globulin 02/05/2018 2.3  2.0 - 4.0 g/dL Final    Anion gap 02/05/2018 16.0  mmol/L Final    Comment: Test includes Albumin, Alkaline Phosphatase, ALT, AST, BUN, Calcium, CO2,  Chloride, Creatinine, Glucose, Potassium, Sodium, Total Bilirubin and Total  Protein. Estimated GFR results are reported in mL/min/1.73 sq.m. by the MDRD equation. This eGFR is validated for stable chronic renal failure patients. This   equation  is unreliable in acute illness or patients with normal renal function.  GFRAA 02/05/2018 >60.0  >60.0 Final    GFRNA 02/05/2018 60.0* >60.0 Final    WBC 02/05/2018 7.9  4.0 - 11.0 K/uL Final    RBC 02/05/2018 4.35  3.80 - 5.20 M/uL Final    HGB 02/05/2018 12.2  11.7 - 16.0 g/dL Final    HCT 02/05/2018 36.9  35.1 - 48.0 % Final    MCV 02/05/2018 85  80 - 95 fL Final    MCH 02/05/2018 28  26 - 34 pg Final    MCHC 02/05/2018 33  32 - 36 g/dL Final    RDW 02/05/2018 13.2  10.0 - 16.0 % Final    PLATELET 47/60/7246 187  140 - 440 K/uL Final    MPV 02/05/2018 11.4* 6.0 - 10.8 fL Final    NEUTROPHILS 02/05/2018 58  40 - 75 % Final    Lymphocytes 02/05/2018 27  27 - 45 % Final    MONOCYTES 02/05/2018 11* 3 - 9 % Final    EOSINOPHILS 02/05/2018 4  0 - 6 % Final    BASOPHILS 02/05/2018 1  0 - 2 % Final    ABS. NEUTROPHILS 02/05/2018 4.6  1.8 - 7.7 K/uL Final    ABSOLUTE LYMPHOCYTE COUNT 02/05/2018 2.2  1.0 - 4.8 K/uL Final    ABS. MONOCYTES 02/05/2018 0.9  0.1 - 0.9 K/uL Final    ABS. EOSINOPHILS 02/05/2018 0.3  0.0 - 0.5 K/uL Final    ABS.  BASOPHILS 02/05/2018 0.1  0.0 - 0.2 K/uL Final   Admission on 01/29/2018, Discharged on 02/02/2018   Component Date Value Ref Range Status    WBC 01/29/2018 6.6  4.6 - 13.2 K/uL Final    RBC 01/29/2018 4.82  4.20 - 5.30 M/uL Final    HGB 01/29/2018 13.6  12.0 - 16.0 g/dL Final    HCT 01/29/2018 40.1  35.0 - 45.0 % Final    MCV 01/29/2018 83.2  74.0 - 97.0 FL Final    MCH 01/29/2018 28.2  24.0 - 34.0 PG Final    MCHC 01/29/2018 33.9  31.0 - 37.0 g/dL Final    RDW 01/29/2018 12.9  11.6 - 14.5 % Final    PLATELET 23/05/6366 002  135 - 420 K/uL Final    MPV 01/29/2018 9.7  9.2 - 11.8 FL Final    NEUTROPHILS 01/29/2018 63  40 - 73 % Final    LYMPHOCYTES 01/29/2018 23  21 - 52 % Final    MONOCYTES 01/29/2018 10  3 - 10 % Final    EOSINOPHILS 01/29/2018 3  0 - 5 % Final    BASOPHILS 01/29/2018 1  0 - 2 % Final    ABS. NEUTROPHILS 01/29/2018 4.2  1.8 - 8.0 K/UL Final    ABS. LYMPHOCYTES 01/29/2018 1.5  0.9 - 3.6 K/UL Final    ABS. MONOCYTES 01/29/2018 0.6  0.05 - 1.2 K/UL Final    ABS. EOSINOPHILS 01/29/2018 0.2  0.0 - 0.4 K/UL Final    ABS. BASOPHILS 01/29/2018 0.1* 0.0 - 0.06 K/UL Final    DF 01/29/2018 AUTOMATED    Final    Sodium 01/29/2018 138  136 - 145 mmol/L Final    Potassium 01/29/2018 4.5  3.5 - 5.5 mmol/L Final    Chloride 01/29/2018 103  100 - 108 mmol/L Final    CO2 01/29/2018 30  21 - 32 mmol/L Final    Anion gap 01/29/2018 5  3.0 - 18 mmol/L Final    Glucose 01/29/2018 96  74 - 99 mg/dL Final    BUN 01/29/2018 10  7.0 - 18 MG/DL Final    Creatinine 01/29/2018 0.90  0.6 - 1.3 MG/DL Final    BUN/Creatinine ratio 01/29/2018 11* 12 - 20   Final    GFR est AA 01/29/2018 >60  >60 ml/min/1.73m2 Final    GFR est non-AA 01/29/2018 >60  >60 ml/min/1.73m2 Final    Comment: (NOTE)  Estimated GFR is calculated using the Modification of Diet in Renal   Disease (MDRD) Study equation, reported for both  Americans   (GFRAA) and non- Americans (GFRNA), and normalized to 1.73m2   body surface area. The physician must decide which value applies to   the patient. The MDRD study equation should only be used in   individuals age 25 or older.  It has not been validated for the   following: pregnant women, patients with serious comorbid conditions,   or on certain medications, or persons with extremes of body size,   muscle mass, or nutritional status.  Calcium 01/29/2018 9.0  8.5 - 10.1 MG/DL Final    Bilirubin, total 01/29/2018 0.6  0.2 - 1.0 MG/DL Final    ALT (SGPT) 01/29/2018 15  13 - 56 U/L Final    AST (SGOT) 01/29/2018 19  15 - 37 U/L Final    Alk. phosphatase 01/29/2018 83  45 - 117 U/L Final    Protein, total 01/29/2018 7.7  6.4 - 8.2 g/dL Final    Albumin 01/29/2018 3.8  3.4 - 5.0 g/dL Final    Globulin 01/29/2018 3.9  2.0 - 4.0 g/dL Final    A-G Ratio 01/29/2018 1.0  0.8 - 1.7   Final    Lipase 01/29/2018 134  73 - 393 U/L Final    Protein, total 01/29/2018 7.3  6.4 - 8.2 g/dL Final    Albumin 01/29/2018 3.9  3.4 - 5.0 g/dL Final    Globulin 01/29/2018 3.4  2.0 - 4.0 g/dL Final    A-G Ratio 01/29/2018 1.1  0.8 - 1.7   Final    Bilirubin, total 01/29/2018 0.6  0.2 - 1.0 MG/DL Final    Bilirubin, direct 01/29/2018 0.2  0.0 - 0.2 MG/DL Final    Alk.  phosphatase 01/29/2018 86  45 - 117 U/L Final    AST (SGOT) 01/29/2018 16  15 - 37 U/L Final    ALT (SGPT) 01/29/2018 19  13 - 56 U/L Final    Color 01/29/2018 YELLOW    Final    Appearance 01/29/2018 CLEAR    Final    Specific gravity 01/29/2018 1.007  1.005 - 1.030   Final    pH (UA) 01/29/2018 7.5  5.0 - 8.0   Final    Protein 01/29/2018 NEGATIVE   NEG mg/dL Final    Glucose 01/29/2018 NEGATIVE   NEG mg/dL Final    Ketone 01/29/2018 NEGATIVE   NEG mg/dL Final    Bilirubin 01/29/2018 NEGATIVE   NEG   Final    Blood 01/29/2018 TRACE* NEG   Final    Urobilinogen 01/29/2018 0.2  0.2 - 1.0 EU/dL Final    Nitrites 01/29/2018 NEGATIVE   NEG   Final    Leukocyte Esterase 01/29/2018 NEGATIVE   NEG   Final    WBC 01/29/2018 NEGATIVE   0 - 4 /hpf Final    RBC 01/29/2018 0 to 1  0 - 5 /hpf Final    Epithelial cells 01/29/2018 FEW  0 - 5 /lpf Final    Bacteria 01/29/2018 NEGATIVE   NEG /hpf Final    Sodium 01/30/2018 139  136 - 145 mmol/L Final    Potassium 01/30/2018 3.7  3.5 - 5.5 mmol/L Final    Chloride 01/30/2018 103  100 - 108 mmol/L Final    CO2 01/30/2018 24  21 - 32 mmol/L Final    Anion gap 01/30/2018 12  3.0 - 18 mmol/L Final    Glucose 01/30/2018 130* 74 - 99 mg/dL Final    BUN 01/30/2018 13  7.0 - 18 MG/DL Final    Creatinine 01/30/2018 0.83  0.6 - 1.3 MG/DL Final    BUN/Creatinine ratio 01/30/2018 16  12 - 20   Final    GFR est AA 01/30/2018 >60  >60 ml/min/1.73m2 Final    GFR est non-AA 01/30/2018 >60  >60 ml/min/1.73m2 Final    Calcium 01/30/2018 9.1  8.5 - 10.1 MG/DL Final    WBC 01/30/2018 13.4* 4.6 - 13.2 K/uL Final    RBC 01/30/2018 4.63  4.20 - 5.30 M/uL Final    HGB 01/30/2018 13.4  12.0 - 16.0 g/dL Final    HCT 01/30/2018 38.6  35.0 - 45.0 % Final    MCV 01/30/2018 83.4  74.0 - 97.0 FL Final    MCH 01/30/2018 28.9  24.0 - 34.0 PG Final    MCHC 01/30/2018 34.7  31.0 - 37.0 g/dL Final    RDW 01/30/2018 13.1  11.6 - 14.5 % Final    PLATELET 53/96/3512 494  135 - 420 K/uL Final    MPV 01/30/2018 10.2  9.2 - 11.8 FL Final    Vitamin B1 01/30/2018 173.8  66.5 - 200.0 nmol/L Final    Comment: (NOTE)  This test was developed and its performance characteristics  determined by [email or web address]. It has not been cleared or approved  by the Food and Drug Administration.   Performed At: [address] [id number]  [place]:[id number]      Lactic acid 01/30/2018 0.6  0.4 - 2.0 MMOL/L Final    Sodium 01/31/2018 140  136 - 145 mmol/L Final    Potassium 01/31/2018 3.4* 3.5 - 5.5 mmol/L Final    Chloride 01/31/2018 107  100 - 108 mmol/L Final    CO2 01/31/2018 26  21 - 32 mmol/L Final    Anion gap 01/31/2018 7  3.0 - 18 mmol/L Final    Glucose 01/31/2018 80  74 - 99 mg/dL Final    BUN 01/31/2018 15  7.0 - 18 MG/DL Final    Creatinine 01/31/2018 0.67  0.6 - 1.3 MG/DL Final    BUN/Creatinine ratio 01/31/2018 22* 12 - 20   Final    GFR est AA 01/31/2018 >60  >60 ml/min/1.73m2 Final    GFR est non-AA 01/31/2018 >60  >60 ml/min/1.73m2 Final    Calcium 01/31/2018 8.1* 8.5 - 10.1 MG/DL Final    WBC 01/31/2018 9.1  4.6 - 13.2 K/uL Final    RBC 01/31/2018 4.03* 4.20 - 5.30 M/uL Final    HGB 01/31/2018 11.3* 12.0 - 16.0 g/dL Final    HCT 01/31/2018 33.6* 35.0 - 45.0 % Final    MCV 01/31/2018 83.4  74.0 - 97.0 FL Final    MCH 01/31/2018 28.0  24.0 - 34.0 PG Final    MCHC 01/31/2018 33.6  31.0 - 37.0 g/dL Final    RDW 01/31/2018 13.0  11.6 - 14.5 % Final    PLATELET 26/98/1324 862  135 - 420 K/uL Final    MPV 01/31/2018 9.8  9.2 - 11.8 FL Final    HGB 01/31/2018 13.4  12.0 - 16.0 g/dL Final    HCT 01/31/2018 39.3  35.0 - 45.0 % Final    Occult blood, stool 01/31/2018 POSITIVE* NEG   Final    Ventricular Rate 01/31/2018 109  BPM Final    Atrial Rate 01/31/2018 109  BPM Final    P-R Interval 01/31/2018 152  ms Final    QRS Duration 01/31/2018 76  ms Final    Q-T Interval 01/31/2018 336  ms Final    QTC Calculation (Bezet) 01/31/2018 452  ms Final    Calculated P Axis 01/31/2018 68  degrees Final    Calculated R Axis 01/31/2018 70  degrees Final    Calculated T Axis 01/31/2018 67  degrees Final    Diagnosis 01/31/2018    Final                    Value:Sinus tachycardia  Otherwise normal ECG  When compared with ECG of 28-JAN-2018 16:50,  No significant change was found  Confirmed by Rahat Chinchilla ([id number]) on 2/2/2018 8:24:02 AM      TSH 01/31/2018 0.04* 0.36 - 3.74 uIU/mL Final    Sodium 02/01/2018 138  136 - 145 mmol/L Final    Potassium 02/01/2018 3.0* 3.5 - 5.5 mmol/L Final    Chloride 02/01/2018 107  100 - 108 mmol/L Final    CO2 02/01/2018 25  21 - 32 mmol/L Final    Anion gap 02/01/2018 6  3.0 - 18 mmol/L Final    Glucose 02/01/2018 84  74 - 99 mg/dL Final    BUN 02/01/2018 16  7.0 - 18 MG/DL Final    Creatinine 02/01/2018 0.87  0.6 - 1.3 MG/DL Final    BUN/Creatinine ratio 02/01/2018 18  12 - 20   Final    GFR est AA 02/01/2018 >60  >60 ml/min/1.73m2 Final    GFR est non-AA 02/01/2018 >60  >60 ml/min/1.73m2 Final    Calcium 02/01/2018 8.3* 8.5 - 10.1 MG/DL Final    WBC 02/01/2018 9.7  4.6 - 13.2 K/uL Final    RBC 02/01/2018 3.98* 4.20 - 5.30 M/uL Final    HGB 02/01/2018 11.0* 12.0 - 16.0 g/dL Final    HCT 02/01/2018 33.1* 35.0 - 45.0 % Final    MCV 02/01/2018 83.2  74.0 - 97.0 FL Final    MCH 02/01/2018 27.6  24.0 - 34.0 PG Final    MCHC 02/01/2018 33.2  31.0 - 37.0 g/dL Final    RDW 02/01/2018 13.0  11.6 - 14.5 % Final    PLATELET 69/96/2687 272  135 - 420 K/uL Final    MPV 02/01/2018 10.1  9.2 - 11.8 FL Final    Cortisol, a.m. 02/01/2018 23.9* 4.30 - 22.40 ug/dL Final    Protein, total 02/01/2018 6.3* 6.4 - 8.2 g/dL Final    Albumin 02/01/2018 3.1* 3.4 - 5.0 g/dL Final    Globulin 02/01/2018 3.2  2.0 - 4.0 g/dL Final    A-G Ratio 02/01/2018 1.0  0.8 - 1.7   Final    Bilirubin, total 02/01/2018 0.7  0.2 - 1.0 MG/DL Final    Bilirubin, direct 02/01/2018 0.1  0.0 - 0.2 MG/DL Final    Alk.  phosphatase 02/01/2018 66  45 - 117 U/L Final    AST (SGOT) 02/01/2018 12* 15 - 37 U/L Final    ALT (SGPT) 02/01/2018 17  13 - 56 U/L Final    Lipase 02/01/2018 208  73 - 393 U/L Final    T4, Free 02/01/2018 1.3  0.7 - 1.5 NG/DL Final    Magnesium 02/01/2018 2.1  1.6 - 2.6 mg/dL Final    HGB 02/01/2018 12.2  12.0 - 16.0 g/dL Final    HCT 02/01/2018 35.6  35.0 - 45.0 % Final   Admission on 01/28/2018, Discharged on 01/28/2018   Component Date Value Ref Range Status    Ventricular Rate 01/28/2018 85  BPM Final    Atrial Rate 01/28/2018 85  BPM Final    P-R Interval 01/28/2018 148  ms Final    QRS Duration 01/28/2018 86  ms Final    Q-T Interval 01/28/2018 368  ms Final    QTC Calculation (Bezet) 01/28/2018 437  ms Final    Calculated P Axis 01/28/2018 76  degrees Final    Calculated R Axis 01/28/2018 74  degrees Final    Calculated T Axis 01/28/2018 71  degrees Final    Diagnosis 01/28/2018    Final                    Value:Normal sinus rhythm  Normal ECG  When compared with ECG of 03-MAR-2017 07:55,  No significant change was found  Confirmed by Renda Crigler, MD, Piter Vogel [id number]) on 1/29/2018 4:54:57 PM      WBC 01/28/2018 7.0  4.6 - 13.2 K/uL Final    RBC 01/28/2018 4.55  4.20 - 5.30 M/uL Final    HGB 01/28/2018 12.5  12.0 - 16.0 g/dL Final    HCT 01/28/2018 37.7  35.0 - 45.0 % Final    MCV 01/28/2018 82.9  74.0 - 97.0 FL Final    MCH 01/28/2018 27.5  24.0 - 34.0 PG Final    MCHC 01/28/2018 33.2  31.0 - 37.0 g/dL Final    RDW 01/28/2018 13.0  11.6 - 14.5 % Final    PLATELET 02/63/1810 500  135 - 420 K/uL Final    MPV 01/28/2018 10.3  9.2 - 11.8 FL Final    NEUTROPHILS 01/28/2018 48  40 - 73 % Final    LYMPHOCYTES 01/28/2018 37  21 - 52 % Final    MONOCYTES 01/28/2018 10  3 - 10 % Final    EOSINOPHILS 01/28/2018 4  0 - 5 % Final    BASOPHILS 01/28/2018 1  0 - 2 % Final    ABS. NEUTROPHILS 01/28/2018 3.4  1.8 - 8.0 K/UL Final    ABS. LYMPHOCYTES 01/28/2018 2.6  0.9 - 3.6 K/UL Final    ABS. MONOCYTES 01/28/2018 0.7  0.05 - 1.2 K/UL Final    ABS. EOSINOPHILS 01/28/2018 0.3  0.0 - 0.4 K/UL Final    ABS. BASOPHILS 01/28/2018 0.0  0.0 - 0.06 K/UL Final    DF 01/28/2018 AUTOMATED    Final    Sodium 01/28/2018 139  136 - 145 mmol/L Final    Potassium 01/28/2018 3.5  3.5 - 5.5 mmol/L Final    Chloride 01/28/2018 103  100 - 108 mmol/L Final    CO2 01/28/2018 28  21 - 32 mmol/L Final    Anion gap 01/28/2018 8  3.0 - 18 mmol/L Final    Glucose 01/28/2018 82  74 - 99 mg/dL Final    BUN 01/28/2018 15  7.0 - 18 MG/DL Final    Creatinine 01/28/2018 1.01  0.6 - 1.3 MG/DL Final    BUN/Creatinine ratio 01/28/2018 15  12 - 20   Final    GFR est AA 01/28/2018 >60  >60 ml/min/1.73m2 Final    GFR est non-AA 01/28/2018 56* >60 ml/min/1.73m2 Final    Comment: (NOTE)  Estimated GFR is calculated using the Modification of Diet in Renal   Disease (MDRD) Study equation, reported for both  Americans   (GFRAA) and non- Americans (GFRNA), and normalized to 1.73m2   body surface area. The physician must decide which value applies to   the patient. The MDRD study equation should only be used in   individuals age 25 or older. It has not been validated for the   following: pregnant women, patients with serious comorbid conditions,   or on certain medications, or persons with extremes of body size,   muscle mass, or nutritional status.  Calcium 01/28/2018 8.7  8.5 - 10.1 MG/DL Final    Bilirubin, total 01/28/2018 0.3  0.2 - 1.0 MG/DL Final    ALT (SGPT) 01/28/2018 21  13 - 56 U/L Final    AST (SGOT) 01/28/2018 18  15 - 37 U/L Final    Alk.  phosphatase 01/28/2018 75  45 - 117 U/L Final    Protein, total 01/28/2018 7.1  6.4 - 8.2 g/dL Final    Albumin 01/28/2018 3.5  3.4 - 5.0 g/dL Final    Globulin 01/28/2018 3.6  2.0 - 4.0 g/dL Final    A-G Ratio 01/28/2018 1.0  0.8 - 1.7   Final    Color 01/28/2018 YELLOW    Final    Appearance 01/28/2018 CLEAR    Final    Specific gravity 01/28/2018 1.009  1.005 - 1.030   Final    pH (UA) 01/28/2018 7.0  5.0 - 8.0   Final    Protein 01/28/2018 NEGATIVE   NEG mg/dL Final    Glucose 01/28/2018 NEGATIVE   NEG mg/dL Final    Ketone 01/28/2018 NEGATIVE   NEG mg/dL Final    Bilirubin 01/28/2018 NEGATIVE   NEG   Final    Blood 01/28/2018 NEGATIVE   NEG   Final    Urobilinogen 01/28/2018 0.2  0.2 - 1.0 EU/dL Final    Nitrites 01/28/2018 NEGATIVE   NEG   Final    Leukocyte Esterase 01/28/2018 NEGATIVE   NEG   Final    Lipase 01/28/2018 207  73 - 393 U/L Final   Admission on 01/24/2018, Discharged on 01/25/2018   Component Date Value Ref Range Status    WBC 01/24/2018 9.2  4.6 - 13.2 K/uL Final    RBC 01/24/2018 4.66  4.20 - 5.30 M/uL Final    HGB 01/24/2018 13.4  12.0 - 16.0 g/dL Final    HCT 01/24/2018 38.1  35.0 - 45.0 % Final    MCV 01/24/2018 81.8  74.0 - 97.0 FL Final    MCH 01/24/2018 28.8  24.0 - 34.0 PG Final    MCHC 01/24/2018 35.2  31.0 - 37.0 g/dL Final    RDW 01/24/2018 13.0  11.6 - 14.5 % Final    PLATELET 05/32/9892 973  135 - 420 K/uL Final    MPV 01/24/2018 10.0  9.2 - 11.8 FL Final    NEUTROPHILS 01/24/2018 58  40 - 73 % Final    LYMPHOCYTES 01/24/2018 26  21 - 52 % Final    MONOCYTES 01/24/2018 13* 3 - 10 % Final    EOSINOPHILS 01/24/2018 2  0 - 5 % Final    BASOPHILS 01/24/2018 1  0 - 2 % Final    ABS. NEUTROPHILS 01/24/2018 5.4  1.8 - 8.0 K/UL Final    ABS. LYMPHOCYTES 01/24/2018 2.4  0.9 - 3.6 K/UL Final    ABS. MONOCYTES 01/24/2018 1.2  0.05 - 1.2 K/UL Final    ABS. EOSINOPHILS 01/24/2018 0.2  0.0 - 0.4 K/UL Final    ABS. BASOPHILS 01/24/2018 0.1  0.0 - 0.1 K/UL Final    DF 01/24/2018 AUTOMATED    Final    Sodium 01/24/2018 137  136 - 145 mmol/L Final    Potassium 01/24/2018 3.5  3.5 - 5.5 mmol/L Final    Chloride 01/24/2018 99* 100 - 108 mmol/L Final    CO2 01/24/2018 28  21 - 32 mmol/L Final    Anion gap 01/24/2018 10  3.0 - 18 mmol/L Final    Glucose 01/24/2018 97  74 - 99 mg/dL Final    BUN 01/24/2018 16  7.0 - 18 MG/DL Final    Creatinine 01/24/2018 1.02  0.6 - 1.3 MG/DL Final    BUN/Creatinine ratio 01/24/2018 16  12 - 20   Final    GFR est AA 01/24/2018 >60  >60 ml/min/1.73m2 Final    GFR est non-AA 01/24/2018 55* >60 ml/min/1.73m2 Final    Comment: (NOTE)  Estimated GFR is calculated using the Modification of Diet in Renal   Disease (MDRD) Study equation, reported for both  Americans   (GFRAA) and non- Americans (GFRNA), and normalized to 1.73m2   body surface area. The physician must decide which value applies to   the patient. The MDRD study equation should only be used in   individuals age 25 or older. It has not been validated for the   following: pregnant women, patients with serious comorbid conditions,   or on certain medications, or persons with extremes of body size,   muscle mass, or nutritional status.       Calcium 01/24/2018 9.0  8.5 - 10.1 MG/DL Final    Bilirubin, total 01/24/2018 0.5  0.2 - 1.0 MG/DL Final    ALT (SGPT) 01/24/2018 22  13 - 56 U/L Final    AST (SGOT) 01/24/2018 12* 15 - 37 U/L Final    Alk. phosphatase 01/24/2018 86  45 - 117 U/L Final    Protein, total 01/24/2018 7.8  6.4 - 8.2 g/dL Final    Albumin 01/24/2018 4.0  3.4 - 5.0 g/dL Final    Globulin 01/24/2018 3.8  2.0 - 4.0 g/dL Final    A-G Ratio 01/24/2018 1.1  0.8 - 1.7   Final    Lipase 01/24/2018 705* 73 - 393 U/L Final    Color 01/24/2018 YELLOW    Final    Appearance 01/24/2018 CLEAR    Final    Specific gravity 01/24/2018 >1.030* 1.005 - 1.030 Final    pH (UA) 01/24/2018 5.5  5.0 - 8.0   Final    Protein 01/24/2018 TRACE* NEG mg/dL Final    Glucose 01/24/2018 NEGATIVE   NEG mg/dL Final    Ketone 01/24/2018 TRACE* NEG mg/dL Final    Bilirubin 01/24/2018 NEGATIVE   NEG   Final    Blood 01/24/2018 MODERATE* NEG   Final    Urobilinogen 01/24/2018 0.2  0.2 - 1.0 EU/dL Final    Nitrites 01/24/2018 NEGATIVE   NEG   Final    Leukocyte Esterase 01/24/2018 SMALL* NEG   Final    WBC 01/24/2018 4 to 10  0 - 4 /hpf Final    RBC 01/24/2018 4 to 10  0 - 5 /hpf Final    Epithelial cells 01/24/2018 FEW  0 - 5 /lpf Final    Bacteria 01/24/2018 NEGATIVE   NEG /hpf Final   Hospital Outpatient Visit on 12/20/2017   Component Date Value Ref Range Status    Creatinine, POC 12/20/2017 0.8  0.6 - 1.3 MG/DL Final    GFRAA, POC 12/20/2017 >60  >60 ml/min/1.73m2 Final    GFRNA, POC 12/20/2017 >60  >60 ml/min/1.73m2 Final    Comment: Estimated GFR is calculated using the IDMS-traceable Modification of Diet in Renal Disease (MDRD) Study equation, reported for both  Americans (GFRAA) and non- Americans (GFRNA), and normalized to 1.73m2 body surface area. The physician must decide which value applies to the patient. The MDRD study equation should only be used in individuals age 25 or older. It has not been validated for the following: pregnant women, patients with serious comorbid conditions, or on certain medications, or persons with extremes of body size, muscle mass, or nutritional status. .No results found for any visits on 02/13/18. Assessment / Plan:      ICD-10-CM ICD-9-CM    1. Essential hypertension I10 401.9 labetalol (NORMODYNE) 100 mg tablet   2. Right renal artery stenosis (HCC) I70.1 440.1 REFERRAL TO NEPHROLOGY   3. Dyspnea on exertion R06.09 786.09 REFERRAL TO CARDIOLOGY     HTN- uncontrolled  Labetalol rx  Patient daughter request referral to Nephrology and Cardiology  F/u in 1 week. Follow-up Disposition:  Return in about 1 week (around 2/20/2018), or if symptoms worsen or fail to improve. I asked the patient if she  had any questions and answered her  questions.   The patient stated that she understands the treatment plan and agrees with the treatment plan

## 2018-02-13 NOTE — PROGRESS NOTES
1. Have you been to the ER, urgent care clinic since your last visit? Hospitalized since your last visit? No    2. Have you seen or consulted any other health care providers outside of the [address] [place] [address] since your last visit? Include any pap smears or colon screening.  No

## 2018-02-13 NOTE — PROGRESS NOTES
The patient presents to the office today with the chief complaint of nausea    HPI    The patient complains of continued nausea and abdominal pain. Her oral intake is marginal.  The patient remains on medications for hypertension. Recent studies have shown occlusion of her renal artery and stent. Review of Systems   Respiratory: Negative for shortness of breath. Cardiovascular: Negative for chest pain and leg swelling. Gastrointestinal: Positive for abdominal pain and nausea. Allergies   Allergen Reactions    Pcn [Penicillins] Anaphylaxis    Tetracycline Anaphylaxis    Sulfabenzamide Hives       Current Outpatient Prescriptions   Medication Sig Dispense Refill    atorvastatin (LIPITOR) 40 mg tablet Take 1 Tab by mouth daily. 30 Tab 3    labetalol (NORMODYNE) 100 mg tablet Take 1 Tab by mouth two (2) times a day. 60 Tab 0    HYDROcodone-acetaminophen (NORCO)  mg tablet Take 1 Tab by mouth every six (6) hours as needed for Pain. Max Daily Amount: 4 Tabs. 120 Tab 0    pantoprazole (PROTONIX) 40 mg tablet Take 1 Tab by mouth Before breakfast and dinner. 60 Tab 0    ondansetron (ZOFRAN ODT) 8 mg disintegrating tablet Take 1 Tab by mouth every eight (8) hours as needed for Nausea. 30 Tab 0    amLODIPine (NORVASC) 10 mg tablet Take 1 Tab by mouth daily. 30 Tab 0    cloNIDine (CATAPRES) 0.2 mg/24 hr patch 1 Patch by TransDERmal route every seven (7) days. 4 Patch 0    mirtazapine (REMERON) 15 mg tablet TAKE ONE TABLET BY MOUTH EVERY EVENING 30 Tab 0    Dexlansoprazole (DEXILANT) 60 mg CpDB Take  by mouth daily.  docusate sodium (COLACE) 100 mg capsule Take 1 Cap by mouth two (2) times a day. (Patient taking differently: Take 100 mg by mouth daily.) 60 Cap 5    esomeprazole (NEXIUM) 20 mg capsule Take 1 Cap by mouth daily. STOP PREVACID  Indications: HEARTBURN 30 Cap 1    diclofenac (VOLTAREN) 1 % gel Apply 2 g to affected area four (4) times daily.  1 Each 2    montelukast (SINGULAIR) 10 mg tablet TAKE 1 TABLET DAILY 90 Tab 2    clopidogrel (PLAVIX) 75 mg tab TAKE 1 TABLET DAILY 90 Tab 2    DALIRESP tab tablet TAKE 1 TABLET DAILY 90 Tab 2    tiaGABine (GABITRIL) 2 mg tablet TAKE ONE TABLET BY MOUTH TWICE A DAY WITH MEALS 60 Tab 0    fluticasone-salmeterol (ADVAIR DISKUS) 250-50 mcg/dose diskus inhaler Take 1 Puff by inhalation daily. (Patient taking differently: Take 2 Puffs by inhalation daily. ) 1 Inhaler 5       Past Medical History:   Diagnosis Date    Aorto-iliac duplex 09/17/2015    Patent abdom aorta endovascular graft w/o leak. Mod stenosis suggested in right limb.  Calculus of kidney 2014    stent/kidney     Carotid duplex 09/17/2015    Mild < 50% bilateral plaquing.  Cataract, left     Chronic obstructive pulmonary disease ([address]     Echocardiogram 11/04/2014    EF 56%. No WMA. Mild LAE. Echo is within normal limits.  Esophageal reflux     Generalized osteoarthrosis, involving multiple sites     GERD (gastroesophageal reflux disease)     Gout     History of renal stent     right side    Hypercholesteremia     Hypertension     resolved    Lower extremity arterial testing 03/27/2015    Normal perfusion at rest and w/treadmill bilaterally.   R KORI 1.05.  L KORI 1.03.    Murmur     patient reports that she is aware- has been told in the past    Nausea & vomiting     Renal artery stenosis ([address]     Subclinical hyperthyroidism 8/2015    Unspecified sleep apnea     resolved       Past Surgical History:   Procedure Laterality Date    ADJUSTMENT GASTRIC BAND N/A 11/10/2016    MAKAYLA Cullen    HX APPENDECTOMY      HX BLADDER SUSPENSION      HX CHOLECYSTECTOMY      HX GASTRIC BYPASS      lap band 2012    HX HEENT Right 1990s, 2016    Ear sx    HX HYSTERECTOMY      HX OTHER SURGICAL  10/27/14     Bilateral open femoral exposures, Placement of catheter and sheath in the aorta Endo repair of aortic aneurysm with modular bifurcated device Interpretation of angiography, intravascular ultrasound (IVUS) catheter    HX OTHER SURGICAL  10/27/2014    Endurant II abdominal stent graft implant    HX UROLOGICAL      stent    VASCULAR SURGERY PROCEDURE UNLIST      surgery for abdominal aneurysm       Social History     Social History    Marital status:      Spouse name: N/A    Number of children: N/A    Years of education: N/A     Occupational History    Not on file. Social History Main Topics    Smoking status: Former Smoker     Packs/day: 0.00     Years: 0.00     Types: Cigarettes     Quit date: 1/25/2016    Smokeless tobacco: Never Used    Alcohol use No    Drug use: No    Sexual activity: Yes     Birth control/ protection: Surgical     Other Topics Concern    Not on file     Social History Narrative       Patient does not have an advanced directive on file    Visit Vitals    BP (!) 158/108 (BP 1 Location: Left arm, BP Patient Position: Sitting)    Pulse 90    Temp 98.9 °F (37.2 °C) (Tympanic)    Resp 16    Ht 5' (1.524 m)    Wt 172 lb (78 kg)    SpO2 98%    BMI 33.59 kg/m2       Physical Exam   No Cervical Lymphadenopathy  No Supraclavicular Lymphadenopathy  Thyroid is Normal  Lungs are normal to percussion. Clear to auscultation   Heart:  S1 S2 are normal, No gallops, No mummers  No Carotid Bruits  Abdomen:  Normal Bowel Sounds. No tenderness. No masses. No Hepatomegaly or Splenomegly  LE:  Strong Pedal Pulses.   No Edema      BMI:  BMI is high but it was not addressed during this visit due to on going abdominal problems      Office Visit on 02/05/2018   Component Date Value Ref Range Status    Glucose 02/05/2018 88  70 - 99 mg/dL Final    BUN 02/05/2018 13  6 - 22 mg/dL Final    Creatinine 02/05/2018 0.9  0.8 - 1.4 mg/dL Final    Sodium 02/05/2018 142  133 - 145 mmol/L Final    Potassium 02/05/2018 4.1  3.5 - 5.5 mmol/L Final    Chloride 02/05/2018 100  98 - 110 mmol/L Final    CO2 02/05/2018 26  20 - 32 mmol/L Final    AST (SGOT) 02/05/2018 25  10 - 37 U/L Final    ALT (SGPT) 02/05/2018 32  5 - 40 U/L Final    Alk. phosphatase 02/05/2018 72  40 - 120 U/L Final    Bilirubin, total 02/05/2018 0.5  0.2 - 1.2 mg/dL Final    Calcium 02/05/2018 9.2  8.4 - 10.5 mg/dL Final    Protein, total 02/05/2018 6.4  6.2 - 8.1 g/dL Final    Albumin 02/05/2018 4.1  3.5 - 5.0 g/dL Final    A-G Ratio 02/05/2018 1.8  1.1 - 2.6 ratio Final    Globulin 02/05/2018 2.3  2.0 - 4.0 g/dL Final    Anion gap 02/05/2018 16.0  mmol/L Final    Comment: Test includes Albumin, Alkaline Phosphatase, ALT, AST, BUN, Calcium, CO2,  Chloride, Creatinine, Glucose, Potassium, Sodium, Total Bilirubin and Total  Protein. Estimated GFR results are reported in mL/min/1.73 sq.m. by the MDRD equation. This eGFR is validated for stable chronic renal failure patients. This   equation  is unreliable in acute illness or patients with normal renal function.  GFRAA 02/05/2018 >60.0  >60.0 Final    GFRNA 02/05/2018 60.0* >60.0 Final    WBC 02/05/2018 7.9  4.0 - 11.0 K/uL Final    RBC 02/05/2018 4.35  3.80 - 5.20 M/uL Final    HGB 02/05/2018 12.2  11.7 - 16.0 g/dL Final    HCT 02/05/2018 36.9  35.1 - 48.0 % Final    MCV 02/05/2018 85  80 - 95 fL Final    MCH 02/05/2018 28  26 - 34 pg Final    MCHC 02/05/2018 33  32 - 36 g/dL Final    RDW 02/05/2018 13.2  10.0 - 16.0 % Final    PLATELET 78/13/8073 771  140 - 440 K/uL Final    MPV 02/05/2018 11.4* 6.0 - 10.8 fL Final    NEUTROPHILS 02/05/2018 58  40 - 75 % Final    Lymphocytes 02/05/2018 27  27 - 45 % Final    MONOCYTES 02/05/2018 11* 3 - 9 % Final    EOSINOPHILS 02/05/2018 4  0 - 6 % Final    BASOPHILS 02/05/2018 1  0 - 2 % Final    ABS. NEUTROPHILS 02/05/2018 4.6  1.8 - 7.7 K/uL Final    ABSOLUTE LYMPHOCYTE COUNT 02/05/2018 2.2  1.0 - 4.8 K/uL Final    ABS. MONOCYTES 02/05/2018 0.9  0.1 - 0.9 K/uL Final    ABS. EOSINOPHILS 02/05/2018 0.3  0.0 - 0.5 K/uL Final    ABS.  BASOPHILS 02/05/2018 0.1  0.0 - 0.2 K/uL Final   Admission on 01/29/2018, Discharged on 02/02/2018   Component Date Value Ref Range Status    WBC 01/29/2018 6.6  4.6 - 13.2 K/uL Final    RBC 01/29/2018 4.82  4.20 - 5.30 M/uL Final    HGB 01/29/2018 13.6  12.0 - 16.0 g/dL Final    HCT 01/29/2018 40.1  35.0 - 45.0 % Final    MCV 01/29/2018 83.2  74.0 - 97.0 FL Final    MCH 01/29/2018 28.2  24.0 - 34.0 PG Final    MCHC 01/29/2018 33.9  31.0 - 37.0 g/dL Final    RDW 01/29/2018 12.9  11.6 - 14.5 % Final    PLATELET 53/83/3432 634  135 - 420 K/uL Final    MPV 01/29/2018 9.7  9.2 - 11.8 FL Final    NEUTROPHILS 01/29/2018 63  40 - 73 % Final    LYMPHOCYTES 01/29/2018 23  21 - 52 % Final    MONOCYTES 01/29/2018 10  3 - 10 % Final    EOSINOPHILS 01/29/2018 3  0 - 5 % Final    BASOPHILS 01/29/2018 1  0 - 2 % Final    ABS. NEUTROPHILS 01/29/2018 4.2  1.8 - 8.0 K/UL Final    ABS. LYMPHOCYTES 01/29/2018 1.5  0.9 - 3.6 K/UL Final    ABS. MONOCYTES 01/29/2018 0.6  0.05 - 1.2 K/UL Final    ABS. EOSINOPHILS 01/29/2018 0.2  0.0 - 0.4 K/UL Final    ABS. BASOPHILS 01/29/2018 0.1* 0.0 - 0.06 K/UL Final    DF 01/29/2018 AUTOMATED    Final    Sodium 01/29/2018 138  136 - 145 mmol/L Final    Potassium 01/29/2018 4.5  3.5 - 5.5 mmol/L Final    Chloride 01/29/2018 103  100 - 108 mmol/L Final    CO2 01/29/2018 30  21 - 32 mmol/L Final    Anion gap 01/29/2018 5  3.0 - 18 mmol/L Final    Glucose 01/29/2018 96  74 - 99 mg/dL Final    BUN 01/29/2018 10  7.0 - 18 MG/DL Final    Creatinine 01/29/2018 0.90  0.6 - 1.3 MG/DL Final    BUN/Creatinine ratio 01/29/2018 11* 12 - 20   Final    GFR est AA 01/29/2018 >60  >60 ml/min/1.73m2 Final    GFR est non-AA 01/29/2018 >60  >60 ml/min/1.73m2 Final    Comment: (NOTE)  Estimated GFR is calculated using the Modification of Diet in Renal   Disease (MDRD) Study equation, reported for both  Americans   (GFRAA) and non- Americans (GFRNA), and normalized to 1.73m2   body surface area.  The physician must decide which value applies to   the patient. The MDRD study equation should only be used in   individuals age 25 or older. It has not been validated for the   following: pregnant women, patients with serious comorbid conditions,   or on certain medications, or persons with extremes of body size,   muscle mass, or nutritional status.  Calcium 01/29/2018 9.0  8.5 - 10.1 MG/DL Final    Bilirubin, total 01/29/2018 0.6  0.2 - 1.0 MG/DL Final    ALT (SGPT) 01/29/2018 15  13 - 56 U/L Final    AST (SGOT) 01/29/2018 19  15 - 37 U/L Final    Alk. phosphatase 01/29/2018 83  45 - 117 U/L Final    Protein, total 01/29/2018 7.7  6.4 - 8.2 g/dL Final    Albumin 01/29/2018 3.8  3.4 - 5.0 g/dL Final    Globulin 01/29/2018 3.9  2.0 - 4.0 g/dL Final    A-G Ratio 01/29/2018 1.0  0.8 - 1.7   Final    Lipase 01/29/2018 134  73 - 393 U/L Final    Protein, total 01/29/2018 7.3  6.4 - 8.2 g/dL Final    Albumin 01/29/2018 3.9  3.4 - 5.0 g/dL Final    Globulin 01/29/2018 3.4  2.0 - 4.0 g/dL Final    A-G Ratio 01/29/2018 1.1  0.8 - 1.7   Final    Bilirubin, total 01/29/2018 0.6  0.2 - 1.0 MG/DL Final    Bilirubin, direct 01/29/2018 0.2  0.0 - 0.2 MG/DL Final    Alk.  phosphatase 01/29/2018 86  45 - 117 U/L Final    AST (SGOT) 01/29/2018 16  15 - 37 U/L Final    ALT (SGPT) 01/29/2018 19  13 - 56 U/L Final    Color 01/29/2018 YELLOW    Final    Appearance 01/29/2018 CLEAR    Final    Specific gravity 01/29/2018 1.007  1.005 - 1.030   Final    pH (UA) 01/29/2018 7.5  5.0 - 8.0   Final    Protein 01/29/2018 NEGATIVE   NEG mg/dL Final    Glucose 01/29/2018 NEGATIVE   NEG mg/dL Final    Ketone 01/29/2018 NEGATIVE   NEG mg/dL Final    Bilirubin 01/29/2018 NEGATIVE   NEG   Final    Blood 01/29/2018 TRACE* NEG   Final    Urobilinogen 01/29/2018 0.2  0.2 - 1.0 EU/dL Final    Nitrites 01/29/2018 NEGATIVE   NEG   Final    Leukocyte Esterase 01/29/2018 NEGATIVE   NEG   Final    WBC 01/29/2018 NEGATIVE   0 - 4 /hpf Final    RBC 01/29/2018 0 to 1  0 - 5 /hpf Final    Epithelial cells 01/29/2018 FEW  0 - 5 /lpf Final    Bacteria 01/29/2018 NEGATIVE   NEG /hpf Final    Sodium 01/30/2018 139  136 - 145 mmol/L Final    Potassium 01/30/2018 3.7  3.5 - 5.5 mmol/L Final    Chloride 01/30/2018 103  100 - 108 mmol/L Final    CO2 01/30/2018 24  21 - 32 mmol/L Final    Anion gap 01/30/2018 12  3.0 - 18 mmol/L Final    Glucose 01/30/2018 130* 74 - 99 mg/dL Final    BUN 01/30/2018 13  7.0 - 18 MG/DL Final    Creatinine 01/30/2018 0.83  0.6 - 1.3 MG/DL Final    BUN/Creatinine ratio 01/30/2018 16  12 - 20   Final    GFR est AA 01/30/2018 >60  >60 ml/min/1.73m2 Final    GFR est non-AA 01/30/2018 >60  >60 ml/min/1.73m2 Final    Calcium 01/30/2018 9.1  8.5 - 10.1 MG/DL Final    WBC 01/30/2018 13.4* 4.6 - 13.2 K/uL Final    RBC 01/30/2018 4.63  4.20 - 5.30 M/uL Final    HGB 01/30/2018 13.4  12.0 - 16.0 g/dL Final    HCT 01/30/2018 38.6  35.0 - 45.0 % Final    MCV 01/30/2018 83.4  74.0 - 97.0 FL Final    MCH 01/30/2018 28.9  24.0 - 34.0 PG Final    MCHC 01/30/2018 34.7  31.0 - 37.0 g/dL Final    RDW 01/30/2018 13.1  11.6 - 14.5 % Final    PLATELET 96/17/4138 688  135 - 420 K/uL Final    MPV 01/30/2018 10.2  9.2 - 11.8 FL Final    Vitamin B1 01/30/2018 173.8  66.5 - 200.0 nmol/L Final    Comment: (NOTE)  This test was developed and its performance characteristics  determined by [facility]. It has not been cleared or approved  by the Food and Drug Administration.   Performed At: [address] [id number]  Ena Blackburn MD [id number]      Lactic acid 01/30/2018 0.6  0.4 - 2.0 MMOL/L Final    Sodium 01/31/2018 140  136 - 145 mmol/L Final    Potassium 01/31/2018 3.4* 3.5 - 5.5 mmol/L Final    Chloride 01/31/2018 107  100 - 108 mmol/L Final    CO2 01/31/2018 26  21 - 32 mmol/L Final    Anion gap 01/31/2018 7  3.0 - 18 mmol/L Final    Glucose 01/31/2018 80  74 - 99 mg/dL Final    BUN 01/31/2018 15  7.0 - 18 MG/DL Final    Creatinine 01/31/2018 0.67  0.6 - 1.3 MG/DL Final    BUN/Creatinine ratio 01/31/2018 22* 12 - 20   Final    GFR est AA 01/31/2018 >60  >60 ml/min/1.73m2 Final    GFR est non-AA 01/31/2018 >60  >60 ml/min/1.73m2 Final    Calcium 01/31/2018 8.1* 8.5 - 10.1 MG/DL Final    WBC 01/31/2018 9.1  4.6 - 13.2 K/uL Final    RBC 01/31/2018 4.03* 4.20 - 5.30 M/uL Final    HGB 01/31/2018 11.3* 12.0 - 16.0 g/dL Final    HCT 01/31/2018 33.6* 35.0 - 45.0 % Final    MCV 01/31/2018 83.4  74.0 - 97.0 FL Final    MCH 01/31/2018 28.0  24.0 - 34.0 PG Final    MCHC 01/31/2018 33.6  31.0 - 37.0 g/dL Final    RDW 01/31/2018 13.0  11.6 - 14.5 % Final    PLATELET 43/46/2383 513  135 - 420 K/uL Final    MPV 01/31/2018 9.8  9.2 - 11.8 FL Final    HGB 01/31/2018 13.4  12.0 - 16.0 g/dL Final    HCT 01/31/2018 39.3  35.0 - 45.0 % Final    Occult blood, stool 01/31/2018 POSITIVE* NEG   Final    Ventricular Rate 01/31/2018 109  BPM Final    Atrial Rate 01/31/2018 109  BPM Final    P-R Interval 01/31/2018 152  ms Final    QRS Duration 01/31/2018 76  ms Final    Q-T Interval 01/31/2018 336  ms Final    QTC Calculation (Bezet) 01/31/2018 452  ms Final    Calculated P Axis 01/31/2018 68  degrees Final    Calculated R Axis 01/31/2018 70  degrees Final    Calculated T Axis 01/31/2018 67  degrees Final    Diagnosis 01/31/2018    Final                    Value:Sinus tachycardia  Otherwise normal ECG  When compared with ECG of 28-JAN-2018 16:50,  No significant change was found  Confirmed by Rahat Perez ([id number]) on 2/2/2018 8:24:02 AM      TSH 01/31/2018 0.04* 0.36 - 3.74 uIU/mL Final    Sodium 02/01/2018 138  136 - 145 mmol/L Final    Potassium 02/01/2018 3.0* 3.5 - 5.5 mmol/L Final    Chloride 02/01/2018 107  100 - 108 mmol/L Final    CO2 02/01/2018 25  21 - 32 mmol/L Final    Anion gap 02/01/2018 6  3.0 - 18 mmol/L Final    Glucose 02/01/2018 84  74 - 99 mg/dL Final    BUN 02/01/2018 16  7.0 - 18 MG/DL Final    Creatinine 02/01/2018 0.87  0.6 - 1.3 MG/DL Final    BUN/Creatinine ratio 02/01/2018 18  12 - 20   Final    GFR est AA 02/01/2018 >60  >60 ml/min/1.73m2 Final    GFR est non-AA 02/01/2018 >60  >60 ml/min/1.73m2 Final    Calcium 02/01/2018 8.3* 8.5 - 10.1 MG/DL Final    WBC 02/01/2018 9.7  4.6 - 13.2 K/uL Final    RBC 02/01/2018 3.98* 4.20 - 5.30 M/uL Final    HGB 02/01/2018 11.0* 12.0 - 16.0 g/dL Final    HCT 02/01/2018 33.1* 35.0 - 45.0 % Final    MCV 02/01/2018 83.2  74.0 - 97.0 FL Final    MCH 02/01/2018 27.6  24.0 - 34.0 PG Final    MCHC 02/01/2018 33.2  31.0 - 37.0 g/dL Final    RDW 02/01/2018 13.0  11.6 - 14.5 % Final    PLATELET 79/84/0178 169  135 - 420 K/uL Final    MPV 02/01/2018 10.1  9.2 - 11.8 FL Final    Cortisol, a.m. 02/01/2018 23.9* 4.30 - 22.40 ug/dL Final    Protein, total 02/01/2018 6.3* 6.4 - 8.2 g/dL Final    Albumin 02/01/2018 3.1* 3.4 - 5.0 g/dL Final    Globulin 02/01/2018 3.2  2.0 - 4.0 g/dL Final    A-G Ratio 02/01/2018 1.0  0.8 - 1.7   Final    Bilirubin, total 02/01/2018 0.7  0.2 - 1.0 MG/DL Final    Bilirubin, direct 02/01/2018 0.1  0.0 - 0.2 MG/DL Final    Alk.  phosphatase 02/01/2018 66  45 - 117 U/L Final    AST (SGOT) 02/01/2018 12* 15 - 37 U/L Final    ALT (SGPT) 02/01/2018 17  13 - 56 U/L Final    Lipase 02/01/2018 208  73 - 393 U/L Final    T4, Free 02/01/2018 1.3  0.7 - 1.5 NG/DL Final    Magnesium 02/01/2018 2.1  1.6 - 2.6 mg/dL Final    HGB 02/01/2018 12.2  12.0 - 16.0 g/dL Final    HCT 02/01/2018 35.6  35.0 - 45.0 % Final   Admission on 01/28/2018, Discharged on 01/28/2018   Component Date Value Ref Range Status    Ventricular Rate 01/28/2018 85  BPM Final    Atrial Rate 01/28/2018 85  BPM Final    P-R Interval 01/28/2018 148  ms Final    QRS Duration 01/28/2018 86  ms Final    Q-T Interval 01/28/2018 368  ms Final    QTC Calculation (Bezet) 01/28/2018 437  ms Final    Calculated P Axis 01/28/2018 76  degrees Final    Calculated R Axis 01/28/2018 74  degrees Final    Calculated T Axis 01/28/2018 71  degrees Final    Diagnosis 01/28/2018    Final                    Value:Normal sinus rhythm  Normal ECG  When compared with ECG of 03-MAR-2017 07:55,  No significant change was found  Confirmed by Tennille Luke MD, Adam De Leon ([id number]) on 1/29/2018 4:54:57 PM      WBC 01/28/2018 7.0  4.6 - 13.2 K/uL Final    RBC 01/28/2018 4.55  4.20 - 5.30 M/uL Final    HGB 01/28/2018 12.5  12.0 - 16.0 g/dL Final    HCT 01/28/2018 37.7  35.0 - 45.0 % Final    MCV 01/28/2018 82.9  74.0 - 97.0 FL Final    MCH 01/28/2018 27.5  24.0 - 34.0 PG Final    MCHC 01/28/2018 33.2  31.0 - 37.0 g/dL Final    RDW 01/28/2018 13.0  11.6 - 14.5 % Final    PLATELET 64/28/2989 289  135 - 420 K/uL Final    MPV 01/28/2018 10.3  9.2 - 11.8 FL Final    NEUTROPHILS 01/28/2018 48  40 - 73 % Final    LYMPHOCYTES 01/28/2018 37  21 - 52 % Final    MONOCYTES 01/28/2018 10  3 - 10 % Final    EOSINOPHILS 01/28/2018 4  0 - 5 % Final    BASOPHILS 01/28/2018 1  0 - 2 % Final    ABS. NEUTROPHILS 01/28/2018 3.4  1.8 - 8.0 K/UL Final    ABS. LYMPHOCYTES 01/28/2018 2.6  0.9 - 3.6 K/UL Final    ABS. MONOCYTES 01/28/2018 0.7  0.05 - 1.2 K/UL Final    ABS. EOSINOPHILS 01/28/2018 0.3  0.0 - 0.4 K/UL Final    ABS.  BASOPHILS 01/28/2018 0.0  0.0 - 0.06 K/UL Final    DF 01/28/2018 AUTOMATED    Final    Sodium 01/28/2018 139  136 - 145 mmol/L Final    Potassium 01/28/2018 3.5  3.5 - 5.5 mmol/L Final    Chloride 01/28/2018 103  100 - 108 mmol/L Final    CO2 01/28/2018 28  21 - 32 mmol/L Final    Anion gap 01/28/2018 8  3.0 - 18 mmol/L Final    Glucose 01/28/2018 82  74 - 99 mg/dL Final    BUN 01/28/2018 15  7.0 - 18 MG/DL Final    Creatinine 01/28/2018 1.01  0.6 - 1.3 MG/DL Final    BUN/Creatinine ratio 01/28/2018 15  12 - 20   Final    GFR est AA 01/28/2018 >60  >60 ml/min/1.73m2 Final    GFR est non-AA 01/28/2018 56* >60 ml/min/1.73m2 Final    Comment: (NOTE)  Estimated GFR is calculated using the Modification of Diet in Renal   Disease (MDRD) Study equation, reported for both  Americans   (GFRAA) and non- Americans (GFRNA), and normalized to 1.73m2   body surface area. The physician must decide which value applies to   the patient. The MDRD study equation should only be used in   individuals age 25 or older. It has not been validated for the   following: pregnant women, patients with serious comorbid conditions,   or on certain medications, or persons with extremes of body size,   muscle mass, or nutritional status.  Calcium 01/28/2018 8.7  8.5 - 10.1 MG/DL Final    Bilirubin, total 01/28/2018 0.3  0.2 - 1.0 MG/DL Final    ALT (SGPT) 01/28/2018 21  13 - 56 U/L Final    AST (SGOT) 01/28/2018 18  15 - 37 U/L Final    Alk.  phosphatase 01/28/2018 75  45 - 117 U/L Final    Protein, total 01/28/2018 7.1  6.4 - 8.2 g/dL Final    Albumin 01/28/2018 3.5  3.4 - 5.0 g/dL Final    Globulin 01/28/2018 3.6  2.0 - 4.0 g/dL Final    A-G Ratio 01/28/2018 1.0  0.8 - 1.7   Final    Color 01/28/2018 YELLOW    Final    Appearance 01/28/2018 CLEAR    Final    Specific gravity 01/28/2018 1.009  1.005 - 1.030   Final    pH (UA) 01/28/2018 7.0  5.0 - 8.0   Final    Protein 01/28/2018 NEGATIVE   NEG mg/dL Final    Glucose 01/28/2018 NEGATIVE   NEG mg/dL Final    Ketone 01/28/2018 NEGATIVE   NEG mg/dL Final    Bilirubin 01/28/2018 NEGATIVE   NEG   Final    Blood 01/28/2018 NEGATIVE   NEG   Final    Urobilinogen 01/28/2018 0.2  0.2 - 1.0 EU/dL Final    Nitrites 01/28/2018 NEGATIVE   NEG   Final    Leukocyte Esterase 01/28/2018 NEGATIVE   NEG   Final    Lipase 01/28/2018 207  73 - 393 U/L Final   Admission on 01/24/2018, Discharged on 01/25/2018   Component Date Value Ref Range Status    WBC 01/24/2018 9.2  4.6 - 13.2 K/uL Final    RBC 01/24/2018 4.66  4.20 - 5.30 M/uL Final    HGB 01/24/2018 13.4  12.0 - 16.0 g/dL Final    HCT 01/24/2018 38.1 35.0 - 45.0 % Final    MCV 01/24/2018 81.8  74.0 - 97.0 FL Final    MCH 01/24/2018 28.8  24.0 - 34.0 PG Final    MCHC 01/24/2018 35.2  31.0 - 37.0 g/dL Final    RDW 01/24/2018 13.0  11.6 - 14.5 % Final    PLATELET 23/48/8706 962  135 - 420 K/uL Final    MPV 01/24/2018 10.0  9.2 - 11.8 FL Final    NEUTROPHILS 01/24/2018 58  40 - 73 % Final    LYMPHOCYTES 01/24/2018 26  21 - 52 % Final    MONOCYTES 01/24/2018 13* 3 - 10 % Final    EOSINOPHILS 01/24/2018 2  0 - 5 % Final    BASOPHILS 01/24/2018 1  0 - 2 % Final    ABS. NEUTROPHILS 01/24/2018 5.4  1.8 - 8.0 K/UL Final    ABS. LYMPHOCYTES 01/24/2018 2.4  0.9 - 3.6 K/UL Final    ABS. MONOCYTES 01/24/2018 1.2  0.05 - 1.2 K/UL Final    ABS. EOSINOPHILS 01/24/2018 0.2  0.0 - 0.4 K/UL Final    ABS. BASOPHILS 01/24/2018 0.1  0.0 - 0.1 K/UL Final    DF 01/24/2018 AUTOMATED    Final    Sodium 01/24/2018 137  136 - 145 mmol/L Final    Potassium 01/24/2018 3.5  3.5 - 5.5 mmol/L Final    Chloride 01/24/2018 99* 100 - 108 mmol/L Final    CO2 01/24/2018 28  21 - 32 mmol/L Final    Anion gap 01/24/2018 10  3.0 - 18 mmol/L Final    Glucose 01/24/2018 97  74 - 99 mg/dL Final    BUN 01/24/2018 16  7.0 - 18 MG/DL Final    Creatinine 01/24/2018 1.02  0.6 - 1.3 MG/DL Final    BUN/Creatinine ratio 01/24/2018 16  12 - 20   Final    GFR est AA 01/24/2018 >60  >60 ml/min/1.73m2 Final    GFR est non-AA 01/24/2018 55* >60 ml/min/1.73m2 Final    Comment: (NOTE)  Estimated GFR is calculated using the Modification of Diet in Renal   Disease (MDRD) Study equation, reported for both  Americans   (GFRAA) and non- Americans (GFRNA), and normalized to 1.73m2   body surface area. The physician must decide which value applies to   the patient. The MDRD study equation should only be used in   individuals age 25 or older.  It has not been validated for the   following: pregnant women, patients with serious comorbid conditions,   or on certain medications, or persons with extremes of body size,   muscle mass, or nutritional status.  Calcium 01/24/2018 9.0  8.5 - 10.1 MG/DL Final    Bilirubin, total 01/24/2018 0.5  0.2 - 1.0 MG/DL Final    ALT (SGPT) 01/24/2018 22  13 - 56 U/L Final    AST (SGOT) 01/24/2018 12* 15 - 37 U/L Final    Alk. phosphatase 01/24/2018 86  45 - 117 U/L Final    Protein, total 01/24/2018 7.8  6.4 - 8.2 g/dL Final    Albumin 01/24/2018 4.0  3.4 - 5.0 g/dL Final    Globulin 01/24/2018 3.8  2.0 - 4.0 g/dL Final    A-G Ratio 01/24/2018 1.1  0.8 - 1.7   Final    Lipase 01/24/2018 705* 73 - 393 U/L Final    Color 01/24/2018 YELLOW    Final    Appearance 01/24/2018 CLEAR    Final    Specific gravity 01/24/2018 >1.030* 1.005 - 1.030 Final    pH (UA) 01/24/2018 5.5  5.0 - 8.0   Final    Protein 01/24/2018 TRACE* NEG mg/dL Final    Glucose 01/24/2018 NEGATIVE   NEG mg/dL Final    Ketone 01/24/2018 TRACE* NEG mg/dL Final    Bilirubin 01/24/2018 NEGATIVE   NEG   Final    Blood 01/24/2018 MODERATE* NEG   Final    Urobilinogen 01/24/2018 0.2  0.2 - 1.0 EU/dL Final    Nitrites 01/24/2018 NEGATIVE   NEG   Final    Leukocyte Esterase 01/24/2018 SMALL* NEG   Final    WBC 01/24/2018 4 to 10  0 - 4 /hpf Final    RBC 01/24/2018 4 to 10  0 - 5 /hpf Final    Epithelial cells 01/24/2018 FEW  0 - 5 /lpf Final    Bacteria 01/24/2018 NEGATIVE   NEG /hpf Final   Hospital Outpatient Visit on 12/20/2017   Component Date Value Ref Range Status    Creatinine, POC 12/20/2017 0.8  0.6 - 1.3 MG/DL Final    GFRAA, POC 12/20/2017 >60  >60 ml/min/1.73m2 Final    GFRNA, POC 12/20/2017 >60  >60 ml/min/1.73m2 Final    Comment: Estimated GFR is calculated using the IDMS-traceable Modification of Diet in Renal Disease (MDRD) Study equation, reported for both  Americans (GFRAA) and non- Americans (GFRNA), and normalized to 1.73m2 body surface area. The physician must decide which value applies to the patient.   The MDRD study equation should only be used in individuals age 25 or older. It has not been validated for the following: pregnant women, patients with serious comorbid conditions, or on certain medications, or persons with extremes of body size, muscle mass, or nutritional status. .No results found for any visits on 02/12/18. Assessment / Plan      ICD-10-CM ICD-9-CM    1. Nausea R11.0 787.02    2. Right renal artery stenosis (HCC) I70.1 440.1    3. Essential hypertension I10 401.9    4. Generalized abdominal pain R10.84 789.07        she was advised to continue her maintenance medications  Use Zofran on a regular basis (for now)    Follow-up Disposition:  Return in about 6 weeks (around 3/26/2018). I asked Linda Roberto if she has any questions and I answered the questions. Linda Graf states that she understands the treatment plan and agrees with the treatment plan

## 2018-02-13 NOTE — PROGRESS NOTES
Dalila Curling    Chief Complaint   Patient presents with    Back Pain     Renal Artery Stenosis       History and Physical    82-year-old female coming in today for evaluation of her right kidney. She has had progressive reduction in size of her right kidney over a long period of time. She had a right renal artery stent that was fully patent without stenosis up until December, 2 months ago. She has significant GI issues going on she is being worked up for gastroparesis she has history of a LAP-BAND. She also has a persistent hypertension she told me she took her blood pressure at home today at 190/101. Here in the office it was 984 systolic her daughter is here with her today for exam.  She has had episodes of high blood pressure and headaches and feels miserable. Past Medical History:   Diagnosis Date    Aorto-iliac duplex 09/17/2015    Patent abdom aorta endovascular graft w/o leak. Mod stenosis suggested in right limb.  Calculus of kidney 2014    stent/kidney     Carotid duplex 09/17/2015    Mild < 50% bilateral plaquing.  Cataract, left     Chronic obstructive pulmonary disease ([address]     Echocardiogram 11/04/2014    EF 56%. No WMA. Mild LAE. Echo is within normal limits.  Esophageal reflux     Generalized osteoarthrosis, involving multiple sites     GERD (gastroesophageal reflux disease)     Gout     History of renal stent     right side    Hypercholesteremia     Hypertension     resolved    Lower extremity arterial testing 03/27/2015    Normal perfusion at rest and w/treadmill bilaterally.   R KORI 1.05.  L KORI 1.03.    Murmur     patient reports that she is aware- has been told in the past    Nausea & vomiting     Renal artery stenosis (HCC)     Subclinical hyperthyroidism 8/2015    Unspecified sleep apnea     resolved     Patient Active Problem List   Diagnosis Code    AAA (abdominal aortic aneurysm) ([address] I71.4    Essential hypertension I10    Hyperlipidemia E78.5  Subclinical hyperthyroidism E05.90    Generalized osteoarthrosis, involving multiple sites M15.9    Esophageal reflux K21.9    Renal infarction ([address] N28.0    Dysarthria R47.1    LAP-BAND surgery status Z98.84    Non morbid obesity due to excess calories E66.09    Gastroesophageal reflux disease without esophagitis K21.9    Obesity (BMI 30.0-34. 9) E66.9    Peripheral vascular disease (HCC) I73.9    Right-sided carotid artery disease (HCC) I77.9    Sacroiliitis (HCC) M46.1    Right hip pain M25.551    Spondylosis of lumbar region without myelopathy or radiculopathy M47.816    Lumbar neuritis M54.16    Left upper quadrant pain R10.12    Hypovitaminosis D E55.9    Lumbar radiculopathy M54.16    Hx of aortic aneurysm repair Z98.890, Z86.79    History of renal stent Z98.890    Right renal artery stenosis (HCC) I70.1    Abdominal pain R10.9    Intractable nausea and vomiting R11.2    Atrophy of right kidney N26.1     Past Surgical History:   Procedure Laterality Date    ADJUSTMENT GASTRIC BAND N/A 11/10/2016    MAKAYLA Cullen    HX APPENDECTOMY      HX BLADDER SUSPENSION      HX CHOLECYSTECTOMY      HX GASTRIC BYPASS      lap band 2012    HX HEENT Right 1990s, 2016    Ear sx    HX HYSTERECTOMY      HX OTHER SURGICAL  10/27/14     Bilateral open femoral exposures, Placement of catheter and sheath in the aorta Endo repair of aortic aneurysm with modular bifurcated device  Interpretation of angiography, intravascular ultrasound (IVUS) catheter    HX OTHER SURGICAL  10/27/2014    Endurant II abdominal stent graft implant    HX UROLOGICAL      stent    VASCULAR SURGERY PROCEDURE UNLIST      surgery for abdominal aneurysm     Current Outpatient Prescriptions   Medication Sig Dispense Refill    HYDROcodone-acetaminophen (NORCO)  mg tablet Take 1 Tab by mouth every six (6) hours as needed for Pain. Max Daily Amount: 4 Tabs.  120 Tab 0    pantoprazole (PROTONIX) 40 mg tablet Take 1 Tab by mouth Before breakfast and dinner. 60 Tab 0    ondansetron (ZOFRAN ODT) 8 mg disintegrating tablet Take 1 Tab by mouth every eight (8) hours as needed for Nausea. 30 Tab 0    amLODIPine (NORVASC) 10 mg tablet Take 1 Tab by mouth daily. 30 Tab 0    cloNIDine (CATAPRES) 0.2 mg/24 hr patch 1 Patch by TransDERmal route every seven (7) days. 4 Patch 0    mirtazapine (REMERON) 15 mg tablet TAKE ONE TABLET BY MOUTH EVERY EVENING 30 Tab 0    docusate sodium (COLACE) 100 mg capsule Take 1 Cap by mouth two (2) times a day. (Patient taking differently: Take 100 mg by mouth daily.) 60 Cap 5    diclofenac (VOLTAREN) 1 % gel Apply 2 g to affected area four (4) times daily. 1 Each 2    montelukast (SINGULAIR) 10 mg tablet TAKE 1 TABLET DAILY 90 Tab 2    clopidogrel (PLAVIX) 75 mg tab TAKE 1 TABLET DAILY 90 Tab 2    DALIRESP tab tablet TAKE 1 TABLET DAILY 90 Tab 2    fluticasone-salmeterol (ADVAIR DISKUS) 250-50 mcg/dose diskus inhaler Take 1 Puff by inhalation daily. (Patient taking differently: Take 2 Puffs by inhalation daily. ) 1 Inhaler 5    Dexlansoprazole (DEXILANT) 60 mg CpDB Take  by mouth daily.  esomeprazole (NEXIUM) 20 mg capsule Take 1 Cap by mouth daily. STOP PREVACID  Indications: HEARTBURN 30 Cap 1    pravastatin (PRAVACHOL) 20 mg tablet TAKE 1 TABLET DAILY 90 Tab 2    tiaGABine (GABITRIL) 2 mg tablet TAKE ONE TABLET BY MOUTH TWICE A DAY WITH MEALS 60 Tab 0    sertraline (ZOLOFT) 50 mg tablet Take 1 Tab by mouth daily. 90 Tab 3     Allergies   Allergen Reactions    Pcn [Penicillins] Anaphylaxis    Tetracycline Anaphylaxis    Sulfabenzamide Hives       Review of Systems    A full review of systems was completed times ten organ systems and was deemed negative unless otherwise mentioned in the HPI.     Physical   Visit Vitals    BP (!) 150/102 (BP 1 Location: Left arm, BP Patient Position: Sitting)    Pulse 74    Resp 20    Ht 5' (1.524 m)    Wt 172 lb (78 kg)    BMI 33.59 kg/m2 She is emotional but appears physically okay  Head is normocephalic pupils are reactive  Speech hearing vision intact  Neck no JVD  Chest clear  Cardiac regular  Abdomen soft nontender  Peripheral pulses are palpable  No skin ulcerations notable  No edema notable  Doppler shows complete occlusion of the artery and vein to the right kidney with atrophy of the right kidney  Left renal artery patent without critical stenosis  Creatinine within normal limits    Impression/Plan:     ICD-10-CM ICD-9-CM    1. Abdominal aortic aneurysm (AAA) without rupture (HCC) I71.4 441.4    2. Essential hypertension I10 401.9    3. Hx of aortic aneurysm repair Z98.890 V45.89 DUPLEX AORTA ILIAC GRAFT COMPLETE AMB    Z86.79     4. Atrophy of right kidney N26.1 587 DUPLEX RENAL ARTERY LEFT AMB     Progressive death of right kidney now with complete shrinkage arterial and venous occlusion as well  Her creatinine remains stable, left renal artery with atherosclerosis but no focal stenosis  We talked about kidney precautions, limit contrast studies  She is going to see Dr. Daiana Kahn tomorrow, she may require beta-blocker addition  We will send her in a prescription for Lipitor for purposes of peripheral artery treatment  Follow-up in 3 months to reevaluate left renal artery  Orders Placed This Encounter    DUPLEX RENAL ARTERY LEFT AMB    ENDOGRAFT       Follow-up Disposition:  Return in about 3 months (around 5/13/2018). Justice Washburn MD    PLEASE NOTE:  This document has been produced using voice recognition software. Unrecognized errors in transcription may be present.

## 2018-02-14 ENCOUNTER — HOSPITAL ENCOUNTER (OUTPATIENT)
Dept: GENERAL RADIOLOGY | Age: 63
Discharge: HOME | End: 2018-02-14
Attending: PHYSICIAN ASSISTANT
Payer: COMMERCIAL

## 2018-02-14 DIAGNOSIS — K21.9 GERD (GASTROESOPHAGEAL REFLUX DISEASE): ICD-10-CM

## 2018-02-14 PROCEDURE — 74011000255 HC RX REV CODE- 255

## 2018-02-14 PROCEDURE — 74220 X-RAY XM ESOPHAGUS 1CNTRST: CPT

## 2018-02-14 RX ADMIN — BARIUM SULFATE 700 MG: 700 TABLET ORAL at 08:00

## 2018-02-14 RX ADMIN — BARIUM SULFATE 176 G: 960 POWDER, FOR SUSPENSION ORAL at 08:00

## 2018-02-16 ENCOUNTER — OFFICE VISIT (OUTPATIENT)
Dept: INTERNAL MEDICINE CLINIC | Age: 63
End: 2018-02-16

## 2018-02-16 VITALS
HEIGHT: 60 IN | TEMPERATURE: 97.9 F | BODY MASS INDEX: 33.77 KG/M2 | RESPIRATION RATE: 16 BRPM | OXYGEN SATURATION: 99 % | SYSTOLIC BLOOD PRESSURE: 160 MMHG | HEART RATE: 86 BPM | WEIGHT: 172 LBS | DIASTOLIC BLOOD PRESSURE: 92 MMHG

## 2018-02-16 DIAGNOSIS — I70.1 RIGHT RENAL ARTERY STENOSIS (HCC): ICD-10-CM

## 2018-02-16 DIAGNOSIS — R10.84 GENERALIZED ABDOMINAL PAIN: Primary | ICD-10-CM

## 2018-02-16 DIAGNOSIS — I10 ESSENTIAL HYPERTENSION: ICD-10-CM

## 2018-02-16 DIAGNOSIS — Z98.84 STATUS POST GASTRIC BANDING: ICD-10-CM

## 2018-02-16 RX ORDER — LABETALOL 100 MG/1
TABLET, FILM COATED ORAL
Qty: 90 TAB | Refills: 5
Start: 2018-02-16 | End: 2018-02-21 | Stop reason: ALTCHOICE

## 2018-02-16 NOTE — MR AVS SNAPSHOT
Karen Molina 
 
 
 [address] [facility], [facility] 6 [facility] [address] 
[phone] Patient: Lizeth Loaiza MRN: [id number] Visit Information Date & Time Provider Department Dept. Phone Encounter #  
 2/16/2018  8:45 AM Rafael Craven MD [facility] INTERNAL MEDICINE OF [facility] Gomez [phone] [id number] Your Appointments 2/20/2018 11:30 AM  
Follow Up with Marilee Holter, PA-C [address] ([address]) Appt Note: 3 month f/up [address] [facility] [address] [facility]  
  
    
 2/21/2018  1:45 PM  
New Patient with Marianela Coffman MD  
[facility]) Appt Note: per homer finch NP for dyspnea on exertion [address] [facility], [address] [facility] [address]  
[address], [address] [address]  
  
    
 2/22/2018  9:45 AM  
Follow Up with Rafael Craven MD  
[facility] INTERNAL MEDICINE OF [place] [address] [place] [address]) Appt Note: BP Check [address] [facility], [facility] [address] [place] [address]  
  
   
 [address] [facility] [address]  
  
    
 3/9/2018 10:00 AM  
Follow Up with Rafael Craven MD  
[facility] INTERNAL MEDICINE OF [place] [address] [place] [address]) Appt Note: 3wk  
 [address] [facility] [address] [facility] [address]  
[phone] 5/15/2018  8:00 AM  
PROCEDURE with [facility] 1 [facility] ([address]) Appt Note: evar endografcristobal hutton 3 months 23[address] [facility] [place] [address] [facility] [address]  
[phone] 2300 [facility] [address] [facility] [address] 5/15/2018  9:00 AM  
PROCEDURE with [facility] 1 [facility] ([facility]) Appt Note: rt renal occlusion anni 3 months 2300 [facility] 652 [address] [facility] [address]  
[phone] 5/29/2018 10:45 AM  
Follow Up with Sharyle Judge, MD  
[address] [place] [address] and [facility]) Appt Note: 3 month after studies with prep 2300 [facility] 047 [address] [facility] [address]  
[phone] 2630 [facility] 1M07  
  
    
 11/12/2018  9:00 AM  
PROCEDURE with [facility] 1 [facility] ([facility]) Appt Note: evar knaak 1 year 2300 [facility] 250 [address] [facility] [address]  
[phone]  
  
    
 11/12/2018 10:00 AM  
PROCEDURE with [facility] 1 [facility] ([facility]) Appt Note: cv knaak 1 year 2300 [facility] 160 [address] [facility] [address]  
[phone]  
  
    
 11/27/2018 10:00 AM  
Follow Up with MAKAYLA Temple [facility] ([facility]) Appt Note: 1 year follow up after studies with prep 2300 [facility] 086 [address] [facility] [address]  
[phone] [address] [place], [place] [address]  
  
    
  
 6/5/2018 10:45 AM  
Any with Chiquita Monahan MD  
[facility] ([facility]) Appt Note: ep- 6 month follow up [address] [place] [address]  
[address] [facility] [address] [place] [address] Upcoming Health Maintenance Date Due ZOSTER VACCINE AGE 60> 12/11/2016 Bone Densitometry 9/24/2017 BREAST CANCER SCRN MAMMOGRAM 6/1/2018 FOBT Q 1 YEAR AGE 50-75 1/31/2019 PAP AKA CERVICAL CYTOLOGY 8/12/2019 DTaP/Tdap/Td series (2 - Td) 9/1/2026 Allergies as of 2/16/2018  Review Complete On: 2/16/2018 By: Ruy Ibanez LPN Severity Noted Reaction Type Reactions Pcn [Penicillins] High 09/18/2014    Anaphylaxis Tetracycline High 09/18/2014    Anaphylaxis Sulfabenzamide  09/18/2014    Hives Current Immunizations  Reviewed on 2/9/2018 Name Date Influenza Vaccine 10/31/2017 Influenza Vaccine (Quad) PF 9/13/2017, 9/1/2016 Pneumococcal Vaccine (Unspecified Type) 3/5/2013 Tdap 9/1/2016 Not reviewed this visit You Were Diagnosed With   
  
 Codes Comments Essential hypertension     ICD-10-CM: I10 
ICD-9-CM: 401.9 Vitals BP Pulse Temp Resp Height(growth percentile) (!) 160/92 (BP 1 Location: Left arm, BP Patient Position: Sitting) 86 97.9 °F (36.6 °C) (Tympanic) 16 5' (1.524 m) Weight(growth percentile) SpO2 BMI OB Status Smoking Status 172 lb (78 kg) 99% 33.59 kg/m2 Hysterectomy Former Smoker BMI and BSA Data Body Mass Index Body Surface Area  
 33.59 kg/m 2 1.82 m 2 [facility] [facility] [phone] Your Updated Medication List  
  
   
This list is accurate as of: 2/16/18  9:59 AM.  Always use your most recent med list. amLODIPine 10 mg tablet Commonly known as:  Linda Peach Take 1 Tab by mouth daily. atorvastatin 40 mg tablet Commonly known as:  LIPITOR Take 1 Tab by mouth daily. cloNIDine 0.2 mg/24 hr patch Commonly known as:  CATAPRES  
1 Patch by TransDERmal route every seven (7) days. clopidogrel 75 mg Tab Commonly known as:  PLAVIX TAKE 1 TABLET DAILY DALIRESP Tab tablet Generic drug:  roflumilast  
TAKE 1 TABLET DAILY DEXILANT 60 mg Cpdb Generic drug:  Dexlansoprazole Take  by mouth daily. diclofenac 1 % Gel Commonly known as:  VOLTAREN Apply 2 g to affected area four (4) times daily. docusate sodium 100 mg capsule Commonly known as:  Werner Erm Take 1 Cap by mouth two (2) times a day. esomeprazole 20 mg capsule Commonly known as:  NexIUM Take 1 Cap by mouth daily.  STOP PREVACID  Indications: HEARTBURN  
  
 fluticasone-salmeterol 250-50 mcg/dose diskus inhaler Commonly known as:  ADVAIR DISKUS Take 1 Puff by inhalation daily. HYDROcodone-acetaminophen  mg tablet Commonly known as:  Isidoro Norlander Take 1 Tab by mouth every six (6) hours as needed for Pain. Max Daily Amount: 4 Tabs. labetalol 100 mg tablet Commonly known as:  NORMODYNE  
1 tablet three times per day  
  
 mirtazapine 15 mg tablet Commonly known as:  REMERON  
TAKE ONE TABLET BY MOUTH EVERY EVENING  
  
 montelukast 10 mg tablet Commonly known as:  SINGULAIR  
TAKE 1 TABLET DAILY  
  
 ondansetron 8 mg disintegrating tablet Commonly known as:  ZOFRAN ODT Take 1 Tab by mouth every eight (8) hours as needed for Nausea. pantoprazole 40 mg tablet Commonly known as:  PROTONIX Take 1 Tab by mouth Before breakfast and dinner. tiaGABine 2 mg tablet Commonly known as:  GABITRIL  
TAKE ONE TABLET BY MOUTH TWICE A DAY WITH MEALS To-Do List   
 02/19/2018 8:00 AM  
  Appointment with [address] at [facility]  [place] [facility] ([phone]) MEDICATIONS  - Patient must bring a complete list of all medications currently taking to include prescriptions, over-the-counter meds, herbals, vitamins & any dietary supplements  STUDY DETAILS  -  This study takes 4.5 hours to complete. -  Patient must be NPO (nothing to eat/drink, including meds and water) for 6 hours prior to study - No Reglan, Protonoix, or any stomach motility drugs for 48 hours prior to the exam.  
  
 02/19/2018 12:30 PM  
  Appointment with [address] at [facility]  [place] [facility] ([phone]) MEDICATIONS  - Patient must bring a complete list of all medications currently taking to include prescriptions, over-the-counter meds, herbals, vitamins & any dietary supplements  STUDY DETAILS  -  This study takes 4.5 hours to complete.  -  Patient must be NPO (nothing to eat/drink, including meds and water) for 6 hours prior to study - No Reglan, Protonoix, or any stomach motility drugs for 48 hours prior to the exam.  
  
  
Patient Instructions Health Maintenance Due Topic  ZOSTER VACCINE AGE 60>   
 Bone Densitometry  BREAST CANCER SCRN MAMMOGRAM   
 
 
 
  
 Please provide this summary of care documentation to your next provider. Your primary care clinician is listed as Dexter Tapia. If you have any questions after today's visit, please call [phone].

## 2018-02-16 NOTE — PROGRESS NOTES
1. Have you been to the ER, urgent care clinic since your last visit? Hospitalized since your last visit? No    2. Have you seen or consulted any other health care providers outside of the [address] [place] [address] since your last visit? Include any pap smears or colon screening.  No

## 2018-02-16 NOTE — DISCHARGE SUMMARY
Discharge Summary    Patient: Kathryn Frey               Sex: female          DOA: 1/29/2018         YOB: 1955      Age:  66 y.o.        LOS:  LOS: 2 days                Admit Date: 1/29/2018    Discharge Date: 2/2/2018    Discharge Medications:     Discharge Medication List as of 2/2/2018 10:47 AM      START taking these medications    Details   metoclopramide HCl (REGLAN) 5 mg tablet Take 1 Tab by mouth Before breakfast, lunch, and dinner for 10 days. , Print, Disp-30 Tab, R-0      amLODIPine (NORVASC) 10 mg tablet Take 1 Tab by mouth daily. , Print, Disp-30 Tab, R-0      cloNIDine (CATAPRES) 0.2 mg/24 hr patch 1 Patch by TransDERmal route every seven (7) days. , Print, Disp-4 Patch, R-0         CONTINUE these medications which have CHANGED    Details   pantoprazole (PROTONIX) 40 mg tablet Take 1 Tab by mouth Before breakfast and dinner., Print, Disp-60 Tab, R-0      ondansetron (ZOFRAN ODT) 8 mg disintegrating tablet Take 1 Tab by mouth every eight (8) hours as needed for Nausea. , Print, Disp-30 Tab, R-0         CONTINUE these medications which have NOT CHANGED    Details   mirtazapine (REMERON) 15 mg tablet TAKE ONE TABLET BY MOUTH EVERY EVENING, Normal, Disp-30 Tab, R-0      Dexlansoprazole (DEXILANT) 60 mg CpDB Take  by mouth daily. , Historical Med      guaiFENesin-codeine (ROBITUSSIN AC) 100-10 mg/5 mL solution Take 5 mL by mouth three (3) times daily as needed for Cough. Max Daily Amount: 15 mL. Do not drive if taking this medication, Print, Disp-118 mL, R-0      docusate sodium (COLACE) 100 mg capsule Take 1 Cap by mouth two (2) times a day., Normal, Disp-60 Cap, R-5      esomeprazole (NEXIUM) 20 mg capsule Take 1 Cap by mouth daily. STOP PREVACID  Indications: HEARTBURN, Normal, Disp-30 Cap, R-1      diclofenac (VOLTAREN) 1 % gel Apply 2 g to affected area four (4) times daily. , Normal, Disp-1 Each, R-2      montelukast (SINGULAIR) 10 mg tablet TAKE 1 TABLET DAILY, Normal, Disp-90 Tab, R-2 clopidogrel (PLAVIX) 75 mg tab TAKE 1 TABLET DAILY, Normal, Disp-90 Tab, R-2      DALIRESP tab tablet TAKE 1 TABLET DAILY, Normal, Disp-90 Tab, R-2      pravastatin (PRAVACHOL) 20 mg tablet TAKE 1 TABLET DAILY, Normal, Disp-90 Tab, R-2      tiaGABine (GABITRIL) 2 mg tablet TAKE ONE TABLET BY MOUTH TWICE A DAY WITH MEALS, Normal, Disp-60 Tab, R-0      sertraline (ZOLOFT) 50 mg tablet Take 1 Tab by mouth daily. , Normal, Disp-90 Tab, R-3      fluticasone-salmeterol (ADVAIR DISKUS) 250-50 mcg/dose diskus inhaler Take 1 Puff by inhalation daily. , Normal, Disp-1 Inhaler, R-5         STOP taking these medications       promethazine (PHENERGAN) 25 mg tablet Comments:   Reason for Stopping:         HYDROcodone-acetaminophen (NORCO)  mg tablet Comments:   Reason for Stopping:         azithromycin (ZITHROMAX) 250 mg tablet Comments:   Reason for Stopping:         raNITIdine (ZANTAC) 150 mg tablet Comments:   Reason for Stopping:         HYDROcodone-acetaminophen (NORCO) 7.5-325 mg per tablet Comments:   Reason for Stopping: Follow-up: PCP, GI    Discharge Condition: Stable    Activity: Activity as tolerated    Diet: Resume previous diet    Labs:  Labs: Results:       Chemistry No results for input(s): GLU, NA, K, CL, CO2, BUN, CREA, CA, AGAP, BUCR, TBIL, GPT, AP, TP, ALB, GLOB, AGRAT in the last 72 hours. CBC w/Diff No results for input(s): WBC, RBC, HGB, HCT, PLT, GRANS, LYMPH, EOS, HGBEXT, HCTEXT, PLTEXT in the last 72 hours. Cardiac Enzymes No results for input(s): CPK, CKND1, ROSEMARY in the last 72 hours. No lab exists for component: CKRMB, TROIP   Coagulation No results for input(s): PTP, INR, APTT in the last 72 hours.     No lab exists for component: INREXT    Lipid Panel Lab Results   Component Value Date/Time    Cholesterol, total 192 11/22/2016 10:20 AM    HDL Cholesterol 69 11/22/2016 10:20 AM    LDL, calculated 103 (H) 11/22/2016 10:20 AM    VLDL, calculated 20 11/22/2016 10:20 AM Triglyceride 102 11/22/2016 10:20 AM    CHOL/HDL Ratio 3.5 02/29/2016 07:00 AM      BNP No results for input(s): BNPP in the last 72 hours. Liver Enzymes No results for input(s): TP, ALB, TBIL, AP, SGOT, GPT in the last 72 hours. No lab exists for component: DBIL   Thyroid Studies Lab Results   Component Value Date/Time    TSH 0.04 (L) 01/31/2018 03:54 PM          Imaging:      CT abdomen    Status post lap band. The stomach appears fairly narrow as it passes through the  lap band, but CT is a suboptimal modality for assessing the lap band. Consider  correlation with nonemergent upper GI. Component of overly tight lap band is  possible.     Atrophic right kidney with delayed enhancement but no hydronephrosis. This  appears worsened in the interval from prior since December 2017. There is a  right renal artery stent noted as well as prior endovascular repair of an  abdominal aortic aneurysm. Recommend correlation with renal Doppler ultrasound  as the progressive atrophy and diminished enhancement without hydronephrosis or  renal vein occlusion is concerning for occlusion of the right renal artery  stent. This could potentially also be assessed with CTA although evaluation may  be limited due to streak from adjacent metallic stents.     Prior cholecystectomy.     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 Consults: Gastroenterology    Treatment Team: Treatment Team: Consulting Provider: Orlando Lovett DO; Consulting Provider: Kathy Clark MD; Consulting Provider: Donaldo Clark MD; Care Manager: Blane Eubanks    Significant Diagnostic Studies: labs: No results found for this or any previous visit (from the past 24 hour(s)).       Discharge diagnoses:    Problem List as of 2/2/2018  Date Reviewed: 1/26/2018          Codes Class Noted - Resolved    Intractable nausea and vomiting ICD-10-CM: R11.2  ICD-9-CM: 536.2  1/31/2018 - Present        Abdominal pain ICD-10-CM: R10.9  ICD-9-CM: 789.00  1/29/2018 - Present        Right renal artery stenosis (HCC) ICD-10-CM: I70.1  ICD-9-CM: 440.1  1/26/2018 - Present        Hx of aortic aneurysm repair ICD-10-CM: Z98.890, Z86.79  ICD-9-CM: V45.89  12/12/2017 - Present        History of renal stent ICD-10-CM: Z98.890  ICD-9-CM: V45.89  12/12/2017 - Present        Lumbar radiculopathy ICD-10-CM: M54.16  ICD-9-CM: 724.4  8/31/2017 - Present        Left upper quadrant pain ICD-10-CM: R10.12  ICD-9-CM: 789.02  6/30/2017 - Present        Hypovitaminosis D ICD-10-CM: E55.9  ICD-9-CM: 268.9  6/30/2017 - Present        Sacroiliitis ([facility] ICD-10-CM: M46.1  ICD-9-CM: 720.2  4/13/2017 - Present        Right hip pain ICD-10-CM: M25.551  ICD-9-CM: 719.45  4/13/2017 - Present        Spondylosis of lumbar region without myelopathy or radiculopathy ICD-10-CM: M47.816  ICD-9-CM: 721.3  4/13/2017 - Present        Lumbar neuritis ICD-10-CM: M54.16  ICD-9-CM: 724.4  4/13/2017 - Present        Peripheral vascular disease ([facility] ICD-10-CM: I73.9  ICD-9-CM: 443.9  3/10/2017 - Present        Right-sided carotid artery disease ([facility] ICD-10-CM: I77.9  ICD-9-CM: 447.9  3/10/2017 - Present        Obesity (BMI 30.0-34.9) ICD-10-CM: E66.9  ICD-9-CM: 278.00  3/7/2017 - Present        Non morbid obesity due to excess calories ICD-10-CM: E66.09  ICD-9-CM: 278.00  2/23/2017 - Present        Gastroesophageal reflux disease without esophagitis ICD-10-CM: K21.9  ICD-9-CM: 530.81  2/23/2017 - Present        LAP-BAND surgery status ICD-10-CM: Z98.84  ICD-9-CM: V45.86  5/26/2016 - Present        Dysarthria ICD-10-CM: R47.1  ICD-9-CM: 784.51  2/28/2016 - Present        Renal infarction [facility]) ICD-10-CM: N28.0  ICD-9-CM: 593.81  10/7/2015 - Present        Generalized osteoarthrosis, involving multiple sites ICD-10-CM: M15.9  ICD-9-CM: 715.09  Unknown - Present        Esophageal reflux ICD-10-CM: K21.9  ICD-9-CM: 530.81  Unknown - Present        Subclinical hyperthyroidism ICD-10-CM: E05.90  ICD-9-CM: 242.90  8/28/2015 - Present        Essential hypertension ICD-10-CM: I10  ICD-9-CM: 401.9  8/25/2015 - Present        Hyperlipidemia ICD-10-CM: E78.5  ICD-9-CM: 272.4  8/25/2015 - Present        AAA (abdominal aortic aneurysm) ([facility] ICD-10-CM: I71.4  ICD-9-CM: 441.4  10/14/2014 - Present        RESOLVED: Bilateral cataracts ICD-10-CM: H26.9  ICD-9-CM: 366.9  6/27/2016 - 10/8/2016        RESOLVED: Pain of upper abdomen ICD-10-CM: R10.10  ICD-9-CM: 789.09  6/27/2016 - 10/8/2016        RESOLVED: Dysphagia ICD-10-CM: R13.10  ICD-9-CM: 787.20  5/26/2016 - 10/8/2016        RESOLVED: Syncope ICD-10-CM: R55  ICD-9-CM: 780.2  3/8/2016 - 10/8/2016        RESOLVED: Generalized abdominal pain ICD-10-CM: R10.84  ICD-9-CM: 789.07  12/8/2015 - 10/8/2016        RESOLVED: Unspecified sinusitis (chronic) ICD-10-CM: J32.9  ICD-9-CM: 473.9  Unknown - 10/8/2016        RESOLVED: Chronic airway obstruction, not elsewhere classified ICD-10-CM: J44.9  ICD-9-CM: 496  Unknown - 10/8/2016        RESOLVED: Bronchitis ICD-10-CM: J40  ICD-9-CM: 829  9/21/2015 - 10/8/2016            Hospital Course:  Major issues addressed during hospitalization outlined  below. Lloyd Mcintosh is a 63 y.o. female with PMHX HTN, PAD/TORY, Obesity s/p gastric banding in [place] 2014 who presents with   Recurrent and worsening N/V and Abd pain. Pain reported as constant and worse with vomiting in the supraumbilical and R upper side of abdomen. Reports continued N/V for over 1 week now. Has been unable to hold down some tea and bread at times. She was seen at [facility] ER 1/24 evening with CT scan showing proximal stomach stenosis from gastric band and chronic atrophic R kidney. Patient went home and came back to [facility] ER and reportedly had a AXR showing gastric band placement. She has seen Dr Luis Inman as an outpatient. ROS+ for some loose stools.    Discussed case at length with Dr India Garcia, her gastric band appears to be in place and unchanged from prior studies and unless erosion is present, is not the cause of her abdominal pain. GI and surgery consulted. Patient has had lap band that was decompressed previously but not removed. Apparently 2/2 affordability issue this was not removed. Testing again here was negative as per CT above as it has been in previous hospitalizations/visits. She will need to complete full GI workup as OP then likely attempt discuss again with insurance company as the lap bands prescence is thought to be causing her symptoms. PCP f/u 1 week. Bother GI and surgery suspected lap band as cause here.   Cornelio Lainez MD  February 16, 2018        Total time spent 35 minutes

## 2018-02-19 ENCOUNTER — HOSPITAL ENCOUNTER (OUTPATIENT)
Dept: NUCLEAR MEDICINE | Age: 63
Discharge: HOME | End: 2018-02-19
Attending: PHYSICIAN ASSISTANT
Payer: COMMERCIAL

## 2018-02-19 DIAGNOSIS — K21.9 GERD (GASTROESOPHAGEAL REFLUX DISEASE): ICD-10-CM

## 2018-02-19 PROBLEM — Z98.84 STATUS POST GASTRIC BANDING: Status: ACTIVE | Noted: 2018-02-19

## 2018-02-19 PROBLEM — R10.84 GENERALIZED ABDOMINAL PAIN: Status: ACTIVE | Noted: 2018-02-19

## 2018-02-19 PROCEDURE — 78264 GASTRIC EMPTYING IMG STUDY: CPT

## 2018-02-19 NOTE — PROGRESS NOTES
The patient presents to the office today with the chief complaint of abdominal pain    HPI    The patient persists with abdominal pain but improved. The patient's bowels are doing better. The patient's problems are related to previous gastric banding. Once the patient's condition is stable revision surgery is planned. The patient is being followed by vascular surgery for renal artery stenosis. The patient remains on medications for hypertension. She is tolerating the medications well but her BP goes up at times at home      Review of Systems   Respiratory: Negative for shortness of breath. Cardiovascular: Negative for chest pain and leg swelling. Gastrointestinal: Positive for abdominal pain. Allergies   Allergen Reactions    Pcn [Penicillins] Anaphylaxis    Tetracycline Anaphylaxis    Sulfabenzamide Hives       Current Outpatient Prescriptions   Medication Sig Dispense Refill    labetalol (NORMODYNE) 100 mg tablet 1 tablet three times per day 90 Tab 5    atorvastatin (LIPITOR) 40 mg tablet Take 1 Tab by mouth daily. 30 Tab 3    HYDROcodone-acetaminophen (NORCO)  mg tablet Take 1 Tab by mouth every six (6) hours as needed for Pain. Max Daily Amount: 4 Tabs. 120 Tab 0    pantoprazole (PROTONIX) 40 mg tablet Take 1 Tab by mouth Before breakfast and dinner. 60 Tab 0    ondansetron (ZOFRAN ODT) 8 mg disintegrating tablet Take 1 Tab by mouth every eight (8) hours as needed for Nausea. 30 Tab 0    amLODIPine (NORVASC) 10 mg tablet Take 1 Tab by mouth daily. 30 Tab 0    cloNIDine (CATAPRES) 0.2 mg/24 hr patch 1 Patch by TransDERmal route every seven (7) days. 4 Patch 0    mirtazapine (REMERON) 15 mg tablet TAKE ONE TABLET BY MOUTH EVERY EVENING 30 Tab 0    Dexlansoprazole (DEXILANT) 60 mg CpDB Take  by mouth daily.  docusate sodium (COLACE) 100 mg capsule Take 1 Cap by mouth two (2) times a day.  (Patient taking differently: Take 100 mg by mouth daily.) 60 Cap 5    esomeprazole (NEXIUM) 20 mg capsule Take 1 Cap by mouth daily. STOP PREVACID  Indications: HEARTBURN 30 Cap 1    diclofenac (VOLTAREN) 1 % gel Apply 2 g to affected area four (4) times daily. 1 Each 2    montelukast (SINGULAIR) 10 mg tablet TAKE 1 TABLET DAILY 90 Tab 2    clopidogrel (PLAVIX) 75 mg tab TAKE 1 TABLET DAILY 90 Tab 2    DALIRESP tab tablet TAKE 1 TABLET DAILY 90 Tab 2    tiaGABine (GABITRIL) 2 mg tablet TAKE ONE TABLET BY MOUTH TWICE A DAY WITH MEALS 60 Tab 0    fluticasone-salmeterol (ADVAIR DISKUS) 250-50 mcg/dose diskus inhaler Take 1 Puff by inhalation daily. (Patient taking differently: Take 2 Puffs by inhalation daily. ) 1 Inhaler 5       Past Medical History:   Diagnosis Date    Aorto-iliac duplex 09/17/2015    Patent abdom aorta endovascular graft w/o leak. Mod stenosis suggested in right limb.  Calculus of kidney 2014    stent/kidney     Carotid duplex 09/17/2015    Mild < 50% bilateral plaquing.  Cataract, left     Chronic obstructive pulmonary disease ([address]     Echocardiogram 11/04/2014    EF 56%. No WMA. Mild LAE. Echo is within normal limits.  Esophageal reflux     Generalized osteoarthrosis, involving multiple sites     GERD (gastroesophageal reflux disease)     Gout     History of renal stent     right side    Hypercholesteremia     Hypertension     resolved    Lower extremity arterial testing 03/27/2015    Normal perfusion at rest and w/treadmill bilaterally.   R KORI 1.05.  L KORI 1.03.    Murmur     patient reports that she is aware- has been told in the past    Nausea & vomiting     Renal artery stenosis ([address]     Subclinical hyperthyroidism 8/2015    Unspecified sleep apnea     resolved       Past Surgical History:   Procedure Laterality Date    ADJUSTMENT GASTRIC BAND N/A 11/10/2016    MAKAYLA Cullen    HX APPENDECTOMY      HX BLADDER SUSPENSION      HX CHOLECYSTECTOMY      HX GASTRIC BYPASS      lap band 2012    HX HEENT Right 1990s, 2016    Ear sx  HX HYSTERECTOMY      HX OTHER SURGICAL  10/27/14     Bilateral open femoral exposures, Placement of catheter and sheath in the aorta Endo repair of aortic aneurysm with modular bifurcated device  Interpretation of angiography, intravascular ultrasound (IVUS) catheter    HX OTHER SURGICAL  10/27/2014    Endurant II abdominal stent graft implant    HX UROLOGICAL      stent    VASCULAR SURGERY PROCEDURE UNLIST      surgery for abdominal aneurysm       Social History     Social History    Marital status:      Spouse name: N/A    Number of children: N/A    Years of education: N/A     Occupational History    Not on file. Social History Main Topics    Smoking status: Former Smoker     Packs/day: 0.00     Years: 0.00     Types: Cigarettes     Quit date: 1/25/2016    Smokeless tobacco: Never Used    Alcohol use No    Drug use: No    Sexual activity: Yes     Birth control/ protection: Surgical     Other Topics Concern    Not on file     Social History Narrative       Patient does not have an advanced directive on file    Visit Vitals    BP (!) 160/92 (BP 1 Location: Left arm, BP Patient Position: Sitting)    Pulse 86    Temp 97.9 °F (36.6 °C) (Tympanic)    Resp 16    Ht 5' (1.524 m)    Wt 172 lb (78 kg)    SpO2 99%    BMI 33.59 kg/m2       Physical Exam   No Cervical Lymphadenopathy  No Supraclavicular Lymphadenopathy  Thyroid is Normal  Lungs are normal to percussion. Clear to auscultation   Heart:  S1 S2 are normal, No gallops, No mummers  No Carotid Bruits  Abdomen:  Normal Bowel Sounds. Mild to Moderate mid abdominal tenderness without rebound. No masses. No Hepatomegaly or Splenomegly  LE:  Strong Pedal Pulses.   No Edema      BMI:  BMI is high but it was not addressed during this visit due to ongoing abdominal problems      Office Visit on 02/05/2018   Component Date Value Ref Range Status    Glucose 02/05/2018 88  70 - 99 mg/dL Final    BUN 02/05/2018 13  6 - 22 mg/dL Final    Creatinine 02/05/2018 0.9  0.8 - 1.4 mg/dL Final    Sodium 02/05/2018 142  133 - 145 mmol/L Final    Potassium 02/05/2018 4.1  3.5 - 5.5 mmol/L Final    Chloride 02/05/2018 100  98 - 110 mmol/L Final    CO2 02/05/2018 26  20 - 32 mmol/L Final    AST (SGOT) 02/05/2018 25  10 - 37 U/L Final    ALT (SGPT) 02/05/2018 32  5 - 40 U/L Final    Alk. phosphatase 02/05/2018 72  40 - 120 U/L Final    Bilirubin, total 02/05/2018 0.5  0.2 - 1.2 mg/dL Final    Calcium 02/05/2018 9.2  8.4 - 10.5 mg/dL Final    Protein, total 02/05/2018 6.4  6.2 - 8.1 g/dL Final    Albumin 02/05/2018 4.1  3.5 - 5.0 g/dL Final    A-G Ratio 02/05/2018 1.8  1.1 - 2.6 ratio Final    Globulin 02/05/2018 2.3  2.0 - 4.0 g/dL Final    Anion gap 02/05/2018 16.0  mmol/L Final    Comment: Test includes Albumin, Alkaline Phosphatase, ALT, AST, BUN, Calcium, CO2,  Chloride, Creatinine, Glucose, Potassium, Sodium, Total Bilirubin and Total  Protein. Estimated GFR results are reported in mL/min/1.73 sq.m. by the MDRD equation. This eGFR is validated for stable chronic renal failure patients. This   equation  is unreliable in acute illness or patients with normal renal function.  GFRAA 02/05/2018 >60.0  >60.0 Final    GFRNA 02/05/2018 60.0* >60.0 Final    WBC 02/05/2018 7.9  4.0 - 11.0 K/uL Final    RBC 02/05/2018 4.35  3.80 - 5.20 M/uL Final    HGB 02/05/2018 12.2  11.7 - 16.0 g/dL Final    HCT 02/05/2018 36.9  35.1 - 48.0 % Final    MCV 02/05/2018 85  80 - 95 fL Final    MCH 02/05/2018 28  26 - 34 pg Final    MCHC 02/05/2018 33  32 - 36 g/dL Final    RDW 02/05/2018 13.2  10.0 - 16.0 % Final    PLATELET 56/05/6060 668  140 - 440 K/uL Final    MPV 02/05/2018 11.4* 6.0 - 10.8 fL Final    NEUTROPHILS 02/05/2018 58  40 - 75 % Final    Lymphocytes 02/05/2018 27  27 - 45 % Final    MONOCYTES 02/05/2018 11* 3 - 9 % Final    EOSINOPHILS 02/05/2018 4  0 - 6 % Final    BASOPHILS 02/05/2018 1  0 - 2 % Final    ABS.  NEUTROPHILS 02/05/2018 4.6  1.8 - 7.7 K/uL Final    ABSOLUTE LYMPHOCYTE COUNT 02/05/2018 2.2  1.0 - 4.8 K/uL Final    ABS. MONOCYTES 02/05/2018 0.9  0.1 - 0.9 K/uL Final    ABS. EOSINOPHILS 02/05/2018 0.3  0.0 - 0.5 K/uL Final    ABS. BASOPHILS 02/05/2018 0.1  0.0 - 0.2 K/uL Final   Admission on 01/29/2018, Discharged on 02/02/2018   Component Date Value Ref Range Status    WBC 01/29/2018 6.6  4.6 - 13.2 K/uL Final    RBC 01/29/2018 4.82  4.20 - 5.30 M/uL Final    HGB 01/29/2018 13.6  12.0 - 16.0 g/dL Final    HCT 01/29/2018 40.1  35.0 - 45.0 % Final    MCV 01/29/2018 83.2  74.0 - 97.0 FL Final    MCH 01/29/2018 28.2  24.0 - 34.0 PG Final    MCHC 01/29/2018 33.9  31.0 - 37.0 g/dL Final    RDW 01/29/2018 12.9  11.6 - 14.5 % Final    PLATELET 51/68/8298 765  135 - 420 K/uL Final    MPV 01/29/2018 9.7  9.2 - 11.8 FL Final    NEUTROPHILS 01/29/2018 63  40 - 73 % Final    LYMPHOCYTES 01/29/2018 23  21 - 52 % Final    MONOCYTES 01/29/2018 10  3 - 10 % Final    EOSINOPHILS 01/29/2018 3  0 - 5 % Final    BASOPHILS 01/29/2018 1  0 - 2 % Final    ABS. NEUTROPHILS 01/29/2018 4.2  1.8 - 8.0 K/UL Final    ABS. LYMPHOCYTES 01/29/2018 1.5  0.9 - 3.6 K/UL Final    ABS. MONOCYTES 01/29/2018 0.6  0.05 - 1.2 K/UL Final    ABS. EOSINOPHILS 01/29/2018 0.2  0.0 - 0.4 K/UL Final    ABS.  BASOPHILS 01/29/2018 0.1* 0.0 - 0.06 K/UL Final    DF 01/29/2018 AUTOMATED    Final    Sodium 01/29/2018 138  136 - 145 mmol/L Final    Potassium 01/29/2018 4.5  3.5 - 5.5 mmol/L Final    Chloride 01/29/2018 103  100 - 108 mmol/L Final    CO2 01/29/2018 30  21 - 32 mmol/L Final    Anion gap 01/29/2018 5  3.0 - 18 mmol/L Final    Glucose 01/29/2018 96  74 - 99 mg/dL Final    BUN 01/29/2018 10  7.0 - 18 MG/DL Final    Creatinine 01/29/2018 0.90  0.6 - 1.3 MG/DL Final    BUN/Creatinine ratio 01/29/2018 11* 12 - 20   Final    GFR est AA 01/29/2018 >60  >60 ml/min/1.73m2 Final    GFR est non-AA 01/29/2018 >60  >60 ml/min/1.73m2 Final Comment: (NOTE)  Estimated GFR is calculated using the Modification of Diet in Renal   Disease (MDRD) Study equation, reported for both  Americans   (GFRAA) and non- Americans (GFRNA), and normalized to 1.73m2   body surface area. The physician must decide which value applies to   the patient. The MDRD study equation should only be used in   individuals age 25 or older. It has not been validated for the   following: pregnant women, patients with serious comorbid conditions,   or on certain medications, or persons with extremes of body size,   muscle mass, or nutritional status.  Calcium 01/29/2018 9.0  8.5 - 10.1 MG/DL Final    Bilirubin, total 01/29/2018 0.6  0.2 - 1.0 MG/DL Final    ALT (SGPT) 01/29/2018 15  13 - 56 U/L Final    AST (SGOT) 01/29/2018 19  15 - 37 U/L Final    Alk. phosphatase 01/29/2018 83  45 - 117 U/L Final    Protein, total 01/29/2018 7.7  6.4 - 8.2 g/dL Final    Albumin 01/29/2018 3.8  3.4 - 5.0 g/dL Final    Globulin 01/29/2018 3.9  2.0 - 4.0 g/dL Final    A-G Ratio 01/29/2018 1.0  0.8 - 1.7   Final    Lipase 01/29/2018 134  73 - 393 U/L Final    Protein, total 01/29/2018 7.3  6.4 - 8.2 g/dL Final    Albumin 01/29/2018 3.9  3.4 - 5.0 g/dL Final    Globulin 01/29/2018 3.4  2.0 - 4.0 g/dL Final    A-G Ratio 01/29/2018 1.1  0.8 - 1.7   Final    Bilirubin, total 01/29/2018 0.6  0.2 - 1.0 MG/DL Final    Bilirubin, direct 01/29/2018 0.2  0.0 - 0.2 MG/DL Final    Alk.  phosphatase 01/29/2018 86  45 - 117 U/L Final    AST (SGOT) 01/29/2018 16  15 - 37 U/L Final    ALT (SGPT) 01/29/2018 19  13 - 56 U/L Final    Color 01/29/2018 YELLOW    Final    Appearance 01/29/2018 CLEAR    Final    Specific gravity 01/29/2018 1.007  1.005 - 1.030   Final    pH (UA) 01/29/2018 7.5  5.0 - 8.0   Final    Protein 01/29/2018 NEGATIVE   NEG mg/dL Final    Glucose 01/29/2018 NEGATIVE   NEG mg/dL Final    Ketone 01/29/2018 NEGATIVE   NEG mg/dL Final    Bilirubin 01/29/2018 NEGATIVE   NEG   Final    Blood 01/29/2018 TRACE* NEG   Final    Urobilinogen 01/29/2018 0.2  0.2 - 1.0 EU/dL Final    Nitrites 01/29/2018 NEGATIVE   NEG   Final    Leukocyte Esterase 01/29/2018 NEGATIVE   NEG   Final    WBC 01/29/2018 NEGATIVE   0 - 4 /hpf Final    RBC 01/29/2018 0 to 1  0 - 5 /hpf Final    Epithelial cells 01/29/2018 FEW  0 - 5 /lpf Final    Bacteria 01/29/2018 NEGATIVE   NEG /hpf Final    Sodium 01/30/2018 139  136 - 145 mmol/L Final    Potassium 01/30/2018 3.7  3.5 - 5.5 mmol/L Final    Chloride 01/30/2018 103  100 - 108 mmol/L Final    CO2 01/30/2018 24  21 - 32 mmol/L Final    Anion gap 01/30/2018 12  3.0 - 18 mmol/L Final    Glucose 01/30/2018 130* 74 - 99 mg/dL Final    BUN 01/30/2018 13  7.0 - 18 MG/DL Final    Creatinine 01/30/2018 0.83  0.6 - 1.3 MG/DL Final    BUN/Creatinine ratio 01/30/2018 16  12 - 20   Final    GFR est AA 01/30/2018 >60  >60 ml/min/1.73m2 Final    GFR est non-AA 01/30/2018 >60  >60 ml/min/1.73m2 Final    Calcium 01/30/2018 9.1  8.5 - 10.1 MG/DL Final    WBC 01/30/2018 13.4* 4.6 - 13.2 K/uL Final    RBC 01/30/2018 4.63  4.20 - 5.30 M/uL Final    HGB 01/30/2018 13.4  12.0 - 16.0 g/dL Final    HCT 01/30/2018 38.6  35.0 - 45.0 % Final    MCV 01/30/2018 83.4  74.0 - 97.0 FL Final    MCH 01/30/2018 28.9  24.0 - 34.0 PG Final    MCHC 01/30/2018 34.7  31.0 - 37.0 g/dL Final    RDW 01/30/2018 13.1  11.6 - 14.5 % Final    PLATELET 27/30/6181 423  135 - 420 K/uL Final    MPV 01/30/2018 10.2  9.2 - 11.8 FL Final    Vitamin B1 01/30/2018 173.8  66.5 - 200.0 nmol/L Final    Comment: (NOTE)  This test was developed and its performance characteristics  determined by [facility]. It has not been cleared or approved  by the Food and Drug Administration.   Performed At: [address] [id number]  Gladis De Santiago MD [id number]      Lactic acid 01/30/2018 0.6  0.4 - 2.0 MMOL/L Final    Sodium 01/31/2018 140  136 - 145 mmol/L Final    Potassium 01/31/2018 3.4* 3.5 - 5.5 mmol/L Final    Chloride 01/31/2018 107  100 - 108 mmol/L Final    CO2 01/31/2018 26  21 - 32 mmol/L Final    Anion gap 01/31/2018 7  3.0 - 18 mmol/L Final    Glucose 01/31/2018 80  74 - 99 mg/dL Final    BUN 01/31/2018 15  7.0 - 18 MG/DL Final    Creatinine 01/31/2018 0.67  0.6 - 1.3 MG/DL Final    BUN/Creatinine ratio 01/31/2018 22* 12 - 20   Final    GFR est AA 01/31/2018 >60  >60 ml/min/1.73m2 Final    GFR est non-AA 01/31/2018 >60  >60 ml/min/1.73m2 Final    Calcium 01/31/2018 8.1* 8.5 - 10.1 MG/DL Final    WBC 01/31/2018 9.1  4.6 - 13.2 K/uL Final    RBC 01/31/2018 4.03* 4.20 - 5.30 M/uL Final    HGB 01/31/2018 11.3* 12.0 - 16.0 g/dL Final    HCT 01/31/2018 33.6* 35.0 - 45.0 % Final    MCV 01/31/2018 83.4  74.0 - 97.0 FL Final    MCH 01/31/2018 28.0  24.0 - 34.0 PG Final    MCHC 01/31/2018 33.6  31.0 - 37.0 g/dL Final    RDW 01/31/2018 13.0  11.6 - 14.5 % Final    PLATELET 37/24/9459 269  135 - 420 K/uL Final    MPV 01/31/2018 9.8  9.2 - 11.8 FL Final    HGB 01/31/2018 13.4  12.0 - 16.0 g/dL Final    HCT 01/31/2018 39.3  35.0 - 45.0 % Final    Occult blood, stool 01/31/2018 POSITIVE* NEG   Final    Ventricular Rate 01/31/2018 109  BPM Final    Atrial Rate 01/31/2018 109  BPM Final    P-R Interval 01/31/2018 152  ms Final    QRS Duration 01/31/2018 76  ms Final    Q-T Interval 01/31/2018 336  ms Final    QTC Calculation (Bezet) 01/31/2018 452  ms Final    Calculated P Axis 01/31/2018 68  degrees Final    Calculated R Axis 01/31/2018 70  degrees Final    Calculated T Axis 01/31/2018 67  degrees Final    Diagnosis 01/31/2018    Final                    Value:Sinus tachycardia  Otherwise normal ECG  When compared with ECG of 28-JAN-2018 16:50,  No significant change was found  Confirmed by Rahat Browne ([id number]) on 2/2/2018 8:24:02 AM      TSH 01/31/2018 0.04* 0.36 - 3.74 uIU/mL Final    Sodium 02/01/2018 138  136 - 145 mmol/L Final    Potassium 02/01/2018 3.0* 3.5 - 5.5 mmol/L Final    Chloride 02/01/2018 107  100 - 108 mmol/L Final    CO2 02/01/2018 25  21 - 32 mmol/L Final    Anion gap 02/01/2018 6  3.0 - 18 mmol/L Final    Glucose 02/01/2018 84  74 - 99 mg/dL Final    BUN 02/01/2018 16  7.0 - 18 MG/DL Final    Creatinine 02/01/2018 0.87  0.6 - 1.3 MG/DL Final    BUN/Creatinine ratio 02/01/2018 18  12 - 20   Final    GFR est AA 02/01/2018 >60  >60 ml/min/1.73m2 Final    GFR est non-AA 02/01/2018 >60  >60 ml/min/1.73m2 Final    Calcium 02/01/2018 8.3* 8.5 - 10.1 MG/DL Final    WBC 02/01/2018 9.7  4.6 - 13.2 K/uL Final    RBC 02/01/2018 3.98* 4.20 - 5.30 M/uL Final    HGB 02/01/2018 11.0* 12.0 - 16.0 g/dL Final    HCT 02/01/2018 33.1* 35.0 - 45.0 % Final    MCV 02/01/2018 83.2  74.0 - 97.0 FL Final    MCH 02/01/2018 27.6  24.0 - 34.0 PG Final    MCHC 02/01/2018 33.2  31.0 - 37.0 g/dL Final    RDW 02/01/2018 13.0  11.6 - 14.5 % Final    PLATELET 26/80/8732 109  135 - 420 K/uL Final    MPV 02/01/2018 10.1  9.2 - 11.8 FL Final    Cortisol, a.m. 02/01/2018 23.9* 4.30 - 22.40 ug/dL Final    Protein, total 02/01/2018 6.3* 6.4 - 8.2 g/dL Final    Albumin 02/01/2018 3.1* 3.4 - 5.0 g/dL Final    Globulin 02/01/2018 3.2  2.0 - 4.0 g/dL Final    A-G Ratio 02/01/2018 1.0  0.8 - 1.7   Final    Bilirubin, total 02/01/2018 0.7  0.2 - 1.0 MG/DL Final    Bilirubin, direct 02/01/2018 0.1  0.0 - 0.2 MG/DL Final    Alk.  phosphatase 02/01/2018 66  45 - 117 U/L Final    AST (SGOT) 02/01/2018 12* 15 - 37 U/L Final    ALT (SGPT) 02/01/2018 17  13 - 56 U/L Final    Lipase 02/01/2018 208  73 - 393 U/L Final    T4, Free 02/01/2018 1.3  0.7 - 1.5 NG/DL Final    Magnesium 02/01/2018 2.1  1.6 - 2.6 mg/dL Final    HGB 02/01/2018 12.2  12.0 - 16.0 g/dL Final    HCT 02/01/2018 35.6  35.0 - 45.0 % Final   Admission on 01/28/2018, Discharged on 01/28/2018   Component Date Value Ref Range Status    Ventricular Rate 01/28/2018 85  BPM Final    Atrial Rate 01/28/2018 85  BPM Final    P-R Interval 01/28/2018 148  ms Final    QRS Duration 01/28/2018 86  ms Final    Q-T Interval 01/28/2018 368  ms Final    QTC Calculation (Bezet) 01/28/2018 437  ms Final    Calculated P Axis 01/28/2018 76  degrees Final    Calculated R Axis 01/28/2018 74  degrees Final    Calculated T Axis 01/28/2018 71  degrees Final    Diagnosis 01/28/2018    Final                    Value:Normal sinus rhythm  Normal ECG  When compared with ECG of 03-MAR-2017 07:55,  No significant change was found  Confirmed by Reza Merida MD, Mounika Smith ([id number]) on 1/29/2018 4:54:57 PM      WBC 01/28/2018 7.0  4.6 - 13.2 K/uL Final    RBC 01/28/2018 4.55  4.20 - 5.30 M/uL Final    HGB 01/28/2018 12.5  12.0 - 16.0 g/dL Final    HCT 01/28/2018 37.7  35.0 - 45.0 % Final    MCV 01/28/2018 82.9  74.0 - 97.0 FL Final    MCH 01/28/2018 27.5  24.0 - 34.0 PG Final    MCHC 01/28/2018 33.2  31.0 - 37.0 g/dL Final    RDW 01/28/2018 13.0  11.6 - 14.5 % Final    PLATELET 81/80/2458 524  135 - 420 K/uL Final    MPV 01/28/2018 10.3  9.2 - 11.8 FL Final    NEUTROPHILS 01/28/2018 48  40 - 73 % Final    LYMPHOCYTES 01/28/2018 37  21 - 52 % Final    MONOCYTES 01/28/2018 10  3 - 10 % Final    EOSINOPHILS 01/28/2018 4  0 - 5 % Final    BASOPHILS 01/28/2018 1  0 - 2 % Final    ABS. NEUTROPHILS 01/28/2018 3.4  1.8 - 8.0 K/UL Final    ABS. LYMPHOCYTES 01/28/2018 2.6  0.9 - 3.6 K/UL Final    ABS. MONOCYTES 01/28/2018 0.7  0.05 - 1.2 K/UL Final    ABS. EOSINOPHILS 01/28/2018 0.3  0.0 - 0.4 K/UL Final    ABS.  BASOPHILS 01/28/2018 0.0  0.0 - 0.06 K/UL Final    DF 01/28/2018 AUTOMATED    Final    Sodium 01/28/2018 139  136 - 145 mmol/L Final    Potassium 01/28/2018 3.5  3.5 - 5.5 mmol/L Final    Chloride 01/28/2018 103  100 - 108 mmol/L Final    CO2 01/28/2018 28  21 - 32 mmol/L Final    Anion gap 01/28/2018 8  3.0 - 18 mmol/L Final    Glucose 01/28/2018 82  74 - 99 mg/dL Final    BUN 01/28/2018 15 7.0 - 18 MG/DL Final    Creatinine 01/28/2018 1.01  0.6 - 1.3 MG/DL Final    BUN/Creatinine ratio 01/28/2018 15  12 - 20   Final    GFR est AA 01/28/2018 >60  >60 ml/min/1.73m2 Final    GFR est non-AA 01/28/2018 56* >60 ml/min/1.73m2 Final    Comment: (NOTE)  Estimated GFR is calculated using the Modification of Diet in Renal   Disease (MDRD) Study equation, reported for both  Americans   (GFRAA) and non- Americans (GFRNA), and normalized to 1.73m2   body surface area. The physician must decide which value applies to   the patient. The MDRD study equation should only be used in   individuals age 25 or older. It has not been validated for the   following: pregnant women, patients with serious comorbid conditions,   or on certain medications, or persons with extremes of body size,   muscle mass, or nutritional status.  Calcium 01/28/2018 8.7  8.5 - 10.1 MG/DL Final    Bilirubin, total 01/28/2018 0.3  0.2 - 1.0 MG/DL Final    ALT (SGPT) 01/28/2018 21  13 - 56 U/L Final    AST (SGOT) 01/28/2018 18  15 - 37 U/L Final    Alk.  phosphatase 01/28/2018 75  45 - 117 U/L Final    Protein, total 01/28/2018 7.1  6.4 - 8.2 g/dL Final    Albumin 01/28/2018 3.5  3.4 - 5.0 g/dL Final    Globulin 01/28/2018 3.6  2.0 - 4.0 g/dL Final    A-G Ratio 01/28/2018 1.0  0.8 - 1.7   Final    Color 01/28/2018 YELLOW    Final    Appearance 01/28/2018 CLEAR    Final    Specific gravity 01/28/2018 1.009  1.005 - 1.030   Final    pH (UA) 01/28/2018 7.0  5.0 - 8.0   Final    Protein 01/28/2018 NEGATIVE   NEG mg/dL Final    Glucose 01/28/2018 NEGATIVE   NEG mg/dL Final    Ketone 01/28/2018 NEGATIVE   NEG mg/dL Final    Bilirubin 01/28/2018 NEGATIVE   NEG   Final    Blood 01/28/2018 NEGATIVE   NEG   Final    Urobilinogen 01/28/2018 0.2  0.2 - 1.0 EU/dL Final    Nitrites 01/28/2018 NEGATIVE   NEG   Final    Leukocyte Esterase 01/28/2018 NEGATIVE   NEG   Final    Lipase 01/28/2018 207  73 - 393 U/L Final Admission on 01/24/2018, Discharged on 01/25/2018   Component Date Value Ref Range Status    WBC 01/24/2018 9.2  4.6 - 13.2 K/uL Final    RBC 01/24/2018 4.66  4.20 - 5.30 M/uL Final    HGB 01/24/2018 13.4  12.0 - 16.0 g/dL Final    HCT 01/24/2018 38.1  35.0 - 45.0 % Final    MCV 01/24/2018 81.8  74.0 - 97.0 FL Final    MCH 01/24/2018 28.8  24.0 - 34.0 PG Final    MCHC 01/24/2018 35.2  31.0 - 37.0 g/dL Final    RDW 01/24/2018 13.0  11.6 - 14.5 % Final    PLATELET 38/51/8045 766  135 - 420 K/uL Final    MPV 01/24/2018 10.0  9.2 - 11.8 FL Final    NEUTROPHILS 01/24/2018 58  40 - 73 % Final    LYMPHOCYTES 01/24/2018 26  21 - 52 % Final    MONOCYTES 01/24/2018 13* 3 - 10 % Final    EOSINOPHILS 01/24/2018 2  0 - 5 % Final    BASOPHILS 01/24/2018 1  0 - 2 % Final    ABS. NEUTROPHILS 01/24/2018 5.4  1.8 - 8.0 K/UL Final    ABS. LYMPHOCYTES 01/24/2018 2.4  0.9 - 3.6 K/UL Final    ABS. MONOCYTES 01/24/2018 1.2  0.05 - 1.2 K/UL Final    ABS. EOSINOPHILS 01/24/2018 0.2  0.0 - 0.4 K/UL Final    ABS. BASOPHILS 01/24/2018 0.1  0.0 - 0.1 K/UL Final    DF 01/24/2018 AUTOMATED    Final    Sodium 01/24/2018 137  136 - 145 mmol/L Final    Potassium 01/24/2018 3.5  3.5 - 5.5 mmol/L Final    Chloride 01/24/2018 99* 100 - 108 mmol/L Final    CO2 01/24/2018 28  21 - 32 mmol/L Final    Anion gap 01/24/2018 10  3.0 - 18 mmol/L Final    Glucose 01/24/2018 97  74 - 99 mg/dL Final    BUN 01/24/2018 16  7.0 - 18 MG/DL Final    Creatinine 01/24/2018 1.02  0.6 - 1.3 MG/DL Final    BUN/Creatinine ratio 01/24/2018 16  12 - 20   Final    GFR est AA 01/24/2018 >60  >60 ml/min/1.73m2 Final    GFR est non-AA 01/24/2018 55* >60 ml/min/1.73m2 Final    Comment: (NOTE)  Estimated GFR is calculated using the Modification of Diet in Renal   Disease (MDRD) Study equation, reported for both  Americans   (GFRAA) and non- Americans (GFRNA), and normalized to 1.73m2   body surface area.  The physician must decide which value applies to   the patient. The MDRD study equation should only be used in   individuals age 25 or older. It has not been validated for the   following: pregnant women, patients with serious comorbid conditions,   or on certain medications, or persons with extremes of body size,   muscle mass, or nutritional status.  Calcium 01/24/2018 9.0  8.5 - 10.1 MG/DL Final    Bilirubin, total 01/24/2018 0.5  0.2 - 1.0 MG/DL Final    ALT (SGPT) 01/24/2018 22  13 - 56 U/L Final    AST (SGOT) 01/24/2018 12* 15 - 37 U/L Final    Alk.  phosphatase 01/24/2018 86  45 - 117 U/L Final    Protein, total 01/24/2018 7.8  6.4 - 8.2 g/dL Final    Albumin 01/24/2018 4.0  3.4 - 5.0 g/dL Final    Globulin 01/24/2018 3.8  2.0 - 4.0 g/dL Final    A-G Ratio 01/24/2018 1.1  0.8 - 1.7   Final    Lipase 01/24/2018 705* 73 - 393 U/L Final    Color 01/24/2018 YELLOW    Final    Appearance 01/24/2018 CLEAR    Final    Specific gravity 01/24/2018 >1.030* 1.005 - 1.030 Final    pH (UA) 01/24/2018 5.5  5.0 - 8.0   Final    Protein 01/24/2018 TRACE* NEG mg/dL Final    Glucose 01/24/2018 NEGATIVE   NEG mg/dL Final    Ketone 01/24/2018 TRACE* NEG mg/dL Final    Bilirubin 01/24/2018 NEGATIVE   NEG   Final    Blood 01/24/2018 MODERATE* NEG   Final    Urobilinogen 01/24/2018 0.2  0.2 - 1.0 EU/dL Final    Nitrites 01/24/2018 NEGATIVE   NEG   Final    Leukocyte Esterase 01/24/2018 SMALL* NEG   Final    WBC 01/24/2018 4 to 10  0 - 4 /hpf Final    RBC 01/24/2018 4 to 10  0 - 5 /hpf Final    Epithelial cells 01/24/2018 FEW  0 - 5 /lpf Final    Bacteria 01/24/2018 NEGATIVE   NEG /hpf Final   Hospital Outpatient Visit on 12/20/2017   Component Date Value Ref Range Status    Creatinine, POC 12/20/2017 0.8  0.6 - 1.3 MG/DL Final    GFRAA, POC 12/20/2017 >60  >60 ml/min/1.73m2 Final    GFRNA, POC 12/20/2017 >60  >60 ml/min/1.73m2 Final    Comment: Estimated GFR is calculated using the IDMS-traceable Modification of Diet in Renal Disease (MDRD) Study equation, reported for both  Americans (GFRAA) and non- Americans (GFRNA), and normalized to 1.73m2 body surface area. The physician must decide which value applies to the patient. The MDRD study equation should only be used in individuals age 25 or older. It has not been validated for the following: pregnant women, patients with serious comorbid conditions, or on certain medications, or persons with extremes of body size, muscle mass, or nutritional status. .No results found for any visits on 02/16/18. Assessment / Plan      ICD-10-CM ICD-9-CM    1. Generalized abdominal pain R10.84 789.07    2. Essential hypertension I10 401.9 labetalol (NORMODYNE) 100 mg tablet   3. Right renal artery stenosis (HCC) I70.1 440.1    4. Status post gastric banding Z98.84 V45.86      Increase Labetalol to 100 mg TID  she was advised to continue her maintenance medications  Follow up with vascular and bariatric surgery      Follow-up Disposition:  Return in about 3 months (around 5/16/2018). I asked Familia Roberto if she has any questions and I answered the questions. Familia Bhakta states that she understands the treatment plan and agrees with the treatment plan

## 2018-02-20 ENCOUNTER — OFFICE VISIT (OUTPATIENT)
Dept: SURGERY | Age: 63
End: 2018-02-20

## 2018-02-20 VITALS
HEART RATE: 91 BPM | DIASTOLIC BLOOD PRESSURE: 92 MMHG | WEIGHT: 176 LBS | SYSTOLIC BLOOD PRESSURE: 144 MMHG | RESPIRATION RATE: 16 BRPM | TEMPERATURE: 97.5 F | BODY MASS INDEX: 34.55 KG/M2 | OXYGEN SATURATION: 98 % | HEIGHT: 60 IN

## 2018-02-20 DIAGNOSIS — R10.84 GENERALIZED ABDOMINAL PAIN: ICD-10-CM

## 2018-02-20 DIAGNOSIS — E66.9 OBESITY (BMI 30.0-34.9): Primary | ICD-10-CM

## 2018-02-20 DIAGNOSIS — Z98.84 LAP-BAND SURGERY STATUS: ICD-10-CM

## 2018-02-20 DIAGNOSIS — R11.15 INTRACTABLE CYCLICAL VOMITING WITH NAUSEA: ICD-10-CM

## 2018-02-20 NOTE — PROGRESS NOTES
Ms. Nereida Durbin returns to the office for a follow-up of recent hospitalization for chronic and intractable nausea, vomiting, and abdominal pain. She has a lap band in place, however all of the upper GI testing performed thus far has not shown any obvious anatomical defects. She is under the care of a GI specialist who was the attending on her recent admission, and is due to follow-up with him for possible esophageal manometry. Recent barium swallow image shown below. Ms. Nereida Durbin notes that her symptoms persist: She reports severe heartburn, nausea and dry heaves, and is only able to hold down some liquids and soups. She is also still struggling with uncontrolled hypertension. Past Medical History:   Diagnosis Date    Aorto-iliac duplex 09/17/2015    Patent abdom aorta endovascular graft w/o leak. Mod stenosis suggested in right limb.  Calculus of kidney 2014    stent/kidney     Carotid duplex 09/17/2015    Mild < 50% bilateral plaquing.  Cataract, left     Chronic obstructive pulmonary disease ([address]     Echocardiogram 11/04/2014    EF 56%. No WMA. Mild LAE. Echo is within normal limits.  Esophageal reflux     Generalized osteoarthrosis, involving multiple sites     GERD (gastroesophageal reflux disease)     Gout     History of renal stent     right side    Hypercholesteremia     Hypertension     resolved    Lower extremity arterial testing 03/27/2015    Normal perfusion at rest and w/treadmill bilaterally.   R KORI 1.05.  L KORI 1.03.    Murmur     patient reports that she is aware- has been told in the past    Nausea & vomiting     Renal artery stenosis (HCC)     Subclinical hyperthyroidism 8/2015    Unspecified sleep apnea     resolved     Patient Active Problem List   Diagnosis Code    AAA (abdominal aortic aneurysm) ([address] I71.4    Essential hypertension I10    Hyperlipidemia E78.5    Subclinical hyperthyroidism E05.90    Generalized osteoarthrosis, involving multiple sites M15.9    Esophageal reflux K21.9    Renal infarction ([address] N28.0    Dysarthria R47.1    LAP-BAND surgery status Z98.84    Non morbid obesity due to excess calories E66.09    Gastroesophageal reflux disease without esophagitis K21.9    Obesity (BMI 30.0-34. 9) E66.9    Peripheral vascular disease (HCC) I73.9    Right-sided carotid artery disease (HCC) I77.9    Sacroiliitis (HCC) M46.1    Right hip pain M25.551    Spondylosis of lumbar region without myelopathy or radiculopathy M47.816    Lumbar neuritis M54.16    Left upper quadrant pain R10.12    Hypovitaminosis D E55.9    Lumbar radiculopathy M54.16    Hx of aortic aneurysm repair Z98.890, Z86.79    History of renal stent Z98.890    Right renal artery stenosis (HCC) I70.1    Abdominal pain R10.9    Intractable nausea and vomiting R11.2    Atrophy of right kidney N26.1    Generalized abdominal pain R10.84    Status post gastric banding Z98.84     Current Outpatient Prescriptions on File Prior to Visit   Medication Sig Dispense Refill    atorvastatin (LIPITOR) 40 mg tablet Take 1 Tab by mouth daily. 30 Tab 3    HYDROcodone-acetaminophen (NORCO)  mg tablet Take 1 Tab by mouth every six (6) hours as needed for Pain. Max Daily Amount: 4 Tabs. 120 Tab 0    pantoprazole (PROTONIX) 40 mg tablet Take 1 Tab by mouth Before breakfast and dinner. 60 Tab 0    ondansetron (ZOFRAN ODT) 8 mg disintegrating tablet Take 1 Tab by mouth every eight (8) hours as needed for Nausea. 30 Tab 0    amLODIPine (NORVASC) 10 mg tablet Take 1 Tab by mouth daily. 30 Tab 0    cloNIDine (CATAPRES) 0.2 mg/24 hr patch 1 Patch by TransDERmal route every seven (7) days. 4 Patch 0    mirtazapine (REMERON) 15 mg tablet TAKE ONE TABLET BY MOUTH EVERY EVENING 30 Tab 0    docusate sodium (COLACE) 100 mg capsule Take 1 Cap by mouth two (2) times a day.  (Patient taking differently: Take 100 mg by mouth daily.) 60 Cap 5    esomeprazole (NEXIUM) 20 mg capsule Take 1 Cap by mouth daily. STOP PREVACID  Indications: HEARTBURN 30 Cap 1    diclofenac (VOLTAREN) 1 % gel Apply 2 g to affected area four (4) times daily. 1 Each 2    montelukast (SINGULAIR) 10 mg tablet TAKE 1 TABLET DAILY 90 Tab 2    clopidogrel (PLAVIX) 75 mg tab TAKE 1 TABLET DAILY 90 Tab 2    DALIRESP tab tablet TAKE 1 TABLET DAILY 90 Tab 2    fluticasone-salmeterol (ADVAIR DISKUS) 250-50 mcg/dose diskus inhaler Take 1 Puff by inhalation daily. (Patient taking differently: Take 2 Puffs by inhalation daily. ) 1 Inhaler 5     No current facility-administered medications on file prior to visit. Weight Metrics 2/21/2018 2/20/2018 2/16/2018 2/13/2018 2/13/2018 2/12/2018 2/5/2018   Weight 175 lb 176 lb 172 lb 172 lb 172 lb 172 lb 177 lb   BMI 34.18 kg/m2 34.37 kg/m2 33.59 kg/m2 33.59 kg/m2 33.59 kg/m2 33.59 kg/m2 34.57 kg/m2     Visit Vitals    BP (!) 144/92    Pulse 91    Temp 97.5 °F (36.4 °C) (Oral)    Resp 16    Ht 5' (1.524 m)    Wt 79.8 kg (176 lb)    SpO2 98%    BMI 34.37 kg/m2     Appears uncomfortable  Abd: tender to palpation, generalized    A/P: Patient continues to struggle with nausea, vomiting, abdominal pain, with a lap band in place. Recommend patient follow-up with GI, or seek second opinion closer to home to continue physiologic workup. Recommend patient continue to push fluids as able, and start to sip on protein shakes for nutrition.   Follow-up 1 month, sooner as needed  Latonya Guzman PA-C

## 2018-02-21 ENCOUNTER — OFFICE VISIT (OUTPATIENT)
Dept: CARDIOLOGY CLINIC | Age: 63
End: 2018-02-21

## 2018-02-21 VITALS
DIASTOLIC BLOOD PRESSURE: 82 MMHG | HEIGHT: 60 IN | BODY MASS INDEX: 34.36 KG/M2 | SYSTOLIC BLOOD PRESSURE: 144 MMHG | HEART RATE: 94 BPM | WEIGHT: 175 LBS

## 2018-02-21 DIAGNOSIS — Z98.890 HX OF AORTIC ANEURYSM REPAIR: ICD-10-CM

## 2018-02-21 DIAGNOSIS — Z86.79 HX OF AORTIC ANEURYSM REPAIR: ICD-10-CM

## 2018-02-21 DIAGNOSIS — I10 ESSENTIAL HYPERTENSION: ICD-10-CM

## 2018-02-21 DIAGNOSIS — I70.1 RIGHT RENAL ARTERY STENOSIS (HCC): ICD-10-CM

## 2018-02-21 DIAGNOSIS — R00.2 PALPITATION: ICD-10-CM

## 2018-02-21 DIAGNOSIS — E78.2 MIXED HYPERLIPIDEMIA: ICD-10-CM

## 2018-02-21 DIAGNOSIS — R06.02 SOB (SHORTNESS OF BREATH) ON EXERTION: Primary | ICD-10-CM

## 2018-02-21 RX ORDER — CARVEDILOL 25 MG/1
25 TABLET ORAL 2 TIMES DAILY WITH MEALS
Qty: 60 TAB | Refills: 6 | Status: SHIPPED | OUTPATIENT
Start: 2018-02-21 | End: 2018-09-15 | Stop reason: SDUPTHER

## 2018-02-21 RX ORDER — METOCLOPRAMIDE 5 MG/1
5 TABLET ORAL
COMMUNITY
End: 2018-03-14

## 2018-02-21 NOTE — PROGRESS NOTES
HISTORY OF PRESENT ILLNESS  Keyshawn Patel is a 66 y.o. female. New Patient   The history is provided by the patient. Associated symptoms include shortness of breath. Pertinent negatives include no chest pain, no abdominal pain and no headaches. Shortness of Breath   The history is provided by the patient. This is a recurrent problem. The problem occurs frequently. The problem has been gradually worsening. Pertinent negatives include no fever, no headaches, no cough, no sputum production, no hemoptysis, no wheezing, no PND, no orthopnea, no chest pain, no vomiting, no abdominal pain, no rash, no leg swelling and no claudication. Precipitated by: exertion. Hypertension   The history is provided by the patient. This is a chronic problem. The problem occurs constantly. The problem has been gradually worsening. Associated symptoms include shortness of breath. Pertinent negatives include no chest pain, no abdominal pain and no headaches. Nothing aggravates the symptoms. Palpitations    The history is provided by the patient. This is a new problem. The current episode started more than 1 week ago. The problem has not changed since onset. The problem occurs daily. Associated symptoms include shortness of breath. Pertinent negatives include no fever, no chest pain, no claudication, no orthopnea, no PND, no abdominal pain, no nausea, no vomiting, no headaches, no dizziness, no weakness, no cough, no hemoptysis and no sputum production. Her past medical history is significant for hypertension. Review of Systems   Constitutional: Negative for chills and fever. HENT: Negative for nosebleeds. Eyes: Negative for blurred vision and double vision. Respiratory: Positive for shortness of breath. Negative for cough, hemoptysis, sputum production and wheezing. Cardiovascular: Positive for palpitations. Negative for chest pain, orthopnea, claudication, leg swelling and PND.    Gastrointestinal: Negative for abdominal pain, heartburn, nausea and vomiting. Musculoskeletal: Negative for myalgias. Skin: Negative for rash. Neurological: Negative for dizziness, weakness and headaches. Endo/Heme/Allergies: Does not bruise/bleed easily. Family History   Problem Relation Age of Onset   [facility] Mother     Diabetes Father     Alcohol abuse Father     Heart Disease Maternal Grandmother     Alzheimer Maternal Grandmother     Diabetes Sister        Past Medical History:   Diagnosis Date    Aorto-iliac duplex 09/17/2015    Patent abdom aorta endovascular graft w/o leak. Mod stenosis suggested in right limb.  Calculus of kidney 2014    stent/kidney     Carotid duplex 09/17/2015    Mild < 50% bilateral plaquing.  Cataract, left     Chronic obstructive pulmonary disease ([address]     Echocardiogram 11/04/2014    EF 56%. No WMA. Mild LAE. Echo is within normal limits.  Esophageal reflux     Generalized osteoarthrosis, involving multiple sites     GERD (gastroesophageal reflux disease)     Gout     History of renal stent     right side    Hypercholesteremia     Hypertension     resolved    Lower extremity arterial testing 03/27/2015    Normal perfusion at rest and w/treadmill bilaterally.   R KORI 1.05.  L KORI 1.03.    Murmur     patient reports that she is aware- has been told in the past    Nausea & vomiting     Renal artery stenosis ([address]     Subclinical hyperthyroidism 8/2015    Unspecified sleep apnea     resolved       Past Surgical History:   Procedure Laterality Date    ADJUSTMENT GASTRIC BAND N/A 11/10/2016    MAKAYLA Cullen    HX APPENDECTOMY      HX BLADDER SUSPENSION      HX CHOLECYSTECTOMY      HX GASTRIC BYPASS      lap band 2012    HX HEENT Right 1990s, 2016    Ear sx    HX HYSTERECTOMY      HX OTHER SURGICAL  10/27/14     Bilateral open femoral exposures, Placement of catheter and sheath in the aorta Endo repair of aortic aneurysm with modular bifurcated device Interpretation of angiography, intravascular ultrasound (IVUS) catheter    HX OTHER SURGICAL  10/27/2014    Endurant II abdominal stent graft implant    HX UROLOGICAL      stent    VASCULAR SURGERY PROCEDURE UNLIST      surgery for abdominal aneurysm       Social History   Substance Use Topics    Smoking status: Former Smoker     Packs/day: 0.00     Years: 0.00     Types: Cigarettes     Quit date: 1/25/2016    Smokeless tobacco: Never Used    Alcohol use No       Allergies   Allergen Reactions    Pcn [Penicillins] Anaphylaxis    Tetracycline Anaphylaxis    Sulfabenzamide Hives       Prior to Admission medications    Medication Sig Start Date End Date Taking? Authorizing Provider   metoclopramide HCl (REGLAN) 5 mg tablet Take 5 mg by mouth Before breakfast, lunch, and dinner. Yes Historical Provider   carvedilol (COREG) 25 mg tablet Take 1 Tab by mouth two (2) times daily (with meals). 2/21/18  Yes Peña Cortez MD   atorvastatin (LIPITOR) 40 mg tablet Take 1 Tab by mouth daily. 2/13/18  Yes Melania Lino MD   HYDROcodone-acetaminophen [facility])  mg tablet Take 1 Tab by mouth every six (6) hours as needed for Pain. Max Daily Amount: 4 Tabs. 2/5/18  Yes Toya Castaneda MD   pantoprazole (PROTONIX) 40 mg tablet Take 1 Tab by mouth Before breakfast and dinner. 2/2/18  Yes Jeff Wilcox MD   ondansetron (ZOFRAN ODT) 8 mg disintegrating tablet Take 1 Tab by mouth every eight (8) hours as needed for Nausea. 2/2/18  Yes Jeff Wilcox MD   amLODIPine (NORVASC) 10 mg tablet Take 1 Tab by mouth daily. 2/2/18  Yes Jeff Wilcox MD   cloNIDine (CATAPRES) 0.2 mg/24 hr patch 1 Patch by TransDERmal route every seven (7) days. 2/8/18  Yes Jeff Wilcox MD   mirtazapine (REMERON) 15 mg tablet TAKE ONE TABLET BY MOUTH EVERY EVENING 1/30/18  Yes Vu Medina NP   docusate sodium (COLACE) 100 mg capsule Take 1 Cap by mouth two (2) times a day. Patient taking differently: Take 100 mg by mouth daily. 10/5/17  Yes Airam Askew MD   esomeprazole (NEXIUM) 20 mg capsule Take 1 Cap by mouth daily. STOP PREVACID  Indications: HEARTBURN 9/13/17  Yes Afshan Harris NP   diclofenac (VOLTAREN) 1 % gel Apply 2 g to affected area four (4) times daily. 9/6/17  Yes Airam Askew MD   montelukast (SINGULAIR) 10 mg tablet TAKE 1 TABLET DAILY 7/31/17  Yes Afshan Harris NP   clopidogrel (PLAVIX) 75 mg tab TAKE 1 TABLET DAILY 7/31/17  Yes Afshan Harris NP   DALIRESP tab tablet TAKE 1 TABLET DAILY 7/31/17  Yes Afshan Harris NP   fluticasone-salmeterol (ADVAIR DISKUS) 250-50 mcg/dose diskus inhaler Take 1 Puff by inhalation daily. Patient taking differently: Take 2 Puffs by inhalation daily. 5/6/16  Yes Airam Askew MD         Visit Vitals    /82    Pulse 94    Ht 5' (1.524 m)    Wt 79.4 kg (175 lb)    BMI 34.18 kg/m2         Physical Exam   Constitutional: She is oriented to person, place, and time. She appears well-developed and well-nourished. HENT:   Head: Normocephalic and atraumatic. Eyes: Conjunctivae are normal.   Neck: Neck supple. No JVD present. No tracheal deviation present. No thyromegaly present. Cardiovascular: Normal rate and regular rhythm. PMI is not displaced. Exam reveals no gallop, no S3 and no decreased pulses. No murmur heard. Pulmonary/Chest: No respiratory distress. She has no wheezes. She has no rales. She exhibits no tenderness. Abdominal: Soft. There is no tenderness. Musculoskeletal: She exhibits no edema. Neurological: She is alert and oriented to person, place, and time. Skin: Skin is warm. Psychiatric: She has a normal mood and affect. Ms. Jitendra Luu has a reminder for a \"due or due soon\" health maintenance. I have asked that she contact her primary care provider for follow-up on this health maintenance.     I have personally reviewed patient's records available from hospital and other providers and incorporated findings in patient care. I Have personally reviewed recent relevant labs available and discussed with patient  SUMMARY:2014  Left ventricle: Size was normal. Systolic function was normal by EF  (biplane method of disks). Ejection fraction was estimated to be 56 %. No  obvious wall motion abnormalities identified in the views obtained. Right ventricle: The size was normal. The tricuspid jet envelope  definition was inadequate for estimation of RV systolic pressure. There  are no indirect findings (abnormal RV volume or geometry, altered  pulmonary flow velocity profile, or leftward septal displacement) which  would suggest moderate or severe pulmonary hypertension. Left atrium: The atrium was mildly dilated. 3/2017  Impression:   1. Low risk pharmacological cardiac nuclear stress test.  2. Normal left ventricular systolic function calculated at 66%. No  regional wall motion abnormalities. 3. No evidence of ischemia or previous infarct based on perfusion  imaging. 4. Normal EKG portion of stress test.      Assessment         ICD-10-CM ICD-9-CM    1. SOB (shortness of breath) on exertion R06.02 786.05 2D ECHO COMPLETE ADULT (TTE)      SCHEDULE NUCLEAR STUDY    progressively worse  ? chf,cmp,hcvd  negative stress test 1 year ago   2. Essential hypertension I10 401.9     labile  meds adjusted   3. Mixed hyperlipidemia E78.2 272.2    4. Right renal artery stenosis (HCC) I70.1 440. 1     post stent     5. Hx of aortic aneurysm repair Z98.890 V45.89     Z86.79     6. History of renal stent Z98.890 V45.89    7. Palpitation R00.2 785. 1 ECG MONITOR/24 HRS,COMPLETE    ? arrhthmia       Medications Discontinued During This Encounter   Medication Reason    tiaGABine (GABITRIL) 2 mg tablet Not A Current Medication    Dexlansoprazole (DEXILANT) 60 mg CpDB Not A Current Medication    labetalol (NORMODYNE) 100 mg tablet Alternate Therapy       Orders Placed This Encounter    SCHEDULE NUCLEAR STUDY     lexiscan stress test Standing Status:   Future     Standing Expiration Date:   2/21/2019    2D ECHO COMPLETE ADULT (TTE)     Standing Status:   Future     Standing Expiration Date:   8/20/2018     Order Specific Question:   Reason for Exam:     Answer:   see diagnosis    ECG MONITOR/24 HRS,COMPLETE     Order Specific Question:   Reason for Exam:     Answer:   palpitation    carvedilol (COREG) 25 mg tablet     Sig: Take 1 Tab by mouth two (2) times daily (with meals). Dispense:  60 Tab     Refill:  6       Follow-up Disposition:  Return in about 4 weeks (around 3/21/2018).

## 2018-02-21 NOTE — LETTER
Kathryn Patillas 1955 2/21/2018 Dear Taiwo Collier MD 
 
I had the pleasure of evaluating  Ms. Roberto in office today. Below are the relevant portions of my assessment and plan of care. ICD-10-CM ICD-9-CM 1. SOB (shortness of breath) on exertion R06.02 786.05 2D ECHO COMPLETE ADULT (TTE) SCHEDULE NUCLEAR STUDY progressively worse 
? chf,cmp,hcvd 
negative stress test 1 year ago 2. Essential hypertension I10 401.9   
 labile 
meds adjusted 3. Mixed hyperlipidemia E78.2 272.2 4. Right renal artery stenosis (HCC) I70.1 440. 1 post stent 5. Hx of aortic aneurysm repair Z98.890 V45.89   
 Z86.79    
6. History of renal stent Z98.890 V45.89   
7. Palpitation R00.2 785. 1 ECG MONITOR/24 HRS,COMPLETE  
 ? arrhthmia Current Outpatient Prescriptions Medication Sig Dispense Refill  metoclopramide HCl (REGLAN) 5 mg tablet Take 5 mg by mouth Before breakfast, lunch, and dinner.  carvedilol (COREG) 25 mg tablet Take 1 Tab by mouth two (2) times daily (with meals). 60 Tab 6  
 atorvastatin (LIPITOR) 40 mg tablet Take 1 Tab by mouth daily. 30 Tab 3  
 HYDROcodone-acetaminophen (NORCO)  mg tablet Take 1 Tab by mouth every six (6) hours as needed for Pain. Max Daily Amount: 4 Tabs. 120 Tab 0  
 pantoprazole (PROTONIX) 40 mg tablet Take 1 Tab by mouth Before breakfast and dinner. 60 Tab 0  
 ondansetron (ZOFRAN ODT) 8 mg disintegrating tablet Take 1 Tab by mouth every eight (8) hours as needed for Nausea. 30 Tab 0  
 amLODIPine (NORVASC) 10 mg tablet Take 1 Tab by mouth daily. 30 Tab 0  cloNIDine (CATAPRES) 0.2 mg/24 hr patch 1 Patch by TransDERmal route every seven (7) days. 4 Patch 0  
 mirtazapine (REMERON) 15 mg tablet TAKE ONE TABLET BY MOUTH EVERY EVENING 30 Tab 0  
 docusate sodium (COLACE) 100 mg capsule Take 1 Cap by mouth two (2) times a day.  (Patient taking differently: Take 100 mg by mouth daily.) 60 Cap 5  
  esomeprazole (NEXIUM) 20 mg capsule Take 1 Cap by mouth daily. STOP PREVACID  Indications: HEARTBURN 30 Cap 1  diclofenac (VOLTAREN) 1 % gel Apply 2 g to affected area four (4) times daily. 1 Each 2  
 montelukast (SINGULAIR) 10 mg tablet TAKE 1 TABLET DAILY 90 Tab 2  clopidogrel (PLAVIX) 75 mg tab TAKE 1 TABLET DAILY 90 Tab 2  
 DALIRESP tab tablet TAKE 1 TABLET DAILY 90 Tab 2  
 fluticasone-salmeterol (ADVAIR DISKUS) 250-50 mcg/dose diskus inhaler Take 1 Puff by inhalation daily. (Patient taking differently: Take 2 Puffs by inhalation daily. ) 1 Inhaler 5 Orders Placed This Encounter  SCHEDULE NUCLEAR STUDY  
  lexiscan stress test  
  Standing Status:   Future Standing Expiration Date:   2/21/2019  2D ECHO COMPLETE ADULT (TTE) Standing Status:   Future Standing Expiration Date:   8/20/2018 Order Specific Question:   Reason for Exam: Answer:   see diagnosis  ECG MONITOR/24 HRS,COMPLETE Order Specific Question:   Reason for Exam: Answer:   palpitation  metoclopramide HCl (REGLAN) 5 mg tablet Sig: Take 5 mg by mouth Before breakfast, lunch, and dinner.  carvedilol (COREG) 25 mg tablet Sig: Take 1 Tab by mouth two (2) times daily (with meals). Dispense:  60 Tab Refill:  6 If you have questions, please do not hesitate to call me. I look forward to following Ms. Roberto along with you. Sincerely, Chapincito Rivera MD

## 2018-02-21 NOTE — MR AVS SNAPSHOT
303 [facility] Ne 
 
 
 [address] [facility], [address] [facility] [address] 
[phone] Patient: Tracy Mccray MRN: [id number] Visit Information Date & Time Provider Department Dept. Phone Encounter #  
 2/21/2018 11:45 AM Anand Barlow MD [facility] [address] [place] [address] [id number] Follow-up Instructions Return in about 4 weeks (around 3/21/2018). Your Appointments 2/22/2018  9:45 AM  
Follow Up with Lambert Acosta MD  
55 [facility]) Appt Note: BP Check [address], [address] [facility]20 [facility] [place]  
[phone]  
  
   
 [address], [address] [facility] [address]  
  
    
 3/7/2018  7:30 AM  
PROCEDURE with CA NUC [facility] [place] ([facility]) Appt Note: with echo Josue Postin [address], [address] [facility] [address]  
[address], [address] [address]  
  
    
 3/7/2018  8:30 AM  
PROCEDURE with CA ECHO [facility] [place] ([facility]) Appt Note: with nuc/ tierney [address] [facility], [address] [facility] [address]  
[address], [address] [address]  
  
    
 3/9/2018 10:00 AM  
Follow Up with Lambert Acosta MD  
55 [facility]) Appt Note: 3wk  
 [address], [address] [facility] [address]  
[phone]  
  
    
 3/14/2018 11:15 AM  
ESTABLISHED PATIENT with Anand Barlow MD  
[facility]) Appt Note: post echo/nuc [address] [facility], [address] [facility] [address]  
[address], 9352 [facility] [place] [address] [place] [address] [place] 5/15/2018  8:00 AM  
PROCEDURE with BSVVS IMAGING 1 [facility] ([facility]) Appt Note: evar endografcristobal hutton 3 months 2300 [facility] 834 200 [facility] [address]  
[phone] [address] [place] Kevin kumar, [place] 200 [facility] 5/15/2018  9:00 AM  
PROCEDURE with [facility]SVVS IMAGING 1 [facility] ([address] [place] [address]) Appt Note: rt renal occlusion anni 3 months 1212 [facility] 005 200 [facility] [address]  
[phone] 5/29/2018 10:45 AM  
Follow Up with Alexandra Tim MD  
[address] [place] [address] and [facility] [address] [place] [address]) Appt Note: 3 month after studies with prep 1212 [facility] [address] 144 200 [facility] [address]  
[phone] 2630 [address]  
  
    
 11/12/2018  9:00 AM  
PROCEDURE with [facility]VS IMAGING 1 [facility] ([address] [place] [address]) Appt Note: evar knsylwiak 1 year 1212 [facility] Ethan [address]ta[address] 678 200 [facility] [address]  
[phone]  
  
    
 11/12/2018 10:00 AM  
PROCEDURE with BSVVS IMAGING 1 [facility] ([address] [place] [address]) Appt Note: cv knaak 1 year 1212 [facility] Ethan Baltazar 202 200 [facility] [address]  
[phone]  
  
    
 11/27/2018 10:00 AM  
Follow Up with MAKAYLA Lewis [facility] ([address] [place] [address]) Appt Note: 1 year follow up after studies with prep 1212 [place] Madiha [address]ta[address] 932 200 [facility] [address]  
[phone] [facility] [address]  
  
    
  
 6/5/2018 10:45 AM  
Any with Tamie Urrutia MD  
[facility] ([address] [place] [address]) Appt Note: ep- 6 month follow up [address] [place] [address]  
[address] [facility] [place] [address] Upcoming Health Maintenance Date Due ZOSTER VACCINE AGE 60> 12/11/2016 Bone Densitometry 9/24/2017 BREAST CANCER SCRN MAMMOGRAM 6/1/2018 FOBT Q 1 YEAR AGE 50-75 1/31/2019 PAP AKA CERVICAL CYTOLOGY 8/12/2019 DTaP/Tdap/Td series (2 - Td) 9/1/2026 Allergies as of 2/21/2018  Review Complete On: 2/21/2018 By: Willy Teran MD  
  
 Severity Noted Reaction Type Reactions Pcn [Penicillins] High 09/18/2014    Anaphylaxis Tetracycline High 09/18/2014    Anaphylaxis Sulfabenzamide  09/18/2014    Hives Current Immunizations  Reviewed on 2/9/2018 Name Date Influenza Vaccine 10/31/2017 Influenza Vaccine (Quad) PF 9/13/2017, 9/1/2016 Pneumococcal Vaccine (Unspecified Type) 3/5/2013 Tdap 9/1/2016 Not reviewed this visit You Were Diagnosed With   
  
 Codes Comments SOB (shortness of breath) on exertion    -  Primary ICD-10-CM: R06.02 
ICD-9-CM: 786.05 progressively worse 
? chf,cmp,hcvd 
negative stress test 1 year ago Essential hypertension     ICD-10-CM: I10 
ICD-9-CM: 401.9 labile 
meds adjusted Mixed hyperlipidemia     ICD-10-CM: E78.2 ICD-9-CM: 272.2 Right renal artery stenosis (HCC)     ICD-10-CM: I70.1 ICD-9-CM: 440. 1 post stent Hx of aortic aneurysm repair     ICD-10-CM: Z98.890, Z86.79 
ICD-9-CM: V45.89 History of renal stent     ICD-10-CM: Z98.890 ICD-9-CM: V45.89 Palpitation     ICD-10-CM: R00.2 ICD-9-CM: 785.1 ? arrhthmia Vitals BP Pulse Height(growth percentile) Weight(growth percentile) BMI OB Status 144/82 94 5' (1.524 m) 175 lb (79.4 kg) 34.18 kg/m2 Hysterectomy Smoking Status Former Smoker Vitals History BMI and BSA Data Body Mass Index Body Surface Area  
 34.18 kg/m 2 1.83 m 2 Preferred Pharmacy Pharmacy Name Phone Mauro Lamas [address], 17 [facility] [phone] Your Updated Medication List  
  
   
This list is accurate as of 2/21/18 11:58 AM.  Always use your most recent med list. amLODIPine 10 mg tablet Commonly known as:  Shawna Rice Take 1 Tab by mouth daily. atorvastatin 40 mg tablet Commonly known as:  LIPITOR Take 1 Tab by mouth daily. carvedilol 25 mg tablet Commonly known as:  Kiaugie Evener Take 1 Tab by mouth two (2) times daily (with meals). cloNIDine 0.2 mg/24 hr patch Commonly known as:  CATAPRES  
1 Patch by TransDERmal route every seven (7) days. clopidogrel 75 mg Tab Commonly known as:  PLAVIX TAKE 1 TABLET DAILY DALIRESP Tab tablet Generic drug:  roflumilast  
TAKE 1 TABLET DAILY  
  
 diclofenac 1 % Gel Commonly known as:  VOLTAREN Apply 2 g to affected area four (4) times daily. docusate sodium 100 mg capsule Commonly known as:  Keo Contreras Take 1 Cap by mouth two (2) times a day. esomeprazole 20 mg capsule Commonly known as:  NexIUM Take 1 Cap by mouth daily. STOP PREVACID  Indications: HEARTBURN  
  
 fluticasone-salmeterol 250-50 mcg/dose diskus inhaler Commonly known as:  ADVAIR DISKUS Take 1 Puff by inhalation daily. HYDROcodone-acetaminophen  mg tablet Commonly known as:  Rajni Lopezens Take 1 Tab by mouth every six (6) hours as needed for Pain. Max Daily Amount: 4 Tabs. mirtazapine 15 mg tablet Commonly known as:  REMERON  
TAKE ONE TABLET BY MOUTH EVERY EVENING  
  
 montelukast 10 mg tablet Commonly known as:  SINGULAIR  
TAKE 1 TABLET DAILY  
  
 ondansetron 8 mg disintegrating tablet Commonly known as:  ZOFRAN ODT Take 1 Tab by mouth every eight (8) hours as needed for Nausea. pantoprazole 40 mg tablet Commonly known as:  PROTONIX Take 1 Tab by mouth Before breakfast and dinner. REGLAN 5 mg tablet Generic drug:  metoclopramide HCl Take 5 mg by mouth Before breakfast, lunch, and dinner. Prescriptions Sent to Pharmacy Refills  
 carvedilol (COREG) 25 mg tablet 6 Sig: Take 1 Tab by mouth two (2) times daily (with meals). Class: Normal  
 Pharmacy: Melvi Washburn [address] [place] [address] #: [phone] Route: Oral  
  
We Performed the Following ECG MONITOR/24 HRS,COMPLETE [id number] CPT(R)] Follow-up Instructions Return in about 4 weeks (around 3/21/2018). To-Do List   
 02/24/2018 Cardiac Services:  2D ECHO COMPLETE ADULT (TTE)   
  
 02/28/2018 Nursing:  SCHEDULE NUCLEAR STUDY Please provide this summary of care documentation to your next provider. Your primary care clinician is listed as Christiano Rothman. If you have any questions after today's visit, please call [phone].

## 2018-02-22 ENCOUNTER — OFFICE VISIT (OUTPATIENT)
Dept: INTERNAL MEDICINE CLINIC | Age: 63
End: 2018-02-22

## 2018-02-22 VITALS — DIASTOLIC BLOOD PRESSURE: 102 MMHG | SYSTOLIC BLOOD PRESSURE: 148 MMHG

## 2018-02-22 DIAGNOSIS — I10 ESSENTIAL HYPERTENSION: Primary | ICD-10-CM

## 2018-02-22 NOTE — PROGRESS NOTES
Patient came into the office today for a blood pressure check. Blood pressure was checked in the left arm at 148/102. Dr Danice Sandhoff informed and patient able to leave office ambulatory.

## 2018-02-22 NOTE — MR AVS SNAPSHOT
303 [facility] Ne 
 
 
 [address], [address] [facility] [address] 
[phone] Patient: Ferris Aase MRN: [id number] Visit Information Date & Time Provider Department Dept. Phone Encounter #  
 2/22/2018  9:45 AM Yash Casas MD [facility] INTERNAL MEDICINE OF [place] [phone] [id number] Your Appointments 3/1/2018  8:30 AM  
Follow Up with Yusra Albright NP  
[facility] INTERNAL MEDICINE OF [place] ([facility]) Appt Note: 1 wk f/u  
 [address], [address] 4300 [facility]  
[phone]  
  
   
 [address], [address] [facility] [address]  
  
    
 3/7/2018  7:30 AM  
PROCEDURE with [place] NUC [facility] [place] ([facility]) Appt Note: with echo Kiah Zia wt [address], [address] [facility] [address]  
[address], [address] [address]  
  
    
 3/7/2018  8:30 AM  
PROCEDURE with CA ECHO [facility] [place] ([facility]) Appt Note: with nuc/ tierney 178 [facility], [address] [facility] [address]  
[address], [address] [address]  
  
    
 3/9/2018 10:00 AM  
Follow Up with Yash Casas MD  
55 [facility]) Appt Note: 3wk  
 [address], [address] [facility] [address]  
  
   
 [address], [address] [facility] [address] 3/14/2018 11:15 AM  
ESTABLISHED PATIENT with Yoel Craft MD  
[facility]) Appt Note: post echo/nuc 178 [facility], [address] [facility] [address]  
[address], 9352 [facility] 4300 [facility] 5/15/2018  8:00 AM  
PROCEDURE with BSVVS IMAGING 1 [facility] ([facility]) Appt Note: evar endogrrossana hutton 3 months [address] [place] [address] [address] [address] [phone] [address] [facility] [address] [place], [place] [address] [place] 5/15/2018  9:00 AM  
PROCEDURE with BSVVS IMAGING 1 [facility] ([address] [place] [address]) Appt Note: rt renal occlusion anni 3 months [address] Mchenry Griffon 858 706 [place]  
[phone] 5/29/2018 10:45 AM  
Follow Up with Alexandra Tim MD  
[facility] [address] [place] [address]) Appt Note: 3 month after studies with prep 2300 Ollie [address] 531 706 [place]  
[phone] [address] [place] [address],Suite [facility]  
  
    
 11/12/2018  9:00 AM  
PROCEDURE with BSVVS IMAGING 1 [facility] ([address] [place] [address] Road) Appt Note: evar stephaniek 1 year 2300 [facility] [address] 532 [address] [place]  
[phone]  
  
    
 11/12/2018 10:00 AM  
PROCEDURE with BSVVS IMAGING 1 [facility] ([address] [place] [address] Road) Appt Note: cv knaak 1 year 2300 [facility] [address] 136 706 [place]  
[phone]  
  
    
 11/27/2018 10:00 AM  
Follow Up with MAKAYLA Lewis [facility] ([address] [place] [address] Road) Appt Note: 1 year follow up after studies with prep 2300 [address] 123 706 [place]  
[phone] 2300 [facility] Ethan [address]ta[address] 47 [facility]  
  
    
  
 6/5/2018 10:45 AM  
Any with Tamie Urrutia MD  
[facility] ([address]) Appt Note: ep- 6 month follow up  
 709 [facility] [address]  
  
   
 709 [facility] [address] Upcoming Health Maintenance Date Due ZOSTER VACCINE AGE 60> 12/11/2016 Bone Densitometry 9/24/2017 BREAST CANCER SCRN MAMMOGRAM 6/1/2018 FOBT Q 1 YEAR AGE 50-75 1/31/2019 PAP AKA CERVICAL CYTOLOGY 8/12/2019 DTaP/Tdap/Td series (2 - Td) 9/1/2026 Allergies as of 2/22/2018  Review Complete On: 2/21/2018 By: Anshul Muñoz MD  
  
 Severity Noted Reaction Type Reactions Pcn [Penicillins] High 09/18/2014    Anaphylaxis Tetracycline High 09/18/2014    Anaphylaxis Sulfabenzamide  09/18/2014    Hives Current Immunizations  Reviewed on 2/9/2018 Name Date Influenza Vaccine 10/31/2017 Influenza Vaccine (Quad) PF 9/13/2017, 9/1/2016 Pneumococcal Vaccine (Unspecified Type) 3/5/2013 Tdap 9/1/2016 Not reviewed this visit Vitals BP OB Status Smoking Status (!) 148/102 (BP 1 Location: Left arm, BP Patient Position: Sitting) Hysterectomy Former Smoker Preferred Pharmacy Pharmacy Name Phone [address] [facility] [phone] Your Updated Medication List  
  
   
This list is accurate as of 2/22/18 10:52 AM.  Always use your most recent med list. amLODIPine 10 mg tablet Commonly known as:  Candice Sadler Take 1 Tab by mouth daily. atorvastatin 40 mg tablet Commonly known as:  LIPITOR Take 1 Tab by mouth daily. carvedilol 25 mg tablet Commonly known as:  Lurlene Solo Take 1 Tab by mouth two (2) times daily (with meals). cloNIDine 0.2 mg/24 hr patch Commonly known as:  CATAPRES  
1 Patch by TransDERmal route every seven (7) days. clopidogrel 75 mg Tab Commonly known as:  PLAVIX TAKE 1 TABLET DAILY DALIRESP Tab tablet Generic drug:  roflumilast  
TAKE 1 TABLET DAILY  
  
 diclofenac 1 % Gel Commonly known as:  VOLTAREN Apply 2 g to affected area four (4) times daily. docusate sodium 100 mg capsule Commonly known as:  Olene Cam Take 1 Cap by mouth two (2) times a day. esomeprazole 20 mg capsule Commonly known as:  NexIUM Take 1 Cap by mouth daily. STOP PREVACID  Indications: HEARTBURN  
  
 fluticasone-salmeterol 250-50 mcg/dose diskus inhaler Commonly known as:  ADVAIR DISKUS Take 1 Puff by inhalation daily. HYDROcodone-acetaminophen  mg tablet Commonly known as:  Adolm Ramirez Take 1 Tab by mouth every six (6) hours as needed for Pain. Max Daily Amount: 4 Tabs. mirtazapine 15 mg tablet Commonly known as:  REMERON  
TAKE ONE TABLET BY MOUTH EVERY EVENING  
  
 montelukast 10 mg tablet Commonly known as:  SINGULAIR  
TAKE 1 TABLET DAILY  
  
 ondansetron 8 mg disintegrating tablet Commonly known as:  ZOFRAN ODT Take 1 Tab by mouth every eight (8) hours as needed for Nausea. pantoprazole 40 mg tablet Commonly known as:  PROTONIX Take 1 Tab by mouth Before breakfast and dinner. REGLAN 5 mg tablet Generic drug:  metoclopramide HCl Take 5 mg by mouth Before breakfast, lunch, and dinner. Please provide this summary of care documentation to your next provider. Your primary care clinician is listed as Stacy Day. If you have any questions after today's visit, please call [phone].

## 2018-02-22 NOTE — LETTER
18 To: [facility] 
 
RE:  Lloyd Mcintosh :  1955 Please allow [employer] for Lloyd Mcintosh. I feel that it is medically necessary for her to start attending classes on a regular basis. Thank you. Sincerely, Lucero Beckford M.D.   [facility]

## 2018-02-28 DIAGNOSIS — G47.00 INSOMNIA, UNSPECIFIED TYPE: ICD-10-CM

## 2018-02-28 RX ORDER — MIRTAZAPINE 15 MG/1
TABLET, FILM COATED ORAL
Qty: 30 TAB | Refills: 0 | Status: SHIPPED | OUTPATIENT
Start: 2018-02-28 | End: 2018-03-31 | Stop reason: SDUPTHER

## 2018-03-01 DIAGNOSIS — R19.7 DIARRHEA, UNSPECIFIED TYPE: ICD-10-CM

## 2018-03-01 DIAGNOSIS — R11.0 NAUSEA: ICD-10-CM

## 2018-03-01 RX ORDER — HYDROCODONE BITARTRATE AND ACETAMINOPHEN 10; 325 MG/1; MG/1
1 TABLET ORAL
Qty: 120 TAB | Refills: 0 | Status: SHIPPED | OUTPATIENT
Start: 2018-03-06 | End: 2018-04-02 | Stop reason: SDUPTHER

## 2018-03-01 RX ORDER — CLOPIDOGREL BISULFATE 75 MG/1
TABLET ORAL
Qty: 90 TAB | Refills: 2 | Status: ON HOLD | OUTPATIENT
Start: 2018-03-01 | End: 2018-07-17

## 2018-03-01 NOTE — TELEPHONE ENCOUNTER
Requested Prescriptions     Pending Prescriptions Disp Refills    clopidogrel (PLAVIX) 75 mg tab 90 Tab 2    HYDROcodone-acetaminophen (NORCO)  mg tablet 120 Tab 0     Sig: Take 1 Tab by mouth every six (6) hours as needed for Pain. Max Daily Amount: 4 Tabs.

## 2018-03-03 DIAGNOSIS — K21.9 GASTROESOPHAGEAL REFLUX DISEASE, ESOPHAGITIS PRESENCE NOT SPECIFIED: ICD-10-CM

## 2018-03-06 RX ORDER — RANITIDINE 150 MG/1
TABLET, FILM COATED ORAL
Qty: 60 TAB | Refills: 1 | Status: SHIPPED | OUTPATIENT
Start: 2018-03-06 | End: 2018-05-01 | Stop reason: SDUPTHER

## 2018-03-06 RX ORDER — AMLODIPINE BESYLATE 10 MG/1
10 TABLET ORAL DAILY
Qty: 90 TAB | Refills: 0 | Status: SHIPPED | OUTPATIENT
Start: 2018-03-06 | End: 2018-06-02 | Stop reason: SDUPTHER

## 2018-03-07 ENCOUNTER — CLINICAL SUPPORT (OUTPATIENT)
Dept: CARDIOLOGY CLINIC | Age: 63
End: 2018-03-07

## 2018-03-07 DIAGNOSIS — I10 ESSENTIAL HYPERTENSION, MALIGNANT: ICD-10-CM

## 2018-03-07 DIAGNOSIS — R00.2 PALPITATIONS: ICD-10-CM

## 2018-03-07 DIAGNOSIS — R06.02 SOB (SHORTNESS OF BREATH) ON EXERTION: ICD-10-CM

## 2018-03-07 DIAGNOSIS — R06.02 SHORTNESS OF BREATH: Primary | ICD-10-CM

## 2018-03-09 ENCOUNTER — OFFICE VISIT (OUTPATIENT)
Dept: INTERNAL MEDICINE CLINIC | Age: 63
End: 2018-03-09

## 2018-03-09 ENCOUNTER — TELEPHONE (OUTPATIENT)
Dept: SURGERY | Age: 63
End: 2018-03-09

## 2018-03-09 VITALS
BODY MASS INDEX: 34.36 KG/M2 | SYSTOLIC BLOOD PRESSURE: 162 MMHG | HEART RATE: 78 BPM | RESPIRATION RATE: 16 BRPM | WEIGHT: 175 LBS | TEMPERATURE: 97.3 F | OXYGEN SATURATION: 99 % | HEIGHT: 60 IN | DIASTOLIC BLOOD PRESSURE: 88 MMHG

## 2018-03-09 DIAGNOSIS — I10 ESSENTIAL HYPERTENSION: Primary | ICD-10-CM

## 2018-03-09 DIAGNOSIS — Z98.84 LAP-BAND SURGERY STATUS: ICD-10-CM

## 2018-03-09 DIAGNOSIS — R10.84 GENERALIZED ABDOMINAL PAIN: ICD-10-CM

## 2018-03-09 NOTE — TELEPHONE ENCOUNTER
Juan from [facility] office called to inquire of when patient's surgery was, advised per Latonya's note patient is to follow up in 1 month or sooner. Verbalized understanding.

## 2018-03-09 NOTE — MR AVS SNAPSHOT
303 [facility] Ne 
 
 
 340 [facility], [address] [facility] [address] 
[phone] Patient: Katherine Rg MRN: [id number] Visit Information Date & Time Provider Department Dept. Phone Encounter #  
 3/9/2018 10:00 AM Rome Jorge MD [facility] INTERNAL MEDICINE OF [place] [phone] [id number] Your Appointments 3/14/2018 11:15 AM  
ESTABLISHED PATIENT with Larry Beltran MD  
[facility]) Appt Note: post echo/nuc 178 [facility], [address] [facility] [address]  
[address], 9352 [facility] 83 [facility] 5/15/2018  8:00 AM  
PROCEDURE with BSVVS IMAGING 1 [facility] ([address] [place] [address]) Appt Note: evar endografy anni 3 months 2300 [facility] 666 200 [facility] [address]  
[phone] 2300 [facility] 200 [facility] 5/15/2018  9:00 AM  
PROCEDURE with BSVVS IMAGING 1 [facility] ([facility]) Appt Note: rt renal occlusion anni 3 months 2300 [facility] 062 200 [facility] [address]  
[phone] 5/29/2018 10:45 AM  
Follow Up with Justina Whitten MD  
[address] [place] [address] and [facility] [address] [place] [address]) Appt Note: 3 month after studies with prep 2300 [facility] 060 200 [facility] [address]  
[phone] 2630 [facility],[address]  
  
    
 11/12/2018  9:00 AM  
PROCEDURE with BSVVS IMAGING 1 [facility] ([address]) Appt Note: evar stephaniek 1 year 2300 [facility] 009 200 [facility] [address]  
[phone]  
  
    
 11/12/2018 10:00 AM  
PROCEDURE with BSVVS IMAGING 1 [facility] ([address]) Appt Note: cv knaak 1 year 2300 [facility] 309 200 [facility]  
[phone] 11/27/2018 10:00 AM  
Follow Up with MAKAYLA Garcia [facility] (36[address] [facility]) Appt Note: 1 year follow up after studies with prep 2300 [facility] Archana 003 200 [facility]  
[phone] 2300 [facility] [place] 47 [facility]  
  
    
  
 6/5/2018 10:45 AM  
Any with Shante Garcia MD  
[facility] (36[address] [facility]) Appt Note: ep- 6 month follow up  
 709 [facility] [address]  
  
   
 709 [facility] [address] [place] [address] Upcoming Health Maintenance Date Due ZOSTER VACCINE AGE 60> 12/11/2016 Bone Densitometry 9/24/2017 BREAST CANCER SCRN MAMMOGRAM 6/1/2018 FOBT Q 1 YEAR AGE 50-75 1/31/2019 PAP AKA CERVICAL CYTOLOGY 8/12/2019 DTaP/Tdap/Td series (2 - Td) 9/1/2026 Allergies as of 3/9/2018  Review Complete On: 3/9/2018 By: Clayton Masterson LPN Severity Noted Reaction Type Reactions Pcn [Penicillins] High 09/18/2014    Anaphylaxis Tetracycline High 09/18/2014    Anaphylaxis Sulfabenzamide  09/18/2014    Hives Current Immunizations  Reviewed on 2/9/2018 Name Date Influenza Vaccine 10/31/2017 Influenza Vaccine (Quad) PF 9/13/2017, 9/1/2016 Pneumococcal Vaccine (Unspecified Type) 3/5/2013 Tdap 9/1/2016 Not reviewed this visit Vitals BP Pulse Temp Resp Height(growth percentile) 162/88 (BP 1 Location: Left arm, BP Patient Position: Sitting) 78 97.3 °F (36.3 °C) (Tympanic) 16 5' (1.524 m) Weight(growth percentile) SpO2 BMI OB Status Smoking Status 175 lb (79.4 kg) 99% 34.18 kg/m2 Hysterectomy Former Smoker BMI and BSA Data Body Mass Index Body Surface Area  
 34.18 kg/m 2 1.83 m 2 Preferred Pharmacy Pharmacy Name Phone [facility] [address], 19 [facility] [phone] Your Updated Medication List  
  
   
 This list is accurate as of 3/9/18 11:37 AM.  Always use your most recent med list. amLODIPine 10 mg tablet Commonly known as:  Raisa Hair Take 1 Tab by mouth daily. atorvastatin 40 mg tablet Commonly known as:  LIPITOR Take 1 Tab by mouth daily. carvedilol 25 mg tablet Commonly known as:  Korin Prabhakar Take 1 Tab by mouth two (2) times daily (with meals). cloNIDine 0.2 mg/24 hr patch Commonly known as:  CATAPRES  
1 Patch by TransDERmal route every seven (7) days. clopidogrel 75 mg Tab Commonly known as:  PLAVIX TAKE 1 TABLET DAILY DALIRESP Tab tablet Generic drug:  roflumilast  
TAKE 1 TABLET DAILY  
  
 diclofenac 1 % Gel Commonly known as:  VOLTAREN Apply 2 g to affected area four (4) times daily. docusate sodium 100 mg capsule Commonly known as:  Jerre Carota Take 1 Cap by mouth two (2) times a day. esomeprazole 20 mg capsule Commonly known as:  NexIUM Take 1 Cap by mouth daily. STOP PREVACID  Indications: HEARTBURN  
  
 fluticasone-salmeterol 250-50 mcg/dose diskus inhaler Commonly known as:  ADVAIR DISKUS Take 1 Puff by inhalation daily. HYDROcodone-acetaminophen  mg tablet Commonly known as:  Hugo Lama Take 1 Tab by mouth every six (6) hours as needed for Pain. Max Daily Amount: 4 Tabs. mirtazapine 15 mg tablet Commonly known as:  REMERON  
TAKE ONE TABLET BY MOUTH EVERY EVENING  
  
 montelukast 10 mg tablet Commonly known as:  SINGULAIR  
TAKE 1 TABLET DAILY  
  
 ondansetron 8 mg disintegrating tablet Commonly known as:  ZOFRAN ODT Take 1 Tab by mouth every eight (8) hours as needed for Nausea. pantoprazole 40 mg tablet Commonly known as:  PROTONIX Take 1 Tab by mouth Before breakfast and dinner. raNITIdine 150 mg tablet Commonly known as:  ZANTAC TAKE ONE TABLET BY MOUTH TWICE A DAY  
  
 REGLAN 5 mg tablet Generic drug:  metoclopramide HCl Take 5 mg by mouth Before breakfast, lunch, and dinner. Please provide this summary of care documentation to your next provider. Your primary care clinician is listed as Toshia Barrett. If you have any questions after today's visit, please call [phone].

## 2018-03-11 DIAGNOSIS — I10 ESSENTIAL HYPERTENSION: ICD-10-CM

## 2018-03-11 DIAGNOSIS — G47.00 INSOMNIA, UNSPECIFIED TYPE: ICD-10-CM

## 2018-03-11 RX ORDER — CLONIDINE 0.2 MG/24H
1 PATCH, EXTENDED RELEASE TRANSDERMAL
Qty: 4 PATCH | Refills: 0 | Status: SHIPPED | OUTPATIENT
Start: 2018-03-11 | End: 2018-04-05 | Stop reason: SDUPTHER

## 2018-03-11 NOTE — PROGRESS NOTES
The patient presents to the office today with the chief complaint of abdominal pain    HPI    The patient persists with abdominal pain as before. The problem seems to be related to previous gastric banding. The patient is being evaluated for a dedo of this surgery. The patient remains on medications for hypertension      Review of Systems   Gastrointestinal: Positive for abdominal pain. Negative for nausea. Allergies   Allergen Reactions    Pcn [Penicillins] Anaphylaxis    Tetracycline Anaphylaxis    Sulfabenzamide Hives       Current Outpatient Prescriptions   Medication Sig Dispense Refill    cloNIDine (CATAPRES) 0.2 mg/24 hr patch 1 Patch by TransDERmal route every seven (7) days. 4 Patch 0    raNITIdine (ZANTAC) 150 mg tablet TAKE ONE TABLET BY MOUTH TWICE A DAY 60 Tab 1    amLODIPine (NORVASC) 10 mg tablet Take 1 Tab by mouth daily. 90 Tab 0    clopidogrel (PLAVIX) 75 mg tab TAKE 1 TABLET DAILY 90 Tab 2    HYDROcodone-acetaminophen (NORCO)  mg tablet Take 1 Tab by mouth every six (6) hours as needed for Pain. Max Daily Amount: 4 Tabs. 120 Tab 0    mirtazapine (REMERON) 15 mg tablet TAKE ONE TABLET BY MOUTH EVERY EVENING 30 Tab 0    metoclopramide HCl (REGLAN) 5 mg tablet Take 5 mg by mouth Before breakfast, lunch, and dinner.  carvedilol (COREG) 25 mg tablet Take 1 Tab by mouth two (2) times daily (with meals). 60 Tab 6    atorvastatin (LIPITOR) 40 mg tablet Take 1 Tab by mouth daily. 30 Tab 3    pantoprazole (PROTONIX) 40 mg tablet Take 1 Tab by mouth Before breakfast and dinner. 60 Tab 0    ondansetron (ZOFRAN ODT) 8 mg disintegrating tablet Take 1 Tab by mouth every eight (8) hours as needed for Nausea. 30 Tab 0    docusate sodium (COLACE) 100 mg capsule Take 1 Cap by mouth two (2) times a day. (Patient taking differently: Take 100 mg by mouth daily.) 60 Cap 5    esomeprazole (NEXIUM) 20 mg capsule Take 1 Cap by mouth daily.  STOP PREVACID  Indications: HEARTBURN 30 Cap 1  diclofenac (VOLTAREN) 1 % gel Apply 2 g to affected area four (4) times daily. 1 Each 2    montelukast (SINGULAIR) 10 mg tablet TAKE 1 TABLET DAILY 90 Tab 2    DALIRESP tab tablet TAKE 1 TABLET DAILY 90 Tab 2    fluticasone-salmeterol (ADVAIR DISKUS) 250-50 mcg/dose diskus inhaler Take 1 Puff by inhalation daily. (Patient taking differently: Take 2 Puffs by inhalation daily. ) 1 Inhaler 5       Past Medical History:   Diagnosis Date    Aorto-iliac duplex 09/17/2015    Patent abdom aorta endovascular graft w/o leak. Mod stenosis suggested in right limb.  Calculus of kidney 2014    stent/kidney     Carotid duplex 09/17/2015    Mild < 50% bilateral plaquing.  Cataract, left     Chronic obstructive pulmonary disease ([address]     Echocardiogram 11/04/2014    EF 56%. No WMA. Mild LAE. Echo is within normal limits.  Esophageal reflux     Generalized osteoarthrosis, involving multiple sites     GERD (gastroesophageal reflux disease)     Gout     History of renal stent     right side    Hypercholesteremia     Hypertension     resolved    Lower extremity arterial testing 03/27/2015    Normal perfusion at rest and w/treadmill bilaterally.   R KORI 1.05.  L KORI 1.03.    Murmur     patient reports that she is aware- has been told in the past    Nausea & vomiting     Renal artery stenosis ([address]     Subclinical hyperthyroidism 8/2015    Unspecified sleep apnea     resolved       Past Surgical History:   Procedure Laterality Date    ADJUSTMENT GASTRIC BAND N/A 11/10/2016    MAKAYLA Cullen    HX APPENDECTOMY      HX BLADDER SUSPENSION      HX CHOLECYSTECTOMY      HX GASTRIC BYPASS      lap band 2012    HX HEENT Right 1990s, 2016    Ear sx    HX HYSTERECTOMY      HX OTHER SURGICAL  10/27/14     Bilateral open femoral exposures, Placement of catheter and sheath in the aorta Endo repair of aortic aneurysm with modular bifurcated device  Interpretation of angiography, intravascular ultrasound (IVUS) catheter    HX OTHER SURGICAL  10/27/2014    Endurant II abdominal stent graft implant    HX UROLOGICAL      stent    VASCULAR SURGERY PROCEDURE UNLIST      surgery for abdominal aneurysm       Social History     Social History    Marital status:      Spouse name: N/A    Number of children: N/A    Years of education: N/A     Occupational History    Not on file. Social History Main Topics    Smoking status: Former Smoker     Packs/day: 0.00     Years: 0.00     Types: Cigarettes     Quit date: 1/25/2016    Smokeless tobacco: Never Used    Alcohol use No    Drug use: No    Sexual activity: Yes     Birth control/ protection: Surgical     Other Topics Concern    Not on file     Social History Narrative       Patient does not have an advanced directive on file    Visit Vitals    /88 (BP 1 Location: Left arm, BP Patient Position: Sitting)    Pulse 78    Temp 97.3 °F (36.3 °C) (Tympanic)    Resp 16    Ht 5' (1.524 m)    Wt 175 lb (79.4 kg)    SpO2 99%    BMI 34.18 kg/m2       Physical Exam   No Cervical Lymphadenopathy  No Supraclavicular Lymphadenopathy  Thyroid is Normal  Lungs are normal to percussion. Clear to auscultation   Heart:  S1 S2 are normal, No gallops, No mummers  No Carotid Bruits  Abdomen:  Normal Bowel Sounds. Moderate diffuse abdominal tenderness. No masses. No Hepatomegaly or Splenomegly  LE:  Strong Pedal Pulses.   No Edema      BMI:  The patient was advised to limit calories to 1800 ni and carbohydrates to 100 grams daily      Office Visit on 02/05/2018   Component Date Value Ref Range Status    Glucose 02/05/2018 88  70 - 99 mg/dL Final    BUN 02/05/2018 13  6 - 22 mg/dL Final    Creatinine 02/05/2018 0.9  0.8 - 1.4 mg/dL Final    Sodium 02/05/2018 142  133 - 145 mmol/L Final    Potassium 02/05/2018 4.1  3.5 - 5.5 mmol/L Final    Chloride 02/05/2018 100  98 - 110 mmol/L Final    CO2 02/05/2018 26  20 - 32 mmol/L Final    AST (SGOT) 02/05/2018 25 10 - 37 U/L Final    ALT (SGPT) 02/05/2018 32  5 - 40 U/L Final    Alk. phosphatase 02/05/2018 72  40 - 120 U/L Final    Bilirubin, total 02/05/2018 0.5  0.2 - 1.2 mg/dL Final    Calcium 02/05/2018 9.2  8.4 - 10.5 mg/dL Final    Protein, total 02/05/2018 6.4  6.2 - 8.1 g/dL Final    Albumin 02/05/2018 4.1  3.5 - 5.0 g/dL Final    A-G Ratio 02/05/2018 1.8  1.1 - 2.6 ratio Final    Globulin 02/05/2018 2.3  2.0 - 4.0 g/dL Final    Anion gap 02/05/2018 16.0  mmol/L Final    Comment: Test includes Albumin, Alkaline Phosphatase, ALT, AST, BUN, Calcium, CO2,  Chloride, Creatinine, Glucose, Potassium, Sodium, Total Bilirubin and Total  Protein. Estimated GFR results are reported in mL/min/1.73 sq.m. by the MDRD equation. This eGFR is validated for stable chronic renal failure patients. This   equation  is unreliable in acute illness or patients with normal renal function.  GFRAA 02/05/2018 >60.0  >60.0 Final    GFRNA 02/05/2018 60.0* >60.0 Final    WBC 02/05/2018 7.9  4.0 - 11.0 K/uL Final    RBC 02/05/2018 4.35  3.80 - 5.20 M/uL Final    HGB 02/05/2018 12.2  11.7 - 16.0 g/dL Final    HCT 02/05/2018 36.9  35.1 - 48.0 % Final    MCV 02/05/2018 85  80 - 95 fL Final    MCH 02/05/2018 28  26 - 34 pg Final    MCHC 02/05/2018 33  32 - 36 g/dL Final    RDW 02/05/2018 13.2  10.0 - 16.0 % Final    PLATELET 53/10/9207 070  140 - 440 K/uL Final    MPV 02/05/2018 11.4* 6.0 - 10.8 fL Final    NEUTROPHILS 02/05/2018 58  40 - 75 % Final    Lymphocytes 02/05/2018 27  27 - 45 % Final    MONOCYTES 02/05/2018 11* 3 - 9 % Final    EOSINOPHILS 02/05/2018 4  0 - 6 % Final    BASOPHILS 02/05/2018 1  0 - 2 % Final    ABS. NEUTROPHILS 02/05/2018 4.6  1.8 - 7.7 K/uL Final    ABSOLUTE LYMPHOCYTE COUNT 02/05/2018 2.2  1.0 - 4.8 K/uL Final    ABS. MONOCYTES 02/05/2018 0.9  0.1 - 0.9 K/uL Final    ABS. EOSINOPHILS 02/05/2018 0.3  0.0 - 0.5 K/uL Final    ABS.  BASOPHILS 02/05/2018 0.1  0.0 - 0.2 K/uL Final   Admission on 01/29/2018, Discharged on 02/02/2018   Component Date Value Ref Range Status    WBC 01/29/2018 6.6  4.6 - 13.2 K/uL Final    RBC 01/29/2018 4.82  4.20 - 5.30 M/uL Final    HGB 01/29/2018 13.6  12.0 - 16.0 g/dL Final    HCT 01/29/2018 40.1  35.0 - 45.0 % Final    MCV 01/29/2018 83.2  74.0 - 97.0 FL Final    MCH 01/29/2018 28.2  24.0 - 34.0 PG Final    MCHC 01/29/2018 33.9  31.0 - 37.0 g/dL Final    RDW 01/29/2018 12.9  11.6 - 14.5 % Final    PLATELET 22/13/6162 594  135 - 420 K/uL Final    MPV 01/29/2018 9.7  9.2 - 11.8 FL Final    NEUTROPHILS 01/29/2018 63  40 - 73 % Final    LYMPHOCYTES 01/29/2018 23  21 - 52 % Final    MONOCYTES 01/29/2018 10  3 - 10 % Final    EOSINOPHILS 01/29/2018 3  0 - 5 % Final    BASOPHILS 01/29/2018 1  0 - 2 % Final    ABS. NEUTROPHILS 01/29/2018 4.2  1.8 - 8.0 K/UL Final    ABS. LYMPHOCYTES 01/29/2018 1.5  0.9 - 3.6 K/UL Final    ABS. MONOCYTES 01/29/2018 0.6  0.05 - 1.2 K/UL Final    ABS. EOSINOPHILS 01/29/2018 0.2  0.0 - 0.4 K/UL Final    ABS. BASOPHILS 01/29/2018 0.1* 0.0 - 0.06 K/UL Final    DF 01/29/2018 AUTOMATED    Final    Sodium 01/29/2018 138  136 - 145 mmol/L Final    Potassium 01/29/2018 4.5  3.5 - 5.5 mmol/L Final    Chloride 01/29/2018 103  100 - 108 mmol/L Final    CO2 01/29/2018 30  21 - 32 mmol/L Final    Anion gap 01/29/2018 5  3.0 - 18 mmol/L Final    Glucose 01/29/2018 96  74 - 99 mg/dL Final    BUN 01/29/2018 10  7.0 - 18 MG/DL Final    Creatinine 01/29/2018 0.90  0.6 - 1.3 MG/DL Final    BUN/Creatinine ratio 01/29/2018 11* 12 - 20   Final    GFR est AA 01/29/2018 >60  >60 ml/min/1.73m2 Final    GFR est non-AA 01/29/2018 >60  >60 ml/min/1.73m2 Final    Comment: (NOTE)  Estimated GFR is calculated using the Modification of Diet in Renal   Disease (MDRD) Study equation, reported for both  Americans   (GFRAA) and non- Americans (GFRNA), and normalized to 1.73m2   body surface area.  The physician must decide which value applies to   the patient. The MDRD study equation should only be used in   individuals age 25 or older. It has not been validated for the   following: pregnant women, patients with serious comorbid conditions,   or on certain medications, or persons with extremes of body size,   muscle mass, or nutritional status.  Calcium 01/29/2018 9.0  8.5 - 10.1 MG/DL Final    Bilirubin, total 01/29/2018 0.6  0.2 - 1.0 MG/DL Final    ALT (SGPT) 01/29/2018 15  13 - 56 U/L Final    AST (SGOT) 01/29/2018 19  15 - 37 U/L Final    Alk. phosphatase 01/29/2018 83  45 - 117 U/L Final    Protein, total 01/29/2018 7.7  6.4 - 8.2 g/dL Final    Albumin 01/29/2018 3.8  3.4 - 5.0 g/dL Final    Globulin 01/29/2018 3.9  2.0 - 4.0 g/dL Final    A-G Ratio 01/29/2018 1.0  0.8 - 1.7   Final    Lipase 01/29/2018 134  73 - 393 U/L Final    Protein, total 01/29/2018 7.3  6.4 - 8.2 g/dL Final    Albumin 01/29/2018 3.9  3.4 - 5.0 g/dL Final    Globulin 01/29/2018 3.4  2.0 - 4.0 g/dL Final    A-G Ratio 01/29/2018 1.1  0.8 - 1.7   Final    Bilirubin, total 01/29/2018 0.6  0.2 - 1.0 MG/DL Final    Bilirubin, direct 01/29/2018 0.2  0.0 - 0.2 MG/DL Final    Alk.  phosphatase 01/29/2018 86  45 - 117 U/L Final    AST (SGOT) 01/29/2018 16  15 - 37 U/L Final    ALT (SGPT) 01/29/2018 19  13 - 56 U/L Final    Color 01/29/2018 YELLOW    Final    Appearance 01/29/2018 CLEAR    Final    Specific gravity 01/29/2018 1.007  1.005 - 1.030   Final    pH (UA) 01/29/2018 7.5  5.0 - 8.0   Final    Protein 01/29/2018 NEGATIVE   NEG mg/dL Final    Glucose 01/29/2018 NEGATIVE   NEG mg/dL Final    Ketone 01/29/2018 NEGATIVE   NEG mg/dL Final    Bilirubin 01/29/2018 NEGATIVE   NEG   Final    Blood 01/29/2018 TRACE* NEG   Final    Urobilinogen 01/29/2018 0.2  0.2 - 1.0 EU/dL Final    Nitrites 01/29/2018 NEGATIVE   NEG   Final    Leukocyte Esterase 01/29/2018 NEGATIVE   NEG   Final    WBC 01/29/2018 NEGATIVE   0 - 4 /hpf Final    RBC 01/29/2018 0 to 1  0 - 5 /hpf Final    Epithelial cells 01/29/2018 FEW  0 - 5 /lpf Final    Bacteria 01/29/2018 NEGATIVE   NEG /hpf Final    Sodium 01/30/2018 139  136 - 145 mmol/L Final    Potassium 01/30/2018 3.7  3.5 - 5.5 mmol/L Final    Chloride 01/30/2018 103  100 - 108 mmol/L Final    CO2 01/30/2018 24  21 - 32 mmol/L Final    Anion gap 01/30/2018 12  3.0 - 18 mmol/L Final    Glucose 01/30/2018 130* 74 - 99 mg/dL Final    BUN 01/30/2018 13  7.0 - 18 MG/DL Final    Creatinine 01/30/2018 0.83  0.6 - 1.3 MG/DL Final    BUN/Creatinine ratio 01/30/2018 16  12 - 20   Final    GFR est AA 01/30/2018 >60  >60 ml/min/1.73m2 Final    GFR est non-AA 01/30/2018 >60  >60 ml/min/1.73m2 Final    Calcium 01/30/2018 9.1  8.5 - 10.1 MG/DL Final    WBC 01/30/2018 13.4* 4.6 - 13.2 K/uL Final    RBC 01/30/2018 4.63  4.20 - 5.30 M/uL Final    HGB 01/30/2018 13.4  12.0 - 16.0 g/dL Final    HCT 01/30/2018 38.6  35.0 - 45.0 % Final    MCV 01/30/2018 83.4  74.0 - 97.0 FL Final    MCH 01/30/2018 28.9  24.0 - 34.0 PG Final    MCHC 01/30/2018 34.7  31.0 - 37.0 g/dL Final    RDW 01/30/2018 13.1  11.6 - 14.5 % Final    PLATELET 70/75/4042 829  135 - 420 K/uL Final    MPV 01/30/2018 10.2  9.2 - 11.8 FL Final    Vitamin B1 01/30/2018 173.8  66.5 - 200.0 nmol/L Final    Comment: (NOTE)  This test was developed and its performance characteristics  determined by [facility] It has not been cleared or approved  by the Food and Drug Administration.   Performed At: [address] [id number]  Melissa Murray MD [id number]      Lactic acid 01/30/2018 0.6  0.4 - 2.0 MMOL/L Final    Sodium 01/31/2018 140  136 - 145 mmol/L Final    Potassium 01/31/2018 3.4* 3.5 - 5.5 mmol/L Final    Chloride 01/31/2018 107  100 - 108 mmol/L Final    CO2 01/31/2018 26  21 - 32 mmol/L Final    Anion gap 01/31/2018 7  3.0 - 18 mmol/L Final    Glucose 01/31/2018 80  74 - 99 mg/dL Final    BUN 01/31/2018 15  7.0 - 18 MG/DL Final    Creatinine 01/31/2018 0.67  0.6 - 1.3 MG/DL Final    BUN/Creatinine ratio 01/31/2018 22* 12 - 20   Final    GFR est AA 01/31/2018 >60  >60 ml/min/1.73m2 Final    GFR est non-AA 01/31/2018 >60  >60 ml/min/1.73m2 Final    Calcium 01/31/2018 8.1* 8.5 - 10.1 MG/DL Final    WBC 01/31/2018 9.1  4.6 - 13.2 K/uL Final    RBC 01/31/2018 4.03* 4.20 - 5.30 M/uL Final    HGB 01/31/2018 11.3* 12.0 - 16.0 g/dL Final    HCT 01/31/2018 33.6* 35.0 - 45.0 % Final    MCV 01/31/2018 83.4  74.0 - 97.0 FL Final    MCH 01/31/2018 28.0  24.0 - 34.0 PG Final    MCHC 01/31/2018 33.6  31.0 - 37.0 g/dL Final    RDW 01/31/2018 13.0  11.6 - 14.5 % Final    PLATELET 23/15/6802 823  135 - 420 K/uL Final    MPV 01/31/2018 9.8  9.2 - 11.8 FL Final    HGB 01/31/2018 13.4  12.0 - 16.0 g/dL Final    HCT 01/31/2018 39.3  35.0 - 45.0 % Final    Occult blood, stool 01/31/2018 POSITIVE* NEG   Final    Ventricular Rate 01/31/2018 109  BPM Final    Atrial Rate 01/31/2018 109  BPM Final    P-R Interval 01/31/2018 152  ms Final    QRS Duration 01/31/2018 76  ms Final    Q-T Interval 01/31/2018 336  ms Final    QTC Calculation (Bezet) 01/31/2018 452  ms Final    Calculated P Axis 01/31/2018 68  degrees Final    Calculated R Axis 01/31/2018 70  degrees Final    Calculated T Axis 01/31/2018 67  degrees Final    Diagnosis 01/31/2018    Final                    Value:Sinus tachycardia  Otherwise normal ECG  When compared with ECG of 28-JAN-2018 16:50,  No significant change was found  Confirmed by Emiliano Phoenix, Saumil ([id number]) on 2/2/2018 8:24:02 AM      TSH 01/31/2018 0.04* 0.36 - 3.74 uIU/mL Final    Sodium 02/01/2018 138  136 - 145 mmol/L Final    Potassium 02/01/2018 3.0* 3.5 - 5.5 mmol/L Final    Chloride 02/01/2018 107  100 - 108 mmol/L Final    CO2 02/01/2018 25  21 - 32 mmol/L Final    Anion gap 02/01/2018 6  3.0 - 18 mmol/L Final    Glucose 02/01/2018 84  74 - 99 mg/dL Final    BUN 02/01/2018 16  7.0 - 18 MG/DL Final    Creatinine 02/01/2018 0.87  0.6 - 1.3 MG/DL Final    BUN/Creatinine ratio 02/01/2018 18  12 - 20   Final    GFR est AA 02/01/2018 >60  >60 ml/min/1.73m2 Final    GFR est non-AA 02/01/2018 >60  >60 ml/min/1.73m2 Final    Calcium 02/01/2018 8.3* 8.5 - 10.1 MG/DL Final    WBC 02/01/2018 9.7  4.6 - 13.2 K/uL Final    RBC 02/01/2018 3.98* 4.20 - 5.30 M/uL Final    HGB 02/01/2018 11.0* 12.0 - 16.0 g/dL Final    HCT 02/01/2018 33.1* 35.0 - 45.0 % Final    MCV 02/01/2018 83.2  74.0 - 97.0 FL Final    MCH 02/01/2018 27.6  24.0 - 34.0 PG Final    MCHC 02/01/2018 33.2  31.0 - 37.0 g/dL Final    RDW 02/01/2018 13.0  11.6 - 14.5 % Final    PLATELET 83/76/4911 030  135 - 420 K/uL Final    MPV 02/01/2018 10.1  9.2 - 11.8 FL Final    Cortisol, a.m. 02/01/2018 23.9* 4.30 - 22.40 ug/dL Final    Protein, total 02/01/2018 6.3* 6.4 - 8.2 g/dL Final    Albumin 02/01/2018 3.1* 3.4 - 5.0 g/dL Final    Globulin 02/01/2018 3.2  2.0 - 4.0 g/dL Final    A-G Ratio 02/01/2018 1.0  0.8 - 1.7   Final    Bilirubin, total 02/01/2018 0.7  0.2 - 1.0 MG/DL Final    Bilirubin, direct 02/01/2018 0.1  0.0 - 0.2 MG/DL Final    Alk.  phosphatase 02/01/2018 66  45 - 117 U/L Final    AST (SGOT) 02/01/2018 12* 15 - 37 U/L Final    ALT (SGPT) 02/01/2018 17  13 - 56 U/L Final    Lipase 02/01/2018 208  73 - 393 U/L Final    T4, Free 02/01/2018 1.3  0.7 - 1.5 NG/DL Final    Magnesium 02/01/2018 2.1  1.6 - 2.6 mg/dL Final    HGB 02/01/2018 12.2  12.0 - 16.0 g/dL Final    HCT 02/01/2018 35.6  35.0 - 45.0 % Final   Admission on 01/28/2018, Discharged on 01/28/2018   Component Date Value Ref Range Status    Ventricular Rate 01/28/2018 85  BPM Final    Atrial Rate 01/28/2018 85  BPM Final    P-R Interval 01/28/2018 148  ms Final    QRS Duration 01/28/2018 86  ms Final    Q-T Interval 01/28/2018 368  ms Final    QTC Calculation (Bezet) 01/28/2018 437  ms Final    Calculated P Axis 01/28/2018 76  degrees Final    Calculated R Thurmond 01/28/2018 74  degrees Final    Calculated T Axis 01/28/2018 71  degrees Final    Diagnosis 01/28/2018    Final                    Value:Normal sinus rhythm  Normal ECG  When compared with ECG of 03-MAR-2017 07:55,  No significant change was found  Confirmed by Mary Brewster MD, Nadege Beverly ([id number]) on 1/29/2018 4:54:57 PM      WBC 01/28/2018 7.0  4.6 - 13.2 K/uL Final    RBC 01/28/2018 4.55  4.20 - 5.30 M/uL Final    HGB 01/28/2018 12.5  12.0 - 16.0 g/dL Final    HCT 01/28/2018 37.7  35.0 - 45.0 % Final    MCV 01/28/2018 82.9  74.0 - 97.0 FL Final    MCH 01/28/2018 27.5  24.0 - 34.0 PG Final    MCHC 01/28/2018 33.2  31.0 - 37.0 g/dL Final    RDW 01/28/2018 13.0  11.6 - 14.5 % Final    PLATELET 76/07/9097 715  135 - 420 K/uL Final    MPV 01/28/2018 10.3  9.2 - 11.8 FL Final    NEUTROPHILS 01/28/2018 48  40 - 73 % Final    LYMPHOCYTES 01/28/2018 37  21 - 52 % Final    MONOCYTES 01/28/2018 10  3 - 10 % Final    EOSINOPHILS 01/28/2018 4  0 - 5 % Final    BASOPHILS 01/28/2018 1  0 - 2 % Final    ABS. NEUTROPHILS 01/28/2018 3.4  1.8 - 8.0 K/UL Final    ABS. LYMPHOCYTES 01/28/2018 2.6  0.9 - 3.6 K/UL Final    ABS. MONOCYTES 01/28/2018 0.7  0.05 - 1.2 K/UL Final    ABS. EOSINOPHILS 01/28/2018 0.3  0.0 - 0.4 K/UL Final    ABS.  BASOPHILS 01/28/2018 0.0  0.0 - 0.06 K/UL Final    DF 01/28/2018 AUTOMATED    Final    Sodium 01/28/2018 139  136 - 145 mmol/L Final    Potassium 01/28/2018 3.5  3.5 - 5.5 mmol/L Final    Chloride 01/28/2018 103  100 - 108 mmol/L Final    CO2 01/28/2018 28  21 - 32 mmol/L Final    Anion gap 01/28/2018 8  3.0 - 18 mmol/L Final    Glucose 01/28/2018 82  74 - 99 mg/dL Final    BUN 01/28/2018 15  7.0 - 18 MG/DL Final    Creatinine 01/28/2018 1.01  0.6 - 1.3 MG/DL Final    BUN/Creatinine ratio 01/28/2018 15  12 - 20   Final    GFR est AA 01/28/2018 >60  >60 ml/min/1.73m2 Final    GFR est non-AA 01/28/2018 56* >60 ml/min/1.73m2 Final    Comment: (NOTE)  Estimated GFR is calculated using the Modification of Diet in Renal   Disease (MDRD) Study equation, reported for both  Americans   (GFRAA) and non- Americans (GFRNA), and normalized to 1.73m2   body surface area. The physician must decide which value applies to   the patient. The MDRD study equation should only be used in   individuals age 25 or older. It has not been validated for the   following: pregnant women, patients with serious comorbid conditions,   or on certain medications, or persons with extremes of body size,   muscle mass, or nutritional status.  Calcium 01/28/2018 8.7  8.5 - 10.1 MG/DL Final    Bilirubin, total 01/28/2018 0.3  0.2 - 1.0 MG/DL Final    ALT (SGPT) 01/28/2018 21  13 - 56 U/L Final    AST (SGOT) 01/28/2018 18  15 - 37 U/L Final    Alk.  phosphatase 01/28/2018 75  45 - 117 U/L Final    Protein, total 01/28/2018 7.1  6.4 - 8.2 g/dL Final    Albumin 01/28/2018 3.5  3.4 - 5.0 g/dL Final    Globulin 01/28/2018 3.6  2.0 - 4.0 g/dL Final    A-G Ratio 01/28/2018 1.0  0.8 - 1.7   Final    Color 01/28/2018 YELLOW    Final    Appearance 01/28/2018 CLEAR    Final    Specific gravity 01/28/2018 1.009  1.005 - 1.030   Final    pH (UA) 01/28/2018 7.0  5.0 - 8.0   Final    Protein 01/28/2018 NEGATIVE   NEG mg/dL Final    Glucose 01/28/2018 NEGATIVE   NEG mg/dL Final    Ketone 01/28/2018 NEGATIVE   NEG mg/dL Final    Bilirubin 01/28/2018 NEGATIVE   NEG   Final    Blood 01/28/2018 NEGATIVE   NEG   Final    Urobilinogen 01/28/2018 0.2  0.2 - 1.0 EU/dL Final    Nitrites 01/28/2018 NEGATIVE   NEG   Final    Leukocyte Esterase 01/28/2018 NEGATIVE   NEG   Final    Lipase 01/28/2018 207  73 - 393 U/L Final   Admission on 01/24/2018, Discharged on 01/25/2018   Component Date Value Ref Range Status    WBC 01/24/2018 9.2  4.6 - 13.2 K/uL Final    RBC 01/24/2018 4.66  4.20 - 5.30 M/uL Final    HGB 01/24/2018 13.4  12.0 - 16.0 g/dL Final    HCT 01/24/2018 38.1  35.0 - 45.0 % Final    MCV 01/24/2018 81.8  74.0 - 97.0 FL Final    MCH 01/24/2018 28.8  24.0 - 34.0 PG Final    MCHC 01/24/2018 35.2  31.0 - 37.0 g/dL Final    RDW 01/24/2018 13.0  11.6 - 14.5 % Final    PLATELET 12/42/1075 410  135 - 420 K/uL Final    MPV 01/24/2018 10.0  9.2 - 11.8 FL Final    NEUTROPHILS 01/24/2018 58  40 - 73 % Final    LYMPHOCYTES 01/24/2018 26  21 - 52 % Final    MONOCYTES 01/24/2018 13* 3 - 10 % Final    EOSINOPHILS 01/24/2018 2  0 - 5 % Final    BASOPHILS 01/24/2018 1  0 - 2 % Final    ABS. NEUTROPHILS 01/24/2018 5.4  1.8 - 8.0 K/UL Final    ABS. LYMPHOCYTES 01/24/2018 2.4  0.9 - 3.6 K/UL Final    ABS. MONOCYTES 01/24/2018 1.2  0.05 - 1.2 K/UL Final    ABS. EOSINOPHILS 01/24/2018 0.2  0.0 - 0.4 K/UL Final    ABS. BASOPHILS 01/24/2018 0.1  0.0 - 0.1 K/UL Final    DF 01/24/2018 AUTOMATED    Final    Sodium 01/24/2018 137  136 - 145 mmol/L Final    Potassium 01/24/2018 3.5  3.5 - 5.5 mmol/L Final    Chloride 01/24/2018 99* 100 - 108 mmol/L Final    CO2 01/24/2018 28  21 - 32 mmol/L Final    Anion gap 01/24/2018 10  3.0 - 18 mmol/L Final    Glucose 01/24/2018 97  74 - 99 mg/dL Final    BUN 01/24/2018 16  7.0 - 18 MG/DL Final    Creatinine 01/24/2018 1.02  0.6 - 1.3 MG/DL Final    BUN/Creatinine ratio 01/24/2018 16  12 - 20   Final    GFR est AA 01/24/2018 >60  >60 ml/min/1.73m2 Final    GFR est non-AA 01/24/2018 55* >60 ml/min/1.73m2 Final    Comment: (NOTE)  Estimated GFR is calculated using the Modification of Diet in Renal   Disease (MDRD) Study equation, reported for both  Americans   (GFRAA) and non- Americans (GFRNA), and normalized to 1.73m2   body surface area. The physician must decide which value applies to   the patient. The MDRD study equation should only be used in   individuals age 25 or older.  It has not been validated for the   following: pregnant women, patients with serious comorbid conditions,   or on certain medications, or persons with extremes of body size,   muscle mass, or nutritional status.  Calcium 01/24/2018 9.0  8.5 - 10.1 MG/DL Final    Bilirubin, total 01/24/2018 0.5  0.2 - 1.0 MG/DL Final    ALT (SGPT) 01/24/2018 22  13 - 56 U/L Final    AST (SGOT) 01/24/2018 12* 15 - 37 U/L Final    Alk. phosphatase 01/24/2018 86  45 - 117 U/L Final    Protein, total 01/24/2018 7.8  6.4 - 8.2 g/dL Final    Albumin 01/24/2018 4.0  3.4 - 5.0 g/dL Final    Globulin 01/24/2018 3.8  2.0 - 4.0 g/dL Final    A-G Ratio 01/24/2018 1.1  0.8 - 1.7   Final    Lipase 01/24/2018 705* 73 - 393 U/L Final    Color 01/24/2018 YELLOW    Final    Appearance 01/24/2018 CLEAR    Final    Specific gravity 01/24/2018 >1.030* 1.005 - 1.030 Final    pH (UA) 01/24/2018 5.5  5.0 - 8.0   Final    Protein 01/24/2018 TRACE* NEG mg/dL Final    Glucose 01/24/2018 NEGATIVE   NEG mg/dL Final    Ketone 01/24/2018 TRACE* NEG mg/dL Final    Bilirubin 01/24/2018 NEGATIVE   NEG   Final    Blood 01/24/2018 MODERATE* NEG   Final    Urobilinogen 01/24/2018 0.2  0.2 - 1.0 EU/dL Final    Nitrites 01/24/2018 NEGATIVE   NEG   Final    Leukocyte Esterase 01/24/2018 SMALL* NEG   Final    WBC 01/24/2018 4 to 10  0 - 4 /hpf Final    RBC 01/24/2018 4 to 10  0 - 5 /hpf Final    Epithelial cells 01/24/2018 FEW  0 - 5 /lpf Final    Bacteria 01/24/2018 NEGATIVE   NEG /hpf Final   Hospital Outpatient Visit on 12/20/2017   Component Date Value Ref Range Status    Creatinine, POC 12/20/2017 0.8  0.6 - 1.3 MG/DL Final    GFRAA, POC 12/20/2017 >60  >60 ml/min/1.73m2 Final    GFRNA, POC 12/20/2017 >60  >60 ml/min/1.73m2 Final    Comment: Estimated GFR is calculated using the IDMS-traceable Modification of Diet in Renal Disease (MDRD) Study equation, reported for both  Americans (GFRAA) and non- Americans (GFRNA), and normalized to 1.73m2 body surface area. The physician must decide which value applies to the patient. The MDRD study equation should only be used in individuals age 25 or older.  It has not been validated for the following: pregnant women, patients with serious comorbid conditions, or on certain medications, or persons with extremes of body size, muscle mass, or nutritional status. .No results found for any visits on 03/09/18. Assessment / Plan      ICD-10-CM ICD-9-CM    1. Essential hypertension I10 401.9    2. Generalized abdominal pain R10.84 789.07    3. LAP-BAND surgery status Z98.84 V45.86        she was advised to continue her maintenance medications  To continue to follow up with surgery    Follow-up Disposition:  Return in about 4 months (around 7/9/2018). I asked Rhona Roberto if she has any questions and I answered the questions. Rhona Reyna states that she understands the treatment plan and agrees with the treatment plan

## 2018-03-12 RX ORDER — LABETALOL 100 MG/1
TABLET, FILM COATED ORAL
Qty: 60 TAB | Refills: 2 | OUTPATIENT
Start: 2018-03-12

## 2018-03-14 ENCOUNTER — OFFICE VISIT (OUTPATIENT)
Dept: CARDIOLOGY CLINIC | Age: 63
End: 2018-03-14

## 2018-03-14 VITALS
HEIGHT: 60 IN | BODY MASS INDEX: 34.16 KG/M2 | HEART RATE: 75 BPM | DIASTOLIC BLOOD PRESSURE: 70 MMHG | SYSTOLIC BLOOD PRESSURE: 128 MMHG | WEIGHT: 174 LBS

## 2018-03-14 DIAGNOSIS — E78.2 MIXED HYPERLIPIDEMIA: ICD-10-CM

## 2018-03-14 DIAGNOSIS — R06.02 SOB (SHORTNESS OF BREATH) ON EXERTION: ICD-10-CM

## 2018-03-14 DIAGNOSIS — I10 ESSENTIAL HYPERTENSION: Primary | ICD-10-CM

## 2018-03-14 RX ORDER — HYDRALAZINE HYDROCHLORIDE 25 MG/1
25 TABLET, FILM COATED ORAL 2 TIMES DAILY
Qty: 60 TAB | Refills: 6 | Status: SHIPPED | OUTPATIENT
Start: 2018-03-14 | End: 2018-11-26 | Stop reason: ALTCHOICE

## 2018-03-14 NOTE — LETTER
[facility] 1955 3/14/2018 Dear Rajesh Smith MD 
 
I had the pleasure of evaluating  Ms. Roberto in office today. Below are the relevant portions of my assessment and plan of care. ICD-10-CM ICD-9-CM 1. Essential hypertension I10 401.9   
 uncontrolled in afternoon and evening 2. Mixed hyperlipidemia E78.2 272.2   
 stable 3. SOB (shortness of breath) on exertion R06.02 786.05   
 negative stress test 
normal lvef Current Outpatient Prescriptions Medication Sig Dispense Refill  hydrALAZINE (APRESOLINE) 25 mg tablet Take 1 Tab by mouth two (2) times a day. 60 Tab 6  cloNIDine (CATAPRES) 0.2 mg/24 hr patch 1 Patch by TransDERmal route every seven (7) days. 4 Patch 0  
 raNITIdine (ZANTAC) 150 mg tablet TAKE ONE TABLET BY MOUTH TWICE A DAY 60 Tab 1  
 amLODIPine (NORVASC) 10 mg tablet Take 1 Tab by mouth daily. 90 Tab 0  clopidogrel (PLAVIX) 75 mg tab TAKE 1 TABLET DAILY 90 Tab 2  
 HYDROcodone-acetaminophen (NORCO)  mg tablet Take 1 Tab by mouth every six (6) hours as needed for Pain. Max Daily Amount: 4 Tabs. 120 Tab 0  
 mirtazapine (REMERON) 15 mg tablet TAKE ONE TABLET BY MOUTH EVERY EVENING 30 Tab 0  carvedilol (COREG) 25 mg tablet Take 1 Tab by mouth two (2) times daily (with meals). 60 Tab 6  
 atorvastatin (LIPITOR) 40 mg tablet Take 1 Tab by mouth daily. 30 Tab 3  
 ondansetron (ZOFRAN ODT) 8 mg disintegrating tablet Take 1 Tab by mouth every eight (8) hours as needed for Nausea. 30 Tab 0  
 docusate sodium (COLACE) 100 mg capsule Take 1 Cap by mouth two (2) times a day. (Patient taking differently: Take 100 mg by mouth daily.) 60 Cap 5  esomeprazole (NEXIUM) 20 mg capsule Take 1 Cap by mouth daily. STOP PREVACID  Indications: HEARTBURN 30 Cap 1  diclofenac (VOLTAREN) 1 % gel Apply 2 g to affected area four (4) times daily.  1 Each 2  
 montelukast (SINGULAIR) 10 mg tablet TAKE 1 TABLET DAILY 90 Tab 2  
  DALIRESP tab tablet TAKE 1 TABLET DAILY 90 Tab 2  
 fluticasone-salmeterol (ADVAIR DISKUS) 250-50 mcg/dose diskus inhaler Take 1 Puff by inhalation daily. (Patient taking differently: Take 2 Puffs by inhalation daily. ) 1 Inhaler 5 Orders Placed This Encounter  hydrALAZINE (APRESOLINE) 25 mg tablet Sig: Take 1 Tab by mouth two (2) times a day. Dispense:  60 Tab Refill:  6 If you have questions, please do not hesitate to call me. I look forward to following Ms. Roberto along with you. Sincerely, Peña Cortez MD

## 2018-03-14 NOTE — PROGRESS NOTES
1. Have you been to the ER, urgent care clinic since your last visit? Hospitalized since your last visit? No    2. Have you seen or consulted any other health care providers outside of the [address] [place] [address] since your last visit? Include any pap smears or colon screening.  No     Patient did not bring meds or list but states everything is the same

## 2018-03-14 NOTE — MR AVS SNAPSHOT
[address] [facility] [address] 
 
 
 [address] [facility], [address] [facility] [address] 
[phone] Patient: Barbie Roth MRN: [id number] Visit Information Date & Time Provider Department Dept. Phone Encounter #  
 3/14/2018 11:15 AM Michael Sanchez MD [facility] [address] [place] [address] [id number] Follow-up Instructions Return in about 6 months (around 9/14/2018) for bp chart at home. Your Appointments 4/13/2018 10:30 AM  
Follow Up with Fara Estes MD  
[address] [facility] [address] [place] [address]) Appt Note: 5wk  
 [address] [place], [address] [facility] [address]  
  
   
 [address] [place], [address] [facility] [address] 5/15/2018  8:00 AM  
PROCEDURE with [facility] 1 [facility] ([address] [place] [address]) Appt Note: evar endografy anni 3 months [address] [facility] [address] [facility] [address]  
[phone] 23[address] [facility], [facility] [address] [facility] [address] 5/15/2018  9:00 AM  
PROCEDURE with [facility] 1 [facility] ([address] [place] [address]) Appt Note: rt renal occlusion anni 3 months [address] [facility] [address] [facility] [address]  
[phone] 5/29/2018 10:45 AM  
Follow Up with Reshma Perez MD  
[facility] [address] [place] [address]) Appt Note: 3 month after studies with prep [address] [facility] [address] Pun [address] [facility]  
[phone] [address] [facility] [address]anna Pun [address]  
  
    
 9/20/2018 10:15 AM  
Office Visit with Michael Sanchez MD  
[facility]) Appt Note: 6 months [address] [facility], [address] [facility] [address]  
13[address] [facility], [address] [place] [address] Road [address]  
  
    
 11/12/2018  9:00 AM  
PROCEDURE with [facility] 1  
 [facility] ([facility]) Appt Note: evar stephaniek 1 year [address] [place] [address] [facility]  
[phone]  
  
    
 11/12/2018 10:00 AM  
PROCEDURE with [facility] 1 [facility] ([facility]) Appt Note: cv knaak 1 year [address] [place] [address] 200 [facility]  
[phone]  
  
    
 11/27/2018 10:00 AM  
Follow Up with MAKAYLA Qureshi [facility] ([facility]) Appt Note: 1 year follow up after studies with prep [address] [place] [address] [address] [facility]  
[phone] [address] [place] [address] [facility]  
  
    
  
 6/5/2018 10:45 AM  
Any with Jacklyn King MD  
[facility] ([facility]) Appt Note: ep- 6 month follow up  
 [address] [facility] [employer] [facility] [address]  
  
   
 [address] [facility] [place] [address] Upcoming Health Maintenance Date Due ZOSTER VACCINE AGE 60> 12/11/2016 Bone Densitometry 9/24/2017 BREAST CANCER SCRN MAMMOGRAM 6/1/2018 FOBT Q 1 YEAR AGE 50-75 1/31/2019 PAP AKA CERVICAL CYTOLOGY 8/12/2019 DTaP/Tdap/Td series (2 - Td) 9/1/2026 Allergies as of 3/14/2018  Review Complete On: 3/14/2018 By: Nikhil Najera MD  
  
 Severity Noted Reaction Type Reactions Pcn [Penicillins] High 09/18/2014    Anaphylaxis Tetracycline High 09/18/2014    Anaphylaxis Sulfabenzamide  09/18/2014    Hives Current Immunizations  Reviewed on 2/9/2018 Name Date Influenza Vaccine 10/31/2017 Influenza Vaccine (Quad) PF 9/13/2017, 9/1/2016 Pneumococcal Vaccine (Unspecified Type) 3/5/2013 Tdap 9/1/2016 Not reviewed this visit You Were Diagnosed With   
  
 Codes Comments Essential hypertension    -  Primary ICD-10-CM: I10 
ICD-9-CM: 401.9 uncontrolled in afternoon and evening Mixed hyperlipidemia     ICD-10-CM: E78.2 ICD-9-CM: 272.2 stable  
 SOB (shortness of breath) on exertion     ICD-10-CM: R06.02 
ICD-9-CM: 786.05 negative stress test 
normal lvef Vitals BP Pulse Height(growth percentile) Weight(growth percentile) BMI OB Status 128/70 75 5' (1.524 m) 174 lb (78.9 kg) 33.98 kg/m2 Hysterectomy Smoking Status Former Smoker Vitals History BMI and BSA Data Body Mass Index Body Surface Area 33.98 kg/m 2 1.83 m 2 Preferred Pharmacy Pharmacy Name Phone [address] [facility] [phone] Your Updated Medication List  
  
   
This list is accurate as of 3/14/18 11:49 AM.  Always use your most recent med list. amLODIPine 10 mg tablet Commonly known as:  Tylene Theo Take 1 Tab by mouth daily. atorvastatin 40 mg tablet Commonly known as:  LIPITOR Take 1 Tab by mouth daily. carvedilol 25 mg tablet Commonly known as:  Renetta Bustard Take 1 Tab by mouth two (2) times daily (with meals). cloNIDine 0.2 mg/24 hr patch Commonly known as:  CATAPRES  
1 Patch by TransDERmal route every seven (7) days. clopidogrel 75 mg Tab Commonly known as:  PLAVIX TAKE 1 TABLET DAILY DALIRESP Tab tablet Generic drug:  roflumilast  
TAKE 1 TABLET DAILY  
  
 diclofenac 1 % Gel Commonly known as:  VOLTAREN Apply 2 g to affected area four (4) times daily. docusate sodium 100 mg capsule Commonly known as:  Donalynn Nichelle Take 1 Cap by mouth two (2) times a day. esomeprazole 20 mg capsule Commonly known as:  NexIUM Take 1 Cap by mouth daily. STOP PREVACID  Indications: HEARTBURN  
  
 fluticasone-salmeterol 250-50 mcg/dose diskus inhaler Commonly known as:  ADVAIR DISKUS Take 1 Puff by inhalation daily. hydrALAZINE 25 mg tablet Commonly known as:  APRESOLINE Take 1 Tab by mouth two (2) times a day. HYDROcodone-acetaminophen  mg tablet Commonly known as:  Donna Rios Take 1 Tab by mouth every six (6) hours as needed for Pain. Max Daily Amount: 4 Tabs. mirtazapine 15 mg tablet Commonly known as:  REMERON  
TAKE ONE TABLET BY MOUTH EVERY EVENING  
  
 montelukast 10 mg tablet Commonly known as:  SINGULAIR  
TAKE 1 TABLET DAILY  
  
 ondansetron 8 mg disintegrating tablet Commonly known as:  ZOFRAN ODT Take 1 Tab by mouth every eight (8) hours as needed for Nausea. raNITIdine 150 mg tablet Commonly known as:  ZANTAC TAKE ONE TABLET BY MOUTH TWICE A DAY Prescriptions Sent to Pharmacy Refills  
 hydrALAZINE (APRESOLINE) 25 mg tablet 6 Sig: Take 1 Tab by mouth two (2) times a day. Class: Normal  
 Pharmacy: [address] #: [phone] Route: Oral  
  
Follow-up Instructions Return in about 6 months (around 9/14/2018) for bp chart at home. Please provide this summary of care documentation to your next provider. Your primary care clinician is listed as Salvador Camarena. If you have any questions after today's visit, please call [phone].

## 2018-03-14 NOTE — PROGRESS NOTES
HISTORY OF PRESENT ILLNESS  Gillian Velasquez is a 66 y.o. female. Hypertension   The history is provided by the patient. This is a chronic problem. The problem occurs constantly. The problem has been gradually worsening. Pertinent negatives include no chest pain, no abdominal pain, no headaches and no shortness of breath. Nothing aggravates the symptoms. Shortness of Breath   The history is provided by the patient. This is a recurrent problem. The problem occurs frequently. The problem has been gradually worsening. Pertinent negatives include no fever, no headaches, no cough, no sputum production, no hemoptysis, no wheezing, no PND, no orthopnea, no chest pain, no vomiting, no abdominal pain, no rash, no leg swelling and no claudication. Precipitated by: exertion. Palpitations    The history is provided by the patient. This is a new problem. The current episode started more than 1 week ago. The problem has not changed since onset. The problem occurs daily. Pertinent negatives include no fever, no chest pain, no claudication, no orthopnea, no PND, no abdominal pain, no nausea, no vomiting, no headaches, no dizziness, no weakness, no cough, no hemoptysis, no shortness of breath and no sputum production. Her past medical history is significant for hypertension. Review of Systems   Constitutional: Negative for chills and fever. HENT: Negative for nosebleeds. Eyes: Negative for blurred vision and double vision. Respiratory: Negative for cough, hemoptysis, sputum production, shortness of breath and wheezing. Cardiovascular: Positive for palpitations. Negative for chest pain, orthopnea, claudication, leg swelling and PND. Gastrointestinal: Negative for abdominal pain, heartburn, nausea and vomiting. Musculoskeletal: Negative for myalgias. Skin: Negative for rash. Neurological: Negative for dizziness, weakness and headaches. Endo/Heme/Allergies: Does not bruise/bleed easily.      Family History Problem Relation Age of Onset    Cancer Mother     Diabetes Father     Alcohol abuse Father     Heart Disease Maternal Grandmother     Alzheimer Maternal Grandmother     Diabetes Sister        Past Medical History:   Diagnosis Date    Aorto-iliac duplex 09/17/2015    Patent abdom aorta endovascular graft w/o leak. Mod stenosis suggested in right limb.  Calculus of kidney 2014    stent/kidney     Carotid duplex 09/17/2015    Mild < 50% bilateral plaquing.  Cataract, left     Chronic obstructive pulmonary disease ([address]     Echocardiogram 11/04/2014    EF 56%. No WMA. Mild LAE. Echo is within normal limits.  Esophageal reflux     Generalized osteoarthrosis, involving multiple sites     GERD (gastroesophageal reflux disease)     Gout     History of renal stent     right side    Hypercholesteremia     Hypertension     resolved    Lower extremity arterial testing 03/27/2015    Normal perfusion at rest and w/treadmill bilaterally.   R KORI 1.05.  L KORI 1.03.    Murmur     patient reports that she is aware- has been told in the past    Nausea & vomiting     Renal artery stenosis ([address]     Subclinical hyperthyroidism 8/2015    Unspecified sleep apnea     resolved       Past Surgical History:   Procedure Laterality Date    ADJUSTMENT GASTRIC BAND N/A 11/10/2016    MAKAYLA Cullen    HX APPENDECTOMY      HX BLADDER SUSPENSION      HX CHOLECYSTECTOMY      HX GASTRIC BYPASS      lap band 2012    HX HEENT Right 1990s, 2016    Ear sx    HX HYSTERECTOMY      HX OTHER SURGICAL  10/27/14     Bilateral open femoral exposures, Placement of catheter and sheath in the aorta Endo repair of aortic aneurysm with modular bifurcated device  Interpretation of angiography, intravascular ultrasound (IVUS) catheter    HX OTHER SURGICAL  10/27/2014    Endurant II abdominal stent graft implant    HX UROLOGICAL      stent    VASCULAR SURGERY PROCEDURE UNLIST      surgery for abdominal aneurysm Social History   Substance Use Topics    Smoking status: Former Smoker     Packs/day: 0.00     Years: 0.00     Types: Cigarettes     Quit date: 1/25/2016    Smokeless tobacco: Never Used    Alcohol use No       Allergies   Allergen Reactions    Pcn [Penicillins] Anaphylaxis    Tetracycline Anaphylaxis    Sulfabenzamide Hives       Prior to Admission medications    Medication Sig Start Date End Date Taking? Authorizing Provider   hydrALAZINE (APRESOLINE) 25 mg tablet Take 1 Tab by mouth two (2) times a day. 3/14/18  Yes Toña Silver MD   cloNIDine (CATAPRES) 0.2 mg/24 hr patch 1 Patch by TransDERmal route every seven (7) days. 3/11/18  Yes Savanna Beckwith MD   raNITIdine (ZANTAC) 150 mg tablet TAKE ONE TABLET BY MOUTH TWICE A DAY 3/6/18  Yes Joseph Mar NP   amLODIPine (NORVASC) 10 mg tablet Take 1 Tab by mouth daily. 3/6/18  Yes Savanna Beckwith MD   clopidogrel (PLAVIX) 75 mg tab TAKE 1 TABLET DAILY 3/1/18  Yes Savanna Beckwith MD   HYDROcodone-acetaminophen [facility])  mg tablet Take 1 Tab by mouth every six (6) hours as needed for Pain. Max Daily Amount: 4 Tabs. 3/6/18  Yes Savanna Beckwith MD   mirtazapine (REMERON) 15 mg tablet TAKE ONE TABLET BY MOUTH EVERY EVENING 2/28/18  Yes Joseph Mar NP   carvedilol (COREG) 25 mg tablet Take 1 Tab by mouth two (2) times daily (with meals). 2/21/18  Yes Toña Silver MD   atorvastatin (LIPITOR) 40 mg tablet Take 1 Tab by mouth daily. 2/13/18  Yes Ara Norton MD   ondansetron (ZOFRAN ODT) 8 mg disintegrating tablet Take 1 Tab by mouth every eight (8) hours as needed for Nausea. 2/2/18  Yes Gumaro Cm MD   docusate sodium (COLACE) 100 mg capsule Take 1 Cap by mouth two (2) times a day. Patient taking differently: Take 100 mg by mouth daily. 10/5/17  Yes Savanna Beckwith MD   esomeprazole (NEXIUM) 20 mg capsule Take 1 Cap by mouth daily.  STOP PREVACID  Indications: HEARTBURN 9/13/17  Yes Joseph Mar NP   diclofenac (VOLTAREN) 1 % gel Apply 2 g to affected area four (4) times daily. 9/6/17  Yes Sally Collins MD   montelukast (SINGULAIR) 10 mg tablet TAKE 1 TABLET DAILY 7/31/17  Yes Kolby Samano NP   DALIRESP tab tablet TAKE 1 TABLET DAILY 7/31/17  Yes Kolby Samano NP   fluticasone-salmeterol (ADVAIR DISKUS) 250-50 mcg/dose diskus inhaler Take 1 Puff by inhalation daily. Patient taking differently: Take 2 Puffs by inhalation daily. 5/6/16  Yes Sally Collins MD         Visit Vitals    /70    Pulse 75    Ht 5' (1.524 m)    Wt 78.9 kg (174 lb)    BMI 33.98 kg/m2         Physical Exam   Constitutional: She is oriented to person, place, and time. She appears well-developed and well-nourished. HENT:   Head: Normocephalic and atraumatic. Eyes: Conjunctivae are normal.   Neck: Neck supple. No JVD present. No tracheal deviation present. No thyromegaly present. Cardiovascular: Normal rate and regular rhythm. PMI is not displaced. Exam reveals no gallop, no S3 and no decreased pulses. No murmur heard. Pulmonary/Chest: No respiratory distress. She has no wheezes. She has no rales. She exhibits no tenderness. Abdominal: Soft. There is no tenderness. Musculoskeletal: She exhibits no edema. Neurological: She is alert and oriented to person, place, and time. Skin: Skin is warm. Psychiatric: She has a normal mood and affect. Ms. Franc Orozco has a reminder for a \"due or due soon\" health maintenance. I have asked that she contact her primary care provider for follow-up on this health maintenance. I have personally reviewed patient's records available from hospital and other providers and incorporated findings in patient care. I Have personally reviewed recent relevant labs available and discussed with patient  SUMMARY:2014  Left ventricle: Size was normal. Systolic function was normal by EF  (biplane method of disks). Ejection fraction was estimated to be 56 %.  No  obvious wall motion abnormalities identified in the views obtained. Right ventricle: The size was normal. The tricuspid jet envelope  definition was inadequate for estimation of RV systolic pressure. There  are no indirect findings (abnormal RV volume or geometry, altered  pulmonary flow velocity profile, or leftward septal displacement) which  would suggest moderate or severe pulmonary hypertension. Left atrium: The atrium was mildly dilated. 3/2017  Impression:   1. Low risk pharmacological cardiac nuclear stress test.  2. Normal left ventricular systolic function calculated at 66%. No  regional wall motion abnormalities. 3. No evidence of ischemia or previous infarct based on perfusion  imaging. 4. Normal EKG portion of stress test.    SUMMARY:echo-3/2016  Left ventricle: Systolic function was normal. Ejection fraction was  estimated in the range of 55 % to 60 %. There were no regional wall motion  abnormalities. Doppler parameters were consistent with abnormal left  ventricular relaxation (grade 1 diastolic dysfunction). Mitral valve: There was mild annular calcification. NUCLEAR IMAGING:3/2016     Findings:   1. Stress images reveal normal Myoview distrubution in all the LV segments in short axis, vertical and horizontal long axis views. 2. Resting images have a normal uptake. 3. Gated images reveal normal wall motion and the ejection fraction is calculated to be 57%. Conclusion:   1. Normal perfusion scan. 2. Normal wall motion and ejection fraction is calculated at 57%. 3. No evidence of significant fixed or reversible defect suggesting ischemia or myocardial infarction noted from this nuclear study. 4. Low risk scan. Assessment         ICD-10-CM ICD-9-CM    1. Essential hypertension I10 401.9     uncontrolled in afternoon and evening   2. Mixed hyperlipidemia E78.2 272.2     stable   3.  SOB (shortness of breath) on exertion R06.02 786.05     negative stress test  normal lvef       Medications Discontinued During This Encounter   Medication Reason    pantoprazole (PROTONIX) 40 mg tablet Not A Current Medication    metoclopramide HCl (REGLAN) 5 mg tablet Not A Current Medication       Orders Placed This Encounter    hydrALAZINE (APRESOLINE) 25 mg tablet     Sig: Take 1 Tab by mouth two (2) times a day. Dispense:  60 Tab     Refill:  6       Follow-up Disposition:  Return in about 6 months (around 9/14/2018) for bp chart at home.

## 2018-03-23 ENCOUNTER — HOSPITAL ENCOUNTER (EMERGENCY)
Age: 63
Discharge: HOME | End: 2018-03-24
Attending: EMERGENCY MEDICINE
Payer: COMMERCIAL

## 2018-03-23 DIAGNOSIS — R60.9 PERIPHERAL EDEMA: Primary | ICD-10-CM

## 2018-03-23 DIAGNOSIS — I10 ELEVATED SYSTOLIC BLOOD PRESSURE READING WITH DIAGNOSIS OF HYPERTENSION: ICD-10-CM

## 2018-03-23 LAB
ALBUMIN SERPL-MCNC: 4.3 G/DL (ref 3.4–5)
ALBUMIN/GLOB SERPL: 1.1 {RATIO} (ref 0.8–1.7)
ALP SERPL-CCNC: 105 U/L (ref 45–117)
ALT SERPL-CCNC: 24 U/L (ref 13–56)
ANION GAP SERPL CALC-SCNC: 12 MMOL/L (ref 3–18)
APPEARANCE UR: CLEAR
AST SERPL-CCNC: 27 U/L (ref 15–37)
BACTERIA URNS QL MICRO: NEGATIVE /HPF
BASOPHILS # BLD: 0.1 K/UL (ref 0–0.1)
BASOPHILS NFR BLD: 1 % (ref 0–2)
BILIRUB SERPL-MCNC: 0.6 MG/DL (ref 0.2–1)
BILIRUB UR QL: NEGATIVE
BUN SERPL-MCNC: 15 MG/DL (ref 7–18)
BUN/CREAT SERPL: 15 (ref 12–20)
CALCIUM SERPL-MCNC: 9.6 MG/DL (ref 8.5–10.1)
CHLORIDE SERPL-SCNC: 100 MMOL/L (ref 100–108)
CO2 SERPL-SCNC: 25 MMOL/L (ref 21–32)
COLOR UR: YELLOW
CREAT SERPL-MCNC: 0.98 MG/DL (ref 0.6–1.3)
DIFFERENTIAL METHOD BLD: NORMAL
EOSINOPHIL # BLD: 0.4 K/UL (ref 0–0.4)
EOSINOPHIL NFR BLD: 5 % (ref 0–5)
EPITH CASTS URNS QL MICRO: NORMAL /LPF (ref 0–5)
ERYTHROCYTE [DISTWIDTH] IN BLOOD BY AUTOMATED COUNT: 13.4 % (ref 11.6–14.5)
GLOBULIN SER CALC-MCNC: 3.9 G/DL (ref 2–4)
GLUCOSE SERPL-MCNC: 122 MG/DL (ref 74–99)
GLUCOSE UR STRIP.AUTO-MCNC: NEGATIVE MG/DL
HCT VFR BLD AUTO: 40.4 % (ref 35–45)
HGB BLD-MCNC: 14 G/DL (ref 12–16)
HGB UR QL STRIP: ABNORMAL
KETONES UR QL STRIP.AUTO: NEGATIVE MG/DL
LEUKOCYTE ESTERASE UR QL STRIP.AUTO: ABNORMAL
LYMPHOCYTES # BLD: 2.6 K/UL (ref 0.9–3.6)
LYMPHOCYTES NFR BLD: 33 % (ref 21–52)
MCH RBC QN AUTO: 27.8 PG (ref 24–34)
MCHC RBC AUTO-ENTMCNC: 34.7 G/DL (ref 31–37)
MCV RBC AUTO: 80.3 FL (ref 74–97)
MONOCYTES # BLD: 0.8 K/UL (ref 0.05–1.2)
MONOCYTES NFR BLD: 10 % (ref 3–10)
NEUTS SEG # BLD: 4.1 K/UL (ref 1.8–8)
NEUTS SEG NFR BLD: 51 % (ref 40–73)
NITRITE UR QL STRIP.AUTO: NEGATIVE
PH UR STRIP: 6.5 [PH] (ref 5–8)
PLATELET # BLD AUTO: 297 K/UL (ref 135–420)
PMV BLD AUTO: 10.4 FL (ref 9.2–11.8)
POTASSIUM SERPL-SCNC: 3.5 MMOL/L (ref 3.5–5.5)
PROT SERPL-MCNC: 8.2 G/DL (ref 6.4–8.2)
PROT UR STRIP-MCNC: NEGATIVE MG/DL
RBC # BLD AUTO: 5.03 M/UL (ref 4.2–5.3)
RBC #/AREA URNS HPF: NORMAL /HPF (ref 0–5)
SODIUM SERPL-SCNC: 137 MMOL/L (ref 136–145)
SP GR UR REFRACTOMETRY: 1.01 (ref 1–1.03)
UROBILINOGEN UR QL STRIP.AUTO: 0.2 EU/DL (ref 0.2–1)
WBC # BLD AUTO: 7.9 K/UL (ref 4.6–13.2)
WBC URNS QL MICRO: NORMAL /HPF (ref 0–4)

## 2018-03-23 PROCEDURE — 99285 EMERGENCY DEPT VISIT HI MDM: CPT

## 2018-03-23 PROCEDURE — 85025 COMPLETE CBC W/AUTO DIFF WBC: CPT | Performed by: PHYSICIAN ASSISTANT

## 2018-03-23 PROCEDURE — 80053 COMPREHEN METABOLIC PANEL: CPT | Performed by: PHYSICIAN ASSISTANT

## 2018-03-23 PROCEDURE — 81001 URINALYSIS AUTO W/SCOPE: CPT | Performed by: NURSE PRACTITIONER

## 2018-03-24 VITALS
BODY MASS INDEX: 32.81 KG/M2 | RESPIRATION RATE: 20 BRPM | TEMPERATURE: 97.1 F | SYSTOLIC BLOOD PRESSURE: 164 MMHG | WEIGHT: 168 LBS | HEART RATE: 80 BPM | OXYGEN SATURATION: 99 % | DIASTOLIC BLOOD PRESSURE: 82 MMHG

## 2018-03-24 NOTE — ED PROVIDER NOTES
HPI Comments: Pt presents to ed with a complaint of bilateral ankle swelling. Pt states she usually keeps them elevated and wears compression socks daily although states that she has not worn them in the past week or so and has noticed swelling,  States a hx of kidney problems in the past and is concerned about the swelling no chest pain reported. Pts blood pressure is elevated tonight but she states that she is due for her night time htn meds and to change her clonidine patch. No chest pain no sob reported      Patient is a 66 y.o. female presenting with ankle swelling. The history is provided by the patient. Ankle swelling    This is a new problem. The current episode started 2 days ago. The problem occurs daily. The problem has been gradually improving. The pain is present in the right lower leg and left lower leg. The quality of the pain is described as aching. The pain is at a severity of 2/10. The pain is mild. Pertinent negatives include no back pain. She has tried nothing for the symptoms. There has been no history of extremity trauma. Past Medical History:   Diagnosis Date    Aorto-iliac duplex 09/17/2015    Patent abdom aorta endovascular graft w/o leak. Mod stenosis suggested in right limb.  Calculus of kidney 2014    stent/kidney     Carotid duplex 09/17/2015    Mild < 50% bilateral plaquing.  Cataract, left     Chronic obstructive pulmonary disease ([address]     Echocardiogram 11/04/2014    EF 56%. No WMA. Mild LAE. Echo is within normal limits.  Esophageal reflux     Generalized osteoarthrosis, involving multiple sites     GERD (gastroesophageal reflux disease)     Gout     History of renal stent     right side    Hypercholesteremia     Hypertension     resolved    Lower extremity arterial testing 03/27/2015    Normal perfusion at rest and w/treadmill bilaterally.   R KORI 1.05.  L KORI 1.03.    Murmur     patient reports that she is aware- has been told in the past  Nausea & vomiting     Renal artery stenosis (HCC)     Subclinical hyperthyroidism 8/2015    Unspecified sleep apnea     resolved       Past Surgical History:   Procedure Laterality Date    ADJUSTMENT GASTRIC BAND N/A 11/10/2016    MAKAYLA Cullen    HX APPENDECTOMY      HX BLADDER SUSPENSION      HX CHOLECYSTECTOMY      HX GASTRIC BYPASS      lap band 2012    HX HEENT Right 1990s, 2016    Ear sx    HX HYSTERECTOMY      HX OTHER SURGICAL  10/27/14     Bilateral open femoral exposures, Placement of catheter and sheath in the aorta Endo repair of aortic aneurysm with modular bifurcated device  Interpretation of angiography, intravascular ultrasound (IVUS) catheter    HX OTHER SURGICAL  10/27/2014    Endurant II abdominal stent graft implant    HX UROLOGICAL      stent    VASCULAR SURGERY PROCEDURE UNLIST      surgery for abdominal aneurysm         Family History:   Problem Relation Age of Onset    Cancer Mother     Diabetes Father     Alcohol abuse Father     Heart Disease Maternal Grandmother     Alzheimer Maternal Grandmother     Diabetes Sister        Social History     Social History    Marital status:      Spouse name: N/A    Number of children: N/A    Years of education: N/A     Occupational History    Not on file. Social History Main Topics    Smoking status: Former Smoker     Packs/day: 0.00     Years: 0.00     Types: Cigarettes     Quit date: 1/25/2016    Smokeless tobacco: Never Used    Alcohol use No    Drug use: No    Sexual activity: Yes     Birth control/ protection: Surgical     Other Topics Concern    Not on file     Social History Narrative         ALLERGIES: Pcn [penicillins]; Tetracycline; and Sulfabenzamide    Review of Systems   Constitutional: Negative for fever. Respiratory: Negative for shortness of breath. Cardiovascular: Positive for leg swelling. Negative for chest pain and palpitations. Gastrointestinal: Negative for abdominal pain. Genitourinary: Negative for dysuria and flank pain. Musculoskeletal: Negative for back pain and joint swelling. Neurological: Negative for dizziness. All other systems reviewed and are negative. Vitals:    03/23/18 2045 03/23/18 2145 03/23/18 2200   BP: (!) 154/96 (!) 187/98 (!) 186/98   Pulse: 84 84 90   Resp: 17 12 14   Temp: 97.1 °F (36.2 °C)     SpO2: 99% 99% 100%   Weight: 76.2 kg (168 lb)              Physical Exam   Constitutional: She is oriented to person, place, and time. She appears well-developed and well-nourished. HENT:   Head: Normocephalic and atraumatic. Eyes: Conjunctivae and EOM are normal. Pupils are equal, round, and reactive to light. Neck: Normal range of motion. Neck supple. Cardiovascular: Normal rate and regular rhythm. Pulmonary/Chest: Effort normal and breath sounds normal.   Abdominal: Soft. Bowel sounds are normal.   Musculoskeletal: Normal range of motion. She exhibits edema. Very minimal edema noted, non pitting, normal distal pulse,  No calf tenderness or pain. No erythema no bruising noted. Neurological: She is alert and oriented to person, place, and time. She has normal reflexes. Skin: Skin is warm and dry. Psychiatric: She has a normal mood and affect. Her behavior is normal. Judgment and thought content normal.   Nursing note and vitals reviewed. MDM  Number of Diagnoses or Management Options  Peripheral edema: established and improving  Diagnosis management comments: Minimal edema noted. Suspect edema is due to her not wearing her compression socks for the past 1-2 weeks, she is used to wearing them. Pt recently had an evaluation by her cardiologist with a normal echo cardiogram.  Pt's renal function is wnl and creatinine and bun is wnl . I do not suspect a acute medical issue and I will discharge the pt to home and suggest elevation of legs and to go back to the use of her compression stockings.   She will take her bp meds when she gets home       Amount and/or Complexity of Data Reviewed  Clinical lab tests: ordered and reviewed  Review and summarize past medical records: yes  Independent visualization of images, tracings, or specimens: yes    Risk of Complications, Morbidity, and/or Mortality  Presenting problems: moderate  Diagnostic procedures: moderate  Management options: moderate    Patient Progress  Patient progress: improved        ED Course       Procedures              Vitals:  Patient Vitals for the past 12 hrs:   Temp Pulse Resp BP SpO2   03/23/18 2200 - 90 14 (!) 186/98 100 %   03/23/18 2145 - 84 12 (!) 187/98 99 %   03/23/18 2045 97.1 °F (36.2 °C) 84 17 (!) 154/96 99 %       Medications ordered:   Medications - No data to display      Lab findings:  Recent Results (from the past 12 hour(s))   CBC WITH AUTOMATED DIFF    Collection Time: 03/23/18  9:53 PM   Result Value Ref Range    WBC 7.9 4.6 - 13.2 K/uL    RBC 5.03 4.20 - 5.30 M/uL    HGB 14.0 12.0 - 16.0 g/dL    HCT 40.4 35.0 - 45.0 %    MCV 80.3 74.0 - 97.0 FL    MCH 27.8 24.0 - 34.0 PG    MCHC 34.7 31.0 - 37.0 g/dL    RDW 13.4 11.6 - 14.5 %    PLATELET 004 108 - 988 K/uL    MPV 10.4 9.2 - 11.8 FL    NEUTROPHILS 51 40 - 73 %    LYMPHOCYTES 33 21 - 52 %    MONOCYTES 10 3 - 10 %    EOSINOPHILS 5 0 - 5 %    BASOPHILS 1 0 - 2 %    ABS. NEUTROPHILS 4.1 1.8 - 8.0 K/UL    ABS. LYMPHOCYTES 2.6 0.9 - 3.6 K/UL    ABS. MONOCYTES 0.8 0.05 - 1.2 K/UL    ABS. EOSINOPHILS 0.4 0.0 - 0.4 K/UL    ABS.  BASOPHILS 0.1 0.0 - 0.1 K/UL    DF AUTOMATED     METABOLIC PANEL, COMPREHENSIVE    Collection Time: 03/23/18  9:53 PM   Result Value Ref Range    Sodium 137 136 - 145 mmol/L    Potassium 3.5 3.5 - 5.5 mmol/L    Chloride 100 100 - 108 mmol/L    CO2 25 21 - 32 mmol/L    Anion gap 12 3.0 - 18 mmol/L    Glucose 122 (H) 74 - 99 mg/dL    BUN 15 7.0 - 18 MG/DL    Creatinine 0.98 0.6 - 1.3 MG/DL    BUN/Creatinine ratio 15 12 - 20      GFR est AA >60 >60 ml/min/1.73m2    GFR est non-AA 58 (L) >60 ml/min/1.73m2    Calcium 9.6 8.5 - 10.1 MG/DL    Bilirubin, total 0.6 0.2 - 1.0 MG/DL    ALT (SGPT) 24 13 - 56 U/L    AST (SGOT) 27 15 - 37 U/L    Alk. phosphatase 105 45 - 117 U/L    Protein, total 8.2 6.4 - 8.2 g/dL    Albumin 4.3 3.4 - 5.0 g/dL    Globulin 3.9 2.0 - 4.0 g/dL    A-G Ratio 1.1 0.8 - 1.7     URINALYSIS W/ RFLX MICROSCOPIC    Collection Time: 03/23/18  9:55 PM   Result Value Ref Range    Color YELLOW      Appearance CLEAR      Specific gravity 1.007 1.005 - 1.030      pH (UA) 6.5 5.0 - 8.0      Protein NEGATIVE  NEG mg/dL    Glucose NEGATIVE  NEG mg/dL    Ketone NEGATIVE  NEG mg/dL    Bilirubin NEGATIVE  NEG      Blood TRACE (A) NEG      Urobilinogen 0.2 0.2 - 1.0 EU/dL    Nitrites NEGATIVE  NEG      Leukocyte Esterase SMALL (A) NEG     URINE MICROSCOPIC ONLY    Collection Time: 03/23/18  9:55 PM   Result Value Ref Range    WBC 0 to 3 0 - 4 /hpf    RBC 0 to 3 0 - 5 /hpf    Epithelial cells FEW 0 - 5 /lpf    Bacteria NEGATIVE  NEG /hpf          Reevaluation of patient:   I have reassessed the patient. Patient is feeling better and is asking to go home    Disposition:    Diagnosis:   1. Peripheral edema    2. Elevated systolic blood pressure reading with diagnosis of hypertension        Disposition: to Home      Follow-up Information     Follow up With Details Comments Contact Info    Zena Haddad MD In 2 days  [place] [id number] [phone]             Patient's Medications   Start Taking    No medications on file   Continue Taking    AMLODIPINE (NORVASC) 10 MG TABLET    Take 1 Tab by mouth daily. ATORVASTATIN (LIPITOR) 40 MG TABLET    Take 1 Tab by mouth daily. CARVEDILOL (COREG) 25 MG TABLET    Take 1 Tab by mouth two (2) times daily (with meals). CLONIDINE (CATAPRES) 0.2 MG/24 HR PATCH    1 Patch by TransDERmal route every seven (7) days.     CLOPIDOGREL (PLAVIX) 75 MG TAB    TAKE 1 TABLET DAILY    DALIRESP TAB TABLET    TAKE 1 TABLET DAILY    DICLOFENAC (VOLTAREN) 1 % GEL    Apply 2 g to affected area four (4) times daily. DOCUSATE SODIUM (COLACE) 100 MG CAPSULE    Take 1 Cap by mouth two (2) times a day. ESOMEPRAZOLE (NEXIUM) 20 MG CAPSULE    Take 1 Cap by mouth daily. STOP PREVACID  Indications: HEARTBURN    FLUTICASONE-SALMETEROL (ADVAIR DISKUS) 250-50 MCG/DOSE DISKUS INHALER    Take 1 Puff by inhalation daily. HYDRALAZINE (APRESOLINE) 25 MG TABLET    Take 1 Tab by mouth two (2) times a day. HYDROCODONE-ACETAMINOPHEN (NORCO)  MG TABLET    Take 1 Tab by mouth every six (6) hours as needed for Pain. Max Daily Amount: 4 Tabs. MIRTAZAPINE (REMERON) 15 MG TABLET    TAKE ONE TABLET BY MOUTH EVERY EVENING    MONTELUKAST (SINGULAIR) 10 MG TABLET    TAKE 1 TABLET DAILY    ONDANSETRON (ZOFRAN ODT) 8 MG DISINTEGRATING TABLET    Take 1 Tab by mouth every eight (8) hours as needed for Nausea. RANITIDINE (ZANTAC) 150 MG TABLET    TAKE ONE TABLET BY MOUTH TWICE A DAY   These Medications have changed    No medications on file   Stop Taking    No medications on file       Return to the ER if you are unable to obtain referral as directed. Royal Murillo  results have been reviewed with her. She has been counseled regarding her diagnosis, treatment, and plan. She verbally conveys understanding and agreement of the signs, symptoms, diagnosis, treatment and prognosis and additionally agrees to follow up as discussed. She also agrees with the care-plan and conveys that all of her questions have been answered. I have also provided discharge instructions for her that include: educational information regarding their diagnosis and treatment, and list of reasons why they would want to return to the ED prior to their follow-up appointment, should her condition change.         Arpit BONILLA

## 2018-03-24 NOTE — DISCHARGE INSTRUCTIONS
High Blood Pressure: Care Instructions  Your Care Instructions    If your blood pressure is usually above 140/90, you have high blood pressure, or hypertension. That means the top number is 140 or higher or the bottom number is 90 or higher, or both. Despite what a lot of people think, high blood pressure usually doesn't cause headaches or make you feel dizzy or lightheaded. It usually has no symptoms. But it does increase your risk for heart attack, stroke, and kidney or eye damage. The higher your blood pressure, the more your risk increases. Your doctor will give you a goal for your blood pressure. Your goal will be based on your health and your age. An example of a goal is to keep your blood pressure below 140/90. Lifestyle changes, such as eating healthy and being active, are always important to help lower blood pressure. You might also take medicine to reach your blood pressure goal.  Follow-up care is a key part of your treatment and safety. Be sure to make and go to all appointments, and call your doctor if you are having problems. It's also a good idea to know your test results and keep a list of the medicines you take. How can you care for yourself at home? Medical treatment  · If you stop taking your medicine, your blood pressure will go back up. You may take one or more types of medicine to lower your blood pressure. Be safe with medicines. Take your medicine exactly as prescribed. Call your doctor if you think you are having a problem with your medicine. · Talk to your doctor before you start taking aspirin every day. Aspirin can help certain people lower their risk of a heart attack or stroke. But taking aspirin isn't right for everyone, because it can cause serious bleeding. · See your doctor regularly. You may need to see the doctor more often at first or until your blood pressure comes down.   · If you are taking blood pressure medicine, talk to your doctor before you take decongestants or anti-inflammatory medicine, such as ibuprofen. Some of these medicines can raise blood pressure. · Learn how to check your blood pressure at home. Lifestyle changes  · Stay at a healthy weight. This is especially important if you put on weight around the waist. Losing even 10 pounds can help you lower your blood pressure. · If your doctor recommends it, get more exercise. Walking is a good choice. Bit by bit, increase the amount you walk every day. Try for at least 30 minutes on most days of the week. You also may want to swim, bike, or do other activities. · Avoid or limit alcohol. Talk to your doctor about whether you can drink any alcohol. · Try to limit how much sodium you eat to less than 2,300 milligrams (mg) a day. Your doctor may ask you to try to eat less than 1,500 mg a day. · Eat plenty of fruits (such as bananas and oranges), vegetables, legumes, whole grains, and low-fat dairy products. · Lower the amount of saturated fat in your diet. Saturated fat is found in animal products such as milk, cheese, and meat. Limiting these foods may help you lose weight and also lower your risk for heart disease. · Do not smoke. Smoking increases your risk for heart attack and stroke. If you need help quitting, talk to your doctor about stop-smoking programs and medicines. These can increase your chances of quitting for good. When should you call for help? Call 911 anytime you think you may need emergency care. This may mean having symptoms that suggest that your blood pressure is causing a serious heart or blood vessel problem. Your blood pressure may be over 180/110. ? For example, call 911 if:  ? · You have symptoms of a heart attack. These may include:  ¨ Chest pain or pressure, or a strange feeling in the chest.  ¨ Sweating. ¨ Shortness of breath. ¨ Nausea or vomiting.   ¨ Pain, pressure, or a strange feeling in the back, neck, jaw, or upper belly or in one or both shoulders or arms.  ¨ Lightheadedness or sudden weakness. ¨ A fast or irregular heartbeat. ? · You have symptoms of a stroke. These may include:  ¨ Sudden numbness, tingling, weakness, or loss of movement in your face, arm, or leg, especially on only one side of your body. ¨ Sudden vision changes. ¨ Sudden trouble speaking. ¨ Sudden confusion or trouble understanding simple statements. ¨ Sudden problems with walking or balance. ¨ A sudden, severe headache that is different from past headaches. ? · You have severe back or belly pain. ?Do not wait until your blood pressure comes down on its own. Get help right away. ?Call your doctor now or seek immediate care if:  ? · Your blood pressure is much higher than normal (such as 180/110 or higher), but you don't have symptoms. ? · You think high blood pressure is causing symptoms, such as:  ¨ Severe headache. ¨ Blurry vision. ? Watch closely for changes in your health, and be sure to contact your doctor if:  ? · Your blood pressure measures 140/90 or higher at least 2 times. That means the top number is 140 or higher or the bottom number is 90 or higher, or both. ? · You think you may be having side effects from your blood pressure medicine. ? · Your blood pressure is usually normal, but it goes above normal at least 2 times. Where can you learn more? Go to http://linda-slava.info/. Enter N509 in the search box to learn more about \"High Blood Pressure: Care Instructions. \"  Current as of: September 21, 2016  Content Version: 11.4  © 8677-1731 [employer]. Care instructions adapted under license by [employer] (which disclaims liability or warranty for this information). If you have questions about a medical condition or this instruction, always ask your healthcare professional. [place] [address] any warranty or liability for your use of this information.        Leg and Ankle Edema: Care Instructions  Your Care Instructions  Swelling in the legs, ankles, and feet is called edema. It is common after you sit or stand for a while. Long plane flights or car rides often cause swelling in the legs and feet. You may also have swelling if you have to stand for long periods of time at your job. Problems with the veins in the legs (varicose veins) and changes in hormones can also cause swelling. Sometimes the swelling in the ankles and feet is caused by a more serious problem, such as heart failure, infection, blood clots, or liver or kidney disease. Follow-up care is a key part of your treatment and safety. Be sure to make and go to all appointments, and call your doctor if you are having problems. It's also a good idea to know your test results and keep a list of the medicines you take. How can you care for yourself at home? · If your doctor gave you medicine, take it as prescribed. Call your doctor if you think you are having a problem with your medicine. · Whenever you are resting, raise your legs up. Try to keep the swollen area higher than the level of your heart. · Take breaks from standing or sitting in one position. ¨ Walk around to increase the blood flow in your lower legs. ¨ Move your feet and ankles often while you stand, or tighten and relax your leg muscles. · Wear support stockings. Put them on in the morning, before swelling gets worse. · Eat a balanced diet. Lose weight if you need to. · Limit the amount of salt (sodium) in your diet. Salt holds fluid in the body and may increase swelling. When should you call for help? Call 911 anytime you think you may need emergency care. For example, call if:  ? · You have symptoms of a blood clot in your lung (called a pulmonary embolism). These may include:  ¨ Sudden chest pain. ¨ Trouble breathing. ¨ Coughing up blood.    ?Call your doctor now or seek immediate medical care if:  ? · You have signs of a blood clot, such as:  ¨ Pain in your calf, back of the knee, thigh, or groin. ¨ Redness and swelling in your leg or groin. ? · You have symptoms of infection, such as:  ¨ Increased pain, swelling, warmth, or redness. ¨ Red streaks or pus. ¨ A fever. ? Watch closely for changes in your health, and be sure to contact your doctor if:  ? · Your swelling is getting worse. ? · You have new or worsening pain in your legs. ? · You do not get better as expected. Where can you learn more? Go to http://linda-slava.info/. Enter S038 in the search box to learn more about \"Leg and Ankle Edema: Care Instructions. \"  Current as of: March 20, 2017  Content Version: 11.4  © 3715-2762 [employer]. Care instructions adapted under license by [employer] (which disclaims liability or warranty for this information). If you have questions about a medical condition or this instruction, always ask your healthcare professional. [place] [address] any warranty or liability for your use of this information.

## 2018-03-24 NOTE — ED NOTES
I performed a brief evaluation, including history and physical, of the patient here in triage and I have determined that pt will need further treatment and evaluation from the main side ER physician. I have placed initial orders to help in expediting patients care.      March 23, 2018 at 8:53 PM - MAKAYLA Clark Do        Visit Vitals    BP (!) 154/96    Pulse 84    Temp 97.1 °F (36.2 °C)    Resp 17    Wt 76.2 kg (168 lb)    SpO2 99%    BMI 32.81 kg/m2

## 2018-03-24 NOTE — ED NOTES
I have reviewed discharge instructions with the patient. The patient verbalized understanding. Pt is AxOx4, NAD. Pt taken out of ED by wheelchair, accompanied by family members.

## 2018-03-24 NOTE — ED TRIAGE NOTES
Patient has one  Kidney and has developed swollen ankles bilat and high bp . Patient states she took  Her blood pressure meds.

## 2018-03-26 ENCOUNTER — HOSPITAL ENCOUNTER (OUTPATIENT)
Dept: LAB | Age: 63
Discharge: HOME | End: 2018-03-26

## 2018-03-26 LAB — SENTARA SPECIMEN COL,SENBCF: NORMAL

## 2018-03-26 PROCEDURE — 99001 SPECIMEN HANDLING PT-LAB: CPT | Performed by: INTERNAL MEDICINE

## 2018-03-28 ENCOUNTER — TELEPHONE (OUTPATIENT)
Dept: INTERNAL MEDICINE CLINIC | Age: 63
End: 2018-03-28

## 2018-03-28 DIAGNOSIS — M25.531 RIGHT WRIST PAIN: Primary | ICD-10-CM

## 2018-03-28 NOTE — TELEPHONE ENCOUNTER
Patient fell and wanted to know if Dr Steve Tsai would order for a xray on right wrist  Please call [phone]

## 2018-03-29 ENCOUNTER — TELEPHONE (OUTPATIENT)
Dept: INTERNAL MEDICINE CLINIC | Age: 63
End: 2018-03-29

## 2018-03-29 DIAGNOSIS — M25.531 RIGHT WRIST PAIN: Primary | ICD-10-CM

## 2018-03-31 ENCOUNTER — HOSPITAL ENCOUNTER (OUTPATIENT)
Dept: GENERAL RADIOLOGY | Age: 63
Discharge: HOME | End: 2018-03-31
Payer: COMMERCIAL

## 2018-03-31 DIAGNOSIS — M25.531 RIGHT WRIST PAIN: ICD-10-CM

## 2018-03-31 DIAGNOSIS — G47.00 INSOMNIA, UNSPECIFIED TYPE: ICD-10-CM

## 2018-03-31 PROCEDURE — 73110 X-RAY EXAM OF WRIST: CPT

## 2018-04-02 ENCOUNTER — TELEPHONE (OUTPATIENT)
Dept: INTERNAL MEDICINE CLINIC | Age: 63
End: 2018-04-02

## 2018-04-02 DIAGNOSIS — R19.7 DIARRHEA, UNSPECIFIED TYPE: ICD-10-CM

## 2018-04-02 DIAGNOSIS — R11.0 NAUSEA: ICD-10-CM

## 2018-04-02 RX ORDER — MIRTAZAPINE 15 MG/1
TABLET, FILM COATED ORAL
Qty: 30 TAB | Refills: 0 | Status: SHIPPED | OUTPATIENT
Start: 2018-04-02 | End: 2018-04-16 | Stop reason: ALTCHOICE

## 2018-04-02 RX ORDER — HYDROCODONE BITARTRATE AND ACETAMINOPHEN 10; 325 MG/1; MG/1
1 TABLET ORAL
Qty: 120 TAB | Refills: 0 | Status: SHIPPED | OUTPATIENT
Start: 2018-04-02 | End: 2018-05-04 | Stop reason: SDUPTHER

## 2018-04-02 NOTE — TELEPHONE ENCOUNTER
Requested Prescriptions     Pending Prescriptions Disp Refills    HYDROcodone-acetaminophen (NORCO)  mg tablet 120 Tab 0     Sig: Take 1 Tab by mouth every six (6) hours as needed for Pain. Max Daily Amount: 4 Tabs.

## 2018-04-03 NOTE — TELEPHONE ENCOUNTER
I have attempted to contact this patient by phone with the following results: no answer.  In reference to x-ray being within normal limits and showing only arthritis

## 2018-04-04 NOTE — TELEPHONE ENCOUNTER
Contacted Juan Finney and informed Her of lab results per Dr Valentina Harris request. Juan Finney expressed understanding.

## 2018-04-05 DIAGNOSIS — J30.9 ALLERGIC RHINITIS: ICD-10-CM

## 2018-04-06 RX ORDER — MONTELUKAST SODIUM 10 MG/1
TABLET ORAL
Qty: 30 TAB | Refills: 0 | Status: SHIPPED | OUTPATIENT
Start: 2018-04-06 | End: 2018-05-05 | Stop reason: SDUPTHER

## 2018-04-06 RX ORDER — ROFLUMILAST 500 UG/1
TABLET ORAL
Qty: 30 TAB | Refills: 0 | Status: SHIPPED | OUTPATIENT
Start: 2018-04-06 | End: 2018-05-05 | Stop reason: SDUPTHER

## 2018-04-06 RX ORDER — MIRTAZAPINE 15 MG/1
TABLET, FILM COATED ORAL
Qty: 30 TAB | Refills: 0 | Status: SHIPPED | OUTPATIENT
Start: 2018-04-06 | End: 2018-09-04 | Stop reason: SDUPTHER

## 2018-04-08 RX ORDER — CLONIDINE 0.2 MG/24H
PATCH, EXTENDED RELEASE TRANSDERMAL
Qty: 4 PATCH | Refills: 0 | Status: SHIPPED | OUTPATIENT
Start: 2018-04-08 | End: 2018-05-03 | Stop reason: SDUPTHER

## 2018-04-13 ENCOUNTER — OFFICE VISIT (OUTPATIENT)
Dept: INTERNAL MEDICINE CLINIC | Age: 63
End: 2018-04-13

## 2018-04-13 VITALS
RESPIRATION RATE: 16 BRPM | HEIGHT: 60 IN | HEART RATE: 72 BPM | OXYGEN SATURATION: 97 % | TEMPERATURE: 98.3 F | WEIGHT: 173 LBS | SYSTOLIC BLOOD PRESSURE: 130 MMHG | BODY MASS INDEX: 33.96 KG/M2 | DIASTOLIC BLOOD PRESSURE: 80 MMHG

## 2018-04-13 DIAGNOSIS — M25.531 RIGHT WRIST PAIN: ICD-10-CM

## 2018-04-13 DIAGNOSIS — R10.84 GENERALIZED ABDOMINAL PAIN: ICD-10-CM

## 2018-04-13 DIAGNOSIS — Z98.84 STATUS POST GASTRIC BANDING: ICD-10-CM

## 2018-04-13 DIAGNOSIS — I70.1 RIGHT RENAL ARTERY STENOSIS (HCC): ICD-10-CM

## 2018-04-13 DIAGNOSIS — M25.50 PAIN, JOINT, MULTIPLE SITES: ICD-10-CM

## 2018-04-13 DIAGNOSIS — I10 ESSENTIAL HYPERTENSION: Primary | ICD-10-CM

## 2018-04-13 DIAGNOSIS — I10 ESSENTIAL HYPERTENSION: ICD-10-CM

## 2018-04-13 RX ORDER — TRIAMTERENE AND HYDROCHLOROTHIAZIDE 37.5; 25 MG/1; MG/1
CAPSULE ORAL
Qty: 30 CAP | Refills: 3 | Status: SHIPPED | OUTPATIENT
Start: 2018-04-13 | End: 2018-08-07 | Stop reason: SDUPTHER

## 2018-04-13 NOTE — MR AVS SNAPSHOT
[address] [facility] 
 
 
 [address] [facility] [address] 
[phone] Patient: Andrae Baez MRN: [id number] ONC:7/89/3250 Visit Information Date & Time Provider Department Dept. Phone Encounter #  
 4/13/2018 10:30 AM Tish Roth MD [facility] INTERNAL MEDICINE OF Glynis Pallas [phone] [id number] Your Appointments 5/21/2018  8:00 AM  
PROCEDURE with [facility] IMAGING 2 [facility] ([facility]) Appt Note: evar endografy anni 3 months; Patient rescheduled. Confirmed new appt 2300 [facility] Roddie Necessary 372 200 [facility] [address]  
[phone] [facility]  
  
    
 5/21/2018  9:00 AM  
PROCEDURE with [facility] IMAGING 1 [facility] ([facility]) Appt Note: rt renal occlusion anni 3 months; Patient rescheduled and confirmed new appt. 2300 [facility] Roddie Necessary 372 200 [facility] [address]  
[phone] [facility]  
  
    
 5/29/2018 10:45 AM  
Follow Up with Nella Reed MD  
600 [facility]) Appt Note: 3 month after studies with prep 2300 [facility] Roddie Necessary 503 200 [facility] [address]  
[phone] 2300 [facility] 200 [facility] [address] 7/13/2018  9:15 AM  
Follow Up with Tish Roth MD  
55 [facility]) Appt Note: 3 month [address] [place] [address]  
  
   
 [address] [place] [address]  
  
    
 9/20/2018 10:15 AM  
Office Visit with Analisa Lazo MD  
[facility]) Appt Note: 6 months [address] [facility] [place], [address] [facility] [address]  
[address]  
  
    
 11/12/2018  9:00 AM  
 PROCEDURE with B[facility]VS IMAGING 1 [facility] ([address] [place] [address]) Appt Note: evrene brown 1 year 1212 [facility] 570 200 [facility] [address]  
[phone]  
  
    
 11/12/2018 10:00 AM  
PROCEDURE with [facility] IMAGING 1 [facility] ([address] [place] [address]) Appt Note: cv stephanie 1 year 1212 [facility] 725 200 [facility] [address]  
[phone]  
  
    
 11/27/2018 10:00 AM  
Follow Up with MAKAYLA [facility] ([address] [place] [address]) Appt Note: 1 year follow up after studies with prep 1212 [facility] 068 200 [facility] [address]  
[phone] 1212 [facility] 47 [facility]  
  
    
  
 6/5/2018 10:45 AM  
Any with Zhao Duque MD  
[facility] ([address] [place] [address]) Appt Note: ep- 6 month follow up  
 709 [facility] [place] [address]  
  
   
 709 [facility] [address] Upcoming Health Maintenance Date Due ZOSTER VACCINE AGE 60> 12/11/2016 Bone Densitometry 9/24/2017 BREAST CANCER SCRN MAMMOGRAM 6/1/2018 FOBT Q 1 YEAR AGE 50-75 1/31/2019 PAP AKA CERVICAL CYTOLOGY 8/12/2019 DTaP/Tdap/Td series (2 - Td) 9/1/2026 Allergies as of 4/13/2018  Review Complete On: 4/13/2018 By: Harper Walker LPN Severity Noted Reaction Type Reactions Pcn [Penicillins] High 09/18/2014    Anaphylaxis Tetracycline High 09/18/2014    Anaphylaxis Sulfabenzamide  09/18/2014    Hives Current Immunizations  Reviewed on 2/9/2018 Name Date Influenza Vaccine 10/31/2017 Influenza Vaccine (Quad) PF 9/13/2017, 9/1/2016 Pneumococcal Vaccine (Unspecified Type) 3/5/2013 Tdap 9/1/2016 Not reviewed this visit You Were Diagnosed With   
  
 Codes Comments Essential hypertension    -  Primary ICD-10-CM: I10 
ICD-9-CM: 401.9 Pain, joint, multiple sites     ICD-10-CM: M25.50 ICD-9-CM: 719.49 Right wrist pain     ICD-10-CM: M25.531 ICD-9-CM: 719.43 Vitals BP Pulse Temp Resp Height(growth percentile) 130/80 (BP 1 Location: Left arm, BP Patient Position: Sitting) 72 98.3 °F (36.8 °C) (Tympanic) 16 5' (1.524 m) Weight(growth percentile) SpO2 BMI OB Status Smoking Status 173 lb (78.5 kg) 97% 33.79 kg/m2 Hysterectomy Former Smoker Vitals History BMI and BSA Data Body Mass Index Body Surface Area 33.79 kg/m 2 1.82 m 2 Preferred Pharmacy Pharmacy Name Phone [facility] [address] 19 [facility] [phone] Your Updated Medication List  
  
   
This list is accurate as of 4/13/18 12:42 PM.  Always use your most recent med list. amLODIPine 10 mg tablet Commonly known as:  Anais Nathaniel Take 1 Tab by mouth daily. atorvastatin 40 mg tablet Commonly known as:  LIPITOR Take 1 Tab by mouth daily. carvedilol 25 mg tablet Commonly known as:  Maybelle Pallas Take 1 Tab by mouth two (2) times daily (with meals). cloNIDine 0.2 mg/24 hr patch Commonly known as:  CATAPRES  
USE ONE PATCH BY TRANSDERMAL ROUTE EVERY SEVEN DAYS  
  
 clopidogrel 75 mg Tab Commonly known as:  PLAVIX TAKE 1 TABLET DAILY DALIRESP Tab tablet Generic drug:  roflumilast  
TAKE ONE TABLET BY MOUTH DAILY  
  
 diclofenac 1 % Gel Commonly known as:  VOLTAREN Apply 2 g to affected area four (4) times daily. docusate sodium 100 mg capsule Commonly known as:  Grey Guppy Take 1 Cap by mouth two (2) times a day. esomeprazole 20 mg capsule Commonly known as:  NexIUM Take 1 Cap by mouth daily. STOP PREVACID  Indications: HEARTBURN  
  
 fluticasone-salmeterol 250-50 mcg/dose diskus inhaler Commonly known as:  ADVAIR DISKUS Take 1 Puff by inhalation daily. hydrALAZINE 25 mg tablet Commonly known as:  APRESOLINE  
 Take 1 Tab by mouth two (2) times a day. HYDROcodone-acetaminophen  mg tablet Commonly known as:  Jasmin Dimes Take 1 Tab by mouth every six (6) hours as needed for Pain. Max Daily Amount: 4 Tabs. * mirtazapine 15 mg tablet Commonly known as:  REMERON  
TAKE ONE TABLET BY MOUTH EVERY EVENING  
  
 * mirtazapine 15 mg tablet Commonly known as:  REMERON  
TAKE ONE TABLET BY MOUTH EVERY EVENING  
  
 montelukast 10 mg tablet Commonly known as:  SINGULAIR  
TAKE ONE TABLET BY MOUTH DAILY  
  
 ondansetron 8 mg disintegrating tablet Commonly known as:  ZOFRAN ODT Take 1 Tab by mouth every eight (8) hours as needed for Nausea. raNITIdine 150 mg tablet Commonly known as:  ZANTAC TAKE ONE TABLET BY MOUTH TWICE A DAY  
  
 triamterene-hydroCHLOROthiazide 37.5-25 mg per capsule Commonly known as:  DYAZIDE  
1 cap each AM  
  
 * Notice: This list has 2 medication(s) that are the same as other medications prescribed for you. Read the directions carefully, and ask your doctor or other care provider to review them with you. Prescriptions Sent to Pharmacy Refills  
 triamterene-hydroCHLOROthiazide (DYAZIDE) 37.5-25 mg per capsule 3 Si cap each AM  
 Class: Normal  
 Pharmacy: [address] #: [phone] We Performed the Following REFERRAL TO ORTHODONTICS [REF60 Custom] To-Do List   
 2018 Lab:  C REACTIVE PROTEIN, QT   
  
 2018 Lab:  METABOLIC PANEL, COMPREHENSIVE   
  
 2018 Lab:  SED RATE (ESR)   
  
 2018 Lab:  CBC WITH AUTOMATED DIFF Referral Information Referral ID Referred By Referred To  
  
 [id number] Fabiola Baum, [address] [place], [address] Phone: [phone] Fax: [phone] Visits Status Start Date End Date 1 Authorized 18 If your referral has a status of pending review or denied, additional information will be sent to support the outcome of this decision. Please provide this summary of care documentation to your next provider. Your primary care clinician is listed as Joshua Fox. If you have any questions after today's visit, please call [phone].

## 2018-04-16 NOTE — PROGRESS NOTES
The patient presents to the office today with the chief complaint of elevated BP    HPI    The patient is on medications of hypertension. Her BP has been up as of late. The patient is noted to have right renal artery stenosis. The patient is status gastric banding. She continues with generalized abdominal pain related to this previous procedure. The patient complains of right wrist pain after a fall. ROS    Allergies   Allergen Reactions    Pcn [Penicillins] Anaphylaxis    Tetracycline Anaphylaxis    Sulfabenzamide Hives       Current Outpatient Prescriptions   Medication Sig Dispense Refill    triamterene-hydroCHLOROthiazide (DYAZIDE) 37.5-25 mg per capsule 1 cap each AM 30 Cap 3    cloNIDine (CATAPRES) 0.2 mg/24 hr patch USE ONE PATCH BY TRANSDERMAL ROUTE EVERY SEVEN DAYS 4 Patch 0    mirtazapine (REMERON) 15 mg tablet TAKE ONE TABLET BY MOUTH EVERY EVENING 30 Tab 0    DALIRESP tab tablet TAKE ONE TABLET BY MOUTH DAILY 30 Tab 0    montelukast (SINGULAIR) 10 mg tablet TAKE ONE TABLET BY MOUTH DAILY 30 Tab 0    HYDROcodone-acetaminophen (NORCO)  mg tablet Take 1 Tab by mouth every six (6) hours as needed for Pain. Max Daily Amount: 4 Tabs. 120 Tab 0    hydrALAZINE (APRESOLINE) 25 mg tablet Take 1 Tab by mouth two (2) times a day. 60 Tab 6    raNITIdine (ZANTAC) 150 mg tablet TAKE ONE TABLET BY MOUTH TWICE A DAY 60 Tab 1    amLODIPine (NORVASC) 10 mg tablet Take 1 Tab by mouth daily. 90 Tab 0    clopidogrel (PLAVIX) 75 mg tab TAKE 1 TABLET DAILY 90 Tab 2    carvedilol (COREG) 25 mg tablet Take 1 Tab by mouth two (2) times daily (with meals). 60 Tab 6    atorvastatin (LIPITOR) 40 mg tablet Take 1 Tab by mouth daily. 30 Tab 3    ondansetron (ZOFRAN ODT) 8 mg disintegrating tablet Take 1 Tab by mouth every eight (8) hours as needed for Nausea. 30 Tab 0    docusate sodium (COLACE) 100 mg capsule Take 1 Cap by mouth two (2) times a day.  (Patient taking differently: Take 100 mg by mouth daily.) 60 Cap 5    esomeprazole (NEXIUM) 20 mg capsule Take 1 Cap by mouth daily. STOP PREVACID  Indications: HEARTBURN 30 Cap 1    diclofenac (VOLTAREN) 1 % gel Apply 2 g to affected area four (4) times daily. 1 Each 2    fluticasone-salmeterol (ADVAIR DISKUS) 250-50 mcg/dose diskus inhaler Take 1 Puff by inhalation daily. (Patient taking differently: Take 2 Puffs by inhalation daily. ) 1 Inhaler 5       Past Medical History:   Diagnosis Date    Aorto-iliac duplex 09/17/2015    Patent abdom aorta endovascular graft w/o leak. Mod stenosis suggested in right limb.  Calculus of kidney 2014    stent/kidney     Carotid duplex 09/17/2015    Mild < 50% bilateral plaquing.  Cataract, left     Chronic obstructive pulmonary disease ([address]     Echocardiogram 11/04/2014    EF 56%. No WMA. Mild LAE. Echo is within normal limits.  Esophageal reflux     Generalized osteoarthrosis, involving multiple sites     GERD (gastroesophageal reflux disease)     Gout     History of renal stent     right side    Hypercholesteremia     Hypertension     resolved    Lower extremity arterial testing 03/27/2015    Normal perfusion at rest and w/treadmill bilaterally.   R KORI 1.05.  L KORI 1.03.    Murmur     patient reports that she is aware- has been told in the past    Nausea & vomiting     Renal artery stenosis ([address]     Subclinical hyperthyroidism 8/2015    Unspecified sleep apnea     resolved       Past Surgical History:   Procedure Laterality Date    ADJUSTMENT GASTRIC BAND N/A 11/10/2016    MAKAYLA Cullen    HX APPENDECTOMY      HX BLADDER SUSPENSION      HX CHOLECYSTECTOMY      HX GASTRIC BYPASS      lap band 2012    HX HEENT Right 1990s, 2016    Ear sx    HX HYSTERECTOMY      HX OTHER SURGICAL  10/27/14     Bilateral open femoral exposures, Placement of catheter and sheath in the aorta Endo repair of aortic aneurysm with modular bifurcated device  Interpretation of angiography, intravascular ultrasound (IVUS) catheter    HX OTHER SURGICAL  10/27/2014    Endurant II abdominal stent graft implant    HX UROLOGICAL      stent    VASCULAR SURGERY PROCEDURE UNLIST      surgery for abdominal aneurysm       Social History     Social History    Marital status:      Spouse name: N/A    Number of children: N/A    Years of education: N/A     Occupational History    Not on file. Social History Main Topics    Smoking status: Former Smoker     Packs/day: 0.00     Years: 0.00     Types: Cigarettes     Quit date: 1/25/2016    Smokeless tobacco: Never Used    Alcohol use No    Drug use: No    Sexual activity: Yes     Birth control/ protection: Surgical     Other Topics Concern    Not on file     Social History Narrative       Patient does not have an advanced directive on file    Visit Vitals    /80 (BP 1 Location: Left arm, BP Patient Position: Sitting)    Pulse 72    Temp 98.3 °F (36.8 °C) (Tympanic)    Resp 16    Ht 5' (1.524 m)    Wt 173 lb (78.5 kg)    SpO2 97%    BMI 33.79 kg/m2       Physical Exam   No Cervical Lymphadenopathy  No Supraclavicular Lymphadenopathy  Thyroid is Normal  Lungs are normal to percussion. Clear to auscultation   Heart:  S1 S2 are normal, No gallops, No mummers  No Carotid Bruits  Abdomen:  Normal Bowel Sounds. Mild  Tenderness in all quadrants without rebound. No masses. No Hepatomegaly or Splenomegly  LE:  Strong Pedal Pulses.   No Edema      BMI:  BMI is high but it was not addressed during this visit due to chronic GI complaints      Hospital Outpatient Visit on 03/26/2018   Component Date Value Ref Range Status    [facility] SPE[facility] COL 03/26/2018 Specimens collected/[facility] to [facility]    Final   Admission on 03/23/2018, Discharged on 03/24/2018   Component Date Value Ref Range Status    WBC 03/23/2018 7.9  4.6 - 13.2 K/uL Final    RBC 03/23/2018 5.03  4.20 - 5.30 M/uL Final    HGB 03/23/2018 14.0  12.0 - 16.0 g/dL Final    HCT 03/23/2018 40.4 35.0 - 45.0 % Final    MCV 03/23/2018 80.3  74.0 - 97.0 FL Final    MCH 03/23/2018 27.8  24.0 - 34.0 PG Final    MCHC 03/23/2018 34.7  31.0 - 37.0 g/dL Final    RDW 03/23/2018 13.4  11.6 - 14.5 % Final    PLATELET 70/32/7154 943  135 - 420 K/uL Final    MPV 03/23/2018 10.4  9.2 - 11.8 FL Final    NEUTROPHILS 03/23/2018 51  40 - 73 % Final    LYMPHOCYTES 03/23/2018 33  21 - 52 % Final    MONOCYTES 03/23/2018 10  3 - 10 % Final    EOSINOPHILS 03/23/2018 5  0 - 5 % Final    BASOPHILS 03/23/2018 1  0 - 2 % Final    ABS. NEUTROPHILS 03/23/2018 4.1  1.8 - 8.0 K/UL Final    ABS. LYMPHOCYTES 03/23/2018 2.6  0.9 - 3.6 K/UL Final    ABS. MONOCYTES 03/23/2018 0.8  0.05 - 1.2 K/UL Final    ABS. EOSINOPHILS 03/23/2018 0.4  0.0 - 0.4 K/UL Final    ABS. BASOPHILS 03/23/2018 0.1  0.0 - 0.1 K/UL Final    DF 03/23/2018 AUTOMATED    Final    Sodium 03/23/2018 137  136 - 145 mmol/L Final    Potassium 03/23/2018 3.5  3.5 - 5.5 mmol/L Final    Chloride 03/23/2018 100  100 - 108 mmol/L Final    CO2 03/23/2018 25  21 - 32 mmol/L Final    Anion gap 03/23/2018 12  3.0 - 18 mmol/L Final    Glucose 03/23/2018 122* 74 - 99 mg/dL Final    BUN 03/23/2018 15  7.0 - 18 MG/DL Final    Creatinine 03/23/2018 0.98  0.6 - 1.3 MG/DL Final    BUN/Creatinine ratio 03/23/2018 15  12 - 20   Final    GFR est AA 03/23/2018 >60  >60 ml/min/1.73m2 Final    GFR est non-AA 03/23/2018 58* >60 ml/min/1.73m2 Final    Comment: (NOTE)  Estimated GFR is calculated using the Modification of Diet in Renal   Disease (MDRD) Study equation, reported for both  Americans   (GFRAA) and non- Americans (GFRNA), and normalized to 1.73m2   body surface area. The physician must decide which value applies to   the patient. The MDRD study equation should only be used in   individuals age 25 or older.  It has not been validated for the   following: pregnant women, patients with serious comorbid conditions,   or on certain medications, or persons with extremes of body size,   muscle mass, or nutritional status.  Calcium 03/23/2018 9.6  8.5 - 10.1 MG/DL Final    Bilirubin, total 03/23/2018 0.6  0.2 - 1.0 MG/DL Final    ALT (SGPT) 03/23/2018 24  13 - 56 U/L Final    AST (SGOT) 03/23/2018 27  15 - 37 U/L Final    Alk. phosphatase 03/23/2018 105  45 - 117 U/L Final    Protein, total 03/23/2018 8.2  6.4 - 8.2 g/dL Final    Albumin 03/23/2018 4.3  3.4 - 5.0 g/dL Final    Globulin 03/23/2018 3.9  2.0 - 4.0 g/dL Final    A-G Ratio 03/23/2018 1.1  0.8 - 1.7   Final    Color 03/23/2018 YELLOW    Final    Appearance 03/23/2018 CLEAR    Final    Specific gravity 03/23/2018 1.007  1.005 - 1.030   Final    pH (UA) 03/23/2018 6.5  5.0 - 8.0   Final    Protein 03/23/2018 NEGATIVE   NEG mg/dL Final    Glucose 03/23/2018 NEGATIVE   NEG mg/dL Final    Ketone 03/23/2018 NEGATIVE   NEG mg/dL Final    Bilirubin 03/23/2018 NEGATIVE   NEG   Final    Blood 03/23/2018 TRACE* NEG   Final    Urobilinogen 03/23/2018 0.2  0.2 - 1.0 EU/dL Final    Nitrites 03/23/2018 NEGATIVE   NEG   Final    Leukocyte Esterase 03/23/2018 SMALL* NEG   Final    WBC 03/23/2018 0 to 3  0 - 4 /hpf Final    RBC 03/23/2018 0 to 3  0 - 5 /hpf Final    Epithelial cells 03/23/2018 FEW  0 - 5 /lpf Final    Bacteria 03/23/2018 NEGATIVE   NEG /hpf Final   Office Visit on 02/05/2018   Component Date Value Ref Range Status    Glucose 02/05/2018 88  70 - 99 mg/dL Final    BUN 02/05/2018 13  6 - 22 mg/dL Final    Creatinine 02/05/2018 0.9  0.8 - 1.4 mg/dL Final    Sodium 02/05/2018 142  133 - 145 mmol/L Final    Potassium 02/05/2018 4.1  3.5 - 5.5 mmol/L Final    Chloride 02/05/2018 100  98 - 110 mmol/L Final    CO2 02/05/2018 26  20 - 32 mmol/L Final    AST (SGOT) 02/05/2018 25  10 - 37 U/L Final    ALT (SGPT) 02/05/2018 32  5 - 40 U/L Final    Alk.  phosphatase 02/05/2018 72  40 - 120 U/L Final    Bilirubin, total 02/05/2018 0.5  0.2 - 1.2 mg/dL Final    Calcium 02/05/2018 9.2 8.4 - 10.5 mg/dL Final    Protein, total 02/05/2018 6.4  6.2 - 8.1 g/dL Final    Albumin 02/05/2018 4.1  3.5 - 5.0 g/dL Final    A-G Ratio 02/05/2018 1.8  1.1 - 2.6 ratio Final    Globulin 02/05/2018 2.3  2.0 - 4.0 g/dL Final    Anion gap 02/05/2018 16.0  mmol/L Final    Comment: Test includes Albumin, Alkaline Phosphatase, ALT, AST, BUN, Calcium, CO2,  Chloride, Creatinine, Glucose, Potassium, Sodium, Total Bilirubin and Total  Protein. Estimated GFR results are reported in mL/min/1.73 sq.m. by the MDRD equation. This eGFR is validated for stable chronic renal failure patients. This   equation  is unreliable in acute illness or patients with normal renal function.  GFRAA 02/05/2018 >60.0  >60.0 Final    GFRNA 02/05/2018 60.0* >60.0 Final    WBC 02/05/2018 7.9  4.0 - 11.0 K/uL Final    RBC 02/05/2018 4.35  3.80 - 5.20 M/uL Final    HGB 02/05/2018 12.2  11.7 - 16.0 g/dL Final    HCT 02/05/2018 36.9  35.1 - 48.0 % Final    MCV 02/05/2018 85  80 - 95 fL Final    MCH 02/05/2018 28  26 - 34 pg Final    MCHC 02/05/2018 33  32 - 36 g/dL Final    RDW 02/05/2018 13.2  10.0 - 16.0 % Final    PLATELET 73/18/2027 691  140 - 440 K/uL Final    MPV 02/05/2018 11.4* 6.0 - 10.8 fL Final    NEUTROPHILS 02/05/2018 58  40 - 75 % Final    Lymphocytes 02/05/2018 27  27 - 45 % Final    MONOCYTES 02/05/2018 11* 3 - 9 % Final    EOSINOPHILS 02/05/2018 4  0 - 6 % Final    BASOPHILS 02/05/2018 1  0 - 2 % Final    ABS. NEUTROPHILS 02/05/2018 4.6  1.8 - 7.7 K/uL Final    ABSOLUTE LYMPHOCYTE COUNT 02/05/2018 2.2  1.0 - 4.8 K/uL Final    ABS. MONOCYTES 02/05/2018 0.9  0.1 - 0.9 K/uL Final    ABS. EOSINOPHILS 02/05/2018 0.3  0.0 - 0.5 K/uL Final    ABS.  BASOPHILS 02/05/2018 0.1  0.0 - 0.2 K/uL Final   Admission on 01/29/2018, Discharged on 02/02/2018   Component Date Value Ref Range Status    WBC 01/29/2018 6.6  4.6 - 13.2 K/uL Final    RBC 01/29/2018 4.82  4.20 - 5.30 M/uL Final    HGB 01/29/2018 13.6  12.0 - 16.0 g/dL Final    HCT 01/29/2018 40.1  35.0 - 45.0 % Final    MCV 01/29/2018 83.2  74.0 - 97.0 FL Final    MCH 01/29/2018 28.2  24.0 - 34.0 PG Final    MCHC 01/29/2018 33.9  31.0 - 37.0 g/dL Final    RDW 01/29/2018 12.9  11.6 - 14.5 % Final    PLATELET 28/29/4229 506  135 - 420 K/uL Final    MPV 01/29/2018 9.7  9.2 - 11.8 FL Final    NEUTROPHILS 01/29/2018 63  40 - 73 % Final    LYMPHOCYTES 01/29/2018 23  21 - 52 % Final    MONOCYTES 01/29/2018 10  3 - 10 % Final    EOSINOPHILS 01/29/2018 3  0 - 5 % Final    BASOPHILS 01/29/2018 1  0 - 2 % Final    ABS. NEUTROPHILS 01/29/2018 4.2  1.8 - 8.0 K/UL Final    ABS. LYMPHOCYTES 01/29/2018 1.5  0.9 - 3.6 K/UL Final    ABS. MONOCYTES 01/29/2018 0.6  0.05 - 1.2 K/UL Final    ABS. EOSINOPHILS 01/29/2018 0.2  0.0 - 0.4 K/UL Final    ABS. BASOPHILS 01/29/2018 0.1* 0.0 - 0.06 K/UL Final    DF 01/29/2018 AUTOMATED    Final    Sodium 01/29/2018 138  136 - 145 mmol/L Final    Potassium 01/29/2018 4.5  3.5 - 5.5 mmol/L Final    Chloride 01/29/2018 103  100 - 108 mmol/L Final    CO2 01/29/2018 30  21 - 32 mmol/L Final    Anion gap 01/29/2018 5  3.0 - 18 mmol/L Final    Glucose 01/29/2018 96  74 - 99 mg/dL Final    BUN 01/29/2018 10  7.0 - 18 MG/DL Final    Creatinine 01/29/2018 0.90  0.6 - 1.3 MG/DL Final    BUN/Creatinine ratio 01/29/2018 11* 12 - 20   Final    GFR est AA 01/29/2018 >60  >60 ml/min/1.73m2 Final    GFR est non-AA 01/29/2018 >60  >60 ml/min/1.73m2 Final    Comment: (NOTE)  Estimated GFR is calculated using the Modification of Diet in Renal   Disease (MDRD) Study equation, reported for both  Americans   (GFRAA) and non- Americans (GFRNA), and normalized to 1.73m2   body surface area. The physician must decide which value applies to   the patient. The MDRD study equation should only be used in   individuals age 25 or older.  It has not been validated for the   following: pregnant women, patients with serious comorbid conditions, or on certain medications, or persons with extremes of body size,   muscle mass, or nutritional status.  Calcium 01/29/2018 9.0  8.5 - 10.1 MG/DL Final    Bilirubin, total 01/29/2018 0.6  0.2 - 1.0 MG/DL Final    ALT (SGPT) 01/29/2018 15  13 - 56 U/L Final    AST (SGOT) 01/29/2018 19  15 - 37 U/L Final    Alk. phosphatase 01/29/2018 83  45 - 117 U/L Final    Protein, total 01/29/2018 7.7  6.4 - 8.2 g/dL Final    Albumin 01/29/2018 3.8  3.4 - 5.0 g/dL Final    Globulin 01/29/2018 3.9  2.0 - 4.0 g/dL Final    A-G Ratio 01/29/2018 1.0  0.8 - 1.7   Final    Lipase 01/29/2018 134  73 - 393 U/L Final    Protein, total 01/29/2018 7.3  6.4 - 8.2 g/dL Final    Albumin 01/29/2018 3.9  3.4 - 5.0 g/dL Final    Globulin 01/29/2018 3.4  2.0 - 4.0 g/dL Final    A-G Ratio 01/29/2018 1.1  0.8 - 1.7   Final    Bilirubin, total 01/29/2018 0.6  0.2 - 1.0 MG/DL Final    Bilirubin, direct 01/29/2018 0.2  0.0 - 0.2 MG/DL Final    Alk.  phosphatase 01/29/2018 86  45 - 117 U/L Final    AST (SGOT) 01/29/2018 16  15 - 37 U/L Final    ALT (SGPT) 01/29/2018 19  13 - 56 U/L Final    Color 01/29/2018 YELLOW    Final    Appearance 01/29/2018 CLEAR    Final    Specific gravity 01/29/2018 1.007  1.005 - 1.030   Final    pH (UA) 01/29/2018 7.5  5.0 - 8.0   Final    Protein 01/29/2018 NEGATIVE   NEG mg/dL Final    Glucose 01/29/2018 NEGATIVE   NEG mg/dL Final    Ketone 01/29/2018 NEGATIVE   NEG mg/dL Final    Bilirubin 01/29/2018 NEGATIVE   NEG   Final    Blood 01/29/2018 TRACE* NEG   Final    Urobilinogen 01/29/2018 0.2  0.2 - 1.0 EU/dL Final    Nitrites 01/29/2018 NEGATIVE   NEG   Final    Leukocyte Esterase 01/29/2018 NEGATIVE   NEG   Final    WBC 01/29/2018 NEGATIVE   0 - 4 /hpf Final    RBC 01/29/2018 0 to 1  0 - 5 /hpf Final    Epithelial cells 01/29/2018 FEW  0 - 5 /lpf Final    Bacteria 01/29/2018 NEGATIVE   NEG /hpf Final    Sodium 01/30/2018 139  136 - 145 mmol/L Final    Potassium 01/30/2018 3.7 3.5 - 5.5 mmol/L Final    Chloride 2018 103  100 - 108 mmol/L Final    CO2 2018 24  21 - 32 mmol/L Final    Anion gap 2018 12  3.0 - 18 mmol/L Final    Glucose 2018 130* 74 - 99 mg/dL Final    BUN 2018 13  7.0 - 18 MG/DL Final    Creatinine 2018 0.83  0.6 - 1.3 MG/DL Final    BUN/Creatinine ratio 2018 16  12 - 20   Final    GFR est AA 2018 >60  >60 ml/min/1.73m2 Final    GFR est non-AA 2018 >60  >60 ml/min/1.73m2 Final    Calcium 2018 9.1  8.5 - 10.1 MG/DL Final    WBC 2018 13.4* 4.6 - 13.2 K/uL Final    RBC 2018 4.63  4.20 - 5.30 M/uL Final    HGB 2018 13.4  12.0 - 16.0 g/dL Final    HCT 2018 38.6  35.0 - 45.0 % Final    MCV 2018 83.4  74.0 - 97.0 FL Final    MCH 2018 28.9  24.0 - 34.0 PG Final    MCHC 2018 34.7  31.0 - 37.0 g/dL Final    RDW 2018 13.1  11.6 - 14.5 % Final    PLATELET  090  135 - 420 K/uL Final    MPV 2018 10.2  9.2 - 11.8 FL Final    Vitamin B1 2018 173.8  66.5 - 200.0 nmol/L Final    Comment: (NOTE)  This test was developed and its performance characteristics  determined by [facility]. It has not been cleared or approved  by the Food and Drug Administration.   Performed At: [address] [id number]  Liam Lloyd MD F      Lactic acid 2018 0.6  0.4 - 2.0 MMOL/L Final    Sodium 2018 140  136 - 145 mmol/L Final    Potassium 2018 3.4* 3.5 - 5.5 mmol/L Final    Chloride 2018 107  100 - 108 mmol/L Final    CO2 2018 26  21 - 32 mmol/L Final    Anion gap 2018 7  3.0 - 18 mmol/L Final    Glucose 2018 80  74 - 99 mg/dL Final    BUN 2018 15  7.0 - 18 MG/DL Final    Creatinine 2018 0.67  0.6 - 1.3 MG/DL Final    BUN/Creatinine ratio 2018 22* 12 - 20   Final    GFR est AA 2018 >60  >60 ml/min/1.73m2 Final    GFR est non-AA 2018 >60  >60 ml/min/1.73m2 Final    Calcium 01/31/2018 8.1* 8.5 - 10.1 MG/DL Final    WBC 01/31/2018 9.1  4.6 - 13.2 K/uL Final    RBC 01/31/2018 4.03* 4.20 - 5.30 M/uL Final    HGB 01/31/2018 11.3* 12.0 - 16.0 g/dL Final    HCT 01/31/2018 33.6* 35.0 - 45.0 % Final    MCV 01/31/2018 83.4  74.0 - 97.0 FL Final    MCH 01/31/2018 28.0  24.0 - 34.0 PG Final    MCHC 01/31/2018 33.6  31.0 - 37.0 g/dL Final    RDW 01/31/2018 13.0  11.6 - 14.5 % Final    PLATELET 40/86/5930 753  135 - 420 K/uL Final    MPV 01/31/2018 9.8  9.2 - 11.8 FL Final    HGB 01/31/2018 13.4  12.0 - 16.0 g/dL Final    HCT 01/31/2018 39.3  35.0 - 45.0 % Final    Occult blood, stool 01/31/2018 POSITIVE* NEG   Final    Ventricular Rate 01/31/2018 109  BPM Final    Atrial Rate 01/31/2018 109  BPM Final    P-R Interval 01/31/2018 152  ms Final    QRS Duration 01/31/2018 76  ms Final    Q-T Interval 01/31/2018 336  ms Final    QTC Calculation (Bezet) 01/31/2018 452  ms Final    Calculated P Axis 01/31/2018 68  degrees Final    Calculated R Axis 01/31/2018 70  degrees Final    Calculated T Axis 01/31/2018 67  degrees Final    Diagnosis 01/31/2018    Final                    Value:Sinus tachycardia  Otherwise normal ECG  When compared with ECG of 28-JAN-2018 16:50,  No significant change was found  Confirmed by Rahat Merino ([id number]) on 2/2/2018 8:24:02 AM      TSH 01/31/2018 0.04* 0.36 - 3.74 uIU/mL Final    Sodium 02/01/2018 138  136 - 145 mmol/L Final    Potassium 02/01/2018 3.0* 3.5 - 5.5 mmol/L Final    Chloride 02/01/2018 107  100 - 108 mmol/L Final    CO2 02/01/2018 25  21 - 32 mmol/L Final    Anion gap 02/01/2018 6  3.0 - 18 mmol/L Final    Glucose 02/01/2018 84  74 - 99 mg/dL Final    BUN 02/01/2018 16  7.0 - 18 MG/DL Final    Creatinine 02/01/2018 0.87  0.6 - 1.3 MG/DL Final    BUN/Creatinine ratio 02/01/2018 18  12 - 20   Final    GFR est AA 02/01/2018 >60  >60 ml/min/1.73m2 Final    GFR est non-AA 02/01/2018 >60  >60 ml/min/1.73m2 Final    Calcium 02/01/2018 8.3* 8.5 - 10.1 MG/DL Final    WBC 02/01/2018 9.7  4.6 - 13.2 K/uL Final    RBC 02/01/2018 3.98* 4.20 - 5.30 M/uL Final    HGB 02/01/2018 11.0* 12.0 - 16.0 g/dL Final    HCT 02/01/2018 33.1* 35.0 - 45.0 % Final    MCV 02/01/2018 83.2  74.0 - 97.0 FL Final    MCH 02/01/2018 27.6  24.0 - 34.0 PG Final    MCHC 02/01/2018 33.2  31.0 - 37.0 g/dL Final    RDW 02/01/2018 13.0  11.6 - 14.5 % Final    PLATELET 15/89/4063 230  135 - 420 K/uL Final    MPV 02/01/2018 10.1  9.2 - 11.8 FL Final    Cortisol, a.m. 02/01/2018 23.9* 4.30 - 22.40 ug/dL Final    Protein, total 02/01/2018 6.3* 6.4 - 8.2 g/dL Final    Albumin 02/01/2018 3.1* 3.4 - 5.0 g/dL Final    Globulin 02/01/2018 3.2  2.0 - 4.0 g/dL Final    A-G Ratio 02/01/2018 1.0  0.8 - 1.7   Final    Bilirubin, total 02/01/2018 0.7  0.2 - 1.0 MG/DL Final    Bilirubin, direct 02/01/2018 0.1  0.0 - 0.2 MG/DL Final    Alk.  phosphatase 02/01/2018 66  45 - 117 U/L Final    AST (SGOT) 02/01/2018 12* 15 - 37 U/L Final    ALT (SGPT) 02/01/2018 17  13 - 56 U/L Final    Lipase 02/01/2018 208  73 - 393 U/L Final    T4, Free 02/01/2018 1.3  0.7 - 1.5 NG/DL Final    Magnesium 02/01/2018 2.1  1.6 - 2.6 mg/dL Final    HGB 02/01/2018 12.2  12.0 - 16.0 g/dL Final    HCT 02/01/2018 35.6  35.0 - 45.0 % Final   Admission on 01/28/2018, Discharged on 01/28/2018   Component Date Value Ref Range Status    Ventricular Rate 01/28/2018 85  BPM Final    Atrial Rate 01/28/2018 85  BPM Final    P-R Interval 01/28/2018 148  ms Final    QRS Duration 01/28/2018 86  ms Final    Q-T Interval 01/28/2018 368  ms Final    QTC Calculation (Bezet) 01/28/2018 437  ms Final    Calculated P Axis 01/28/2018 76  degrees Final    Calculated R Axis 01/28/2018 74  degrees Final    Calculated T Axis 01/28/2018 71  degrees Final    Diagnosis 01/28/2018    Final                    Value:Normal sinus rhythm  Normal ECG  When compared with ECG of 03-MAR-2017 07:55,  No significant change was found  Confirmed by Renan De La Fuente MD, Luciano Cartagena ([id number]) on 1/29/2018 4:54:57 PM      WBC 01/28/2018 7.0  4.6 - 13.2 K/uL Final    RBC 01/28/2018 4.55  4.20 - 5.30 M/uL Final    HGB 01/28/2018 12.5  12.0 - 16.0 g/dL Final    HCT 01/28/2018 37.7  35.0 - 45.0 % Final    MCV 01/28/2018 82.9  74.0 - 97.0 FL Final    MCH 01/28/2018 27.5  24.0 - 34.0 PG Final    MCHC 01/28/2018 33.2  31.0 - 37.0 g/dL Final    RDW 01/28/2018 13.0  11.6 - 14.5 % Final    PLATELET 78/63/7615 613  135 - 420 K/uL Final    MPV 01/28/2018 10.3  9.2 - 11.8 FL Final    NEUTROPHILS 01/28/2018 48  40 - 73 % Final    LYMPHOCYTES 01/28/2018 37  21 - 52 % Final    MONOCYTES 01/28/2018 10  3 - 10 % Final    EOSINOPHILS 01/28/2018 4  0 - 5 % Final    BASOPHILS 01/28/2018 1  0 - 2 % Final    ABS. NEUTROPHILS 01/28/2018 3.4  1.8 - 8.0 K/UL Final    ABS. LYMPHOCYTES 01/28/2018 2.6  0.9 - 3.6 K/UL Final    ABS. MONOCYTES 01/28/2018 0.7  0.05 - 1.2 K/UL Final    ABS. EOSINOPHILS 01/28/2018 0.3  0.0 - 0.4 K/UL Final    ABS. BASOPHILS 01/28/2018 0.0  0.0 - 0.06 K/UL Final    DF 01/28/2018 AUTOMATED    Final    Sodium 01/28/2018 139  136 - 145 mmol/L Final    Potassium 01/28/2018 3.5  3.5 - 5.5 mmol/L Final    Chloride 01/28/2018 103  100 - 108 mmol/L Final    CO2 01/28/2018 28  21 - 32 mmol/L Final    Anion gap 01/28/2018 8  3.0 - 18 mmol/L Final    Glucose 01/28/2018 82  74 - 99 mg/dL Final    BUN 01/28/2018 15  7.0 - 18 MG/DL Final    Creatinine 01/28/2018 1.01  0.6 - 1.3 MG/DL Final    BUN/Creatinine ratio 01/28/2018 15  12 - 20   Final    GFR est AA 01/28/2018 >60  >60 ml/min/1.73m2 Final    GFR est non-AA 01/28/2018 56* >60 ml/min/1.73m2 Final    Comment: (NOTE)  Estimated GFR is calculated using the Modification of Diet in Renal   Disease (MDRD) Study equation, reported for both  Americans   (GFRAA) and non- Americans (GFRNA), and normalized to 1.73m2   body surface area. The physician must decide which value applies to   the patient. The MDRD study equation should only be used in   individuals age 25 or older. It has not been validated for the   following: pregnant women, patients with serious comorbid conditions,   or on certain medications, or persons with extremes of body size,   muscle mass, or nutritional status.  Calcium 01/28/2018 8.7  8.5 - 10.1 MG/DL Final    Bilirubin, total 01/28/2018 0.3  0.2 - 1.0 MG/DL Final    ALT (SGPT) 01/28/2018 21  13 - 56 U/L Final    AST (SGOT) 01/28/2018 18  15 - 37 U/L Final    Alk.  phosphatase 01/28/2018 75  45 - 117 U/L Final    Protein, total 01/28/2018 7.1  6.4 - 8.2 g/dL Final    Albumin 01/28/2018 3.5  3.4 - 5.0 g/dL Final    Globulin 01/28/2018 3.6  2.0 - 4.0 g/dL Final    A-G Ratio 01/28/2018 1.0  0.8 - 1.7   Final    Color 01/28/2018 YELLOW    Final    Appearance 01/28/2018 CLEAR    Final    Specific gravity 01/28/2018 1.009  1.005 - 1.030   Final    pH (UA) 01/28/2018 7.0  5.0 - 8.0   Final    Protein 01/28/2018 NEGATIVE   NEG mg/dL Final    Glucose 01/28/2018 NEGATIVE   NEG mg/dL Final    Ketone 01/28/2018 NEGATIVE   NEG mg/dL Final    Bilirubin 01/28/2018 NEGATIVE   NEG   Final    Blood 01/28/2018 NEGATIVE   NEG   Final    Urobilinogen 01/28/2018 0.2  0.2 - 1.0 EU/dL Final    Nitrites 01/28/2018 NEGATIVE   NEG   Final    Leukocyte Esterase 01/28/2018 NEGATIVE   NEG   Final    Lipase 01/28/2018 207  73 - 393 U/L Final   Admission on 01/24/2018, Discharged on 01/25/2018   Component Date Value Ref Range Status    WBC 01/24/2018 9.2  4.6 - 13.2 K/uL Final    RBC 01/24/2018 4.66  4.20 - 5.30 M/uL Final    HGB 01/24/2018 13.4  12.0 - 16.0 g/dL Final    HCT 01/24/2018 38.1  35.0 - 45.0 % Final    MCV 01/24/2018 81.8  74.0 - 97.0 FL Final    MCH 01/24/2018 28.8  24.0 - 34.0 PG Final    MCHC 01/24/2018 35.2  31.0 - 37.0 g/dL Final    RDW 01/24/2018 13.0  11.6 - 14.5 % Final    PLATELET 23/01/5640 387  957 - 420 K/uL Final    MPV 01/24/2018 10.0  9.2 - 11.8 FL Final    NEUTROPHILS 01/24/2018 58  40 - 73 % Final    LYMPHOCYTES 01/24/2018 26  21 - 52 % Final    MONOCYTES 01/24/2018 13* 3 - 10 % Final    EOSINOPHILS 01/24/2018 2  0 - 5 % Final    BASOPHILS 01/24/2018 1  0 - 2 % Final    ABS. NEUTROPHILS 01/24/2018 5.4  1.8 - 8.0 K/UL Final    ABS. LYMPHOCYTES 01/24/2018 2.4  0.9 - 3.6 K/UL Final    ABS. MONOCYTES 01/24/2018 1.2  0.05 - 1.2 K/UL Final    ABS. EOSINOPHILS 01/24/2018 0.2  0.0 - 0.4 K/UL Final    ABS. BASOPHILS 01/24/2018 0.1  0.0 - 0.1 K/UL Final    DF 01/24/2018 AUTOMATED    Final    Sodium 01/24/2018 137  136 - 145 mmol/L Final    Potassium 01/24/2018 3.5  3.5 - 5.5 mmol/L Final    Chloride 01/24/2018 99* 100 - 108 mmol/L Final    CO2 01/24/2018 28  21 - 32 mmol/L Final    Anion gap 01/24/2018 10  3.0 - 18 mmol/L Final    Glucose 01/24/2018 97  74 - 99 mg/dL Final    BUN 01/24/2018 16  7.0 - 18 MG/DL Final    Creatinine 01/24/2018 1.02  0.6 - 1.3 MG/DL Final    BUN/Creatinine ratio 01/24/2018 16  12 - 20   Final    GFR est AA 01/24/2018 >60  >60 ml/min/1.73m2 Final    GFR est non-AA 01/24/2018 55* >60 ml/min/1.73m2 Final    Comment: (NOTE)  Estimated GFR is calculated using the Modification of Diet in Renal   Disease (MDRD) Study equation, reported for both  Americans   (GFRAA) and non- Americans (GFRNA), and normalized to 1.73m2   body surface area. The physician must decide which value applies to   the patient. The MDRD study equation should only be used in   individuals age 25 or older. It has not been validated for the   following: pregnant women, patients with serious comorbid conditions,   or on certain medications, or persons with extremes of body size,   muscle mass, or nutritional status.       Calcium 01/24/2018 9.0  8.5 - 10.1 MG/DL Final    Bilirubin, total 01/24/2018 0.5  0.2 - 1.0 MG/DL Final    ALT (SGPT) 01/24/2018 22  13 - 56 U/L Final    AST (SGOT) 01/24/2018 12* 15 - 37 U/L Final    Alk. phosphatase 01/24/2018 86  45 - 117 U/L Final    Protein, total 01/24/2018 7.8  6.4 - 8.2 g/dL Final    Albumin 01/24/2018 4.0  3.4 - 5.0 g/dL Final    Globulin 01/24/2018 3.8  2.0 - 4.0 g/dL Final    A-G Ratio 01/24/2018 1.1  0.8 - 1.7   Final    Lipase 01/24/2018 705* 73 - 393 U/L Final    Color 01/24/2018 YELLOW    Final    Appearance 01/24/2018 CLEAR    Final    Specific gravity 01/24/2018 >1.030* 1.005 - 1.030 Final    pH (UA) 01/24/2018 5.5  5.0 - 8.0   Final    Protein 01/24/2018 TRACE* NEG mg/dL Final    Glucose 01/24/2018 NEGATIVE   NEG mg/dL Final    Ketone 01/24/2018 TRACE* NEG mg/dL Final    Bilirubin 01/24/2018 NEGATIVE   NEG   Final    Blood 01/24/2018 MODERATE* NEG   Final    Urobilinogen 01/24/2018 0.2  0.2 - 1.0 EU/dL Final    Nitrites 01/24/2018 NEGATIVE   NEG   Final    Leukocyte Esterase 01/24/2018 SMALL* NEG   Final    WBC 01/24/2018 4 to 10  0 - 4 /hpf Final    RBC 01/24/2018 4 to 10  0 - 5 /hpf Final    Epithelial cells 01/24/2018 FEW  0 - 5 /lpf Final    Bacteria 01/24/2018 NEGATIVE   NEG /hpf Final       .No results found for any visits on 04/13/18. Assessment / Plan      ICD-10-CM ICD-9-CM    1. Essential hypertension I10 401.9 triamterene-hydroCHLOROthiazide (DYAZIDE) 37.5-25 mg per capsule      CBC WITH AUTOMATED DIFF      METABOLIC PANEL, COMPREHENSIVE      C REACTIVE PROTEIN, QT      SED RATE (ESR)      REFERRAL TO ORTHODONTICS   2. Right wrist pain M25.531 719.43 triamterene-hydroCHLOROthiazide (DYAZIDE) 37.5-25 mg per capsule      CBC WITH AUTOMATED DIFF      METABOLIC PANEL, COMPREHENSIVE      C REACTIVE PROTEIN, QT      SED RATE (ESR)      REFERRAL TO ORTHODONTICS   3. Right renal artery stenosis (HCC) I70.1 440.1    4. Status post gastric banding Z98.84 V45.86    5.  Generalized abdominal pain R10.84 789.07        Labs  Add Dyazide  she was advised to continue her maintenance medications  Check on status with surgeon and insurance for surgery    Follow-up Disposition:  Return in about 2 months (around 6/13/2018). I asked Faby Roberto if she has any questions and I answered the questions. Faby Johnson states that she understands the treatment plan and agrees with the treatment plan

## 2018-04-17 ENCOUNTER — HOSPITAL ENCOUNTER (OUTPATIENT)
Dept: LAB | Age: 63
Discharge: HOME | End: 2018-04-17

## 2018-04-17 LAB
A-G RATIO,AGRAT: 1.7 RATIO (ref 1.1–2.6)
ABSOLUTE LYMPHOCYTE COUNT, 10803: 1.7 K/UL (ref 1–4.8)
ALBUMIN SERPL-MCNC: 4.1 G/DL (ref 3.5–5)
ALP SERPL-CCNC: 73 U/L (ref 40–120)
ALT SERPL-CCNC: 14 U/L (ref 5–40)
ANION GAP SERPL CALC-SCNC: 11 MMOL/L
AST SERPL W P-5'-P-CCNC: 16 U/L (ref 10–37)
BASOPHILS # BLD: 0.1 K/UL (ref 0–0.2)
BASOPHILS NFR BLD: 1 % (ref 0–2)
BILIRUB SERPL-MCNC: 0.6 MG/DL (ref 0.2–1.2)
BUN SERPL-MCNC: 14 MG/DL (ref 6–22)
C-REACTIVE PROTEIN, QT, 006627: 0.1 MG/DL (ref 0–0.5)
CALCIUM SERPL-MCNC: 9.6 MG/DL (ref 8.4–10.5)
CHLORIDE SERPL-SCNC: 104 MMOL/L (ref 98–110)
CO2 SERPL-SCNC: 28 MMOL/L (ref 20–32)
CREAT SERPL-MCNC: 1 MG/DL (ref 0.8–1.4)
EOSINOPHIL # BLD: 0.2 K/UL (ref 0–0.5)
EOSINOPHIL NFR BLD: 4 % (ref 0–6)
ERYTHROCYTE [DISTWIDTH] IN BLOOD BY AUTOMATED COUNT: 13.2 % (ref 10–15.5)
GFRAA, 66117: >60
GFRNA, 66118: 53.9
GLOBULIN,GLOB: 2.4 G/DL (ref 2–4)
GLUCOSE SERPL-MCNC: 92 MG/DL (ref 70–99)
GRANULOCYTES,GRANS: 55 % (ref 40–75)
HCT VFR BLD AUTO: 37.5 % (ref 35.1–48)
HGB BLD-MCNC: 12.6 G/DL (ref 11.7–16)
LYMPHOCYTES, LYMLT: 31 % (ref 20–45)
MCH RBC QN AUTO: 28 PG (ref 26–34)
MCHC RBC AUTO-ENTMCNC: 34 G/DL (ref 31–36)
MCV RBC AUTO: 83 FL (ref 80–95)
MONOCYTES # BLD: 0.6 K/UL (ref 0.1–1)
MONOCYTES NFR BLD: 10 % (ref 3–12)
NEUTROPHILS # BLD AUTO: 3.1 K/UL (ref 1.8–7.7)
PLATELET # BLD AUTO: 302 K/UL (ref 140–440)
PMV BLD AUTO: 10.4 FL (ref 9–13)
POTASSIUM SERPL-SCNC: 4.5 MMOL/L (ref 3.5–5.5)
PROT SERPL-MCNC: 6.5 G/DL (ref 6.2–8.1)
RBC # BLD AUTO: 4.51 M/UL (ref 3.8–5.2)
SED RATE (ESR): 8 MM/HR (ref 0–30)
SENTARA SPECIMEN COL,SENBCF: NORMAL
SODIUM SERPL-SCNC: 143 MMOL/L (ref 133–145)
WBC # BLD AUTO: 5.7 K/UL (ref 4–11)

## 2018-04-17 PROCEDURE — 99001 SPECIMEN HANDLING PT-LAB: CPT | Performed by: INTERNAL MEDICINE

## 2018-04-19 ENCOUNTER — OFFICE VISIT (OUTPATIENT)
Dept: SURGERY | Age: 63
End: 2018-04-19

## 2018-04-19 VITALS
RESPIRATION RATE: 18 BRPM | DIASTOLIC BLOOD PRESSURE: 82 MMHG | BODY MASS INDEX: 33.57 KG/M2 | TEMPERATURE: 97.8 F | WEIGHT: 171 LBS | SYSTOLIC BLOOD PRESSURE: 124 MMHG | HEIGHT: 60 IN | HEART RATE: 80 BPM

## 2018-04-19 DIAGNOSIS — Z98.84 LAP-BAND SURGERY STATUS: Primary | ICD-10-CM

## 2018-04-19 DIAGNOSIS — K21.9 GASTROESOPHAGEAL REFLUX DISEASE, ESOPHAGITIS PRESENCE NOT SPECIFIED: ICD-10-CM

## 2018-04-23 ENCOUNTER — TELEPHONE (OUTPATIENT)
Dept: SURGERY | Age: 63
End: 2018-04-23

## 2018-05-01 DIAGNOSIS — K21.9 GASTROESOPHAGEAL REFLUX DISEASE, ESOPHAGITIS PRESENCE NOT SPECIFIED: ICD-10-CM

## 2018-05-03 RX ORDER — CLONIDINE 0.2 MG/24H
PATCH, EXTENDED RELEASE TRANSDERMAL
Qty: 4 PATCH | Refills: 0 | Status: SHIPPED | OUTPATIENT
Start: 2018-05-03 | End: 2018-05-25

## 2018-05-04 DIAGNOSIS — R19.7 DIARRHEA, UNSPECIFIED TYPE: ICD-10-CM

## 2018-05-04 DIAGNOSIS — R11.0 NAUSEA: ICD-10-CM

## 2018-05-04 RX ORDER — RANITIDINE 150 MG/1
TABLET, FILM COATED ORAL
Qty: 60 TAB | Refills: 0 | Status: SHIPPED | OUTPATIENT
Start: 2018-05-04 | End: 2018-06-02 | Stop reason: SDUPTHER

## 2018-05-05 DIAGNOSIS — J30.9 ALLERGIC RHINITIS: ICD-10-CM

## 2018-05-08 ENCOUNTER — HOSPITAL ENCOUNTER (OUTPATIENT)
Age: 63
Setting detail: OUTPATIENT SURGERY
Discharge: HOME | End: 2018-05-08
Attending: INTERNAL MEDICINE | Admitting: INTERNAL MEDICINE
Payer: COMMERCIAL

## 2018-05-08 VITALS
SYSTOLIC BLOOD PRESSURE: 133 MMHG | WEIGHT: 166 LBS | BODY MASS INDEX: 32.59 KG/M2 | RESPIRATION RATE: 16 BRPM | HEIGHT: 60 IN | DIASTOLIC BLOOD PRESSURE: 79 MMHG | OXYGEN SATURATION: 100 % | HEART RATE: 76 BPM

## 2018-05-08 PROCEDURE — 74011000250 HC RX REV CODE- 250: Performed by: INTERNAL MEDICINE

## 2018-05-08 PROCEDURE — 91010 ESOPHAGUS MOTILITY STUDY: CPT | Performed by: INTERNAL MEDICINE

## 2018-05-08 RX ORDER — HYDROCODONE BITARTRATE AND ACETAMINOPHEN 10; 325 MG/1; MG/1
1 TABLET ORAL
Qty: 120 TAB | Refills: 0 | Status: SHIPPED | OUTPATIENT
Start: 2018-05-08 | End: 2018-06-05 | Stop reason: SDUPTHER

## 2018-05-08 RX ORDER — ROFLUMILAST 500 UG/1
TABLET ORAL
Qty: 30 TAB | Refills: 0 | Status: SHIPPED | OUTPATIENT
Start: 2018-05-08 | End: 2018-06-03 | Stop reason: SDUPTHER

## 2018-05-08 RX ORDER — LIDOCAINE HYDROCHLORIDE 20 MG/ML
JELLY TOPICAL AS NEEDED
Status: DISCONTINUED | OUTPATIENT
Start: 2018-05-08 | End: 2018-05-08 | Stop reason: HOSPADM

## 2018-05-08 RX ORDER — MONTELUKAST SODIUM 10 MG/1
TABLET ORAL
Qty: 30 TAB | Refills: 0 | Status: SHIPPED | OUTPATIENT
Start: 2018-05-08 | End: 2018-06-03 | Stop reason: SDUPTHER

## 2018-05-08 RX ADMIN — LIDOCAINE HYDROCHLORIDE: 20 JELLY TOPICAL at 07:52

## 2018-05-08 NOTE — DISCHARGE INSTRUCTIONS
Bernard Corbett  [id number]  1955      MANOMETRY DISCHARGE INSTRUCTION    You may resume your regular diet as tolerated. You may resume your normal daily activities. If you develop a sore throat- throat lozenges or warm salt water gargles will help. Call your Physician if you have any complications or questions. DISCHARGE SUMMARY from Nurse     POST-PROCEDURE NOTE:    Jd Azar diagnostic procedure was tolerated well. A copy of the study results will be sent to your Ordering Physician. Medications:    *Please give a list of your current medications to your Primary Care Provider. *Please update this list whenever your medications are discontinued, doses are  changed, or new medications (including over-the-counter products) are added. *Please carry medication information at all times in case of emergency situations. These are general instructions for a healthy lifestyle:    No smoking/ No tobacco products/ Avoid exposure to second hand smoke.  Surgeon General's Warning:  Quitting smoking now greatly reduces serious risk to your health. Obesity, smoking, and a sedentary lifestyle greatly increase your risk for illness.  A healthy diet, regular physical exercise & weight monitoring are important for maintaining a healthy lifestyle   You may be retaining fluid if you have a history of heart failure or if you experience any of the following symptoms:  Weight gain of 3 pounds or more overnight or 5 pounds in a week, increased swelling in our hands or feet or shortness of breath while lying flat in bed. Please call your doctor as soon as you notice any of these symptoms; do not wait until your next office visit.     Recognize signs and symptoms of STROKE:  F  -  Face looks uneven  A  -  Arms unable to move or move unevenly  S  -  Speech slurred or non-existent  T  -  Time to call 911 - as soon as signs and symptoms begin - DO NOT go back         to bed or wait to see If you get better - TIME IS BRAIN. Colorectal Screening   Colorectal cancer almost always develops from precancerous polyps (abnormal growths) in the colon or rectum. Screening tests can find precancerous polyps, so that they can be removed before they turn into cancer. Screening tests can also find colorectal cancer early, when treatment works best.  [facility] Speak with your physician about when you should begin screening and how often you should be tested. REPPhart Activation    Thank you for requesting access to [employer]. Please follow the instructions below to securely access and download your online medical record. [employer] allows you to send messages to your doctor, view your test results, renew your prescriptions, schedule appointments, and more. How Do I Sign Up? 1. In your internet browser, go to [email or web address] 2. Click on the First Time User? Click Here link in the Sign In box. You will see the New Member Sign Up page. 3. Enter your [employer] Access Code exactly as it appears below. You will not need to use this code after youve completed the sign-up process. If you do not sign up before the expiration date, you must request a new code. [employer] Access Code: Activation code not generated  Current [employer] Status: Patient Declined (This is the date your [employer] access code will )    4. Enter the last four digits of your Social Security Number (xxxx) and Date of Birth (mm/dd/yyyy) as indicated and click Submit. You will be taken to the next sign-up page. 5. Create a [employer] ID. This will be your [employer] login ID and cannot be changed, so think of one that is secure and easy to remember. 6. Create a [employer] password. You can change your password at any time. 7. Enter your Password Reset Question and Answer. This can be used at a later time if you forget your password. 8. Enter your e-mail address.  You will receive e-mail notification when new information is available in [employer]. 9. Click Sign Up. You can now view and download portions of your medical record. 10. Click the Download Summary menu link to download a portable copy of your medical information. Additional Information    If you have questions, please call [phone]. Remember, [employer] is NOT to be used for urgent needs. For medical emergencies, dial 911. Discharge information has been reviewed with the patient. The patient verbalized understanding.

## 2018-05-08 NOTE — IP AVS SNAPSHOT
[address] [facility] 
 
 
 [place] [id number] 
[phone] Patient: Vito Richards MRN: [id number] PGP:[id number] About your hospitalization You were admitted on: May 8, 2018 You last received care in the:  [facility] You were discharged on: May 8, 2018 Why you were hospitalized Your primary diagnosis was:  Not on File Follow-up Information None Your Scheduled Appointments Monday May 21, 2018  8:00 AM EDT PROCEDURE with [facility] 2 [facility] ([facility]) [address] [facility] [address] [facility]  
[phone] Monday May 21, 2018  9:00 AM EDT PROCEDURE with [facility] IM[facility] 1 [facility] ([facility]) [address] [facility] [address] [facility]  
[phone] Tuesday May 29, 2018 10:45 AM EDT Follow Up with Elina Steele MD  
[address] [facility]) [address] [facility] [address] [facility]  
[phone] Tuesday June 05, 2018 10:45 AM EDT Any with Jose Pena MD  
[facility] ([facility]) [address] [facility] [address] [facility]  
[phone] Discharge Orders None A check shivam indicates which time of day the medication should be taken. My Medications ASK your doctor about these medications Instructions Each Dose to Equal  
 Morning Noon Evening Bedtime  
 amLODIPine 10 mg tablet Commonly known as:  [place] Your last dose was: Your next dose is: Take 1 Tab by mouth daily. 10 mg  
    
   
   
   
  
 atorvastatin 40 mg tablet Commonly known as:  LIPITOR Your last dose was: Your next dose is: Take 1 Tab by mouth daily. 40 mg  
    
   
   
   
  
 carvedilol 25 mg tablet Commonly known as:  Mike Plasencia Your last dose was: Your next dose is: Take 1 Tab by mouth two (2) times daily (with meals). 25 mg  
    
   
   
   
  
 cloNIDine 0.2 mg/24 hr patch Commonly known as:  CATAPRES Your last dose was: Your next dose is:    
   
   
 USE ONE PATCH BY TRANSDERMAL ROUTE EVERY SEVEN DAYS  
     
   
   
   
  
 clopidogrel 75 mg Tab Commonly known as:  PLAVIX Your last dose was: Your next dose is: TAKE 1 TABLET DAILY DALIRESP Tab tablet Generic drug:  roflumilast  
   
Your last dose was: Your next dose is: TAKE ONE TABLET BY MOUTH DAILY  
     
   
   
   
  
 diclofenac 1 % Gel Commonly known as:  VOLTAREN Your last dose was: Your next dose is:    
   
   
 Apply 2 g to affected area four (4) times daily. 2 g  
    
   
   
   
  
 docusate sodium 100 mg capsule Commonly known as:  Wilfredo Mascorro Your last dose was: Your next dose is: Take 1 Cap by mouth two (2) times a day. 100 mg  
    
   
   
   
  
 esomeprazole 20 mg capsule Commonly known as:  NexIUM Your last dose was: Your next dose is: Take 1 Cap by mouth daily. STOP PREVACID  Indications: HEARTBURN  
 20 mg  
    
   
   
   
  
 fluticasone-salmeterol 250-50 mcg/dose diskus inhaler Commonly known as:  ADVAIR DISKUS Your last dose was: Your next dose is: Take 1 Puff by inhalation daily. 1 Puff  
    
   
   
   
  
 hydrALAZINE 25 mg tablet Commonly known as:  APRESOLINE Your last dose was: Your next dose is: Take 1 Tab by mouth two (2) times a day. 25 mg HYDROcodone-acetaminophen  mg tablet Commonly known as:  Real [place] Your last dose was: Your next dose is: Take 1 Tab by mouth every six (6) hours as needed for Pain. Max Daily Amount: 4 Tabs. 1 Tab  
    
   
   
   
  
 mirtazapine 15 mg tablet Commonly known as:  Juan Antonio Mayfield Your last dose was: Your next dose is: TAKE ONE TABLET BY MOUTH EVERY EVENING  
     
   
   
   
  
 montelukast 10 mg tablet Commonly known as:  SINGULAIR Your last dose was: Your next dose is: TAKE ONE TABLET BY MOUTH DAILY  
     
   
   
   
  
 ondansetron 8 mg disintegrating tablet Commonly known as:  ZOFRAN ODT Your last dose was: Your next dose is: Take 1 Tab by mouth every eight (8) hours as needed for Nausea. 8 mg  
    
   
   
   
  
 raNITIdine 150 mg tablet Commonly known as:  ZANTAC Your last dose was: Your next dose is: TAKE ONE TABLET BY MOUTH TWICE A DAY  
     
   
   
   
  
 triamterene-hydroCHLOROthiazide 37.5-25 mg per capsule Commonly known as:  Monie Dumont Your last dose was: Your next dose is:    
   
   
 1 cap each AM  
     
   
   
   
  
  
  
  
Opioid Education Prescription Opioids: What You Need to Know: 
 
 
Todays diagnostic procedure was tolerated well. A copy of the study results will be sent to your Ordering Physician. Medications: *Please give a list of your current medications to your Primary Care Provider. *Please update this list whenever your medications are discontinued, doses are 
changed, or new medications (including over-the-counter products) are added. *Please carry medication information at all times in case of emergency situations. These are general instructions for a healthy lifestyle: No smoking/ No tobacco products/ Avoid exposure to second hand smoke. ? Surgeon General's Warning:  Quitting smoking now greatly reduces serious risk to your health. Obesity, smoking, and a sedentary lifestyle greatly increase your risk for illness. ? A healthy diet, regular physical exercise & weight monitoring are important for maintaining a healthy lifestyle ? You may be retaining fluid if you have a history of heart failure or if you experience any of the following symptoms:  Weight gain of 3 pounds or more overnight or 5 pounds in a week, increased swelling in our hands or feet or shortness of breath while lying flat in bed. Please call your doctor as soon as you notice any of these symptoms; do not wait until your next office visit. Recognize signs and symptoms of STROKE: 
F  -  Face looks uneven A  -  Arms unable to move or move unevenly S  -  Speech slurred or non-existent T  -  Time to call 911 - as soon as signs and symptoms begin - DO NOT go back         to bed or wait to see If you get better - TIME IS BRAIN. Colorectal Screening ? Colorectal cancer almost always develops from precancerous polyps (abnormal growths) in the colon or rectum. Screening tests can find precancerous polyps, so that they can be removed before they turn into cancer. Screening tests can also find colorectal cancer early, when treatment works best. 
? Speak with your physician about when you should begin screening and how often you should be tested. MyChart Activation Thank you for requesting access to [employer]. Please follow the instructions below to securely access and download your online medical record. [employer] allows you to send messages to your doctor, view your test results, renew your prescriptions, schedule appointments, and more. How Do I Sign Up? 1. In your internet browser, go to [email or web address]. 2. Click on the First Time User? Click Here link in the Sign In box. You will see the New Member Sign Up page. 3. Enter your [employer] Access Code exactly as it appears below. You will not need to use this code after youve completed the sign-up process. If you do not sign up before the expiration date, you must request a new code. [email or web address] Access Code: Activation code not generated Current [employer] Status: Patient Declined (This is the date your [employer] access code will ) 4. Enter the last four digits of your Social Security Number (xxxx) and Date of Birth (mm/dd/yyyy) as indicated and click Submit. You will be taken to the next sign-up page. 5. Create a [employer] ID. This will be your [employer] login ID and cannot be changed, so think of one that is secure and easy to remember. 6. Create a [employer] password. You can change your password at any time. 7. Enter your Password Reset Question and Answer. This can be used at a later time if you forget your password. 8. Enter your e-mail address. You will receive e-mail notification when new information is available in [address] 9. Click Sign Up. You can now view and download portions of your medical record. 10. Click the Download Summary menu link to download a portable copy of your medical information. Additional Information If you have questions, please call [phone]. Remember, [employer] is NOT to be used for urgent needs. For medical emergencies, dial 911.  
 
 
Discharge information has been reviewed with the {PATIENT PARENT GUARDIAN:50575}. The {PATIENT PARENT GUARDIAN:09846} verbalized understanding. Introducing Nico Elise As a [employer] patient, I wanted to make you aware of our electronic visit tool called Nico Elise. [employer] 24/7 allows you to connect within minutes with a medical provider 24 hours a day, seven days a week via a mobile device or tablet or logging into a secure website from your computer. You can access Nico Elise from anywhere in the [place]. A virtual visit might be right for you when you have a simple condition and feel like you just dont want to get out of bed, or cant get away from work for an appointment, when your regular [employer] provider is not available (evenings, weekends or holidays), or when youre out of town and need minor care. Electronic visits cost only $49 and if the [employer] 24/7 provider determines a prescription is needed to treat your condition, one can be electronically transmitted to a nearby pharmacy*. Please take a moment to enroll today if you have not already done so. The enrollment process is free and takes just a few minutes. To enroll, please download the [employer] 24/7 bruna to your tablet or phone, or visit [email or web address] to enroll on your computer. And, as an [address] [place] [address] patient with a [employer] account, the results of your visits will be scanned into your electronic medical record and your primary care provider will be able to view the scanned results. We urge you to continue to see your regular [employer] provider for your ongoing medical care. And while your primary care provider may not be the one available when you seek a Nico Elise virtual visit, the peace of mind you get from getting a real diagnosis real time can be priceless. For more information on Nico Elise, view our Frequently Asked Questions (FAQs) at [email or web address].  
 
Sincerely, 
 
 Jacklyn Lawrence MD 
Chief Medical Officer [employer] *:  certain medications cannot be prescribed via Nico Elise Providers Seen During Your Hospitalization Provider Specialty Primary office phone Taylor Fowler MD Gastroenterology [phone] Your Primary Care Physician (PCP) Primary Care Physician Office Phone Office Fax [address] [place] , [address] [place] [address] [address] [phone] You are allergic to the following Allergen Reactions Pcn (Penicillins) Anaphylaxis Tetracycline Anaphylaxis Sulfabenzamide Hives Recent Documentation Height Weight BMI OB Status Smoking Status 1.524 m 75.3 kg 32.42 kg/m2 Hysterectomy Former Smoker Emergency Contacts Name Discharge Info Relation Home Work Mobile Self [facility] DISCHARGE CAREGIVER [3] Daughter [21] [phone] Zhao Mireles  Child [2] [phone] Patient Belongings The following personal items are in your possession at time of discharge: 
  Dental Appliances: Uppers Please provide this summary of care documentation to your next provider. Signatures-by signing, you are acknowledging that this After Visit Summary has been reviewed with you and you have received a copy. Patient Signature:  ____________________________________________________________ Date:  ____________________________________________________________  
  
Nadiya Harper Provider Signature:  ____________________________________________________________ Date:  ____________________________________________________________

## 2018-05-08 NOTE — PERIOP NOTES
5 cc 2% lidocaine jelly inhaled into left nare per MD orders. Probe inserted into  left nare without difficulty. Pt tolerated procedure well.

## 2018-05-25 ENCOUNTER — OFFICE VISIT (OUTPATIENT)
Dept: INTERNAL MEDICINE CLINIC | Age: 63
End: 2018-05-25

## 2018-05-25 VITALS
HEART RATE: 84 BPM | HEIGHT: 60 IN | SYSTOLIC BLOOD PRESSURE: 112 MMHG | DIASTOLIC BLOOD PRESSURE: 58 MMHG | BODY MASS INDEX: 32.2 KG/M2 | TEMPERATURE: 97.7 F | RESPIRATION RATE: 18 BRPM | OXYGEN SATURATION: 99 % | WEIGHT: 164 LBS

## 2018-05-25 DIAGNOSIS — M25.551 RIGHT HIP PAIN: ICD-10-CM

## 2018-05-25 DIAGNOSIS — R10.13 EPIGASTRIC PAIN: ICD-10-CM

## 2018-05-25 DIAGNOSIS — I10 ESSENTIAL HYPERTENSION: Primary | ICD-10-CM

## 2018-05-25 DIAGNOSIS — R63.4 WEIGHT LOSS: ICD-10-CM

## 2018-05-25 RX ORDER — FLUTICASONE PROPIONATE AND SALMETEROL 250; 50 UG/1; UG/1
1 POWDER RESPIRATORY (INHALATION) DAILY
Qty: 1 INHALER | Refills: 5 | Status: SHIPPED | OUTPATIENT
Start: 2018-05-25 | End: 2018-12-12 | Stop reason: ALTCHOICE

## 2018-05-25 RX ORDER — DICLOFENAC SODIUM 10 MG/G
2 GEL TOPICAL 4 TIMES DAILY
Qty: 1 EACH | Refills: 2 | Status: SHIPPED | OUTPATIENT
Start: 2018-05-25 | End: 2018-09-19 | Stop reason: SDUPTHER

## 2018-05-25 NOTE — PROGRESS NOTES
Romulo Christopher is a 66 y.o. female presenting today for Hospital Follow Up  . HPI:  Romulo Christopher presents to the office today for hospital follow-up. Patient was seen admitted to the hopital for in and out Endoscopy Manometry. She reports she is wating for results of the procedure to determine if the lap band removal cost will be covered. She continues to have epigastric pain, nausea and weight loss. Patient reports a 7 lb weight loss from April 19, 2018 to today. She reports she is unable to eat due to nausea and increase abdominal epigastric pain. Patient is also here for Hypertension follow-up care. Patient reports her Nephrologist is the only one who wants to control her blood pressure medication changes. Her blood pressure is controlled today and she is negative for chest pain, palpitations or dyspnea. Review of Systems   Constitutional: Positive for weight loss (constantly nauseated secondary to lap band). Respiratory: Negative for cough. Cardiovascular: Negative for chest pain and palpitations. Gastrointestinal: Positive for abdominal pain (contstant secondary to lap band) and nausea (secondary to lap band). Allergies   Allergen Reactions    Pcn [Penicillins] Anaphylaxis    Tetracycline Anaphylaxis    Sulfabenzamide Hives       Current Outpatient Prescriptions   Medication Sig Dispense Refill    diclofenac (VOLTAREN) 1 % gel Apply 2 g to affected area four (4) times daily. 1 Each 2    fluticasone-salmeterol (ADVAIR DISKUS) 250-50 mcg/dose diskus inhaler Take 1 Puff by inhalation daily. 1 Inhaler 5    HYDROcodone-acetaminophen (NORCO)  mg tablet Take 1 Tab by mouth every six (6) hours as needed for Pain. Max Daily Amount: 4 Tabs.  120 Tab 0    DALIRESP tab tablet TAKE ONE TABLET BY MOUTH DAILY 30 Tab 0    montelukast (SINGULAIR) 10 mg tablet TAKE ONE TABLET BY MOUTH DAILY 30 Tab 0    raNITIdine (ZANTAC) 150 mg tablet TAKE ONE TABLET BY MOUTH TWICE A DAY 60 Tab 0    triamterene-hydroCHLOROthiazide (DYAZIDE) 37.5-25 mg per capsule 1 cap each AM 30 Cap 3    mirtazapine (REMERON) 15 mg tablet TAKE ONE TABLET BY MOUTH EVERY EVENING 30 Tab 0    hydrALAZINE (APRESOLINE) 25 mg tablet Take 1 Tab by mouth two (2) times a day. 60 Tab 6    amLODIPine (NORVASC) 10 mg tablet Take 1 Tab by mouth daily. 90 Tab 0    clopidogrel (PLAVIX) 75 mg tab TAKE 1 TABLET DAILY 90 Tab 2    carvedilol (COREG) 25 mg tablet Take 1 Tab by mouth two (2) times daily (with meals). 60 Tab 6    atorvastatin (LIPITOR) 40 mg tablet Take 1 Tab by mouth daily. 30 Tab 3    ondansetron (ZOFRAN ODT) 8 mg disintegrating tablet Take 1 Tab by mouth every eight (8) hours as needed for Nausea. 30 Tab 0    docusate sodium (COLACE) 100 mg capsule Take 1 Cap by mouth two (2) times a day. (Patient taking differently: Take 100 mg by mouth daily.) 60 Cap 5    esomeprazole (NEXIUM) 20 mg capsule Take 1 Cap by mouth daily. STOP PREVACID  Indications: HEARTBURN 30 Cap 1    cloNIDine (CATAPRES) 0.2 mg/24 hr patch USE ONE PATCH BY TRANSDERMAL ROUTE EVERY SEVEN DAYS 4 Patch 0       Past Medical History:   Diagnosis Date    Aorto-iliac duplex 09/17/2015    Patent abdom aorta endovascular graft w/o leak. Mod stenosis suggested in right limb.  Calculus of kidney 2014    stent/kidney     Carotid duplex 09/17/2015    Mild < 50% bilateral plaquing.  Cataract, left     Chronic obstructive pulmonary disease ([address]     Echocardiogram 11/04/2014    EF 56%. No WMA. Mild LAE. Echo is within normal limits.  Esophageal reflux     Generalized osteoarthrosis, involving multiple sites     GERD (gastroesophageal reflux disease)     Gout     History of renal stent     right side    Hypercholesteremia     Hypertension     resolved    Lower extremity arterial testing 03/27/2015    Normal perfusion at rest and w/treadmill bilaterally.   R KORI 1.05.  L KORI 1.03.    Murmur     patient reports that she is aware- has been told in the past    Nausea & vomiting     Renal artery stenosis (HCC)     Subclinical hyperthyroidism 8/2015    Unspecified sleep apnea     resolved       Past Surgical History:   Procedure Laterality Date    ADJUSTMENT GASTRIC BAND N/A 11/10/2016    MAKAYLA Cullen    FULL ESOPHAGEAL MANOMETRY  5/21/2018         HX APPENDECTOMY      HX BLADDER SUSPENSION      HX CHOLECYSTECTOMY      HX GASTRIC BYPASS      lap band 2012    HX HEENT Right 1990s, 2016    Ear sx    HX HYSTERECTOMY      HX OTHER SURGICAL  10/27/14     Bilateral open femoral exposures, Placement of catheter and sheath in the aorta Endo repair of aortic aneurysm with modular bifurcated device  Interpretation of angiography, intravascular ultrasound (IVUS) catheter    HX OTHER SURGICAL  10/27/2014    Endurant II abdominal stent graft implant    HX UROLOGICAL      stent    VASCULAR SURGERY PROCEDURE UNLIST      surgery for abdominal aneurysm       Social History     Social History    Marital status:      Spouse name: N/A    Number of children: N/A    Years of education: N/A     Occupational History    Not on file. Social History Main Topics    Smoking status: Former Smoker     Packs/day: 0.00     Years: 0.00     Types: Cigarettes     Quit date: 1/25/2016    Smokeless tobacco: Never Used    Alcohol use No    Drug use: No    Sexual activity: Yes     Birth control/ protection: Surgical     Other Topics Concern    Not on file     Social History Narrative       Patient does not have an advanced directive on file    Vitals:    05/25/18 0940   BP: 112/58   Pulse: 84   Resp: 18   Temp: 97.7 °F (36.5 °C)   TempSrc: Tympanic   SpO2: 99%   Weight: 164 lb (74.4 kg)   Height: 5' (1.524 m)   PainSc:   8   PainLoc: Back       Physical Exam   Constitutional: No distress. Cardiovascular: Normal rate, regular rhythm and normal heart sounds. Pulmonary/Chest: Effort normal and breath sounds normal.   Abdominal: Soft.  There is tenderness (epigastric region). Musculoskeletal:        Legs:  Neurological: She is alert. Nursing note and vitals reviewed. Orders Only on 04/17/2018   Component Date Value Ref Range Status    WBC 04/17/2018 5.7  4.0 - 11.0 K/uL Final    RBC 04/17/2018 4.51  3.80 - 5.20 M/uL Final    HGB 04/17/2018 12.6  11.7 - 16.0 g/dL Final    HCT 04/17/2018 37.5  35.1 - 48.0 % Final    MCV 04/17/2018 83  80 - 95 fL Final    MCH 04/17/2018 28  26 - 34 pg Final    MCHC 04/17/2018 34  31 - 36 g/dL Final    RDW 04/17/2018 13.2  10.0 - 15.5 % Final    PLATELET 81/54/9044 861  140 - 440 K/uL Final    MPV 04/17/2018 10.4  9.0 - 13.0 fL Final    NEUTROPHILS 04/17/2018 55  40 - 75 % Final    Lymphocytes 04/17/2018 31  20 - 45 % Final    MONOCYTES 04/17/2018 10  3 - 12 % Final    EOSINOPHILS 04/17/2018 4  0 - 6 % Final    BASOPHILS 04/17/2018 1  0 - 2 % Final    ABS. NEUTROPHILS 04/17/2018 3.1  1.8 - 7.7 K/uL Final    ABSOLUTE LYMPHOCYTE COUNT 04/17/2018 1.7  1.0 - 4.8 K/uL Final    ABS. MONOCYTES 04/17/2018 0.6  0.1 - 1.0 K/uL Final    ABS. EOSINOPHILS 04/17/2018 0.2  0.0 - 0.5 K/uL Final    ABS. BASOPHILS 04/17/2018 0.1  0.0 - 0.2 K/uL Final   Hospital Outpatient Visit on 04/17/2018   Component Date Value Ref Range Status    [facility] SPE[facility] COL 04/17/2018 Specimens collected/sent to [facility]    Final   Orders Only on 04/13/2018   Component Date Value Ref Range Status    Glucose 04/17/2018 92  70 - 99 mg/dL Final    BUN 04/17/2018 14  6 - 22 mg/dL Final    Creatinine 04/17/2018 1.0  0.8 - 1.4 mg/dL Final    Sodium 04/17/2018 143  133 - 145 mmol/L Final    Potassium 04/17/2018 4.5  3.5 - 5.5 mmol/L Final    Chloride 04/17/2018 104  98 - 110 mmol/L Final    CO2 04/17/2018 28  20 - 32 mmol/L Final    AST (SGOT) 04/17/2018 16  10 - 37 U/L Final    ALT (SGPT) 04/17/2018 14  5 - 40 U/L Final    Alk.  phosphatase 04/17/2018 73  40 - 120 U/L Final    Bilirubin, total 04/17/2018 0.6  0.2 - 1.2 mg/dL Final    Calcium 04/17/2018 9.6  8.4 - 10.5 mg/dL Final    Protein, total 04/17/2018 6.5  6.2 - 8.1 g/dL Final    Albumin 04/17/2018 4.1  3.5 - 5.0 g/dL Final    A-G Ratio 04/17/2018 1.7  1.1 - 2.6 ratio Final    Globulin 04/17/2018 2.4  2.0 - 4.0 g/dL Final    Anion gap 04/17/2018 11.0  mmol/L Final    Comment: Test includes Albumin, Alkaline Phosphatase, ALT, AST, BUN, Calcium, CO2,  Chloride, Creatinine, Glucose, Potassium, Sodium, Total Bilirubin and Total  Protein. Estimated GFR results are reported in mL/min/1.73 sq.m. by the MDRD equation. This eGFR is validated for stable chronic renal failure patients. This   equation  is unreliable in acute illness or patients with normal renal function.  GFRAA 04/17/2018 >60.0  >60.0 Final    GFRNA 04/17/2018 53.9* >60.0 Final    C-Reactive Protein, Qt 04/17/2018 0.1  0.0 - 0.5 mg/dL Final    Sed rate (ESR) 04/17/2018 8  0 - 30 mm/Hr Final   Hospital Outpatient Visit on 03/26/2018   Component Date Value Ref Range Status    [facility] COL 03/26/2018 Specimens collected/sent to [facility]    Final   Admission on 03/23/2018, Discharged on 03/24/2018   Component Date Value Ref Range Status    WBC 03/23/2018 7.9  4.6 - 13.2 K/uL Final    RBC 03/23/2018 5.03  4.20 - 5.30 M/uL Final    HGB 03/23/2018 14.0  12.0 - 16.0 g/dL Final    HCT 03/23/2018 40.4  35.0 - 45.0 % Final    MCV 03/23/2018 80.3  74.0 - 97.0 FL Final    MCH 03/23/2018 27.8  24.0 - 34.0 PG Final    MCHC 03/23/2018 34.7  31.0 - 37.0 g/dL Final    RDW 03/23/2018 13.4  11.6 - 14.5 % Final    PLATELET 52/90/3980 640  135 - 420 K/uL Final    MPV 03/23/2018 10.4  9.2 - 11.8 FL Final    NEUTROPHILS 03/23/2018 51  40 - 73 % Final    LYMPHOCYTES 03/23/2018 33  21 - 52 % Final    MONOCYTES 03/23/2018 10  3 - 10 % Final    EOSINOPHILS 03/23/2018 5  0 - 5 % Final    BASOPHILS 03/23/2018 1  0 - 2 % Final    ABS. NEUTROPHILS 03/23/2018 4.1  1.8 - 8.0 K/UL Final    ABS.  LYMPHOCYTES 03/23/2018 2.6  0.9 - 3.6 K/UL Final    ABS. MONOCYTES 03/23/2018 0.8  0.05 - 1.2 K/UL Final    ABS. EOSINOPHILS 03/23/2018 0.4  0.0 - 0.4 K/UL Final    ABS. BASOPHILS 03/23/2018 0.1  0.0 - 0.1 K/UL Final    DF 03/23/2018 AUTOMATED    Final    Sodium 03/23/2018 137  136 - 145 mmol/L Final    Potassium 03/23/2018 3.5  3.5 - 5.5 mmol/L Final    Chloride 03/23/2018 100  100 - 108 mmol/L Final    CO2 03/23/2018 25  21 - 32 mmol/L Final    Anion gap 03/23/2018 12  3.0 - 18 mmol/L Final    Glucose 03/23/2018 122* 74 - 99 mg/dL Final    BUN 03/23/2018 15  7.0 - 18 MG/DL Final    Creatinine 03/23/2018 0.98  0.6 - 1.3 MG/DL Final    BUN/Creatinine ratio 03/23/2018 15  12 - 20   Final    GFR est AA 03/23/2018 >60  >60 ml/min/1.73m2 Final    GFR est non-AA 03/23/2018 58* >60 ml/min/1.73m2 Final    Comment: (NOTE)  Estimated GFR is calculated using the Modification of Diet in Renal   Disease (MDRD) Study equation, reported for both  Americans   (GFRAA) and non- Americans (GFRNA), and normalized to 1.73m2   body surface area. The physician must decide which value applies to   the patient. The MDRD study equation should only be used in   individuals age 25 or older. It has not been validated for the   following: pregnant women, patients with serious comorbid conditions,   or on certain medications, or persons with extremes of body size,   muscle mass, or nutritional status.  Calcium 03/23/2018 9.6  8.5 - 10.1 MG/DL Final    Bilirubin, total 03/23/2018 0.6  0.2 - 1.0 MG/DL Final    ALT (SGPT) 03/23/2018 24  13 - 56 U/L Final    AST (SGOT) 03/23/2018 27  15 - 37 U/L Final    Alk.  phosphatase 03/23/2018 105  45 - 117 U/L Final    Protein, total 03/23/2018 8.2  6.4 - 8.2 g/dL Final    Albumin 03/23/2018 4.3  3.4 - 5.0 g/dL Final    Globulin 03/23/2018 3.9  2.0 - 4.0 g/dL Final    A-G Ratio 03/23/2018 1.1  0.8 - 1.7   Final    Color 03/23/2018 YELLOW    Final    Appearance 03/23/2018 CLEAR    Final    Specific gravity 03/23/2018 1.007  1.005 - 1.030   Final    pH (UA) 03/23/2018 6.5  5.0 - 8.0   Final    Protein 03/23/2018 NEGATIVE   NEG mg/dL Final    Glucose 03/23/2018 NEGATIVE   NEG mg/dL Final    Ketone 03/23/2018 NEGATIVE   NEG mg/dL Final    Bilirubin 03/23/2018 NEGATIVE   NEG   Final    Blood 03/23/2018 TRACE* NEG   Final    Urobilinogen 03/23/2018 0.2  0.2 - 1.0 EU/dL Final    Nitrites 03/23/2018 NEGATIVE   NEG   Final    Leukocyte Esterase 03/23/2018 SMALL* NEG   Final    WBC 03/23/2018 0 to 3  0 - 4 /hpf Final    RBC 03/23/2018 0 to 3  0 - 5 /hpf Final    Epithelial cells 03/23/2018 FEW  0 - 5 /lpf Final    Bacteria 03/23/2018 NEGATIVE   NEG /hpf Final       .No results found for any visits on 05/25/18. Assessment / Plan:      ICD-10-CM ICD-9-CM    1. Essential hypertension I10 401.9    2. Right hip pain M25.551 719.45 diclofenac (VOLTAREN) 1 % gel   3. Epigastric pain R10.13 789.06    4. Weight loss R63.4 783.21      HTN- controlled  Right hip pain- refilled Diclofenac  Epigastric pain- secondary to Lap band procedure. Patient waiting for results of Endoscopy Manometry  F/u prn    Follow-up Disposition:  Return if symptoms worsen or fail to improve. I asked the patient if she  had any questions and answered her  questions.   The patient stated that she understands the treatment plan and agrees with the treatment plan

## 2018-05-28 RX ORDER — CLONIDINE 0.2 MG/24H
PATCH, EXTENDED RELEASE TRANSDERMAL
Qty: 4 PATCH | Refills: 0 | Status: SHIPPED | OUTPATIENT
Start: 2018-05-28 | End: 2018-07-13 | Stop reason: CLARIF

## 2018-05-31 NOTE — PROGRESS NOTES
Hernia Evaluation      Subjective:     Sheri Clemente is a 66 y.o. female with a history of intractable GERD with an indwelling fully deflated lap band. She had a several day admission for this in January. After discharge, she was supposed to obtain manometry and pH testing looking for possible pseudoachalasia and for proof of reflux since her xray work-up and EGD have shown no obstruction. Those still have not been done. She is still having epigastric pain and heartburn. She is not losing weight. She has had no aspiration events. Patient Active Problem List    Diagnosis Date Noted    Generalized abdominal pain 02/19/2018    Status post gastric banding 02/19/2018    Atrophy of right kidney 02/05/2018    Intractable nausea and vomiting 01/31/2018    Abdominal pain 01/29/2018    Right renal artery stenosis ([address] 01/26/2018    Hx of aortic aneurysm repair 12/12/2017    History of renal stent 12/12/2017    Lumbar radiculopathy 08/31/2017    Left upper quadrant pain 06/30/2017    Hypovitaminosis D 06/30/2017    Sacroiliitis ([address] 04/13/2017    Right hip pain 04/13/2017    Spondylosis of lumbar region without myelopathy or radiculopathy 04/13/2017    Lumbar neuritis 04/13/2017    Peripheral vascular disease ([address] 03/10/2017    Right-sided carotid artery disease ([address] 03/10/2017    Obesity (BMI 30.0-34.9) 03/07/2017    Non morbid obesity due to excess calories 02/23/2017    Gastroesophageal reflux disease without esophagitis 02/23/2017    LAP-BAND surgery status 05/26/2016    Dysarthria 02/28/2016    Renal infarction ([address] 10/07/2015    Generalized osteoarthrosis, involving multiple sites     Esophageal reflux     Subclinical hyperthyroidism 08/28/2015    Essential hypertension 08/25/2015    Hyperlipidemia 08/25/2015    AAA (abdominal aortic aneurysm) ([address] 10/14/2014     Past Medical History:   Diagnosis Date    Aorto-iliac duplex 09/17/2015    Patent abdom aorta endovascular graft w/o leak. Mod stenosis suggested in right limb.  Calculus of kidney 2014    stent/kidney     Carotid duplex 09/17/2015    Mild < 50% bilateral plaquing.  Cataract, left     Chronic obstructive pulmonary disease ([address]     Echocardiogram 11/04/2014    EF 56%. No WMA. Mild LAE. Echo is within normal limits.  Esophageal reflux     Generalized osteoarthrosis, involving multiple sites     GERD (gastroesophageal reflux disease)     Gout     History of renal stent     right side    Hypercholesteremia     Hypertension     resolved    Lower extremity arterial testing 03/27/2015    Normal perfusion at rest and w/treadmill bilaterally.   R KORI 1.05.  L KORI 1.03.    Murmur     patient reports that she is aware- has been told in the past    Nausea & vomiting     Renal artery stenosis ([address]     Subclinical hyperthyroidism 8/2015    Unspecified sleep apnea     resolved      Past Surgical History:   Procedure Laterality Date    ADJUSTMENT GASTRIC BAND N/A 11/10/2016    MAKAYLA Cullen    FULL ESOPHAGEAL MANOMETRY  5/21/2018         HX APPENDECTOMY      HX BLADDER SUSPENSION      HX CHOLECYSTECTOMY      HX GASTRIC BYPASS      lap band 2012    HX HEENT Right 1990s, 2016    Ear sx    HX HYSTERECTOMY      HX OTHER SURGICAL  10/27/14     Bilateral open femoral exposures, Placement of catheter and sheath in the aorta Endo repair of aortic aneurysm with modular bifurcated device  Interpretation of angiography, intravascular ultrasound (IVUS) catheter    HX OTHER SURGICAL  10/27/2014    Endurant II abdominal stent graft implant    HX UROLOGICAL      stent    VASCULAR SURGERY PROCEDURE UNLIST      surgery for abdominal aneurysm      Social History   Substance Use Topics    Smoking status: Former Smoker     Packs/day: 0.00     Years: 0.00     Types: Cigarettes     Quit date: 1/25/2016    Smokeless tobacco: Never Used    Alcohol use No      Family History   Problem Relation Age of Onset    Cancer Mother  Diabetes Father     Alcohol abuse Father     Heart Disease Maternal Grandmother     Alzheimer Maternal Grandmother     Diabetes Sister       Current Outpatient Prescriptions   Medication Sig    triamterene-hydroCHLOROthiazide (DYAZIDE) 37.5-25 mg per capsule 1 cap each AM    mirtazapine (REMERON) 15 mg tablet TAKE ONE TABLET BY MOUTH EVERY EVENING    hydrALAZINE (APRESOLINE) 25 mg tablet Take 1 Tab by mouth two (2) times a day.  amLODIPine (NORVASC) 10 mg tablet Take 1 Tab by mouth daily.  clopidogrel (PLAVIX) 75 mg tab TAKE 1 TABLET DAILY    carvedilol (COREG) 25 mg tablet Take 1 Tab by mouth two (2) times daily (with meals).  atorvastatin (LIPITOR) 40 mg tablet Take 1 Tab by mouth daily.  ondansetron (ZOFRAN ODT) 8 mg disintegrating tablet Take 1 Tab by mouth every eight (8) hours as needed for Nausea.  docusate sodium (COLACE) 100 mg capsule Take 1 Cap by mouth two (2) times a day. (Patient taking differently: Take 100 mg by mouth daily.)    cloNIDine (CATAPRES) 0.2 mg/24 hr patch USE ONE PATCH BY TRANSDERMAL ROUTE EVERY SEVEN DAYS    diclofenac (VOLTAREN) 1 % gel Apply 2 g to affected area four (4) times daily.  fluticasone-salmeterol (ADVAIR DISKUS) 250-50 mcg/dose diskus inhaler Take 1 Puff by inhalation daily.  HYDROcodone-acetaminophen (NORCO)  mg tablet Take 1 Tab by mouth every six (6) hours as needed for Pain. Max Daily Amount: 4 Tabs.  DALIRESP tab tablet TAKE ONE TABLET BY MOUTH DAILY    montelukast (SINGULAIR) 10 mg tablet TAKE ONE TABLET BY MOUTH DAILY    raNITIdine (ZANTAC) 150 mg tablet TAKE ONE TABLET BY MOUTH TWICE A DAY    esomeprazole (NEXIUM) 20 mg capsule Take 1 Cap by mouth daily. STOP PREVACID  Indications: HEARTBURN     No current facility-administered medications for this visit.        Allergies   Allergen Reactions    Pcn [Penicillins] Anaphylaxis    Tetracycline Anaphylaxis    Sulfabenzamide Hives        Review of Systems:  Positive in BOLD    CONST: Fever, weight loss, fatigue or chills  GI: Nausea, vomiting, abdominal pain, change in bowel habits, hematochezia, melena, and GERD   INTEG: Dermatitis, abnormal moles  HEENT: Recent changes in vision, vertigo, epistaxis, dysphagia and hoarseness  CV: Chest pain, palpitations, HTN, edema and varicosities  RESP: Cough, shortness of breath, wheezing, hemoptysis, snoring and reactive airway disease  : Hematuria, dysuria, frequency, urgency, nocturia and stress urinary incontinence   MS: Weakness, joint pain and arthritis  ENDO: Diabetes, thyroid disease, polyuria, polydipsia, polyphagia, poor wound healing, heat intolerance, cold intolerance  LYMPH/HEME: Anemia, bruising and history of blood transfusions  NEURO: Dizziness, headache, fainting, seizures and stroke  PSYCH: Anxiety and depression    Objective:     Visit Vitals    /82    Pulse 80    Temp 97.8 °F (36.6 °C)    Resp 18    Ht 5' (1.524 m)    Wt 77.6 kg (171 lb)    BMI 33.4 kg/m2       Physical Exam:      GENERAL: alert, cooperative, no distress, appears stated age, moderately obese  EYE:conjunctivae and sclerae normal, pupils equal, round, reactive to light, extraocular movements intact without nystagmus  THROAT & NECK: no erythema or exudates noted and neck supple and symmetrical; no palpable masses  LUNG: clear to auscultation bilaterally  HEART: Regular rate and rhythm  ABDOMEN:abdomen is soft without significant tenderness, masses, organomegaly or guarding, port is the most tender place on her abdomen  EXTREMITIES:  extremities normal, atraumatic, no cyanosis or edema  SKIN: Normal.    Imaging and Lab Review:   Findings of ultrasound of the abdomen: normal.         Assessment:   Chronic epigastric pain and GERD symptoms with indwelling band      Plan:     1. She still has no diagnosis that I am able to appeal to her insurance carrier for removal of her band  2.  I again recommended she undergo pH and manometry testing looking for a diagnosis of pseudoachalasia  3. She has recently switched from [place] to [place]. We may have more success with [place] if the work-up is completed as desired  4. See me after that sstudies are complete  5. Stay on PPI otherwise.      Signed By: Chris Lilly MD     May 31, 2018

## 2018-06-02 DIAGNOSIS — K21.9 GASTROESOPHAGEAL REFLUX DISEASE, ESOPHAGITIS PRESENCE NOT SPECIFIED: ICD-10-CM

## 2018-06-02 RX ORDER — AMLODIPINE BESYLATE 10 MG/1
TABLET ORAL
Qty: 30 TAB | Refills: 0 | Status: SHIPPED | OUTPATIENT
Start: 2018-06-02 | End: 2018-07-02 | Stop reason: SDUPTHER

## 2018-06-03 DIAGNOSIS — J30.9 ALLERGIC RHINITIS: ICD-10-CM

## 2018-06-05 DIAGNOSIS — R11.0 NAUSEA: ICD-10-CM

## 2018-06-05 DIAGNOSIS — R19.7 DIARRHEA, UNSPECIFIED TYPE: ICD-10-CM

## 2018-06-05 RX ORDER — MONTELUKAST SODIUM 10 MG/1
TABLET ORAL
Qty: 30 TAB | Refills: 0 | Status: SHIPPED | OUTPATIENT
Start: 2018-06-05 | End: 2018-07-02 | Stop reason: SDUPTHER

## 2018-06-05 RX ORDER — HYDROCODONE BITARTRATE AND ACETAMINOPHEN 10; 325 MG/1; MG/1
1 TABLET ORAL
Qty: 120 TAB | Refills: 0 | Status: SHIPPED | OUTPATIENT
Start: 2018-06-05 | End: 2018-07-03 | Stop reason: SDUPTHER

## 2018-06-05 RX ORDER — RANITIDINE 150 MG/1
TABLET, FILM COATED ORAL
Qty: 60 TAB | Refills: 0 | Status: SHIPPED | OUTPATIENT
Start: 2018-06-05 | End: 2018-07-02 | Stop reason: SDUPTHER

## 2018-06-05 RX ORDER — ROFLUMILAST 500 UG/1
TABLET ORAL
Qty: 30 TAB | Refills: 0 | Status: SHIPPED | OUTPATIENT
Start: 2018-06-05 | End: 2018-07-02 | Stop reason: SDUPTHER

## 2018-06-06 ENCOUNTER — OFFICE VISIT (OUTPATIENT)
Dept: VASCULAR SURGERY | Age: 63
End: 2018-06-06

## 2018-06-06 DIAGNOSIS — I77.9 CAROTID DISEASE, BILATERAL (HCC): Primary | ICD-10-CM

## 2018-06-06 DIAGNOSIS — Z86.79 HX OF AORTIC ANEURYSM REPAIR: ICD-10-CM

## 2018-06-06 DIAGNOSIS — Z98.890 HX OF AORTIC ANEURYSM REPAIR: ICD-10-CM

## 2018-06-06 NOTE — PROCEDURES
[address] [place] [address] [facility]in & Vascular  *** FINAL REPORT ***    Name: Justin Astudillo  MRN: [id number]       Outpatient  : 1955  HIS Order #: [id number]  [address] [facility] Visit #: [id number]  Date: 2018    TYPE OF TEST: Aorto-Iliac Duplex    REASON FOR TEST  AAA    B-Mode:-                 (cm)   1     2     3  Aortic diameter:         AP:     2.4                           TV:     2.7  Common iliac diameter:   Right:                           Left:    Duplex:-                           PSV  Stenosis                           ----- --------------------  Aorta: (1)                65.0 Normal         (2)         (3)    Right common iliac:       36.0 Normal  Right external iliac:    131.0 Normal    Left common iliac:        45.0 Normal  Left external iliac:      41.0 Normal    INTERPRETATION/FINDINGS  Duplex images were obtained using 2-D gray scale, color flow and  spectral doppler analysis. EVAR:  1. Patent abdominal aorta bi-iliac endovascular graft with no evidence   of leak or limb dysfunciton. 2. The residual sac measures 2.21 x 2.61cm compared to 2.44 x 2.52cm  on 10-17-17 duplex. 3. Patent limbs bilaterally with multiphasic flow and no significant  stenosis. 4. Patent proximal external iliac arteries with multiphasic flow. 5. Patent celiac, superior mesenteric and left renal artery at the  origin. Right renal artery not visualized. 6. Elevated velocity at the right distal attachment could be due to  size change of the vessel. ADDITIONAL COMMENTS  CA 212c/s. SMA 251c/s. LRA 68c/s, Sup Attach 29c/s. Graft body 22c/s. Rt Limb Prox 36c/s, mid 39c/s, dst 44c/s, dst attach 150c/s, OF  131c/s, IIA 125c/s. Lt Limb Prox 45c/s, mid 57c/s, dst 41c/s, dst  attach 94c/s. OF 47c/s. I have personally reviewed the data relevant to the interpretation of  this  study. TECHNOLOGIST: Glenys Erwin RVT, RDMS  Signed: 2018 09:36 AM    PHYSICIAN: Benita Zepeda MD  Signed: 2018 10:04 AM

## 2018-06-06 NOTE — PROCEDURES
[facility]  *** FINAL REPORT ***    Name: Carlos Vines  MRN: [id number]       Outpatient  : 1955  HIS Order #: [id number]  [id number] [facility] Visit #: [id number]  Date: 2018    TYPE OF TEST: Cerebrovascular Duplex    REASON FOR TEST  Carotid disease    Right Carotid:-             Proximal               Mid                 Distal  cm/s  Systolic  Diastolic  Systolic  Diastolic  Systolic  Diastolic  CCA:    689.5      35.0      100.0      30.0       68.0      27.0  Bulb:    69.0      29.0  ECA:     82.0      11.0  ICA:     77.0      36.0       94.0      45.0       69.0      35.0  ICA/CCA:  0.8       1.3    ICA Stenosis: <50%    Right Vertebral:-  Finding: Antegrade  Sys:       49.0  Steph:       17.0    Right Subclavian:    Left Carotid:-            Proximal                Mid                 Distal  cm/s  Systolic  Diastolic  Systolic  Diastolic  Systolic  Diastolic  CCA:     65.3      34.0       99.0      37.0       65.0      25.0  Bulb:    87.0      40.0  ECA:    114.0      17.0  ICA:    100.0      33.0       92.0      43.0       91.0      48.0  ICA/CCA:  1.0       1.0    ICA Stenosis: <50%    Left Vertebral:-  Finding: Antegrade  Sys:       61.0  Steph:       31.0    Left Subclavian:    INTERPRETATION/FINDINGS  Duplex images were obtained using 2-D gray scale, color flow and  spectral doppler analysis. 1. Mild plaquing of the internal carotid arteries bilaterally in the  range of less than 50%  without evidence of a hemodynamically  significant stenosis. 2. No significant stenosis in the external carotid arteries  bilaterally. 3. Antegrade flow in both vertebral arteries. Plaque Morphology:  1. Heterogeneous plaque in the bulb and right ICA. 2. Heterogeneous plaque in the bulb and left ICA. Compared to the previous study on 16 there is no significant  change. ADDITIONAL COMMENTS    I have personally reviewed the data relevant to the interpretation of  this  study.     TECHNOLOGIST: Bisi Madrigal Henry Stephens RVT, RDMS  Signed: 06/06/2018 09:31 AM    PHYSICIAN: Philippe Carrion.  Rebeka Cee MD  Signed: 06/06/2018 10:04 AM

## 2018-06-11 RX ORDER — ATORVASTATIN CALCIUM 40 MG/1
TABLET, FILM COATED ORAL
Qty: 30 TAB | Refills: 2 | Status: SHIPPED | OUTPATIENT
Start: 2018-06-11 | End: 2018-09-05 | Stop reason: SDUPTHER

## 2018-06-18 LAB
MISCELLANEOUS TEST,99000: NORMAL
SENT TO, 434: NORMAL
TEST NAME, 435: NORMAL

## 2018-06-22 ENCOUNTER — OFFICE VISIT (OUTPATIENT)
Dept: SURGERY | Age: 63
End: 2018-06-22

## 2018-06-22 VITALS
TEMPERATURE: 98.1 F | DIASTOLIC BLOOD PRESSURE: 60 MMHG | BODY MASS INDEX: 32.59 KG/M2 | HEIGHT: 60 IN | HEART RATE: 75 BPM | WEIGHT: 166 LBS | SYSTOLIC BLOOD PRESSURE: 96 MMHG | RESPIRATION RATE: 18 BRPM

## 2018-06-22 DIAGNOSIS — R10.12 LEFT UPPER QUADRANT PAIN: ICD-10-CM

## 2018-06-22 DIAGNOSIS — Z87.19: ICD-10-CM

## 2018-06-22 DIAGNOSIS — Z98.84 LAP-BAND SURGERY STATUS: Primary | ICD-10-CM

## 2018-06-22 DIAGNOSIS — K21.9 GASTROESOPHAGEAL REFLUX DISEASE, ESOPHAGITIS PRESENCE NOT SPECIFIED: ICD-10-CM

## 2018-06-22 NOTE — PATIENT INSTRUCTIONS
Take the following medications as noted. - If the medication is circled, take with a sip of water on the morning of surgery prior to reporting to the hospital  - If the medication has a line drawn through it, do not take that medication after starting your liquid diet before surgery  - If the medication has a star beside it, change its dosing as written beside it on the date written beside it. Current Outpatient Prescriptions   Medication Sig Dispense Refill    atorvastatin (LIPITOR) 40 mg tablet TAKE ONE TABLET BY MOUTH DAILY 30 Tab 2    raNITIdine (ZANTAC) 150 mg tablet TAKE ONE TABLET BY MOUTH TWICE A DAY 60 Tab 0    montelukast (SINGULAIR) 10 mg tablet TAKE ONE TABLET BY MOUTH DAILY 30 Tab 0    DALIRESP tab tablet TAKE ONE TABLET BY MOUTH DAILY 30 Tab 0    HYDROcodone-acetaminophen (NORCO)  mg tablet Take 1 Tab by mouth every six (6) hours as needed for Pain. Max Daily Amount: 4 Tabs. 120 Tab 0    amLODIPine (NORVASC) 10 mg tablet TAKE ONE TABLET BY MOUTH DAILY 30 Tab 0    cloNIDine (CATAPRES) 0.2 mg/24 hr patch USE ONE PATCH BY TRANSDERMAL ROUTE EVERY SEVEN DAYS 4 Patch 0    diclofenac (VOLTAREN) 1 % gel Apply 2 g to affected area four (4) times daily. 1 Each 2    fluticasone-salmeterol (ADVAIR DISKUS) 250-50 mcg/dose diskus inhaler Take 1 Puff by inhalation daily. 1 Inhaler 5    triamterene-hydroCHLOROthiazide (DYAZIDE) 37.5-25 mg per capsule 1 cap each AM 30 Cap 3    mirtazapine (REMERON) 15 mg tablet TAKE ONE TABLET BY MOUTH EVERY EVENING 30 Tab 0    hydrALAZINE (APRESOLINE) 25 mg tablet Take 1 Tab by mouth two (2) times a day. 60 Tab 6    clopidogrel (PLAVIX) 75 mg tab TAKE 1 TABLET DAILY 90 Tab 2    carvedilol (COREG) 25 mg tablet Take 1 Tab by mouth two (2) times daily (with meals). 60 Tab 6    ondansetron (ZOFRAN ODT) 8 mg disintegrating tablet Take 1 Tab by mouth every eight (8) hours as needed for Nausea.  30 Tab 0    docusate sodium (COLACE) 100 mg capsule Take 1 Cap by mouth two (2) times a day. (Patient taking differently: Take 100 mg by mouth daily.) 60 Cap 5    esomeprazole (NEXIUM) 20 mg capsule Take 1 Cap by mouth daily.  STOP PREVACID  Indications: HEARTBURN 30 Cap 1

## 2018-06-26 ENCOUNTER — OFFICE VISIT (OUTPATIENT)
Dept: VASCULAR SURGERY | Age: 63
End: 2018-06-26

## 2018-06-26 VITALS
DIASTOLIC BLOOD PRESSURE: 70 MMHG | HEART RATE: 88 BPM | RESPIRATION RATE: 17 BRPM | BODY MASS INDEX: 32.59 KG/M2 | SYSTOLIC BLOOD PRESSURE: 110 MMHG | HEIGHT: 60 IN | WEIGHT: 166 LBS

## 2018-06-26 DIAGNOSIS — Z98.890 HX OF AORTIC ANEURYSM REPAIR: ICD-10-CM

## 2018-06-26 DIAGNOSIS — I71.40 ABDOMINAL AORTIC ANEURYSM (AAA) WITHOUT RUPTURE: Primary | ICD-10-CM

## 2018-06-26 DIAGNOSIS — N26.1 ATROPHY OF RIGHT KIDNEY: ICD-10-CM

## 2018-06-26 DIAGNOSIS — Z86.79 HX OF AORTIC ANEURYSM REPAIR: ICD-10-CM
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
## 2018-06-26 NOTE — PROGRESS NOTES
1. Have you been to an emergency room or urgent care clinic since your last visit? no    Hospitalized since your last visit? If yes, where, when, and reason for visit? Yes [facility]    2. Have you seen or consulted any other health care providers outside of the [facility] since your last visit including any procedures, health maintenance items. If yes, where, when and reason for visit?  [facility]

## 2018-06-28 PROBLEM — Z87.19: Status: ACTIVE | Noted: 2018-06-28

## 2018-06-28 NOTE — PROGRESS NOTES
Surgical Evaluation        Subjective:      Fred Bob is a 66 y.o. female with a history of intractable GERD with an indwelling fully deflated lap band. She had a several day admission for this in January. She is still having epigastric pain and heartburn. She is not losing weight. She has had no aspiration events. She returns after manometry testing and EGD to discuss results and possible plans for treatment. She is not interested on conversion to another weight loss surgical type.           Patient Active Problem List     Diagnosis Date Noted    Generalized abdominal pain 02/19/2018    Status post gastric banding 02/19/2018    Atrophy of right kidney 02/05/2018    Intractable nausea and vomiting 01/31/2018    Abdominal pain 01/29/2018    Right renal artery stenosis ([address] 01/26/2018    Hx of aortic aneurysm repair 12/12/2017    History of renal stent 12/12/2017    Lumbar radiculopathy 08/31/2017    Left upper quadrant pain 06/30/2017    Hypovitaminosis D 06/30/2017    Sacroiliitis ([address] 04/13/2017    Right hip pain 04/13/2017    Spondylosis of lumbar region without myelopathy or radiculopathy 04/13/2017    Lumbar neuritis 04/13/2017    Peripheral vascular disease ([address] 03/10/2017    Right-sided carotid artery disease ([address] 03/10/2017    Obesity (BMI 30.0-34.9) 03/07/2017    Non morbid obesity due to excess calories 02/23/2017    Gastroesophageal reflux disease without esophagitis 02/23/2017    LAP-BAND surgery status 05/26/2016    Dysarthria 02/28/2016    Renal infarction ([address] 10/07/2015    Generalized osteoarthrosis, involving multiple sites      Esophageal reflux      Subclinical hyperthyroidism 08/28/2015    Essential hypertension 08/25/2015    Hyperlipidemia 08/25/2015    AAA (abdominal aortic aneurysm) ([address] 10/14/2014           Past Medical History:   Diagnosis Date    Aorto-iliac duplex 09/17/2015     Patent abdom aorta endovascular graft w/o leak.   Mod stenosis suggested in right limb.  Calculus of kidney 2014     stent/kidney     Carotid duplex 09/17/2015     Mild < 50% bilateral plaquing.  Cataract, left      Chronic obstructive pulmonary disease (HCC)      Echocardiogram 11/04/2014     EF 56%. No WMA. Mild LAE. Echo is within normal limits.  Esophageal reflux      Generalized osteoarthrosis, involving multiple sites      GERD (gastroesophageal reflux disease)      Gout      History of renal stent       right side    Hypercholesteremia      Hypertension       resolved    Lower extremity arterial testing 03/27/2015     Normal perfusion at rest and w/treadmill bilaterally.   R KORI 1.05.  L KORI 1.03.    Murmur       patient reports that she is aware- has been told in the past    Nausea & vomiting      Renal artery stenosis ([address]      Subclinical hyperthyroidism 8/2015    Unspecified sleep apnea       resolved            Past Surgical History:   Procedure Laterality Date    ADJUSTMENT GASTRIC BAND N/A 11/10/2016     MAKAYLA Cullen    FULL ESOPHAGEAL MANOMETRY   5/21/2018           HX APPENDECTOMY        HX BLADDER SUSPENSION        HX CHOLECYSTECTOMY        HX GASTRIC BYPASS         lap band 2012    HX HEENT Right 1990s, 2016     Ear sx    HX HYSTERECTOMY        HX OTHER SURGICAL   10/27/14      Bilateral open femoral exposures, Placement of catheter and sheath in the aorta Endo repair of aortic aneurysm with modular bifurcated device  Interpretation of angiography, intravascular ultrasound (IVUS) catheter    HX OTHER SURGICAL   10/27/2014     Endurant II abdominal stent graft implant    HX UROLOGICAL         stent    VASCULAR SURGERY PROCEDURE UNLIST         surgery for abdominal aneurysm            Social History   Substance Use Topics    Smoking status: Former Smoker       Packs/day: 0.00       Years: 0.00       Types: Cigarettes       Quit date: 1/25/2016    Smokeless tobacco: Never Used    Alcohol use No            Family History Problem Relation Age of Onset    Cancer Mother      Diabetes Father      Alcohol abuse Father      Heart Disease Maternal Grandmother      Alzheimer Maternal Grandmother      Diabetes Sister             Current Outpatient Prescriptions   Medication Sig    triamterene-hydroCHLOROthiazide (DYAZIDE) 37.5-25 mg per capsule 1 cap each AM    mirtazapine (REMERON) 15 mg tablet TAKE ONE TABLET BY MOUTH EVERY EVENING    hydrALAZINE (APRESOLINE) 25 mg tablet Take 1 Tab by mouth two (2) times a day.  amLODIPine (NORVASC) 10 mg tablet Take 1 Tab by mouth daily.  clopidogrel (PLAVIX) 75 mg tab TAKE 1 TABLET DAILY    carvedilol (COREG) 25 mg tablet Take 1 Tab by mouth two (2) times daily (with meals).  atorvastatin (LIPITOR) 40 mg tablet Take 1 Tab by mouth daily.  ondansetron (ZOFRAN ODT) 8 mg disintegrating tablet Take 1 Tab by mouth every eight (8) hours as needed for Nausea.  docusate sodium (COLACE) 100 mg capsule Take 1 Cap by mouth two (2) times a day. (Patient taking differently: Take 100 mg by mouth daily.)    cloNIDine (CATAPRES) 0.2 mg/24 hr patch USE ONE PATCH BY TRANSDERMAL ROUTE EVERY SEVEN DAYS    diclofenac (VOLTAREN) 1 % gel Apply 2 g to affected area four (4) times daily.  fluticasone-salmeterol (ADVAIR DISKUS) 250-50 mcg/dose diskus inhaler Take 1 Puff by inhalation daily.  HYDROcodone-acetaminophen (NORCO)  mg tablet Take 1 Tab by mouth every six (6) hours as needed for Pain. Max Daily Amount: 4 Tabs.  DALIRESP tab tablet TAKE ONE TABLET BY MOUTH DAILY    montelukast (SINGULAIR) 10 mg tablet TAKE ONE TABLET BY MOUTH DAILY    raNITIdine (ZANTAC) 150 mg tablet TAKE ONE TABLET BY MOUTH TWICE A DAY    esomeprazole (NEXIUM) 20 mg capsule Take 1 Cap by mouth daily. STOP PREVACID  Indications: HEARTBURN      No current facility-administered medications for this visit.             Allergies   Allergen Reactions    Pcn [Penicillins] Anaphylaxis    Tetracycline Anaphylaxis  Sulfabenzamide Hives         Review of Systems:  Positive in BOLD - no chagne from April     CONST: Fever, weight loss, fatigue or chills  GI: Nausea, vomiting, abdominal pain, change in bowel habits, hematochezia, melena, and GERD   INTEG: Dermatitis, abnormal moles  HEENT: Recent changes in vision, vertigo, epistaxis, dysphagia and hoarseness  CV: Chest pain, palpitations, HTN, edema and varicosities  RESP: Cough, shortness of breath, wheezing, hemoptysis, snoring and reactive airway disease  : Hematuria, dysuria, frequency, urgency, nocturia and stress urinary incontinence   MS: Weakness, joint pain and arthritis  ENDO: Diabetes, thyroid disease, polyuria, polydipsia, polyphagia, poor wound healing, heat intolerance, cold intolerance  LYMPH/HEME: Anemia, bruising and history of blood transfusions  NEURO: Dizziness, headache, fainting, seizures and stroke  PSYCH: Anxiety and depression     Objective:      Visit Vitals    BP 96/60    Pulse 75    Temp 98.1 °F (36.7 °C)    Resp 18    Ht 5' (1.524 m)    Wt 75.3 kg (166 lb)    BMI 32.42 kg/m2          Physical Exam:       GENERAL: alert, cooperative, no distress, appears stated age, moderately obese  EYE:conjunctivae and sclerae normal, pupils equal, round, reactive to light, extraocular movements intact without nystagmus  THROAT & NECK: no erythema or exudates noted and neck supple and symmetrical; no palpable masses  LUNG: clear to auscultation bilaterally  HEART: Regular rate and rhythm  ABDOMEN:abdomen is soft without significant tenderness, masses, organomegaly or guarding, port is the most tender place on her abdomen  EXTREMITIES:  extremities normal, atraumatic, no cyanosis or edema  SKIN: Normal.     Imaging and Lab Review:   Findings of ultrasound of the abdomen: normal.     Manometry - GEJ spasm with incomplete esophageal emptying.  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         Plan:      1. Stay on PPI  2. Risks, benefits and alternatives for laparoscopic band removal were discussed at length. Risks included bleeding, infection, injury to surrounding structures (including esophagus, stomach, liver, spleen, colon), intestinal leak, stricture or recurrent obstruction, failure to resolve her pain, anesthetic risks and imponderables to include death. she  wishes to proceed.

## 2018-07-02 DIAGNOSIS — K21.9 GASTROESOPHAGEAL REFLUX DISEASE, ESOPHAGITIS PRESENCE NOT SPECIFIED: ICD-10-CM

## 2018-07-02 DIAGNOSIS — J30.9 ALLERGIC RHINITIS: ICD-10-CM

## 2018-07-02 RX ORDER — AMLODIPINE BESYLATE 10 MG/1
TABLET ORAL
Qty: 30 TAB | Refills: 0 | Status: ON HOLD | OUTPATIENT
Start: 2018-07-02 | End: 2018-07-17

## 2018-07-03 ENCOUNTER — TELEPHONE (OUTPATIENT)
Dept: INTERNAL MEDICINE CLINIC | Age: 63
End: 2018-07-03

## 2018-07-03 DIAGNOSIS — R11.0 NAUSEA: ICD-10-CM

## 2018-07-03 DIAGNOSIS — R19.7 DIARRHEA, UNSPECIFIED TYPE: ICD-10-CM

## 2018-07-03 RX ORDER — MONTELUKAST SODIUM 10 MG/1
TABLET ORAL
Qty: 30 TAB | Refills: 0 | Status: SHIPPED | OUTPATIENT
Start: 2018-07-03 | End: 2018-08-04 | Stop reason: SDUPTHER

## 2018-07-03 RX ORDER — RANITIDINE 150 MG/1
TABLET, FILM COATED ORAL
Qty: 60 TAB | Refills: 0 | Status: SHIPPED | OUTPATIENT
Start: 2018-07-03 | End: 2018-07-13 | Stop reason: CLARIF

## 2018-07-03 RX ORDER — HYDROCODONE BITARTRATE AND ACETAMINOPHEN 10; 325 MG/1; MG/1
1 TABLET ORAL
Qty: 120 TAB | Refills: 0 | Status: SHIPPED | OUTPATIENT
Start: 2018-07-03 | End: 2018-07-17

## 2018-07-03 RX ORDER — ROFLUMILAST 500 UG/1
TABLET ORAL
Qty: 30 TAB | Refills: 0 | Status: SHIPPED | OUTPATIENT
Start: 2018-07-03 | End: 2018-08-04 | Stop reason: SDUPTHER

## 2018-07-03 NOTE — TELEPHONE ENCOUNTER
FYI- Patient called to let Dr Mark Briceno know she will be having surgery on the lap band July 17th [facility].

## 2018-07-13 ENCOUNTER — HOSPITAL ENCOUNTER (OUTPATIENT)
Dept: GENERAL RADIOLOGY | Age: 63
Discharge: HOME | End: 2018-07-13
Payer: COMMERCIAL

## 2018-07-13 ENCOUNTER — OFFICE VISIT (OUTPATIENT)
Dept: INTERNAL MEDICINE CLINIC | Age: 63
End: 2018-07-13

## 2018-07-13 VITALS
SYSTOLIC BLOOD PRESSURE: 142 MMHG | RESPIRATION RATE: 22 BRPM | WEIGHT: 166 LBS | HEART RATE: 77 BPM | DIASTOLIC BLOOD PRESSURE: 88 MMHG | HEIGHT: 60 IN | OXYGEN SATURATION: 98 % | BODY MASS INDEX: 32.59 KG/M2 | TEMPERATURE: 97.3 F

## 2018-07-13 DIAGNOSIS — Z98.84 STATUS POST GASTRIC BANDING: ICD-10-CM

## 2018-07-13 DIAGNOSIS — Z79.891 LONG TERM CURRENT USE OF OPIATE ANALGESIC: ICD-10-CM

## 2018-07-13 DIAGNOSIS — R10.84 GENERALIZED ABDOMINAL PAIN: ICD-10-CM

## 2018-07-13 DIAGNOSIS — T17.908D ASPIRATION INTO AIRWAY, SUBSEQUENT ENCOUNTER: ICD-10-CM

## 2018-07-13 DIAGNOSIS — Z01.818 PREOP EXAMINATION: ICD-10-CM

## 2018-07-13 DIAGNOSIS — Z98.890 HX OF AORTIC ANEURYSM REPAIR: ICD-10-CM

## 2018-07-13 DIAGNOSIS — R10.84 GENERALIZED ABDOMINAL PAIN: Primary | ICD-10-CM

## 2018-07-13 DIAGNOSIS — Z86.79 HX OF AORTIC ANEURYSM REPAIR: ICD-10-CM

## 2018-07-13 PROCEDURE — 71046 X-RAY EXAM CHEST 2 VIEWS: CPT

## 2018-07-13 RX ORDER — TOBRAMYCIN AND DEXAMETHASONE 3; 1 MG/ML; MG/ML
SUSPENSION/ DROPS OPHTHALMIC
Status: ON HOLD | COMMUNITY
Start: 2015-05-12 | End: 2018-07-17

## 2018-07-13 RX ORDER — AZELASTINE 1 MG/ML
SPRAY, METERED NASAL
COMMUNITY
Start: 2016-02-24 | End: 2018-11-26 | Stop reason: ALTCHOICE

## 2018-07-13 RX ORDER — LANSOPRAZOLE 30 MG/1
CAPSULE, DELAYED RELEASE ORAL
COMMUNITY
End: 2018-07-13 | Stop reason: SDUPTHER

## 2018-07-13 RX ORDER — FLUTICASONE PROPIONATE AND SALMETEROL 250; 50 UG/1; UG/1
POWDER RESPIRATORY (INHALATION)
COMMUNITY
End: 2018-07-13 | Stop reason: SDUPTHER

## 2018-07-13 RX ORDER — CLOPIDOGREL 300 MG/1
TABLET, FILM COATED ORAL
COMMUNITY
End: 2018-07-13 | Stop reason: SDUPTHER

## 2018-07-13 RX ORDER — MONTELUKAST SODIUM 10 MG/1
TABLET ORAL
COMMUNITY
End: 2018-07-13 | Stop reason: SDUPTHER

## 2018-07-13 RX ORDER — HYDROCODONE BITARTRATE AND ACETAMINOPHEN 5; 325 MG/1; MG/1
TABLET ORAL
COMMUNITY
End: 2018-07-13 | Stop reason: SDUPTHER

## 2018-07-13 RX ORDER — LANSOPRAZOLE 30 MG/1
1 CAPSULE, DELAYED RELEASE ORAL
COMMUNITY
End: 2018-10-13

## 2018-07-13 RX ORDER — ALBUTEROL SULFATE 90 UG/1
AEROSOL, METERED RESPIRATORY (INHALATION)
COMMUNITY
End: 2018-11-26 | Stop reason: ALTCHOICE

## 2018-07-13 RX ORDER — OMEPRAZOLE 40 MG/1
CAPSULE, DELAYED RELEASE ORAL
COMMUNITY
End: 2018-07-13 | Stop reason: SDUPTHER

## 2018-07-13 NOTE — PROGRESS NOTES
Chief Complaint   Patient presents with    Well Woman     3 month         Depression Screening:  PHQ over the last two weeks 6/26/2018   PHQ Not Done -   Little interest or pleasure in doing things Not at all   Feeling down, depressed or hopeless Not at all   Total Score PHQ 2 0       Learning Assessment:  Learning Assessment 6/26/2018   PRIMARY LEARNER Patient   HIGHEST LEVEL OF EDUCATION - PRIMARY LEARNER  -   BARRIERS PRIMARY LEARNER -   CO-LEARNER CAREGIVER -   PRIMARY LANGUAGE ENGLISH   LEARNER PREFERENCE PRIMARY DEMONSTRATION   ANSWERED BY patient   RELATIONSHIP SELF       Abuse Screening:  Abuse Screening Questionnaire 3/22/2017   Do you ever feel afraid of your partner? N   Are you in a relationship with someone who physically or mentally threatens you? N   Is it safe for you to go home? Y       Health Maintenance Due   Topic Date Due    ZOSTER VACCINE AGE 60>  12/11/2016    Bone Densitometry  09/24/2017    BREAST CANCER SCRN MAMMOGRAM  06/01/2018         Coordination of Care:  1. Have you been to the ER, urgent care clinic since your last visit? Hospitalized since your last visit? No    2. Have you seen or consulted any other health care providers outside of the [facility] since your last visit? Include any pap smears or colon screening.  No

## 2018-07-13 NOTE — PROGRESS NOTES
The patient presents to the office today with the chief complaint of abdominal pain and for a pre op evaluation    HPI    Bandar Martinez complains of abdominal pain nausea secondary to gastric banding. she is now scheduled for surgical correction. Other than abdominal pain the patient has no complaints. The patient denies chest pain or dyspnea. She continues with generalized joint pain. The patient has been on Norco for the abdominal pain and the joint pain. Review of Systems   Respiratory: Negative for shortness of breath. Cardiovascular: Negative for chest pain and leg swelling. Gastrointestinal: Positive for abdominal pain. Musculoskeletal: Positive for joint pain. Allergies   Allergen Reactions    Pcn [Penicillins] Anaphylaxis    Tetracycline Anaphylaxis    Sulfabenzamide Hives       Current Outpatient Prescriptions   Medication Sig Dispense Refill    albuterol (PROVENTIL HFA, VENTOLIN HFA, PROAIR HFA) 90 mcg/actuation inhaler Take  by inhalation.  tobramycin-dexamethasone (TOBRADEX) ophthalmic suspension Apply  to eye.  Ondansetron (ZUPLENZ) 4 mg film Take  by mouth.  azelastine (ASTELIN) 137 mcg (0.1 %) nasal spray by Nasal route.  lansoprazole (PREVACID) 30 mg capsule Take 1 Cap by mouth.  DALIRESP tab tablet TAKE ONE TABLET BY MOUTH DAILY 30 Tab 0    montelukast (SINGULAIR) 10 mg tablet TAKE ONE TABLET BY MOUTH DAILY 30 Tab 0    HYDROcodone-acetaminophen (NORCO)  mg tablet Take 1 Tab by mouth every six (6) hours as needed for Pain. Max Daily Amount: 4 Tabs. 120 Tab 0    amLODIPine (NORVASC) 10 mg tablet TAKE ONE TABLET BY MOUTH DAILY 30 Tab 0    atorvastatin (LIPITOR) 40 mg tablet TAKE ONE TABLET BY MOUTH DAILY 30 Tab 2    diclofenac (VOLTAREN) 1 % gel Apply 2 g to affected area four (4) times daily. 1 Each 2    fluticasone-salmeterol (ADVAIR DISKUS) 250-50 mcg/dose diskus inhaler Take 1 Puff by inhalation daily.  1 Inhaler 5    triamterene-hydroCHLOROthiazide (DYAZIDE) 37.5-25 mg per capsule 1 cap each AM 30 Cap 3    mirtazapine (REMERON) 15 mg tablet TAKE ONE TABLET BY MOUTH EVERY EVENING 30 Tab 0    hydrALAZINE (APRESOLINE) 25 mg tablet Take 1 Tab by mouth two (2) times a day. 60 Tab 6    clopidogrel (PLAVIX) 75 mg tab TAKE 1 TABLET DAILY 90 Tab 2    carvedilol (COREG) 25 mg tablet Take 1 Tab by mouth two (2) times daily (with meals). 60 Tab 6    ondansetron (ZOFRAN ODT) 8 mg disintegrating tablet Take 1 Tab by mouth every eight (8) hours as needed for Nausea. 30 Tab 0    docusate sodium (COLACE) 100 mg capsule Take 1 Cap by mouth two (2) times a day. (Patient taking differently: Take 100 mg by mouth daily.) 60 Cap 5    esomeprazole (NEXIUM) 20 mg capsule Take 1 Cap by mouth daily. STOP PREVACID  Indications: HEARTBURN 30 Cap 1       Past Medical History:   Diagnosis Date    Aorto-iliac duplex 09/17/2015    Patent abdom aorta endovascular graft w/o leak. Mod stenosis suggested in right limb.  Calculus of kidney 2014    stent/kidney     Carotid duplex 09/17/2015    Mild < 50% bilateral plaquing.  Cataract, left     Chronic kidney disease     one functioning kidney    Chronic obstructive pulmonary disease ([address]     Echocardiogram 11/04/2014    EF 56%. No WMA. Mild LAE. Echo is within normal limits.  Esophageal reflux     Generalized osteoarthrosis, involving multiple sites     GERD (gastroesophageal reflux disease)     Gout     History of renal stent     right side    Hypercholesteremia     Hypertension     resolved    Lower extremity arterial testing 03/27/2015    Normal perfusion at rest and w/treadmill bilaterally.   R KORI 1.05.  L KORI 1.03.    Murmur     patient reports that she is aware- has been told in the past    Nausea & vomiting     Renal artery stenosis ([address]     Subclinical hyperthyroidism 8/2015    Unspecified sleep apnea     resolved       Past Surgical History:   Procedure Laterality Date    ADJUSTMENT GASTRIC BAND N/A 11/10/2016    MAKAYLA Cullen    FULL ESOPHAGEAL MANOMETRY  5/21/2018         HX APPENDECTOMY      HX BLADDER SUSPENSION      HX CHOLECYSTECTOMY      HX GI      lap band 2012    HX HEENT Right 1990s, 2016    Ear sx    HX HYSTERECTOMY      HX OTHER SURGICAL  10/27/14     Bilateral open femoral exposures, Placement of catheter and sheath in the aorta Endo repair of aortic aneurysm with modular bifurcated device  Interpretation of angiography, intravascular ultrasound (IVUS) catheter    HX OTHER SURGICAL  10/27/2014    Endurant II abdominal stent graft implant    HX UROLOGICAL      stent    VASCULAR SURGERY PROCEDURE UNLIST      surgery for abdominal aneurysm       Social History     Social History    Marital status:      Spouse name: N/A    Number of children: N/A    Years of education: N/A     Occupational History    Not on file. Social History Main Topics    Smoking status: Former Smoker     Packs/day: 0.00     Years: 0.00     Types: Cigarettes     Quit date: 1/25/2016    Smokeless tobacco: Never Used    Alcohol use No    Drug use: No    Sexual activity: Yes     Birth control/ protection: Surgical     Other Topics Concern    Not on file     Social History Narrative       Patient does not have an advanced directive on file    Visit Vitals    /88 (BP 1 Location: Right arm, BP Patient Position: Sitting)    Pulse 77    Temp 97.3 °F (36.3 °C) (Tympanic)    Resp 22    Ht 5' (1.524 m)    Wt 166 lb (75.3 kg)    SpO2 98%    BMI 32.42 kg/m2       Physical Exam   No Cervical Lymphadenopathy  No Supraclavicular Lymphadenopathy  Thyroid is Normal  Lungs are normal to percussion. Clear to auscultation   Heart:  S1 S2 are normal, No gallops, No mummers  No Carotid Bruits  Abdomen:  Normal Bowel Sounds. Moderate diffuse abdominal tenderness. No masses. No Hepatomegaly or Splenomegly  LE:  Strong Pedal Pulses.   No Edema      BMI:  BMI is high but it was not addressed during this visit due to up coming surgery      Orders Only on 06/08/2018   Component Date Value Ref Range Status    Sent to 06/08/2018 [facility]   Final    Test name 06/08/2018 [facility]PL[facility]UG TEST[facility]   Final    [id number]    Miscellaneous Test 06/08/2018 See scanned report   Final   Office Visit on 06/05/2018   Component Date Value Ref Range Status    Color (UA POC) 06/05/2018 Yellow   Final    Clarity (UA POC) 06/05/2018 Clear   Final    Glucose (UA POC) 06/05/2018 Negative  Negative Final    Bilirubin (UA POC) 06/05/2018 Negative  Negative Final    Ketones (UA POC) 06/05/2018 Negative  Negative Final    Specific gravity (UA POC) 06/05/2018 1.010  1.001 - 1.035 Final    Blood (UA POC) 06/05/2018 Negative  Negative Final    pH (UA POC) 06/05/2018 7.0  4.6 - 8.0 Final    Protein (UA POC) 06/05/2018 Negative  Negative Final    Urobilinogen (UA POC) 06/05/2018 0.2 mg/dL  0.2 - 1 Final    Nitrites (UA POC) 06/05/2018 Negative  Negative Final    Leukocyte esterase (UA POC) 06/05/2018 Negative  Negative Final   Orders Only on 04/17/2018   Component Date Value Ref Range Status    WBC 04/17/2018 5.7  4.0 - 11.0 K/uL Final    RBC 04/17/2018 4.51  3.80 - 5.20 M/uL Final    HGB 04/17/2018 12.6  11.7 - 16.0 g/dL Final    HCT 04/17/2018 37.5  35.1 - 48.0 % Final    MCV 04/17/2018 83  80 - 95 fL Final    MCH 04/17/2018 28  26 - 34 pg Final    MCHC 04/17/2018 34  31 - 36 g/dL Final    RDW 04/17/2018 13.2  10.0 - 15.5 % Final    PLATELET 66/52/6769 391  140 - 440 K/uL Final    MPV 04/17/2018 10.4  9.0 - 13.0 fL Final    NEUTROPHILS 04/17/2018 55  40 - 75 % Final    Lymphocytes 04/17/2018 31  20 - 45 % Final    MONOCYTES 04/17/2018 10  3 - 12 % Final    EOSINOPHILS 04/17/2018 4  0 - 6 % Final    BASOPHILS 04/17/2018 1  0 - 2 % Final    ABS. NEUTROPHILS 04/17/2018 3.1  1.8 - 7.7 K/uL Final    ABSOLUTE LYMPHOCYTE COUNT 04/17/2018 1.7  1.0 - 4.8 K/uL Final    ABS.  MONOCYTES 04/17/2018 0.6  0.1 - 1.0 K/uL Final    ABS. EOSINOPHILS 04/17/2018 0.2  0.0 - 0.5 K/uL Final    ABS. BASOPHILS 04/17/2018 0.1  0.0 - 0.2 K/uL Final   Hospital Outpatient Visit on 04/17/2018   Component Date Value Ref Range Status    PAUL SPECIMEN COL 04/17/2018 [facility]    Final       .  Results for orders placed or performed during the hospital encounter of 07/13/18   XR CHEST PA LAT    Narrative    Chest AP and Lateral views. HISTORY:  pre op    COMPARISON: Generally 20/9/2017. FINDINGS:     PA and lateral views of the chest were obtained. The cardiac silhouette is normal.     The lungs are clear. Pulmonary vascularity is normal. The costophrenic angles  are sharply defined. Lateral view showed sharp posterior costophrenic angles. Old healed right-sided rib fractures were again noted. Gastric banding hardware  is unchanged. Surgical clips were noted in the right upper quadrant. Aortic  stent graft was again noted. Impression    IMPRESSION:  1. There are no infiltrates or pleural effusion. 2. Old healed right-sided rib fractures. 3. Cholecystectomy, gastric banding, aortic stent graft are unchanged. Pre op testing:        Labs - ok      CXR - ok      EKG - ok    Assessment / Plan      ICD-10-CM ICD-9-CM    1. Generalized abdominal pain R10.84 789.07 albuterol (PROVENTIL HFA, VENTOLIN HFA, PROAIR HFA) 90 mcg/actuation inhaler      tobramycin-dexamethasone (TOBRADEX) ophthalmic suspension      Ondansetron (ZUPLENZ) 4 mg film      CBC WITH AUTOMATED DIFF      METABOLIC PANEL, COMPREHENSIVE      URINALYSIS W/MICROSCOPIC      XR CHEST PA LAT      AMB POC EKG ROUTINE W/ 12 LEADS, INTER & REP      METABOLIC PANEL, COMPREHENSIVE      URINALYSIS W/MICROSCOPIC      DISCONTINUED: tiotropium bromide (SPIRIVA RESPIMAT) 2.5 mcg/actuation inhaler   2.  Status post gastric banding Z98.84 V45.86 albuterol (PROVENTIL HFA, VENTOLIN HFA, PROAIR HFA) 90 mcg/actuation inhaler      tobramycin-dexamethasone (TOBRADEX) ophthalmic suspension      Ondansetron (ZUPLENZ) 4 mg film      CBC WITH AUTOMATED DIFF      METABOLIC PANEL, COMPREHENSIVE      URINALYSIS W/MICROSCOPIC      XR CHEST PA LAT      AMB POC EKG ROUTINE W/ 12 LEADS, INTER & REP      METABOLIC PANEL, COMPREHENSIVE      URINALYSIS W/MICROSCOPIC      DISCONTINUED: tiotropium bromide (SPIRIVA RESPIMAT) 2.5 mcg/actuation inhaler   3. Preop examination Z01.818 V72.84 albuterol (PROVENTIL HFA, VENTOLIN HFA, PROAIR HFA) 90 mcg/actuation inhaler      tobramycin-dexamethasone (TOBRADEX) ophthalmic suspension      Ondansetron (ZUPLENZ) 4 mg film      CBC WITH AUTOMATED DIFF      METABOLIC PANEL, COMPREHENSIVE      URINALYSIS W/MICROSCOPIC      XR CHEST PA LAT      AMB POC EKG ROUTINE W/ 12 LEADS, INTER & REP      METABOLIC PANEL, COMPREHENSIVE      URINALYSIS W/MICROSCOPIC      DISCONTINUED: tiotropium bromide (SPIRIVA RESPIMAT) 2.5 mcg/actuation inhaler   4. Long term current use of opiate analgesic Z79.891 V58.69 COMPLIANCE DRUG SCREEN/PRESCRIPTION MONITORING      COMPLIANCE DRUG SCREEN/PRESCRIPTION MONITORING       THE PATIENT IS OK FOR SURGERY    she was advised to continue her maintenance medications      Follow-up Disposition:  Return in about 3 months (around 10/13/2018). I asked Sahra Roberto if she has any questions and I answered the questions. Sahra Lazaro states that she understands the treatment plan and agrees with the treatment plan

## 2018-07-16 ENCOUNTER — ANESTHESIA EVENT (OUTPATIENT)
Dept: SURGERY | Age: 63
End: 2018-07-16
Payer: COMMERCIAL

## 2018-07-16 NOTE — PERIOP NOTES
PAT - SURGICAL PRE-ADMISSION INSTRUCTIONS    NAME:  Maria Koehler                                                          TODAY'S DATE:  7/16/2018    SURGERY DATE:  7/17/2018                                  SURGERY ARRIVAL TIME:   0900    1. Do NOT eat or drink anything, including candy or gum, after MIDNIGHT on 7/16/18 , unless you have specific instructions from your Surgeon or Anesthesia Provider to do so. 2. No smoking on the day of surgery. 3. No alcohol 24 hours prior to the day of surgery. 4. No recreational drugs for one week prior to the day of surgery. 5. Leave all valuables, including money/purse, at home. 6. Remove all jewelry, nail polish, makeup (including mascara); no lotions, powders, deodorant, or perfume/cologne/after shave. 7. Glasses/Contact lenses and Dentures may be worn to the hospital.  They will be removed prior to surgery. 8. Call your doctor if symptoms of a cold or illness develop within 24 ours prior to surgery. 9. AN ADULT MUST DRIVE YOU HOME AFTER OUTPATIENT SURGERY. 10. If you are having an OUTPATIENT procedure, please make arrangements for a responsible adult to be with you for 24 hours after your surgery. 11. If you are admitted to the hospital, you will be assigned to a bed after surgery is complete. Normally a family member will not be able to see you until you are in your assigned bed. 15. Family is encouraged to accompany you to the hospital.  We ask visitors in the treatment area to be limited to ONE person at a time to ensure patient privacy. EXCEPTIONS WILL BE MADE AS NEEDED. 15. Children under 12 are discouraged from entering the treatment area and need to be supervised by an adult when in the waiting room. Special Instructions:    Bring list of CURRENT medications . , Bring inhaler. , Take these medications the morning of surgery with a sip of water:  COREG, AMLODIPINE, HYDRALAZINE, NEXIUM, INHALERS, Complete bowel prep per MD instructions., STOP anticoagulants AT LEAST 1 WEEK PRIOR to your surgery or, follow other MD instructions:  PLAVIX    Patient Prep:    shower with anti-bacterial soap    These surgical instructions were reviewed with PATIENT during the PAT PHONE CALL. Directions: On the morning of surgery, please go to the [address] [place] [address]. Enter the building from the [facility] entrance, 1st floor (next to the Emergency Room entrance). Take the elevator to the 2nd floor. Sign in at the Registration Desk.     If you have any questions and/or concerns, please do not hesitate to call:  (Prior to the day of surgery)  [facility] unit:  [phone]  (Day of surgery)  [facility] unit:  [phone]

## 2018-07-17 ENCOUNTER — ANESTHESIA (OUTPATIENT)
Dept: SURGERY | Age: 63
End: 2018-07-17
Payer: COMMERCIAL

## 2018-07-17 ENCOUNTER — HOSPITAL ENCOUNTER (OUTPATIENT)
Age: 63
Setting detail: OUTPATIENT SURGERY
Discharge: HOME | End: 2018-07-17
Attending: SURGERY | Admitting: SURGERY
Payer: COMMERCIAL

## 2018-07-17 VITALS
RESPIRATION RATE: 16 BRPM | OXYGEN SATURATION: 99 % | SYSTOLIC BLOOD PRESSURE: 145 MMHG | DIASTOLIC BLOOD PRESSURE: 76 MMHG | HEIGHT: 60 IN | HEART RATE: 76 BPM | WEIGHT: 166 LBS | BODY MASS INDEX: 32.59 KG/M2 | TEMPERATURE: 97.5 F

## 2018-07-17 DIAGNOSIS — Z98.84 LAP-BAND SURGERY STATUS: Primary | ICD-10-CM

## 2018-07-17 PROCEDURE — 77030035051: Performed by: SURGERY

## 2018-07-17 PROCEDURE — 77030008603 HC TRCR ENDOSC EPATH J&J -C: Performed by: SURGERY

## 2018-07-17 PROCEDURE — 77030016151 HC PROTCTR LNS DFOG COVD -B: Performed by: SURGERY

## 2018-07-17 PROCEDURE — 74011250637 HC RX REV CODE- 250/637: Performed by: ANESTHESIOLOGY

## 2018-07-17 PROCEDURE — 74011250636 HC RX REV CODE- 250/636: Performed by: NURSE ANESTHETIST, CERTIFIED REGISTERED

## 2018-07-17 PROCEDURE — 77030035050: Performed by: SURGERY

## 2018-07-17 PROCEDURE — 77030018823 HC SLV COMPR VENO -B: Performed by: SURGERY

## 2018-07-17 PROCEDURE — 88300 SURGICAL PATH GROSS: CPT | Performed by: SURGERY

## 2018-07-17 PROCEDURE — 77030018834: Performed by: SURGERY

## 2018-07-17 PROCEDURE — 77030027491 HC SHR ENDOSC COVD -B: Performed by: SURGERY

## 2018-07-17 PROCEDURE — 76060000033 HC ANESTHESIA 1 TO 1.5 HR: Performed by: SURGERY

## 2018-07-17 PROCEDURE — 74011000250 HC RX REV CODE- 250: Performed by: SURGERY

## 2018-07-17 PROCEDURE — 74011250636 HC RX REV CODE- 250/636: Performed by: SURGERY

## 2018-07-17 PROCEDURE — 76010000149 HC OR TIME 1 TO 1.5 HR: Performed by: SURGERY

## 2018-07-17 PROCEDURE — 74011250636 HC RX REV CODE- 250/636

## 2018-07-17 PROCEDURE — 77030018846 HC SOL IRR STRL H20 ICUM -A: Performed by: SURGERY

## 2018-07-17 PROCEDURE — 76210000000 HC OR PH I REC 2 TO 2.5 HR: Performed by: SURGERY

## 2018-07-17 PROCEDURE — 77030031139 HC SUT VCRL2 J&J -A: Performed by: SURGERY

## 2018-07-17 PROCEDURE — 76210000021 HC REC RM PH II 0.5 TO 1 HR: Performed by: SURGERY

## 2018-07-17 PROCEDURE — 74011000250 HC RX REV CODE- 250

## 2018-07-17 PROCEDURE — 77030035048 HC TRCR ENDOSC OPTCL COVD -B: Performed by: SURGERY

## 2018-07-17 PROCEDURE — 77030008477 HC STYL SATN SLP COVD -A: Performed by: ANESTHESIOLOGY

## 2018-07-17 PROCEDURE — 77030036583 HC TRCR CANN BLDLSS OPT MEDT -B: Performed by: SURGERY

## 2018-07-17 PROCEDURE — 77030008602 HC TRCR ENDOSC EPATH J&J -B: Performed by: SURGERY

## 2018-07-17 PROCEDURE — 77030005518 HC CATH URETH FOL 2W BARD -B: Performed by: SURGERY

## 2018-07-17 PROCEDURE — 74011250637 HC RX REV CODE- 250/637: Performed by: NURSE ANESTHETIST, CERTIFIED REGISTERED

## 2018-07-17 PROCEDURE — 77030033639 HC SHR ENDO COAG HARM 36 J&J -E: Performed by: SURGERY

## 2018-07-17 PROCEDURE — 77030013079 HC BLNKT BAIR HGGR 3M -A: Performed by: ANESTHESIOLOGY

## 2018-07-17 PROCEDURE — 77030032490 HC SLV COMPR SCD KNE COVD -B: Performed by: SURGERY

## 2018-07-17 PROCEDURE — 77030002933 HC SUT MCRYL J&J -A: Performed by: SURGERY

## 2018-07-17 PROCEDURE — 77030035045 HC TRCR ENDOSC VRSPRT BLDLSS COVD -B: Performed by: SURGERY

## 2018-07-17 PROCEDURE — 77030002986 HC SUT PROL J&J -A: Performed by: SURGERY

## 2018-07-17 PROCEDURE — 77030002912 HC SUT ETHBND J&J -A: Performed by: SURGERY

## 2018-07-17 PROCEDURE — 77030008683 HC TU ET CUF COVD -A: Performed by: ANESTHESIOLOGY

## 2018-07-17 PROCEDURE — 93005 ELECTROCARDIOGRAM TRACING: CPT

## 2018-07-17 PROCEDURE — 77030009393: Performed by: SURGERY

## 2018-07-17 RX ORDER — PROMETHAZINE HYDROCHLORIDE 25 MG/ML
12.5 INJECTION, SOLUTION INTRAMUSCULAR; INTRAVENOUS ONCE
Status: COMPLETED | OUTPATIENT
Start: 2018-07-17 | End: 2018-07-17

## 2018-07-17 RX ORDER — SCOLOPAMINE TRANSDERMAL SYSTEM 1 MG/1
1 PATCH, EXTENDED RELEASE TRANSDERMAL ONCE
Status: DISCONTINUED | OUTPATIENT
Start: 2018-07-17 | End: 2018-07-17 | Stop reason: HOSPADM

## 2018-07-17 RX ORDER — ONDANSETRON 2 MG/ML
4 INJECTION INTRAMUSCULAR; INTRAVENOUS
Status: COMPLETED | OUTPATIENT
Start: 2018-07-17 | End: 2018-07-17

## 2018-07-17 RX ORDER — SODIUM CHLORIDE, SODIUM LACTATE, POTASSIUM CHLORIDE, CALCIUM CHLORIDE 600; 310; 30; 20 MG/100ML; MG/100ML; MG/100ML; MG/100ML
50 INJECTION, SOLUTION INTRAVENOUS CONTINUOUS
Status: DISCONTINUED | OUTPATIENT
Start: 2018-07-17 | End: 2018-07-17 | Stop reason: HOSPADM

## 2018-07-17 RX ORDER — SODIUM CHLORIDE, SODIUM LACTATE, POTASSIUM CHLORIDE, CALCIUM CHLORIDE 600; 310; 30; 20 MG/100ML; MG/100ML; MG/100ML; MG/100ML
50 INJECTION, SOLUTION INTRAVENOUS CONTINUOUS
Status: DISCONTINUED | OUTPATIENT
Start: 2018-07-18 | End: 2018-07-17 | Stop reason: HOSPADM

## 2018-07-17 RX ORDER — VECURONIUM BROMIDE FOR INJECTION 1 MG/ML
INJECTION, POWDER, LYOPHILIZED, FOR SOLUTION INTRAVENOUS AS NEEDED
Status: DISCONTINUED | OUTPATIENT
Start: 2018-07-17 | End: 2018-07-17 | Stop reason: HOSPADM

## 2018-07-17 RX ORDER — CEFAZOLIN SODIUM 2 G/50ML
2 SOLUTION INTRAVENOUS ONCE
Status: DISCONTINUED | OUTPATIENT
Start: 2018-07-17 | End: 2018-07-17

## 2018-07-17 RX ORDER — CLOPIDOGREL BISULFATE 75 MG/1
TABLET ORAL
Qty: 90 TAB | Refills: 2 | Status: SHIPPED | OUTPATIENT
Start: 2018-07-17 | End: 2019-01-22 | Stop reason: SDUPTHER

## 2018-07-17 RX ORDER — SODIUM CHLORIDE 0.9 % (FLUSH) 0.9 %
5-10 SYRINGE (ML) INJECTION EVERY 8 HOURS
Status: DISCONTINUED | OUTPATIENT
Start: 2018-07-17 | End: 2018-07-17 | Stop reason: HOSPADM

## 2018-07-17 RX ORDER — DEXAMETHASONE SODIUM PHOSPHATE 4 MG/ML
INJECTION, SOLUTION INTRA-ARTICULAR; INTRALESIONAL; INTRAMUSCULAR; INTRAVENOUS; SOFT TISSUE AS NEEDED
Status: DISCONTINUED | OUTPATIENT
Start: 2018-07-17 | End: 2018-07-17 | Stop reason: HOSPADM

## 2018-07-17 RX ORDER — PROMETHAZINE HYDROCHLORIDE 25 MG/ML
12.5 INJECTION, SOLUTION INTRAMUSCULAR; INTRAVENOUS ONCE
Status: DISCONTINUED | OUTPATIENT
Start: 2018-07-17 | End: 2018-07-17 | Stop reason: SDUPTHER

## 2018-07-17 RX ORDER — DIPHENHYDRAMINE HYDROCHLORIDE 50 MG/ML
25 INJECTION, SOLUTION INTRAMUSCULAR; INTRAVENOUS
Status: DISCONTINUED | OUTPATIENT
Start: 2018-07-17 | End: 2018-07-17 | Stop reason: HOSPADM

## 2018-07-17 RX ORDER — SODIUM CHLORIDE 0.9 % (FLUSH) 0.9 %
5-10 SYRINGE (ML) INJECTION AS NEEDED
Status: DISCONTINUED | OUTPATIENT
Start: 2018-07-17 | End: 2018-07-17 | Stop reason: HOSPADM

## 2018-07-17 RX ORDER — CLINDAMYCIN PHOSPHATE 900 MG/50ML
900 INJECTION INTRAVENOUS ONCE
Status: COMPLETED | OUTPATIENT
Start: 2018-07-17 | End: 2018-07-17

## 2018-07-17 RX ORDER — PROMETHAZINE HYDROCHLORIDE 25 MG/ML
12.5 INJECTION, SOLUTION INTRAMUSCULAR; INTRAVENOUS ONCE
Status: DISCONTINUED | OUTPATIENT
Start: 2018-07-17 | End: 2018-07-17

## 2018-07-17 RX ORDER — EPHEDRINE SULFATE 50 MG/ML
INJECTION, SOLUTION INTRAVENOUS AS NEEDED
Status: DISCONTINUED | OUTPATIENT
Start: 2018-07-17 | End: 2018-07-17 | Stop reason: HOSPADM

## 2018-07-17 RX ORDER — MIDAZOLAM HYDROCHLORIDE 1 MG/ML
INJECTION, SOLUTION INTRAMUSCULAR; INTRAVENOUS AS NEEDED
Status: DISCONTINUED | OUTPATIENT
Start: 2018-07-17 | End: 2018-07-17 | Stop reason: HOSPADM

## 2018-07-17 RX ORDER — NEOSTIGMINE METHYLSULFATE 5 MG/5 ML
SYRINGE (ML) INTRAVENOUS AS NEEDED
Status: DISCONTINUED | OUTPATIENT
Start: 2018-07-17 | End: 2018-07-17 | Stop reason: HOSPADM

## 2018-07-17 RX ORDER — PROPOFOL 10 MG/ML
INJECTION, EMULSION INTRAVENOUS AS NEEDED
Status: DISCONTINUED | OUTPATIENT
Start: 2018-07-17 | End: 2018-07-17 | Stop reason: HOSPADM

## 2018-07-17 RX ORDER — FAMOTIDINE 20 MG/1
20 TABLET, FILM COATED ORAL ONCE
Status: COMPLETED | OUTPATIENT
Start: 2018-07-18 | End: 2018-07-17

## 2018-07-17 RX ORDER — OXYCODONE AND ACETAMINOPHEN 5; 325 MG/1; MG/1
1 TABLET ORAL
Qty: 15 TAB | Refills: 0 | Status: SHIPPED | OUTPATIENT
Start: 2018-07-17 | End: 2018-08-07 | Stop reason: SDUPTHER

## 2018-07-17 RX ORDER — HYDROMORPHONE HYDROCHLORIDE 2 MG/ML
0.5 INJECTION, SOLUTION INTRAMUSCULAR; INTRAVENOUS; SUBCUTANEOUS
Status: DISCONTINUED | OUTPATIENT
Start: 2018-07-17 | End: 2018-07-17 | Stop reason: RX

## 2018-07-17 RX ORDER — OXYCODONE AND ACETAMINOPHEN 5; 325 MG/1; MG/1
1 TABLET ORAL
Status: COMPLETED | OUTPATIENT
Start: 2018-07-17 | End: 2018-07-17

## 2018-07-17 RX ORDER — FENTANYL CITRATE 50 UG/ML
INJECTION, SOLUTION INTRAMUSCULAR; INTRAVENOUS AS NEEDED
Status: DISCONTINUED | OUTPATIENT
Start: 2018-07-17 | End: 2018-07-17 | Stop reason: HOSPADM

## 2018-07-17 RX ORDER — FAMOTIDINE 20 MG/1
TABLET, FILM COATED ORAL
Status: DISCONTINUED
Start: 2018-07-17 | End: 2018-07-17 | Stop reason: HOSPADM

## 2018-07-17 RX ORDER — SUCCINYLCHOLINE CHLORIDE 20 MG/ML
INJECTION INTRAMUSCULAR; INTRAVENOUS AS NEEDED
Status: DISCONTINUED | OUTPATIENT
Start: 2018-07-17 | End: 2018-07-17 | Stop reason: HOSPADM

## 2018-07-17 RX ORDER — ONDANSETRON 2 MG/ML
INJECTION INTRAMUSCULAR; INTRAVENOUS AS NEEDED
Status: DISCONTINUED | OUTPATIENT
Start: 2018-07-17 | End: 2018-07-17 | Stop reason: HOSPADM

## 2018-07-17 RX ORDER — HYDRALAZINE HYDROCHLORIDE 20 MG/ML
INJECTION INTRAMUSCULAR; INTRAVENOUS
Status: COMPLETED
Start: 2018-07-17 | End: 2018-07-17

## 2018-07-17 RX ORDER — FENTANYL CITRATE 50 UG/ML
25 INJECTION, SOLUTION INTRAMUSCULAR; INTRAVENOUS
Status: COMPLETED | OUTPATIENT
Start: 2018-07-17 | End: 2018-07-17

## 2018-07-17 RX ORDER — GLYCOPYRROLATE 0.2 MG/ML
INJECTION INTRAMUSCULAR; INTRAVENOUS AS NEEDED
Status: DISCONTINUED | OUTPATIENT
Start: 2018-07-17 | End: 2018-07-17 | Stop reason: HOSPADM

## 2018-07-17 RX ORDER — HYDRALAZINE HYDROCHLORIDE 20 MG/ML
10 INJECTION INTRAMUSCULAR; INTRAVENOUS ONCE
Status: COMPLETED | OUTPATIENT
Start: 2018-07-17 | End: 2018-07-17

## 2018-07-17 RX ORDER — MORPHINE SULFATE 4 MG/ML
INJECTION INTRAVENOUS
Status: COMPLETED
Start: 2018-07-17 | End: 2018-07-17

## 2018-07-17 RX ORDER — MORPHINE SULFATE 4 MG/ML
4 INJECTION INTRAVENOUS
Status: COMPLETED | OUTPATIENT
Start: 2018-07-17 | End: 2018-07-17

## 2018-07-17 RX ADMIN — GLYCOPYRROLATE 0.4 MG: 0.2 INJECTION INTRAMUSCULAR; INTRAVENOUS at 11:41

## 2018-07-17 RX ADMIN — FENTANYL CITRATE 25 MCG: 50 INJECTION, SOLUTION INTRAMUSCULAR; INTRAVENOUS at 13:15

## 2018-07-17 RX ADMIN — SODIUM CHLORIDE, SODIUM LACTATE, POTASSIUM CHLORIDE, AND CALCIUM CHLORIDE 50 ML/HR: 600; 310; 30; 20 INJECTION, SOLUTION INTRAVENOUS at 09:33

## 2018-07-17 RX ADMIN — SUCCINYLCHOLINE CHLORIDE 100 MG: 20 INJECTION INTRAMUSCULAR; INTRAVENOUS at 10:50

## 2018-07-17 RX ADMIN — FENTANYL CITRATE 25 MCG: 50 INJECTION, SOLUTION INTRAMUSCULAR; INTRAVENOUS at 13:05

## 2018-07-17 RX ADMIN — PROPOFOL 100 MG: 10 INJECTION, EMULSION INTRAVENOUS at 10:50

## 2018-07-17 RX ADMIN — CLINDAMYCIN PHOSPHATE 900 MG: 900 INJECTION INTRAVENOUS at 10:56

## 2018-07-17 RX ADMIN — VECURONIUM BROMIDE FOR INJECTION 4 MG: 1 INJECTION, POWDER, LYOPHILIZED, FOR SOLUTION INTRAVENOUS at 11:00

## 2018-07-17 RX ADMIN — EPHEDRINE SULFATE 10 MG: 50 INJECTION, SOLUTION INTRAVENOUS at 11:34

## 2018-07-17 RX ADMIN — OXYCODONE HYDROCHLORIDE AND ACETAMINOPHEN 1 TABLET: 5; 325 TABLET ORAL at 15:04

## 2018-07-17 RX ADMIN — FENTANYL CITRATE 50 MCG: 50 INJECTION, SOLUTION INTRAMUSCULAR; INTRAVENOUS at 10:50

## 2018-07-17 RX ADMIN — PROMETHAZINE HYDROCHLORIDE 12.5 MG: 25 INJECTION INTRAMUSCULAR; INTRAVENOUS at 13:00

## 2018-07-17 RX ADMIN — SODIUM CHLORIDE, SODIUM LACTATE, POTASSIUM CHLORIDE, AND CALCIUM CHLORIDE 50 ML/HR: 600; 310; 30; 20 INJECTION, SOLUTION INTRAVENOUS at 13:14

## 2018-07-17 RX ADMIN — DEXAMETHASONE SODIUM PHOSPHATE 4 MG: 4 INJECTION, SOLUTION INTRA-ARTICULAR; INTRALESIONAL; INTRAMUSCULAR; INTRAVENOUS; SOFT TISSUE at 10:55

## 2018-07-17 RX ADMIN — FENTANYL CITRATE 50 MCG: 50 INJECTION, SOLUTION INTRAMUSCULAR; INTRAVENOUS at 10:55

## 2018-07-17 RX ADMIN — MORPHINE SULFATE 4 MG: 4 INJECTION INTRAVENOUS at 12:55

## 2018-07-17 RX ADMIN — MIDAZOLAM HYDROCHLORIDE 2 MG: 1 INJECTION, SOLUTION INTRAMUSCULAR; INTRAVENOUS at 10:42

## 2018-07-17 RX ADMIN — FENTANYL CITRATE 25 MCG: 50 INJECTION, SOLUTION INTRAMUSCULAR; INTRAVENOUS at 12:45

## 2018-07-17 RX ADMIN — HYDRALAZINE HYDROCHLORIDE 10 MG: 20 INJECTION INTRAMUSCULAR; INTRAVENOUS at 13:25

## 2018-07-17 RX ADMIN — Medication 3 MG: at 11:41

## 2018-07-17 RX ADMIN — ONDANSETRON 4 MG: 2 INJECTION, SOLUTION INTRAMUSCULAR; INTRAVENOUS at 12:19

## 2018-07-17 RX ADMIN — MORPHINE SULFATE 4 MG: 4 INJECTION INTRAVENOUS at 13:15

## 2018-07-17 RX ADMIN — ONDANSETRON 4 MG: 2 INJECTION INTRAMUSCULAR; INTRAVENOUS at 11:29

## 2018-07-17 RX ADMIN — MIDAZOLAM HYDROCHLORIDE 2 MG: 1 INJECTION, SOLUTION INTRAMUSCULAR; INTRAVENOUS at 10:39

## 2018-07-17 RX ADMIN — FENTANYL CITRATE 25 MCG: 50 INJECTION, SOLUTION INTRAMUSCULAR; INTRAVENOUS at 12:55

## 2018-07-17 RX ADMIN — FAMOTIDINE 20 MG: 20 TABLET ORAL at 09:34

## 2018-07-17 NOTE — OP NOTES
7/17/2018      PREOPERATIVE DIAGNOSIS: indwelling Lap Band with pseudoachalasia  POSTOPERATIVE DIAGNOSIS: indwelling Lap Band with pseudoachalasia  PROCEDURES: Lap Band Removal  SURGEON: Dr. Celestino Sullivan. Yulia Feldman MD, FACS   ASSISTANT: Karen Young SA  ANESTHESIA: General endotracheal anesthesia. Local anesthetic mixture 1%   lidocaine, 0.5% Marcaine, with epinephrine injected locally utilizing 50  mL. FINDINGS: No erosion, no slip of band. All components removed without issue  SPECIMEN: Band and port system  IMPLANTS: * No implants in log *  ESTIMATED BLOOD LOSS: 25 ml ml  FLUIDS: 600 ml   URINE OUTPUT: None   DRAIN: None  COMPLICATIONS: None  OPERATIVE START TIME: 1106  OPERATIVE COMPLETION TIME: 1146    DESCRIPTION OF PROCEDURE: The patient was prepped and draped in standard sterile fashion after being placed under general anesthetic. The right upper quadrant port site was identified first.   Attention was directed to the port site in the epigastrium. This area was anesthetized, and incision was made around  the previous incisional scar excising it in toto. Dissection was carried down and the port site was entered. The port was dissected off the anterior rectus sheath circumferentially, the port fixation was cut loose in all quadrants. The port was then elevated up and the tubing was transected. The tubing was allowed to withdraw back into the abdominal cavity. The port was passed off as specimen. The entire pocket fibrotic sheath was excised.      Trocars were then placed. A site was selected superior to the umbilicus in the midline. This area was incised. A  5 mm Optical trocar was placed over the laparoscope and directed into the abdominal cavity using Optiview technique. Abdomen was insufflated to 15 mmHg and surveyed. There was no evidence of injury from the entry. Additional trocars were then placed.  One 5 mm trocar was placed in the anterior axillary line left upper quadrant approximately 3 fingerbreadths below the costal margin. Another 12 mm trocar was placed in the lateral portion of the left rectus sheath approximately 3 superior to the umbilical trocar. Another 12 mm trocar was placed approximately 4 fingerbreadths superior to the umbilical trocar in the  lateral portion of right rectus sheath. All were placed after incising with scalpel, all were placed using blunt dissecting technique. There was no evidence of injury from the entries. The port tubing was then grasped as it coursed under the liver edge and brought through all the omental adhesions to the area of the liver edge. Then, using Harmonic scalpel and blunt dissection, the adhesions were opened over the port tubing all the way down to the band itself, exposing the band buckle. This allowed me then to elevate up the band from the surrounding tissues until the entire buckle was exposed.      Dissection was then carried along the left anterior surface of the band, taking down adhesions and fibrotic scar until encountering the anterior fundoplication of the gastric fundus to the gastric cardia. The first suture was identified and divided and removed. Then, blunt dissection was taken to get the fundus away from the cardia. Dissection was continued until we finally elevated up the fundus. However, the adhesions were really dense to the liver and the diaphragm so the dissection was aborted to prevent injury. There was noted to be no evidence of injury from the dissection.     With this complete, the anterior wall of the buckle was excised using scissors and removed. The band was removed from around the stomach and removed from the patient at the left rectus sheath 12 mm trocar site and passed off the field as specimen.  The anterior wall of the fibrotic covering around the band was excised off the cardia of the stomach using blunt dissecting technique until it was fully excised, it was brought out of the trocar site and passed off the field as specimen.     Once this was complete, the area of the cardia was examined, there was noted to be no injuries, no bleeding. No evidence of perforation or other abnormalities. A 34 Macedonian Rigo tube was passed via the esophagus into the stomach without angulation or hang-up and then removed. At this point, the abdomen was desufflated via the remaining trocars. All trocars were then removed. The trocar sites were rendered hemostatic with Bovie cautery, anesthetized with the local anesthetic mixture and then closed with interrupted 4-0 Monocryl deep dermal sutures. The port site was closed with interrupted 3-0 Vicryl on Migue's fascia and the skin was closed with running 4-0 Moncryl. Dermabond was applied as wound dressings. The patient tolerated the procedure well.

## 2018-07-17 NOTE — IP AVS SNAPSHOT
[address] [id number] Juani Mosher  
[phone] Patient: Elvia Garrido MRN: [id number] About your hospitalization You were admitted on:  July 17, 2018 You last received care in the:  [facility] PACU You were discharged on:  July 17, 2018 Why you were hospitalized Your primary diagnosis was:  Not on File Follow-up Information Follow up With Details Comments Contact Info Deyanira Perez MD   [address] [facility] [address] [phone] Your Scheduled Appointments Friday August 03, 2018 10:00 AM EDT  
POST OP with Jeffrey Escobedo MD  
[facility] [address] [facility] [address] [facility]  
[phone] Tuesday August 07, 2018 10:15 AM EDT Any with Inna العراقي MD  
[facility] ([facility] [address] [facility] [address] [facility]  
[phone] Discharge Orders None A check shivam indicates which time of day the medication should be taken. My Medications START taking these medications Instructions Each Dose to Equal  
 Morning Noon Evening Bedtime  
 oxyCODONE-acetaminophen 5-325 mg per tablet Commonly known as:  PERCOCET Your last dose was: Your next dose is: Take 1 Tab by mouth every four (4) hours as needed for Pain. Max Daily Amount: 6 Tabs. 1 Tab CHANGE how you take these medications Instructions Each Dose to Equal  
 Morning Noon Evening Bedtime  
 docusate sodium 100 mg capsule Commonly known as:  Yogi Kincaid What changed:  when to take this Your last dose was: Your next dose is: Take 1 Cap by mouth two (2) times a day. 100 mg CONTINUE taking these medications  Instructions Each Dose to Equal  
 Morning Noon Evening Bedtime  
 albuterol 90 mcg/actuation inhaler Commonly known as:  PROVENTIL HFA, VENTOLIN HFA, PROAIR HFA Your last dose was: Your next dose is: Take  by inhalation. atorvastatin 40 mg tablet Commonly known as:  LIPITOR Your last dose was: Your next dose is: TAKE ONE TABLET BY MOUTH DAILY  
     
   
   
   
  
 azelastine 137 mcg (0.1 %) nasal spray Commonly known as:  ASTELIN Your last dose was: Your next dose is:    
   
   
 by Nasal route. carvedilol 25 mg tablet Commonly known as:  Demetrius Been Your last dose was: Your next dose is: Take 1 Tab by mouth two (2) times daily (with meals). 25 mg  
    
   
   
   
  
 clopidogrel 75 mg Tab Commonly known as:  PLAVIX Your last dose was: Your next dose is: TAKE 1 TABLET DAILY  Indications: Resume on 7/19/2018 DALIRESP Tab tablet Generic drug:  roflumilast  
   
Your last dose was: Your next dose is: TAKE ONE TABLET BY MOUTH DAILY  
     
   
   
   
  
 diclofenac 1 % Gel Commonly known as:  VOLTAREN Your last dose was: Your next dose is:    
   
   
 Apply 2 g to affected area four (4) times daily. 2 g  
    
   
   
   
  
 esomeprazole 20 mg capsule Commonly known as:  NexIUM Your last dose was: Your next dose is: Take 1 Cap by mouth daily. STOP PREVACID  Indications: HEARTBURN  
 20 mg  
    
   
   
   
  
 fluticasone-salmeterol 250-50 mcg/dose diskus inhaler Commonly known as:  ADVAIR DISKUS Your last dose was: Your next dose is: Take 1 Puff by inhalation daily. 1 Puff  
    
   
   
   
  
 hydrALAZINE 25 mg tablet Commonly known as:  APRESOLINE Your last dose was: Your next dose is: Take 1 Tab by mouth two (2) times a day. 25 mg  
    
   
   
   
  
 lansoprazole 30 mg capsule Commonly known as:  PREVACID Your last dose was: Your next dose is: Take 1 Cap by mouth. 1 Cap  
    
   
   
   
  
 mirtazapine 15 mg tablet Commonly known as:  Buddy Osbaldo Your last dose was: Your next dose is: TAKE ONE TABLET BY MOUTH EVERY EVENING  
     
   
   
   
  
 montelukast 10 mg tablet Commonly known as:  SINGULAIR Your last dose was: Your next dose is: TAKE ONE TABLET BY MOUTH DAILY * ZUPLENZ 4 mg Film Generic drug:  Ondansetron Your last dose was: Your next dose is: Take  by mouth. * ondansetron 8 mg disintegrating tablet Commonly known as:  ZOFRAN ODT Your last dose was: Your next dose is: Take 1 Tab by mouth every eight (8) hours as needed for Nausea. 8 mg  
    
   
   
   
  
 triamterene-hydroCHLOROthiazide 37.5-25 mg per capsule Commonly known as:  Jose Armando Morris Your last dose was: Your next dose is:    
   
   
 1 cap each AM  
     
   
   
   
  
 * Notice: This list has 2 medication(s) that are the same as other medications prescribed for you. Read the directions carefully, and ask your doctor or other care provider to review them with you. STOP taking these medications HYDROcodone-acetaminophen  mg tablet Commonly known as:  Meera Vincent Where to Get Your Medications Information on where to get these meds will be given to you by the nurse or doctor. ! Ask your nurse or doctor about these medications  
  clopidogrel 75 mg Tab  
 oxyCODONE-acetaminophen 5-325 mg per tablet Opioid Education  Prescription Opioids: What You Need to Know: 
 
 
After general anesthesia or intravenous sedation, for 24 hours or while taking prescription Narcotics: · Limit your activities · Do not drive and operate hazardous machinery · Do not make important personal or business decisions · Do  not drink alcoholic beverages · If you have not urinated within 8 hours after discharge, please contact your surgeon on call. Report the following to your surgeon: 
· Excessive pain, swelling, redness or odor of or around the surgical area · Temperature over 100.5 · Nausea and vomiting lasting longer than 4 hours or if unable to take medications · Any signs of decreased circulation or nerve impairment to extremity: change in color, persistent  numbness, tingling, coldness or increase pain · Any questions What to do at Home: These are general instructions for a healthy lifestyle: No smoking/ No tobacco products/ Avoid exposure to second hand smoke Surgeon General's Warning:  Quitting smoking now greatly reduces serious risk to your health. Obesity, smoking, and sedentary lifestyle greatly increases your risk for illness A healthy diet, regular physical exercise & weight monitoring are important for maintaining a healthy lifestyle You may be retaining fluid if you have a history of heart failure or if you experience any of the following symptoms:  Weight gain of 3 pounds or more overnight or 5 pounds in a week, increased swelling in our hands or feet or shortness of breath while lying flat in bed. Please call your doctor as soon as you notice any of these symptoms; do not wait until your next office visit. Recognize signs and symptoms of STROKE: 
 
F-face looks uneven A-arms unable to move or move unevenly S-speech slurred or non-existent T-time-call 911 as soon as signs and symptoms begin-DO NOT go Back to bed or wait to see if you get better-TIME IS BRAIN. Warning Signs of HEART ATTACK Call 911 if you have these symptoms: 
? Chest discomfort. Most heart attacks involve discomfort in the center of the chest that lasts more than a few minutes, or that goes away and comes back. It can feel like uncomfortable pressure, squeezing, fullness, or pain. ? Discomfort in other areas of the upper body. Symptoms can include pain or discomfort in one or both arms, the back, neck, jaw, or stomach. ? Shortness of breath with or without chest discomfort. ? Other signs may include breaking out in a cold sweat, nausea, or lightheadedness. Don't wait more than five minutes to call [address]! Fast action can save your life. Calling 911 is almost always the fastest way to get lifesaving treatment. Emergency Medical Services staff can begin treatment when they arrive  up to an hour sooner than if someone gets to the hospital by car. The discharge information has been reviewed with the patient. The patient verbalized understanding. Discharge medications reviewed with the patient and appropriate educational materials and side effects teaching were provided. ___________________________________________________________________________________________________________________________________ Patient armband removed and given to patient to take home. Patient was informed of the privacy risks if armband lost or stolen Introducing [facility]! Amairani Diaz introduces [employer] patient portal. Now you can access parts of your medical record, email your doctor's office, and request medication refills online. 1. In your internet browser, go to [email or web address] 2. Click on the First Time User? Click Here link in the Sign In box. You will see the New Member Sign Up page. 3. Enter your [employer] Access Code exactly as it appears below. You will not need to use this code after youve completed the sign-up process. If you do not sign up before the expiration date, you must request a new code. · [employer] Access Code: [facility] Expires: 10/11/2018 11:37 AM 
 
4. Enter the last four digits of your Social Security Number (xxxx) and Date of Birth (mm/dd/yyyy) as indicated and click Submit. You will be taken to the next sign-up page. 5. Create a [employer] ID. This will be your [employer] login ID and cannot be changed, so think of one that is secure and easy to remember. 6. Create a [employer] password. You can change your password at any time. 7. Enter your Password Reset Question and Answer. This can be used at a later time if you forget your password. 8. Enter your e-mail address. You will receive e-mail notification when new information is available in [facility] [address] [address]. 9. Click Sign Up. You can now view and download portions of your medical record. 10. Click the Download Summary menu link to download a portable copy of your medical information. If you have questions, please visit the Frequently Asked Questions section of the [employer] website. Remember, [employer] is NOT to be used for urgent needs. For medical emergencies, dial 911. Now available from your iPhone and Android! Introducing Nico Elise As a [employer] patient, I wanted to make you aware of our electronic visit tool called Nico Elise. [employer] 24/7 allows you to connect within minutes with a medical provider 24 hours a day, seven days a week via a mobile device or tablet or logging into a secure website from your computer. You can access Nico Elise from anywhere in the [place].  
 
A virtual visit might be right for you when you have a simple condition and feel like you just dont want to get out of bed, or cant get away from work for an appointment, when your regular [employer] provider is not available (evenings, weekends or holidays), or when youre out of town and need minor care. Electronic visits cost only $49 and if the [employer] 24/7 provider determines a prescription is needed to treat your condition, one can be electronically transmitted to a nearby pharmacy*. Please take a moment to enroll today if you have not already done so. The enrollment process is free and takes just a few minutes. To enroll, please download the [employer] 24/7 bruna to your tablet or phone, or visit [email or web address] to enroll on your computer. And, as an [address] [place] [address] patient with a [employer] account, the results of your visits will be scanned into your electronic medical record and your primary care provider will be able to view the scanned results. We urge you to continue to see your regular [employer] provider for your ongoing medical care. And while your primary care provider may not be the one available when you seek a [employer] virtual visit, the peace of mind you get from getting a real diagnosis real time can be priceless. For more information on [employer], view our Frequently Asked Questions (FAQs) at [email or web address]. Sincerely, 
 
Trenton Liu MD 
Chief Medical Officer [address] *:  certain medications cannot be prescribed via [employer] Providers Seen During Your Hospitalization Provider Specialty Primary office phone Author MD [facility] [phone] Your Primary Care Physician (PCP) Primary Care Physician Office Phone Office Fax [address], [address] [place] [address] [phone] You are allergic to the following Allergen Reactions Pcn (Penicillins) Anaphylaxis Tetracycline Anaphylaxis Sulfabenzamide Hives Recent Documentation Height Weight BMI OB Status Smoking Status 1.524 m 75.3 kg 32.42 kg/m2 Postmenopausal Former Smoker Emergency Contacts Name Discharge Info Relation Home Work Mobile [facility] DISCHARGE CAREGIVER [3] Daughter [21] [phone] Patient Belongings The following personal items are in your possession at time of discharge: 
  Dental Appliances: Uppers         Home Medications: None   Jewelry: None  Clothing: Footwear, Undergarments, Socks, Shorts, Shirt    Other Valuables: Kathy Daniel STEIN ($8)    
 
  
  
 Please provide this summary of care documentation to your next provider. Signatures-by signing, you are acknowledging that this After Visit Summary has been reviewed with you and you have received a copy. Patient Signature:  ____________________________________________________________ Date:  ____________________________________________________________  
  
[facility] Provider Signature:  ____________________________________________________________ Date:  ____________________________________________________________

## 2018-07-17 NOTE — ANESTHESIA PREPROCEDURE EVALUATION
Anesthetic History     PONV          Review of Systems / Medical History  Patient summary reviewed and pertinent labs reviewed    Pulmonary        Sleep apnea: No treatment           Neuro/Psych   Within defined limits           Cardiovascular    Hypertension              Exercise tolerance: >4 METS     GI/Hepatic/Renal     GERD           Endo/Other        Obesity     Other Findings   Comments: Documentation of current medication  Current medications obtained, documented and obtained? YES      Risk Factors for Postoperative nausea/vomiting:       History of postoperative nausea/vomiting? YES       Female? YES       Motion sickness? NO       Intended opioid administration for postoperative analgesia? YES      Smoking Abstinence:  Current Smoker? NO  Elective Surgery? YES  Seen preoperatively by anesthesiologist or proxy prior to day of surgery? YES  Pt abstained from smoking 24 hours prior to anesthesia?  N/A    Preventive care/screening for High Blood Pressure:  Aged 18 years and older: YES  Screened for high blood pressure: YES  Patients with high blood pressure referred to primary care provider   for BP management: YES                 Physical Exam    Airway  Mallampati: II  TM Distance: 4 - 6 cm  Neck ROM: normal range of motion   Mouth opening: Normal     Cardiovascular  Regular rate and rhythm,  S1 and S2 normal,  no murmur, click, rub, or gallop  Rhythm: regular  Rate: normal         Dental    Dentition: Lower dentition intact and Full upper dentures     Pulmonary  Breath sounds clear to auscultation               Abdominal  GI exam deferred       Other Findings            Anesthetic Plan    ASA: 3  Anesthesia type: general          Induction: Intravenous  Anesthetic plan and risks discussed with: Patient

## 2018-07-17 NOTE — INTERVAL H&P NOTE
H&P Update:  Elvia Garrido was seen and examined. History and physical has been reviewed. The patient has been examined.  There have been no significant clinical changes since the completion of the originally dated History and Physical.    Signed By: Jeffrey Escobedo MD     July 17, 2018 10:11 AM

## 2018-07-17 NOTE — H&P (VIEW-ONLY)
Surgical Evaluation        Subjective:      Migdalia Macario is a 66 y.o. female with a history of intractable GERD with an indwelling fully deflated lap band. She had a several day admission for this in January. She is still having epigastric pain and heartburn. She is not losing weight. She has had no aspiration events. She returns after manometry testing and EGD to discuss results and possible plans for treatment. She is not interested on conversion to another weight loss surgical type.           Patient Active Problem List     Diagnosis Date Noted    Generalized abdominal pain 02/19/2018    Status post gastric banding 02/19/2018    Atrophy of right kidney 02/05/2018    Intractable nausea and vomiting 01/31/2018    Abdominal pain 01/29/2018    Right renal artery stenosis ([address] 01/26/2018    Hx of aortic aneurysm repair 12/12/2017    History of renal stent 12/12/2017    Lumbar radiculopathy 08/31/2017    Left upper quadrant pain 06/30/2017    Hypovitaminosis D 06/30/2017    Sacroiliitis ([address] 04/13/2017    Right hip pain 04/13/2017    Spondylosis of lumbar region without myelopathy or radiculopathy 04/13/2017    Lumbar neuritis 04/13/2017    Peripheral vascular disease ([address] 03/10/2017    Right-sided carotid artery disease ([address] 03/10/2017    Obesity (BMI 30.0-34.9) 03/07/2017    Non morbid obesity due to excess calories 02/23/2017    Gastroesophageal reflux disease without esophagitis 02/23/2017    LAP-BAND surgery status 05/26/2016    Dysarthria 02/28/2016    Renal infarction ([address] 10/07/2015    Generalized osteoarthrosis, involving multiple sites      Esophageal reflux      Subclinical hyperthyroidism 08/28/2015    Essential hypertension 08/25/2015    Hyperlipidemia 08/25/2015    AAA (abdominal aortic aneurysm) ([address] 10/14/2014           Past Medical History:   Diagnosis Date    Aorto-iliac duplex 09/17/2015     Patent abdom aorta endovascular graft w/o leak.   Mod stenosis suggested in right limb.  Calculus of kidney 2014     stent/kidney     Carotid duplex 09/17/2015     Mild < 50% bilateral plaquing.  Cataract, left      Chronic obstructive pulmonary disease (HCC)      Echocardiogram 11/04/2014     EF 56%. No WMA. Mild LAE. Echo is within normal limits.  Esophageal reflux      Generalized osteoarthrosis, involving multiple sites      GERD (gastroesophageal reflux disease)      Gout      History of renal stent       right side    Hypercholesteremia      Hypertension       resolved    Lower extremity arterial testing 03/27/2015     Normal perfusion at rest and w/treadmill bilaterally.   R KORI 1.05.  L KORI 1.03.    Murmur       patient reports that she is aware- has been told in the past    Nausea & vomiting      Renal artery stenosis ([address]      Subclinical hyperthyroidism 8/2015    Unspecified sleep apnea       resolved            Past Surgical History:   Procedure Laterality Date    ADJUSTMENT GASTRIC BAND N/A 11/10/2016     MAKAYLA Cullen    FULL ESOPHAGEAL MANOMETRY   5/21/2018           HX APPENDECTOMY        HX BLADDER SUSPENSION        HX CHOLECYSTECTOMY        HX GASTRIC BYPASS         lap band 2012    HX HEENT Right 1990s, 2016     Ear sx    HX HYSTERECTOMY        HX OTHER SURGICAL   10/27/14      Bilateral open femoral exposures, Placement of catheter and sheath in the aorta Endo repair of aortic aneurysm with modular bifurcated device  Interpretation of angiography, intravascular ultrasound (IVUS) catheter    HX OTHER SURGICAL   10/27/2014     Endurant II abdominal stent graft implant    HX UROLOGICAL         stent    VASCULAR SURGERY PROCEDURE UNLIST         surgery for abdominal aneurysm            Social History   Substance Use Topics    Smoking status: Former Smoker       Packs/day: 0.00       Years: 0.00       Types: Cigarettes       Quit date: 1/25/2016    Smokeless tobacco: Never Used    Alcohol use No            Family History Problem Relation Age of Onset    Cancer Mother      Diabetes Father      Alcohol abuse Father      Heart Disease Maternal Grandmother      Alzheimer Maternal Grandmother      Diabetes Sister             Current Outpatient Prescriptions   Medication Sig    triamterene-hydroCHLOROthiazide (DYAZIDE) 37.5-25 mg per capsule 1 cap each AM    mirtazapine (REMERON) 15 mg tablet TAKE ONE TABLET BY MOUTH EVERY EVENING    hydrALAZINE (APRESOLINE) 25 mg tablet Take 1 Tab by mouth two (2) times a day.  amLODIPine (NORVASC) 10 mg tablet Take 1 Tab by mouth daily.  clopidogrel (PLAVIX) 75 mg tab TAKE 1 TABLET DAILY    carvedilol (COREG) 25 mg tablet Take 1 Tab by mouth two (2) times daily (with meals).  atorvastatin (LIPITOR) 40 mg tablet Take 1 Tab by mouth daily.  ondansetron (ZOFRAN ODT) 8 mg disintegrating tablet Take 1 Tab by mouth every eight (8) hours as needed for Nausea.  docusate sodium (COLACE) 100 mg capsule Take 1 Cap by mouth two (2) times a day. (Patient taking differently: Take 100 mg by mouth daily.)    cloNIDine (CATAPRES) 0.2 mg/24 hr patch USE ONE PATCH BY TRANSDERMAL ROUTE EVERY SEVEN DAYS    diclofenac (VOLTAREN) 1 % gel Apply 2 g to affected area four (4) times daily.  fluticasone-salmeterol (ADVAIR DISKUS) 250-50 mcg/dose diskus inhaler Take 1 Puff by inhalation daily.  HYDROcodone-acetaminophen (NORCO)  mg tablet Take 1 Tab by mouth every six (6) hours as needed for Pain. Max Daily Amount: 4 Tabs.  DALIRESP tab tablet TAKE ONE TABLET BY MOUTH DAILY    montelukast (SINGULAIR) 10 mg tablet TAKE ONE TABLET BY MOUTH DAILY    raNITIdine (ZANTAC) 150 mg tablet TAKE ONE TABLET BY MOUTH TWICE A DAY    esomeprazole (NEXIUM) 20 mg capsule Take 1 Cap by mouth daily. STOP PREVACID  Indications: HEARTBURN      No current facility-administered medications for this visit.             Allergies   Allergen Reactions    Pcn [Penicillins] Anaphylaxis    Tetracycline Anaphylaxis  Sulfabenzamide Hives         Review of Systems:  Positive in BOLD - no chagne from April     CONST: Fever, weight loss, fatigue or chills  GI: Nausea, vomiting, abdominal pain, change in bowel habits, hematochezia, melena, and GERD   INTEG: Dermatitis, abnormal moles  HEENT: Recent changes in vision, vertigo, epistaxis, dysphagia and hoarseness  CV: Chest pain, palpitations, HTN, edema and varicosities  RESP: Cough, shortness of breath, wheezing, hemoptysis, snoring and reactive airway disease  : Hematuria, dysuria, frequency, urgency, nocturia and stress urinary incontinence   MS: Weakness, joint pain and arthritis  ENDO: Diabetes, thyroid disease, polyuria, polydipsia, polyphagia, poor wound healing, heat intolerance, cold intolerance  LYMPH/HEME: Anemia, bruising and history of blood transfusions  NEURO: Dizziness, headache, fainting, seizures and stroke  PSYCH: Anxiety and depression     Objective:      Visit Vitals    BP 96/60    Pulse 75    Temp 98.1 °F (36.7 °C)    Resp 18    Ht 5' (1.524 m)    Wt 75.3 kg (166 lb)    BMI 32.42 kg/m2          Physical Exam:       GENERAL: alert, cooperative, no distress, appears stated age, moderately obese  EYE:conjunctivae and sclerae normal, pupils equal, round, reactive to light, extraocular movements intact without nystagmus  THROAT & NECK: no erythema or exudates noted and neck supple and symmetrical; no palpable masses  LUNG: clear to auscultation bilaterally  HEART: Regular rate and rhythm  ABDOMEN:abdomen is soft without significant tenderness, masses, organomegaly or guarding, port is the most tender place on her abdomen  EXTREMITIES:  extremities normal, atraumatic, no cyanosis or edema  SKIN: Normal.     Imaging and Lab Review:   Findings of ultrasound of the abdomen: normal.     Manometry - GEJ spasm with incomplete esophageal emptying.  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         Plan:      1. Stay on PPI  2. Risks, benefits and alternatives for laparoscopic band removal were discussed at length. Risks included bleeding, infection, injury to surrounding structures (including esophagus, stomach, liver, spleen, colon), intestinal leak, stricture or recurrent obstruction, failure to resolve her pain, anesthetic risks and imponderables to include death. she  wishes to proceed.

## 2018-07-18 LAB
ATRIAL RATE: 59 BPM
CALCULATED P AXIS, ECG09: 75 DEGREES
CALCULATED R AXIS, ECG10: 69 DEGREES
CALCULATED T AXIS, ECG11: 78 DEGREES
DIAGNOSIS, 93000: NORMAL
P-R INTERVAL, ECG05: 128 MS
Q-T INTERVAL, ECG07: 426 MS
QRS DURATION, ECG06: 80 MS
QTC CALCULATION (BEZET), ECG08: 421 MS
VENTRICULAR RATE, ECG03: 59 BPM

## 2018-07-18 NOTE — ANESTHESIA POSTPROCEDURE EVALUATION
Post-Anesthesia Evaluation and Assessment    Patient: George Orlando MRN: [id number]  SSN: [id number]    YOB: 1955  Age: 66 y.o. Sex: female       Cardiovascular Function/Vital Signs  Visit Vitals    /76 (BP 1 Location: Left arm, BP Patient Position: At rest)    Pulse 76    Temp 36.4 °C (97.5 °F)    Resp 16    Ht 5' (1.524 m)    Wt 75.3 kg (166 lb)    SpO2 99%    BMI 32.42 kg/m2       Patient is status post general anesthesia for Procedure(s):  LAPAROSCOPIC lapband removal.    Nausea/Vomiting: None    Postoperative hydration reviewed and adequate. Pain:    Managed    Neurological Status:   Neuro (WDL): Within Defined Limits (07/17/18 1405)   At baseline    Mental Status and Level of Consciousness: Alert and oriented     Pulmonary Status:   O2 Device: Room air (07/17/18 1432)   Adequate oxygenation and airway patent    Complications related to anesthesia: None    Post-anesthesia assessment completed.  No concerns    Signed By: Dereck Banks MD     July 17, 2018

## 2018-07-26 LAB
A-G RATIO,AGRAT: 1.9 RATIO (ref 1.1–2.6)
ABSOLUTE LYMPHOCYTE COUNT, 10803: 2.1 K/UL (ref 1–4.8)
ALBUMIN SERPL-MCNC: 4.5 G/DL (ref 3.5–5)
ALP SERPL-CCNC: 98 U/L (ref 40–120)
ALT SERPL-CCNC: 18 U/L (ref 5–40)
ANION GAP SERPL CALC-SCNC: 15 MMOL/L
AST SERPL W P-5'-P-CCNC: 16 U/L (ref 10–37)
BASOPHILS # BLD: 0.1 K/UL (ref 0–0.2)
BASOPHILS NFR BLD: 1 % (ref 0–2)
BILIRUB SERPL-MCNC: 0.7 MG/DL (ref 0.2–1.2)
BILIRUB UR QL: NEGATIVE
BUN SERPL-MCNC: 12 MG/DL (ref 6–22)
CALCIUM SERPL-MCNC: 9.9 MG/DL (ref 8.4–10.5)
CHLORIDE SERPL-SCNC: 95 MMOL/L (ref 98–110)
CO2 SERPL-SCNC: 29 MMOL/L (ref 20–32)
CREAT SERPL-MCNC: 0.9 MG/DL (ref 0.8–1.4)
EOSINOPHIL # BLD: 0.1 K/UL (ref 0–0.5)
EOSINOPHIL NFR BLD: 2 % (ref 0–6)
ERYTHROCYTE [DISTWIDTH] IN BLOOD BY AUTOMATED COUNT: 15.3 % (ref 10–15.5)
GFRAA, 66117: >60
GFRNA, 66118: 59.8
GLOBULIN,GLOB: 2.4 G/DL (ref 2–4)
GLUCOSE SERPL-MCNC: 105 MG/DL (ref 70–99)
GLUCOSE UR QL: NEGATIVE MG/DL
GRANULOCYTES,GRANS: 56 % (ref 40–75)
HCT VFR BLD AUTO: 40.7 % (ref 35.1–48)
HGB BLD-MCNC: 12.6 G/DL (ref 11.7–16)
HGB UR QL STRIP: NEGATIVE
KETONES UR QL STRIP.AUTO: NEGATIVE MG/DL
LEUKOCYTE ESTERASE: ABNORMAL
LYMPHOCYTES, LYMLT: 33 % (ref 20–45)
MCH RBC QN AUTO: 28 PG (ref 26–34)
MCHC RBC AUTO-ENTMCNC: 31 G/DL (ref 31–36)
MCV RBC AUTO: 91 FL (ref 80–95)
MISCELLANEOUS TEST,99000: NORMAL
MONOCYTES # BLD: 0.6 K/UL (ref 0.1–1)
MONOCYTES NFR BLD: 8 % (ref 3–12)
NEUTROPHILS # BLD AUTO: 3.7 K/UL (ref 1.8–7.7)
NITRITE UR QL STRIP.AUTO: NEGATIVE
PH UR STRIP: 6 PH (ref 5–8)
PLATELET # BLD AUTO: 320 K/UL (ref 140–440)
PMV BLD AUTO: 10.6 FL (ref 9–13)
POTASSIUM SERPL-SCNC: 4.6 MMOL/L (ref 3.5–5.5)
PROT SERPL-MCNC: 6.9 G/DL (ref 6.2–8.1)
PROT UR QL STRIP: NEGATIVE MG/DL
RBC # BLD AUTO: 4.47 M/UL (ref 3.8–5.2)
RBC #/AREA URNS HPF: ABNORMAL /HPF
SENT TO, 434: NORMAL
SODIUM SERPL-SCNC: 139 MMOL/L (ref 133–145)
SP GR UR: 1 (ref 1–1.03)
TEST NAME, 435: NORMAL
UROBILINOGEN UR STRIP-MCNC: <2 MG/DL
WBC # BLD AUTO: 6.5 K/UL (ref 4–11)
WBC URNS QL MICRO: NEGATIVE /HPF (ref 0–2)

## 2018-08-04 RX ORDER — AMLODIPINE BESYLATE 10 MG/1
TABLET ORAL
Qty: 30 TAB | Refills: 0 | Status: SHIPPED | OUTPATIENT
Start: 2018-08-04 | End: 2018-09-02 | Stop reason: SDUPTHER

## 2018-08-07 DIAGNOSIS — I10 ESSENTIAL HYPERTENSION: ICD-10-CM

## 2018-08-07 DIAGNOSIS — M25.531 RIGHT WRIST PAIN: ICD-10-CM

## 2018-08-07 DIAGNOSIS — Z98.84 LAP-BAND SURGERY STATUS: ICD-10-CM

## 2018-08-07 RX ORDER — OXYCODONE AND ACETAMINOPHEN 5; 325 MG/1; MG/1
1 TABLET ORAL
Qty: 120 TAB | Refills: 0 | Status: SHIPPED | OUTPATIENT
Start: 2018-08-07 | End: 2018-10-05 | Stop reason: SDUPTHER

## 2018-08-07 RX ORDER — TRIAMTERENE AND HYDROCHLOROTHIAZIDE 37.5; 25 MG/1; MG/1
CAPSULE ORAL
Qty: 30 CAP | Refills: 2 | Status: SHIPPED | OUTPATIENT
Start: 2018-08-07 | End: 2018-10-13

## 2018-08-07 NOTE — TELEPHONE ENCOUNTER
Requested Prescriptions     Pending Prescriptions Disp Refills    oxyCODONE-acetaminophen (PERCOCET) 5-325 mg per tablet 15 Tab 0     Sig: Take 1 Tab by mouth every four (4) hours as needed for Pain. Max Daily Amount: 6 Tabs.

## 2018-08-13 ENCOUNTER — TELEPHONE (OUTPATIENT)
Dept: INTERNAL MEDICINE CLINIC | Age: 63
End: 2018-08-13

## 2018-08-13 DIAGNOSIS — I10 ESSENTIAL HYPERTENSION: ICD-10-CM

## 2018-08-13 DIAGNOSIS — M25.531 RIGHT WRIST PAIN: ICD-10-CM

## 2018-08-13 NOTE — TELEPHONE ENCOUNTER
Dr Annette Mendez gave  a Script for Percocet to the patient and she got it filled not realizing what it was for she stated she has not taken any because she usually takes Norco she would like some one to call her [phone]

## 2018-08-16 ENCOUNTER — OFFICE VISIT (OUTPATIENT)
Dept: SURGERY | Age: 63
End: 2018-08-16

## 2018-08-16 VITALS
BODY MASS INDEX: 31.61 KG/M2 | WEIGHT: 161 LBS | RESPIRATION RATE: 16 BRPM | HEIGHT: 60 IN | DIASTOLIC BLOOD PRESSURE: 96 MMHG | TEMPERATURE: 98.2 F | HEART RATE: 97 BPM | OXYGEN SATURATION: 99 % | SYSTOLIC BLOOD PRESSURE: 172 MMHG

## 2018-08-16 DIAGNOSIS — Z09 POSTOPERATIVE EXAMINATION: Primary | ICD-10-CM

## 2018-08-16 NOTE — PROGRESS NOTES
SUBJECTIVE: Penny Boyce is a 66 y.o. female is seen for a routine postop check one month after lap band remnoval for pseudo achalasia. Reports no problems with the wound or other issues. Activity, diet and bowels are normal. No pain. OBJECTIVE: Appears well. Wounds are well healed without complications or infection. ASSESSMENT: normal postoperative course, doing well. PLAN: Resume all normal activities. Return PRN.

## 2018-09-04 DIAGNOSIS — R10.84 GENERALIZED ABDOMINAL PAIN: ICD-10-CM

## 2018-09-04 DIAGNOSIS — G47.00 INSOMNIA, UNSPECIFIED TYPE: ICD-10-CM

## 2018-09-04 DIAGNOSIS — K21.9 GASTROESOPHAGEAL REFLUX DISEASE, ESOPHAGITIS PRESENCE NOT SPECIFIED: ICD-10-CM

## 2018-09-04 DIAGNOSIS — I73.9 PERIPHERAL VASCULAR DISEASE (HCC): Primary | ICD-10-CM

## 2018-09-04 RX ORDER — AMLODIPINE BESYLATE 10 MG/1
TABLET ORAL
Qty: 30 TAB | Refills: 0 | Status: SHIPPED | OUTPATIENT
Start: 2018-09-04 | End: 2018-09-30 | Stop reason: SDUPTHER

## 2018-09-04 NOTE — TELEPHONE ENCOUNTER
Patient is requesting a script for Norco 10/ 325 mg Not Percocet Dr Oli Bates gave her last time time she also needs       Requested Prescriptions     Pending Prescriptions Disp Refills    raNITIdine (ZANTAC) 150 mg tablet 60 Tab 3    mirtazapine (REMERON) 15 mg tablet 30 Tab 0     Sig: TAKE ONE TABLET BY MOUTH EVERY EVENING

## 2018-09-05 RX ORDER — ATORVASTATIN CALCIUM 40 MG/1
TABLET, FILM COATED ORAL
Qty: 30 TAB | Refills: 1 | Status: SHIPPED | OUTPATIENT
Start: 2018-09-05 | End: 2018-10-31 | Stop reason: SDUPTHER

## 2018-09-07 RX ORDER — RANITIDINE 150 MG/1
TABLET, FILM COATED ORAL
Qty: 60 TAB | Refills: 3 | Status: SHIPPED | OUTPATIENT
Start: 2018-09-07 | End: 2018-10-13

## 2018-09-07 RX ORDER — HYDROCODONE BITARTRATE AND ACETAMINOPHEN 10; 325 MG/1; MG/1
1 TABLET ORAL
Qty: 120 TAB | Refills: 0 | Status: SHIPPED | OUTPATIENT
Start: 2018-09-07 | End: 2018-10-13

## 2018-09-07 RX ORDER — MIRTAZAPINE 15 MG/1
TABLET, FILM COATED ORAL
Qty: 30 TAB | Refills: 0 | Status: SHIPPED | OUTPATIENT
Start: 2018-09-07 | End: 2018-12-12 | Stop reason: ALTCHOICE

## 2018-09-17 RX ORDER — CARVEDILOL 25 MG/1
TABLET ORAL
Qty: 60 TAB | Refills: 5 | Status: SHIPPED | OUTPATIENT
Start: 2018-09-17 | End: 2019-01-08 | Stop reason: ALTCHOICE

## 2018-09-18 ENCOUNTER — TELEPHONE (OUTPATIENT)
Dept: VASCULAR SURGERY | Age: 63
End: 2018-09-18

## 2018-09-18 NOTE — TELEPHONE ENCOUNTER
Called the patient, she states she had vein burst and was able to control the bleeding , but now she has warm and some swelling and is concerned . Advised to come in on nurses schedule and we will evaluate and go from there. She verbalized understanding and is placed on the schedule.

## 2018-09-18 NOTE — TELEPHONE ENCOUNTER
Patient would like to speak with the nurse. Says she had a vein bust in her left lower leg four days ago. It is not bleeding now but her leg is painful, warm to the touch and black & blue. She did not seek any medical advise when it happened. But is now worried that it may be a clot because of her history. Please call her back at [phone].    Thank you

## 2018-09-19 ENCOUNTER — OFFICE VISIT (OUTPATIENT)
Dept: VASCULAR SURGERY | Age: 63
End: 2018-09-19

## 2018-09-19 DIAGNOSIS — M79.89 RIGHT LEG SWELLING: Primary | ICD-10-CM

## 2018-09-19 DIAGNOSIS — M25.551 RIGHT HIP PAIN: ICD-10-CM

## 2018-09-19 RX ORDER — DICLOFENAC SODIUM 10 MG/G
2 GEL TOPICAL 4 TIMES DAILY
Qty: 1 EACH | Refills: 2 | Status: SHIPPED | OUTPATIENT
Start: 2018-09-19 | End: 2018-11-26 | Stop reason: ALTCHOICE

## 2018-09-19 NOTE — TELEPHONE ENCOUNTER
Patient would like refills on script    Requested Prescriptions     Pending Prescriptions Disp Refills    diclofenac (VOLTAREN) 1 % gel 1 Each 2     Sig: Apply 2 g to affected area four (4) times daily.

## 2018-09-20 ENCOUNTER — OFFICE VISIT (OUTPATIENT)
Dept: CARDIOLOGY CLINIC | Age: 63
End: 2018-09-20

## 2018-09-20 VITALS
DIASTOLIC BLOOD PRESSURE: 64 MMHG | HEART RATE: 80 BPM | HEIGHT: 60 IN | SYSTOLIC BLOOD PRESSURE: 122 MMHG | WEIGHT: 167 LBS | BODY MASS INDEX: 32.79 KG/M2

## 2018-09-20 DIAGNOSIS — E78.2 MIXED HYPERLIPIDEMIA: ICD-10-CM

## 2018-09-20 DIAGNOSIS — I10 ESSENTIAL HYPERTENSION: ICD-10-CM

## 2018-09-20 DIAGNOSIS — I10 ESSENTIAL HYPERTENSION: Primary | ICD-10-CM

## 2018-09-20 DIAGNOSIS — Z98.890 S/P AAA (ABDOMINAL AORTIC ANEURYSM) REPAIR: ICD-10-CM

## 2018-09-20 DIAGNOSIS — I70.1 RIGHT RENAL ARTERY STENOSIS (HCC): ICD-10-CM

## 2018-09-20 DIAGNOSIS — Z86.79 S/P AAA (ABDOMINAL AORTIC ANEURYSM) REPAIR: ICD-10-CM

## 2018-09-20 DIAGNOSIS — N28.0 RENAL INFARCTION (HCC): ICD-10-CM

## 2018-09-20 NOTE — PROGRESS NOTES
HISTORY OF PRESENT ILLNESS  Sidra Bello is a 66 y.o. female. Hypertension   The history is provided by the patient. This is a chronic problem. The problem occurs constantly. The problem has been gradually worsening. Pertinent negatives include no chest pain, no abdominal pain, no headaches and no shortness of breath. Nothing aggravates the symptoms. Shortness of Breath   The history is provided by the patient. This is a recurrent problem. The problem occurs frequently. The problem has been gradually worsening. Pertinent negatives include no fever, no headaches, no cough, no sputum production, no hemoptysis, no wheezing, no PND, no orthopnea, no chest pain, no vomiting, no abdominal pain, no rash, no leg swelling and no claudication. Precipitated by: exertion. Palpitations    The history is provided by the patient. This is a new problem. The current episode started more than 1 week ago. The problem has not changed since onset. The problem occurs daily. Pertinent negatives include no fever, no chest pain, no claudication, no orthopnea, no PND, no abdominal pain, no nausea, no vomiting, no headaches, no dizziness, no weakness, no cough, no hemoptysis, no shortness of breath and no sputum production. Her past medical history is significant for hypertension. Review of Systems   Constitutional: Negative for chills and fever. HENT: Negative for nosebleeds. Eyes: Negative for blurred vision and double vision. Respiratory: Negative for cough, hemoptysis, sputum production, shortness of breath and wheezing. Cardiovascular: Positive for palpitations. Negative for chest pain, orthopnea, claudication, leg swelling and PND. Gastrointestinal: Negative for abdominal pain, heartburn, nausea and vomiting. Musculoskeletal: Negative for myalgias. Skin: Negative for rash. Neurological: Negative for dizziness, weakness and headaches. Endo/Heme/Allergies: Does not bruise/bleed easily.      Family History Problem Relation Age of Onset    Cancer Mother     Diabetes Father     Alcohol abuse Father     Heart Disease Maternal Grandmother     Alzheimer Maternal Grandmother     Diabetes Sister        Past Medical History:   Diagnosis Date    Aorto-iliac duplex 09/17/2015    Patent abdom aorta endovascular graft w/o leak. Mod stenosis suggested in right limb.  Calculus of kidney 2014    stent/kidney     Carotid duplex 09/17/2015    Mild < 50% bilateral plaquing.  Cataract, left     Chronic kidney disease     one functioning kidney    Chronic obstructive pulmonary disease ([address]     Echocardiogram 11/04/2014    EF 56%. No WMA. Mild LAE. Echo is within normal limits.  Esophageal reflux     Generalized osteoarthrosis, involving multiple sites     GERD (gastroesophageal reflux disease)     Gout     History of renal stent     right side    Hypercholesteremia     Hypertension     resolved    Lower extremity arterial testing 03/27/2015    Normal perfusion at rest and w/treadmill bilaterally.   R KORI 1.05.  L KORI 1.03.    Murmur     patient reports that she is aware- has been told in the past    Nausea & vomiting     Renal artery stenosis (HCC)     S/P AAA (abdominal aortic aneurysm) repair 9/20/2018    Subclinical hyperthyroidism 8/2015    Unspecified sleep apnea     resolved       Past Surgical History:   Procedure Laterality Date    ADJUSTMENT GASTRIC BAND N/A 11/10/2016    MAKAYLA Cullen    FULL ESOPHAGEAL MANOMETRY  5/21/2018         HX APPENDECTOMY      HX BLADDER SUSPENSION      HX CHOLECYSTECTOMY      HX GI      lap band 2012    HX GI  07/2018    lap band removal    HX HEENT Right 1990s, 2016    Ear sx    HX HYSTERECTOMY      HX OTHER SURGICAL  10/27/14     Bilateral open femoral exposures, Placement of catheter and sheath in the aorta Endo repair of aortic aneurysm with modular bifurcated device  Interpretation of angiography, intravascular ultrasound (IVUS) catheter  HX OTHER SURGICAL  10/27/2014    Endurant II abdominal stent graft implant    HX UROLOGICAL      stent    VASCULAR SURGERY PROCEDURE UNLIST      surgery for abdominal aneurysm       Social History   Substance Use Topics    Smoking status: Former Smoker     Packs/day: 0.00     Years: 0.00     Types: Cigarettes     Quit date: 1/25/2016    Smokeless tobacco: Never Used    Alcohol use No       Allergies   Allergen Reactions    Pcn [Penicillins] Anaphylaxis    Tetracycline Anaphylaxis    Sulfabenzamide Hives       Prior to Admission medications    Medication Sig Start Date End Date Taking? Authorizing Provider   diclofenac (VOLTAREN) 1 % gel Apply 2 g to affected area four (4) times daily. 9/19/18  Yes Mis Vines MD   carvedilol (COREG) 25 mg tablet TAKE ONE TABLET BY MOUTH TWICE A DAY WITH MEALS 9/17/18  Yes Bossman Ellis MD   TOBRADEX ST drps ophthalmix suspension  8/17/18  Yes Historical Provider   pantoprazole (PROTONIX) 40 mg tablet  9/11/18  Yes Historical Provider   losartan (COZAAR) 50 mg tablet  8/21/18  Yes Historical Provider   cloNIDine HCl (CATAPRES) 0.2 mg tablet  8/21/18  Yes Historical Provider   raNITIdine (ZANTAC) 150 mg tablet TAKE ONE TABLET BY MOUTH TWICE A DAY 9/7/18  Yes Mis Vines MD   mirtazapine (REMERON) 15 mg tablet TAKE ONE TABLET BY MOUTH EVERY EVENING 9/7/18  Yes Mis Vines MD   HYDROcodone-acetaminophen [facility])  mg tablet Take 1 Tab by mouth every six (6) hours as needed for Pain. Max Daily Amount: 4 Tabs.  9/7/18  Yes Mis Vines MD   atorvastatin (LIPITOR) 40 mg tablet TAKE ONE TABLET BY MOUTH DAILY 9/5/18  Yes Mario Braun MD   amLODIPine (NORVASC) 10 mg tablet TAKE ONE TABLET BY MOUTH DAILY 9/4/18  Yes Mis Vines MD   montelukast (SINGULAIR) 10 mg tablet TAKE ONE TABLET BY MOUTH DAILY 8/7/18  Yes Mk Beltran NP   DALIRESP tab tablet TAKE ONE TABLET BY MOUTH DAILY 8/7/18  Yes Mk Beltran NP triamterene-hydroCHLOROthiazide (DYAZIDE) 37.5-25 mg per capsule TAKE ONE CAPSULE BY MOUTH EVERY MORNING 8/7/18  Yes Vi Ramirez MD   desmopressin (DDAVP) 0.2 mg tablet Take 1 Tab by mouth nightly. 8/7/18  Yes Telly Campuzano MD   clopidogrel (PLAVIX) 75 mg tab TAKE 1 TABLET DAILY  Indications: Resume on 7/19/2018 7/17/18  Yes Miguel A Bruce MD   albuterol (PROVENTIL HFA, VENTOLIN HFA, PROAIR HFA) 90 mcg/actuation inhaler Take  by inhalation. Yes Historical Provider   Ondansetron (ZUPLENZ) 4 mg film Take  by mouth. Yes Historical Provider   azelastine (ASTELIN) 137 mcg (0.1 %) nasal spray by Nasal route. 2/24/16  Yes Historical Provider   lansoprazole (PREVACID) 30 mg capsule Take 1 Cap by mouth. Yes Historical Provider   fluticasone-salmeterol (ADVAIR DISKUS) 250-50 mcg/dose diskus inhaler Take 1 Puff by inhalation daily. 5/25/18  Yes Rob Valdez NP   hydrALAZINE (APRESOLINE) 25 mg tablet Take 1 Tab by mouth two (2) times a day. 3/14/18  Yes Katie Melo MD   ondansetron (ZOFRAN ODT) 8 mg disintegrating tablet Take 1 Tab by mouth every eight (8) hours as needed for Nausea. 2/2/18  Yes Darius Quevedo MD   docusate sodium (COLACE) 100 mg capsule Take 1 Cap by mouth two (2) times a day. Patient taking differently: Take 100 mg by mouth daily. 10/5/17  Yes Vi Ramirez MD   esomeprazole (NEXIUM) 20 mg capsule Take 1 Cap by mouth daily. STOP PREVACID  Indications: HEARTBURN 9/13/17  Yes Rob Valdez NP   oxyCODONE-acetaminophen (PERCOCET) 5-325 mg per tablet Take 1 Tab by mouth every four (4) hours as needed for Pain. Max Daily Amount: 6 Tabs. 8/7/18   Vi Ramirez MD         Visit Vitals    /64    Pulse 80    Ht 5' (1.524 m)    Wt 75.8 kg (167 lb)    BMI 32.61 kg/m2         Physical Exam   Constitutional: She is oriented to person, place, and time. She appears well-developed and well-nourished. HENT:   Head: Normocephalic and atraumatic.    Eyes: Conjunctivae are normal.   Neck: Neck supple. No JVD present. No tracheal deviation present. No thyromegaly present. Cardiovascular: Normal rate and regular rhythm. PMI is not displaced. Exam reveals no gallop, no S3 and no decreased pulses. No murmur heard. Pulmonary/Chest: No respiratory distress. She has no wheezes. She has no rales. She exhibits no tenderness. Abdominal: Soft. There is no tenderness. Musculoskeletal: She exhibits no edema. Neurological: She is alert and oriented to person, place, and time. Skin: Skin is warm. Psychiatric: She has a normal mood and affect. Ms. Humphrey Ravi has a reminder for a \"due or due soon\" health maintenance. I have asked that she contact her primary care provider for follow-up on this health maintenance. I have personally reviewed patient's records available from hospital and other providers and incorporated findings in patient care. I Have personally reviewed recent relevant labs available and discussed with patient  SUMMARY:2014  Left ventricle: Size was normal. Systolic function was normal by EF  (biplane method of disks). Ejection fraction was estimated to be 56 %. No  obvious wall motion abnormalities identified in the views obtained. Right ventricle: The size was normal. The tricuspid jet envelope  definition was inadequate for estimation of RV systolic pressure. There  are no indirect findings (abnormal RV volume or geometry, altered  pulmonary flow velocity profile, or leftward septal displacement) which  would suggest moderate or severe pulmonary hypertension. Left atrium: The atrium was mildly dilated. 3/2017  Impression:   1. Low risk pharmacological cardiac nuclear stress test.  2. Normal left ventricular systolic function calculated at 66%. No  regional wall motion abnormalities. 3. No evidence of ischemia or previous infarct based on perfusion  imaging.   4. Normal EKG portion of stress test.    SUMMARY:echo-3/2016  Left ventricle: Systolic function was normal. Ejection fraction was  estimated in the range of 55 % to 60 %. There were no regional wall motion  abnormalities. Doppler parameters were consistent with abnormal left  ventricular relaxation (grade 1 diastolic dysfunction). Mitral valve: There was mild annular calcification. NUCLEAR IMAGING:3/2016     Findings:   1. Stress images reveal normal Myoview distrubution in all the LV segments in short axis, vertical and horizontal long axis views. 2. Resting images have a normal uptake. 3. Gated images reveal normal wall motion and the ejection fraction is calculated to be 57%. Conclusion:   1. Normal perfusion scan. 2. Normal wall motion and ejection fraction is calculated at 57%. 3. No evidence of significant fixed or reversible defect suggesting ischemia or myocardial infarction noted from this nuclear study. 4. Low risk scan. Assessment         ICD-10-CM ICD-9-CM    1. Essential hypertension I10 401.9 METANEPHRINES, PLASMA      ALDOSTERONE      ALDOSTERONE/RENIN RATIO    Clinically stable. Labile blood pressure. Possible need for nephrectomy being followed by specialist   2. S/P AAA (abdominal aortic aneurysm) repair Z98.890 V45.89     Z86.79      Status post EVAR clinically stable   3. Right renal artery stenosis (HCC) I70.1 440. 1     Been followed closely for possible need for nephrectomy in view of labile hypertension   4. Renal infarction ([address] N28.0 593.81    5. Mixed hyperlipidemia E78.2 272.2 LIPID PANEL      HEPATIC FUNCTION PANEL    On statin will check labs   9/2018  Patient is labile blood pressure with. Of palpitation and headache. I will get plasma metanephrine and aldosterone   There are no discontinued medications.     Orders Placed This Encounter    LIPID PANEL     Standing Status:   Future     Standing Expiration Date:   3/21/2019    HEPATIC FUNCTION PANEL     Standing Status:   Future     Standing Expiration Date:   3/21/2019    METANEPHRINES, PLASMA Standing Status:   Future     Standing Expiration Date:   9/21/2019    ALDOSTERONE     Standing Status:   Future     Standing Expiration Date:   9/21/2019    ALDOSTERONE/RENIN RATIO     Standing Status:   Future     Standing Expiration Date:   9/21/2019       Follow-up Disposition:  Return in about 6 months (around 3/20/2019).

## 2018-09-20 NOTE — LETTER
Nikolay Dowling 1955 9/20/2018 Dear Pete Scruggs MD 
 
I had the pleasure of evaluating  Ms. Roberto in office today. Below are the relevant portions of my assessment and plan of care. ICD-10-CM ICD-9-CM 1. Essential hypertension I10 401.9 METANEPHRINES, PLASMA ALDOSTERONE  
   ALDOSTERONE/RENIN RATIO Clinically stable. Labile blood pressure. Possible need for nephrectomy being followed by specialist  
2. S/P AAA (abdominal aortic aneurysm) repair Z98.890 V45.89   
 Z86.79 Status post EVAR clinically stable 3. Right renal artery stenosis (HCC) I70.1 440. 1 Been followed closely for possible need for nephrectomy in view of labile hypertension 4. Renal infarction ([address] N28.0 593.81   
5. Mixed hyperlipidemia E78.2 272.2 LIPID PANEL  
   HEPATIC FUNCTION PANEL On statin will check labs Current Outpatient Prescriptions Medication Sig Dispense Refill  diclofenac (VOLTAREN) 1 % gel Apply 2 g to affected area four (4) times daily. 1 Each 2  
 carvedilol (COREG) 25 mg tablet TAKE ONE TABLET BY MOUTH TWICE A DAY WITH MEALS 60 Tab 5  TOBRADEX ST drps ophthalmix suspension  pantoprazole (PROTONIX) 40 mg tablet  losartan (COZAAR) 50 mg tablet  cloNIDine HCl (CATAPRES) 0.2 mg tablet  raNITIdine (ZANTAC) 150 mg tablet TAKE ONE TABLET BY MOUTH TWICE A DAY 60 Tab 3  
 mirtazapine (REMERON) 15 mg tablet TAKE ONE TABLET BY MOUTH EVERY EVENING 30 Tab 0  
 HYDROcodone-acetaminophen (NORCO)  mg tablet Take 1 Tab by mouth every six (6) hours as needed for Pain. Max Daily Amount: 4 Tabs.  120 Tab 0  
 atorvastatin (LIPITOR) 40 mg tablet TAKE ONE TABLET BY MOUTH DAILY 30 Tab 1  
 amLODIPine (NORVASC) 10 mg tablet TAKE ONE TABLET BY MOUTH DAILY 30 Tab 0  
 montelukast (SINGULAIR) 10 mg tablet TAKE ONE TABLET BY MOUTH DAILY 30 Tab 3  
 DALIRESP tab tablet TAKE ONE TABLET BY MOUTH DAILY 30 Tab 3  
  triamterene-hydroCHLOROthiazide (DYAZIDE) 37.5-25 mg per capsule TAKE ONE CAPSULE BY MOUTH EVERY MORNING 30 Cap 2  
 desmopressin (DDAVP) 0.2 mg tablet Take 1 Tab by mouth nightly. 90 Tab 0  clopidogrel (PLAVIX) 75 mg tab TAKE 1 TABLET DAILY  Indications: Resume on 7/19/2018 90 Tab 2  
 albuterol (PROVENTIL HFA, VENTOLIN HFA, PROAIR HFA) 90 mcg/actuation inhaler Take  by inhalation.  Ondansetron (ZUPLENZ) 4 mg film Take  by mouth.  azelastine (ASTELIN) 137 mcg (0.1 %) nasal spray by Nasal route.  lansoprazole (PREVACID) 30 mg capsule Take 1 Cap by mouth.  fluticasone-salmeterol (ADVAIR DISKUS) 250-50 mcg/dose diskus inhaler Take 1 Puff by inhalation daily. 1 Inhaler 5  
 hydrALAZINE (APRESOLINE) 25 mg tablet Take 1 Tab by mouth two (2) times a day. 60 Tab 6  
 ondansetron (ZOFRAN ODT) 8 mg disintegrating tablet Take 1 Tab by mouth every eight (8) hours as needed for Nausea. 30 Tab 0  
 docusate sodium (COLACE) 100 mg capsule Take 1 Cap by mouth two (2) times a day. (Patient taking differently: Take 100 mg by mouth daily.) 60 Cap 5  esomeprazole (NEXIUM) 20 mg capsule Take 1 Cap by mouth daily. STOP PREVACID  Indications: HEARTBURN 30 Cap 1  
 oxyCODONE-acetaminophen (PERCOCET) 5-325 mg per tablet Take 1 Tab by mouth every four (4) hours as needed for Pain. Max Daily Amount: 6 Tabs. 120 Tab 0 Orders Placed This Encounter  LIPID PANEL Standing Status:   Future Standing Expiration Date:   3/21/2019  
 HEPATIC FUNCTION PANEL Standing Status:   Future Standing Expiration Date:   3/21/2019  METANEPHRINES, PLASMA Standing Status:   Future Standing Expiration Date:   9/21/2019  ALDOSTERONE Standing Status:   Future Standing Expiration Date:   9/21/2019  ALDOSTERONE/RENIN RATIO Standing Status:   Future Standing Expiration Date:   9/21/2019 If you have questions, please do not hesitate to call me.   I look forward to following Ms. Roberto along with you. Sincerely, Ash Gauthier MD

## 2018-09-20 NOTE — PROGRESS NOTES
Patient didn't bring medications, verbally reviewed    1. Have you been to the ER, urgent care clinic since your last visit? Hospitalized since your last visit? No    2. Have you seen or consulted any other health care providers outside of the [address] [place] [address] since your last visit? Include any pap smears or colon screening.  Yes Where: [facility] Reason for visit: Lap band removal surgery

## 2018-09-20 NOTE — MR AVS SNAPSHOT
Mary Garcia 
 
 
 [address], [address] [facility] [address] [phone] Patient: Adelita Molina MRN: [id number] POO:9/54/[id number] Visit Information Date & Time Provider Department Dept. Phone Encounter #  
 9/20/2018 10:15 AM Jada Oates MD [facility] [address] [place] [address] [id number] Follow-up Instructions Return in about 6 months (around 3/20/2019). Your Appointments 10/5/2018  9:45 AM  
Follow Up with Caitie Castellon MD  
[facility]ICINE OF [place] [address] [facility]) Appt Note: f/u ov  
 [address] [place] [address], [address] [facility]  
[phone]  
  
   
 [address] [facility], [address] [facility] [address]  
  
    
 12/28/2018  8:00 AM  
PROCEDURE with [facility] IMAGING 2 [facility] ([address] [facility]) Appt Note: sma hutton 6 months 2300 [facility] [address] 706 [facility]  
[phone] [address]  
  
    
 1/4/2019  9:30 AM  
Follow Up with Sweetie Garcia MD  
[address] [place] [address] and [facility] [address] [place] [address]) Appt Note: 6 months  fu after study with prep/per jocelyn dr Tigist Sanders stated to cancel all other studies in november 2300 [facility] [address] 706 [facility]  
[phone] [address]  
  
    
 3/14/2019 10:15 AM  
Office Visit with Jada Oates MD  
[facility]) Appt Note: 6 months post labs [address] [facility], [address] [facility] [address]  
[address], [address] [place] [address] 83 [facility] Upcoming Health Maintenance Date Due ZOSTER VACCINE AGE 60> 12/11/2016 Bone Densitometry 9/24/2017 BREAST CANCER SCRN MAMMOGRAM 6/1/2018 Influenza Age 5 to Adult 8/1/2018 FOBT Q 1 YEAR AGE 50-75 1/31/2019 PAP AKA CERVICAL CYTOLOGY 8/12/2019 DTaP/Tdap/Td series (2 - Td) 9/1/2026 Allergies as of 9/20/2018  Review Complete On: 9/20/2018 By: Mason Rasheed MD  
  
 Severity Noted Reaction Type Reactions Pcn [Penicillins] High 09/18/2014    Anaphylaxis Tetracycline High 09/18/2014    Anaphylaxis Sulfabenzamide  09/18/2014    Hives Current Immunizations  Reviewed on 2/9/2018 Name Date Influenza Vaccine 10/31/2017 Influenza Vaccine (Quad) PF 9/13/2017, 9/1/2016 Pneumococcal Vaccine (Unspecified Type) 3/5/2013 Tdap 9/1/2016 Not reviewed this visit You Were Diagnosed With   
  
 Codes Comments Essential hypertension    -  Primary ICD-10-CM: I10 
ICD-9-CM: 401.9 Clinically stable. Labile blood pressure. Possible need for nephrectomy being followed by specialist  
 S/P AAA (abdominal aortic aneurysm) repair     ICD-10-CM: G64.752, Z86.79 
ICD-9-CM: V45.89 Status post EVAR clinically stable Right renal artery stenosis (HCC)     ICD-10-CM: I70.1 ICD-9-CM: 440.1 Been followed closely for possible need for nephrectomy in view of labile hypertension Renal infarction ([facility] [address] [facility]     ICD-10-CM: N28.0 ICD-9-CM: 593.81 Mixed hyperlipidemia     ICD-10-CM: E78.2 ICD-9-CM: 272.2 On statin will check labs Vitals BP Pulse Height(growth percentile) Weight(growth percentile) BMI OB Status 122/64 80 5' (1.524 m) 167 lb (75.8 kg) 32.61 kg/m2 Postmenopausal  
 Smoking Status Former Smoker Vitals History BMI and BSA Data Body Mass Index Body Surface Area  
 32.61 kg/m 2 1.79 m 2 Preferred Pharmacy Pharm[facility] Name Phone [facility] [place] [address], [address] [address] [phone] Your Updated Medication List  
  
   
This list is accurate as of 9/20/18 10:53 AM.  Always use your most recent med list.  
  
  
  
  
 albuterol 90 mcg/actuation inhaler Commonly known as:  PROVENTIL HFA, VENTOLIN HFA, PROAIR HFA Take  by inhalation. amLODIPine 10 mg tablet Commonly known as:  Maisha Blade TAKE ONE TABLET BY MOUTH DAILY  
  
 atorvastatin 40 mg tablet Commonly known as:  LIPITOR  
TAKE ONE TABLET BY MOUTH DAILY  
  
 azelastine 137 mcg (0.1 %) nasal spray Commonly known as:  ASTELIN  
by Nasal route. carvedilol 25 mg tablet Commonly known as:  COREG  
TAKE ONE TABLET BY MOUTH TWICE A DAY WITH MEALS  
  
 cloNIDine HCl 0.2 mg tablet Commonly known as:  CATAPRES  
  
 clopidogrel 75 mg Tab Commonly known as:  PLAVIX TAKE 1 TABLET DAILY  Indications: Resume on 7/19/2018 DALIRESP Tab tablet Generic drug:  roflumilast  
TAKE ONE TABLET BY MOUTH DAILY  
  
 desmopressin 0.2 mg tablet Commonly known as:  DDAVP Take 1 Tab by mouth nightly. diclofenac 1 % Gel Commonly known as:  VOLTAREN Apply 2 g to affected area four (4) times daily. docusate sodium 100 mg capsule Commonly known as:  Krystal Chanda Take 1 Cap by mouth two (2) times a day. esomeprazole 20 mg capsule Commonly known as:  NexIUM Take 1 Cap by mouth daily. STOP PREVACID  Indications: HEARTBURN  
  
 fluticasone-salmeterol 250-50 mcg/dose diskus inhaler Commonly known as:  ADVAIR DISKUS Take 1 Puff by inhalation daily. hydrALAZINE 25 mg tablet Commonly known as:  APRESOLINE Take 1 Tab by mouth two (2) times a day. HYDROcodone-acetaminophen  mg tablet Commonly known as:  Katerine Oyster Take 1 Tab by mouth every six (6) hours as needed for Pain. Max Daily Amount: 4 Tabs. lansoprazole 30 mg capsule Commonly known as:  PREVACID Take 1 Cap by mouth.  
  
 losartan 50 mg tablet Commonly known as:  COZAAR  
  
 mirtazapine 15 mg tablet Commonly known as:  REMERON  
TAKE ONE TABLET BY MOUTH EVERY EVENING  
  
 montelukast 10 mg tablet Commonly known as:  SINGULAIR  
TAKE ONE TABLET BY MOUTH DAILY * ZUPLENZ 4 mg Film Generic drug:  Ondansetron Take  by mouth. * ondansetron 8 mg disintegrating tablet Commonly known as:  ZOFRAN ODT Take 1 Tab by mouth every eight (8) hours as needed for Nausea. oxyCODONE-acetaminophen 5-325 mg per tablet Commonly known as:  PERCOCET Take 1 Tab by mouth every four (4) hours as needed for Pain. Max Daily Amount: 6 Tabs. pantoprazole 40 mg tablet Commonly known as:  PROTONIX  
  
 raNITIdine 150 mg tablet Commonly known as:  ZANTAC TAKE ONE TABLET BY MOUTH TWICE A DAY  
  
 TOBRADEX ST Drps ophthalmix suspension Generic drug:  tobramycin-dexamethasone  
  
 triamterene-hydroCHLOROthiazide 37.5-25 mg per capsule Commonly known as:  Marcina Majors TAKE ONE CAPSULE BY MOUTH EVERY MORNING  
  
 * Notice: This list has 2 medication(s) that are the same as other medications prescribed for you. Read the directions carefully, and ask your doctor or other care provider to review them with you. Follow-up Instructions Return in about 6 months (around 3/20/2019). To-Do List   
 09/20/2018 Lab:  ALDOSTERONE/RENIN RATIO   
  
 09/20/2018 Lab:  ALDOSTERONE   
  
 09/20/2018 Lab:  HEPATIC FUNCTION PANEL   
  
 09/20/2018 Lab:  LIPID PANEL   
  
 09/20/2018 Lab:  METANEPHRINES, PLASMA Introducing [facility]! Wilfred Holland introduces [employer] patient portal. Now you can access parts of your medical record, email your doctor's office, and request medication refills online. 1. In your internet browser, go to [email or web address] 2. Click on the First Time User? Click Here link in the Sign In box. You will see the New Member Sign Up page. 3. Enter your [employer] Access Code exactly as it appears below. You will not need to use this code after youve completed the sign-up process. If you do not sign up before the expiration date, you must request a new code. · [employer] Access Code: [facility] Expires: 10/11/2018 11:37 AM 
 
 4. Enter the last four digits of your Social Security Number (xxxx) and Date of Birth (mm/dd/yyyy) as indicated and click Submit. You will be taken to the next sign-up page. 5. Create a [employer] ID. This will be your [employer] login ID and cannot be changed, so think of one that is secure and easy to remember. 6. Create a [employer] password. You can change your password at any time. 7. Enter your Password Reset Question and Answer. This can be used at a later time if you forget your password. 8. Enter your e-mail address. You will receive e-mail notification when new information is available in [address] 9. Click Sign Up. You can now view and download portions of your medical record. 10. Click the Download Summary menu link to download a portable copy of your medical information. If you have questions, please visit the Frequently Asked Questions section of the [employer] website. Remember, [employer] is NOT to be used for urgent needs. For medical emergencies, dial 911. Now available from your iPhone and Android! Please provide this summary of care documentation to your next provider. Your primary care clinician is listed as Grace Almonte. If you have any questions after today's visit, please call [phone].

## 2018-09-21 VITALS
SYSTOLIC BLOOD PRESSURE: 124 MMHG | BODY MASS INDEX: 32.79 KG/M2 | HEART RATE: 70 BPM | WEIGHT: 167 LBS | RESPIRATION RATE: 18 BRPM | HEIGHT: 60 IN | DIASTOLIC BLOOD PRESSURE: 70 MMHG

## 2018-09-21 PROBLEM — M79.89 RIGHT LEG SWELLING: Status: ACTIVE | Noted: 2018-09-21

## 2018-09-21 NOTE — PROGRESS NOTES
Patient ambulated to the room , patient has area on the right leg that is bruised with some surface veins that are hardened and tender to touch, patient had a bleeding varicose vein , patient states that this has gotten better since the last week. Advised she has some phlebitous and that it will improve over time, since patient is on anticoagulant she can not take antiinflammatory meds. Advised to use light compression and patient was placed in tubi  size e , advised to use warm compresses 3 times a day for the next week and she should see improvement. Pt verbalized understanding. I advised would call her next week to see how she is doing.

## 2018-09-26 ENCOUNTER — HOSPITAL ENCOUNTER (OUTPATIENT)
Dept: LAB | Age: 63
Discharge: HOME | End: 2018-09-26

## 2018-09-26 LAB — SENTARA SPECIMEN COL,SENBCF: NORMAL

## 2018-09-26 PROCEDURE — 99001 SPECIMEN HANDLING PT-LAB: CPT | Performed by: INTERNAL MEDICINE

## 2018-10-01 RX ORDER — AMLODIPINE BESYLATE 10 MG/1
TABLET ORAL
Qty: 30 TAB | Refills: 0 | Status: SHIPPED | OUTPATIENT
Start: 2018-10-01 | End: 2018-10-27 | Stop reason: SDUPTHER

## 2018-10-02 ENCOUNTER — TELEPHONE (OUTPATIENT)
Dept: INTERNAL MEDICINE CLINIC | Age: 63
End: 2018-10-02

## 2018-10-02 RX ORDER — LANCETS
EACH MISCELLANEOUS
Qty: 1 EACH | Refills: 11 | Status: SHIPPED | OUTPATIENT
Start: 2018-10-02

## 2018-10-02 RX ORDER — LANCETS 33 GAUGE
EACH MISCELLANEOUS
Qty: 1 PACKAGE | Refills: 3 | Status: SHIPPED | OUTPATIENT
Start: 2018-10-02

## 2018-10-02 NOTE — TELEPHONE ENCOUNTER
Message was left on secure voicemail that patients request has been sent to Dr Nemesio Francis for final approval

## 2018-10-05 ENCOUNTER — OFFICE VISIT (OUTPATIENT)
Dept: INTERNAL MEDICINE CLINIC | Age: 63
End: 2018-10-05

## 2018-10-05 VITALS
SYSTOLIC BLOOD PRESSURE: 106 MMHG | WEIGHT: 167 LBS | HEIGHT: 60 IN | OXYGEN SATURATION: 99 % | DIASTOLIC BLOOD PRESSURE: 70 MMHG | TEMPERATURE: 78 F | HEART RATE: 76 BPM | BODY MASS INDEX: 32.79 KG/M2 | RESPIRATION RATE: 16 BRPM

## 2018-10-05 DIAGNOSIS — I73.9 PERIPHERAL VASCULAR DISEASE (HCC): ICD-10-CM

## 2018-10-05 DIAGNOSIS — G89.29 CHRONIC GENERALIZED ABDOMINAL PAIN: Primary | ICD-10-CM

## 2018-10-05 DIAGNOSIS — Z98.84 LAP-BAND SURGERY STATUS: ICD-10-CM

## 2018-10-05 DIAGNOSIS — R10.84 CHRONIC GENERALIZED ABDOMINAL PAIN: Primary | ICD-10-CM

## 2018-10-05 DIAGNOSIS — I10 ESSENTIAL HYPERTENSION: ICD-10-CM

## 2018-10-05 RX ORDER — OXYCODONE AND ACETAMINOPHEN 5; 325 MG/1; MG/1
1 TABLET ORAL
Qty: 120 TAB | Refills: 0 | Status: SHIPPED | OUTPATIENT
Start: 2018-10-05 | End: 2018-10-05 | Stop reason: ALTCHOICE

## 2018-10-05 RX ORDER — OMEPRAZOLE 40 MG/1
CAPSULE, DELAYED RELEASE ORAL
Qty: 30 CAP | Refills: 3 | Status: SHIPPED | OUTPATIENT
Start: 2018-10-05 | End: 2018-10-13

## 2018-10-05 RX ORDER — OXYCODONE AND ACETAMINOPHEN 10; 325 MG/1; MG/1
TABLET ORAL
Qty: 120 TAB | Refills: 0 | Status: SHIPPED | OUTPATIENT
Start: 2018-10-05 | End: 2018-10-13

## 2018-10-05 RX ORDER — ONDANSETRON 8 MG/1
8 TABLET, ORALLY DISINTEGRATING ORAL
Qty: 30 TAB | Refills: 0 | Status: SHIPPED | OUTPATIENT
Start: 2018-10-05 | End: 2018-10-13

## 2018-10-05 NOTE — MR AVS SNAPSHOT
[address] [facility] 
 
 
 [address] [facility], [address] [facility] [id number] [phone] Patient: Ziggy Mullins MRN: [id number] VAT:9/84/[id number] Visit Information Date & Time Provider Department Dept. Phone Encounter #  
 10/5/2018  9:45 AM Nancy Perez MD [facility] INTERNAL MEDICINE OF Karl Garcia [phone] [id number] Your Appointments 12/7/2018  9:00 AM  
Follow Up with Nancy Perez MD  
[address]) Appt Note: 2 mo f/u  
 [address] [place] [address], [address] [facility]  
[phone]  
  
   
 [address] [place] [address], [address] [facility] [address]  
  
    
 12/28/2018  8:00 AM  
PROCEDURE with [facility] IMAGING 2 [facility] ([address] [facility]) Appt Note: sma hutton 6 months [address] [facility] [address] [facility]  
[phone] [address] [facility] [address]  
  
    
 1/4/2019  9:30 AM  
Follow Up with Jarod De La Cruz MD  
[address] [facility] [address] [facility]) Appt Note: 6 months  fu after study with prep/per jocelyn dr Chava Orr stated to cancel all other studies in november [address] [facility] [address] 706 [facility]  
[phone] [address] [facility] [address]  
  
    
 3/13/2019 10:15 AM  
Office Visit with Aggie Armas MD  
[facility]) Appt Note: 6 months post labs; 6 months post labs [address] [facility], [address] [facility] [address]  
[address], [address] [facility] [address] [facility] Upcoming Health Maintenance Date Due Shingrix Vaccine Age 50> (1 of 2) 2/11/2007 Bone Densitometry 9/24/2017 BREAST CANCER SCRN MAMMOGRAM 6/1/2018 Influenza Age 5 to Adult 8/1/2018 FOBT Q 1 YEAR AGE 50-75 1/31/2019 PAP AKA CERVICAL CYTOLOGY 8/12/2019 DTaP/Tdap/Td series (2 - Td) 9/1/2026 Allergies as of 10/5/2018  Review Complete On: 10/5/2018 By: Lauri Larios LPN Severity Noted Reaction Type Reactions Pcn [Penicillins] High 09/18/2014    Anaphylaxis Tetracycline High 09/18/2014    Anaphylaxis Sulfabenzamide  09/18/2014    Hives Current Immunizations  Reviewed on 2/9/2018 Name Date Influenza Vaccine 10/31/2017 Influenza Vaccine (Quad) PF 9/13/2017, 9/1/2016 Pneumococcal Vaccine (Unspecified Type) 3/5/2013 Tdap 9/1/2016 Not reviewed this visit Vitals BP Pulse Temp Resp Height(growth percentile) Weight(growth percentile) 106/70 (BP 1 Location: Left arm, BP Patient Position: Sitting) 76 (!) 78 °F (25.6 °C) (Tympanic) 16 5' (1.524 m) 167 lb (75.8 kg) SpO2 BMI OB Status Smoking Status 99% 32.61 kg/m2 Postmenopausal Former Smoker BMI and BSA Data Body Mass Index Body Surface Area  
 32.61 kg/m 2 1.79 m 2 Preferred Pharmacy Pharmacy Name Phone Yulisa Serra [address], [address] [facility] [phone] Your Updated Medication List  
  
   
This list is accurate as of 10/5/18 11:24 AM.  Always use your most recent med list.  
  
  
  
  
 albuterol 90 mcg/actuation inhaler Commonly known as:  PROVENTIL HFA, VENTOLIN HFA, PROAIR HFA Take  by inhalation. amLODIPine 10 mg tablet Commonly known as:  Pasty Pall TAKE ONE TABLET BY MOUTH DAILY  
  
 atorvastatin 40 mg tablet Commonly known as:  LIPITOR  
TAKE ONE TABLET BY MOUTH DAILY  
  
 azelastine 137 mcg (0.1 %) nasal spray Commonly known as:  ASTELIN  
by Nasal route. carvedilol 25 mg tablet Commonly known as:  COREG  
TAKE ONE TABLET BY MOUTH TWICE A DAY WITH MEALS  
  
 cloNIDine HCl 0.2 mg tablet Commonly known as:  CATAPRES  
  
 clopidogrel 75 mg Tab Commonly known as:  PLAVIX TAKE 1 TABLET DAILY  Indications: Resume on 7/19/2018 DALIRESP Tab tablet Generic drug:  roflumilast  
 TAKE ONE TABLET BY MOUTH DAILY  
  
 desmopressin 0.2 mg tablet Commonly known as:  DDAVP Take 1 Tab by mouth nightly. diclofenac 1 % Gel Commonly known as:  VOLTAREN Apply 2 g to affected area four (4) times daily. docusate sodium 100 mg capsule Commonly known as:  Nelly Opoka Take 1 Cap by mouth two (2) times a day. esomeprazole 20 mg capsule Commonly known as:  NexIUM Take 1 Cap by mouth daily. STOP PREVACID  Indications: HEARTBURN  
  
 fluticasone-salmeterol 250-50 mcg/dose diskus inhaler Commonly known as:  ADVAIR DISKUS Take 1 Puff by inhalation daily. * glucose blood VI test strips strip Commonly known as:  blood glucose test  
One touch test strips. To use with one touch delica  Patient to check blood sugar daily E11.9  
  
 * glucose blood VI test strips strip Commonly known as:  ONETOUCH ULTRA BLUE TEST STRIP To check the blood sugars daily  
  
 hydrALAZINE 25 mg tablet Commonly known as:  APRESOLINE Take 1 Tab by mouth two (2) times a day. HYDROcodone-acetaminophen  mg tablet Commonly known as:  Sharlotte Eis Take 1 Tab by mouth every six (6) hours as needed for Pain. Max Daily Amount: 4 Tabs. * Lancets Misc Commonly known as:  ONETOUCH ULTRASOFT LANCETS To use with blood for glucose monitoring * lancets 33 gauge Misc Commonly known as: One Touch Theotis Rex To use with blood for glucose monitoring  
  
 lansoprazole 30 mg capsule Commonly known as:  PREVACID Take 1 Cap by mouth.  
  
 losartan 50 mg tablet Commonly known as:  COZAAR  
  
 mirtazapine 15 mg tablet Commonly known as:  REMERON  
TAKE ONE TABLET BY MOUTH EVERY EVENING  
  
 montelukast 10 mg tablet Commonly known as:  SINGULAIR  
TAKE ONE TABLET BY MOUTH DAILY  
  
 omeprazole 40 mg capsule Commonly known as:  PRILOSEC  
1 cap each AM.  Stop ranitidine  
  
 oxyCODONE-acetaminophen 5-325 mg per tablet Commonly known as:  PERCOCET  
 Take 1 Tab by mouth every four (4) hours as needed for Pain. Max Daily Amount: 6 Tabs. pantoprazole 40 mg tablet Commonly known as:  PROTONIX  
  
 raNITIdine 150 mg tablet Commonly known as:  ZANTAC TAKE ONE TABLET BY MOUTH TWICE A DAY  
  
 TOBRADEX ST Drps ophthalmix suspension Generic drug:  tobramycin-dexamethasone  
  
 triamterene-hydroCHLOROthiazide 37.5-25 mg per capsule Commonly known as:  Jay Limbo TAKE ONE CAPSULE BY MOUTH EVERY MORNING  
  
 * ZUPLENZ 4 mg Film Generic drug:  Ondansetron Take  by mouth. * ondansetron 8 mg disintegrating tablet Commonly known as:  ZOFRAN ODT Take 1 Tab by mouth every eight (8) hours as needed for Nausea. * Notice: This list has 6 medication(s) that are the same as other medications prescribed for you. Read the directions carefully, and ask your doctor or other care provider to review them with you. Prescriptions Sent to Pharmacy Refills  
 omeprazole (PRILOSEC) 40 mg capsule 3 Si cap each AM.  Stop ranitidine Class: Normal  
 Pharmacy: [address] #: [phone] Please provide this summary of care documentation to your next provider. Your primary care clinician is listed as Antony Mccall. If you have any questions after today's visit, please call [phone].

## 2018-10-08 ENCOUNTER — APPOINTMENT (OUTPATIENT)
Dept: GENERAL RADIOLOGY | Age: 63
DRG: 381 | End: 2018-10-08
Attending: EMERGENCY MEDICINE
Payer: COMMERCIAL

## 2018-10-08 ENCOUNTER — APPOINTMENT (OUTPATIENT)
Dept: CT IMAGING | Age: 63
DRG: 381 | End: 2018-10-08
Attending: PHYSICIAN ASSISTANT
Payer: COMMERCIAL

## 2018-10-08 ENCOUNTER — HOSPITAL ENCOUNTER (INPATIENT)
Age: 63
LOS: 5 days | Discharge: HOME | DRG: 381 | End: 2018-10-13
Attending: EMERGENCY MEDICINE | Admitting: HOSPITALIST
Payer: COMMERCIAL

## 2018-10-08 DIAGNOSIS — R10.9 LEFT SIDED ABDOMINAL PAIN: ICD-10-CM

## 2018-10-08 DIAGNOSIS — R11.11 INTRACTABLE VOMITING WITHOUT NAUSEA, UNSPECIFIED VOMITING TYPE: Primary | ICD-10-CM

## 2018-10-08 PROBLEM — K31.1 GASTRIC OUTLET OBSTRUCTION: Status: ACTIVE | Noted: 2018-10-08

## 2018-10-08 LAB
ALBUMIN SERPL-MCNC: 4.1 G/DL (ref 3.4–5)
ALBUMIN/GLOB SERPL: 1 {RATIO} (ref 0.8–1.7)
ALP SERPL-CCNC: 110 U/L (ref 45–117)
ALT SERPL-CCNC: 33 U/L (ref 13–56)
ANION GAP SERPL CALC-SCNC: 12 MMOL/L (ref 3–18)
ANION GAP SERPL CALC-SCNC: 14 MMOL/L (ref 3–18)
APPEARANCE UR: CLEAR
AST SERPL-CCNC: 30 U/L (ref 15–37)
BACTERIA URNS QL MICRO: ABNORMAL /HPF
BASOPHILS # BLD: 0 K/UL (ref 0–0.1)
BASOPHILS NFR BLD: 0 % (ref 0–2)
BILIRUB SERPL-MCNC: 1.2 MG/DL (ref 0.2–1)
BILIRUB UR QL: NEGATIVE
BUN SERPL-MCNC: 11 MG/DL (ref 7–18)
BUN SERPL-MCNC: 8 MG/DL (ref 7–18)
BUN/CREAT SERPL: 10 (ref 12–20)
BUN/CREAT SERPL: 10 (ref 12–20)
CALCIUM SERPL-MCNC: 8.9 MG/DL (ref 8.5–10.1)
CALCIUM SERPL-MCNC: 9.1 MG/DL (ref 8.5–10.1)
CHLORIDE SERPL-SCNC: 83 MMOL/L (ref 100–108)
CHLORIDE SERPL-SCNC: 86 MMOL/L (ref 100–108)
CO2 SERPL-SCNC: 24 MMOL/L (ref 21–32)
CO2 SERPL-SCNC: 26 MMOL/L (ref 21–32)
COLOR UR: YELLOW
CREAT SERPL-MCNC: 0.81 MG/DL (ref 0.6–1.3)
CREAT SERPL-MCNC: 1.06 MG/DL (ref 0.6–1.3)
DIFFERENTIAL METHOD BLD: ABNORMAL
EOSINOPHIL # BLD: 0 K/UL (ref 0–0.4)
EOSINOPHIL NFR BLD: 0 % (ref 0–5)
EPITH CASTS URNS QL MICRO: ABNORMAL /LPF (ref 0–5)
ERYTHROCYTE [DISTWIDTH] IN BLOOD BY AUTOMATED COUNT: 12.1 % (ref 11.6–14.5)
GLOBULIN SER CALC-MCNC: 4.2 G/DL (ref 2–4)
GLUCOSE BLD STRIP.AUTO-MCNC: 150 MG/DL (ref 70–110)
GLUCOSE SERPL-MCNC: 127 MG/DL (ref 74–99)
GLUCOSE SERPL-MCNC: 136 MG/DL (ref 74–99)
GLUCOSE UR STRIP.AUTO-MCNC: NEGATIVE MG/DL
HCT VFR BLD AUTO: 40.2 % (ref 35–45)
HGB BLD-MCNC: 15.1 G/DL (ref 12–16)
HGB UR QL STRIP: ABNORMAL
KETONES UR QL STRIP.AUTO: 15 MG/DL
LEUKOCYTE ESTERASE UR QL STRIP.AUTO: ABNORMAL
LIPASE SERPL-CCNC: 129 U/L (ref 73–393)
LYMPHOCYTES # BLD: 1 K/UL (ref 0.9–3.6)
LYMPHOCYTES NFR BLD: 10 % (ref 21–52)
MCH RBC QN AUTO: 29.1 PG (ref 24–34)
MCHC RBC AUTO-ENTMCNC: 37.6 G/DL (ref 31–37)
MCV RBC AUTO: 77.5 FL (ref 74–97)
MONOCYTES # BLD: 0.4 K/UL (ref 0.05–1.2)
MONOCYTES NFR BLD: 4 % (ref 3–10)
NEUTS SEG # BLD: 8.8 K/UL (ref 1.8–8)
NEUTS SEG NFR BLD: 86 % (ref 40–73)
NITRITE UR QL STRIP.AUTO: NEGATIVE
PH UR STRIP: 7 [PH] (ref 5–8)
PLATELET # BLD AUTO: 330 K/UL (ref 135–420)
PMV BLD AUTO: 10.2 FL (ref 9.2–11.8)
POTASSIUM SERPL-SCNC: 3 MMOL/L (ref 3.5–5.5)
POTASSIUM SERPL-SCNC: 4.3 MMOL/L (ref 3.5–5.5)
PROT SERPL-MCNC: 8.3 G/DL (ref 6.4–8.2)
PROT UR STRIP-MCNC: 30 MG/DL
RBC # BLD AUTO: 5.19 M/UL (ref 4.2–5.3)
RBC #/AREA URNS HPF: ABNORMAL /HPF (ref 0–5)
SODIUM SERPL-SCNC: 121 MMOL/L (ref 136–145)
SODIUM SERPL-SCNC: 124 MMOL/L (ref 136–145)
SP GR UR REFRACTOMETRY: 1.02 (ref 1–1.03)
UROBILINOGEN UR QL STRIP.AUTO: 0.2 EU/DL (ref 0.2–1)
WBC # BLD AUTO: 10.2 K/UL (ref 4.6–13.2)
WBC URNS QL MICRO: ABNORMAL /HPF (ref 0–4)

## 2018-10-08 PROCEDURE — 74018 RADEX ABDOMEN 1 VIEW: CPT

## 2018-10-08 PROCEDURE — 96375 TX/PRO/DX INJ NEW DRUG ADDON: CPT

## 2018-10-08 PROCEDURE — 74011636320 HC RX REV CODE- 636/320: Performed by: EMERGENCY MEDICINE

## 2018-10-08 PROCEDURE — 96361 HYDRATE IV INFUSION ADD-ON: CPT

## 2018-10-08 PROCEDURE — 74011250637 HC RX REV CODE- 250/637: Performed by: PHYSICIAN ASSISTANT

## 2018-10-08 PROCEDURE — 87077 CULTURE AEROBIC IDENTIFY: CPT | Performed by: FAMILY MEDICINE

## 2018-10-08 PROCEDURE — 83690 ASSAY OF LIPASE: CPT | Performed by: PHYSICIAN ASSISTANT

## 2018-10-08 PROCEDURE — 74011250636 HC RX REV CODE- 250/636: Performed by: HOSPITALIST

## 2018-10-08 PROCEDURE — 77030027138 HC INCENT SPIROMETER -A

## 2018-10-08 PROCEDURE — 82962 GLUCOSE BLOOD TEST: CPT

## 2018-10-08 PROCEDURE — 99284 EMERGENCY DEPT VISIT MOD MDM: CPT

## 2018-10-08 PROCEDURE — 80053 COMPREHEN METABOLIC PANEL: CPT | Performed by: PHYSICIAN ASSISTANT

## 2018-10-08 PROCEDURE — 36415 COLL VENOUS BLD VENIPUNCTURE: CPT | Performed by: HOSPITALIST

## 2018-10-08 PROCEDURE — 93005 ELECTROCARDIOGRAM TRACING: CPT

## 2018-10-08 PROCEDURE — 85025 COMPLETE CBC W/AUTO DIFF WBC: CPT | Performed by: PHYSICIAN ASSISTANT

## 2018-10-08 PROCEDURE — 74177 CT ABD & PELVIS W/CONTRAST: CPT

## 2018-10-08 PROCEDURE — 87086 URINE CULTURE/COLONY COUNT: CPT | Performed by: FAMILY MEDICINE

## 2018-10-08 PROCEDURE — 81001 URINALYSIS AUTO W/SCOPE: CPT | Performed by: PHYSICIAN ASSISTANT

## 2018-10-08 PROCEDURE — 74011000258 HC RX REV CODE- 258: Performed by: EMERGENCY MEDICINE

## 2018-10-08 PROCEDURE — 96374 THER/PROPH/DIAG INJ IV PUSH: CPT

## 2018-10-08 PROCEDURE — 74011250636 HC RX REV CODE- 250/636: Performed by: EMERGENCY MEDICINE

## 2018-10-08 PROCEDURE — 65270000029 HC RM PRIVATE

## 2018-10-08 PROCEDURE — 74011000258 HC RX REV CODE- 258: Performed by: PHYSICIAN ASSISTANT

## 2018-10-08 PROCEDURE — 0D9670Z DRAINAGE OF STOMACH WITH DRAINAGE DEVICE, VIA NATURAL OR ARTIFICIAL OPENING: ICD-10-PCS | Performed by: RADIOLOGY

## 2018-10-08 PROCEDURE — 74011250636 HC RX REV CODE- 250/636: Performed by: PHYSICIAN ASSISTANT

## 2018-10-08 RX ORDER — DIPHENHYDRAMINE HYDROCHLORIDE 50 MG/ML
12.5 INJECTION, SOLUTION INTRAMUSCULAR; INTRAVENOUS
Status: COMPLETED | OUTPATIENT
Start: 2018-10-08 | End: 2018-10-08

## 2018-10-08 RX ORDER — MORPHINE SULFATE 2 MG/ML
2 INJECTION, SOLUTION INTRAMUSCULAR; INTRAVENOUS
Status: DISCONTINUED | OUTPATIENT
Start: 2018-10-08 | End: 2018-10-13 | Stop reason: HOSPADM

## 2018-10-08 RX ORDER — ONDANSETRON 2 MG/ML
4 INJECTION INTRAMUSCULAR; INTRAVENOUS
Status: DISCONTINUED | OUTPATIENT
Start: 2018-10-08 | End: 2018-10-10 | Stop reason: SDUPTHER

## 2018-10-08 RX ORDER — SODIUM CHLORIDE 9 MG/ML
100 INJECTION, SOLUTION INTRAVENOUS CONTINUOUS
Status: DISCONTINUED | OUTPATIENT
Start: 2018-10-08 | End: 2018-10-13 | Stop reason: HOSPADM

## 2018-10-08 RX ORDER — MORPHINE SULFATE 2 MG/ML
2 INJECTION, SOLUTION INTRAMUSCULAR; INTRAVENOUS ONCE
Status: COMPLETED | OUTPATIENT
Start: 2018-10-08 | End: 2018-10-08

## 2018-10-08 RX ORDER — ONDANSETRON 2 MG/ML
4 INJECTION INTRAMUSCULAR; INTRAVENOUS
Status: COMPLETED | OUTPATIENT
Start: 2018-10-08 | End: 2018-10-08

## 2018-10-08 RX ORDER — METOCLOPRAMIDE HYDROCHLORIDE 5 MG/ML
10 INJECTION INTRAMUSCULAR; INTRAVENOUS
Status: COMPLETED | OUTPATIENT
Start: 2018-10-08 | End: 2018-10-08

## 2018-10-08 RX ORDER — MORPHINE SULFATE 2 MG/ML
4 INJECTION, SOLUTION INTRAMUSCULAR; INTRAVENOUS ONCE
Status: COMPLETED | OUTPATIENT
Start: 2018-10-08 | End: 2018-10-08

## 2018-10-08 RX ORDER — SCOLOPAMINE TRANSDERMAL SYSTEM 1 MG/1
1 PATCH, EXTENDED RELEASE TRANSDERMAL
Status: DISCONTINUED | OUTPATIENT
Start: 2018-10-08 | End: 2018-10-13 | Stop reason: HOSPADM

## 2018-10-08 RX ORDER — SODIUM CHLORIDE 900 MG/100ML
INJECTION INTRAVENOUS
Status: DISPENSED
Start: 2018-10-08 | End: 2018-10-09

## 2018-10-08 RX ORDER — ACETAMINOPHEN 325 MG/1
650 TABLET ORAL
Status: DISCONTINUED | OUTPATIENT
Start: 2018-10-08 | End: 2018-10-13 | Stop reason: HOSPADM

## 2018-10-08 RX ORDER — HEPARIN SODIUM 5000 [USP'U]/ML
5000 INJECTION, SOLUTION INTRAVENOUS; SUBCUTANEOUS EVERY 8 HOURS
Status: DISCONTINUED | OUTPATIENT
Start: 2018-10-08 | End: 2018-10-13 | Stop reason: HOSPADM

## 2018-10-08 RX ORDER — SODIUM CHLORIDE 9 MG/ML
1000 INJECTION, SOLUTION INTRAVENOUS ONCE
Status: COMPLETED | OUTPATIENT
Start: 2018-10-08 | End: 2018-10-08

## 2018-10-08 RX ADMIN — MORPHINE SULFATE 2 MG: 2 INJECTION, SOLUTION INTRAMUSCULAR; INTRAVENOUS at 18:51

## 2018-10-08 RX ADMIN — METOCLOPRAMIDE 10 MG: 5 INJECTION, SOLUTION INTRAMUSCULAR; INTRAVENOUS at 15:00

## 2018-10-08 RX ADMIN — DIPHENHYDRAMINE HYDROCHLORIDE 12.5 MG: 50 INJECTION INTRAMUSCULAR; INTRAVENOUS at 16:57

## 2018-10-08 RX ADMIN — SODIUM CHLORIDE 1000 ML: 900 INJECTION, SOLUTION INTRAVENOUS at 14:58

## 2018-10-08 RX ADMIN — PROMETHAZINE HYDROCHLORIDE 12.5 MG: 25 INJECTION, SOLUTION INTRAMUSCULAR; INTRAVENOUS at 16:59

## 2018-10-08 RX ADMIN — MORPHINE SULFATE 4 MG: 2 INJECTION, SOLUTION INTRAMUSCULAR; INTRAVENOUS at 15:41

## 2018-10-08 RX ADMIN — ONDANSETRON HYDROCHLORIDE 4 MG: 2 INJECTION INTRAMUSCULAR; INTRAVENOUS at 21:38

## 2018-10-08 RX ADMIN — MORPHINE SULFATE 2 MG: 2 INJECTION, SOLUTION INTRAMUSCULAR; INTRAVENOUS at 21:38

## 2018-10-08 RX ADMIN — ONDANSETRON 4 MG: 2 INJECTION INTRAMUSCULAR; INTRAVENOUS at 15:02

## 2018-10-08 RX ADMIN — SODIUM CHLORIDE 100 ML/HR: 900 INJECTION, SOLUTION INTRAVENOUS at 21:05

## 2018-10-08 RX ADMIN — IOPAMIDOL 100 ML: 612 INJECTION, SOLUTION INTRAVENOUS at 16:02

## 2018-10-08 RX ADMIN — PROMETHAZINE HYDROCHLORIDE 12.5 MG: 25 INJECTION INTRAMUSCULAR; INTRAVENOUS at 20:17

## 2018-10-08 NOTE — IP AVS SNAPSHOT
[address] Patient: Toni Brooke MRN: [id number] UNP:[id number] About your hospitalization You were admitted on:  October 8, 2018 You last received care in the:  [facility] 1978 You were discharged on:  October 13, 2018 Why you were hospitalized Your primary diagnosis was:  Not on File Your diagnoses also included:  Intractable Nausea And Vomiting, Abdominal Pain, Gastric Outlet Obstruction Follow-up Information Follow up With Details Comments Contact Info Marnie Shane MD   will call on Monday for appointment. Will call patient with same. [address] [facility] [address] [phone] Your Scheduled Appointments Friday December 07, 2018  9:00 AM EST Follow Up with Marnie Shane MD  
[facility] [place] [facility]) [address] [place] [address], [address] [facility] [address] [phone] Discharge Orders None A check shivam indicates which time of day the medication should be taken. My Medications START taking these medications Instructions Each Dose to Equal  
 Morning Noon Evening Bedtime  
 scopolamine 1 mg over 3 days Pt3d Commonly known as:  TRANSDERM-SCOP Start taking on:  10/14/2018 Your last dose was: Your next dose is:    
   
   
 1 Patch by TransDERmal route every seventy-two (72) hours. 1 Patch CHANGE how you take these medications Instructions Each Dose to Equal  
 Morning Noon Evening Bedtime  
 docusate sodium 100 mg capsule Commonly known as:  Marcina Pillion What changed:  when to take this Your last dose was: Your next dose is: Take 1 Cap by mouth two (2) times a day. 100 mg  
    
   
   
   
  
 ZUPLENZ 4 mg Film Generic drug:  Ondansetron What changed:  Another medication with the same name was removed. Continue taking this medication, and follow the directions you see here. Your last dose was: Your next dose is: Take  by mouth. CONTINUE taking these medications Instructions Each Dose to Equal  
 Morning Noon Evening Bedtime  
 albuterol 90 mcg/actuation inhaler Commonly known as:  PROVENTIL HFA, VENTOLIN HFA, PROAIR HFA Your last dose was: Your next dose is: Take  by inhalation. amLODIPine 10 mg tablet Commonly known as:  Linnette Greenlino Your last dose was: Your next dose is: TAKE ONE TABLET BY MOUTH DAILY  
     
   
   
   
  
 atorvastatin 40 mg tablet Commonly known as:  LIPITOR Your last dose was: Your next dose is: TAKE ONE TABLET BY MOUTH DAILY  
     
   
   
   
  
 azelastine 137 mcg (0.1 %) nasal spray Commonly known as:  ASTELIN Your last dose was: Your next dose is:    
   
   
 by Nasal route. carvedilol 25 mg tablet Commonly known as:  Berenicejose m Johnsonpastpercy Your last dose was: Your next dose is: TAKE ONE TABLET BY MOUTH TWICE A DAY WITH MEALS  
     
   
   
   
  
 cloNIDine HCl 0.2 mg tablet Commonly known as:  CATAPRES Your last dose was: Your next dose is:    
   
   
      
   
   
   
  
 clopidogrel 75 mg Tab Commonly known as:  PLAVIX Your last dose was: Your next dose is: TAKE 1 TABLET DAILY  Indications: Resume on 7/19/2018 DALIRESP Tab tablet Generic drug:  roflumilast  
   
Your last dose was: Your next dose is: TAKE ONE TABLET BY MOUTH DAILY  
     
   
   
   
  
 desmopressin 0.2 mg tablet Commonly known as:  DDAVP Your last dose was: Your next dose is: Take 1 Tab by mouth nightly. 0.2 mg  
    
   
   
   
  
 diclofenac 1 % Gel Commonly known as:  VOLTAREN Your last dose was: Your next dose is:    
   
   
 Apply 2 g to affected area four (4) times daily. 2 g  
    
   
   
   
  
 esomeprazole 20 mg capsule Commonly known as:  NexIUM Your last dose was: Your next dose is: Take 1 Cap by mouth daily. STOP PREVACID  Indications: HEARTBURN  
 20 mg  
    
   
   
   
  
 fluticasone-salmeterol 250-50 mcg/dose diskus inhaler Commonly known as:  ADVAIR DISKUS Your last dose was: Your next dose is: Take 1 Puff by inhalation daily. 1 Puff * glucose blood VI test strips strip Commonly known as:  blood glucose test  
   
Your last dose was: Your next dose is: One touch test strips. To use with one touch delica  Patient to check blood sugar daily E11.9  
     
   
   
   
  
 * glucose blood VI test strips strip Commonly known as:  ONETOUCH ULTRA BLUE TEST STRIP Your last dose was: Your next dose is: To check the blood sugars daily  
     
   
   
   
  
 hydrALAZINE 25 mg tablet Commonly known as:  APRESOLINE Your last dose was: Your next dose is: Take 1 Tab by mouth two (2) times a day. 25 mg * Lancets Misc Commonly known as:  ONETOUCH ULTRASOFT LANCETS Your last dose was: Your next dose is: To use with blood for glucose monitoring * lancets 33 gauge Misc Commonly known as: One Touch Karin Arguelles Your last dose was: Your next dose is: To use with blood for glucose monitoring  
     
   
   
   
  
 losartan 50 mg tablet Commonly known as:  COZAAR Your last dose was: Your next dose is:    
   
   
      
   
   
   
  
 mirtazapine 15 mg tablet Commonly known as:  Bell Hilliard  
   
 Your last dose was: Your next dose is: TAKE ONE TABLET BY MOUTH EVERY EVENING  
     
   
   
   
  
 montelukast 10 mg tablet Commonly known as:  SINGULAIR Your last dose was: Your next dose is: TAKE ONE TABLET BY MOUTH DAILY TOBRADEX ST Drps ophthalmix suspension Generic drug:  tobramycin-dexamethasone Your last dose was: Your next dose is: * Notice: This list has 4 medication(s) that are the same as other medications prescribed for you. Read the directions carefully, and ask your doctor or other care provider to review them with you. STOP taking these medications HYDROcodone-acetaminophen  mg tablet Commonly known as:  NORCO  
   
  
 lansoprazole 30 mg capsule Commonly known as:  PREVACID  
   
  
 omeprazole 40 mg capsule Commonly known as:  PRILOSEC  
   
  
 oxyCODONE-acetaminophen  mg per tablet Commonly known as:  PERCOCET 10  
   
  
 pantoprazole 40 mg tablet Commonly known as:  PROTONIX  
   
  
 raNITIdine 150 mg tablet Commonly known as:  ZANTAC  
   
  
 triamterene-hydroCHLOROthiazide 37.5-25 mg per capsule Commonly known as:  Ayan Matthews Where to Get Your Medications Information on where to get these meds will be given to you by the nurse or doctor. ! Ask your nurse or doctor about these medications  
  scopolamine 1 mg over 3 days Pt3d Discharge Instructions [employer] Activation Thank you for requesting access to [employer]. Please follow the instructions below to securely access and download your online medical record. [employer] allows you to send messages to your doctor, view your test results, renew your prescriptions, schedule appointments, and more. How Do I Sign Up? 1. In your internet browser, go to [email or web address] 
 2. Click on the First Time User? Click Here link in the Sign In box. You will be redirect to the New Member Sign Up page. 3. Enter your [employer] Access Code exactly as it appears below. You will not need to use this code after youve completed the sign-up process. If you do not sign up before the expiration date, you must request a new code. [email or web address] Access Code: Activation code not generated Current Gem[employer]imo Status: Patient Declined (This is the date your MyChart access code will ) 4. Enter the last four digits of your Social Security Number (xxxx) and Date of Birth (mm/dd/yyyy) as indicated and click Submit. You will be taken to the next sign-up page. 5. Create a [email or web address] ID. This will be your Gem[employer]imo login ID and cannot be changed, so think of one that is secure and easy to remember. 6. Create a Gem[employer] password. You can change your password at any time. 7. Enter your Password Reset Question and Answer. This can be used at a later time if you forget your password. 8. Enter your e-mail address. You will receive e-mail notification when new information is available in [address] 9. Click Sign Up. You can now view and download portions of your medical record. 10. Click the Download Summary menu link to download a portable copy of your medical information. Additional Information If you have questions, please visit the Frequently Asked Questions section of the [employer] website at [email or web address] [email or web address] Remember, [employer] is NOT to be used for urgent needs. For medical emergencies, dial 911. Patient armband removed and shredded DISCHARGE SUMMARY from Nurse PATIENT INSTRUCTIONS: 
 
After general anesthesia or intravenous sedation, for 24 hours or while taking prescription Narcotics: · Limit your activities · Do not drive and operate hazardous machinery · Do not make important personal or business decisions · Do  not drink alcoholic beverages · If you have not urinated within 8 hours after discharge, please contact your surgeon on call. Report the following to your surgeon: 
· Excessive pain, swelling, redness or odor of or around the surgical area · Temperature over 100.5 · Nausea and vomiting lasting longer than 4 hours or if unable to take medications · Any signs of decreased circulation or nerve impairment to extremity: change in color, persistent  numbness, tingling, coldness or increase pain · Any questions What to do at Home: 
Recommended activity: Activity as tolerated, No heavy lifting, pushing, pulling with the surgical and No driving while on analgesics. ,Please follow up with one week. . 
 
*  Please give a list of your current medications to your Primary Care Provider. *  Please update this list whenever your medications are discontinued, doses are 
    changed, or new medications (including over-the-counter products) are added. *  Please carry medication information at all times in case of emergency situations. These are general instructions for a healthy lifestyle: No smoking/ No tobacco products/ Avoid exposure to second hand smoke Surgeon General's Warning:  Quitting smoking now greatly reduces serious risk to your health. Obesity, smoking, and sedentary lifestyle greatly increases your risk for illness A healthy diet, regular physical exercise & weight monitoring are important for maintaining a healthy lifestyle You may be retaining fluid if you have a history of heart failure or if you experience any of the following symptoms:  Weight gain of 3 pounds or more overnight or 5 pounds in a week, increased swelling in our hands or feet or shortness of breath while lying flat in bed. Please call your doctor as soon as you notice any of these symptoms; do not wait until your next office visit. Recognize signs and symptoms of STROKE: 
 
F-face looks uneven A-arms unable to move or move unevenly S-speech slurred or non-existent T-time-call 911 as soon as signs and symptoms begin-DO NOT go Back to bed or wait to see if you get better-TIME IS BRAIN. Warning Signs of HEART ATTACK Call 911 if you have these symptoms: 
? Chest discomfort. Most heart attacks involve discomfort in the center of the chest that lasts more than a few minutes, or that goes away and comes back. It can feel like uncomfortable pressure, squeezing, fullness, or pain. ? Discomfort in other areas of the upper body. Symptoms can include pain or discomfort in one or both arms, the back, neck, jaw, or stomach. ? Shortness of breath with or without chest discomfort. ? Other signs may include breaking out in a cold sweat, nausea, or lightheadedness. Don't wait more than five minutes to call [address]! Fast action can save your life. Calling 911 is almost always the fastest way to get lifesaving treatment. Emergency Medical Services staff can begin treatment when they arrive  up to an hour sooner than if someone gets to the hospital by car. The discharge information has been reviewed with the patient. The patient verbalized understanding. Discharge medications reviewed with the patient and appropriate educational materials and side effects teaching were provided. ___________________________________________________________________________________________________________________________________ [facility] Announcement We are excited to announce that we are making your provider's discharge notes available to you in [employer]. You will see these notes when they are completed and signed by the physician that discharged you from your recent hospital stay. If you have any questions or concerns about any information you see in [employer], please call the Health Information Department where you were seen or reach out to your Primary Care Provider for more information about your plan of care. Introducing Nico Elise As a [employer] [facility] patient, I wanted to make you aware of our electronic visit tool called Nico Elise. demandmart allows you to connect within minutes with a medical provider 24 hours a day, seven days a week via a mobile device or tablet or logging into a secure website from your computer. You can access Nico Elise from anywhere in the [place]. A virtual visit might be right for you when you have a simple condition and feel like you just dont want to get out of bed, or cant get away from work for an appointment, when your regular [facility] provider is not available (evenings, weekends or holidays), or when youre out of town and need minor care. Electronic visits cost only $49 and if the tenKsolar/[employer] provider determines a prescription is needed to treat your condition, one can be electronically transmitted to a nearby pharmacy*. Please take a moment to enroll today if you have not already done so. The enrollment process is free and takes just a few minutes. To enroll, please download the demandmart bruna to your tablet or phone, or visit [email or web address] to enroll on your computer. And, as an [address] [place] [address] patient with a [employer] account, the results of your visits will be scanned into your electronic medical record and your primary care provider will be able to view the scanned results. We urge you to continue to see your regular [employer] [facility] provider for your ongoing medical care. And while your primary care provider may not be the one available when you seek a Nico Elise virtual visit, the peace of mind you get from getting a real diagnosis real time can be priceless. For more information on Nico Elise, view our Frequently Asked Questions (FAQs) at [email or web address]. Sincerely, 
 
Andrew Hylton MD 
Chief Medical Officer Edith Oleary *:  certain medications cannot be prescribed via Nico Elise Providers Seen During Your Hospitalization Provider Specialty Primary office phone Sonia Peoples MD Emergency Medicine [phone] Rosalee Bowman MD Internal Medicine [phone] Nick Spencer MD [facility] [phone] Zaire Gutierrez MD Internal Medicine [phone] Immunizations Administered for This Admission Name Date Influenza Vaccine (Quad) PF  Deferred () Your Primary Care Physician (PCP) Primary Care Physician Office Phone Office Fax [address], [address] [place] [address] [address] [phone] You are allergic to the following Allergen Reactions Pcn (Penicillins) Anaphylaxis Tetracycline Anaphylaxis Sulfabenzamide Hives Recent Documentation Height Weight BMI OB Status Smoking Status 1.549 m 75.3 kg 31.37 kg/m2 Postmenopausal Former Smoker Emergency Contacts Name Discharge Info Relation Home Work Mobile [facility] DISCHARGE CAREGIVER [3] Daughter [21] [phone] Patient Belongings The following personal items are in your possession at time of discharge: 
  Dental Appliances: None  Visual Aid: None      Home Medications: None   Jewelry: None  Clothing: None    Other Valuables: None Please provide this summary of care documentation to your next provider. Signatures-by signing, you are acknowledging that this After Visit Summary has been reviewed with you and you have received a copy. Patient Signature:  ____________________________________________________________ Date:  ____________________________________________________________  
  
Bernabea Staple Provider Signature:  ____________________________________________________________ Date:  ____________________________________________________________

## 2018-10-08 NOTE — ED TRIAGE NOTES
Per daughter, patient has had abdominal pain, n/v for the past 2 days. When she void this morning she had clear but she is c/o pain left flank and abdomin.

## 2018-10-08 NOTE — ED NOTES
Bedside shift change report given to [address] [facility] [address]  N (oncoming nurse) by Elio Simpson (offgoing nurse). Report included the following information ED Summary.

## 2018-10-08 NOTE — ED PROVIDER NOTES
EMERGENCY DEPARTMENT HISTORY AND PHYSICAL EXAM 
 
3:07 PM 
 
 
Date: 10/8/2018 Patient Name: Beni Townsend History of Presenting Illness Chief Complaint Patient presents with  Abdominal Pain  Nausea  Vomiting History Provided By: Patient's Daughter Chief Complaint: Abdominal Pain Duration: 1 Days Timing:  Acute Location: Left Lower Abdomen Quality: N/A Severity: 10 out of 10 Modifying Factors: No modifying or aggravating factors were reported. Associated Symptoms: Vomiting, Flank Pain Additional History (Context): Beni Townsend is a 66 y.o. female with HTN, GERD, hysterectomy, appendectomy, cholecystectomy, lap band with removal, AAA and Chronic Kidney Disease who presents with Acute left lower abdominal pain which began 1 day ago and worsened throughout the night. Pt's daughter reports the pt was not feeling well yesterday and then started having severe abdominal, flank pain, and vomiting  throughout the night. Pt rates her pain a 10/10 in intensity. Pt's daughter reports she has a history of AAA and has a non functioning Right kidney. Pt denies diarrhea, burning with urination,or decreased urine frequency. No other symptoms or concerns were expressed. PCP: Lacey Roberto MD 
 
Current Facility-Administered Medications Medication Dose Route Frequency Provider Last Rate Last Dose  scopolamine (TRANSDERM-SCOP) 1 mg over 3 days 1 Patch  1 Patch TransDERmal Q72H MAKAYLA Madsen   1 Patch at 10/08/18 6321 Current Outpatient Prescriptions Medication Sig Dispense Refill  ondansetron (ZOFRAN ODT) 8 mg disintegrating tablet Take 1 Tab by mouth every eight (8) hours as needed for Nausea.  30 Tab 0  
 omeprazole (PRILOSEC) 40 mg capsule 1 cap each AM.  Stop ranitidine 30 Cap 3  
 oxyCODONE-acetaminophen (PERCOCET 10)  mg per tablet 1 tab every six hours as needed for pain 120 Tab 0  
  Lancets (ONETOUCH ULTRASOFT LANCETS) misc To use with blood for glucose monitoring 1 Each 11  
 glucose blood VI test strips (ONETOUCH ULTRA BLUE TEST STRIP) strip To check the blood sugars daily 100 Strip 2  
 lancets (ONE TOUCH DELICA) 33 gauge misc To use with blood for glucose monitoring 1 Package 3  
 amLODIPine (NORVASC) 10 mg tablet TAKE ONE TABLET BY MOUTH DAILY 30 Tab 0  
 glucose blood VI test strips (BLOOD GLUCOSE TEST) strip One touch test strips. To use with one touch delica  Patient to check blood sugar daily E11.9 100 Strip 11  
 diclofenac (VOLTAREN) 1 % gel Apply 2 g to affected area four (4) times daily. 1 Each 2  
 carvedilol (COREG) 25 mg tablet TAKE ONE TABLET BY MOUTH TWICE A DAY WITH MEALS 60 Tab 5  TOBRADEX ST drps ophthalmix suspension  pantoprazole (PROTONIX) 40 mg tablet  losartan (COZAAR) 50 mg tablet  cloNIDine HCl (CATAPRES) 0.2 mg tablet  raNITIdine (ZANTAC) 150 mg tablet TAKE ONE TABLET BY MOUTH TWICE A DAY 60 Tab 3  
 mirtazapine (REMERON) 15 mg tablet TAKE ONE TABLET BY MOUTH EVERY EVENING 30 Tab 0  
 HYDROcodone-acetaminophen (NORCO)  mg tablet Take 1 Tab by mouth every six (6) hours as needed for Pain. Max Daily Amount: 4 Tabs. 120 Tab 0  
 atorvastatin (LIPITOR) 40 mg tablet TAKE ONE TABLET BY MOUTH DAILY 30 Tab 1  
 montelukast (SINGULAIR) 10 mg tablet TAKE ONE TABLET BY MOUTH DAILY 30 Tab 3  
 DALIRESP tab tablet TAKE ONE TABLET BY MOUTH DAILY 30 Tab 3  
 triamterene-hydroCHLOROthiazide (DYAZIDE) 37.5-25 mg per capsule TAKE ONE CAPSULE BY MOUTH EVERY MORNING 30 Cap 2  
 desmopressin (DDAVP) 0.2 mg tablet Take 1 Tab by mouth nightly. 90 Tab 0  clopidogrel (PLAVIX) 75 mg tab TAKE 1 TABLET DAILY  Indications: Resume on 7/19/2018 90 Tab 2  
 albuterol (PROVENTIL HFA, VENTOLIN HFA, PROAIR HFA) 90 mcg/actuation inhaler Take  by inhalation.  Ondansetron (ZUPLENZ) 4 mg film Take  by mouth.  azelastine (ASTELIN) 137 mcg (0.1 %) nasal spray by Nasal route.  lansoprazole (PREVACID) 30 mg capsule Take 1 Cap by mouth.  fluticasone-salmeterol (ADVAIR DISKUS) 250-50 mcg/dose diskus inhaler Take 1 Puff by inhalation daily. 1 Inhaler 5  
 hydrALAZINE (APRESOLINE) 25 mg tablet Take 1 Tab by mouth two (2) times a day. 60 Tab 6  
 docusate sodium (COLACE) 100 mg capsule Take 1 Cap by mouth two (2) times a day. (Patient taking differently: Take 100 mg by mouth daily.) 60 Cap 5  esomeprazole (NEXIUM) 20 mg capsule Take 1 Cap by mouth daily. STOP PREVACID  Indications: HEARTBURN 30 Cap 1 Past History Past Medical History: 
Past Medical History:  
Diagnosis Date  Aorto-iliac duplex 09/17/2015 Patent abdom aorta endovascular graft w/o leak. Mod stenosis suggested in right limb.  Calculus of kidney 2014  
 stent/kidney  Carotid duplex 09/17/2015 Mild < 50% bilateral plaquing.  Cataract, left  Chronic kidney disease   
 one functioning kidney  Chronic obstructive pulmonary disease ([address]  Echocardiogram 11/04/2014 EF 56%. No WMA. Mild LAE. Echo is within normal limits.  Esophageal reflux  Generalized osteoarthrosis, involving multiple sites  GERD (gastroesophageal reflux disease)  Gout  History of renal stent   
 right side  Hypercholesteremia  Hypertension   
 resolved  Lower extremity arterial testing 03/27/2015 Normal perfusion at rest and w/treadmill bilaterally. R KORI 1.05.  L KORI 1.03.  
 Murmur   
 patient reports that she is aware- has been told in the past  
 Nausea & vomiting  Renal artery stenosis (HCC)  S/P AAA (abdominal aortic aneurysm) repair 9/20/2018  Subclinical hyperthyroidism 8/2015  Unspecified sleep apnea   
 resolved Past Surgical History: 
Past Surgical History:  
Procedure Laterality Date  ADJUSTMENT GASTRIC BAND N/A 11/10/2016  MAKAYLA Hay  
  FULL ESOPHAGEAL MANOMETRY  5/21/2018  HX APPENDECTOMY  HX BLADDER SUSPENSION    
 HX CHOLECYSTECTOMY  HX GI    
 lap band 2012  HX GI  07/2018  
 lap band removal  
 HX HEENT Right 1990s, 2016 Ear sx  HX HYSTERECTOMY  HX OTHER SURGICAL  10/27/14 Bilateral open femoral exposures, Placement of catheter and sheath in the aorta Endo repair of aortic aneurysm with modular bifurcated device  Interpretation of angiography, intravascular ultrasound (IVUS) catheter  HX OTHER SURGICAL  10/27/2014 Endurant II abdominal stent graft implant  HX UROLOGICAL    
 stent  VASCULAR SURGERY PROCEDURE UNLIST    
 surgery for abdominal aneurysm Family History: 
Family History Problem Relation Age of Onset  Cancer Mother  Diabetes Father  Alcohol abuse Father  Heart Disease Maternal Grandmother  Alzheimer Maternal Grandmother  Diabetes Sister Social History: 
Social History Substance Use Topics  Smoking status: Former Smoker Packs/day: 0.00 Years: 0.00 Types: Cigarettes Quit date: 1/25/2016  Smokeless tobacco: Never Used  Alcohol use No  
 
 
Allergies: Allergies Allergen Reactions  Pcn [Penicillins] Anaphylaxis  Tetracycline Anaphylaxis  Sulfabenzamide Hives Review of Systems Review of Systems Constitutional: Negative for chills and fever. HENT: Negative. Respiratory: Negative for shortness of breath. Cardiovascular: Negative for chest pain. Gastrointestinal: Positive for abdominal pain, nausea and vomiting. Negative for diarrhea. Genitourinary: Positive for flank pain. Negative for decreased urine volume. Skin: Negative for rash. Neurological: Negative. Negative for weakness. All other systems reviewed and are negative. Physical Exam  
 
Visit Vitals  /78 (BP 1 Location: Left arm, BP Patient Position: At rest)  Pulse 81  Temp 98.6 °F (37 °C)  Resp 18  Ht 5' 1\" (1.549 m)  Wt 75.3 kg (166 lb)  SpO2 96%  BMI 31.37 kg/m2 Physical Exam  
Constitutional: She is oriented to person, place, and time. She appears well-developed and well-nourished. She appears distressed. Vomiting HENT:  
Head: Normocephalic and atraumatic. Right Ear: Hearing, tympanic membrane, external ear and ear canal normal.  
Left Ear: Hearing, tympanic membrane, external ear and ear canal normal.  
Nose: Nose normal.  
Mouth/Throat: Uvula is midline, oropharynx is clear and moist and mucous membranes are normal.  
Eyes: Conjunctivae are normal.  
Neck: Normal range of motion. Neck supple. Cardiovascular: Normal rate and regular rhythm. Pulmonary/Chest: Effort normal and breath sounds normal.  
Abdominal: Soft. Normal appearance. There is tenderness in the right upper quadrant and left upper quadrant. There is no rigidity, no rebound, no guarding, no CVA tenderness and negative Sawant's sign. Musculoskeletal: Normal range of motion. Lymphadenopathy:  
  She has no cervical adenopathy. Neurological: She is alert and oriented to person, place, and time. Skin: Skin is warm and dry. No rash noted. She is not diaphoretic. Psychiatric: She has a normal mood and affect. Diagnostic Study Results Labs - Recent Results (from the past 12 hour(s)) CBC WITH AUTOMATED DIFF Collection Time: 10/08/18  2:29 PM  
Result Value Ref Range WBC 10.2 4.6 - 13.2 K/uL  
 RBC 5.19 4.20 - 5.30 M/uL  
 HGB 15.1 12.0 - 16.0 g/dL HCT 40.2 35.0 - 45.0 % MCV 77.5 74.0 - 97.0 FL  
 MCH 29.1 24.0 - 34.0 PG  
 MCHC 37.6 (H) 31.0 - 37.0 g/dL  
 RDW 12.1 11.6 - 14.5 % PLATELET 013 468 - 540 K/uL MPV 10.2 9.2 - 11.8 FL  
 NEUTROPHILS 86 (H) 40 - 73 % LYMPHOCYTES 10 (L) 21 - 52 % MONOCYTES 4 3 - 10 % EOSINOPHILS 0 0 - 5 % BASOPHILS 0 0 - 2 %  
 ABS. NEUTROPHILS 8.8 (H) 1.8 - 8.0 K/UL  
 ABS. LYMPHOCYTES 1.0 0.9 - 3.6 K/UL ABS. MONOCYTES 0.4 0.05 - 1.2 K/UL  
 ABS. EOSINOPHILS 0.0 0.0 - 0.4 K/UL  
 ABS. BASOPHILS 0.0 0.0 - 0.1 K/UL  
 DF AUTOMATED METABOLIC PANEL, COMPREHENSIVE Collection Time: 10/08/18  2:29 PM  
Result Value Ref Range Sodium 121 (L) 136 - 145 mmol/L Potassium 4.3 3.5 - 5.5 mmol/L Chloride 83 (L) 100 - 108 mmol/L  
 CO2 26 21 - 32 mmol/L Anion gap 12 3.0 - 18 mmol/L Glucose 136 (H) 74 - 99 mg/dL BUN 11 7.0 - 18 MG/DL Creatinine 1.06 0.6 - 1.3 MG/DL  
 BUN/Creatinine ratio 10 (L) 12 - 20 GFR est AA >60 >60 ml/min/1.73m2 GFR est non-AA 53 (L) >60 ml/min/1.73m2 Calcium 9.1 8.5 - 10.1 MG/DL Bilirubin, total 1.2 (H) 0.2 - 1.0 MG/DL  
 ALT (SGPT) 33 13 - 56 U/L  
 AST (SGOT) 30 15 - 37 U/L Alk. phosphatase 110 45 - 117 U/L Protein, total 8.3 (H) 6.4 - 8.2 g/dL Albumin 4.1 3.4 - 5.0 g/dL Globulin 4.2 (H) 2.0 - 4.0 g/dL A-G Ratio 1.0 0.8 - 1.7 LIPASE Collection Time: 10/08/18  2:29 PM  
Result Value Ref Range Lipase 129 73 - 393 U/L  
URINALYSIS W/ RFLX MICROSCOPIC Collection Time: 10/08/18  4:38 PM  
Result Value Ref Range Color YELLOW Appearance CLEAR Specific gravity 1.025 1.005 - 1.030    
 pH (UA) 7.0 5.0 - 8.0 Protein 30 (A) NEG mg/dL Glucose NEGATIVE  NEG mg/dL Ketone 15 (A) NEG mg/dL Bilirubin NEGATIVE  NEG Blood MODERATE (A) NEG Urobilinogen 0.2 0.2 - 1.0 EU/dL Nitrites NEGATIVE  NEG Leukocyte Esterase TRACE (A) NEG URINE MICROSCOPIC ONLY Collection Time: 10/08/18  4:38 PM  
Result Value Ref Range WBC 0 to 2 0 - 4 /hpf  
 RBC 5 to 8 0 - 5 /hpf Epithelial cells 1+ 0 - 5 /lpf Bacteria FEW (A) NEG /hpf Radiologic Studies -  
CT ABD PELV W CONT Final Result Medical Decision Making I am the first provider for this patient. I reviewed the vital signs, available nursing notes, past medical history, past surgical history, family history and social history. Provider Notes (Medical Decision Making): PT with hx of kidney stones, AAA c/o left flank pain and left sided abd pain with N/V x last night unresolved with zofran and phenergan. Will check basic labs and CT, control symptoms, hydrate. 5:36 PM Pt continues to vomit despite zofran, reglan, phenergan. Will discuss with GI. Vital Signs-Reviewed the patient's vital signs. Pulse Oximetry Analysis -  98% on room air (Interpretation)Normal 
 
 
 
Records Reviewed: Nursing Notes (Time of Review: 3:07 PM) ED Course: Progress Notes, Reevaluation, and Consults: 
Discussed w/ Dr. Leana Bunch, recommends admission. Discussed w/ Dr Isamar Garzon, would like surgery consult. Will admit. Discussed w/ Dr Steven De La Torre to consult. For Hospitalized Patients: 
 
1. Hospitalization Decision Time: The decision to hospitalize the patient was made by MAKAYLA Reed at 6pm on 10/8/2018 Diagnosis Clinical Impression:  
1. Intractable vomiting without nausea, unspecified vomiting type 2. Left sided abdominal pain Disposition: admit Follow-up Information None Patient's Medications Start Taking No medications on file Continue Taking ALBUTEROL (PROVENTIL HFA, VENTOLIN HFA, PROAIR HFA) 90 MCG/ACTUATION INHALER    Take  by inhalation. AMLODIPINE (NORVASC) 10 MG TABLET    TAKE ONE TABLET BY MOUTH DAILY  
 ATORVASTATIN (LIPITOR) 40 MG TABLET    TAKE ONE TABLET BY MOUTH DAILY AZELASTINE (ASTELIN) 137 MCG (0.1 %) NASAL SPRAY    by Nasal route. CARVEDILOL (COREG) 25 MG TABLET    TAKE ONE TABLET BY MOUTH TWICE A DAY WITH MEALS  
 CLONIDINE HCL (CATAPRES) 0.2 MG TABLET      
 CLOPIDOGREL (PLAVIX) 75 MG TAB    TAKE 1 TABLET DAILY  Indications: Resume on 7/19/2018 DALIRESP TAB TABLET    TAKE ONE TABLET BY MOUTH DAILY DESMOPRESSIN (DDAVP) 0.2 MG TABLET    Take 1 Tab by mouth nightly. DICLOFENAC (VOLTAREN) 1 % GEL    Apply 2 g to affected area four (4) times daily. DOCUSATE SODIUM (COLACE) 100 MG CAPSULE    Take 1 Cap by mouth two (2) times a day. ESOMEPRAZOLE (NEXIUM) 20 MG CAPSULE    Take 1 Cap by mouth daily. STOP PREVACID  Indications: HEARTBURN  
 FLUTICASONE-SALMETEROL (ADVAIR DISKUS) 250-50 MCG/DOSE DISKUS INHALER    Take 1 Puff by inhalation daily. GLUCOSE BLOOD VI TEST STRIPS (BLOOD GLUCOSE TEST) STRIP    One touch test strips. To use with one touch delica  Patient to check blood sugar daily E11.9 GLUCOSE BLOOD VI TEST STRIPS (ONETOUCH ULTRA BLUE TEST STRIP) STRIP    To check the blood sugars daily HYDRALAZINE (APRESOLINE) 25 MG TABLET    Take 1 Tab by mouth two (2) times a day. HYDROCODONE-ACETAMINOPHEN (NORCO)  MG TABLET    Take 1 Tab by mouth every six (6) hours as needed for Pain. Max Daily Amount: 4 Tabs. LANCETS (ONE TOUCH DELICA) 33 GAUGE MISC    To use with blood for glucose monitoring LANCETS (ONETOUCH ULTRASOFT LANCETS) MISC    To use with blood for glucose monitoring LANSOPRAZOLE (PREVACID) 30 MG CAPSULE    Take 1 Cap by mouth. LOSARTAN (COZAAR) 50 MG TABLET      
 MIRTAZAPINE (REMERON) 15 MG TABLET    TAKE ONE TABLET BY MOUTH EVERY EVENING  
 MONTELUKAST (SINGULAIR) 10 MG TABLET    TAKE ONE TABLET BY MOUTH DAILY  
 OMEPRAZOLE (PRILOSEC) 40 MG CAPSULE    1 cap each AM.  Stop ranitidine ONDANSETRON (ZOFRAN ODT) 8 MG DISINTEGRATING TABLET    Take 1 Tab by mouth every eight (8) hours as needed for Nausea. ONDANSETRON (ZUPLENZ) 4 MG FILM    Take  by mouth. OXYCODONE-ACETAMINOPHEN (PERCOCET 10)  MG PER TABLET    1 tab every six hours as needed for pain PANTOPRAZOLE (PROTONIX) 40 MG TABLET      
 RANITIDINE (ZANTAC) 150 MG TABLET    TAKE ONE TABLET BY MOUTH TWICE A DAY  
 TOBRADEX ST DRPS OPHTHALMIX SUSPENSION      
 TRIAMTERENE-HYDROCHLOROTHIAZIDE (DYAZIDE) 37.5-25 MG PER CAPSULE    TAKE ONE CAPSULE BY MOUTH EVERY MORNING These Medications have changed No medications on file Stop Taking No medications on file  
 
_______________________________ Attestations: 
Scribe Attestation Ashley Farias acting as a scribe for and in the presence of [employer], [place] October 08, 2018 at 3:07 PM 
    
Provider Attestation:     
I personally performed the services described in the documentation, reviewed the documentation, as recorded by the scribe in my presence, and it accurately and completely records my words and actions. October 08, 2018 at 3:07 PM - [employer], [place] 
_______________________________

## 2018-10-08 NOTE — Clinical Note
Status[de-identified] INPATIENT [101] Type of Bed: Surgical [18] Inpatient Hospitalization Certified Necessary for the Following Reasons: 3. Patient receiving treatment that can only be provided in an inpatient setting (further clarification in H&P documentation) Admitting Diagnosis: Intractable nausea and vomiting [0778439] Admitting Diagnosis: Abdominal pain [[id number]] Admitting Physician: Funmilayo Ratliff [[id number]] Attending Physician: Funmilayo Ratliff [[id number]] Estimated Length of Stay: 2 Midnights Discharge Plan[de-identified] Home with Office Follow-up

## 2018-10-08 NOTE — PROGRESS NOTES
TRANSFER - IN REPORT: 
 
Verbal report received from [facility], RN (name) on Yanet Almanzar  being received from ED (unit) for routine progression of care Report consisted of patients Situation, Background, Assessment and  
Recommendations(SBAR). Information from the following report(s) SBAR, Kardex, ED Summary, Intake/Output and MAR was reviewed with the receiving nurse. Opportunity for questions and clarification was provided. Assessment completed upon patients arrival to unit and care assumed.

## 2018-10-09 ENCOUNTER — ANESTHESIA (OUTPATIENT)
Dept: ENDOSCOPY | Age: 63
DRG: 381 | End: 2018-10-09
Payer: COMMERCIAL

## 2018-10-09 ENCOUNTER — ANESTHESIA EVENT (OUTPATIENT)
Dept: ENDOSCOPY | Age: 63
DRG: 381 | End: 2018-10-09
Payer: COMMERCIAL

## 2018-10-09 LAB
ALBUMIN SERPL-MCNC: 4.2 G/DL (ref 3.4–5)
ALBUMIN/GLOB SERPL: 1.1 {RATIO} (ref 0.8–1.7)
ALP SERPL-CCNC: 106 U/L (ref 45–117)
ALT SERPL-CCNC: 31 U/L (ref 13–56)
ANION GAP SERPL CALC-SCNC: 11 MMOL/L (ref 3–18)
ANION GAP SERPL CALC-SCNC: 12 MMOL/L (ref 3–18)
APTT PPP: 25.7 SEC (ref 23–36.4)
AST SERPL-CCNC: 23 U/L (ref 15–37)
ATRIAL RATE: 72 BPM
BILIRUB SERPL-MCNC: 1 MG/DL (ref 0.2–1)
BUN BLD-MCNC: 11 MG/DL (ref 7–18)
BUN SERPL-MCNC: 10 MG/DL (ref 7–18)
BUN SERPL-MCNC: 11 MG/DL (ref 7–18)
BUN/CREAT SERPL: 10 (ref 12–20)
BUN/CREAT SERPL: 12 (ref 12–20)
CALCIUM SERPL-MCNC: 9.1 MG/DL (ref 8.5–10.1)
CALCIUM SERPL-MCNC: 9.9 MG/DL (ref 8.5–10.1)
CALCULATED P AXIS, ECG09: 71 DEGREES
CALCULATED R AXIS, ECG10: 63 DEGREES
CALCULATED T AXIS, ECG11: 69 DEGREES
CHLORIDE BLD-SCNC: 85 MMOL/L (ref 100–108)
CHLORIDE SERPL-SCNC: 84 MMOL/L (ref 100–108)
CHLORIDE SERPL-SCNC: 87 MMOL/L (ref 100–108)
CHOLEST SERPL-MCNC: 161 MG/DL
CO2 SERPL-SCNC: 30 MMOL/L (ref 21–32)
CO2 SERPL-SCNC: 38 MMOL/L (ref 21–32)
CREAT SERPL-MCNC: 0.94 MG/DL (ref 0.6–1.3)
CREAT SERPL-MCNC: 0.96 MG/DL (ref 0.6–1.3)
DIAGNOSIS, 93000: NORMAL
ERYTHROCYTE [DISTWIDTH] IN BLOOD BY AUTOMATED COUNT: 12.2 % (ref 11.6–14.5)
GLOBULIN SER CALC-MCNC: 3.8 G/DL (ref 2–4)
GLUCOSE BLD STRIP.AUTO-MCNC: 123 MG/DL (ref 74–106)
GLUCOSE SERPL-MCNC: 109 MG/DL (ref 74–99)
GLUCOSE SERPL-MCNC: 133 MG/DL (ref 74–99)
HCT VFR BLD AUTO: 41.6 % (ref 35–45)
HCT VFR BLD CALC: 46 % (ref 36–49)
HDLC SERPL-MCNC: 77 MG/DL (ref 40–60)
HDLC SERPL: 2.1 {RATIO} (ref 0–5)
HGB BLD-MCNC: 15.3 G/DL (ref 12–16)
HGB BLD-MCNC: 15.6 G/DL (ref 12–16)
LACTATE SERPL-SCNC: 2.8 MMOL/L (ref 0.4–2)
LDLC SERPL CALC-MCNC: 66.2 MG/DL (ref 0–100)
LIPID PROFILE,FLP: ABNORMAL
MCH RBC QN AUTO: 28.5 PG (ref 24–34)
MCHC RBC AUTO-ENTMCNC: 36.8 G/DL (ref 31–37)
MCV RBC AUTO: 77.6 FL (ref 74–97)
P-R INTERVAL, ECG05: 144 MS
PLATELET # BLD AUTO: 331 K/UL (ref 135–420)
PMV BLD AUTO: 10.1 FL (ref 9.2–11.8)
POTASSIUM BLD-SCNC: 3.6 MMOL/L (ref 3.5–5.5)
POTASSIUM SERPL-SCNC: 2.7 MMOL/L (ref 3.5–5.5)
POTASSIUM SERPL-SCNC: 3.9 MMOL/L (ref 3.5–5.5)
PROT SERPL-MCNC: 8 G/DL (ref 6.4–8.2)
Q-T INTERVAL, ECG07: 436 MS
QRS DURATION, ECG06: 84 MS
QTC CALCULATION (BEZET), ECG08: 477 MS
RBC # BLD AUTO: 5.36 M/UL (ref 4.2–5.3)
SODIUM BLD-SCNC: 134 MMOL/L (ref 136–145)
SODIUM SERPL-SCNC: 126 MMOL/L (ref 136–145)
SODIUM SERPL-SCNC: 136 MMOL/L (ref 136–145)
TRIGL SERPL-MCNC: 89 MG/DL (ref ?–150)
VENTRICULAR RATE, ECG03: 72 BPM
VLDLC SERPL CALC-MCNC: 17.8 MG/DL
WBC # BLD AUTO: 17.3 K/UL (ref 4.6–13.2)

## 2018-10-09 PROCEDURE — 85027 COMPLETE CBC AUTOMATED: CPT | Performed by: HOSPITALIST

## 2018-10-09 PROCEDURE — 84295 ASSAY OF SERUM SODIUM: CPT

## 2018-10-09 PROCEDURE — 74011250636 HC RX REV CODE- 250/636: Performed by: HOSPITALIST

## 2018-10-09 PROCEDURE — 74011000258 HC RX REV CODE- 258: Performed by: INTERNAL MEDICINE

## 2018-10-09 PROCEDURE — 80061 LIPID PANEL: CPT | Performed by: HOSPITALIST

## 2018-10-09 PROCEDURE — 65270000029 HC RM PRIVATE

## 2018-10-09 PROCEDURE — 74011250637 HC RX REV CODE- 250/637: Performed by: INTERNAL MEDICINE

## 2018-10-09 PROCEDURE — 80053 COMPREHEN METABOLIC PANEL: CPT | Performed by: HOSPITALIST

## 2018-10-09 PROCEDURE — C9113 INJ PANTOPRAZOLE SODIUM, VIA: HCPCS | Performed by: INTERNAL MEDICINE

## 2018-10-09 PROCEDURE — 74011250636 HC RX REV CODE- 250/636: Performed by: ANESTHESIOLOGY

## 2018-10-09 PROCEDURE — 74011000250 HC RX REV CODE- 250: Performed by: ANESTHESIOLOGY

## 2018-10-09 PROCEDURE — 0DJ08ZZ INSPECTION OF UPPER INTESTINAL TRACT, VIA NATURAL OR ARTIFICIAL OPENING ENDOSCOPIC: ICD-10-PCS | Performed by: INTERNAL MEDICINE

## 2018-10-09 PROCEDURE — 77030018846 HC SOL IRR STRL H20 ICUM -A: Performed by: INTERNAL MEDICINE

## 2018-10-09 PROCEDURE — 74011000258 HC RX REV CODE- 258: Performed by: HOSPITALIST

## 2018-10-09 PROCEDURE — 74011000250 HC RX REV CODE- 250: Performed by: INTERNAL MEDICINE

## 2018-10-09 PROCEDURE — 76040000019: Performed by: INTERNAL MEDICINE

## 2018-10-09 PROCEDURE — 77030027138 HC INCENT SPIROMETER -A

## 2018-10-09 PROCEDURE — 77030008565 HC TBNG SUC IRR ERBE -B: Performed by: INTERNAL MEDICINE

## 2018-10-09 PROCEDURE — 36415 COLL VENOUS BLD VENIPUNCTURE: CPT | Performed by: HOSPITALIST

## 2018-10-09 PROCEDURE — 74011250636 HC RX REV CODE- 250/636

## 2018-10-09 PROCEDURE — 85730 THROMBOPLASTIN TIME PARTIAL: CPT | Performed by: HOSPITALIST

## 2018-10-09 PROCEDURE — 74011250636 HC RX REV CODE- 250/636: Performed by: INTERNAL MEDICINE

## 2018-10-09 PROCEDURE — 76060000031 HC ANESTHESIA FIRST 0.5 HR: Performed by: INTERNAL MEDICINE

## 2018-10-09 PROCEDURE — 74011250637 HC RX REV CODE- 250/637: Performed by: FAMILY MEDICINE

## 2018-10-09 PROCEDURE — 83605 ASSAY OF LACTIC ACID: CPT | Performed by: HOSPITALIST

## 2018-10-09 PROCEDURE — 74011000250 HC RX REV CODE- 250

## 2018-10-09 RX ORDER — SODIUM CHLORIDE 0.9 % (FLUSH) 0.9 %
5-10 SYRINGE (ML) INJECTION AS NEEDED
Status: DISCONTINUED | OUTPATIENT
Start: 2018-10-09 | End: 2018-10-09 | Stop reason: HOSPADM

## 2018-10-09 RX ORDER — INSULIN LISPRO 100 [IU]/ML
INJECTION, SOLUTION INTRAVENOUS; SUBCUTANEOUS ONCE
Status: DISCONTINUED | OUTPATIENT
Start: 2018-10-09 | End: 2018-10-09 | Stop reason: HOSPADM

## 2018-10-09 RX ORDER — SUCRALFATE 1 G/10ML
1 SUSPENSION ORAL
Status: DISCONTINUED | OUTPATIENT
Start: 2018-10-09 | End: 2018-10-13 | Stop reason: HOSPADM

## 2018-10-09 RX ORDER — SODIUM CHLORIDE, SODIUM LACTATE, POTASSIUM CHLORIDE, CALCIUM CHLORIDE 600; 310; 30; 20 MG/100ML; MG/100ML; MG/100ML; MG/100ML
25 INJECTION, SOLUTION INTRAVENOUS CONTINUOUS
Status: DISCONTINUED | OUTPATIENT
Start: 2018-10-09 | End: 2018-10-09 | Stop reason: HOSPADM

## 2018-10-09 RX ORDER — HYDRALAZINE HYDROCHLORIDE 20 MG/ML
10 INJECTION INTRAMUSCULAR; INTRAVENOUS
Status: DISCONTINUED | OUTPATIENT
Start: 2018-10-09 | End: 2018-10-13 | Stop reason: HOSPADM

## 2018-10-09 RX ORDER — LABETALOL HYDROCHLORIDE 5 MG/ML
INJECTION, SOLUTION INTRAVENOUS AS NEEDED
Status: DISCONTINUED | OUTPATIENT
Start: 2018-10-09 | End: 2018-10-09 | Stop reason: HOSPADM

## 2018-10-09 RX ORDER — SODIUM CHLORIDE 0.9 % (FLUSH) 0.9 %
5-10 SYRINGE (ML) INJECTION EVERY 8 HOURS
Status: DISCONTINUED | OUTPATIENT
Start: 2018-10-09 | End: 2018-10-09 | Stop reason: HOSPADM

## 2018-10-09 RX ORDER — PROPOFOL 10 MG/ML
INJECTION, EMULSION INTRAVENOUS AS NEEDED
Status: DISCONTINUED | OUTPATIENT
Start: 2018-10-09 | End: 2018-10-09 | Stop reason: HOSPADM

## 2018-10-09 RX ORDER — LIDOCAINE HYDROCHLORIDE 20 MG/ML
INJECTION, SOLUTION EPIDURAL; INFILTRATION; INTRACAUDAL; PERINEURAL AS NEEDED
Status: DISCONTINUED | OUTPATIENT
Start: 2018-10-09 | End: 2018-10-09 | Stop reason: HOSPADM

## 2018-10-09 RX ORDER — CLONIDINE 0.1 MG/24H
1 PATCH, EXTENDED RELEASE TRANSDERMAL
Status: DISCONTINUED | OUTPATIENT
Start: 2018-10-09 | End: 2018-10-10

## 2018-10-09 RX ORDER — METOCLOPRAMIDE HYDROCHLORIDE 5 MG/ML
5 INJECTION INTRAMUSCULAR; INTRAVENOUS
Status: DISCONTINUED | OUTPATIENT
Start: 2018-10-09 | End: 2018-10-13 | Stop reason: HOSPADM

## 2018-10-09 RX ORDER — MORPHINE SULFATE 2 MG/ML
2 INJECTION, SOLUTION INTRAMUSCULAR; INTRAVENOUS ONCE
Status: COMPLETED | OUTPATIENT
Start: 2018-10-09 | End: 2018-10-09

## 2018-10-09 RX ADMIN — PROPOFOL 20 MG: 10 INJECTION, EMULSION INTRAVENOUS at 13:18

## 2018-10-09 RX ADMIN — SODIUM CHLORIDE 2 G: 900 INJECTION INTRAVENOUS at 20:24

## 2018-10-09 RX ADMIN — SODIUM CHLORIDE 20 MG: 9 INJECTION INTRAMUSCULAR; INTRAVENOUS; SUBCUTANEOUS at 12:48

## 2018-10-09 RX ADMIN — HEPARIN SODIUM 5000 UNITS: 5000 INJECTION, SOLUTION INTRAVENOUS; SUBCUTANEOUS at 15:02

## 2018-10-09 RX ADMIN — AZTREONAM 1 G: 1 INJECTION, POWDER, LYOPHILIZED, FOR SOLUTION INTRAMUSCULAR; INTRAVENOUS at 00:13

## 2018-10-09 RX ADMIN — MORPHINE SULFATE 2 MG: 2 INJECTION, SOLUTION INTRAMUSCULAR; INTRAVENOUS at 06:36

## 2018-10-09 RX ADMIN — METOCLOPRAMIDE 5 MG: 5 INJECTION, SOLUTION INTRAMUSCULAR; INTRAVENOUS at 15:02

## 2018-10-09 RX ADMIN — HEPARIN SODIUM 5000 UNITS: 5000 INJECTION, SOLUTION INTRAVENOUS; SUBCUTANEOUS at 22:43

## 2018-10-09 RX ADMIN — SODIUM CHLORIDE 40 MG: 9 INJECTION INTRAMUSCULAR; INTRAVENOUS; SUBCUTANEOUS at 21:26

## 2018-10-09 RX ADMIN — PROPOFOL 30 MG: 10 INJECTION, EMULSION INTRAVENOUS at 13:13

## 2018-10-09 RX ADMIN — LIDOCAINE HYDROCHLORIDE: 10 INJECTION, SOLUTION EPIDURAL; INFILTRATION; INTRACAUDAL; PERINEURAL at 11:55

## 2018-10-09 RX ADMIN — ONDANSETRON HYDROCHLORIDE 4 MG: 2 INJECTION INTRAMUSCULAR; INTRAVENOUS at 05:51

## 2018-10-09 RX ADMIN — ONDANSETRON HYDROCHLORIDE 4 MG: 2 INJECTION INTRAMUSCULAR; INTRAVENOUS at 11:47

## 2018-10-09 RX ADMIN — SODIUM CHLORIDE 2 G: 900 INJECTION INTRAVENOUS at 15:01

## 2018-10-09 RX ADMIN — MORPHINE SULFATE 2 MG: 2 INJECTION, SOLUTION INTRAMUSCULAR; INTRAVENOUS at 09:02

## 2018-10-09 RX ADMIN — SODIUM CHLORIDE, SODIUM LACTATE, POTASSIUM CHLORIDE, AND CALCIUM CHLORIDE 25 ML/HR: 600; 310; 30; 20 INJECTION, SOLUTION INTRAVENOUS at 12:49

## 2018-10-09 RX ADMIN — LIDOCAINE HYDROCHLORIDE: 10 INJECTION, SOLUTION EPIDURAL; INFILTRATION; INTRACAUDAL; PERINEURAL at 10:15

## 2018-10-09 RX ADMIN — SODIUM CHLORIDE 40 MG: 9 INJECTION INTRAMUSCULAR; INTRAVENOUS; SUBCUTANEOUS at 15:01

## 2018-10-09 RX ADMIN — SUCRALFATE 1 G: 1 SUSPENSION ORAL at 22:43

## 2018-10-09 RX ADMIN — PROPOFOL 20 MG: 10 INJECTION, EMULSION INTRAVENOUS at 13:19

## 2018-10-09 RX ADMIN — MORPHINE SULFATE 2 MG: 2 INJECTION, SOLUTION INTRAMUSCULAR; INTRAVENOUS at 15:02

## 2018-10-09 RX ADMIN — LABETALOL HYDROCHLORIDE 10 MG: 5 INJECTION, SOLUTION INTRAVENOUS at 13:17

## 2018-10-09 RX ADMIN — SODIUM CHLORIDE 100 ML/HR: 900 INJECTION, SOLUTION INTRAVENOUS at 06:38

## 2018-10-09 RX ADMIN — PROPOFOL 30 MG: 10 INJECTION, EMULSION INTRAVENOUS at 13:16

## 2018-10-09 RX ADMIN — HEPARIN SODIUM 5000 UNITS: 5000 INJECTION, SOLUTION INTRAVENOUS; SUBCUTANEOUS at 00:12

## 2018-10-09 RX ADMIN — LIDOCAINE HYDROCHLORIDE: 10 INJECTION, SOLUTION EPIDURAL; INFILTRATION; INTRACAUDAL; PERINEURAL at 14:00

## 2018-10-09 RX ADMIN — SUCRALFATE 1 G: 1 SUSPENSION ORAL at 16:40

## 2018-10-09 RX ADMIN — LIDOCAINE HYDROCHLORIDE: 10 INJECTION, SOLUTION EPIDURAL; INFILTRATION; INTRACAUDAL; PERINEURAL at 07:58

## 2018-10-09 RX ADMIN — LIDOCAINE HYDROCHLORIDE 40 MG: 20 INJECTION, SOLUTION EPIDURAL; INFILTRATION; INTRACAUDAL; PERINEURAL at 13:13

## 2018-10-09 RX ADMIN — MORPHINE SULFATE 2 MG: 2 INJECTION, SOLUTION INTRAMUSCULAR; INTRAVENOUS at 02:14

## 2018-10-09 NOTE — PROGRESS NOTES
2105 
Received pt in stable condition, General: sitting on the bed Neuro: AOx4 Cardio: denies chest pain Respiratory:  denies shortness of breath Skin: intact GI: voiding : nauseated, dry heave, med given and tolerated well; NGT low intermittent suction Mus: ambulates c assistance Bed in low position and call bell within reach. Oriented to room and use of call bell. [id number] regarding critical Potassium: 2.7. New order received: Potassium 10 meq with lidocaine Q1 hour x4 runs, per Dr. Illene Shone. Darío Guerra.  
 
2086 Critical lactic 2.8. [place] NICOL[place]. [place] order received: Repeat Lactic acid at 1030 am today per Dr. Illene Shone, Darío Guerra.  
 
7343 Report given, labs results and care resume by Am nurse.

## 2018-10-09 NOTE — ANESTHESIA POSTPROCEDURE EVALUATION
Post-Anesthesia Evaluation and Assessment Patient: Rosamaria Dudley MRN: [id number]  SSN: [id number] YOB: 1955  Age: 66 y.o. Sex: female Cardiovascular Function/Vital Signs Visit Vitals  /85 (BP 1 Location: Left arm, BP Patient Position: At rest)  Pulse 76  Temp 36.7 °C (98.1 °F)  Resp 16  
 Ht 5' 1\" (1.549 m)  Wt 75.3 kg (166 lb)  SpO2 97%  BMI 31.37 kg/m2 Patient is status post MAC anesthesia for Procedure(s): ENDOSCOPY. Nausea/Vomiting: None Postoperative hydration reviewed and adequate. Pain: 
Pain Scale 1: Numeric (0 - 10) (10/09/18 1422) Pain Intensity 1: 0 (10/09/18 1328) Managed Neurological Status:  
Neuro Orientation Level: Oriented X4 (10/09/18 0850) At baseline Mental Status and Level of Consciousness: Arousable Pulmonary Status:  
O2 Device: Room air (10/09/18 1328) Adequate oxygenation and airway patent Complications related to anesthesia: None Post-anesthesia assessment completed. No concerns Signed By: Deepa Hernandez MD   
 October 9, 2018

## 2018-10-09 NOTE — PROGRESS NOTES
Bedside and Verbal shift change report given to TONG Long (oncoming nurse) by Arianna Casey RN (offgoing nurse). Report included the following information SBAR, Kardex, ED Summary, Intake/Output and MAR.

## 2018-10-09 NOTE — CONSULTS
Pt seen and examined. Gastritis (recurrent), likely gastroparesis. No urgent surgical intervention required. Clears now, slowly advance as tolerated.     Consult dictated #[id number]  RP

## 2018-10-09 NOTE — PROGRESS NOTES
[facility] Hospitalist Group Progress Note Patient: Kevin Lance Age: 66 y.o. : 1955 MR#: [id number] SSN: [id number] Date/Time: 10/9/2018 Subjective:  
 
Seen with daughter,  in room. Pt about to go for EGD. Still has some nausea with NGTube in place draining brown fluid. Mild, generalized abd pain. No F/C, CP, SOB. No hx constipation. Takes Colace on occasion as stool softener. Assessment/Plan: 1. Possible gastric outlet obstruction - in context of hx gastric lap banding s/p removal 2018, with nausea, vomiting, abd pain. NGTube in place. To EGD for further eval. Continue antiemetics, NPO. Will f/u EGD results, surgery eval.  
2. UTI - Rocephin empirically. U/A only with trace leuk est, negative nitrites, few bacteria. Called and spoke with micro: they will run urine cx on specimen collected. 3. R kidney failure - nephrectomy next month. Hx of uncontrolled HTN. BUN/Cr wnl.  
4. HTN - holding Norvasc, Coreg, clonidine, Cozaar, hydralazine while NPO. BPs elevated. IV hydralazine prn SBP > 165, Catapres patch. 5. HypoK+ - repleted, recheck AM BMP. 6. HypoNa+ - likely due to hypovolemia. IVFluids. Following. 7. Lactic acidosis - likely due to #1. Will follow for now. 8. AAA hx - stable. No acute. 9. Pancreatic cyst - chronic. Outpt f/u. Stable from 2014 CT according to report (see below). Additional Notes:   
10/8 CT abd/pelv: 
IMPRESSION: 
1. Cystic pancreatic neck region lesion, differential considerations of 
pseudocyst vs. IPMN, similar to prior study in appearance. Stable dating back 
to 14 CT (earliest available comparison study in the PACS). Indicative of 
benign nature of this lesion.  
  
2.  Slight increase in the size of the aortic aneurysm since the most recent 
study.   
  
3. No convincing evidence of acute diverticulitis or colitis.  
  
4.  Mild thickening of the terminal ileum. Query mild intrinsic inflammatory changes to the terminal ileum. 
  
5.  Gastric distention with fluid. If the patient is at risk for aspiration, NG 
tube placement could be helpful to relieve gastric distention. Case discussed with:  [x]Patient  [x]Family  []Nursing  []Case Management DVT Prophylaxis:  []Lovenox  [x]Hep SQ  []SCDs  []Coumadin   []On Heparin gtt Objective:  
VS:  
Visit Vitals  /87  Pulse (!) 105  Temp 98 °F (36.7 °C)  Resp 22  
 Ht 5' 1\" (1.549 m)  Wt 75.3 kg (166 lb)  SpO2 98%  BMI 31.37 kg/m2 Tmax/24hrs: Temp (24hrs), Av.3 °F (36.8 °C), Min:97 °F (36.1 °C), Max:99.5 °F (37.5 °C) Input/Output:  
Intake/Output Summary (Last 24 hours) at 10/09/18 1250 Last data filed at 10/09/18 4779 Gross per 24 hour Intake             1155 ml Output             2975 ml Net            -1820 ml General:  Awake, alert, appears to be in mild discomfort from nausea. Cardiovascular:  RRR. Pulmonary:  CTA B. 
GI:  Soft, ND, mild diffuse TTP. NABS. Extremities:  No CT or edema. Additional:   
 
Labs:   
Recent Results (from the past 24 hour(s)) CBC WITH AUTOMATED DIFF Collection Time: 10/08/18  2:29 PM  
Result Value Ref Range WBC 10.2 4.6 - 13.2 K/uL  
 RBC 5.19 4.20 - 5.30 M/uL  
 HGB 15.1 12.0 - 16.0 g/dL HCT 40.2 35.0 - 45.0 % MCV 77.5 74.0 - 97.0 FL  
 MCH 29.1 24.0 - 34.0 PG  
 MCHC 37.6 (H) 31.0 - 37.0 g/dL  
 RDW 12.1 11.6 - 14.5 % PLATELET 353 905 - 662 K/uL MPV 10.2 9.2 - 11.8 FL  
 NEUTROPHILS 86 (H) 40 - 73 % LYMPHOCYTES 10 (L) 21 - 52 % MONOCYTES 4 3 - 10 % EOSINOPHILS 0 0 - 5 % BASOPHILS 0 0 - 2 %  
 ABS. NEUTROPHILS 8.8 (H) 1.8 - 8.0 K/UL  
 ABS. LYMPHOCYTES 1.0 0.9 - 3.6 K/UL  
 ABS. MONOCYTES 0.4 0.05 - 1.2 K/UL  
 ABS. EOSINOPHILS 0.0 0.0 - 0.4 K/UL  
 ABS. BASOPHILS 0.0 0.0 - 0.1 K/UL  
 DF AUTOMATED METABOLIC PANEL, COMPREHENSIVE Collection Time: 10/08/18  2:29 PM  
Result Value Ref Range  Sodium 121 (L) 136 - 145 mmol/L  
 Potassium 4.3 3.5 - 5.5 mmol/L Chloride 83 (L) 100 - 108 mmol/L  
 CO2 26 21 - 32 mmol/L Anion gap 12 3.0 - 18 mmol/L Glucose 136 (H) 74 - 99 mg/dL BUN 11 7.0 - 18 MG/DL Creatinine 1.06 0.6 - 1.3 MG/DL  
 BUN/Creatinine ratio 10 (L) 12 - 20 GFR est AA >60 >60 ml/min/1.73m2 GFR est non-AA 53 (L) >60 ml/min/1.73m2 Calcium 9.1 8.5 - 10.1 MG/DL Bilirubin, total 1.2 (H) 0.2 - 1.0 MG/DL  
 ALT (SGPT) 33 13 - 56 U/L  
 AST (SGOT) 30 15 - 37 U/L Alk. phosphatase 110 45 - 117 U/L Protein, total 8.3 (H) 6.4 - 8.2 g/dL Albumin 4.1 3.4 - 5.0 g/dL Globulin 4.2 (H) 2.0 - 4.0 g/dL A-G Ratio 1.0 0.8 - 1.7 LIPASE Collection Time: 10/08/18  2:29 PM  
Result Value Ref Range Lipase 129 73 - 393 U/L  
EKG, 12 LEAD, INITIAL Collection Time: 10/08/18  3:07 PM  
Result Value Ref Range Ventricular Rate 72 BPM  
 Atrial Rate 72 BPM  
 P-R Interval 144 ms QRS Duration 84 ms Q-T Interval 436 ms  
 QTC Calculation (Bezet) 477 ms Calculated P Axis 71 degrees Calculated R Axis 63 degrees Calculated T Axis 69 degrees Diagnosis Normal sinus rhythm Possible Left atrial enlargement Borderline ECG When compared with ECG of 17-JUL-2018 13:03, 
QT has lengthened Confirmed by Matias Smith MD, Chrissydavid Reyes ([id number]) on 10/9/2018 12:46:32 PM 
  
URINALYSIS W/ RFLX MICROSCOPIC Collection Time: 10/08/18  4:38 PM  
Result Value Ref Range Color YELLOW Appearance CLEAR Specific gravity 1.025 1.005 - 1.030    
 pH (UA) 7.0 5.0 - 8.0 Protein 30 (A) NEG mg/dL Glucose NEGATIVE  NEG mg/dL Ketone 15 (A) NEG mg/dL Bilirubin NEGATIVE  NEG Blood MODERATE (A) NEG Urobilinogen 0.2 0.2 - 1.0 EU/dL Nitrites NEGATIVE  NEG Leukocyte Esterase TRACE (A) NEG URINE MICROSCOPIC ONLY Collection Time: 10/08/18  4:38 PM  
Result Value Ref Range WBC 0 to 2 0 - 4 /hpf  
 RBC 5 to 8 0 - 5 /hpf Epithelial cells 1+ 0 - 5 /lpf  Bacteria FEW (A) NEG /hpf  
 METABOLIC PANEL, BASIC Collection Time: 10/08/18 10:08 PM  
Result Value Ref Range Sodium 124 (L) 136 - 145 mmol/L Potassium 3.0 (L) 3.5 - 5.5 mmol/L Chloride 86 (L) 100 - 108 mmol/L  
 CO2 24 21 - 32 mmol/L Anion gap 14 3.0 - 18 mmol/L Glucose 127 (H) 74 - 99 mg/dL BUN 8 7.0 - 18 MG/DL Creatinine 0.81 0.6 - 1.3 MG/DL  
 BUN/Creatinine ratio 10 (L) 12 - 20 GFR est AA >60 >60 ml/min/1.73m2 GFR est non-AA >60 >60 ml/min/1.73m2 Calcium 8.9 8.5 - 10.1 MG/DL  
GLUCOSE, POC Collection Time: 10/08/18 10:08 PM  
Result Value Ref Range Glucose (POC) 150 (H) 70 - 110 mg/dL METABOLIC PANEL, COMPREHENSIVE Collection Time: 10/09/18  3:31 AM  
Result Value Ref Range Sodium 126 (L) 136 - 145 mmol/L Potassium 2.7 (LL) 3.5 - 5.5 mmol/L Chloride 84 (L) 100 - 108 mmol/L  
 CO2 30 21 - 32 mmol/L Anion gap 12 3.0 - 18 mmol/L Glucose 133 (H) 74 - 99 mg/dL BUN 10 7.0 - 18 MG/DL Creatinine 0.96 0.6 - 1.3 MG/DL  
 BUN/Creatinine ratio 10 (L) 12 - 20 GFR est AA >60 >60 ml/min/1.73m2 GFR est non-AA 59 (L) >60 ml/min/1.73m2 Calcium 9.1 8.5 - 10.1 MG/DL Bilirubin, total 1.0 0.2 - 1.0 MG/DL  
 ALT (SGPT) 31 13 - 56 U/L  
 AST (SGOT) 23 15 - 37 U/L Alk. phosphatase 106 45 - 117 U/L Protein, total 8.0 6.4 - 8.2 g/dL Albumin 4.2 3.4 - 5.0 g/dL Globulin 3.8 2.0 - 4.0 g/dL A-G Ratio 1.1 0.8 - 1.7 LIPID PANEL Collection Time: 10/09/18  3:31 AM  
Result Value Ref Range LIPID PROFILE Cholesterol, total 161 <200 MG/DL Triglyceride 89 <150 MG/DL  
 HDL Cholesterol 77 (H) 40 - 60 MG/DL  
 LDL, calculated 66.2 0 - 100 MG/DL VLDL, calculated 17.8 MG/DL  
 CHOL/HDL Ratio 2.1 0 - 5.0    
CBC W/O DIFF Collection Time: 10/09/18  3:31 AM  
Result Value Ref Range WBC 17.3 (H) 4.6 - 13.2 K/uL  
 RBC 5.36 (H) 4.20 - 5.30 M/uL  
 HGB 15.3 12.0 - 16.0 g/dL HCT 41.6 35.0 - 45.0 %  MCV 77.6 74.0 - 97.0 FL  
 MCH 28.5 24.0 - 34.0 PG  
 MCHC 36.8 31.0 - 37.0 g/dL  
 RDW 12.2 11.6 - 14.5 % PLATELET 902 084 - 539 K/uL MPV 10.1 9.2 - 11.8 FL  
PTT Collection Time: 10/09/18  3:31 AM  
Result Value Ref Range aPTT 25.7 23.0 - 36.4 SEC LACTIC ACID Collection Time: 10/09/18  6:34 AM  
Result Value Ref Range Lactic acid 2.8 (HH) 0.4 - 2.0 MMOL/L  
POC 6 PLUS Collection Time: 10/09/18 12:41 PM  
Result Value Ref Range Sodium,  (L) 136 - 145 MMOL/L Potassium, POC 3.6 3.5 - 5.5 MMOL/L Chloride, POC 85 (L) 100 - 108 MMOL/L  
 BUN, POC 11 7 - 18 MG/DL Glucose,  (H) 74 - 106 MG/DL Hematocrit, POC 46 36 - 49 % Hemoglobin, POC 15.6 12 - 16 G/DL Additional Data Reviewed:   
 
Signed By: Ariana Orlando MD   
 October 9, 2018 12:50 PM

## 2018-10-09 NOTE — H&P
HISTORY & PHYSICAL Patient: Tim Spivey MRN: [id number]  CSN: [id number] YOB: 1955  Age: 66 y.o. Sex: female DOA: 10/8/2018 LOS:  LOS: 0 days DOA: 10/8/2018 Assessment/Plan Active Problems: 
  Abdominal pain (1/29/2018) Intractable nausea and vomiting (1/31/2018) Gastric outlet obstruction (10/8/2018) Plan: 1. Abdominal pain - intractable - ? Related to Gastric outlet obstruction - NGT to suction , GI & Gen Surg consult 2. Hyponatremia - IVF - monitor - check BMP Q6hrly 3. Intractable N&V - Zofran prn 4. Gastric Outlet obstruction - NPO , IVF , Gen Surg consulted 5. H/o gastric Lap band s/p removal July 2018 at [facility] 6. H/o R kidney failure - is scheduled to have R kidney removed next month 2y to uncontrolled HTN  
7. H/o AAA - stable 8. H/o Pancreatic pseudocyst - chronic 9. UTI - IV Rocephin DVT Px - Heparin FC Severity of Signs & Symptoms -- Moderate Risk of adverse events -- Moderate Current Medical Rx Plan - As Above Patient history & comorbidities - Per HPI Discharge Plan -- Home HPI:  
 
Tim Spivey is a 66 y.o. female who is being admitted 2y to Abd pain with intractable N&V Pt is sedated post Morphine - arousable but most of the H&P is obtained from daughter at bedside Daughter mentions she has had a h/o gastric lap band which was removed last month at [facility] - she says she has done well post op Today she c/o abd pain - N&V - uncontrolled, brought to the ER - CT shows Gastric dilation with fluid levels - no small bowel obstruction NGT placed to suction - shows large amount of greenish bile colored fluid Gen surg consulted Will admit for further eval  
 
 
Past Medical History:  
Diagnosis Date  Aorto-iliac duplex 09/17/2015 Patent abdom aorta endovascular graft w/o leak. Mod stenosis suggested in right limb.  Calculus of kidney 2014  
 stent/kidney  Carotid duplex 09/17/2015 Mild < 50% bilateral plaquing.  Cataract, left  Chronic kidney disease   
 one functioning kidney  Chronic obstructive pulmonary disease ([address]  Echocardiogram 11/04/2014 EF 56%. No WMA. Mild LAE. Echo is within normal limits.  Esophageal reflux  Generalized osteoarthrosis, involving multiple sites  GERD (gastroesophageal reflux disease)  Gout  History of renal stent   
 right side  Hypercholesteremia  Hypertension   
 resolved  Lower extremity arterial testing 03/27/2015 Normal perfusion at rest and w/treadmill bilaterally. R KORI 1.05.  L KORI 1.03.  
 Murmur   
 patient reports that she is aware- has been told in the past  
 Nausea & vomiting  Renal artery stenosis (HCC)  S/P AAA (abdominal aortic aneurysm) repair 9/20/2018  Subclinical hyperthyroidism 8/2015  Unspecified sleep apnea   
 resolved Past Surgical History:  
Procedure Laterality Date  ADJUSTMENT GASTRIC BAND N/A 11/10/2016 [place], [place]  
 FULL ESOPHAGEAL MANOMETRY  5/21/2018  HX APPENDECTOMY  HX BLADDER SUSPENSION    
 HX CHOLECYSTECTOMY  HX GI    
 lap band 2012  HX GI  07/2018  
 lap band removal  
 HX HEENT Right 1990s, 2016 Ear sx  HX HYSTERECTOMY  HX OTHER SURGICAL  10/27/14 Bilateral open femoral exposures, Placement of catheter and sheath in the aorta Endo repair of aortic aneurysm with modular bifurcated device  Interpretation of angiography, intravascular ultrasound (IVUS) catheter  HX OTHER SURGICAL  10/27/2014 Endurant II abdominal stent graft implant  HX UROLOGICAL    
 stent  VASCULAR SURGERY PROCEDURE UNLIST    
 surgery for abdominal aneurysm Family History Problem Relation Age of Onset  Cancer Mother  Diabetes Father  Alcohol abuse Father  Heart Disease Maternal Grandmother  Alzheimer Maternal Grandmother  Diabetes Sister Social History Social History  Marital status:  Spouse name: N/A  
 Number of children: N/A  
 Years of education: N/A Social History Main Topics  Smoking status: Former Smoker Packs/day: 0.00 Years: 0.00 Types: Cigarettes Quit date: 1/25/2016  Smokeless tobacco: Never Used  Alcohol use No  
 Drug use: No  
 Sexual activity: Yes Birth control/ protection: Surgical  
 
Other Topics Concern  None Social History Narrative Prior to Admission medications Medication Sig Start Date End Date Taking? Authorizing Provider  
ondansetron (ZOFRAN ODT) 8 mg disintegrating tablet Take 1 Tab by mouth every eight (8) hours as needed for Nausea. 10/5/18   Krystal John MD  
omeprazole (PRILOSEC) 40 mg capsule 1 cap each AM.  Stop ranitidine 10/5/18   Krystal John MD  
oxyCODONE-acetaminophen (PERCOCET 10)  mg per tablet 1 tab every six hours as needed for pain 10/5/18   Krystal John MD  
Lancets [facility] ULTRASOFT LANCETS) misc To use with blood for glucose monitoring 10/2/18   Krystal John MD  
glucose blood VI test strips (ONETOUCH ULTRA BLUE TEST STRIP) strip To check the blood sugars daily 10/2/18   Krystal John MD  
lancets ([facility]) 33 gauge misc To use with blood for glucose monitoring 10/2/18   Krystal John MD  
amLODIPine (NORVASC) 10 mg tablet TAKE ONE TABLET BY MOUTH DAILY 10/1/18   Krystal John MD  
glucose blood VI test strips (BLOOD GLUCOSE TEST) strip One touch test strips. To use with one touch delica  Patient to check blood sugar daily E11.9 10/1/18   Krystal John MD  
diclofenac (VOLTAREN) 1 % gel Apply 2 g to affected area four (4) times daily. 9/19/18   Krystal John MD  
carvedilol (COREG) 25 mg tablet TAKE ONE TABLET BY MOUTH TWICE A DAY WITH MEALS 9/17/18   Manihs Leon MD  
TOBRADEX ST drps ophthalmix suspension  8/17/18   Historical Provider pantoprazole (PROTONIX) 40 mg tablet  9/11/18   Historical Provider  
losartan (COZAAR) 50 mg tablet  8/21/18   Historical Provider  
cloNIDine HCl (CATAPRES) 0.2 mg tablet  8/21/18   Historical Provider  
raNITIdine (ZANTAC) 150 mg tablet TAKE ONE TABLET BY MOUTH TWICE A DAY 9/7/18   Veronica Trejo MD  
mirtazapine (REMERON) 15 mg tablet TAKE ONE TABLET BY MOUTH EVERY EVENING 9/7/18   Veronica Trejo MD  
HYDROcodone-acetaminophen [facility])  mg tablet Take 1 Tab by mouth every six (6) hours as needed for Pain. Max Daily Amount: 4 Tabs. 9/7/18   Veronica Trejo MD  
atorvastatin (LIPITOR) 40 mg tablet TAKE ONE TABLET BY MOUTH DAILY 9/5/18   Rohan Anne MD  
montelukast (SINGULAIR) 10 mg tablet TAKE ONE TABLET BY MOUTH DAILY 8/7/18   Jeffrey Chaparro NP  
DALIRESP tab tablet TAKE ONE TABLET BY MOUTH DAILY 8/7/18   Jeffrey Chaparro NP  
triamterene-hydroCHLOROthiazide (DYAZIDE) 37.5-25 mg per capsule TAKE ONE CAPSULE BY MOUTH EVERY MORNING 8/7/18   Veronica Trejo MD  
desmopressin (DDAVP) 0.2 mg tablet Take 1 Tab by mouth nightly. 8/7/18   Miladys Nunn MD  
clopidogrel (PLAVIX) 75 mg tab TAKE 1 TABLET DAILY  Indications: Resume on 7/19/2018 7/17/18   Sharath Boyer MD  
albuterol (PROVENTIL HFA, VENTOLIN HFA, PROAIR HFA) 90 mcg/actuation inhaler Take  by inhalation. Historical Provider Ondansetron (ZUPLENZ) 4 mg film Take  by mouth. Historical Provider  
azelastine (ASTELIN) 137 mcg (0.1 %) nasal spray by Nasal route. 2/24/16   Historical Provider  
lansoprazole (PREVACID) 30 mg capsule Take 1 Cap by mouth. Historical Provider  
fluticasone-salmeterol (ADVAIR DISKUS) 250-50 mcg/dose diskus inhaler Take 1 Puff by inhalation daily. 5/25/18   Jeffrey Chaparro NP  
hydrALAZINE (APRESOLINE) 25 mg tablet Take 1 Tab by mouth two (2) times a day. 3/14/18   Jennie Park MD  
docusate sodium (COLACE) 100 mg capsule Take 1 Cap by mouth two (2) times a day. Patient taking differently: Take 100 mg by mouth daily. 10/5/17   Walt Batista MD  
esomeprazole (NEXIUM) 20 mg capsule Take 1 Cap by mouth daily. STOP PREVACID  Indications: HEARTBURN 9/13/17   Kelsi Grandzuleyma, NP Allergies Allergen Reactions  Pcn [Penicillins] Anaphylaxis  Tetracycline Anaphylaxis  Sulfabenzamide Hives Review of Systems Review of systems not obtained due to patient factors. Physical Exam:  
  
Visit Vitals  BP (!) 152/96  Pulse 81  Temp 98.6 °F (37 °C)  Resp 18  Ht 5' 1\" (1.549 m)  Wt 75.3 kg (166 lb)  SpO2 97%  BMI 31.37 kg/m2 Physical Exam: 
 
Gen: In general, this is a well nourished female in no acute distress HEENT: Sclerae nonicteric. Oral mucous membranes moist. Dentition normal 
Neck: Supple with midline trachea. CV: RRR without murmur or rub appreciated. Resp:Respirations are unlabored without use of accessory muscles. Lung fields B/L without wheezes or rhonchi. Abd: Soft, tender, non distended , BS hypoactive Extrem: Extremities are warm, without cyanosis or clubbing. No pitting pretibial edema. Skin: Warm, no visible rashes. Neuro: Patient is sleepy & cooperative. No obvious focal defects. Moves all 4 extremities. Labs Reviewed: 
 
Recent Results (from the past 24 hour(s)) CBC WITH AUTOMATED DIFF Collection Time: 10/08/18  2:29 PM  
Result Value Ref Range WBC 10.2 4.6 - 13.2 K/uL  
 RBC 5.19 4.20 - 5.30 M/uL  
 HGB 15.1 12.0 - 16.0 g/dL HCT 40.2 35.0 - 45.0 % MCV 77.5 74.0 - 97.0 FL  
 MCH 29.1 24.0 - 34.0 PG  
 MCHC 37.6 (H) 31.0 - 37.0 g/dL  
 RDW 12.1 11.6 - 14.5 % PLATELET 301 148 - 796 K/uL MPV 10.2 9.2 - 11.8 FL  
 NEUTROPHILS 86 (H) 40 - 73 % LYMPHOCYTES 10 (L) 21 - 52 % MONOCYTES 4 3 - 10 % EOSINOPHILS 0 0 - 5 % BASOPHILS 0 0 - 2 %  
 ABS. NEUTROPHILS 8.8 (H) 1.8 - 8.0 K/UL  
 ABS. LYMPHOCYTES 1.0 0.9 - 3.6 K/UL  
 ABS. MONOCYTES 0.4 0.05 - 1.2 K/UL ABS. EOSINOPHILS 0.0 0.0 - 0.4 K/UL  
 ABS. BASOPHILS 0.0 0.0 - 0.1 K/UL  
 DF AUTOMATED METABOLIC PANEL, COMPREHENSIVE Collection Time: 10/08/18  2:29 PM  
Result Value Ref Range Sodium 121 (L) 136 - 145 mmol/L Potassium 4.3 3.5 - 5.5 mmol/L Chloride 83 (L) 100 - 108 mmol/L  
 CO2 26 21 - 32 mmol/L Anion gap 12 3.0 - 18 mmol/L Glucose 136 (H) 74 - 99 mg/dL BUN 11 7.0 - 18 MG/DL Creatinine 1.06 0.6 - 1.3 MG/DL  
 BUN/Creatinine ratio 10 (L) 12 - 20 GFR est AA >60 >60 ml/min/1.73m2 GFR est non-AA 53 (L) >60 ml/min/1.73m2 Calcium 9.1 8.5 - 10.1 MG/DL Bilirubin, total 1.2 (H) 0.2 - 1.0 MG/DL  
 ALT (SGPT) 33 13 - 56 U/L  
 AST (SGOT) 30 15 - 37 U/L Alk. phosphatase 110 45 - 117 U/L Protein, total 8.3 (H) 6.4 - 8.2 g/dL Albumin 4.1 3.4 - 5.0 g/dL Globulin 4.2 (H) 2.0 - 4.0 g/dL A-G Ratio 1.0 0.8 - 1.7 LIPASE Collection Time: 10/08/18  2:29 PM  
Result Value Ref Range Lipase 129 73 - 393 U/L  
URINALYSIS W/ RFLX MICROSCOPIC Collection Time: 10/08/18  4:38 PM  
Result Value Ref Range Color YELLOW Appearance CLEAR Specific gravity 1.025 1.005 - 1.030    
 pH (UA) 7.0 5.0 - 8.0 Protein 30 (A) NEG mg/dL Glucose NEGATIVE  NEG mg/dL Ketone 15 (A) NEG mg/dL Bilirubin NEGATIVE  NEG Blood MODERATE (A) NEG Urobilinogen 0.2 0.2 - 1.0 EU/dL Nitrites NEGATIVE  NEG Leukocyte Esterase TRACE (A) NEG URINE MICROSCOPIC ONLY Collection Time: 10/08/18  4:38 PM  
Result Value Ref Range WBC 0 to 2 0 - 4 /hpf  
 RBC 5 to 8 0 - 5 /hpf Epithelial cells 1+ 0 - 5 /lpf Bacteria FEW (A) NEG /hpf Imaging Reviewed: 
 
CT Abd & pelvis IMPRESSION: 
  
1. Cystic pancreatic neck region lesion, differential considerations of 
pseudocyst vs. IPMN, similar to prior study in appearance. Stable dating back 
to 9/29/14 CT (earliest available comparison study in the PACS). Indicative of 
benign nature of this lesion.   
2.  Slight increase in the size of the aortic aneurysm since the most recent 
study.   
  
3. No convincing evidence of acute diverticulitis or colitis.  
  
4.  Mild thickening of the terminal ileum. Query mild intrinsic inflammatory 
changes to the terminal ileum. 
  
5.  Gastric distention with fluid. If the patient is at risk for aspiration, NG 
tube placement could be helpful to relieve gastric distention.   
 
 
 
Estrellita Blanton MD 
10/8/2018, 8:09 PM

## 2018-10-09 NOTE — ANESTHESIA PREPROCEDURE EVALUATION
Anesthetic History PONV Review of Systems / Medical History Patient summary reviewed and pertinent labs reviewed Pulmonary COPD Sleep apnea: No treatment Neuro/Psych Within defined limits Cardiovascular Hypertension Exercise tolerance: <4 METS Comments: AAA - s/p EVAR Ef 55% GI/Hepatic/Renal 
  
GERD Comments: Gastric outlet obstruction Endo/Other Hypothyroidism Obesity and arthritis Other Findings Physical Exam 
 
Airway Mallampati: II 
TM Distance: > 6 cm Neck ROM: normal range of motion Mouth opening: Normal 
 
 Cardiovascular Regular rate and rhythm,  S1 and S2 normal,  no murmur, click, rub, or gallop Dental 
 
Dentition: Upper partial plate Pulmonary Breath sounds clear to auscultation Abdominal 
GI exam deferred Other Findings Anesthetic Plan ASA: 3 Anesthesia type: MAC Induction: Intravenous Anesthetic plan and risks discussed with: Patient

## 2018-10-09 NOTE — PROGRESS NOTES
SBAR/bedside report given to Henrique Goode RN on Kardex, [facility], and Flowsheets

## 2018-10-09 NOTE — PROGRESS NOTES
Reason for Admission:   Intractable nausea and vomiting, abdominal pain gastric outlet obstruction. RRAT Score:     13 Do you (patient/family) have any concerns for transition/discharge? Patient resides with her daughter. Patient and family have no concerns regarding discharge. Plan for utilizing home health:     No home health needs at the time of this initial assessment. Daughter is stating that she will not need home health services. Likelihood of readmission? Moderate Transition of Care Plan:   Patient has family supports that can assist with care as needed, post discharge. Care Management Interventions PCP Verified by CM: Yes (Dr. Aakash eHlm) Mode of Transport at Discharge: Other (see comment) (Family to transport home.) Current Support Network: [facility], Lives with Caregiver Confirm Follow Up Transport: Family Plan discussed with Pt/Family/Caregiver: Yes Discharge Location Discharge Placement: Home with family assistance This writer met with patient and her daughter to assess and discuss discharge planning. Patient resides with her daughter in [place], [place]. Patient has no home health orders at the time of this initial assessment. Daughter states that she will likely not need home health services. Patient's PCP is Dr. Aakash Helm. Patient's daughter reports that patient has a cane that she uses at home, from time to time. Patient is insured through [employer]. Patient's family will provide transportation home at the time of discharge from [place]. This writer will continue to closely monitor for discharge planning to ensure a safe discharge home from [place]. Geovani Gonzales. MSW Care Manager [facility]#[phone]

## 2018-10-09 NOTE — CONSULTS
[email or web address]  [phone]    GASTROENTEROLOGY CONSULT      Impression:   1. Possible gastric outlet obstruction as noted on CT below, h/o gastric lap band s/p removal 7/2018 at [facility]  2. Nausea and vomiting  3. Upper abdominal pain  4. R kidney failure, scheduled to have nephrectomy next month due to uncontrolled HTN  5. AAA - stable  6. Pancreatic cyst - stable since 2014    CT 10/8/2018  IMPRESSION:     1. Cystic pancreatic neck region lesion, differential considerations of  pseudocyst vs. IPMN, similar to prior study in appearance. Stable dating back  to 9/29/14 CT (earliest available comparison study in the PACS). Indicative of  benign nature of this lesion.      2.  Slight increase in the size of the aortic aneurysm since the most recent  study.       3. No convincing evidence of acute diverticulitis or colitis.      4.  Mild thickening of the terminal ileum. Query mild intrinsic inflammatory  changes to the terminal ileum.     5.  Gastric distention with fluid. If the patient is at risk for aspiration, NG  tube placement could be helpful to relieve gastric distention. Plan:     1. EGD today  2. Continue NPO, NGT  3. Continue antiemetics/analgesia  4. Agree with surgical consult  5. Continue current medical management      Chief Complaint: Intractable nausea/vomiting, abdominal pain      HPI:  Merritt Perry is a 66 y.o. female who I am being asked to see in consultation for an opinion regarding the above. She has a history of gastric lap band with complications, underwent multiple EGDs, SPGE, and esophageal manometry prior to removal 7/2018. Last EGD was with Dr. Jose Robertson at [facility] 1/2018 showing gastritis, duodenal bulb nodule, and esophagitis, she was placed on pantoprazole 40mg at that time. She states she was feeling nauseated and uncomfortable for the past week then symptoms worsened 4 days ago before coming to ER.  CT showed possible gastric outlet obstruction as noted above, she has had some improvement with NGT suction. PMH:   Past Medical History:   Diagnosis Date    Aorto-iliac duplex 09/17/2015    Patent abdom aorta endovascular graft w/o leak. Mod stenosis suggested in right limb.  Calculus of kidney 2014    stent/kidney     Carotid duplex 09/17/2015    Mild < 50% bilateral plaquing.  Cataract, left     Chronic kidney disease     one functioning kidney    Chronic obstructive pulmonary disease ([address]     Echocardiogram 11/04/2014    EF 56%. No WMA. Mild LAE. Echo is within normal limits.  Esophageal reflux     Generalized osteoarthrosis, involving multiple sites     GERD (gastroesophageal reflux disease)     Gout     History of renal stent     right side    Hypercholesteremia     Hypertension     resolved    Lower extremity arterial testing 03/27/2015    Normal perfusion at rest and w/treadmill bilaterally.   R KORI 1.05.  L KORI 1.03.    Murmur     patient reports that she is aware- has been told in the past    Nausea & vomiting     Renal artery stenosis (HCC)     S/P AAA (abdominal aortic aneurysm) repair 9/20/2018    Subclinical hyperthyroidism 8/2015    Unspecified sleep apnea     resolved       PSH:   Past Surgical History:   Procedure Laterality Date    ADJUSTMENT GASTRIC BAND N/A 11/10/2016    MAKAYLA Cullen    FULL ESOPHAGEAL MANOMETRY  5/21/2018         HX APPENDECTOMY      HX BLADDER SUSPENSION      HX CHOLECYSTECTOMY      HX GI      lap band 2012    HX GI  07/2018    lap band removal    HX HEENT Right 1990s, 2016    Ear sx    HX HYSTERECTOMY      HX OTHER SURGICAL  10/27/14     Bilateral open femoral exposures, Placement of catheter and sheath in the aorta Endo repair of aortic aneurysm with modular bifurcated device  Interpretation of angiography, intravascular ultrasound (IVUS) catheter    HX OTHER SURGICAL  10/27/2014    Endurant II abdominal stent graft implant    HX UROLOGICAL      stent    VASCULAR SURGERY PROCEDURE UNLIST surgery for abdominal aneurysm       Social HX:   Social History     Social History    Marital status:      Spouse name: N/A    Number of children: N/A    Years of education: N/A     Occupational History    Not on file. Social History Main Topics    Smoking status: Former Smoker     Packs/day: 0.00     Years: 0.00     Types: Cigarettes     Quit date: 1/25/2016    Smokeless tobacco: Never Used    Alcohol use No    Drug use: No    Sexual activity: Yes     Birth control/ protection: Surgical     Other Topics Concern    Not on file     Social History Narrative       FHX:   Family History   Problem Relation Age of Onset    Cancer Mother     Diabetes Father     Alcohol abuse Father     Heart Disease Maternal Grandmother     Alzheimer Maternal Grandmother     Diabetes Sister        Allergy:   Allergies   Allergen Reactions    Pcn [Penicillins] Anaphylaxis    Tetracycline Anaphylaxis    Sulfabenzamide Hives       Patient Active Problem List   Diagnosis Code    Essential hypertension I10    Hyperlipidemia E78.5    Subclinical hyperthyroidism E05.90    Generalized osteoarthrosis, involving multiple sites M15.9    Gastroesophageal reflux disease K21.9    Renal infarction ([address] N28.0    Dysarthria R47.1    LAP-BAND surgery status Z98.84    Non morbid obesity due to excess calories E66.09    Gastroesophageal reflux disease without esophagitis K21.9    Obesity (BMI 30.0-34. 9) E66.9    Peripheral vascular disease (HCC) I73.9    Right-sided carotid artery disease (HCC) I77.9    Sacroiliitis (HCC) M46.1    Right hip pain M25.551    Spondylosis of lumbar region without myelopathy or radiculopathy M47.816    Lumbar neuritis M54.16    Left upper quadrant pain R10.12    Hypovitaminosis D E55.9    Lumbar radiculopathy M54.16    Hx of aortic aneurysm repair Z98.890, Z86.79    History of renal stent Z98.890    Right renal artery stenosis (HCC) I70.1    Abdominal pain R10.9    Intractable nausea and vomiting R11.2    Atrophy of right kidney N26.1    Generalized abdominal pain R10.84    Status post gastric banding Z98.84    History of pseudoachalasia of esophagus Z87.19    S/P AAA (abdominal aortic aneurysm) repair Z98.890, Z86.79    Right leg swelling M79.89    Gastric outlet obstruction K31.1       Home Medications:     Prescriptions Prior to Admission   Medication Sig    ondansetron (ZOFRAN ODT) 8 mg disintegrating tablet Take 1 Tab by mouth every eight (8) hours as needed for Nausea.  Lancets (ONETOUCH ULTRASOFT LANCETS) misc To use with blood for glucose monitoring    glucose blood VI test strips (ONETOUCH ULTRA BLUE TEST STRIP) strip To check the blood sugars daily    lancets (ONE TOUCH DELICA) 33 gauge misc To use with blood for glucose monitoring    glucose blood VI test strips (BLOOD GLUCOSE TEST) strip One touch test strips. To use with one touch delica  Patient to check blood sugar daily E11.9    TOBRADEX ST drps ophthalmix suspension     albuterol (PROVENTIL HFA, VENTOLIN HFA, PROAIR HFA) 90 mcg/actuation inhaler Take  by inhalation.  omeprazole (PRILOSEC) 40 mg capsule 1 cap each AM.  Stop ranitidine    oxyCODONE-acetaminophen (PERCOCET 10)  mg per tablet 1 tab every six hours as needed for pain    amLODIPine (NORVASC) 10 mg tablet TAKE ONE TABLET BY MOUTH DAILY    diclofenac (VOLTAREN) 1 % gel Apply 2 g to affected area four (4) times daily.  carvedilol (COREG) 25 mg tablet TAKE ONE TABLET BY MOUTH TWICE A DAY WITH MEALS    pantoprazole (PROTONIX) 40 mg tablet     losartan (COZAAR) 50 mg tablet     cloNIDine HCl (CATAPRES) 0.2 mg tablet     raNITIdine (ZANTAC) 150 mg tablet TAKE ONE TABLET BY MOUTH TWICE A DAY    mirtazapine (REMERON) 15 mg tablet TAKE ONE TABLET BY MOUTH EVERY EVENING    HYDROcodone-acetaminophen (NORCO)  mg tablet Take 1 Tab by mouth every six (6) hours as needed for Pain. Max Daily Amount: 4 Tabs.     atorvastatin (LIPITOR) 40 mg tablet TAKE ONE TABLET BY MOUTH DAILY    montelukast (SINGULAIR) 10 mg tablet TAKE ONE TABLET BY MOUTH DAILY    DALIRESP tab tablet TAKE ONE TABLET BY MOUTH DAILY    triamterene-hydroCHLOROthiazide (DYAZIDE) 37.5-25 mg per capsule TAKE ONE CAPSULE BY MOUTH EVERY MORNING    desmopressin (DDAVP) 0.2 mg tablet Take 1 Tab by mouth nightly.  clopidogrel (PLAVIX) 75 mg tab TAKE 1 TABLET DAILY  Indications: Resume on 7/19/2018    Ondansetron (ZUPLENZ) 4 mg film Take  by mouth.  azelastine (ASTELIN) 137 mcg (0.1 %) nasal spray by Nasal route.  lansoprazole (PREVACID) 30 mg capsule Take 1 Cap by mouth.  fluticasone-salmeterol (ADVAIR DISKUS) 250-50 mcg/dose diskus inhaler Take 1 Puff by inhalation daily.  hydrALAZINE (APRESOLINE) 25 mg tablet Take 1 Tab by mouth two (2) times a day.  docusate sodium (COLACE) 100 mg capsule Take 1 Cap by mouth two (2) times a day. (Patient taking differently: Take 100 mg by mouth daily.)    esomeprazole (NEXIUM) 20 mg capsule Take 1 Cap by mouth daily. STOP PREVACID  Indications: HEARTBURN       Review of Systems:     Constitutional:  fatigue. Skin: No rashes, pruritis, jaundice, ulcerations, erythema. HENT: No headaches, nosebleeds, sinus pressure, rhinorrhea, sore throat. Eyes: No visual changes, blurred vision, eye pain, photophobia, jaundice. Cardiovascular: No chest pain, heart palpitations. Respiratory: No cough, SOB, wheezing, chest discomfort, orthopnea. Gastrointestinal: Abdominal pain, nausea, vomiting   Genitourinary: No dysuria, bleeding, discharge, pyuria. Musculoskeletal: No weakness, arthralgias, wasting. Endo: No sweats. Heme: No bruising, easy bleeding. Allergies: As noted. Neurological: Cranial nerves intact. Alert and oriented. Gait not assessed. Psychiatric:  No anxiety, depression, hallucinations.           Visit Vitals    BP (!) 142/93 (BP 1 Location: Left arm, BP Patient Position: At rest)    Pulse (!) 109    Temp 98.3 °F (36.8 °C)    Resp 20    Ht 5' 1\" (1.549 m)    Wt 75.3 kg (166 lb)    SpO2 94%    BMI 31.37 kg/m2       Physical Assessment:     constitutional: appearance: appears ill, uncomfortable due to nausea and pain, in no acute distress. skin: inspection: no rashes, ulcers, icterus or other lesions; no clubbing or telangiectasias. palpation: no induration or subcutaneos nodules. eyes: inspection: normal conjunctivae and lids; no jaundice pupils: normal  ENMT: mouth: normal oral mucosa,lips and gums; good dentition. oropharynx: normal tongue, hard and soft palate; posterior pharynx without erithema, exudate or lesions. neck: thyroid: normal size, consistency and position; no masses or tenderness. respiratory: effort: normal chest excursion; no intercostal retraction or accessory muscle use. cardiovascular: abdominal aorta: normal size and position; no bruits. palpation: PMI of normal size and position; normal rhythm; no thrill or murmurs. abdominal: abdomen: normal consistency; Diffuse tenderness, increased in upper quadrant, no masses. hernias: no hernias appreciated. liver: normal size and consistency. spleen: not palpable. rectal: hemoccult/guaiac: not performed. musculoskeletal: digits and nails: no clubbing, cyanosis, petechiae or other inflammatory conditions. gait: not observed, resting in bed, head and neck: normal range of motion; no pain, crepitation or contracture. spine/ribs/pelvis: normal range of motion; no pain, deformity or contracture. neurologic: cranial nerves: II-XII normal.   psychiatric: judgement/insight: within normal limits. memory: within normal limits for recent and remote events. mood and affect: no evidence of depression, anxiety or agitation. orientation: oriented to time, space and person.         Basic Metabolic Profile   Recent Labs      10/09/18   0331   NA  126*   K  2.7*   CL  84*   CO2  30   BUN  10   GLU  133*   CA  9.1         CBC w/Diff Recent Labs      10/09/18   0331   WBC  17.3*   RBC  5.36*   HGB  15.3   HCT  41.6   MCV  77.6   MCH  28.5   MCHC  36.8   RDW  12.2   PLT  331    Recent Labs      10/08/18   1429   GRANS  86*   LYMPH  10*   EOS  0        Hepatic Function   Recent Labs      10/09/18   0331  10/08/18   1429   ALB  4.2  4.1   TP  8.0  8.3*   TBILI  1.0  1.2*   SGOT  23  30   AP  106  110   LPSE   --   129        Coags   Recent Labs      10/09/18   0331   APTT  25.7           MAKAYLA Marquez. [facility], [address] [place] [address]  Cell: [phone]  [email or web address]

## 2018-10-09 NOTE — PERIOP NOTES
TRANSFER - OUT REPORT: 
 
Verbal report given to TONG Jansen(name) on Cyndi Dates  being transferred to [address] [place] [address](unit) for routine post - op Report consisted of patients Situation, Background, Assessment and  
Recommendations(SBAR). Information from the following report(s) SBAR, Kardex, OR Summary, Procedure Summary, Intake/Output, MAR, Recent Results and Med Rec Status was reviewed with the receiving nurse. Lines:  
Peripheral IV 10/08/18 Left;Upper Arm (Active) Site Assessment Clean, dry, & intact 10/8/2018  9:05 PM  
Phlebitis Assessment 0 10/8/2018  9:05 PM  
Infiltration Assessment 0 10/8/2018  9:05 PM  
Dressing Status Clean, dry, & intact 10/8/2018  9:05 PM  
Dressing Type Transparent 10/8/2018  9:05 PM  
Hub Color/Line Status Blue; Infusing;Capped 10/8/2018  9:05 PM  
  
 
Opportunity for questions and clarification was provided. Patient transported with: 
 Qwilr

## 2018-10-09 NOTE — PROCEDURES
[facility]  [address], [address]     Endoscopic Gastroduodenoscopy Procedure Note    Ember Macario  1955  [id number]    Indication: suspected GOO w/ hematemesis     : Wilfredo Cadena MD    Referring Provider:  Ilana Kiser MD    Anesthesia/Sedation:  MAC anesthesia Propofol      Procedure Details     After infomed consent was obtained for the procedure, with all risks and benefits of procedure explained the patient was taken to the endoscopy suite and placed in the left lateral decubitus position. Following sequential administration of sedation as per above, the endoscope was inserted into the mouth and advanced under direct vision to third portion of the duodenum. A careful inspection was made as the gastroscope was withdrawn, including a retroflexed view of the proximal stomach; findings and interventions are described below. Findings:   Esophagus:normal  Stomach: friable mucosa, no specific ulcer seen but mucosa was hemorrhagic   Duodenum/jejunum: normal    Therapies:  none    Specimens: * No specimens in log *           Complications:   None; patient tolerated the procedure well. EBL:  None. Impression:    hemorrhagic gastritis      Recommendations:  -Continue acid suppression. , -Await MAREN test result and treat for Helicobacter pylori if positive. , -Clear liquid diet and advance as tolerated.     Wilfredo Cadena MD  10/9/2018  2:27 PM

## 2018-10-09 NOTE — PROGRESS NOTES
The patient presents to the office today with the chief complaint of generalized abdominal pain    HPI    The patient is doing better with surgery due to the gastric sleeve. She has persistent generalized abdominal pain that still requires pain medication. The patient has peripheral vascular disease which is stable but still with leg pain. The patient has hypertension. She is doing well on the medications. Review of Systems   Respiratory: Negative for shortness of breath. Cardiovascular: Negative for chest pain and leg swelling.        Allergies   Allergen Reactions    Pcn [Penicillins] Anaphylaxis    Tetracycline Anaphylaxis    Sulfabenzamide Hives       Facility-Administered Medications Ordered in Other Visits   Medication Dose Route Frequency Provider Last Rate Last Dose    aztreonam (AZACTAM) 2 g in 0.9% sodium chloride (MBP/ADV) 100 mL MBP  2 g IntraVENous Q8H Robi Ray MD        potassium chloride 10 mEq, lidocaine (PF) (XYLOCAINE) 10 mg/mL (1 %) 1 mL in 0.9% sodium chloride 100 mL IVPB   IntraVENous Q1H Robi Ray MD        scopolamine (TRANSDERM-SCOP) 1 mg over 3 days 1 Patch  1 Patch TransDERmal Q72H MAKAYLA Madsen   1 Patch at 10/08/18 1849    0.9% sodium chloride (MBP/ADV) infusion             acetaminophen (TYLENOL) tablet 650 mg  650 mg Oral Q6H PRN Sam Mays MD        ondansetron [facility]) injection 4 mg  4 mg IntraVENous Q6H PRN Sam Mays MD   4 mg at 10/09/18 0551    0.9% sodium chloride infusion  100 mL/hr IntraVENous CONTINUOUS aSm Mays  mL/hr at 10/08/18 2105 100 mL/hr at 10/08/18 2105    heparin (porcine) injection 5,000 Units  5,000 Units SubCUTAneous Q8H Sam Mays MD   5,000 Units at 10/09/18 0012    morphine injection 2 mg  2 mg IntraVENous Q4H PRN Sam Mays MD   2 mg at 10/09/18 0214       Past Medical History:   Diagnosis Date    Aorto-iliac duplex 09/17/2015    Patent abdom aorta endovascular graft w/o leak.  Mod stenosis suggested in right limb.  Calculus of kidney 2014    stent/kidney     Carotid duplex 09/17/2015    Mild < 50% bilateral plaquing.  Cataract, left     Chronic kidney disease     one functioning kidney    Chronic obstructive pulmonary disease ([address]     Echocardiogram 11/04/2014    EF 56%. No WMA. Mild LAE. Echo is within normal limits.  Esophageal reflux     Generalized osteoarthrosis, involving multiple sites     GERD (gastroesophageal reflux disease)     Gout     History of renal stent     right side    Hypercholesteremia     Hypertension     resolved    Lower extremity arterial testing 03/27/2015    Normal perfusion at rest and w/treadmill bilaterally.   R KORI 1.05.  L KORI 1.03.    Murmur     patient reports that she is aware- has been told in the past    Nausea & vomiting     Renal artery stenosis (HCC)     S/P AAA (abdominal aortic aneurysm) repair 9/20/2018    Subclinical hyperthyroidism 8/2015    Unspecified sleep apnea     resolved       Past Surgical History:   Procedure Laterality Date    ADJUSTMENT GASTRIC BAND N/A 11/10/2016    MAKAYLA Cullen    FULL ESOPHAGEAL MANOMETRY  5/21/2018         HX APPENDECTOMY      HX BLADDER SUSPENSION      HX CHOLECYSTECTOMY      HX GI      lap band 2012    HX GI  07/2018    lap band removal    HX HEENT Right 1990s, 2016    Ear sx    HX HYSTERECTOMY      HX OTHER SURGICAL  10/27/14     Bilateral open femoral exposures, Placement of catheter and sheath in the aorta Endo repair of aortic aneurysm with modular bifurcated device  Interpretation of angiography, intravascular ultrasound (IVUS) catheter    HX OTHER SURGICAL  10/27/2014    Endurant II abdominal stent graft implant    HX UROLOGICAL      stent    VASCULAR SURGERY PROCEDURE UNLIST      surgery for abdominal aneurysm       Social History     Social History    Marital status:      Spouse name: N/A    Number of children: N/A    Years of education: N/A     Occupational History    Not on file. Social History Main Topics    Smoking status: Former Smoker     Packs/day: 0.00     Years: 0.00     Types: Cigarettes     Quit date: 1/25/2016    Smokeless tobacco: Never Used    Alcohol use No    Drug use: No    Sexual activity: Yes     Birth control/ protection: Surgical     Other Topics Concern    Not on file     Social History Narrative       Patient does not have an advanced directive on file    Visit Vitals    /70 (BP 1 Location: Left arm, BP Patient Position: Sitting)    Pulse 76    Temp (!) 78 °F (25.6 °C) (Tympanic)    Resp 16    Ht 5' (1.524 m)    Wt 167 lb (75.8 kg)    SpO2 99%    BMI 32.61 kg/m2       Physical Exam   Neck: Carotid bruit is not present. Cardiovascular: Normal rate and regular rhythm. Exam reveals no gallop. No murmur heard. Pulmonary/Chest: She has no wheezes. She has no rales. Abdominal: Soft. Bowel sounds are normal. She exhibits no distension. There is no tenderness. Musculoskeletal: She exhibits no edema.          Hospital Outpatient Visit on 09/26/2018   Component Date Value Ref Range Status    [facility] SPECIMEN COL 09/26/2018 Specimens collected/sent to [place]    Final   Office Visit on 09/13/2018   Component Date Value Ref Range Status    Glucose (UA POC) 09/13/2018 Negative  Negative Final    Bilirubin (UA POC) 09/13/2018 Negative  Negative Final    Ketones (UA POC) 09/13/2018 Negative  Negative Final    Specific gravity (UA POC) 09/13/2018 1.010  1.001 - 1.035 Final    Blood (UA POC) 09/13/2018 Trace  Negative Final    pH (UA POC) 09/13/2018 6.5  4.6 - 8.0 Final    Protein (UA POC) 09/13/2018 Negative  Negative Final    Urobilinogen (UA POC) 09/13/2018 0.2 mg/dL  0.2 - 1 Final    Nitrites (UA POC) 09/13/2018 Negative  Negative Final    Leukocyte esterase (UA POC) 09/13/2018 Negative  Negative Final    RESULT 09/13/2018 Normal   Final    Comment: Updated: 14Sep18 1948  LAB ACC#: [id number]  SOURCE: Clean Catch Urine  --FINAL REPORT--  No Growth     Office Visit on 08/07/2018   Component Date Value Ref Range Status    Color (UA POC) 08/07/2018 Yellow   Final    Clarity (UA POC) 08/07/2018 Clear   Final    Glucose (UA POC) 08/07/2018 Negative  Negative Final    Bilirubin (UA POC) 08/07/2018 Negative  Negative Final    Ketones (UA POC) 08/07/2018 Negative  Negative Final    Specific gravity (UA POC) 08/07/2018 1.005  1.001 - 1.035 Final    Blood (UA POC) 08/07/2018 Trace  Negative Final    pH (UA POC) 08/07/2018 5.5  4.6 - 8.0 Final    Protein (UA POC) 08/07/2018 Negative  Negative Final    Urobilinogen (UA POC) 08/07/2018 0.2 mg/dL  0.2 - 1 Final    Nitrites (UA POC) 08/07/2018 Negative  Negative Final    Leukocyte esterase (UA POC) 08/07/2018 Negative  Negative Final    Glucose 08/07/2018 192* 70 - 99 mg/dL Final    BUN 08/07/2018 12  6 - 22 mg/dL Final    Creatinine 08/07/2018 1.0  0.8 - 1.4 mg/dL Final    Sodium 08/07/2018 141  133 - 145 mmol/L Final    Potassium 08/07/2018 4.0  3.5 - 5.5 mmol/L Final    Chloride 08/07/2018 97* 98 - 110 mmol/L Final    CO2 08/07/2018 24  20 - 32 mmol/L Final    Calcium 08/07/2018 9.3  8.4 - 10.5 mg/dL Final    Anion gap 08/07/2018 20.0  mmol/L Final    Comment: Test includes BUN, CO2, Chloride, Creatinine, Glucose, Potassium, Calcium and  Sodium. Estimated GFR results are reported in mL/min/1.73 sq.m. by the MDRD equation. This eGFR is validated for stable chronic renal failure patients. This   equation  is unreliable in acute illness or patients with normal renal function.       GFRAA 08/07/2018 >60.0  >60.0 Final    GFRNA 08/07/2018 53.9* >60.0 Final   Admission on 07/17/2018, Discharged on 07/17/2018   Component Date Value Ref Range Status    Ventricular Rate 07/17/2018 59  BPM Final    Atrial Rate 07/17/2018 59  BPM Final    P-R Interval 07/17/2018 128  ms Final    QRS Duration 07/17/2018 80  ms Final    Q-T Interval 07/17/2018 426  ms Final    QTC Calculation (Bezet) 07/17/2018 421  ms Final    Calculated P Axis 07/17/2018 75  degrees Final    Calculated R Axis 07/17/2018 69  degrees Final    Calculated T Axis 07/17/2018 78  degrees Final    Diagnosis 07/17/2018    Final                    Value:Sinus bradycardia  Otherwise normal ECG  When compared with ECG of 31-JAN-2018 16:33,  Vent. rate has decreased BY  50 BPM  Confirmed by Monico Taveras ([phone]) on 7/18/2018 8:56:40 AM     Office Visit on 07/13/2018   Component Date Value Ref Range Status    Glucose 07/13/2018 105* 70 - 99 mg/dL Final    BUN 07/13/2018 12  6 - 22 mg/dL Final    Creatinine 07/13/2018 0.9  0.8 - 1.4 mg/dL Final    Sodium 07/13/2018 139  133 - 145 mmol/L Final    Potassium 07/13/2018 4.6  3.5 - 5.5 mmol/L Final    Chloride 07/13/2018 95* 98 - 110 mmol/L Final    CO2 07/13/2018 29  20 - 32 mmol/L Final    AST (SGOT) 07/13/2018 16  10 - 37 U/L Final    ALT (SGPT) 07/13/2018 18  5 - 40 U/L Final    Alk. phosphatase 07/13/2018 98  40 - 120 U/L Final    Bilirubin, total 07/13/2018 0.7  0.2 - 1.2 mg/dL Final    Calcium 07/13/2018 9.9  8.4 - 10.5 mg/dL Final    Protein, total 07/13/2018 6.9  6.2 - 8.1 g/dL Final    Albumin 07/13/2018 4.5  3.5 - 5.0 g/dL Final    A-G Ratio 07/13/2018 1.9  1.1 - 2.6 ratio Final    Globulin 07/13/2018 2.4  2.0 - 4.0 g/dL Final    Anion gap 07/13/2018 15.0  mmol/L Final    Comment: Test includes Albumin, Alkaline Phosphatase, ALT, AST, BUN, Calcium, CO2,  Chloride, Creatinine, Glucose, Potassium, Sodium, Total Bilirubin and Total  Protein. Estimated GFR results are reported in mL/min/1.73 sq.m. by the MDRD equation. This eGFR is validated for stable chronic renal failure patients. This   equation  is unreliable in acute illness or patients with normal renal function.       GFRAA 07/13/2018 >60.0  >60.0 Final    GFRNA 07/13/2018 59.8* >60.0 Final    Specific Gravity 07/13/2018 1.004* 1.005 - 1.03 Final    pH (UA) 07/13/2018 6.0  5.0 - 8.0 pH Final    Protein 07/13/2018 Negative  Negative, mg/dL Final    Glucose 07/13/2018 Negative  Negative mg/dL Final    Ketone 07/13/2018 Negative  Negative mg/dL Final    Bilirubin 07/13/2018 Negative  Negative Final    Blood 07/13/2018 Negative  Negative Final    Nitrites 07/13/2018 Negative  Negative Final    Leukocyte Esterase 07/13/2018 Trace* Negative Final    Urobilinogen 07/13/2018 <2.0  <2.0 mg/dL Final    WBC 07/13/2018 Negative  0 - 2 /hpf Final    RBC 07/13/2018 0-2  Negative, /hpf Final    WBC 07/13/2018 6.5  4.0 - 11.0 K/uL Final    RBC 07/13/2018 4.47  3.80 - 5.20 M/uL Final    HGB 07/13/2018 12.6  11.7 - 16.0 g/dL Final    HCT 07/13/2018 40.7  35.1 - 48.0 % Final    MCV 07/13/2018 91  80 - 95 fL Final    MCH 07/13/2018 28  26 - 34 pg Final    MCHC 07/13/2018 31  31 - 36 g/dL Final    RDW 07/13/2018 15.3  10.0 - 15.5 % Final    PLATELET 73/75/9222 057  140 - 440 K/uL Final    MPV 07/13/2018 10.6  9.0 - 13.0 fL Final    NEUTROPHILS 07/13/2018 56  40 - 75 % Final    Lymphocytes 07/13/2018 33  20 - 45 % Final    MONOCYTES 07/13/2018 8  3 - 12 % Final    EOSINOPHILS 07/13/2018 2  0 - 6 % Final    BASOPHILS 07/13/2018 1  0 - 2 % Final    ABS. NEUTROPHILS 07/13/2018 3.7  1.8 - 7.7 K/uL Final    ABSOLUTE LYMPHOCYTE COUNT 07/13/2018 2.1  1.0 - 4.8 K/uL Final    ABS. MONOCYTES 07/13/2018 0.6  0.1 - 1.0 K/uL Final    ABS. EOSINOPHILS 07/13/2018 0.1  0.0 - 0.5 K/uL Final    ABS. BASOPHILS 07/13/2018 0.1  0.0 - 0.2 K/uL Final    Sent to 07/13/2018 [facility]   Final    Test name 07/13/2018 toxassure compliance   Final    [id number]    Miscellaneous Test 07/13/2018 See scanned report   Final       .No results found for any visits on 10/05/18. Assessment / Plan      ICD-10-CM ICD-9-CM    1. Chronic generalized abdominal pain R10.84 789.07 oxyCODONE-acetaminophen (PERCOCET 10)  mg per tablet    G89.29 338.29    2.  Peripheral vascular disease ([facility] [address] I73.9 443.9 oxyCODONE-acetaminophen (PERCOCET 10)  mg per tablet   3. Essential hypertension I10 401.9        she was advised to continue her maintenance medications      Follow-up Disposition:  Return in about 3 months (around 1/5/2019). I asked Parisa Roberto if she has any questions and I answered the questions. Parisa Salazar states that she understands the treatment plan and agrees with the treatment plan

## 2018-10-10 LAB
ANION GAP SERPL CALC-SCNC: 10 MMOL/L (ref 3–18)
BACTERIA SPEC CULT: ABNORMAL
BACTERIA SPEC CULT: ABNORMAL
BASOPHILS # BLD: 0 K/UL (ref 0–0.1)
BASOPHILS NFR BLD: 0 % (ref 0–2)
BUN SERPL-MCNC: 11 MG/DL (ref 7–18)
BUN/CREAT SERPL: 14 (ref 12–20)
CALCIUM SERPL-MCNC: 8.8 MG/DL (ref 8.5–10.1)
CHLORIDE SERPL-SCNC: 101 MMOL/L (ref 100–108)
CO2 SERPL-SCNC: 29 MMOL/L (ref 21–32)
CREAT SERPL-MCNC: 0.78 MG/DL (ref 0.6–1.3)
DIFFERENTIAL METHOD BLD: ABNORMAL
EOSINOPHIL # BLD: 0.1 K/UL (ref 0–0.4)
EOSINOPHIL NFR BLD: 1 % (ref 0–5)
ERYTHROCYTE [DISTWIDTH] IN BLOOD BY AUTOMATED COUNT: 12.9 % (ref 11.6–14.5)
GLUCOSE SERPL-MCNC: 100 MG/DL (ref 74–99)
HCT VFR BLD AUTO: 38.7 % (ref 35–45)
HGB BLD-MCNC: 13.6 G/DL (ref 12–16)
LACTATE SERPL-SCNC: 1 MMOL/L (ref 0.4–2)
LYMPHOCYTES # BLD: 2.9 K/UL (ref 0.9–3.6)
LYMPHOCYTES NFR BLD: 22 % (ref 21–52)
MCH RBC QN AUTO: 27.9 PG (ref 24–34)
MCHC RBC AUTO-ENTMCNC: 35.1 G/DL (ref 31–37)
MCV RBC AUTO: 79.5 FL (ref 74–97)
MONOCYTES # BLD: 1.8 K/UL (ref 0.05–1.2)
MONOCYTES NFR BLD: 14 % (ref 3–10)
NEUTS SEG # BLD: 8.2 K/UL (ref 1.8–8)
NEUTS SEG NFR BLD: 63 % (ref 40–73)
PLATELET # BLD AUTO: 308 K/UL (ref 135–420)
PMV BLD AUTO: 10.1 FL (ref 9.2–11.8)
POTASSIUM SERPL-SCNC: 3.1 MMOL/L (ref 3.5–5.5)
RBC # BLD AUTO: 4.87 M/UL (ref 4.2–5.3)
SERVICE CMNT-IMP: ABNORMAL
SODIUM SERPL-SCNC: 140 MMOL/L (ref 136–145)
WBC # BLD AUTO: 12.9 K/UL (ref 4.6–13.2)

## 2018-10-10 PROCEDURE — 74011250636 HC RX REV CODE- 250/636: Performed by: INTERNAL MEDICINE

## 2018-10-10 PROCEDURE — 36415 COLL VENOUS BLD VENIPUNCTURE: CPT | Performed by: FAMILY MEDICINE

## 2018-10-10 PROCEDURE — 83605 ASSAY OF LACTIC ACID: CPT | Performed by: FAMILY MEDICINE

## 2018-10-10 PROCEDURE — 74011250637 HC RX REV CODE- 250/637: Performed by: FAMILY MEDICINE

## 2018-10-10 PROCEDURE — C9113 INJ PANTOPRAZOLE SODIUM, VIA: HCPCS | Performed by: INTERNAL MEDICINE

## 2018-10-10 PROCEDURE — 74011000258 HC RX REV CODE- 258: Performed by: SURGERY

## 2018-10-10 PROCEDURE — 74011000250 HC RX REV CODE- 250: Performed by: INTERNAL MEDICINE

## 2018-10-10 PROCEDURE — 74011250636 HC RX REV CODE- 250/636: Performed by: HOSPITALIST

## 2018-10-10 PROCEDURE — 65270000029 HC RM PRIVATE

## 2018-10-10 PROCEDURE — 74011250636 HC RX REV CODE- 250/636: Performed by: FAMILY MEDICINE

## 2018-10-10 PROCEDURE — 74011250637 HC RX REV CODE- 250/637: Performed by: INTERNAL MEDICINE

## 2018-10-10 PROCEDURE — 74011000258 HC RX REV CODE- 258: Performed by: INTERNAL MEDICINE

## 2018-10-10 PROCEDURE — 85025 COMPLETE CBC W/AUTO DIFF WBC: CPT | Performed by: FAMILY MEDICINE

## 2018-10-10 PROCEDURE — 80048 BASIC METABOLIC PNL TOTAL CA: CPT | Performed by: FAMILY MEDICINE

## 2018-10-10 PROCEDURE — 74011250636 HC RX REV CODE- 250/636: Performed by: SURGERY

## 2018-10-10 RX ORDER — PROMETHAZINE HYDROCHLORIDE 25 MG/1
25 SUPPOSITORY RECTAL
Status: DISCONTINUED | OUTPATIENT
Start: 2018-10-10 | End: 2018-10-10

## 2018-10-10 RX ORDER — CLONIDINE 0.2 MG/24H
1 PATCH, EXTENDED RELEASE TRANSDERMAL
Status: DISCONTINUED | OUTPATIENT
Start: 2018-10-10 | End: 2018-10-13 | Stop reason: HOSPADM

## 2018-10-10 RX ORDER — PROMETHAZINE HYDROCHLORIDE 25 MG/1
25 SUPPOSITORY RECTAL
Status: DISCONTINUED | OUTPATIENT
Start: 2018-10-10 | End: 2018-10-13 | Stop reason: HOSPADM

## 2018-10-10 RX ORDER — ONDANSETRON 2 MG/ML
4 INJECTION INTRAMUSCULAR; INTRAVENOUS
Status: DISCONTINUED | OUTPATIENT
Start: 2018-10-10 | End: 2018-10-11

## 2018-10-10 RX ORDER — POTASSIUM CHLORIDE 7.45 MG/ML
10 INJECTION INTRAVENOUS
Status: COMPLETED | OUTPATIENT
Start: 2018-10-10 | End: 2018-10-10

## 2018-10-10 RX ORDER — POTASSIUM CHLORIDE 7.45 MG/ML
10 INJECTION INTRAVENOUS
Status: DISPENSED | OUTPATIENT
Start: 2018-10-10 | End: 2018-10-10

## 2018-10-10 RX ADMIN — SUCRALFATE 1 G: 1 SUSPENSION ORAL at 20:47

## 2018-10-10 RX ADMIN — METOCLOPRAMIDE 5 MG: 5 INJECTION, SOLUTION INTRAMUSCULAR; INTRAVENOUS at 12:09

## 2018-10-10 RX ADMIN — HEPARIN SODIUM 5000 UNITS: 5000 INJECTION, SOLUTION INTRAVENOUS; SUBCUTANEOUS at 16:30

## 2018-10-10 RX ADMIN — SODIUM CHLORIDE 100 ML/HR: 900 INJECTION, SOLUTION INTRAVENOUS at 16:41

## 2018-10-10 RX ADMIN — SODIUM CHLORIDE 40 MG: 9 INJECTION INTRAMUSCULAR; INTRAVENOUS; SUBCUTANEOUS at 20:48

## 2018-10-10 RX ADMIN — HEPARIN SODIUM 5000 UNITS: 5000 INJECTION, SOLUTION INTRAVENOUS; SUBCUTANEOUS at 05:54

## 2018-10-10 RX ADMIN — ONDANSETRON HYDROCHLORIDE 4 MG: 2 INJECTION INTRAMUSCULAR; INTRAVENOUS at 09:42

## 2018-10-10 RX ADMIN — MORPHINE SULFATE 2 MG: 2 INJECTION, SOLUTION INTRAMUSCULAR; INTRAVENOUS at 06:00

## 2018-10-10 RX ADMIN — LIDOCAINE HYDROCHLORIDE: 10 INJECTION, SOLUTION EPIDURAL; INFILTRATION; INTRACAUDAL; PERINEURAL at 17:00

## 2018-10-10 RX ADMIN — POTASSIUM CHLORIDE 10 MEQ: 7.46 INJECTION, SOLUTION INTRAVENOUS at 19:53

## 2018-10-10 RX ADMIN — HEPARIN SODIUM 5000 UNITS: 5000 INJECTION, SOLUTION INTRAVENOUS; SUBCUTANEOUS at 23:41

## 2018-10-10 RX ADMIN — SUCRALFATE 1 G: 1 SUSPENSION ORAL at 16:29

## 2018-10-10 RX ADMIN — POTASSIUM CHLORIDE 10 MEQ: 7.46 INJECTION, SOLUTION INTRAVENOUS at 20:45

## 2018-10-10 RX ADMIN — MORPHINE SULFATE 2 MG: 2 INJECTION, SOLUTION INTRAMUSCULAR; INTRAVENOUS at 20:00

## 2018-10-10 RX ADMIN — SODIUM CHLORIDE 2 G: 900 INJECTION INTRAVENOUS at 04:14

## 2018-10-10 RX ADMIN — HYDRALAZINE HYDROCHLORIDE 10 MG: 20 INJECTION INTRAMUSCULAR; INTRAVENOUS at 20:23

## 2018-10-10 RX ADMIN — SODIUM CHLORIDE 2 G: 900 INJECTION INTRAVENOUS at 12:10

## 2018-10-10 RX ADMIN — SODIUM CHLORIDE 2 G: 900 INJECTION INTRAVENOUS at 20:47

## 2018-10-10 RX ADMIN — POTASSIUM CHLORIDE 10 MEQ: 7.46 INJECTION, SOLUTION INTRAVENOUS at 18:17

## 2018-10-10 RX ADMIN — SUCRALFATE 1 G: 1 SUSPENSION ORAL at 12:09

## 2018-10-10 RX ADMIN — HYDRALAZINE HYDROCHLORIDE 10 MG: 20 INJECTION INTRAMUSCULAR; INTRAVENOUS at 09:42

## 2018-10-10 RX ADMIN — SODIUM CHLORIDE 40 MG: 9 INJECTION INTRAMUSCULAR; INTRAVENOUS; SUBCUTANEOUS at 09:28

## 2018-10-10 RX ADMIN — METOCLOPRAMIDE 5 MG: 5 INJECTION, SOLUTION INTRAMUSCULAR; INTRAVENOUS at 05:55

## 2018-10-10 RX ADMIN — MORPHINE SULFATE 2 MG: 2 INJECTION, SOLUTION INTRAMUSCULAR; INTRAVENOUS at 09:42

## 2018-10-10 NOTE — PROGRESS NOTES
0600-Pt had no pain or nausea this shift until 0545. Began sudden onset with pain and nausea, no emesis. Pt did drink chicken broth and ate jello earlier when she was feeling well.

## 2018-10-10 NOTE — PROGRESS NOTES
Patient is not available to be assessed at this time. Margot Chen Board Certified [employer] [facility]  
[phone]

## 2018-10-10 NOTE — PROGRESS NOTES
Progress Note Patient: Rosamaria Dudley MRN: [id number]  SSN: [id number] YOB: 1955  Age: 66 y.o. Sex: female Admit Date: 10/8/2018 1 Day Post-Op Procedure:  Procedure(s): ENDOSCOPY Subjective:  
 
Patient has no new complaints. Abd discomfort minimally improved. No further vomiting. Tolerating only small amt clears, +flatus, no BM Objective:  
 
Visit Vitals  /76 (BP 1 Location: Left arm, BP Patient Position: Lying right side)  Pulse (!) 117  Temp 97.1 °F (36.2 °C)  Resp 20  
 Ht 5' 1\" (1.549 m)  Wt 75.3 kg (166 lb)  SpO2 98%  BMI 31.37 kg/m2 Temp (24hrs), Av.2 °F (36.8 °C), Min:97.1 °F (36.2 °C), Max:99.3 °F (37.4 °C) Physical Exam:   
General:  NAD. Lungs:   Clear to auscultation bilaterally. Heart:  Regular rate and rhythm. Abdomen:   Soft, non-tender. Bowel sounds normal.   
Extremities: Extremities normal, atraumatic, no cyanosis or edema. No calf tenderness. Data Review: images and reports reviewed Lab Review: All lab results for the last 24 hours reviewed. WBC nl.  K low. Lactate nl Assessment:  
 
Hospital Problems  Date Reviewed: 10/9/2018 Codes Class Noted POA Gastric outlet obstruction ICD-10-CM: K31.1 ICD-9-CM: 537.0  10/8/2018 Unknown Intractable nausea and vomiting ICD-10-CM: R11.2 ICD-9-CM: 536.2  2018 Unknown Abdominal pain ICD-10-CM: R10.9 ICD-9-CM: 789.00  2018 Unknown Plan/Recommendations/Medical Decision Making:  
 
Continue present treatment. Gastritis/Gastroparesis. Clears, advance SLOWLY as tolerated. Cont PPI/prokinetic per GI.  OOB. Correct/follow electrolytes. Signed By: Miri Orr MD   
 October 10, 2018

## 2018-10-10 NOTE — ROUTINE PROCESS
Bedside and Verbal shift change report given to Simon Scruggs RN (oncoming nurse) by Maxwell Leon RN (offgoing nurse). Report included the following information SBAR, Kardex, MAR and Recent Results. SITUATION:  
? Code Status: Full Code 
? Reason for Admission: Intractable nausea and vomiting ? Abdominal pain ? Gastric outlet obstruction ? DX 
 
? Hospital day: 2 
? Problem List:  
   
Hospital Problems  Date Reviewed: 10/9/2018 Codes Class Noted POA Gastric outlet obstruction ICD-10-CM: K31.1 ICD-9-CM: 537.0  10/8/2018 Unknown Intractable nausea and vomiting ICD-10-CM: R11.2 ICD-9-CM: 536.2  1/31/2018 Unknown Abdominal pain ICD-10-CM: R10.9 ICD-9-CM: 789.00  1/29/2018 Unknown BACKGROUND:  
 Past Medical History:  
Past Medical History:  
Diagnosis Date  Aorto-iliac duplex 09/17/2015 Patent abdom aorta endovascular graft w/o leak. Mod stenosis suggested in right limb.  Calculus of kidney 2014  
 stent/kidney  Carotid duplex 09/17/2015 Mild < 50% bilateral plaquing.  Cataract, left  Chronic kidney disease   
 one functioning kidney  Chronic obstructive pulmonary disease ([address]  Echocardiogram 11/04/2014 EF 56%. No WMA. Mild LAE. Echo is within normal limits.  Esophageal reflux  Generalized osteoarthrosis, involving multiple sites  GERD (gastroesophageal reflux disease)  Gout  History of renal stent   
 right side  Hypercholesteremia  Hypertension   
 resolved  Lower extremity arterial testing 03/27/2015 Normal perfusion at rest and w/treadmill bilaterally. R KORI 1.05.  L KORI 1.03.  
 Murmur   
 patient reports that she is aware- has been told in the past  
 Nausea & vomiting  Renal artery stenosis (HCC)  S/P AAA (abdominal aortic aneurysm) repair 9/20/2018  Subclinical hyperthyroidism 8/2015  Unspecified sleep apnea   
 resolved Patient taking anticoagulants yes ASSESSMENT:  
? Changes in Assessment Throughout Shift: nausea ? Patient has Central Line: no Reasons if yes:  
? Patient has Stevens Cath: no Reasons if yes:   
 
? Last Vitals: 
  
Vitals:  
 10/09/18 1529 10/09/18 2000 10/10/18 6939 10/10/18 0740 BP: 173/85 143/84 162/84 (!) 195/101 Pulse: 76 (!) 104 94 96 Resp: 16 16 20 20 Temp: 98.1 °F (36.7 °C) 99.3 °F (37.4 °C) 98.1 °F (36.7 °C) 97.7 °F (36.5 °C) SpO2: 97% 96% 94% 96% Weight:      
Height:      
 
 
? IV and DRAINS (will only show if present) Peripheral IV 10/08/18 Left;Upper Arm-Site Assessment: Clean, dry, & intact [REMOVED] Nasogastric Tube 10/08/18-Site Assessment: Clean, dry, & intact ? WOUND (if present) Wound Type:  none Dressing present  no Wound Concerns/Notes:  none ? PAIN Pain Assessment Pain Intensity 1: 0 (10/10/18 0641) Pain Location 1: Abdomen Pain Intervention(s) 1: Medication (see MAR) Patient Stated Pain Goal: 2 
o Interventions for Pain:  See mar 
o Intervention effective: yes 
o Time of last intervention: see mar  
o Reassessment Completed: yes ? Last 3 Weights: 
Last 3 Recorded Weights in this Encounter 10/08/18 1341 Weight: 75.3 kg (166 lb) Weight change: ? INTAKE/OUPUT Current Shift:   
  Last three shifts: 10/08 1901 - 10/10 0700 In: 7422 [P.O.:240; I.V.:3055] Out: 5356 [IAB:8528] ? LAB RESULTS Recent Labs 10/10/18 
 0515  10/09/18 
 0331  10/08/18 
 1429 WBC  12.9  17.3*  10.2 HGB  13.6  15.3  15.1 HCT  38.7  41.6  40.2 PLT  308  331  330 Recent Labs 10/10/18 
 0515  10/09/18 
 1255  10/09/18 
 0331 NA  140  136  126*  
K  3.1*  3.9  2.7*  
GLU  100*  109*  133* BUN  11  11  10 CREA  0.78  0.94  0.96  
CA  8.8  9.9  9.1 RECOMMENDATIONS AND DISCHARGE PLANNING 1. Pending tests/procedures/ Plan of Care or Other Needs: none 2. Discharge plan for patient and Needs/Barriers: home 3. Estimated Discharge Date: unknown Posted on Whiteboard in Patients Room: no   
 
4. The patient's care plan was reviewed with the oncoming nurse. \"HEALS\" SAFETY CHECK Fall Risk Total Score: 3 Safety Measures: Safety Measures: Bed/Chair-Wheels locked, Bed in low position, Call light within reach, Gripper socks, Side rails X 3 A safety check occurred in the patient's room between off going nurse and oncoming nurse listed above. The safety check included the below items Area Items H High Alert Medications ? Verify all high alert medication drips (heparin, PCA, etc.) E Equipment ? Suction is set up for ALL patients (with karthik) ? Red plugs utilized for all equipment (IV pumps, etc.) ? WOWs wiped down at end of shift. ? Room stocked with oxygen, suction, and other unit-specific supplies A Alarms ? Bed alarm is set for fall risk patients ? Ensure chair alarm is in place and activated if patient is up in a chair L Lines ? Check IV for any infiltration ? Stevens bag is empty if patient has a Stevens ? Tubing and IV bags are labeled Nirav [employer] Safety ? Room is clean, patient is clean, and equipment is clean. ? Hallways are clear from equipment besides carts. ? Fall bracelet on for fall risk patients ? Ensure room is clear and free of clutter ? Suction is set up for ALL patients (with karthik) ? Hallways are clear from equipment besides carts. ? Isolation precautions followed, supplies available outside room, sign posted Yo Ward RN

## 2018-10-10 NOTE — PROGRESS NOTES
[id number], [address] [facility], [address] [facility] 
[email or web address] Impression/Plan: 
1. N/V 
AAA Plan:  Continue liquids and Reglan only If Bx + for H p, will start triple Rx I note that CT suggests \"thickened TI\"; Difficult to connect removal of lap band w/ current Sxs . At EGD, mucosal somewhat friable, but no discrete ulcer seen . Bxs pending AAA is sl larger. Duplex mesenteric study neg 8 mo ago Will plan to repeat Duplex doppler study and possibly order small bowel series if Bxs negative and Sxs persist  
 
 
Chief Complaint: N/V Subjective:  Severe early satiety, no pain. Reports headache too. EGD unrevealing Eyes: conjunctiva normal, EOM normal  
Neck: ROM normal, supple and trachea normal  
Cardiovascular: heart normal, intact distal pulses, normal rate and regular rhythm Pulmonary/Chest Wall: breath sounds normal and effort normal  
Abdominal: appearance normal, bowel sounds normal and soft, non-acute, non-tender Visit Vitals  /80  Pulse (!) 103  Temp 97.7 °F (36.5 °C)  Resp 20  
 Ht 5' 1\" (1.549 m)  Wt 75.3 kg (166 lb)  SpO2 97%  BMI 31.37 kg/m2 Intake/Output Summary (Last 24 hours) at 10/10/18 1034 Last data filed at 10/10/18 0144 Gross per 24 hour Intake             2140 ml Output             1400 ml Net              740 ml CBC w/Diff Lab Results Component Value Date/Time WBC 12.9 10/10/2018 05:15 AM  
 RBC 4.87 10/10/2018 05:15 AM  
 HGB 13.6 10/10/2018 05:15 AM  
 HCT 38.7 10/10/2018 05:15 AM  
 MCV 79.5 10/10/2018 05:15 AM  
 MCH 27.9 10/10/2018 05:15 AM  
 MCHC 35.1 10/10/2018 05:15 AM  
 RDW 12.9 10/10/2018 05:15 AM  
  10/10/2018 05:15 AM  
 Lab Results Component Value Date/Time  GRANS 63 10/10/2018 05:15 AM  
 LYMPH 22 10/10/2018 05:15 AM  
 EOS 1 10/10/2018 05:15 AM  
 BASOS 0 10/10/2018 05:15 AM  
  
Basic Metabolic Profile Recent Labs 10/10/18 
 0515 NA  140  
K  3.1*  
CL  101 CO2  29 BUN  11  
CA  8.8 Hepatic Function Lab Results Component Value Date/Time ALB 4.2 10/09/2018 03:31 AM  
 TP 8.0 10/09/2018 03:31 AM  
  10/09/2018 03:31 AM  
 Lab Results Component Value Date/Time SGOT 23 10/09/2018 03:31 AM  
  
 
 
 
 
 
 
 
Saud Ch MD, MD 
[facility], [address] [place] [address] 
[email or web address]

## 2018-10-10 NOTE — PROGRESS NOTES
[facility] Hospitalist Group Progress Note Patient: Tj Tam Age: 66 y.o. : 1955 MR#: [id number] SSN: [id number] Date/Time: 10/10/2018 Subjective:  
 
Case d/w nursing. Lost PIV access, unable to obtain despite multiple attempts by multiple people. Seen with daughter in room. Pt is somnolent after receiving pain medication. Has had persistent nausea, abd pain. No SOB. Have ordered one-time dose of Phenergan TX and Zofran IM for nausea until PICC line placed. To go for PICC line placement. Assessment/Plan: 1. Possible gastric outlet obstruction - in context of hx gastric lap banding s/p removal 2018, with nausea, vomiting, abd pain. Had NGTube in place. EGD: hemorrhagic gastritis. Will continue PPI. Follow H. Pylori assay and tx as needed. Anti-emetics, diet as tolerated. GI and surgery notes reviewed and appreciated. 2. UTI - Rocephin empirically. U/A only with trace leuk est, negative nitrites, few bacteria. Urine cx with >100k grp B beta-hemo Strep. Will continue abx. 3. R kidney failure - nephrectomy next month. Hx of uncontrolled HTN. BUN/Cr remains wnl.  
4. HTN - holding Norvasc, Coreg, clonidine, Cozaar, hydralazine while NPO. BPs elevated. IV hydralazine prn SBP > 165, Catapres patch dose increase. Resume PO meds when nausea/vomiting resolved. 5. HypoK+ - replete again. Cannot tolerate PO/liquid potassium. IV runs after PICC line. 6. HypoNa+ - likely due to hypovolemia. Resolved. 7. Lactic acidosis - likely due to #1. Resolved. 8. AAA hx - stable. No acute. 9. Pancreatic cyst - chronic. Outpt f/u. Stable from 2014 CT according to report (see below). Additional Notes:   
10/8 CT abd/pelv: 
IMPRESSION: 
1. Cystic pancreatic neck region lesion, differential considerations of 
pseudocyst vs. IPMN, similar to prior study in appearance. Stable dating back 
to 14 CT (earliest available comparison study in the PACS). Indicative of 
benign nature of this lesion.  
  
2.  Slight increase in the size of the aortic aneurysm since the most recent 
study.   
  
3. No convincing evidence of acute diverticulitis or colitis.  
  
4.  Mild thickening of the terminal ileum. Query mild intrinsic inflammatory 
changes to the terminal ileum. 
  
5.  Gastric distention with fluid. If the patient is at risk for aspiration, NG 
tube placement could be helpful to relieve gastric distention. Case discussed with:  [x]Patient  [x]Family  [x]Nursing  []Case Management DVT Prophylaxis:  []Lovenox  [x]Hep SQ  []SCDs  []Coumadin   []On Heparin gtt Objective:  
VS:  
Visit Vitals  /76 (BP 1 Location: Left arm, BP Patient Position: Lying right side)  Pulse (!) 117 Comment: Notified the nurse  Temp 97.1 °F (36.2 °C)  Resp 20  
 Ht 5' 1\" (1.549 m)  Wt 75.3 kg (166 lb)  SpO2 98%  BMI 31.37 kg/m2 Tmax/24hrs: Temp (24hrs), Av.2 °F (36.8 °C), Min:97.1 °F (36.2 °C), Max:99.3 °F (37.4 °C) Input/Output:  
 
Intake/Output Summary (Last 24 hours) at 10/10/18 1252 Last data filed at 10/10/18 1159 Gross per 24 hour Intake             2140 ml Output             1850 ml Net              290 ml General:  Somnolent. No active vomiting. NAD otherwise. Cardiovascular:  RRR. Pulmonary:  CTA B. 
GI:  Soft, NT/ND. NABS. Extremities:  No CT or edema. Additional:   
 
Labs:   
Recent Results (from the past 24 hour(s)) METABOLIC PANEL, BASIC Collection Time: 10/09/18 12:55 PM  
Result Value Ref Range Sodium 136 136 - 145 mmol/L Potassium 3.9 3.5 - 5.5 mmol/L Chloride 87 (L) 100 - 108 mmol/L  
 CO2 38 (H) 21 - 32 mmol/L Anion gap 11 3.0 - 18 mmol/L Glucose 109 (H) 74 - 99 mg/dL BUN 11 7.0 - 18 MG/DL Creatinine 0.94 0.6 - 1.3 MG/DL  
 BUN/Creatinine ratio 12 12 - 20 GFR est AA >60 >60 ml/min/1.73m2 GFR est non-AA >60 >60 ml/min/1.73m2  Calcium 9.9 8.5 - 10.1 MG/DL  
 LACTIC ACID Collection Time: 10/10/18  5:15 AM  
Result Value Ref Range Lactic acid 1.0 0.4 - 2.0 MMOL/L  
CBC WITH AUTOMATED DIFF Collection Time: 10/10/18  5:15 AM  
Result Value Ref Range WBC 12.9 4.6 - 13.2 K/uL  
 RBC 4.87 4.20 - 5.30 M/uL  
 HGB 13.6 12.0 - 16.0 g/dL HCT 38.7 35.0 - 45.0 % MCV 79.5 74.0 - 97.0 FL  
 MCH 27.9 24.0 - 34.0 PG  
 MCHC 35.1 31.0 - 37.0 g/dL  
 RDW 12.9 11.6 - 14.5 % PLATELET 504 877 - 503 K/uL MPV 10.1 9.2 - 11.8 FL  
 NEUTROPHILS 63 40 - 73 % LYMPHOCYTES 22 21 - 52 % MONOCYTES 14 (H) 3 - 10 % EOSINOPHILS 1 0 - 5 % BASOPHILS 0 0 - 2 %  
 ABS. NEUTROPHILS 8.2 (H) 1.8 - 8.0 K/UL  
 ABS. LYMPHOCYTES 2.9 0.9 - 3.6 K/UL  
 ABS. MONOCYTES 1.8 (H) 0.05 - 1.2 K/UL  
 ABS. EOSINOPHILS 0.1 0.0 - 0.4 K/UL  
 ABS. BASOPHILS 0.0 0.0 - 0.1 K/UL  
 DF AUTOMATED METABOLIC PANEL, BASIC Collection Time: 10/10/18  5:15 AM  
Result Value Ref Range Sodium 140 136 - 145 mmol/L Potassium 3.1 (L) 3.5 - 5.5 mmol/L Chloride 101 100 - 108 mmol/L  
 CO2 29 21 - 32 mmol/L Anion gap 10 3.0 - 18 mmol/L Glucose 100 (H) 74 - 99 mg/dL BUN 11 7.0 - 18 MG/DL Creatinine 0.78 0.6 - 1.3 MG/DL  
 BUN/Creatinine ratio 14 12 - 20 GFR est AA >60 >60 ml/min/1.73m2 GFR est non-AA >60 >60 ml/min/1.73m2 Calcium 8.8 8.5 - 10.1 MG/DL Additional Data Reviewed:   
 
Signed By: Hung Condon MD   
 October 10, 2018 12:50 PM

## 2018-10-11 ENCOUNTER — APPOINTMENT (OUTPATIENT)
Dept: INTERVENTIONAL RADIOLOGY/VASCULAR | Age: 63
DRG: 381 | End: 2018-10-11
Attending: FAMILY MEDICINE
Payer: COMMERCIAL

## 2018-10-11 LAB
ANION GAP SERPL CALC-SCNC: 12 MMOL/L (ref 3–18)
APPEARANCE UR: CLEAR
BACTERIA URNS QL MICRO: ABNORMAL /HPF
BASOPHILS # BLD: 0 K/UL (ref 0–0.1)
BASOPHILS NFR BLD: 0 % (ref 0–2)
BILIRUB UR QL: NEGATIVE
BUN SERPL-MCNC: 8 MG/DL (ref 7–18)
BUN/CREAT SERPL: 14 (ref 12–20)
CALCIUM SERPL-MCNC: 8.7 MG/DL (ref 8.5–10.1)
CHLORIDE SERPL-SCNC: 99 MMOL/L (ref 100–108)
CO2 SERPL-SCNC: 25 MMOL/L (ref 21–32)
COLOR UR: YELLOW
CREAT SERPL-MCNC: 0.59 MG/DL (ref 0.6–1.3)
DIFFERENTIAL METHOD BLD: ABNORMAL
EOSINOPHIL # BLD: 0 K/UL (ref 0–0.4)
EOSINOPHIL NFR BLD: 0 % (ref 0–5)
EPITH CASTS URNS QL MICRO: ABNORMAL /LPF (ref 0–5)
ERYTHROCYTE [DISTWIDTH] IN BLOOD BY AUTOMATED COUNT: 13.1 % (ref 11.6–14.5)
GLUCOSE SERPL-MCNC: 102 MG/DL (ref 74–99)
GLUCOSE UR STRIP.AUTO-MCNC: NEGATIVE MG/DL
HCT VFR BLD AUTO: 38.6 % (ref 35–45)
HGB BLD-MCNC: 13.6 G/DL (ref 12–16)
HGB UR QL STRIP: ABNORMAL
KETONES UR QL STRIP.AUTO: ABNORMAL MG/DL
LEUKOCYTE ESTERASE UR QL STRIP.AUTO: NEGATIVE
LYMPHOCYTES # BLD: 2 K/UL (ref 0.9–3.6)
LYMPHOCYTES NFR BLD: 12 % (ref 21–52)
MAGNESIUM SERPL-MCNC: 2.1 MG/DL (ref 1.6–2.6)
MCH RBC QN AUTO: 28.3 PG (ref 24–34)
MCHC RBC AUTO-ENTMCNC: 35.2 G/DL (ref 31–37)
MCV RBC AUTO: 80.2 FL (ref 74–97)
MONOCYTES # BLD: 1.7 K/UL (ref 0.05–1.2)
MONOCYTES NFR BLD: 10 % (ref 3–10)
NEUTS SEG # BLD: 13.6 K/UL (ref 1.8–8)
NEUTS SEG NFR BLD: 78 % (ref 40–73)
NITRITE UR QL STRIP.AUTO: NEGATIVE
PH UR STRIP: 7.5 [PH] (ref 5–8)
PLATELET # BLD AUTO: 332 K/UL (ref 135–420)
PMV BLD AUTO: 10.1 FL (ref 9.2–11.8)
POTASSIUM SERPL-SCNC: 2.8 MMOL/L (ref 3.5–5.5)
POTASSIUM SERPL-SCNC: 2.9 MMOL/L (ref 3.5–5.5)
PROT UR STRIP-MCNC: ABNORMAL MG/DL
RBC # BLD AUTO: 4.81 M/UL (ref 4.2–5.3)
RBC #/AREA URNS HPF: ABNORMAL /HPF (ref 0–5)
SODIUM SERPL-SCNC: 136 MMOL/L (ref 136–145)
SP GR UR REFRACTOMETRY: 1.01 (ref 1–1.03)
UROBILINOGEN UR QL STRIP.AUTO: 0.2 EU/DL (ref 0.2–1)
WBC # BLD AUTO: 17.4 K/UL (ref 4.6–13.2)
WBC URNS QL MICRO: ABNORMAL /HPF (ref 0–4)

## 2018-10-11 PROCEDURE — 84132 ASSAY OF SERUM POTASSIUM: CPT | Performed by: SURGERY

## 2018-10-11 PROCEDURE — 36415 COLL VENOUS BLD VENIPUNCTURE: CPT | Performed by: SURGERY

## 2018-10-11 PROCEDURE — 74011250637 HC RX REV CODE- 250/637: Performed by: PHYSICIAN ASSISTANT

## 2018-10-11 PROCEDURE — 74011000250 HC RX REV CODE- 250: Performed by: INTERNAL MEDICINE

## 2018-10-11 PROCEDURE — 74011250636 HC RX REV CODE- 250/636: Performed by: INTERNAL MEDICINE

## 2018-10-11 PROCEDURE — 02HV33Z INSERTION OF INFUSION DEVICE INTO SUPERIOR VENA CAVA, PERCUTANEOUS APPROACH: ICD-10-PCS | Performed by: RADIOLOGY

## 2018-10-11 PROCEDURE — 74011000258 HC RX REV CODE- 258: Performed by: INTERNAL MEDICINE

## 2018-10-11 PROCEDURE — 74011250637 HC RX REV CODE- 250/637: Performed by: INTERNAL MEDICINE

## 2018-10-11 PROCEDURE — 74011250636 HC RX REV CODE- 250/636: Performed by: FAMILY MEDICINE

## 2018-10-11 PROCEDURE — 87086 URINE CULTURE/COLONY COUNT: CPT | Performed by: INTERNAL MEDICINE

## 2018-10-11 PROCEDURE — C9113 INJ PANTOPRAZOLE SODIUM, VIA: HCPCS | Performed by: INTERNAL MEDICINE

## 2018-10-11 PROCEDURE — 81001 URINALYSIS AUTO W/SCOPE: CPT | Performed by: INTERNAL MEDICINE

## 2018-10-11 PROCEDURE — 85025 COMPLETE CBC W/AUTO DIFF WBC: CPT | Performed by: SURGERY

## 2018-10-11 PROCEDURE — 74011250636 HC RX REV CODE- 250/636: Performed by: HOSPITALIST

## 2018-10-11 PROCEDURE — 76937 US GUIDE VASCULAR ACCESS: CPT

## 2018-10-11 PROCEDURE — 83735 ASSAY OF MAGNESIUM: CPT | Performed by: SURGERY

## 2018-10-11 PROCEDURE — 65270000029 HC RM PRIVATE

## 2018-10-11 RX ORDER — METOPROLOL TARTRATE 5 MG/5ML
5 INJECTION INTRAVENOUS
Status: DISCONTINUED | OUTPATIENT
Start: 2018-10-11 | End: 2018-10-13 | Stop reason: HOSPADM

## 2018-10-11 RX ORDER — POTASSIUM CHLORIDE 20 MEQ/1
40 TABLET, EXTENDED RELEASE ORAL 3 TIMES DAILY
Status: COMPLETED | OUTPATIENT
Start: 2018-10-11 | End: 2018-10-12

## 2018-10-11 RX ORDER — DIPHENHYDRAMINE HYDROCHLORIDE 50 MG/ML
25 INJECTION, SOLUTION INTRAMUSCULAR; INTRAVENOUS
Status: DISCONTINUED | OUTPATIENT
Start: 2018-10-11 | End: 2018-10-13 | Stop reason: HOSPADM

## 2018-10-11 RX ORDER — ENALAPRILAT 1.25 MG/ML
0.62 INJECTION INTRAVENOUS
Status: DISCONTINUED | OUTPATIENT
Start: 2018-10-11 | End: 2018-10-13 | Stop reason: HOSPADM

## 2018-10-11 RX ORDER — ENALAPRILAT 1.25 MG/ML
1.25 INJECTION INTRAVENOUS ONCE
Status: COMPLETED | OUTPATIENT
Start: 2018-10-11 | End: 2018-10-11

## 2018-10-11 RX ORDER — METOPROLOL TARTRATE 5 MG/5ML
5 INJECTION INTRAVENOUS EVERY 6 HOURS
Status: DISCONTINUED | OUTPATIENT
Start: 2018-10-11 | End: 2018-10-11

## 2018-10-11 RX ORDER — ONDANSETRON 2 MG/ML
4 INJECTION INTRAMUSCULAR; INTRAVENOUS
Status: DISCONTINUED | OUTPATIENT
Start: 2018-10-11 | End: 2018-10-13 | Stop reason: HOSPADM

## 2018-10-11 RX ADMIN — HYDRALAZINE HYDROCHLORIDE 10 MG: 20 INJECTION INTRAMUSCULAR; INTRAVENOUS at 04:33

## 2018-10-11 RX ADMIN — SODIUM CHLORIDE 100 ML/HR: 900 INJECTION, SOLUTION INTRAVENOUS at 15:00

## 2018-10-11 RX ADMIN — SODIUM CHLORIDE 2 G: 900 INJECTION INTRAVENOUS at 03:13

## 2018-10-11 RX ADMIN — SUCRALFATE 1 G: 1 SUSPENSION ORAL at 06:36

## 2018-10-11 RX ADMIN — POTASSIUM CHLORIDE 40 MEQ: 20 TABLET, EXTENDED RELEASE ORAL at 18:27

## 2018-10-11 RX ADMIN — MORPHINE SULFATE 2 MG: 2 INJECTION, SOLUTION INTRAMUSCULAR; INTRAVENOUS at 16:05

## 2018-10-11 RX ADMIN — SODIUM CHLORIDE 40 MG: 9 INJECTION INTRAMUSCULAR; INTRAVENOUS; SUBCUTANEOUS at 08:37

## 2018-10-11 RX ADMIN — ENALAPRILAT 1.25 MG: 1.25 INJECTION INTRAVENOUS at 00:51

## 2018-10-11 RX ADMIN — SUCRALFATE 1 G: 1 SUSPENSION ORAL at 22:04

## 2018-10-11 RX ADMIN — LIDOCAINE HYDROCHLORIDE: 10 INJECTION, SOLUTION EPIDURAL; INFILTRATION; INTRACAUDAL; PERINEURAL at 06:44

## 2018-10-11 RX ADMIN — METOPROLOL TARTRATE 5 MG: 1 INJECTION, SOLUTION INTRAVENOUS at 14:45

## 2018-10-11 RX ADMIN — MORPHINE SULFATE 2 MG: 2 INJECTION, SOLUTION INTRAMUSCULAR; INTRAVENOUS at 04:33

## 2018-10-11 RX ADMIN — HEPARIN SODIUM 5000 UNITS: 5000 INJECTION, SOLUTION INTRAVENOUS; SUBCUTANEOUS at 16:05

## 2018-10-11 RX ADMIN — CEFTRIAXONE SODIUM 2 G: 2 INJECTION, POWDER, FOR SOLUTION INTRAMUSCULAR; INTRAVENOUS at 14:47

## 2018-10-11 RX ADMIN — SUCRALFATE 1 G: 1 SUSPENSION ORAL at 16:04

## 2018-10-11 RX ADMIN — ONDANSETRON 4 MG: 2 INJECTION INTRAMUSCULAR; INTRAVENOUS at 10:58

## 2018-10-11 RX ADMIN — ONDANSETRON 4 MG: 2 INJECTION INTRAMUSCULAR; INTRAVENOUS at 22:08

## 2018-10-11 RX ADMIN — LIDOCAINE HYDROCHLORIDE: 10 INJECTION, SOLUTION EPIDURAL; INFILTRATION; INTRACAUDAL; PERINEURAL at 10:57

## 2018-10-11 RX ADMIN — HEPARIN SODIUM 5000 UNITS: 5000 INJECTION, SOLUTION INTRAVENOUS; SUBCUTANEOUS at 23:47

## 2018-10-11 RX ADMIN — SODIUM CHLORIDE 40 MG: 9 INJECTION INTRAMUSCULAR; INTRAVENOUS; SUBCUTANEOUS at 22:02

## 2018-10-11 RX ADMIN — POTASSIUM CHLORIDE 40 MEQ: 20 TABLET, EXTENDED RELEASE ORAL at 22:01

## 2018-10-11 RX ADMIN — SODIUM CHLORIDE 100 ML/HR: 900 INJECTION, SOLUTION INTRAVENOUS at 03:04

## 2018-10-11 RX ADMIN — METOPROLOL TARTRATE 5 MG: 1 INJECTION, SOLUTION INTRAVENOUS at 03:11

## 2018-10-11 RX ADMIN — SUCRALFATE 1 G: 1 SUSPENSION ORAL at 10:58

## 2018-10-11 RX ADMIN — LIDOCAINE HYDROCHLORIDE: 10 INJECTION, SOLUTION EPIDURAL; INFILTRATION; INTRACAUDAL; PERINEURAL at 08:37

## 2018-10-11 NOTE — PROGRESS NOTES
[facility] Hospitalist Group Progress Note Patient: Toni Brooke Age: 66 y.o. : 1955 MR#: [id number] SSN: [id number] Date/Time: 10/11/2018 Subjective:  
 
Review of systems No CP  
NO NVD No SOB  NO Cough Assessment/Plan:  
Admitted with intractable NV and abdominal pain . PMH /of Gastric lap s/p removal last month . 1. Acute NV -  
2 Abdominal pain 3 Acute gastritis - S/p EGD  
4 Secondary HTN- right Kidney failure  - Urgency 5 UTI - POA - On Azactam - urine culture : 100,000 COLONIES/mL STREPTOCOCCI, BETA HEMOLYTIC GROUP B 
6 Leucocytosis 7 Hypokalemia PLAN  
- PIC for IV access  
-ID consult for Antibiotics management  
- HTN - Stable - Clear liquid if tolerates Case discussed with:  []Patient  [] Family ( In room with patient )    []Nursing  []Case Management DVT Prophylaxis:  []Lovenox  []Hep SQ  []SCDs  []Coumadin   []On Heparin gtt Objective:  
VS:  
Visit Vitals  /77 (BP 1 Location: Right arm, BP Patient Position: Head of bed elevated (Comment degrees))  Pulse (!) 102  Temp 97.3 °F (36.3 °C)  Resp 20  
 Ht 5' 1\" (1.549 m)  Wt 75.3 kg (166 lb)  SpO2 98%  BMI 31.37 kg/m2 Tmax/24hrs: Temp (24hrs), Av.2 °F (36.8 °C), Min:97 °F (36.1 °C), Max:100.4 °F (38 °C) IOBRIEF Intake/Output Summary (Last 24 hours) at 10/11/18 1006 Last data filed at 10/11/18 0840 Gross per 24 hour Intake             3875 ml Output              450 ml Net             3425 ml General:  Sleepy but oriented , no distress Cardiovascular: S1S2 - regular , No Murmur Pulmonary: Equal expansion , No Use of accessory muscles , No Rales No Rhonchi GI:  +BS in all four quadrants, soft, non-tender Extremities:  No edema; 2+ dorsalis pedis pulses bilaterally Neuro: Alert and oriented X 2. Medications:  
Current Facility-Administered Medications Medication Dose Route Frequency  metoprolol (LOPRESSOR) injection 5 mg  5 mg IntraVENous Q6H PRN  
 enalaprilat (VASOTEC) injection 0.625 mg  0.625 mg IntraVENous Q4H PRN  
 ondansetron (ZOFRAN) injection 4 mg  4 mg IntraMUSCular Q6H PRN  promethazine (PHENERGAN) suppository 25 mg  25 mg Rectal Q6H PRN  
 cloNIDine (CATAPRES) 0.2 mg/24 hr patch 1 Patch  1 Patch TransDERmal Q7D  
 aztreonam (AZACTAM) 2 g in 0.9% sodium chloride (MBP/ADV) 100 mL MBP  2 g IntraVENous Q8H  
 influenza vaccine 2018-19 (6 mos+)(PF) (FLUARIX QUAD/FLULAVAL QUAD) injection 0.5 mL  0.5 mL IntraMUSCular PRIOR TO DISCHARGE  hydrALAZINE (APRESOLINE) 20 mg/mL injection 10 mg  10 mg IntraVENous Q6H PRN  pantoprazole (PROTONIX) 40 mg in sodium chloride 0.9% 10 mL injection  40 mg IntraVENous Q12H  
 sucralfate (CARAFATE) 100 mg/mL oral suspension 1 g  1 g Oral AC&HS  
 metoclopramide HCl (REGLAN) injection 5 mg  5 mg IntraVENous Q6H PRN  
 scopolamine (TRANSDERM-SCOP) 1 mg over 3 days 1 Patch  1 Patch TransDERmal Q72H  acetaminophen (TYLENOL) tablet 650 mg  650 mg Oral Q6H PRN  
 0.9% sodium chloride infusion  100 mL/hr IntraVENous CONTINUOUS  
 heparin (porcine) injection 5,000 Units  5,000 Units SubCUTAneous Q8H  
 morphine injection 2 mg  2 mg IntraVENous Q4H PRN Labs:   
Recent Labs 10/11/18 
 4120  10/10/18 
 0515  10/09/18 
 1993 WBC  17.4*  12.9  17.3* HGB  13.6  13.6  15.3 HCT  38.6  38.7  41.6 PLT  332  308  331 Recent Labs 10/11/18 
 2751  10/10/18 
 0515  10/09/18 
 1255  10/09/18 
 0331   10/08/18 
 1429 NA  136  140  136  126*   < >  121*  
K  2.8*  3.1*  3.9  2.7*   < >  4.3 CL  99*  101  87*  84*   < >  83* CO2  25  29  38*  30   < >  26 GLU  102*  100*  109*  133*   < >  136* BUN  8  11  11  10   < >  11  
CREA  0.59*  0.78  0.94  0.96   < >  1.06  
CA  8.7  8.8  9.9  9.1   < >  9.1 MG  2.1   --    --    --    --    --   
ALB   --    --    --   4.2   --   4.1 SGOT   --    --    --   23   --   30  
 ALT   --    --    --   31   --   33  
 < > = values in this interval not displayed. Signed By: Sean Hyatt MD   
 October 11, 2018

## 2018-10-11 NOTE — PROGRESS NOTES
Progress Note Patient: Wili Sergo MRN: [id number]  SSN: [id number] YOB: 1955  Age: 66 y.o. Sex: female Admit Date: 10/8/2018 Procedure:  Procedure(s): 
 
 
Subjective:  
 
Patient has no new complaints. Abd discomfort minimally improved. No n/v today. Continues to pass flatus. Tolerating some liquids Objective:  
 
Visit Vitals  /84  Pulse (!) 104  Temp 97.6 °F (36.4 °C)  Resp 18  Ht 5' 1\" (1.549 m)  Wt 75.3 kg (166 lb)  SpO2 98%  BMI 31.37 kg/m2 Temp (24hrs), Av.3 °F (36.8 °C), Min:97 °F (36.1 °C), Max:100.4 °F (38 °C) Physical Exam:   
General:  NAD. Lungs:   Clear to auscultation bilaterally. Heart:  Regular rate and rhythm. Abdomen:   Soft, no localized tenderness or guarding. Bowel sounds normal.   
Extremities: Extremities normal, atraumatic, no cyanosis or edema. Data Review: images and reports reviewed Lab Review: All lab results for the last 24 hours reviewed. WBC elevated. K low Assessment:  
 
Hospital Problems  Date Reviewed: 10/9/2018 Codes Class Noted POA Gastric outlet obstruction ICD-10-CM: K31.1 ICD-9-CM: 537.0  10/8/2018 Unknown Intractable nausea and vomiting ICD-10-CM: R11.2 ICD-9-CM: 536.2  2018 Unknown Abdominal pain ICD-10-CM: R10.9 ICD-9-CM: 789.00  2018 Unknown Plan/Recommendations/Medical Decision Making:  
 
Continue present treatment. Gastritis/gastroparesis. Elevated WBC -- ?pyelonephritis. Liquids, advance as tolerated. Monitor/correct electrolytes. Care per GI/IM/ID. No surgical issues -- please re-consult prn. Signed By: Joselin Espinoza MD   
 2018

## 2018-10-11 NOTE — CONSULTS
Infectious Disease Consultation Note    Requested by: dr. Jr Vincent    Reason: fever/leukocytosis, group b strep uti    Current abx Prior abx   Aztreonam since 10/9      Lines:       Assessment :    66 y.o. female with HTN, GERD, hysterectomy, appendectomy, cholecystectomy, lap band with removal, AAA and Chronic Kidney Disease who presented to [facility] on 10/8/18 with  left lower abdominal pain which began 1 day ago and worsened throughout the night. Now with worsening leukocytosis, low grade fevers, group b strep in urine cultures    Highly complex clinical picture. Difficult to determine etiology of current leukocytosis, fevers. D/D: evolving right kidney pyelonephritis versus SIRS due to ongoing gastritis/complications of bariatric surgery (review of records reveal that she was admitted to [facility] in 5/2018 with similar presentation, had wbc:20k, no evidence of infection was identified then). Currently since patient has right flank pain, blood in UA 10/8; I would recommend to initiate abx for probable right kidney infection; repeat UA/urine cultures. Abx management complicated due to h/o pcn allergy. Had diffuse rash when she took it in her 25s. Has tolerated cephalexin in the past. Agrees to take ceftriaxone. Benefits and risks of this approach including rash, hives, swelling of throat, anaphylaxis, death explained to patient in details. She verbalized her understanding and wishes to proceed. Nursing staff was advised to monitor closely for allergy. Give antihistaminics & call me if any allergic reaction noted. Recommendations:    1. D/c aztreonam, start ceftriaxone  2. Obtain UA/urine cultures prior to starting ceftriaxone  3. F/u GI recommendations  4. Monitor cbc, clinically    Advance Care planning: full code: discussed  with patient/surrogate decision maker: Vanessa Cabot: [phone]    Thank you for consultation request. Above plan was discussed in details with patient, and dr Jr Vincent.  Please call me if any further questions or concerns. Will continue to participate in the care of this patient. HPI:    66 y.o. female with HTN, GERD, hysterectomy, appendectomy, cholecystectomy, lap band with removal, AAA and Chronic Kidney Disease who presented to [facility] on 10/8/18 with  left lower abdominal pain which began 1 day ago and worsened throughout the night. Pt's daughter reports the pt was not feeling well for one day and then started having severe abdominal, flank pain, and vomiting  throughout the night. Pthas a history of AAA and has a non functioning Right kidney. Planned to have nephrectomy on 10/30/18.  patient was admitted - started on aztreonam for probable UTI. Ct abd/pelvis revealed atrophic right kidney, ?ileitis. Was evaluated by GI. Underwent EGD which revealed some gastritis. H. Pylori ag test was sent. Overnight patient had temp of 100.4. Wbc has increased to 17k. I have been consulted for further recommendations. Urine cultures 10/8 reveal group b streptococcus. UA 10/8 revealed moderate blood. No fever/chills throughout this time. Nausea resolved. No diarrhea. Has right flank pain. Patient denies headaches, visual disturbances, sore throat, runny nose, earaches, cp, sob, chills, cough, abdominal pain,  burning micturition, pain or weakness in extremities. denies recent sick contacts. No h/o recent travel. No known h/o MRSA colonization or infection in the past.        Past Medical History:   Diagnosis Date    Aorto-iliac duplex 09/17/2015    Patent abdom aorta endovascular graft w/o leak. Mod stenosis suggested in right limb.  Calculus of kidney 2014    stent/kidney     Carotid duplex 09/17/2015    Mild < 50% bilateral plaquing.  Cataract, left     Chronic kidney disease     one functioning kidney    Chronic obstructive pulmonary disease ([facility] [address]     Echocardiogram 11/04/2014    EF 56%. No WMA. Mild LAE. Echo is within normal limits.     Esophageal reflux     Generalized osteoarthrosis, involving multiple sites     GERD (gastroesophageal reflux disease)     Gout     History of renal stent     right side    Hypercholesteremia     Hypertension     resolved    Lower extremity arterial testing 03/27/2015    Normal perfusion at rest and w/treadmill bilaterally. R KORI 1.05.  L KORI 1.03.    Murmur     patient reports that she is aware- has been told in the past    Nausea & vomiting     Renal artery stenosis (HCC)     S/P AAA (abdominal aortic aneurysm) repair 9/20/2018    Subclinical hyperthyroidism 8/2015    Unspecified sleep apnea     resolved       Past Surgical History:   Procedure Laterality Date    ADJUSTMENT GASTRIC BAND N/A 11/10/2016    MAKAYLA Cullen    FULL ESOPHAGEAL MANOMETRY  5/21/2018         HX APPENDECTOMY      HX BLADDER SUSPENSION      HX CHOLECYSTECTOMY      HX GI      lap band 2012    HX GI  07/2018    lap band removal    HX HEENT Right 1990s, 2016    Ear sx    HX HYSTERECTOMY      HX OTHER SURGICAL  10/27/14     Bilateral open femoral exposures, Placement of catheter and sheath in the aorta Endo repair of aortic aneurysm with modular bifurcated device  Interpretation of angiography, intravascular ultrasound (IVUS) catheter    HX OTHER SURGICAL  10/27/2014    Endurant II abdominal stent graft implant    HX UROLOGICAL      stent    VASCULAR SURGERY PROCEDURE UNLIST      surgery for abdominal aneurysm       home Medication List    Details   ondansetron (ZOFRAN ODT) 8 mg disintegrating tablet Take 1 Tab by mouth every eight (8) hours as needed for Nausea. Qty: 30 Tab, Refills: 0      !!  Lancets (ONETOUCH ULTRASOFT LANCETS) misc To use with blood for glucose monitoring  Qty: 1 Each, Refills: 11      !! glucose blood VI test strips (ONETOUCH ULTRA BLUE TEST STRIP) strip To check the blood sugars daily  Qty: 100 Strip, Refills: 2      !! lancets (ONE TOUCH DELICA) 33 gauge misc To use with blood for glucose monitoring  Qty: 1 Package, Refills: 3 !! glucose blood VI test strips (BLOOD GLUCOSE TEST) strip One touch test strips. To use with one touch delica  Patient to check blood sugar daily E11.9  Qty: 100 Strip, Refills: 11      TOBRADEX ST drps ophthalmix suspension       albuterol (PROVENTIL HFA, VENTOLIN HFA, PROAIR HFA) 90 mcg/actuation inhaler Take  by inhalation. Associated Diagnoses: Generalized abdominal pain; Status post gastric banding; Preop examination      omeprazole (PRILOSEC) 40 mg capsule 1 cap each AM.  Stop ranitidine  Qty: 30 Cap, Refills: 3      oxyCODONE-acetaminophen (PERCOCET 10)  mg per tablet 1 tab every six hours as needed for pain  Qty: 120 Tab, Refills: 0    Associated Diagnoses: Peripheral vascular disease ([address] Chronic generalized abdominal pain      amLODIPine (NORVASC) 10 mg tablet TAKE ONE TABLET BY MOUTH DAILY  Qty: 30 Tab, Refills: 0      diclofenac (VOLTAREN) 1 % gel Apply 2 g to affected area four (4) times daily. Qty: 1 Each, Refills: 2    Associated Diagnoses: Right hip pain      carvedilol (COREG) 25 mg tablet TAKE ONE TABLET BY MOUTH TWICE A DAY WITH MEALS  Qty: 60 Tab, Refills: 5      pantoprazole (PROTONIX) 40 mg tablet       losartan (COZAAR) 50 mg tablet       cloNIDine HCl (CATAPRES) 0.2 mg tablet       raNITIdine (ZANTAC) 150 mg tablet TAKE ONE TABLET BY MOUTH TWICE A DAY  Qty: 60 Tab, Refills: 3    Associated Diagnoses: Gastroesophageal reflux disease, esophagitis presence not specified      mirtazapine (REMERON) 15 mg tablet TAKE ONE TABLET BY MOUTH EVERY EVENING  Qty: 30 Tab, Refills: 0    Associated Diagnoses: Insomnia, unspecified type      HYDROcodone-acetaminophen (NORCO)  mg tablet Take 1 Tab by mouth every six (6) hours as needed for Pain. Max Daily Amount: 4 Tabs. Qty: 120 Tab, Refills: 0    Associated Diagnoses: Peripheral vascular disease ([facility] [address]  Generalized abdominal pain      atorvastatin (LIPITOR) 40 mg tablet TAKE ONE TABLET BY MOUTH DAILY  Qty: 30 Tab, Refills: 1 montelukast (SINGULAIR) 10 mg tablet TAKE ONE TABLET BY MOUTH DAILY  Qty: 30 Tab, Refills: 3    Associated Diagnoses: Allergic rhinitis      DALIRESP tab tablet TAKE ONE TABLET BY MOUTH DAILY  Qty: 30 Tab, Refills: 3      triamterene-hydroCHLOROthiazide (DYAZIDE) 37.5-25 mg per capsule TAKE ONE CAPSULE BY MOUTH EVERY MORNING  Qty: 30 Cap, Refills: 2    Associated Diagnoses: Essential hypertension; Right wrist pain      desmopressin (DDAVP) 0.2 mg tablet Take 1 Tab by mouth nightly. Qty: 90 Tab, Refills: 0      clopidogrel (PLAVIX) 75 mg tab TAKE 1 TABLET DAILY  Indications: Resume on 7/19/2018  Qty: 90 Tab, Refills: 2    Associated Diagnoses: LAP-BAND surgery status      Ondansetron (ZUPLENZ) 4 mg film Take  by mouth. Associated Diagnoses: Generalized abdominal pain; Status post gastric banding; Preop examination      azelastine (ASTELIN) 137 mcg (0.1 %) nasal spray by Nasal route. lansoprazole (PREVACID) 30 mg capsule Take 1 Cap by mouth. fluticasone-salmeterol (ADVAIR DISKUS) 250-50 mcg/dose diskus inhaler Take 1 Puff by inhalation daily. Qty: 1 Inhaler, Refills: 5      hydrALAZINE (APRESOLINE) 25 mg tablet Take 1 Tab by mouth two (2) times a day. Qty: 60 Tab, Refills: 6      docusate sodium (COLACE) 100 mg capsule Take 1 Cap by mouth two (2) times a day. Qty: 60 Cap, Refills: 5      esomeprazole (NEXIUM) 20 mg capsule Take 1 Cap by mouth daily. STOP PREVACID  Indications: HEARTBURN  Qty: 30 Cap, Refills: 1    Associated Diagnoses: Gastroesophageal reflux disease, esophagitis presence not specified       !! - Potential duplicate medications found. Please discuss with provider.           Current Facility-Administered Medications   Medication Dose Route Frequency    metoprolol (LOPRESSOR) injection 5 mg  5 mg IntraVENous Q6H PRN    enalaprilat (VASOTEC) injection 0.625 mg  0.625 mg IntraVENous Q4H PRN    ondansetron (ZOFRAN) injection 4 mg  4 mg IntraVENous Q6H PRN    promethazine (PHENERGAN) suppository 25 mg  25 mg Rectal Q6H PRN    cloNIDine (CATAPRES) 0.2 mg/24 hr patch 1 Patch  1 Patch TransDERmal Q7D    aztreonam (AZACTAM) 2 g in 0.9% sodium chloride (MBP/ADV) 100 mL MBP  2 g IntraVENous Q8H    influenza vaccine 2018-19 (6 mos+)(PF) (FLUARIX QUAD/FLULAVAL QUAD) injection 0.5 mL  0.5 mL IntraMUSCular PRIOR TO DISCHARGE    hydrALAZINE (APRESOLINE) 20 mg/mL injection 10 mg  10 mg IntraVENous Q6H PRN    pantoprazole (PROTONIX) 40 mg in sodium chloride 0.9% 10 mL injection  40 mg IntraVENous Q12H    sucralfate (CARAFATE) 100 mg/mL oral suspension 1 g  1 g Oral AC&HS    metoclopramide HCl (REGLAN) injection 5 mg  5 mg IntraVENous Q6H PRN    scopolamine (TRANSDERM-SCOP) 1 mg over 3 days 1 Patch  1 Patch TransDERmal Q72H    acetaminophen (TYLENOL) tablet 650 mg  650 mg Oral Q6H PRN    0.9% sodium chloride infusion  100 mL/hr IntraVENous CONTINUOUS    heparin (porcine) injection 5,000 Units  5,000 Units SubCUTAneous Q8H    morphine injection 2 mg  2 mg IntraVENous Q4H PRN       Allergies: Pcn [penicillins]; Tetracycline; and Sulfabenzamide    Family History   Problem Relation Age of Onset    Cancer Mother     Diabetes Father     Alcohol abuse Father     Heart Disease Maternal Grandmother     Alzheimer Maternal Grandmother     Diabetes Sister      Social History     Social History    Marital status:      Spouse name: N/A    Number of children: N/A    Years of education: N/A     Occupational History    Not on file.      Social History Main Topics    Smoking status: Former Smoker     Packs/day: 0.00     Years: 0.00     Types: Cigarettes     Quit date: 1/25/2016    Smokeless tobacco: Never Used    Alcohol use No    Drug use: No    Sexual activity: Yes     Birth control/ protection: Surgical     Other Topics Concern    Not on file     Social History Narrative     History   Smoking Status    Former Smoker    Packs/day: 0.00    Years: 0.00    Types: Cigarettes    Quit date: 2016   Smokeless Tobacco    Never Used        Temp (24hrs), Av.3 °F (36.8 °C), Min:97 °F (36.1 °C), Max:100.4 °F (38 °C)    Visit Vitals    /85 (BP 1 Location: Right arm, BP Patient Position: Head of bed elevated (Comment degrees))    Pulse 92    Temp 97.6 °F (36.4 °C)    Resp 18    Ht 5' 1\" (1.549 m)    Wt 75.3 kg (166 lb)    SpO2 98%    BMI 31.37 kg/m2       ROS: 12 point ROS obtained in details. Pertinent positives as mentioned in HPI,   otherwise negative    Physical Exam:      Constitutional: She is oriented to person, place, and time. She appears well-developed and well-nourished. HENT:   Head: Normocephalic and atraumatic. Right Ear: Hearing, tympanic membrane, external ear and ear canal normal.   Left Ear: Hearing, tympanic membrane, external ear and ear canal normal.   Nose: Nose normal.   Mouth/Throat: Uvula is midline, oropharynx is clear and moist and mucous membranes are normal.   Eyes: Conjunctivae are normal.   Neck: Normal range of motion. Neck supple. Cardiovascular: Normal rate and regular rhythm. Pulmonary/Chest: Effort normal and breath sounds normal.   Abdominal: Soft. Normal appearance. Minimal LLQ tenderness on deep palpation. There is no rigidity, no rebound, no guarding,right CVA tenderness and negative Sawant's sign. Musculoskeletal: Normal range of motion. Lymphadenopathy:     She has no cervical adenopathy. Neurological: She is alert and oriented to person, place, and time. Skin: Skin is warm and dry. No rash noted. She is not diaphoretic.    Psychiatric: She has a normal mood and affect.           Labs: Results:   Chemistry Recent Labs      10/11/18   0337  10/10/18   0515  10/09/18   1255  10/09/18   0331   10/08/18   1429   GLU  102*  100*  109*  133*   < >  136*   NA  136  140  136  126*   < >  121*   K  2.8*  3.1*  3.9  2.7*   < >  4.3   CL  99*  101  87*  84*   < >  83*   CO2  25  29  38*  30   < >  26   BUN  8  11  11  10   < >  11   CREA 0.59*  0.78  0.94  0.96   < >  1.06   CA  8.7  8.8  9.9  9.1   < >  9.1   AGAP  12  10  11  12   < >  12   BUCR  14  14  12  10*   < >  10*   AP   --    --    --   106   --   110   TP   --    --    --   8.0   --   8.3*   ALB   --    --    --   4.2   --   4.1   GLOB   --    --    --   3.8   --   4.2*   AGRAT   --    --    --   1.1   --   1.0    < > = values in this interval not displayed. CBC w/Diff Recent Labs      10/11/18   0337  10/10/18   0515  10/09/18   0331  10/08/18   1429   WBC  17.4*  12.9  17.3*  10.2   RBC  4.81  4.87  5.36*  5.19   HGB  13.6  13.6  15.3  15.1   HCT  38.6  38.7  41.6  40.2   PLT  332  308  331  330   GRANS  78*  63   --   86*   LYMPH  12*  22   --   10*   EOS  0  1   --   0      Microbiology Recent Labs      10/08/18   1638   CULT  >100,000 COLONIES/mL STREPTOCOCCI, BETA HEMOLYTIC GROUP B*  52176  COLONIES/mL  MIXED GRAM POSITIVE STEPH, PROBABLE SKIN/GENITAL CONTAMINATION.             RADIOLOGY:    All available imaging studies/reports in [facility] care for this admission were reviewed    Dr. Laisha Gutierrez, Infectious Disease Specialist  [phone]  October 11, 2018  1:39 PM

## 2018-10-11 NOTE — PROGRESS NOTES
[email or web address] 
[phone] Gastroenterology follow up-Progress note Impression: 
1. N/V, Abdominal pain - Nausea and abdominal pain improved, vomiting resolved, s/p EGD - mucosa somewhat friable but no discrete ulcer seen, Bx pending 2. S/p Lap band removal 7/2018 3. AAA - slightly larger, neg mesenteric doppler 8 mos ago 4. R kidney failure, scheduled to have nephrectomy next month due to uncontrolled HTN 5. Pancreatic cyst - stable since 2014 Plan: 1. Await Bx, if H. Pylori positive will treat, if negative and symptoms persistent will repeat mesenteric doppler and consider SBFT 2. Continue PPI and reglan 3. Advance diet as tolerated per primary care team 
4. Continue current medical management Chief Complaint: Nausea/Vomiting Subjective:  Nausea improved, cannot tolerate broth as she feels its giving her bad heartburn, wants soft food, concerned about sodium content and her bp/kidneys. Eyes: conjunctiva normal, EOM normal  
Neck: ROM normal, supple and trachea normal  
Cardiovascular: heart normal, intact distal pulses, normal rate and regular rhythm Pulmonary/Chest Wall: breath sounds normal and effort normal  
Abdominal: appearance normal, bowel sounds normal and soft, non-acute, mild upper quadrant ttp Patient Active Problem List  
Diagnosis Code  Essential hypertension I10  
 Hyperlipidemia E78.5  Subclinical hyperthyroidism E05.90  Generalized osteoarthrosis, involving multiple sites M15.9  Gastroesophageal reflux disease K21.9  Renal infarction ([address] N28.0  Dysarthria R47.1  LAP-BAND surgery status Z98.84  
 Non morbid obesity due to excess calories E66.09  
 Gastroesophageal reflux disease without esophagitis K21.9  Obesity (BMI 30.0-34. 9) E66.9  Peripheral vascular disease (HCC) I73.9  Right-sided carotid artery disease (HCC) I77.9  Sacroiliitis ([address] M46.1  Right hip pain M25.551  Spondylosis of lumbar region without myelopathy or radiculopathy M47.816  Lumbar neuritis M54.16  Left upper quadrant pain R10.12  
 Hypovitaminosis D E55.9  Lumbar radiculopathy M54.16  
 Hx of aortic aneurysm repair Z98.890, Z86.79  
 History of renal stent Z98.890  Right renal artery stenosis (HCC) I70.1  Abdominal pain R10.9  Intractable nausea and vomiting R11.2  Atrophy of right kidney N26.1  Generalized abdominal pain R10.84  Status post gastric banding Z98.84  
 History of pseudoachalasia of esophagus Z87.19  
 S/P AAA (abdominal aortic aneurysm) repair Z98.890, Z86.79  
 Right leg swelling M79.89  Gastric outlet obstruction K31.1 Visit Vitals  /77 (BP 1 Location: Right arm, BP Patient Position: Head of bed elevated (Comment degrees))  Pulse (!) 102  Temp 97.3 °F (36.3 °C)  Resp 20  
 Ht 5' 1\" (1.549 m)  Wt 75.3 kg (166 lb)  SpO2 98%  BMI 31.37 kg/m2 Intake/Output Summary (Last 24 hours) at 10/11/18 1152 Last data filed at 10/11/18 0840 Gross per 24 hour Intake             3875 ml Output              450 ml Net             3425 ml CBC w/Diff Lab Results Component Value Date/Time WBC 17.4 (H) 10/11/2018 03:37 AM  
 RBC 4.81 10/11/2018 03:37 AM  
 HGB 13.6 10/11/2018 03:37 AM  
 HCT 38.6 10/11/2018 03:37 AM  
 MCV 80.2 10/11/2018 03:37 AM  
 MCH 28.3 10/11/2018 03:37 AM  
 MCHC 35.2 10/11/2018 03:37 AM  
 RDW 13.1 10/11/2018 03:37 AM  
  10/11/2018 03:37 AM  
 Lab Results Component Value Date/Time GRANS 78 (H) 10/11/2018 03:37 AM  
 LYMPH 12 (L) 10/11/2018 03:37 AM  
 EOS 0 10/11/2018 03:37 AM  
 BASOS 0 10/11/2018 03:37 AM  
  
Basic Metabolic Profile Recent Labs 10/11/18 
 9884 NA  136  
K  2.8*  
CL  99* CO2  25 BUN  8  
CA  8.7 MG  2.1 Hepatic Function Lab Results Component Value Date/Time  ALB 4.2 10/09/2018 03:31 AM  
 TP 8.0 10/09/2018 03:31 AM  
  10/09/2018 03:31 AM  
 Lab Results Component Value Date/Time SGOT 23 10/09/2018 03:31 AM  
  
 
 
Coags Recent Labs 10/09/18 
 2595 APTT  25.7 MAKAYLA Zaldivar [facility]. [email or web address] Phone: [phone] Pager: [phone]

## 2018-10-11 NOTE — PROGRESS NOTES
Patient is not available to be assessed at this time. Patient's bed was out of the room. [facility]  
[phone]

## 2018-10-11 NOTE — PROGRESS NOTES
0040: Paged on-call hospitalist to notify of patient's blood pressure. Patient received hydrALAZINE at 2023. No other PRN meds ordered. Spoke to Dr. Tea Obregon to inform. MD to enter orders as appropriate. Patient Vitals for the past 8 hrs: 
 Temp Pulse Resp BP SpO2  
10/11/18 0030 98.7 °F (37.1 °C) (!) 109 16 (!) 192/103 99 % 10/10/18 2015 100.4 °F (38 °C) (!) 106 18 (!) 188/108 99 % 10/10/18 1650 98.6 °F (37 °C) (!) 111 20 (!) 186/93 99 % 0130: Patient needs to be on remote telemetry in order to receive IV beta blocker. Paged on-call hospitalist. Spoke with deandre Perea to order remote telemetry. 5113:  
 10/11/18 5044 Critical Result Types Type of Critical Result Laboratory Critical Lab Result Types Critical Lab Value Electrolytes Potassium Value 2.8 Notification Information Notified By (Arnel) COMFORT Granda Time of Critical Result Notification 3364 Verbal Readback Provided Yes Provider Notification Was Provider Notified Yes Name of Provider Dr. Tea Obregon Provider Gave Verbal Readback Yes Time Provider Notified 5664 Date Provider Notified 10/11/18 Were Orders Received Yes Received telephone order with reachback for potassium chloride 10 mEq, lidocaine x 3.

## 2018-10-11 NOTE — PROGRESS NOTES
Pt left unit for PICC placement. Alert and oriented, no apparent distress. Last dose of potassium due, will give when patient returns.

## 2018-10-11 NOTE — ROUTINE PROCESS
Radiology says \"Tip at [facility]. ok to use. Length 41cm\" concerning newly placed PICC. Pt Nauseous, treated per orders, family at bedside, will continue to monitor.

## 2018-10-11 NOTE — PROCEDURES
INTERVENTIONAL RADIOLOGY POST PICC LINE NOTE       October 11, 2018       10:50 AM     Preoperative Diagnosis:   IV Access    Postoperative Diagnosis:  Same. :  Ángel Orlando PA-C    Assistant:  None. Attending Physician: Dr. Sari Diego    Type of Anesthesia:  1% Lidocaine local    Procedure/Description: right basilic upper extremity PICC Line. Findings:  right basilic 5 Egyptian catheter placed. Tip at SVC/RA junction. OK to use. Length 41 cm. Estimated blood Loss: Minimal    Specimen Removed: None    Drains: None     Complications:  None. Condition:  Stable.     Discharge Plan:  continue present therapy

## 2018-10-11 NOTE — PROGRESS NOTES
Problem: Falls - Risk of 
Goal: *Absence of Falls Document Kiesha Leo Fall Risk and appropriate interventions in the flowsheet. Fall Risk Interventions: 
Mobility Interventions: Utilize walker, cane, or other assistive device, Patient to call before getting OOB Medication Interventions: Patient to call before getting OOB, Teach patient to arise slowly Elimination Interventions: Call light in reach Problem: Pressure Injury - Risk of 
Goal: *Prevention of pressure injury Document Ck Scale and appropriate interventions in the flowsheet. Pressure Injury Interventions: Activity Interventions: Pressure redistribution bed/mattress(bed type), Increase time out of bed Mobility Interventions: Pressure redistribution bed/mattress (bed type), Turn and reposition approx. every two hours(pillow and wedges) Nutrition Interventions: Document food/fluid/supplement intake, Offer support with meals,snacks and hydration Friction and Shear Interventions: Lift sheet

## 2018-10-11 NOTE — ROUTINE PROCESS
Assumed care of patient. Bedside verbal report received from [address] [place] [address], including SBAR, MAR, and Kardex. No complaints of pain or discomfort. No apparent distress.

## 2018-10-12 ENCOUNTER — APPOINTMENT (OUTPATIENT)
Dept: MRI IMAGING | Age: 63
DRG: 381 | End: 2018-10-12
Attending: INTERNAL MEDICINE
Payer: COMMERCIAL

## 2018-10-12 LAB
ANION GAP SERPL CALC-SCNC: 6 MMOL/L (ref 3–18)
BACTERIA SPEC CULT: NORMAL
BASOPHILS # BLD: 0.1 K/UL (ref 0–0.1)
BASOPHILS NFR BLD: 1 % (ref 0–2)
BUN SERPL-MCNC: 11 MG/DL (ref 7–18)
BUN/CREAT SERPL: 15 (ref 12–20)
CALCIUM SERPL-MCNC: 8.5 MG/DL (ref 8.5–10.1)
CHLORIDE SERPL-SCNC: 106 MMOL/L (ref 100–108)
CO2 SERPL-SCNC: 28 MMOL/L (ref 21–32)
CREAT SERPL-MCNC: 0.75 MG/DL (ref 0.6–1.3)
DIFFERENTIAL METHOD BLD: NORMAL
EOSINOPHIL # BLD: 0.2 K/UL (ref 0–0.4)
EOSINOPHIL NFR BLD: 2 % (ref 0–5)
ERYTHROCYTE [DISTWIDTH] IN BLOOD BY AUTOMATED COUNT: 13.7 % (ref 11.6–14.5)
GLUCOSE SERPL-MCNC: 90 MG/DL (ref 74–99)
HCT VFR BLD AUTO: 35.8 % (ref 35–45)
HGB BLD-MCNC: 12.2 G/DL (ref 12–16)
LYMPHOCYTES # BLD: 3.6 K/UL (ref 0.9–3.6)
LYMPHOCYTES NFR BLD: 39 % (ref 21–52)
MCH RBC QN AUTO: 28.5 PG (ref 24–34)
MCHC RBC AUTO-ENTMCNC: 34.1 G/DL (ref 31–37)
MCV RBC AUTO: 83.6 FL (ref 74–97)
MONOCYTES # BLD: 0.9 K/UL (ref 0.05–1.2)
MONOCYTES NFR BLD: 10 % (ref 3–10)
NEUTS SEG # BLD: 4.4 K/UL (ref 1.8–8)
NEUTS SEG NFR BLD: 48 % (ref 40–73)
PLATELET # BLD AUTO: 297 K/UL (ref 135–420)
PMV BLD AUTO: 10.3 FL (ref 9.2–11.8)
POTASSIUM SERPL-SCNC: 4.2 MMOL/L (ref 3.5–5.5)
RBC # BLD AUTO: 4.28 M/UL (ref 4.2–5.3)
SERVICE CMNT-IMP: NORMAL
SODIUM SERPL-SCNC: 140 MMOL/L (ref 136–145)
WBC # BLD AUTO: 9.1 K/UL (ref 4.6–13.2)

## 2018-10-12 PROCEDURE — 74011250637 HC RX REV CODE- 250/637: Performed by: INTERNAL MEDICINE

## 2018-10-12 PROCEDURE — 74011250636 HC RX REV CODE- 250/636: Performed by: INTERNAL MEDICINE

## 2018-10-12 PROCEDURE — 85025 COMPLETE CBC W/AUTO DIFF WBC: CPT | Performed by: INTERNAL MEDICINE

## 2018-10-12 PROCEDURE — C9113 INJ PANTOPRAZOLE SODIUM, VIA: HCPCS | Performed by: INTERNAL MEDICINE

## 2018-10-12 PROCEDURE — 72158 MRI LUMBAR SPINE W/O & W/DYE: CPT

## 2018-10-12 PROCEDURE — 74011000250 HC RX REV CODE- 250: Performed by: INTERNAL MEDICINE

## 2018-10-12 PROCEDURE — A9575 INJ GADOTERATE MEGLUMI 0.1ML: HCPCS | Performed by: INTERNAL MEDICINE

## 2018-10-12 PROCEDURE — 74011250636 HC RX REV CODE- 250/636: Performed by: FAMILY MEDICINE

## 2018-10-12 PROCEDURE — 74011636320 HC RX REV CODE- 636/320: Performed by: INTERNAL MEDICINE

## 2018-10-12 PROCEDURE — 65270000029 HC RM PRIVATE

## 2018-10-12 PROCEDURE — 80048 BASIC METABOLIC PNL TOTAL CA: CPT | Performed by: INTERNAL MEDICINE

## 2018-10-12 PROCEDURE — 74011250636 HC RX REV CODE- 250/636: Performed by: HOSPITALIST

## 2018-10-12 RX ORDER — HYDROCODONE BITARTRATE AND ACETAMINOPHEN 5; 325 MG/1; MG/1
1 TABLET ORAL
Status: DISCONTINUED | OUTPATIENT
Start: 2018-10-12 | End: 2018-10-13 | Stop reason: HOSPADM

## 2018-10-12 RX ADMIN — SUCRALFATE 1 G: 1 SUSPENSION ORAL at 23:39

## 2018-10-12 RX ADMIN — ONDANSETRON 4 MG: 2 INJECTION INTRAMUSCULAR; INTRAVENOUS at 04:08

## 2018-10-12 RX ADMIN — MORPHINE SULFATE 2 MG: 2 INJECTION, SOLUTION INTRAMUSCULAR; INTRAVENOUS at 04:20

## 2018-10-12 RX ADMIN — HEPARIN SODIUM 5000 UNITS: 5000 INJECTION, SOLUTION INTRAVENOUS; SUBCUTANEOUS at 23:38

## 2018-10-12 RX ADMIN — ONDANSETRON 4 MG: 2 INJECTION INTRAMUSCULAR; INTRAVENOUS at 18:56

## 2018-10-12 RX ADMIN — SUCRALFATE 1 G: 1 SUSPENSION ORAL at 11:57

## 2018-10-12 RX ADMIN — MORPHINE SULFATE 2 MG: 2 INJECTION, SOLUTION INTRAMUSCULAR; INTRAVENOUS at 08:52

## 2018-10-12 RX ADMIN — MORPHINE SULFATE 2 MG: 2 INJECTION, SOLUTION INTRAMUSCULAR; INTRAVENOUS at 14:07

## 2018-10-12 RX ADMIN — GADOTERATE MEGLUMINE 15 ML: 376.9 INJECTION INTRAVENOUS at 15:15

## 2018-10-12 RX ADMIN — HYDROCODONE BITARTRATE AND ACETAMINOPHEN 1 TABLET: 5; 325 TABLET ORAL at 18:56

## 2018-10-12 RX ADMIN — HYDRALAZINE HYDROCHLORIDE 10 MG: 20 INJECTION INTRAMUSCULAR; INTRAVENOUS at 08:54

## 2018-10-12 RX ADMIN — HEPARIN SODIUM 5000 UNITS: 5000 INJECTION, SOLUTION INTRAVENOUS; SUBCUTANEOUS at 16:24

## 2018-10-12 RX ADMIN — HEPARIN SODIUM 5000 UNITS: 5000 INJECTION, SOLUTION INTRAVENOUS; SUBCUTANEOUS at 08:58

## 2018-10-12 RX ADMIN — SODIUM CHLORIDE 40 MG: 9 INJECTION INTRAMUSCULAR; INTRAVENOUS; SUBCUTANEOUS at 21:00

## 2018-10-12 RX ADMIN — SUCRALFATE 1 G: 1 SUSPENSION ORAL at 08:58

## 2018-10-12 RX ADMIN — SUCRALFATE 1 G: 1 SUSPENSION ORAL at 16:30

## 2018-10-12 RX ADMIN — POTASSIUM CHLORIDE 40 MEQ: 20 TABLET, EXTENDED RELEASE ORAL at 08:58

## 2018-10-12 RX ADMIN — SODIUM CHLORIDE 40 MG: 9 INJECTION INTRAMUSCULAR; INTRAVENOUS; SUBCUTANEOUS at 08:59

## 2018-10-12 NOTE — ROUTINE PROCESS
Report given to [facility], including SBAR, MAR, and Kardex. Patient alert and oriented, no apparent distress.

## 2018-10-12 NOTE — PROGRESS NOTES
[facility] Hospitalist Group Progress Note Patient: Margarita  Age: 66 y.o. : 1955 MR#: [id number] SSN: [id number] Date/Time: 10/12/2018 Subjective:  
 
Review of systems Mild discomfort No NVD Had nausea yesterday Per nursing had BM yesterday No CP No SOB No Cough Assessment/Plan:  
Admitted with intractable NV and abdominal pain . PMH /of Gastric lap s/p removal last month . 1. Acute NV -  
2 Abdominal pain 3 Acute gastritis - S/p EGD  
4 Secondary HTN- right Kidney failure  - Urgency 5 UTI - POA - On Azactam - urine culture : 100,000 COLONIES/mL STREPTOCOCCI, BETA HEMOLYTIC GROUP B 
6 Leucocytosis 7 Hypokalemia PLAN  
- Leucocytosis improved , no other symptoms , follow MRI spine r/o radiculopathy /Occult infection - DC Ceftriaxone 
- if stable and MRI negative will dc in AM  
- Improved K+ level Case discussed with:  [x]Patient  [] Family ( In room with patient )    []Nursing  []Case Management DVT Prophylaxis:  []Lovenox  []Hep SQ  []SCDs  []Coumadin   []On Heparin gtt Objective:  
VS:  
Visit Vitals  /66 (BP 1 Location: Left arm, BP Patient Position: At rest)  Pulse 97  Temp 97.2 °F (36.2 °C) Comment: come back to do vitals having prayer right now  Resp 18  Ht 5' 1\" (1.549 m)  Wt 75.3 kg (166 lb)  SpO2 99%  BMI 31.37 kg/m2 Tmax/24hrs: Temp (24hrs), Av.6 °F (36.4 °C), Min:97.2 °F (36.2 °C), Max:97.8 °F (36.6 °C) IOBRIEF Intake/Output Summary (Last 24 hours) at 10/12/18 1423 Last data filed at 10/12/18 1302 Gross per 24 hour Intake             3432 ml Output             2400 ml Net             1032 ml General:  Sleepy but oriented , no distress Cardiovascular: S1S2 - regular , No Murmur Pulmonary: Equal expansion , No Use of accessory muscles , No Rales No Rhonchi GI:  +BS in all four quadrants, soft, non-tender Extremities:  No edema; 2+ dorsalis pedis pulses bilaterally Neuro: Alert and oriented X 2. Medications:  
Current Facility-Administered Medications Medication Dose Route Frequency  HYDROcodone-acetaminophen (NORCO) 5-325 mg per tablet 1 Tab  1 Tab Oral Q6H PRN  
 metoprolol (LOPRESSOR) injection 5 mg  5 mg IntraVENous Q6H PRN  
 enalaprilat (VASOTEC) injection 0.625 mg  0.625 mg IntraVENous Q4H PRN  
 ondansetron (ZOFRAN) injection 4 mg  4 mg IntraVENous Q6H PRN  
 diphenhydrAMINE (BENADRYL) injection 25 mg  25 mg IntraVENous Q4H PRN  promethazine (PHENERGAN) suppository 25 mg  25 mg Rectal Q6H PRN  
 cloNIDine (CATAPRES) 0.2 mg/24 hr patch 1 Patch  1 Patch TransDERmal Q7D  
 influenza vaccine 2018-19 (6 mos+)(PF) (FLUARIX QUAD/FLULAVAL QUAD) injection 0.5 mL  0.5 mL IntraMUSCular PRIOR TO DISCHARGE  hydrALAZINE (APRESOLINE) 20 mg/mL injection 10 mg  10 mg IntraVENous Q6H PRN  pantoprazole (PROTONIX) 40 mg in sodium chloride 0.9% 10 mL injection  40 mg IntraVENous Q12H  
 sucralfate (CARAFATE) 100 mg/mL oral suspension 1 g  1 g Oral AC&HS  
 metoclopramide HCl (REGLAN) injection 5 mg  5 mg IntraVENous Q6H PRN  
 scopolamine (TRANSDERM-SCOP) 1 mg over 3 days 1 Patch  1 Patch TransDERmal Q72H  acetaminophen (TYLENOL) tablet 650 mg  650 mg Oral Q6H PRN  
 0.9% sodium chloride infusion  100 mL/hr IntraVENous CONTINUOUS  
 heparin (porcine) injection 5,000 Units  5,000 Units SubCUTAneous Q8H  
 morphine injection 2 mg  2 mg IntraVENous Q4H PRN Labs:   
Recent Labs 10/12/18 
 1150  10/11/18 
 3351  10/10/18 
 0515 WBC  9.1  17.4*  12.9 HGB  12.2  13.6  13.6 HCT  35.8  38.6  38.7 PLT  297  332  308 Recent Labs 10/12/18 
 0400  10/11/18 
 1440  10/11/18 
 0337  10/10/18 
 0515 NA  140   --   136  140  
K  4.2  2.9*  2.8*  3.1*  
CL  106   --   99*  101 CO2  28   --   25  29 GLU  90   --   102*  100* BUN  11   --   8  11 CREA  0.75   --   0.59*  0.78  
CA  8.5   --   8.7  8.8 MG   --    --   2.1   --   
 
 
 
Signed By: Lauren Shen MD   
 October 12, 2018

## 2018-10-12 NOTE — PROGRESS NOTES
Problem: Falls - Risk of 
Goal: *Absence of Falls Document Lonnie Levels Fall Risk and appropriate interventions in the flowsheet. Outcome: Progressing Towards Goal 
Fall Risk Interventions: 
Mobility Interventions: Patient to call before getting OOB Medication Interventions: Patient to call before getting OOB, Teach patient to arise slowly Elimination Interventions: Call light in reach, Patient to call for help with toileting needs

## 2018-10-12 NOTE — PROGRESS NOTES
1932 
Received pt in stable condition, General: lying in bed in supine, not apparent distress Neuro: AOx4 Cardio: pedal pulses palpable, denies chest pain Respiratory: denies shortness of breath Skin:  clean, dry and intact GI: voiding Mus: ambulates c assistance Bed in low position and call bell within reach. [address] : AOX3, NAD, nauseated, med given and tolerated well. No other complaints. 0719 Alert, NAD, stable, nauseated, no vomiting, med given.

## 2018-10-12 NOTE — PROGRESS NOTES
Infectious Disease progress Note Requested by: dr. Jacob Farrell Reason: fever/leukocytosis, group b strep uti Current abx Prior abx Ceftriaxone since 10/11 Aztreonam 10/9-10/11 Lines:  
 
 
Assessment : 
 
66 y.o. female with HTN, GERD, hysterectomy, appendectomy, cholecystectomy, lap band with removal, AAA and Chronic Kidney Disease who presented to [facility] on 10/8/18 with  left lower abdominal pain which began 1 day ago and worsened throughout the night. Now with worsening leukocytosis, low grade fevers, group b strep in urine cultures Highly complex clinical picture. Difficult to determine etiology of current leukocytosis, fevers. D/D:  SIRS due to ongoing gastritis/complications of bariatric surgery (review of records reveal that she was admitted to [facility] in 5/2018 with similar presentation, had wbc:20k, no evidence of infection was identified then) versus undiagnosed infection. Repeat UA 10/11: no pyuria. This would argue against pyelonephritis. Increased left flank pain on today's exam. +nt lumbar spine tenderness: r/o undiagnosed spinal/paraspinal infection, pathology Abx management complicated due to h/o pcn allergy. Had diffuse rash when she took it in her 25s. Has tolerated cephalexin in the past. tolerating ceftriaxone without difficulties. Recommendations: 1. Continue ceftriaxone for now 2. F/u urine cultures 3. F/u GI recommendations 4. Obtain MRI lumbar spine to r/o radicular pain, undiagnosed spinal/paraspinal infection 5. Obtain cbc today 6. D/c ceftriaxone if repeat urine cultures negative, no evidence of infection on MRI spine. Advance Care planning: full code: discussed  with patient/surrogate decision maker: Ash Klever: [phone] Above plan was discussed in details with patient, and dr Jacob Farrell. Please call me if any further questions or concerns. Will continue to participate in the care of this patient. subjective: Patient denies headaches, visual disturbances, sore throat, runny nose, earaches, cp, sob, chills, cough, abdominal pain,  burning micturition, pain or weakness in extremities. denies recent sick contacts. No h/o recent travel. No known h/o MRSA colonization or infection in the past. 
 
 
home Medication List  
 Details  
ondansetron (ZOFRAN ODT) 8 mg disintegrating tablet Take 1 Tab by mouth every eight (8) hours as needed for Nausea. Qty: 30 Tab, Refills: 0  
  
!! Lancets (ONETOUCH ULTRASOFT LANCETS) misc To use with blood for glucose monitoring 
Qty: 1 Each, Refills: 11  
  
!! glucose blood VI test strips (ONETOUCH ULTRA BLUE TEST STRIP) strip To check the blood sugars daily 
Qty: 100 Strip, Refills: 2  
  
!! lancets (ONE TOUCH DELICA) 33 gauge misc To use with blood for glucose monitoring 
Qty: 1 Package, Refills: 3  
  
!! glucose blood VI test strips (BLOOD GLUCOSE TEST) strip One touch test strips. To use with one touch delica  Patient to check blood sugar daily E11.9 Qty: 100 Strip, Refills: 11  
  
TOBRADEX ST drps ophthalmix suspension   
  
albuterol (PROVENTIL HFA, VENTOLIN HFA, PROAIR HFA) 90 mcg/actuation inhaler Take  by inhalation. Associated Diagnoses: Generalized abdominal pain; Status post gastric banding; Preop examination  
  
omeprazole (PRILOSEC) 40 mg capsule 1 cap each AM.  Stop ranitidine Qty: 30 Cap, Refills: 3  
  
oxyCODONE-acetaminophen (PERCOCET 10)  mg per tablet 1 tab every six hours as needed for pain 
Qty: 120 Tab, Refills: 0 Associated Diagnoses: Peripheral vascular disease ([address] Chronic generalized abdominal pain  
  
amLODIPine (NORVASC) 10 mg tablet TAKE ONE TABLET BY MOUTH DAILY Qty: 30 Tab, Refills: 0  
  
diclofenac (VOLTAREN) 1 % gel Apply 2 g to affected area four (4) times daily. Qty: 1 Each, Refills: 2  Associated Diagnoses: Right hip pain  
  
carvedilol (COREG) 25 mg tablet TAKE ONE TABLET BY MOUTH TWICE A DAY WITH MEALS 
 Qty: 60 Tab, Refills: 5  
  
pantoprazole (PROTONIX) 40 mg tablet   
  
losartan (COZAAR) 50 mg tablet   
  
cloNIDine HCl (CATAPRES) 0.2 mg tablet   
  
raNITIdine (ZANTAC) 150 mg tablet TAKE ONE TABLET BY MOUTH TWICE A DAY Qty: 60 Tab, Refills: 3 Associated Diagnoses: Gastroesophageal reflux disease, esophagitis presence not specified  
  
mirtazapine (REMERON) 15 mg tablet TAKE ONE TABLET BY MOUTH EVERY EVENING Qty: 30 Tab, Refills: 0 Associated Diagnoses: Insomnia, unspecified type HYDROcodone-acetaminophen (NORCO)  mg tablet Take 1 Tab by mouth every six (6) hours as needed for Pain. Max Daily Amount: 4 Tabs. Qty: 120 Tab, Refills: 0 Associated Diagnoses: Peripheral vascular disease ([address] Generalized abdominal pain  
  
atorvastatin (LIPITOR) 40 mg tablet TAKE ONE TABLET BY MOUTH DAILY Qty: 30 Tab, Refills: 1  
  
montelukast (SINGULAIR) 10 mg tablet TAKE ONE TABLET BY MOUTH DAILY Qty: 30 Tab, Refills: 3 Associated Diagnoses: Allergic rhinitis DALIRESP tab tablet TAKE ONE TABLET BY MOUTH DAILY Qty: 30 Tab, Refills: 3  
  
triamterene-hydroCHLOROthiazide (DYAZIDE) 37.5-25 mg per capsule TAKE ONE CAPSULE BY MOUTH EVERY MORNING Qty: 30 Cap, Refills: 2 Associated Diagnoses: Essential hypertension; Right wrist pain  
  
desmopressin (DDAVP) 0.2 mg tablet Take 1 Tab by mouth nightly. Qty: 90 Tab, Refills: 0  
  
clopidogrel (PLAVIX) 75 mg tab TAKE 1 TABLET DAILY  Indications: Resume on 7/19/2018 Qty: 90 Tab, Refills: 2 Associated Diagnoses: LAP-BAND surgery status Ondansetron (ZUPLENZ) 4 mg film Take  by mouth. Associated Diagnoses: Generalized abdominal pain; Status post gastric banding; Preop examination  
  
azelastine (ASTELIN) 137 mcg (0.1 %) nasal spray by Nasal route. lansoprazole (PREVACID) 30 mg capsule Take 1 Cap by mouth. fluticasone-salmeterol (ADVAIR DISKUS) 250-50 mcg/dose diskus inhaler Take 1 Puff by inhalation daily. Qty: 1 Inhaler, Refills: 5  
  
hydrALAZINE (APRESOLINE) 25 mg tablet Take 1 Tab by mouth two (2) times a day. Qty: 60 Tab, Refills: 6  
  
docusate sodium (COLACE) 100 mg capsule Take 1 Cap by mouth two (2) times a day. Qty: 60 Cap, Refills: 5  
  
esomeprazole (NEXIUM) 20 mg capsule Take 1 Cap by mouth daily. STOP PREVACID  Indications: HEARTBURN Qty: 30 Cap, Refills: 1 Associated Diagnoses: Gastroesophageal reflux disease, esophagitis presence not specified  
  
 !! - Potential duplicate medications found. Please discuss with provider. Current Facility-Administered Medications Medication Dose Route Frequency  metoprolol (LOPRESSOR) injection 5 mg  5 mg IntraVENous Q6H PRN  
 enalaprilat (VASOTEC) injection 0.625 mg  0.625 mg IntraVENous Q4H PRN  
 ondansetron (ZOFRAN) injection 4 mg  4 mg IntraVENous Q6H PRN  
 cefTRIAXone (ROCEPHIN) 2 g in sterile water (preservative free) 20 mL IV syringe  2 g IntraVENous Q24H  
 diphenhydrAMINE (BENADRYL) injection 25 mg  25 mg IntraVENous Q4H PRN  potassium chloride (K-DUR, KLOR-CON) SR tablet 40 mEq  40 mEq Oral TID  promethazine (PHENERGAN) suppository 25 mg  25 mg Rectal Q6H PRN  
 cloNIDine (CATAPRES) 0.2 mg/24 hr patch 1 Patch  1 Patch TransDERmal Q7D  
 influenza vaccine 2018-19 (6 mos+)(PF) (FLUARIX QUAD/FLULAVAL QUAD) injection 0.5 mL  0.5 mL IntraMUSCular PRIOR TO DISCHARGE  hydrALAZINE (APRESOLINE) 20 mg/mL injection 10 mg  10 mg IntraVENous Q6H PRN  pantoprazole (PROTONIX) 40 mg in sodium chloride 0.9% 10 mL injection  40 mg IntraVENous Q12H  
 sucralfate (CARAFATE) 100 mg/mL oral suspension 1 g  1 g Oral AC&HS  
 metoclopramide HCl (REGLAN) injection 5 mg  5 mg IntraVENous Q6H PRN  
 scopolamine (TRANSDERM-SCOP) 1 mg over 3 days 1 Patch  1 Patch TransDERmal Q72H  acetaminophen (TYLENOL) tablet 650 mg  650 mg Oral Q6H PRN  
 0.9% sodium chloride infusion  100 mL/hr IntraVENous CONTINUOUS  
  heparin (porcine) injection 5,000 Units  5,000 Units SubCUTAneous Q8H  
 morphine injection 2 mg  2 mg IntraVENous Q4H PRN Allergies: Pcn [penicillins]; Tetracycline; and Sulfabenzamide Temp (24hrs), Av.6 °F (36.4 °C), Min:97.5 °F (36.4 °C), Max:97.8 °F (36.6 °C) Visit Vitals  /74 (BP 1 Location: Left arm, BP Patient Position: At rest)  Pulse 90  Temp 97.7 °F (36.5 °C)  Resp 17  Ht 5' 1\" (1.549 m)  Wt 75.3 kg (166 lb)  SpO2 100%  BMI 31.37 kg/m2 ROS: 12 point ROS obtained in details. Pertinent positives as mentioned in HPI,  
otherwise negative Physical Exam: 
 
 
Constitutional: She is oriented to person, place, and time. She appears well-developed and well-nourished. HENT:  
Head: Normocephalic and atraumatic. Right Ear: Hearing, tympanic membrane, external ear and ear canal normal.  
Left Ear: Hearing, tympanic membrane, external ear and ear canal normal.  
Nose: Nose normal.  
Mouth/Throat: Uvula is midline, oropharynx is clear and moist and mucous membranes are normal.  
Eyes: Conjunctivae are normal.  
Neck: Normal range of motion. Neck supple. Cardiovascular: Normal rate and regular rhythm. Pulmonary/Chest: Effort normal and breath sounds normal.  
Abdominal: Soft. Normal appearance. Minimal LLQ tenderness on deep palpation. There is no rigidity, no rebound, no guarding, left CVA tenderness and negative Sawant's sign. Musculoskeletal: Normal range of motion. Lumbar spine tenderness L4-L5 Lymphadenopathy:  
  She has no cervical adenopathy. Neurological: She is alert and oriented to person, place, and time. Skin: Skin is warm and dry. No rash noted. She is not diaphoretic. Psychiatric: She has a normal mood and affect.  
  
  
 
Labs: Results:  
Chemistry Recent Labs 10/12/18 
 0400  10/11/18 
 1440  10/11/18 
 0337  10/10/18 
 0515 GLU  90   --   102*  100* NA  140   --   136  140  
K  4.2  2.9*  2.8*  3.1*  
 CL  106   --   99*  101 CO2  28   --   25  29 BUN  11   --   8  11 CREA  0.75   --   0.59*  0.78  
CA  8.5   --   8.7  8.8 AGAP  6   --   12  10 BUCR  15   --   14  14 CBC w/Diff Recent Labs 10/11/18 
 2073  10/10/18 
 7387 WBC  17.4*  12.9 RBC  4.81  4.87  
HGB  13.6  13.6 HCT  38.6  38.7 PLT  332  308 GRANS  78*  63  
LYMPH  12*  22 EOS  0  1 Microbiology No results for input(s): CULT in the last 72 hours. RADIOLOGY: 
 
All available imaging studies/reports in [facility] for this admission were reviewed Dr. Laisha Gutierrez, Infectious Disease Specialist 
[phone] October 12, 2018 
1:39 PM

## 2018-10-12 NOTE — PROGRESS NOTES
[email or web address] 
[phone] Gastroenterology follow up-Progress note Impression: 
1. N/V, Abdominal pain - Nausea and abdominal pain improved, vomiting resolved, s/p EGD - mucosa somewhat friable but no discrete ulcer seen, Bx pending 2. S/p Lap band removal 7/2018 3. Leukocytosis - likely due to UTI - Beta Strep 4. AAA - slightly larger, neg mesenteric doppler 8 mos ago 5. R kidney failure, scheduled to have nephrectomy next month due to uncontrolled HTN 6. Pancreatic cyst - stable since 2014 Plan: 1. Await Bx, if H. Pylori positive will treat, if negative and symptoms persistent will repeat mesenteric doppler and consider SBFT 2. Continue PPI and reglan 3. Advance diet as tolerated per primary care team 
4. Continue current medical management Chief Complaint: Nausea, Abdominal pain Subjective:  Better this morning, tolerating soft foods Eyes: conjunctiva normal, EOM normal  
Neck: ROM normal, supple and trachea normal  
Cardiovascular: heart normal, intact distal pulses, normal rate and regular rhythm Pulmonary/Chest Wall: breath sounds normal and effort normal  
Abdominal: appearance normal, bowel sounds normal and soft, non-acute, diffuse ttp, resolving ecchymosis from prior surgery, healed lap scars Patient Active Problem List  
Diagnosis Code  Essential hypertension I10  
 Hyperlipidemia E78.5  Subclinical hyperthyroidism E05.90  Generalized osteoarthrosis, involving multiple sites M15.9  Gastroesophageal reflux disease K21.9  Renal infarction ([address] N28.0  Dysarthria R47.1  LAP-BAND surgery status Z98.84  
 Non morbid obesity due to excess calories E66.09  
 Gastroesophageal reflux disease without esophagitis K21.9  Obesity (BMI 30.0-34. 9) E66.9  Peripheral vascular disease (HCC) I73.9  Right-sided carotid artery disease (HCC) I77.9  Sacroiliitis ([address] M46.1  Right hip pain M25.551  Spondylosis of lumbar region without myelopathy or radiculopathy M47.816  Lumbar neuritis M54.16  Left upper quadrant pain R10.12  
 Hypovitaminosis D E55.9  Lumbar radiculopathy M54.16  
 Hx of aortic aneurysm repair Z98.890, Z86.79  
 History of renal stent Z98.890  Right renal artery stenosis (HCC) I70.1  Abdominal pain R10.9  Intractable nausea and vomiting R11.2  Atrophy of right kidney N26.1  Generalized abdominal pain R10.84  Status post gastric banding Z98.84  
 History of pseudoachalasia of esophagus Z87.19  
 S/P AAA (abdominal aortic aneurysm) repair Z98.890, Z86.79  
 Right leg swelling M79.89  Gastric outlet obstruction K31.1 Visit Vitals  /74 (BP 1 Location: Left arm, BP Patient Position: At rest)  Pulse 90  Temp 97.7 °F (36.5 °C)  Resp 17  Ht 5' 1\" (1.549 m)  Wt 75.3 kg (166 lb)  SpO2 100%  BMI 31.37 kg/m2 Intake/Output Summary (Last 24 hours) at 10/12/18 1002 Last data filed at 10/12/18 7536 Gross per 24 hour Intake             3032 ml Output             1100 ml Net             1932 ml CBC w/Diff Lab Results Component Value Date/Time WBC 17.4 (H) 10/11/2018 03:37 AM  
 RBC 4.81 10/11/2018 03:37 AM  
 HGB 13.6 10/11/2018 03:37 AM  
 HCT 38.6 10/11/2018 03:37 AM  
 MCV 80.2 10/11/2018 03:37 AM  
 MCH 28.3 10/11/2018 03:37 AM  
 MCHC 35.2 10/11/2018 03:37 AM  
 RDW 13.1 10/11/2018 03:37 AM  
  10/11/2018 03:37 AM  
 Lab Results Component Value Date/Time GRANS 78 (H) 10/11/2018 03:37 AM  
 LYMPH 12 (L) 10/11/2018 03:37 AM  
 EOS 0 10/11/2018 03:37 AM  
 BASOS 0 10/11/2018 03:37 AM  
  
Basic Metabolic Profile Recent Labs 10/12/18 
 0400   10/11/18 
 8710 NA  140   --   136  
K  4.2   < >  2.8*  
CL  106   --   99* CO2  28   --   25 BUN  11   --   8  
CA  8.5   --   8.7 MG   --    --   2.1  
 < > = values in this interval not displayed. Hepatic Function Lab Results Component Value Date/Time ALB 4.2 10/09/2018 03:31 AM  
 TP 8.0 10/09/2018 03:31 AM  
  10/09/2018 03:31 AM  
 Lab Results Component Value Date/Time SGOT 23 10/09/2018 03:31 AM  
  
 
 
Coags No results for input(s): PTP, INR, APTT in the last 72 hours. No lab exists for component: [facility]. [email or web address] Phone: [phone] Pager: [phone]

## 2018-10-12 NOTE — PROGRESS NOTES
Notified by Azeem Pacheco from the lab of Potassium level 2.9. Paged Dr. Dwayne Epps, awaiting return call.

## 2018-10-12 NOTE — PROGRESS NOTES
conducted a Follow up consultation and Spiritual Assessment for Wili Trotter, who is a 66 y.o.,female. The  provided the following Interventions: 
Continued the relationship of care and support. Listened empathically. Offered prayer and assurance of continued prayer on patients behalf. Chart reviewed. The following outcomes were achieved: 
Patient expressed gratitude for 's visit. Assessment: 
There are no further spiritual or Presybeterian issues which require Spiritual Care Services interventions at this time. Plan: 
Chaplains will continue to follow and will provide pastoral care on an as needed/requested basis.  recommends bedside caregivers page  on duty if patient shows signs of acute spiritual or emotional distress. Chaplain Ketan [facility]  
[phone]

## 2018-10-12 NOTE — PROGRESS NOTES
Bedside and Verbal shift change report given to Gennaro Augustin RN  (oncoming nurse) by Awa Byrd RN (offgoing nurse). Report included the following information SBAR, Kardex, ED Summary, Intake/Output and MAR.

## 2018-10-13 VITALS
HEART RATE: 87 BPM | BODY MASS INDEX: 31.34 KG/M2 | RESPIRATION RATE: 18 BRPM | DIASTOLIC BLOOD PRESSURE: 82 MMHG | OXYGEN SATURATION: 99 % | TEMPERATURE: 98 F | WEIGHT: 166 LBS | SYSTOLIC BLOOD PRESSURE: 138 MMHG | HEIGHT: 61 IN

## 2018-10-13 LAB
ANION GAP SERPL CALC-SCNC: 6 MMOL/L (ref 3–18)
BUN SERPL-MCNC: 13 MG/DL (ref 7–18)
BUN/CREAT SERPL: 16 (ref 12–20)
CALCIUM SERPL-MCNC: 8.2 MG/DL (ref 8.5–10.1)
CHLORIDE SERPL-SCNC: 107 MMOL/L (ref 100–108)
CO2 SERPL-SCNC: 28 MMOL/L (ref 21–32)
CREAT SERPL-MCNC: 0.8 MG/DL (ref 0.6–1.3)
GLUCOSE SERPL-MCNC: 104 MG/DL (ref 74–99)
POTASSIUM SERPL-SCNC: 3.9 MMOL/L (ref 3.5–5.5)
SODIUM SERPL-SCNC: 141 MMOL/L (ref 136–145)

## 2018-10-13 PROCEDURE — 74011000250 HC RX REV CODE- 250: Performed by: INTERNAL MEDICINE

## 2018-10-13 PROCEDURE — 74011250637 HC RX REV CODE- 250/637: Performed by: INTERNAL MEDICINE

## 2018-10-13 PROCEDURE — C9113 INJ PANTOPRAZOLE SODIUM, VIA: HCPCS | Performed by: INTERNAL MEDICINE

## 2018-10-13 PROCEDURE — 74011250636 HC RX REV CODE- 250/636: Performed by: INTERNAL MEDICINE

## 2018-10-13 PROCEDURE — 80048 BASIC METABOLIC PNL TOTAL CA: CPT | Performed by: INTERNAL MEDICINE

## 2018-10-13 PROCEDURE — 74011250636 HC RX REV CODE- 250/636: Performed by: HOSPITALIST

## 2018-10-13 RX ORDER — SCOLOPAMINE TRANSDERMAL SYSTEM 1 MG/1
1 PATCH, EXTENDED RELEASE TRANSDERMAL
Qty: 5 PATCH | Refills: 0 | Status: SHIPPED | OUTPATIENT
Start: 2018-10-14 | End: 2018-11-26 | Stop reason: SDUPTHER

## 2018-10-13 RX ADMIN — SODIUM CHLORIDE 40 MG: 9 INJECTION INTRAMUSCULAR; INTRAVENOUS; SUBCUTANEOUS at 08:39

## 2018-10-13 RX ADMIN — SODIUM CHLORIDE 100 ML/HR: 900 INJECTION, SOLUTION INTRAVENOUS at 01:16

## 2018-10-13 RX ADMIN — SUCRALFATE 1 G: 1 SUSPENSION ORAL at 11:55

## 2018-10-13 RX ADMIN — HEPARIN SODIUM 5000 UNITS: 5000 INJECTION, SOLUTION INTRAVENOUS; SUBCUTANEOUS at 08:39

## 2018-10-13 RX ADMIN — SUCRALFATE 1 G: 1 SUSPENSION ORAL at 08:38

## 2018-10-13 NOTE — PROGRESS NOTES
[facility] 1945, [address] [facility] 
[email or web address] Impression/Plan: 
1. Intractable n/v- appears to have ressolved. No evidence of gastric outlet obstruction. 2. S/p Lap band removal earlier this summer. Plan : 
1. Cont current management 2. No repeat endoscopy planned at this point. 3. Anticipate d/c soon. Chief Complaint: N/V Subjective:  Pt feels much better. N/V is largely ressolved. EGD completed last wk. W/o evidence of ulcer or obstruction. Eyes: conjunctiva normal, EOM normal  
Neck: ROM normal, supple and trachea normal  
Cardiovascular: heart normal, intact distal pulses, normal rate and regular rhythm Pulmonary/Chest Wall: breath sounds normal and effort normal  
Abdominal: appearance normal, bowel sounds normal and soft, non-acute, non-tender Visit Vitals  /73 (BP 1 Location: Left arm, BP Patient Position: Sitting)  Pulse (!) 104  Temp 98.6 °F (37 °C)  Resp 18  Ht 5' 1\" (1.549 m)  Wt 75.3 kg (166 lb)  SpO2 99%  BMI 31.37 kg/m2 Intake/Output Summary (Last 24 hours) at 10/13/18 1017 Last data filed at 10/13/18 3611 Gross per 24 hour Intake             2230 ml Output             3075 ml Net             -845 ml CBC w/Diff Lab Results Component Value Date/Time WBC 9.1 10/12/2018 11:50 AM  
 RBC 4.28 10/12/2018 11:50 AM  
 HGB 12.2 10/12/2018 11:50 AM  
 HCT 35.8 10/12/2018 11:50 AM  
 MCV 83.6 10/12/2018 11:50 AM  
 MCH 28.5 10/12/2018 11:50 AM  
 MCHC 34.1 10/12/2018 11:50 AM  
 RDW 13.7 10/12/2018 11:50 AM  
  10/12/2018 11:50 AM  
 Lab Results Component Value Date/Time GRANS 48 10/12/2018 11:50 AM  
 LYMPH 39 10/12/2018 11:50 AM  
 EOS 2 10/12/2018 11:50 AM  
 BASOS 1 10/12/2018 11:50 AM  
  
Basic Metabolic Profile Recent Labs 10/13/18 
 0411   10/11/18 
 7400 NA  141   < >  136  
K  3.9   < >  2.8*  
CL  107   < >  99*  
 CO2  28   < >  25 BUN  13   < >  8  
CA  8.2*   < >  8.7 MG   --    --   2.1  
 < > = values in this interval not displayed. Hepatic Function Lab Results Component Value Date/Time ALB 4.2 10/09/2018 03:31 AM  
 TP 8.0 10/09/2018 03:31 AM  
  10/09/2018 03:31 AM  
 Lab Results Component Value Date/Time SGOT 23 10/09/2018 03:31 AM  
  
 
[email or web address]) )Nelly Hanson MD, MD 
[facility], [address] [place] [address] Pager [phone] 
[email or web address]

## 2018-10-13 NOTE — PROGRESS NOTES
PICC line removed from right upper arm. Catheter intact, 41cm. Pressure applied for 5 minutes, no bleeding noted. Pressure dressing applied. Patient tolerated procedure well.

## 2018-10-13 NOTE — DISCHARGE SUMMARY
Discharge Summary     Patient ID:  Lady Jesus  [id number]  66 y.o.  1955  Body mass index is 31.37 kg/(m^2). PCP on record: Alvaro Duron MD    Admit date: 10/8/2018  Discharge date and time: 10/13/2018    Discharge Diagnoses:                                           1 Acute Nausea Vomiting   2 Gasticoutlet obstruction - functional   3 H/o Lap band removal recently for persistent NV   4 Bacteremia - contamination   5 UTI - POA   6 Backache from DJD   7 SIRS - from Acute gastritis         Consults: ID, GI and General Surgery          Hospital Course by problems:    1 Acute Nausea Vomiting   - Improved with antiemetics and adjustment of diet , Small frequent easily digestible food. Avoid fatty food . - PPI  Protection   - Out patient follow up with her primary surgeon and PCP   - Follow up lytes in 1 week       2 Gasticoutlet obstruction - functional     3 H/o Lap band removal recently for persistent NV     4 SIRS - from acute gastritis     5 UTI - POA - >100,000 COLONIES/mL STREPTOCOCCI, BETA HEMOLYTIC GROUP B    6 Backache from DJD - MRI spine done did not show any Infection           Patient seen and examined by me on discharge day. Pertinent Findings:  Alert awake and oriented   No CP  No SOB  No NVD   Had BM and tolerating small portion diet  Stable for discharge     Significant Diagnostic Studies:  EXAM:  CT Abdomen-Pelvis with Contrast.     CLINICAL INDICATION:  Acute left lower quadrant abdominal pain with nausea and  vomiting.     COMPARISON:  1/24/18     TECHNIQUE:  Helical volumetric CT imaging of the abdomen and pelvis is performed  following IV contrast administration. Coronal and sagittal multiplanar  reconstruction images are generated for improved anatomic delineation.     - IV contrast dose:  100 mL Isovue-300.   - Radiation dose:   mGy-cm.  - All CT scans at this facility are performed using dose optimization technique  as appropriate to the performed exam, to include automated exposure control,  adjustment of the mA and/or kV according to patient's size (Including  appropriate matching for site-specific examinations), or use of iterative  reconstruction technique.     FINDINGS:     ABDOMEN     Lung Bases:  Clear.     Liver:  Mild hepatosteatosis.       Spleen:  Scattered calcifications, suggestive of old granulomatous disease.     Pancreas:  Subtle cystic focus with possible septations in the pancreatic neck  region, measuring 0.8 x 0.5 cm (axial #26).    Gallbladder:  Removed.     Adrenal Glands:  Unremarkable.     Kidneys:  Unremarkable left kidney. The right kidney is markedly asymmetrically  smaller than the left kidney. There is apparent delayed nephrogram compared to  the left kidney. This may simply reflect relatively decreased vascular flow.     Gastrointestinal Tract, Abdominal Wall, Mesentery:       - Gastric distention with fluid. If the patient is at risk for aspiration, NG  tube placement could be helpful to relieve gastric distention.   - No small bowel obstruction.    - The appendix is not visualized. - Terminal ileum appears mildly thickened (coronal #23). - No acute diverticulitis or colitis. - Diastasis recti. No incarcerated hernia.  - No mesenteric adenopathy or abnormal mesenteric fat stranding.     Retroperitoneum:  No adenopathy.       Vascular:  Status post aortobiiliac stent deployment. The maximal infrarenal  aorta diameter measures about 3.1 x 2.6 cm cyst.  The aorta diameter was about  3.1 x 2.2 cm on 1/24/18.     PELVIS     Urinary Bladder:  Unremarkable.      Uterus:  Removed.      Adnexa:  No adnexal mass.     Rectum:  Unremarkable.     Pelvic Sidewall:  No adenopathy. No free fluid.     Bones:  No acute osseous findings.     IMPRESSION  IMPRESSION:     1. Cystic pancreatic neck region lesion, differential considerations of  pseudocyst vs. IPMN, similar to prior study in appearance.   Stable dating back  to 9/29/14 CT (earliest available comparison study in the PACS). Indicative of  benign nature of this lesion.      2.  Slight increase in the size of the aortic aneurysm since the most recent  study.       3. No convincing evidence of acute diverticulitis or colitis.      4.  Mild thickening of the terminal ileum. Query mild intrinsic inflammatory  changes to the terminal ileum.     5.  Gastric distention with fluid. If the patient is at risk for aspiration, NG  tube placement could be helpful to relieve gastric distention. EXAM:  MRI LUMB SPINE W WO CONT     INDICATION:   evaluate for lumbar spine discitis, left paraspinal abscess,  worsening leukocytosis     COMPARISON: 12/13/2016     TECHNIQUE:   MR imaging of the lumbar spine was performed with sagittal T1, T2, STIR;  axial  T1, T2. Patient received 15 mL Dotarem intravenously. Postcontrast images were  obtained.     FINDINGS:  Normal alignment. Normal vertebral body height. Subtle enhancing bone marrow  edema in the right L3/4 facet with worsening hypertrophic changes since  comparison. No significant associated joint effusion and no associated soft  tissue edema. Lumbar disc space heights are maintained. No evidence for  discitis. The conus medullaris terminates at upper L2 level.     Surgical changes of lower to iliac stent grafts. Asymmetric atrophic right  kidney.     Lower thoracic levels, only on the sagittal plane, T10-T12 demonstrates mild  degenerative disc changes at T11/12 with patent canal and foramina.     Correlation of sagittal and axial images at the level of the discs demonstrates  the following:     T12/L1:  Unremarkable disc. The spinal canal and neuroforamina are widely  patent.     L1/2:  Unremarkable disc. The spinal canal and neural foramina are widely  patent.     L2/3:  Unremarkable disc. The spinal canal and neural foramina are widely  patent.     L3/4:  Mild disc bulge. Moderate facet and ligamentum hypertrophy. No central  canal stenosis.  Mild foraminal stenosis.     L4/5:  Mild foraminal bulging. . Mild facet hypertrophy. No central canal  stenosis. Mild foraminal stenosis.      L5/S1:  Unremarkable disc. Mild facet hypertrophy. No central canal stenosis. Mild left foramina stenosis.     Small S3 Tarlov cysts.     IMPRESSION  IMPRESSION:  1. No evidence of discitis or osteomyelitis. 2. Worsening moderate right L3/4 facet arthropathy with minor enhancing bone  marrow edema likely reactive changes or minor inflammation. No significant joint  effusion or surrounding soft tissue inflammation to suggest infection. 3. Mild progression of mild multilevel degenerative disc and facet joint disease  without central canal stenosis or high-grade foraminal stenosis as discussed. Pertinent Lab Data:  Recent Labs      10/12/18   1150  10/11/18   0337   WBC  9.1  17.4*   HGB  12.2  13.6   HCT  35.8  38.6   PLT  297  332     Recent Labs      10/13/18   0411  10/12/18   0400  10/11/18   1440  10/11/18   0337   NA  141  140   --   136   K  3.9  4.2  2.9*  2.8*   CL  107  106   --   99*   CO2  28  28   --   25   GLU  104*  90   --   102*   BUN  13  11   --   8   CREA  0.80  0.75   --   0.59*   CA  8.2*  8.5   --   8.7   MG   --    --    --   2.1       DISCHARGE MEDICATIONS:   @  Current Discharge Medication List      START taking these medications    Details   scopolamine (TRANSDERM-SCOP) 1 mg over 3 days pt3d 1 Patch by TransDERmal route every seventy-two (72) hours.   Qty: 5 Patch, Refills: 0         CONTINUE these medications which have NOT CHANGED    Details   !! Lancets (ONETOUCH ULTRASOFT LANCETS) misc To use with blood for glucose monitoring  Qty: 1 Each, Refills: 11      !! glucose blood VI test strips (ONETOUCH ULTRA BLUE TEST STRIP) strip To check the blood sugars daily  Qty: 100 Strip, Refills: 2      !! lancets (ONE TOUCH DELICA) 33 gauge misc To use with blood for glucose monitoring  Qty: 1 Package, Refills: 3      !! glucose blood VI test strips (BLOOD GLUCOSE TEST) strip One touch test strips. To use with one touch delica  Patient to check blood sugar daily E11.9  Qty: 100 Strip, Refills: 11      TOBRADEX ST drps ophthalmix suspension       albuterol (PROVENTIL HFA, VENTOLIN HFA, PROAIR HFA) 90 mcg/actuation inhaler Take  by inhalation. Associated Diagnoses: Generalized abdominal pain; Status post gastric banding; Preop examination      amLODIPine (NORVASC) 10 mg tablet TAKE ONE TABLET BY MOUTH DAILY  Qty: 30 Tab, Refills: 0      diclofenac (VOLTAREN) 1 % gel Apply 2 g to affected area four (4) times daily. Qty: 1 Each, Refills: 2    Associated Diagnoses: Right hip pain      carvedilol (COREG) 25 mg tablet TAKE ONE TABLET BY MOUTH TWICE A DAY WITH MEALS  Qty: 60 Tab, Refills: 5      losartan (COZAAR) 50 mg tablet       cloNIDine HCl (CATAPRES) 0.2 mg tablet       mirtazapine (REMERON) 15 mg tablet TAKE ONE TABLET BY MOUTH EVERY EVENING  Qty: 30 Tab, Refills: 0    Associated Diagnoses: Insomnia, unspecified type      atorvastatin (LIPITOR) 40 mg tablet TAKE ONE TABLET BY MOUTH DAILY  Qty: 30 Tab, Refills: 1      montelukast (SINGULAIR) 10 mg tablet TAKE ONE TABLET BY MOUTH DAILY  Qty: 30 Tab, Refills: 3    Associated Diagnoses: Allergic rhinitis      DALIRESP tab tablet TAKE ONE TABLET BY MOUTH DAILY  Qty: 30 Tab, Refills: 3      desmopressin (DDAVP) 0.2 mg tablet Take 1 Tab by mouth nightly. Qty: 90 Tab, Refills: 0      clopidogrel (PLAVIX) 75 mg tab TAKE 1 TABLET DAILY  Indications: Resume on 7/19/2018  Qty: 90 Tab, Refills: 2    Associated Diagnoses: LAP-BAND surgery status      Ondansetron (ZUPLENZ) 4 mg film Take  by mouth. Associated Diagnoses: Generalized abdominal pain; Status post gastric banding; Preop examination      azelastine (ASTELIN) 137 mcg (0.1 %) nasal spray by Nasal route. fluticasone-salmeterol (ADVAIR DISKUS) 250-50 mcg/dose diskus inhaler Take 1 Puff by inhalation daily.   Qty: 1 Inhaler, Refills: 5      hydrALAZINE (APRESOLINE) 25 mg tablet Take 1 Tab by mouth two (2) times a day. Qty: 60 Tab, Refills: 6      docusate sodium (COLACE) 100 mg capsule Take 1 Cap by mouth two (2) times a day. Qty: 60 Cap, Refills: 5      esomeprazole (NEXIUM) 20 mg capsule Take 1 Cap by mouth daily. STOP PREVACID  Indications: HEARTBURN  Qty: 30 Cap, Refills: 1    Associated Diagnoses: Gastroesophageal reflux disease, esophagitis presence not specified       !! - Potential duplicate medications found. Please discuss with provider. STOP taking these medications       ondansetron (ZOFRAN ODT) 8 mg disintegrating tablet Comments:   Reason for Stopping:         omeprazole (PRILOSEC) 40 mg capsule Comments:   Reason for Stopping:         oxyCODONE-acetaminophen (PERCOCET 10)  mg per tablet Comments:   Reason for Stopping:         pantoprazole (PROTONIX) 40 mg tablet Comments:   Reason for Stopping:         raNITIdine (ZANTAC) 150 mg tablet Comments:   Reason for Stopping:         HYDROcodone-acetaminophen (NORCO)  mg tablet Comments:   Reason for Stopping:         triamterene-hydroCHLOROthiazide (DYAZIDE) 37.5-25 mg per capsule Comments:   Reason for Stopping:         lansoprazole (PREVACID) 30 mg capsule Comments:   Reason for Stopping:                 My Recommended Diet, Activity, Wound Care, and follow-up labs are listed in the patient's Discharge Insturctions which I have personally completed and reviewed. Disposition:     [] Home with family     [] [facility] [place] PT/RN   [] SNF/NH   [] Inpatient Rehab/[facility]  Condition at Discharge:  Stable    Follow up with:   PCP : Tyrone Jean Baptiste MD      Please follow-up tests/labs that are still pendin.  None  2.    >30 minutes spent coordinating this discharge (review instructions/follow-up, prescriptions, preparing report for sign off)    Signed:  John Rosa MD  10/13/2018  11:15 AM

## 2018-10-13 NOTE — DISCHARGE INSTRUCTIONS
[employer] Activation    Thank you for requesting access to [facility]. Please follow the instructions below to securely access and download your online medical record. [facility] allows you to send messages to your doctor, view your test results, renew your prescriptions, schedule appointments, and more. How Do I Sign Up? 1. In your internet browser, go to [email or web address]  2. Click on the First Time User? Click Here link in the Sign In box. You will be redirect to the New Member Sign Up page. 3. Enter your [employer] Access Code exactly as it appears below. You will not need to use this code after youve completed the sign-up process. If you do not sign up before the expiration date, you must request a new code. [facility] Access Code: Activation code not generated  Current [facility] Status: Patient Declined (This is the date your [employer] access code will )    4. Enter the last four digits of your Social Security Number (xxxx) and Date of Birth (mm/dd/yyyy) as indicated and click Submit. You will be taken to the next sign-up page. 5. Create a [facility] ID. This will be your [employer] login ID and cannot be changed, so think of one that is secure and easy to remember. 6. Create a [facility] password. You can change your password at any time. 7. Enter your Password Reset Question and Answer. This can be used at a later time if you forget your password. 8. Enter your e-mail address. You will receive e-mail notification when new information is available in [address] 9. Click Sign Up. You can now view and download portions of your medical record. 10. Click the Download Summary menu link to download a portable copy of your medical information. Additional Information    If you have questions, please visit the Frequently Asked Questions section of the [employer] website at [email or web address] Remember, [employer] is NOT to be used for urgent needs. For medical emergencies, dial 911.       Patient armband removed and shredded    DISCHARGE SUMMARY from Nurse    PATIENT INSTRUCTIONS:    After general anesthesia or intravenous sedation, for 24 hours or while taking prescription Narcotics:  · Limit your activities  · Do not drive and operate hazardous machinery  · Do not make important personal or business decisions  · Do  not drink alcoholic beverages  · If you have not urinated within 8 hours after discharge, please contact your surgeon on call. Report the following to your surgeon:  · Excessive pain, swelling, redness or odor of or around the surgical area  · Temperature over 100.5  · Nausea and vomiting lasting longer than 4 hours or if unable to take medications  · Any signs of decreased circulation or nerve impairment to extremity: change in color, persistent  numbness, tingling, coldness or increase pain  · Any questions    What to do at Home:  Recommended activity: Activity as tolerated, No heavy lifting, pushing, pulling with the surgical and No driving while on analgesics. ,Please follow up with one week. .    *  Please give a list of your current medications to your Primary Care Provider. *  Please update this list whenever your medications are discontinued, doses are      changed, or new medications (including over-the-counter products) are added. *  Please carry medication information at all times in case of emergency situations. These are general instructions for a healthy lifestyle:    No smoking/ No tobacco products/ Avoid exposure to second hand smoke  Surgeon General's Warning:  Quitting smoking now greatly reduces serious risk to your health.     Obesity, smoking, and sedentary lifestyle greatly increases your risk for illness    A healthy diet, regular physical exercise & weight monitoring are important for maintaining a healthy lifestyle    You may be retaining fluid if you have a history of heart failure or if you experience any of the following symptoms:  Weight gain of 3 pounds or more overnight or 5 pounds in a week, increased swelling in our hands or feet or shortness of breath while lying flat in bed. Please call your doctor as soon as you notice any of these symptoms; do not wait until your next office visit. Recognize signs and symptoms of STROKE:    F-face looks uneven    A-arms unable to move or move unevenly    S-speech slurred or non-existent    T-time-call 911 as soon as signs and symptoms begin-DO NOT go       Back to bed or wait to see if you get better-TIME IS BRAIN. Warning Signs of HEART ATTACK     Call 911 if you have these symptoms:   Chest discomfort. Most heart attacks involve discomfort in the center of the chest that lasts more than a few minutes, or that goes away and comes back. It can feel like uncomfortable pressure, squeezing, fullness, or pain.  Discomfort in other areas of the upper body. Symptoms can include pain or discomfort in one or both arms, the back, neck, jaw, or stomach.  Shortness of breath with or without chest discomfort.  Other signs may include breaking out in a cold sweat, nausea, or lightheadedness. Don't wait more than five minutes to call 911 - MINUTES MATTER! Fast action can save your life. Calling 911 is almost always the fastest way to get lifesaving treatment. Emergency Medical Services staff can begin treatment when they arrive -- up to an hour sooner than if someone gets to the hospital by car. The discharge information has been reviewed with the patient. The patient verbalized understanding. Discharge medications reviewed with the patient and appropriate educational materials and side effects teaching were provided.   ___________________________________________________________________________________________________________________________________

## 2018-10-13 NOTE — ROUTINE PROCESS
Assumed care at 1900: Pt reports abd discomfort, prn med given x's 1 with relief reported. No c/o nausea, vomiting, or diarrhea. No s/s of resp distress reported and/or noted. Call light and personal items within reach. Pt encouraged to call for assistance as needed. Will continue to monitor and update poc accordingly.

## 2018-10-18 ENCOUNTER — TELEPHONE (OUTPATIENT)
Dept: VASCULAR SURGERY | Age: 63
End: 2018-10-18

## 2018-10-18 NOTE — TELEPHONE ENCOUNTER
Provider advised that patient had called after hours regarding N/V and wanted call back once office opened. Called patient and per patient she is having abd pain and severe nausea and not sure what to do. Patient was just discharged from [facility] on Sunday, patient could not afford the patches she was given for nausea and is currently using phenergan. Advised would discuss with provider and call her back. Discussed with MAKAYLA Esquivel and based on previous issues, patient's study for visceral artery would be moved up . Called the patient back and advised would have someone call her to schedule the study. And also advised to call Dr. Roge Simpson office since he is the one that saw her in the hospital to see if he can prescribe her  Something more affordable for the nausea and vomiting. Pt verbalized understanding.

## 2018-10-19 ENCOUNTER — TELEPHONE (OUTPATIENT)
Dept: CARDIOLOGY CLINIC | Age: 63
End: 2018-10-19

## 2018-10-19 NOTE — TELEPHONE ENCOUNTER
Patient is scheduled for  radical nephrectomy on 10/30/18 by Dr. Jacqui Hernandez and how many days to hold plavix prior to surgery.  Please advise fax number [phone]

## 2018-10-22 NOTE — TELEPHONE ENCOUNTER
Ok to hold from cardiac point  Please have them check with vascular also as patient has aortic stent

## 2018-10-23 ENCOUNTER — TELEPHONE (OUTPATIENT)
Dept: VASCULAR SURGERY | Age: 63
End: 2018-10-23

## 2018-10-23 NOTE — TELEPHONE ENCOUNTER
Called and Dr. Namita Ortiz office and given instructions per Dr. Arcadio Velarde ok to hold from cardiac point. Please check with vascular also as patient has aortic stent. She voices understanding and acceptance of this advice and will call back if any further questions or concerns.

## 2018-10-23 NOTE — TELEPHONE ENCOUNTER
Mindy from [facility] called requesting instruction for patient to stop plavix for a procedure scheduled with Dr. Baldemar Lundberg that is scheduled on October 30. Please call for verbal instructions.

## 2018-10-26 ENCOUNTER — TELEPHONE (OUTPATIENT)
Dept: INTERNAL MEDICINE CLINIC | Age: 63
End: 2018-10-26

## 2018-10-26 NOTE — TELEPHONE ENCOUNTER
Patient would like for  Dr Butch Mcfarlane to please send her prescription in for Promethazine for nausea 25 mg take every six hours  As needed . Jamari Richards  She got this when she was in the hospital.

## 2018-10-28 RX ORDER — AMLODIPINE BESYLATE 10 MG/1
TABLET ORAL
Qty: 30 TAB | Refills: 0 | Status: SHIPPED | OUTPATIENT
Start: 2018-10-28 | End: 2018-11-24 | Stop reason: SDUPTHER

## 2018-10-29 DIAGNOSIS — G47.00 INSOMNIA, UNSPECIFIED TYPE: ICD-10-CM

## 2018-10-29 RX ORDER — PROMETHAZINE HYDROCHLORIDE 25 MG/1
25 TABLET ORAL
Qty: 30 TAB | Refills: 0 | Status: SHIPPED | OUTPATIENT
Start: 2018-10-29 | End: 2018-11-26 | Stop reason: SDUPTHER

## 2018-10-31 RX ORDER — ATORVASTATIN CALCIUM 40 MG/1
TABLET, FILM COATED ORAL
Qty: 30 TAB | Refills: 0 | Status: SHIPPED | OUTPATIENT
Start: 2018-10-31 | End: 2018-11-26 | Stop reason: ALTCHOICE

## 2018-11-01 RX ORDER — MIRTAZAPINE 15 MG/1
TABLET, FILM COATED ORAL
Qty: 30 TAB | Refills: 0 | Status: SHIPPED | OUTPATIENT
Start: 2018-11-01 | End: 2018-11-26 | Stop reason: SDUPTHER

## 2018-11-01 RX ORDER — TRIAMTERENE AND HYDROCHLOROTHIAZIDE 37.5; 25 MG/1; MG/1
CAPSULE ORAL
Qty: 30 CAP | Refills: 1 | Status: SHIPPED | OUTPATIENT
Start: 2018-11-01 | End: 2018-11-26 | Stop reason: ALTCHOICE

## 2018-11-05 ENCOUNTER — TELEPHONE (OUTPATIENT)
Dept: INTERNAL MEDICINE CLINIC | Age: 63
End: 2018-11-05

## 2018-11-05 DIAGNOSIS — R10.84 GENERALIZED ABDOMINAL PAIN: ICD-10-CM

## 2018-11-05 DIAGNOSIS — M54.16 LUMBAR RADICULOPATHY: Primary | ICD-10-CM

## 2018-11-05 RX ORDER — OXYCODONE AND ACETAMINOPHEN 10; 325 MG/1; MG/1
1 TABLET ORAL
Qty: 120 TAB | Refills: 0 | Status: SHIPPED | OUTPATIENT
Start: 2018-11-05 | End: 2018-12-10 | Stop reason: SDUPTHER

## 2018-11-08 ENCOUNTER — TELEPHONE (OUTPATIENT)
Dept: VASCULAR SURGERY | Age: 63
End: 2018-11-08

## 2018-11-08 DIAGNOSIS — Z95.828 HISTORY OF ENDOVASCULAR STENT GRAFT FOR ABDOMINAL AORTIC ANEURYSM (AAA): Primary | ICD-10-CM

## 2018-11-08 NOTE — TELEPHONE ENCOUNTER
----- Message from [address] [place] [address] sent at 11/7/2018  2:38 PM EST -----  Patient would like for you to call her with her results from her study done on 10/25, said she was hospitalized and unable to get messages.

## 2018-11-08 NOTE — TELEPHONE ENCOUNTER
Called that no obvious celiac or SMA stenosis  Stable residual aneurysm sac from prior EVAR   She did question a CT of abdomen in hospital described increase in sac size but this was not a dedicated CTA and duplex images on duplex from February to October are consistent in size    So from our standpoint continue yearly surveillance which we will call back and arrange    For her other issues she will follow up with GI

## 2018-11-12 DIAGNOSIS — R10.84 GENERALIZED ABDOMINAL PAIN: ICD-10-CM

## 2018-11-12 DIAGNOSIS — Z01.818 PREOP EXAMINATION: ICD-10-CM

## 2018-11-12 DIAGNOSIS — Z98.84 STATUS POST GASTRIC BANDING: ICD-10-CM

## 2018-11-20 ENCOUNTER — DOCUMENTATION ONLY (OUTPATIENT)
Dept: INTERNAL MEDICINE CLINIC | Age: 63
End: 2018-11-20

## 2018-11-20 NOTE — PROGRESS NOTES
Pharmacy Note: Immunization Update    Patient: Devin Bates (87 y.o., 1955)     Patient's immunization history was updated to reflect information contained in the [place] and/or outside immunization/pharmacy records were reconciled within [facility]. Health Maintenance schedule updated when appropriate.     Current immunizations now reflect:       Immunization History   Administered Date(s) Administered    Influenza Vaccine 10/31/2017, 11/03/2018    Influenza Vaccine (Quad) PF 09/01/2016, 09/13/2017    Pneumococcal Vaccine (Unspecified Type) 03/05/2013    Tdap 09/01/2016       Slime Castillo, PharmD, BCACP

## 2018-11-23 RX ORDER — CLOPIDOGREL BISULFATE 75 MG/1
TABLET ORAL
Qty: 30 TAB | Refills: 1 | Status: SHIPPED | OUTPATIENT
Start: 2018-11-23 | End: 2018-11-26 | Stop reason: SDUPTHER

## 2018-11-25 RX ORDER — AMLODIPINE BESYLATE 10 MG/1
TABLET ORAL
Qty: 30 TAB | Refills: 0 | Status: SHIPPED | OUTPATIENT
Start: 2018-11-25 | End: 2018-11-26 | Stop reason: ALTCHOICE

## 2018-11-25 NOTE — PROGRESS NOTES
The patient presents to the office today with the chief complaint of hypertension HPI The patient remains on medications for hypertension. she is tolerating the medications well. The patient has peripheral vascular disease which is stable. The patient has complaints of chronic abdominal pain. ROS Allergies Allergen Reactions  Pcn [Penicillins] Anaphylaxis  Tetracycline Anaphylaxis  Sulfabenzamide Hives Current Outpatient Medications Medication Sig Dispense Refill  amLODIPine (NORVASC) 10 mg tablet TAKE ONE TABLET BY MOUTH DAILY 30 Tab 0  clopidogrel (PLAVIX) 75 mg tab TAKE ONE TABLET BY MOUTH DAILY 30 Tab 1  
 oxyCODONE-acetaminophen (PERCOCET 10)  mg per tablet Take 1 Tab by mouth every six (6) hours as needed for Pain. Max Daily Amount: 4 Tabs. 120 Tab 0  
 mirtazapine (REMERON) 15 mg tablet TAKE ONE TABLET BY MOUTH EVERY EVENING 30 Tab 0  
 triamterene-hydroCHLOROthiazide (DYAZIDE) 37.5-25 mg per capsule TAKE ONE CAPSULE BY MOUTH EVERY MORNING 30 Cap 1  
 atorvastatin (LIPITOR) 40 mg tablet TAKE ONE TABLET BY MOUTH DAILY 30 Tab 0  promethazine (PHENERGAN) 25 mg tablet Take 1 Tab by mouth every six (6) hours as needed for Nausea. 30 Tab 0  
 scopolamine (TRANSDERM-SCOP) 1 mg over 3 days pt3d 1 Patch by TransDERmal route every seventy-two (72) hours. 5 Patch 0  
 Lancets (ONETOUCH ULTRASOFT LANCETS) misc To use with blood for glucose monitoring 1 Each 11  
 glucose blood VI test strips (ONETOUCH ULTRA BLUE TEST STRIP) strip To check the blood sugars daily 100 Strip 2  
 lancets (ONE TOUCH DELICA) 33 gauge misc To use with blood for glucose monitoring 1 Package 3  
 glucose blood VI test strips (BLOOD GLUCOSE TEST) strip One touch test strips. To use with one touch delica  Patient to check blood sugar daily E11.9 100 Strip 11  
 diclofenac (VOLTAREN) 1 % gel Apply 2 g to affected area four (4) times daily.  1 Each 2  
  carvedilol (COREG) 25 mg tablet TAKE ONE TABLET BY MOUTH TWICE A DAY WITH MEALS 60 Tab 5  TOBRADEX ST drps ophthalmix suspension  losartan (COZAAR) 50 mg tablet  cloNIDine HCl (CATAPRES) 0.2 mg tablet  mirtazapine (REMERON) 15 mg tablet TAKE ONE TABLET BY MOUTH EVERY EVENING 30 Tab 0  
 montelukast (SINGULAIR) 10 mg tablet TAKE ONE TABLET BY MOUTH DAILY 30 Tab 3  
 DALIRESP tab tablet TAKE ONE TABLET BY MOUTH DAILY 30 Tab 3  
 desmopressin (DDAVP) 0.2 mg tablet Take 1 Tab by mouth nightly. 90 Tab 0  clopidogrel (PLAVIX) 75 mg tab TAKE 1 TABLET DAILY  Indications: Resume on 7/19/2018 90 Tab 2  
 albuterol (PROVENTIL HFA, VENTOLIN HFA, PROAIR HFA) 90 mcg/actuation inhaler Take  by inhalation.  Ondansetron (ZUPLENZ) 4 mg film Take  by mouth.  azelastine (ASTELIN) 137 mcg (0.1 %) nasal spray by Nasal route.  fluticasone-salmeterol (ADVAIR DISKUS) 250-50 mcg/dose diskus inhaler Take 1 Puff by inhalation daily. 1 Inhaler 5  
 hydrALAZINE (APRESOLINE) 25 mg tablet Take 1 Tab by mouth two (2) times a day. 60 Tab 6  
 docusate sodium (COLACE) 100 mg capsule Take 1 Cap by mouth two (2) times a day. (Patient taking differently: Take 100 mg by mouth daily.) 60 Cap 5  esomeprazole (NEXIUM) 20 mg capsule Take 1 Cap by mouth daily. STOP PREVACID  Indications: HEARTBURN 30 Cap 1 Past Medical History:  
Diagnosis Date  Aorto-iliac duplex 09/17/2015 Patent abdom aorta endovascular graft w/o leak. Mod stenosis suggested in right limb.  Calculus of kidney 2014  
 stent/kidney  Carotid duplex 09/17/2015 Mild < 50% bilateral plaquing.  Cataract, left  Chronic kidney disease   
 one functioning kidney  Chronic obstructive pulmonary disease ([facility] [address]  Echocardiogram 11/04/2014 EF 56%. No WMA. Mild LAE. Echo is within normal limits.  Esophageal reflux  Generalized osteoarthrosis, involving multiple sites  GERD (gastroesophageal reflux disease)  Gout  History of renal stent   
 right side  Hypercholesteremia  Hypertension   
 resolved  Lower extremity arterial testing 03/27/2015 Normal perfusion at rest and w/treadmill bilaterally. R KORI 1.05.  L KORI 1.03.  
 Murmur   
 patient reports that she is aware- has been told in the past  
 Nausea & vomiting  Renal artery stenosis (HCC)  S/P AAA (abdominal aortic aneurysm) repair 9/20/2018  Subclinical hyperthyroidism 8/2015  Unspecified sleep apnea   
 resolved Past Surgical History:  
Procedure Laterality Date  ADJUSTMENT GASTRIC BAND N/A 11/10/2016 [facility]  
 FULL ESOPHAGEAL MANOMETRY  5/21/2018  HX APPENDECTOMY  HX BLADDER SUSPENSION    
 HX CHOLECYSTECTOMY  HX GI    
 lap band 2012  HX GI  07/2018  
 lap band removal  
 HX HEENT Right 1990s, 2016 Ear sx  HX HYSTERECTOMY  HX OTHER SURGICAL  10/27/14 Bilateral open femoral exposures, Placement of catheter and sheath in the aorta Endo repair of aortic aneurysm with modular bifurcated device  Interpretation of angiography, intravascular ultrasound (IVUS) catheter  HX OTHER SURGICAL  10/27/2014 Endurant II abdominal stent graft implant  HX UROLOGICAL    
 stent  VASCULAR SURGERY PROCEDURE UNLIST    
 surgery for abdominal aneurysm Social History Socioeconomic History  Marital status:  Spouse name: Not on file  Number of children: Not on file  Years of education: Not on file  Highest education level: Not on file Social Needs  Financial resource strain: Not on file  Food insecurity - worry: Not on file  Food insecurity - inability: Not on file  Transportation needs - medical: Not on file  Transportation needs - non-medical: Not on file Occupational History  Not on file Tobacco Use  Smoking status: Former Smoker Packs/day: 0.00 Years: 0.00 Pack years: 0.00 Types: Cigarettes Last attempt to quit: 2016 Years since quittin.8  Smokeless tobacco: Never Used Substance and Sexual Activity  Alcohol use: No  
 Drug use: No  
 Sexual activity: Yes Birth control/protection: Surgical  
Other Topics Concern  Not on file Social History Narrative  Not on file Patient does not have an advanced directive on file There were no vitals taken for this visit. Physical Exam  
No Cervical Lymphadenopathy No Supraclavicular Lymphadenopathy Thyroid is Normal 
Lungs are normal to percussion. Clear to auscultation Heart:  S1 S2 are normal, No gallops, No murmers No Carotid Bruits Abdomen:  Normal Bowel Sounds. No tenderness. No masses. No Hepatomegaly or Splenomegaly LE:  Strong Pedal Pulses. No Edema BMI:  BMI is high but it was not addressed during this visit due to chronic medical problems Ancillary Procedure on 10/25/2018 Component Date Value Ref Range Status  Prox aortic AP 10/25/2018 2.30  cm Final  
 Prox aortic trans 10/25/2018 2.10  cm Final  
 Mid aortic AP 10/25/2018 1.90  cm Final  
 Mid aortic trans 10/25/2018 2.20  cm Final  
 Right renal origin sys 10/25/2018 29.0  cm/s Final  
 Right renal origin rar 10/25/2018 0.52   Final  
 Left renal origin sys 10/25/2018 99.0  cm/s Final  
 Left renal origin rar 10/25/2018 1.77   Final  
 Abdominal prox aorta damien 10/25/2018 56.0  cm/s Final  
 Abdominal mid aorta damien 10/25/2018 66.0  cm/s Final  
 Celiac PSV 10/25/2018 146.0  cm/s Final  
 Celiac EDV 10/25/2018 38.0  cm/s Final  
 Prox SMA PSV 10/25/2018 201.0  cm/s Final  
 Prox SMA EDV 10/25/2018 27.0  cm/s Final  
 Mid SMA PSV 10/25/2018 102.0  cm/s Final  
 Mid SMA EDV 10/25/2018 19.0  cm/s Final  
 Dist SMA PSV 10/25/2018 164.0  cm/s Final  
 Dist SMA EDV 10/25/2018 26.0  cm/s Final  
 Origin SMA EDV 10/25/2018 33.0  cm/s Final  
  Origin SMA PSV 10/25/2018 183.0  cm/s Final  
Admission on 10/08/2018, Discharged on 10/13/2018 No results displayed because visit has over 200 results. Hospital Outpatient Visit on 09/26/2018 Component Date Value Ref Range Status  [facility] SPECIMEN COL 09/26/2018 Specimens collected/sent to [facility] [employer]    Final  
Office Visit on 09/13/2018 Component Date Value Ref Range Status  Glucose (UA POC) 09/13/2018 Negative  Negative Final  
 Bilirubin (UA POC) 09/13/2018 Negative  Negative Final  
 Ketones (UA POC) 09/13/2018 Negative  Negative Final  
 Specific gravity (UA POC) 09/13/2018 1.010  1.001 - 1.035 Final  
 Blood (UA POC) 09/13/2018 Trace  Negative Final  
 pH (UA POC) 09/13/2018 6.5  4.6 - 8.0 Final  
 Protein (UA POC) 09/13/2018 Negative  Negative Final  
 Urobilinogen (UA POC) 09/13/2018 0.2 mg/dL  0.2 - 1 Final  
 Nitrites (UA POC) 09/13/2018 Negative  Negative Final  
 Leukocyte esterase (UA POC) 09/13/2018 Negative  Negative Final  
 RESULT 09/13/2018 Normal   Final  
 Comment: Updated: [id number] LAB ACC#: [id number] SOURCE: Clean Catch Urine 
--FINAL REPORT-- No Growth [place] No results found for any visits on 11/26/18. Assessment / Plan ICD-10-CM ICD-9-CM 1. Essential hypertension I10 401.9 2. Peripheral vascular disease (HCC) I73.9 443.9 she was advised to continue her maintenance medications Follow-up Disposition: Not on File I asked Sindhu Roberto if she has any questions and I answered the questions. Sindhu Porras Brenna [place] states that she understands the treatment plan and agrees with the treatment plan

## 2018-11-26 ENCOUNTER — OFFICE VISIT (OUTPATIENT)
Dept: INTERNAL MEDICINE CLINIC | Age: 63
End: 2018-11-26

## 2018-11-26 VITALS
SYSTOLIC BLOOD PRESSURE: 128 MMHG | WEIGHT: 162 LBS | HEIGHT: 61 IN | HEART RATE: 93 BPM | DIASTOLIC BLOOD PRESSURE: 76 MMHG | BODY MASS INDEX: 30.58 KG/M2 | OXYGEN SATURATION: 99 % | TEMPERATURE: 98.3 F

## 2018-11-26 DIAGNOSIS — I73.9 PERIPHERAL VASCULAR DISEASE (HCC): ICD-10-CM

## 2018-11-26 DIAGNOSIS — I10 ESSENTIAL HYPERTENSION: Primary | ICD-10-CM

## 2018-11-26 DIAGNOSIS — G47.33 OBSTRUCTIVE SLEEP APNEA SYNDROME: ICD-10-CM

## 2018-11-26 RX ORDER — DOCUSATE SODIUM 100 MG/1
100 CAPSULE, LIQUID FILLED ORAL
COMMUNITY
Start: 2018-10-30 | End: 2018-11-26 | Stop reason: SDUPTHER

## 2018-11-26 RX ORDER — ATORVASTATIN CALCIUM 10 MG/1
40 TABLET, FILM COATED ORAL
COMMUNITY
End: 2018-11-26 | Stop reason: SDUPTHER

## 2018-11-26 RX ORDER — CLOPIDOGREL BISULFATE 75 MG/1
75 TABLET ORAL
COMMUNITY
End: 2018-11-26 | Stop reason: SDUPTHER

## 2018-11-26 RX ORDER — HYDROCODONE BITARTRATE AND ACETAMINOPHEN 10; 325 MG/1; MG/1
TABLET ORAL
COMMUNITY
Start: 2018-09-07 | End: 2018-11-26 | Stop reason: SDUPTHER

## 2018-11-26 RX ORDER — ONDANSETRON 4 MG/1
TABLET, FILM COATED ORAL
COMMUNITY
Start: 2018-11-07 | End: 2018-11-26 | Stop reason: SDUPTHER

## 2018-11-26 RX ORDER — METOPROLOL SUCCINATE 25 MG/1
TABLET, EXTENDED RELEASE ORAL
Qty: 60 TAB | Refills: 3 | Status: SHIPPED | OUTPATIENT
Start: 2018-11-26 | End: 2019-07-18 | Stop reason: SDUPTHER

## 2018-11-26 RX ORDER — METOCLOPRAMIDE 5 MG/1
TABLET ORAL
Qty: 90 TAB | Refills: 0 | Status: SHIPPED | OUTPATIENT
Start: 2018-11-26 | End: 2018-12-12 | Stop reason: ALTCHOICE

## 2018-11-26 RX ORDER — MONTELUKAST SODIUM 10 MG/1
10 TABLET ORAL
COMMUNITY
End: 2018-11-26 | Stop reason: SDUPTHER

## 2018-11-26 RX ORDER — ONDANSETRON 4 MG/1
4 TABLET, FILM COATED ORAL
COMMUNITY
Start: 2018-11-03 | End: 2018-11-26 | Stop reason: SDUPTHER

## 2018-11-26 RX ORDER — HYDROCODONE BITARTRATE AND ACETAMINOPHEN 10; 325 MG/1; MG/1
TABLET ORAL
COMMUNITY
End: 2018-11-26 | Stop reason: SDUPTHER

## 2018-11-26 RX ORDER — MIRTAZAPINE 15 MG/1
15 TABLET, FILM COATED ORAL
COMMUNITY
End: 2018-11-26 | Stop reason: SDUPTHER

## 2018-11-26 RX ORDER — METOCLOPRAMIDE 5 MG/1
5 TABLET ORAL
COMMUNITY
Start: 2018-11-14 | End: 2018-11-26 | Stop reason: SDUPTHER

## 2018-11-26 RX ORDER — SENNOSIDES 8.6 MG/1
8.6 TABLET ORAL
COMMUNITY
Start: 2018-11-15 | End: 2019-03-22

## 2018-11-26 RX ORDER — CARVEDILOL 12.5 MG/1
25 TABLET ORAL
COMMUNITY
End: 2018-11-26 | Stop reason: SDUPTHER

## 2018-11-26 RX ORDER — OXYCODONE AND ACETAMINOPHEN 5; 325 MG/1; MG/1
TABLET ORAL
COMMUNITY
Start: 2018-11-03 | End: 2018-11-26 | Stop reason: SDUPTHER

## 2018-11-26 RX ORDER — METOCLOPRAMIDE 5 MG/1
TABLET ORAL
COMMUNITY
Start: 2018-11-14 | End: 2018-11-26 | Stop reason: SDUPTHER

## 2018-11-26 RX ORDER — PROMETHAZINE HYDROCHLORIDE 25 MG/1
TABLET ORAL
COMMUNITY
Start: 2018-10-30 | End: 2019-01-08 | Stop reason: ALTCHOICE

## 2018-11-26 RX ORDER — PANTOPRAZOLE SODIUM 20 MG/1
40 TABLET, DELAYED RELEASE ORAL
COMMUNITY
End: 2018-11-26 | Stop reason: SDUPTHER

## 2018-11-26 RX ORDER — RANITIDINE 150 MG/1
150 TABLET, FILM COATED ORAL
COMMUNITY
End: 2018-11-26 | Stop reason: SDUPTHER

## 2018-11-26 RX ORDER — ONDANSETRON 8 MG/1
TABLET, ORALLY DISINTEGRATING ORAL
COMMUNITY
Start: 2018-10-05 | End: 2018-11-26 | Stop reason: SDUPTHER

## 2018-11-26 RX ORDER — PANTOPRAZOLE SODIUM 40 MG/1
TABLET, DELAYED RELEASE ORAL
COMMUNITY
Start: 2018-11-09 | End: 2018-11-26 | Stop reason: SDUPTHER

## 2018-11-26 NOTE — PROGRESS NOTES
Chief Complaint   Patient presents with    Well Woman       Depression Screening:  PHQ over the last two weeks 9/21/2018   PHQ Not Done -   Little interest or pleasure in doing things Not at all   Feeling down, depressed, irritable, or hopeless Not at all   Total Score PHQ 2 0       Learning Assessment:  Learning Assessment 6/26/2018   PRIMARY LEARNER Patient   HIGHEST LEVEL OF EDUCATION - PRIMARY LEARNER  -   BARRIERS PRIMARY LEARNER -   CO-LEARNER CAREGIVER -   PRIMARY LANGUAGE ENGLISH   LEARNER PREFERENCE PRIMARY DEMONSTRATION   ANSWERED BY patient   RELATIONSHIP SELF       Abuse Screening:  Abuse Screening Questionnaire 3/22/2017   Do you ever feel afraid of your partner? N   Are you in a relationship with someone who physically or mentally threatens you? N   Is it safe for you to go home? Y           1. Have you been to the ER, urgent care clinic since your last visit? Hospitalized since your last visit? No    2. Have you seen or consulted any other health care providers outside of the [address] [place] [address] since your last visit? Include any pap smears or colon screening.  No

## 2018-11-27 NOTE — PROGRESS NOTES
The patient presents to the office today with the chief complaint of nausea    HPI    The patient is status post a hospitalization at [facility] for gastric immobility with nausea - vomiting. This slowly resolved with therapy. The patient is status post a right nephrectomy for cancer. She is slowly recovering. She has persistent abdominal tenderness which is slowly improving. The patient had hypertension. She was found to have renal artery stenosis at surgery. Since surgery the patient's BP has been normal to low. The patient has had persistent tachycardia. The has been observed to have obstruction with sleep at night      Review of Systems   Respiratory: Negative for shortness of breath. Cardiovascular: Negative for chest pain and leg swelling. Gastrointestinal: Positive for abdominal pain. Allergies   Allergen Reactions    Pcn [Penicillins] Anaphylaxis    Tetracycline Anaphylaxis    Sulfabenzamide Hives       Current Outpatient Medications   Medication Sig Dispense Refill    senna (SENOKOT) 8.6 mg tablet 8.6 mg.      roflumilast (DALIRESP) tab tablet 500 mcg.  promethazine (PHENERGAN) 25 mg tablet       metoclopramide HCl (REGLAN) 5 mg tablet 1 tab three times per day 90 Tab 0    metoprolol succinate (TOPROL-XL) 25 mg XL tablet 1/2 tab twice per day (Stop carvedilol) 60 Tab 3    oxyCODONE-acetaminophen (PERCOCET 10)  mg per tablet Take 1 Tab by mouth every six (6) hours as needed for Pain. Max Daily Amount: 4 Tabs.  120 Tab 0    Lancets (ONETOUCH ULTRASOFT LANCETS) misc To use with blood for glucose monitoring 1 Each 11    glucose blood VI test strips (ONETOUCH ULTRA BLUE TEST STRIP) strip To check the blood sugars daily 100 Strip 2    lancets (ONE TOUCH DELICA) 33 gauge misc To use with blood for glucose monitoring 1 Package 3    carvedilol (COREG) 25 mg tablet TAKE ONE TABLET BY MOUTH TWICE A DAY WITH MEALS 60 Tab 5    mirtazapine (REMERON) 15 mg tablet TAKE ONE TABLET BY MOUTH EVERY EVENING 30 Tab 0    montelukast (SINGULAIR) 10 mg tablet TAKE ONE TABLET BY MOUTH DAILY 30 Tab 3    DALIRESP tab tablet TAKE ONE TABLET BY MOUTH DAILY 30 Tab 3    clopidogrel (PLAVIX) 75 mg tab TAKE 1 TABLET DAILY  Indications: Resume on 7/19/2018 90 Tab 2    Ondansetron (ZUPLENZ) 4 mg film Take  by mouth.  docusate sodium (COLACE) 100 mg capsule Take 1 Cap by mouth two (2) times a day. (Patient taking differently: Take 100 mg by mouth daily.) 60 Cap 5    fluticasone-salmeterol (ADVAIR DISKUS) 250-50 mcg/dose diskus inhaler Take 1 Puff by inhalation daily. 1 Inhaler 5    esomeprazole (NEXIUM) 20 mg capsule Take 1 Cap by mouth daily. STOP PREVACID  Indications: HEARTBURN 30 Cap 1       Past Medical History:   Diagnosis Date    Aorto-iliac duplex 09/17/2015    Patent abdom aorta endovascular graft w/o leak. Mod stenosis suggested in right limb.  Calculus of kidney 2014    stent/kidney     Carotid duplex 09/17/2015    Mild < 50% bilateral plaquing.  Cataract, left     Chronic kidney disease     one functioning kidney    Chronic obstructive pulmonary disease ([address]     Echocardiogram 11/04/2014    EF 56%. No WMA. Mild LAE. Echo is within normal limits.  Esophageal reflux     Generalized osteoarthrosis, involving multiple sites     GERD (gastroesophageal reflux disease)     Gout     History of renal stent     right side    Hypercholesteremia     Hypertension     resolved    Lower extremity arterial testing 03/27/2015    Normal perfusion at rest and w/treadmill bilaterally.   R KORI 1.05.  L KORI 1.03.    Murmur     patient reports that she is aware- has been told in the past    Nausea & vomiting     Renal artery stenosis (HCC)     S/P AAA (abdominal aortic aneurysm) repair 9/20/2018    Subclinical hyperthyroidism 8/2015    Unspecified sleep apnea     resolved       Past Surgical History:   Procedure Laterality Date    ADJUSTMENT GASTRIC BAND N/A 11/10/2016    Latonya Moshe Guzman    FULL ESOPHAGEAL MANOMETRY  2018         HX APPENDECTOMY      HX BLADDER SUSPENSION      HX CHOLECYSTECTOMY      HX GI      lap band     HX GI  2018    lap band removal    HX HEENT Right ,     Ear sx    HX HYSTERECTOMY      HX OTHER SURGICAL  10/27/14     Bilateral open femoral exposures, Placement of catheter and sheath in the aorta Endo repair of aortic aneurysm with modular bifurcated device  Interpretation of angiography, intravascular ultrasound (IVUS) catheter    HX OTHER SURGICAL  10/27/2014    Endurant II abdominal stent graft implant    HX UROLOGICAL      stent    VASCULAR SURGERY PROCEDURE UNLIST      surgery for abdominal aneurysm       Social History     Socioeconomic History    Marital status:      Spouse name: Not on file    Number of children: Not on file    Years of education: Not on file    Highest education level: Not on file   Social Needs    Financial resource strain: Not on file    Food insecurity - worry: Not on file    Food insecurity - inability: Not on file   [employer] needs - medical: Not on file   [employer] needs - non-medical: Not on file   Occupational History    Not on file   Tobacco Use    Smoking status: Former Smoker     Packs/day: 0.00     Years: 0.00     Pack years: 0.00     Types: Cigarettes     Last attempt to quit: 2016     Years since quittin.8    Smokeless tobacco: Never Used   Substance and Sexual Activity    Alcohol use: No    Drug use: No    Sexual activity: Yes     Birth control/protection: Surgical   Other Topics Concern    Not on file   Social History Narrative    Not on file       Patient does not have an advanced directive on file    Visit Vitals  /76 (BP 1 Location: Left arm, BP Patient Position: Sitting)   Pulse 93   Temp 98.3 °F (36.8 °C) (Tympanic)   Ht 5' 1\" (1.549 m)   Wt 162 lb (73.5 kg)   SpO2 99%   BMI 30.61 kg/m²       Physical Exam   Skin:  Incisions well healed  No Cervical Lymphadenopathy  No Supraclavicular Lymphadenopathy  Thyroid is Normal  Lungs are normal to percussion. Clear to auscultation   Heart:  S1 S2 are normal, No gallops, No murmers  No Carotid Bruits  Abdomen:  Normal Bowel Sounds. Generalized tenderness with voluntary guarding. No rebound. No masses. No Hepatomegaly or Splenomegaly  LE:  Strong Pedal Pulses. No Edema      BMI:  BMI is high but it was not addressed during this visit due to recent surgery      Ancillary Procedure on 10/25/2018   Component Date Value Ref Range Status    Prox aortic AP 10/25/2018 2.30  cm Final    Prox aortic trans 10/25/2018 2.10  cm Final    Mid aortic AP 10/25/2018 1.90  cm Final    Mid aortic trans 10/25/2018 2.20  cm Final    Right renal origin sys 10/25/2018 29.0  cm/s Final    Right renal origin rar 10/25/2018 0.52   Final    Left renal origin sys 10/25/2018 99.0  cm/s Final    Left renal origin rar 10/25/2018 1.77   Final    Abdominal prox aorta damien 10/25/2018 56.0  cm/s Final    Abdominal mid aorta damien 10/25/2018 66.0  cm/s Final    Celiac PSV 10/25/2018 146.0  cm/s Final    Celiac EDV 10/25/2018 38.0  cm/s Final    Prox SMA PSV 10/25/2018 201.0  cm/s Final    Prox SMA EDV 10/25/2018 27.0  cm/s Final    Mid SMA PSV 10/25/2018 102.0  cm/s Final    Mid SMA EDV 10/25/2018 19.0  cm/s Final    Dist SMA PSV 10/25/2018 164.0  cm/s Final    Dist SMA EDV 10/25/2018 26.0  cm/s Final    Origin SMA EDV 10/25/2018 33.0  cm/s Final    Origin SMA PSV 10/25/2018 183.0  cm/s Final   Admission on 10/08/2018, Discharged on 10/13/2018   No results displayed because visit has over 200 results.       Hospital Outpatient Visit on 09/26/2018   Component Date Value Ref Range Status    [facility] SPECIMEN COL 09/26/2018 Specimens collected[facility]sent to [facility]    Final   Office Visit on 09/13/2018   Component Date Value Ref Range Status    Glucose (UA POC) 09/13/2018 Negative  Negative Final    Bilirubin (UA POC) 09/13/2018 Negative  Negative Final    Ketones (UA POC) 09/13/2018 Negative  Negative Final    Specific gravity (UA POC) 09/13/2018 1.010  1.001 - 1.035 Final    Blood (UA POC) 09/13/2018 Trace  Negative Final    pH (UA POC) 09/13/2018 6.5  4.6 - 8.0 Final    Protein (UA POC) 09/13/2018 Negative  Negative Final    Urobilinogen (UA POC) 09/13/2018 0.2 mg/dL  0.2 - 1 Final    Nitrites (UA POC) 09/13/2018 Negative  Negative Final    Leukocyte esterase (UA POC) 09/13/2018 Negative  Negative Final    RESULT 09/13/2018 Normal   Final    Comment: Updated: [id number]  LAB ACC#: [id number]  SOURCE: Clean Catch Urine  --FINAL REPORT--  No Growth         . No results found for any visits on 11/26/18. Assessment / Plan      ICD-10-CM ICD-9-CM    1. Essential hypertension I10 401.9    2. Peripheral vascular disease (HCC) I73.9 443.9    3. Obstructive sleep apnea syndrome G47.33 327.23 REFERRAL TO SLEEP STUDIES       Resume Reglan  Change Coreg to Metoprolol  she was advised to continue her maintenance medications - will try lower dose of Norco in the future  Sleep Study      Follow-up Disposition:  Return in about 2 weeks (around 12/10/2018). I asked Sindhu Roberto if she has any questions and I answered the questions. Sindhu Ayalana [place] states that she understands the treatment plan and agrees with the treatment plan

## 2018-12-01 DIAGNOSIS — G47.00 INSOMNIA, UNSPECIFIED TYPE: ICD-10-CM

## 2018-12-01 DIAGNOSIS — J30.9 ALLERGIC RHINITIS: ICD-10-CM

## 2018-12-01 RX ORDER — ATORVASTATIN CALCIUM 40 MG/1
TABLET, FILM COATED ORAL
Qty: 30 TAB | Refills: 0 | Status: SHIPPED | OUTPATIENT
Start: 2018-12-01 | End: 2018-12-30 | Stop reason: SDUPTHER

## 2018-12-03 ENCOUNTER — TELEPHONE (OUTPATIENT)
Dept: INTERNAL MEDICINE CLINIC | Age: 63
End: 2018-12-03

## 2018-12-03 RX ORDER — BENZONATATE 200 MG/1
200 CAPSULE ORAL
Qty: 21 CAP | Refills: 0 | Status: SHIPPED | OUTPATIENT
Start: 2018-12-03 | End: 2018-12-10

## 2018-12-03 RX ORDER — MIRTAZAPINE 15 MG/1
TABLET, FILM COATED ORAL
Qty: 30 TAB | Refills: 0 | Status: SHIPPED | OUTPATIENT
Start: 2018-12-03 | End: 2018-12-30 | Stop reason: SDUPTHER

## 2018-12-03 RX ORDER — ROFLUMILAST 500 UG/1
TABLET ORAL
Qty: 30 TAB | Refills: 2 | Status: SHIPPED | OUTPATIENT
Start: 2018-12-03 | End: 2019-02-28 | Stop reason: SDUPTHER

## 2018-12-03 RX ORDER — LEVOFLOXACIN 500 MG/1
TABLET, FILM COATED ORAL
Qty: 7 TAB | Refills: 0 | Status: SHIPPED | OUTPATIENT
Start: 2018-12-03 | End: 2019-01-08 | Stop reason: ALTCHOICE

## 2018-12-03 RX ORDER — MONTELUKAST SODIUM 10 MG/1
TABLET ORAL
Qty: 30 TAB | Refills: 2 | Status: SHIPPED | OUTPATIENT
Start: 2018-12-03 | End: 2019-02-28 | Stop reason: SDUPTHER

## 2018-12-03 NOTE — TELEPHONE ENCOUNTER
Requested Prescriptions     Pending Prescriptions Disp Refills    montelukast (SINGULAIR) 10 mg tablet [Pharmacy Med Name: MONTELUKAST SOD 10 MG TABLET, UU] 30 Tab 2     Sig: TAKE ONE TABLET BY MOUTH DAILY    DALIRESP tab tablet [Pharmacy Med Name: Kit Yaakov 500 MCG TABLET] 30 Tab 2     Sig: TAKE ONE TABLET BY MOUTH DAILY    mirtazapine (REMERON) 15 mg tablet [Pharmacy Med Name: MIRTAZAPINE 15 MG TABLET] 30 Tab 0     Sig: TAKE ONE TABLET BY MOUTH EVERY EVENING

## 2018-12-03 NOTE — TELEPHONE ENCOUNTER
Left message for patient to call office     Dr Faisal Benjamin has been advised and has approved and sent in medication

## 2018-12-03 NOTE — TELEPHONE ENCOUNTER
Patient called she has cough congestion and fever please send her something in are call her and let her know what do thanks [phone]

## 2018-12-07 ENCOUNTER — OFFICE VISIT (OUTPATIENT)
Dept: INTERNAL MEDICINE CLINIC | Age: 63
End: 2018-12-07

## 2018-12-07 VITALS
SYSTOLIC BLOOD PRESSURE: 102 MMHG | DIASTOLIC BLOOD PRESSURE: 52 MMHG | HEIGHT: 61 IN | TEMPERATURE: 98.3 F | HEART RATE: 101 BPM | WEIGHT: 152 LBS | BODY MASS INDEX: 28.7 KG/M2 | RESPIRATION RATE: 18 BRPM | OXYGEN SATURATION: 97 %

## 2018-12-07 DIAGNOSIS — J22 LOWER RESPIRATORY INFECTION: Primary | ICD-10-CM

## 2018-12-07 DIAGNOSIS — I10 ESSENTIAL HYPERTENSION: ICD-10-CM

## 2018-12-07 RX ORDER — RANITIDINE 150 MG/1
150 TABLET, FILM COATED ORAL 2 TIMES DAILY
COMMUNITY
End: 2019-05-17 | Stop reason: ALTCHOICE

## 2018-12-07 RX ORDER — PREDNISONE 10 MG/1
TABLET ORAL
Qty: 20 TAB | Refills: 0 | Status: SHIPPED | OUTPATIENT
Start: 2018-12-07 | End: 2018-12-10 | Stop reason: ALTCHOICE

## 2018-12-07 RX ORDER — PANTOPRAZOLE SODIUM 40 MG/1
40 TABLET, DELAYED RELEASE ORAL DAILY
COMMUNITY
End: 2019-01-08 | Stop reason: ALTCHOICE

## 2018-12-07 NOTE — PROGRESS NOTES
Yanet Almanzar is a 66 y.o. female presenting today for Nasal Congestion (x1 week)  . HPI:  Yanet Almanzar presents to the office today for nasal congestion, non-productive cough, fever, headache and sore throat x 1 week. Review of Systems   Constitutional: Positive for fever. HENT: Positive for congestion. Respiratory: Positive for cough and stridor. Negative for sputum production. Cardiovascular: Negative for chest pain and palpitations. Neurological: Positive for headaches. Allergies   Allergen Reactions    Pcn [Penicillins] Anaphylaxis    Tetracycline Anaphylaxis    Sulfabenzamide Hives       Current Outpatient Medications   Medication Sig Dispense Refill    raNITIdine (ZANTAC) 150 mg tablet Take 150 mg by mouth two (2) times a day.  pantoprazole (PROTONIX) 40 mg tablet Take 40 mg by mouth daily.  predniSONE (DELTASONE) 10 mg tablet 3 tabs daily x 3 days, 2 tabs daily x 3 days, 1 tab daily x 3 days, 1/2 tab daily x 3 days 20 Tab 0    montelukast (SINGULAIR) 10 mg tablet TAKE ONE TABLET BY MOUTH DAILY 30 Tab 2    DALIRESP tab tablet TAKE ONE TABLET BY MOUTH DAILY 30 Tab 2    mirtazapine (REMERON) 15 mg tablet TAKE ONE TABLET BY MOUTH EVERY EVENING 30 Tab 0    levoFLOXacin (LEVAQUIN) 500 mg tablet Take one tab by mouth daily for seven days 7 Tab 0    benzonatate (TESSALON) 200 mg capsule Take 1 Cap by mouth three (3) times daily as needed for Cough for up to 7 days. 21 Cap 0    atorvastatin (LIPITOR) 40 mg tablet TAKE ONE TABLET BY MOUTH DAILY 30 Tab 0    promethazine (PHENERGAN) 25 mg tablet       metoclopramide HCl (REGLAN) 5 mg tablet 1 tab three times per day 90 Tab 0    metoprolol succinate (TOPROL-XL) 25 mg XL tablet 1/2 tab twice per day (Stop carvedilol) 60 Tab 3    oxyCODONE-acetaminophen (PERCOCET 10)  mg per tablet Take 1 Tab by mouth every six (6) hours as needed for Pain. Max Daily Amount: 4 Tabs.  120 Tab 0    Lancets (ONETOUCH ULTRASOFT LANCETS) misc To use with blood for glucose monitoring 1 Each 11    glucose blood VI test strips (ONETOUCH ULTRA BLUE TEST STRIP) strip To check the blood sugars daily 100 Strip 2    lancets (ONE TOUCH DELICA) 33 gauge misc To use with blood for glucose monitoring 1 Package 3    carvedilol (COREG) 25 mg tablet TAKE ONE TABLET BY MOUTH TWICE A DAY WITH MEALS 60 Tab 5    clopidogrel (PLAVIX) 75 mg tab TAKE 1 TABLET DAILY  Indications: Resume on 7/19/2018 90 Tab 2    Ondansetron (ZUPLENZ) 4 mg film Take  by mouth.  fluticasone-salmeterol (ADVAIR DISKUS) 250-50 mcg/dose diskus inhaler Take 1 Puff by inhalation daily. 1 Inhaler 5    docusate sodium (COLACE) 100 mg capsule Take 1 Cap by mouth two (2) times a day. (Patient taking differently: Take 100 mg by mouth daily.) 60 Cap 5    senna (SENOKOT) 8.6 mg tablet 8.6 mg.      roflumilast (DALIRESP) tab tablet 500 mcg.  mirtazapine (REMERON) 15 mg tablet TAKE ONE TABLET BY MOUTH EVERY EVENING 30 Tab 0    esomeprazole (NEXIUM) 20 mg capsule Take 1 Cap by mouth daily. STOP PREVACID  Indications: HEARTBURN 30 Cap 1       Past Medical History:   Diagnosis Date    Aorto-iliac duplex 09/17/2015    Patent abdom aorta endovascular graft w/o leak. Mod stenosis suggested in right limb.  Calculus of kidney 2014    stent/kidney     Carotid duplex 09/17/2015    Mild < 50% bilateral plaquing.  Cataract, left     Chronic kidney disease     one functioning kidney    Chronic obstructive pulmonary disease ([facility] [address]     Echocardiogram 11/04/2014    EF 56%. No WMA. Mild LAE. Echo is within normal limits.  Esophageal reflux     Generalized osteoarthrosis, involving multiple sites     GERD (gastroesophageal reflux disease)     Gout     History of renal stent     right side    Hypercholesteremia     Hypertension     resolved    Lower extremity arterial testing 03/27/2015    Normal perfusion at rest and w/treadmill bilaterally.   R KORI 1.05.  L KORI 1.03.  Murmur     patient reports that she is aware- has been told in the past    Nausea & vomiting     Renal artery stenosis (HCC)     S/P AAA (abdominal aortic aneurysm) repair 2018    Subclinical hyperthyroidism 2015    Unspecified sleep apnea     resolved       Past Surgical History:   Procedure Laterality Date    ADJUSTMENT GASTRIC BAND N/A 11/10/2016    MAKAYLA Cullen    FULL ESOPHAGEAL MANOMETRY  2018         HX APPENDECTOMY      HX BLADDER SUSPENSION      HX CHOLECYSTECTOMY      HX GI      lap band     HX GI  2018    lap band removal    HX HEENT Right 1990s, 2016    Ear sx    HX HYSTERECTOMY      HX OTHER SURGICAL  10/27/14     Bilateral open femoral exposures, Placement of catheter and sheath in the aorta Endo repair of aortic aneurysm with modular bifurcated device  Interpretation of angiography, intravascular ultrasound (IVUS) catheter    HX OTHER SURGICAL  10/27/2014    Endurant II abdominal stent graft implant    HX UROLOGICAL      stent    VASCULAR SURGERY PROCEDURE UNLIST      surgery for abdominal aneurysm       Social History     Socioeconomic History    Marital status:      Spouse name: Not on file    Number of children: Not on file    Years of education: Not on file    Highest education level: Not on file   Social Needs    Financial resource strain: Not on file    Food insecurity - worry: Not on file    Food insecurity - inability: Not on file   [place] needs - medical: Not on file   [place] needs - non-medical: Not on file   Occupational History    Not on file   Tobacco Use    Smoking status: Former Smoker     Packs/day: 0.00     Years: 0.00     Pack years: 0.00     Types: Cigarettes     Last attempt to quit: 2016     Years since quittin.8    Smokeless tobacco: Never Used   Substance and Sexual Activity    Alcohol use: No    Drug use: No    Sexual activity: Yes     Birth control/protection: Surgical   Other Topics Concern    Not on file   Social History Narrative    Not on file       Patient does not have an advanced directive on file    Vitals:    12/07/18 1006   BP: 102/52   Pulse: (!) 101   Resp: 18   Temp: 98.3 °F (36.8 °C)   TempSrc: Tympanic   SpO2: 97%   Weight: 152 lb (68.9 kg)   Height: 5' 1\" (1.549 m)   PainSc:   8   PainLoc: Back       Physical Exam   HENT:   Right Ear: External ear normal.   Left Ear: External ear normal.   Mouth/Throat: Oropharynx is clear and moist. No oropharyngeal exudate. Cardiovascular: Normal rate and regular rhythm. Pulmonary/Chest: Effort normal. She has rhonchi in the right upper field, the right middle field, the right lower field, the left upper field, the left middle field and the left lower field. Lymphadenopathy:     She has no cervical adenopathy. Nursing note and vitals reviewed.       Office Visit on 12/04/2018   Component Date Value Ref Range Status    Color (UA POC) 12/04/2018 Yellow   Final    Clarity (UA POC) 12/04/2018 Clear   Final    Glucose (UA POC) 12/04/2018 Negative  Negative Final    Bilirubin (UA POC) 12/04/2018 Negative  Negative Final    Ketones (UA POC) 12/04/2018 Negative  Negative Final    Specific gravity (UA POC) 12/04/2018 1.005  1.001 - 1.035 Final    Blood (UA POC) 12/04/2018 Trace  Negative Final    pH (UA POC) 12/04/2018 6.0  4.6 - 8.0 Final    Protein (UA POC) 12/04/2018 Negative  Negative Final    Urobilinogen (UA POC) 12/04/2018 0.2 mg/dL  0.2 - 1 Final    Nitrites (UA POC) 12/04/2018 Negative  Negative Final    Leukocyte esterase (UA POC) 12/04/2018 Negative  Negative Final   Ancillary Procedure on 10/25/2018   Component Date Value Ref Range Status    Prox aortic AP 10/25/2018 2.30  cm Final    Prox aortic trans 10/25/2018 2.10  cm Final    Mid aortic AP 10/25/2018 1.90  cm Final    Mid aortic trans 10/25/2018 2.20  cm Final    Right renal origin sys 10/25/2018 29.0  cm/s Final    Right renal origin rar 10/25/2018 0.52   Final    Left renal origin sys 10/25/2018 99.0  cm/s Final    Left renal origin rar 10/25/2018 1.77   Final    Abdominal prox aorta damien 10/25/2018 56.0  cm/s Final    Abdominal mid aorta damien 10/25/2018 66.0  cm/s Final    Celiac PSV 10/25/2018 146.0  cm/s Final    Celiac EDV 10/25/2018 38.0  cm/s Final    Prox SMA PSV 10/25/2018 201.0  cm/s Final    Prox SMA EDV 10/25/2018 27.0  cm/s Final    Mid SMA PSV 10/25/2018 102.0  cm/s Final    Mid SMA EDV 10/25/2018 19.0  cm/s Final    Dist SMA PSV 10/25/2018 164.0  cm/s Final    Dist SMA EDV 10/25/2018 26.0  cm/s Final    Origin SMA EDV 10/25/2018 33.0  cm/s Final    Origin SMA PSV 10/25/2018 183.0  cm/s Final   Admission on 10/08/2018, Discharged on 10/13/2018   No results displayed because visit has over 200 results. Hospital Outpatient Visit on 09/26/2018   Component Date Value Ref Range Status    [facility] SPE[facility]IMEN COL 09/26/2018 Specimens collected/sent to [facility]    Final   Office Visit on 09/13/2018   Component Date Value Ref Range Status    Glucose (UA POC) 09/13/2018 Negative  Negative Final    Bilirubin (UA POC) 09/13/2018 Negative  Negative Final    Ketones (UA POC) 09/13/2018 Negative  Negative Final    Specific gravity (UA POC) 09/13/2018 1.010  1.001 - 1.035 Final    Blood (UA POC) 09/13/2018 Trace  Negative Final    pH (UA POC) 09/13/2018 6.5  4.6 - 8.0 Final    Protein (UA POC) 09/13/2018 Negative  Negative Final    Urobilinogen (UA POC) 09/13/2018 0.2 mg/dL  0.2 - 1 Final    Nitrites (UA POC) 09/13/2018 Negative  Negative Final    Leukocyte esterase (UA POC) 09/13/2018 Negative  Negative Final    RESULT 09/13/2018 Normal   Final    Comment: Updated: 14Sep18 1948  LAB ACC#: [id number]  SOURCE: Clean Catch Urine  --FINAL REPORT--  No Growth         . No results found for any visits on 12/07/18.     Assessment / Plan:      ICD-10-CM ICD-9-CM    1. Lower respiratory infection J22 519.8 predniSONE (DELTASONE) 10 mg tablet 2. Essential hypertension I10 401.9      Respiratory rhonchi and nonproductive cough patient given prednisone 10 mg taper dose  Patient just completed a dose of antibiotics will hold antibiotic treatment for now  Hypertension controlled blood pressure, more than given 102/52 today  Follow-up on Tuesday if no improvement      Follow-up Disposition:  Return if symptoms worsen or fail to improve. I asked the patient if she  had any questions and answered her  questions.   The patient stated that she understands the treatment plan and agrees with the treatment plan

## 2018-12-10 ENCOUNTER — OFFICE VISIT (OUTPATIENT)
Dept: INTERNAL MEDICINE CLINIC | Age: 63
End: 2018-12-10

## 2018-12-10 VITALS
DIASTOLIC BLOOD PRESSURE: 80 MMHG | OXYGEN SATURATION: 99 % | SYSTOLIC BLOOD PRESSURE: 120 MMHG | HEART RATE: 101 BPM | BODY MASS INDEX: 28.7 KG/M2 | HEIGHT: 61 IN | TEMPERATURE: 98.2 F | WEIGHT: 152 LBS

## 2018-12-10 DIAGNOSIS — R05.9 COUGH: Primary | ICD-10-CM

## 2018-12-10 DIAGNOSIS — R10.84 GENERALIZED ABDOMINAL PAIN: ICD-10-CM

## 2018-12-10 DIAGNOSIS — M54.16 LUMBAR RADICULOPATHY: ICD-10-CM

## 2018-12-10 RX ORDER — ALBUTEROL SULFATE 0.83 MG/ML
SOLUTION RESPIRATORY (INHALATION)
Qty: 100 EACH | Refills: 2 | Status: SHIPPED | OUTPATIENT
Start: 2018-12-10

## 2018-12-10 RX ORDER — OXYCODONE AND ACETAMINOPHEN 10; 325 MG/1; MG/1
1 TABLET ORAL
Qty: 120 TAB | Refills: 0 | Status: SHIPPED | OUTPATIENT
Start: 2018-12-12 | End: 2019-01-14 | Stop reason: SDUPTHER

## 2018-12-10 RX ORDER — CLARITHROMYCIN 500 MG/1
TABLET, FILM COATED ORAL
Qty: 20 TAB | Refills: 0 | Status: SHIPPED | OUTPATIENT
Start: 2018-12-10 | End: 2019-01-08 | Stop reason: ALTCHOICE

## 2018-12-10 RX ORDER — PREDNISONE 20 MG/1
TABLET ORAL
Qty: 22 TAB | Refills: 0 | Status: SHIPPED | OUTPATIENT
Start: 2018-12-10 | End: 2019-01-04 | Stop reason: ALTCHOICE

## 2018-12-10 RX ORDER — CODEINE PHOSPHATE AND GUAIFENESIN 10; 100 MG/5ML; MG/5ML
SOLUTION ORAL
Qty: 240 ML | Refills: 1 | Status: SHIPPED | OUTPATIENT
Start: 2018-12-10 | End: 2019-01-08 | Stop reason: ALTCHOICE

## 2018-12-10 NOTE — PROGRESS NOTES
Chief Complaint   Patient presents with    Cough     2 week follow up    Nasal Congestion       Depression Screening:  PHQ over the last two weeks 9/21/2018   PHQ Not Done -   Little interest or pleasure in doing things Not at all   Feeling down, depressed, irritable, or hopeless Not at all   Total Score PHQ 2 0       Learning Assessment:  Learning Assessment 6/26/2018   PRIMARY LEARNER Patient   HIGHEST LEVEL OF EDUCATION - PRIMARY LEARNER  -   BARRIERS PRIMARY LEARNER -   CO-LEARNER CAREGIVER -   PRIMARY LANGUAGE ENGLISH   LEARNER PREFERENCE PRIMARY DEMONSTRATION   ANSWERED BY patient   RELATIONSHIP SELF       Abuse Screening:  Abuse Screening Questionnaire 3/22/2017   Do you ever feel afraid of your partner? N   Are you in a relationship with someone who physically or mentally threatens you? N   Is it safe for you to go home? Y           1. Have you been to the ER, urgent care clinic since your last visit? Hospitalized since your last visit? No    2. Have you seen or consulted any other health care providers outside of the [address] [place] [address] since your last visit? Include any pap smears or colon screening.  No

## 2018-12-10 NOTE — TELEPHONE ENCOUNTER
Requested Prescriptions     Pending Prescriptions Disp Refills    oxyCODONE-acetaminophen (PERCOCET 10)  mg per tablet 120 Tab 0     Sig: Take 1 Tab by mouth every six (6) hours as needed for Pain. Max Daily Amount: 4 Tabs.       last filled 11/15/2018

## 2018-12-11 ENCOUNTER — HOSPITAL ENCOUNTER (OUTPATIENT)
Dept: GENERAL RADIOLOGY | Age: 63
Discharge: HOME | End: 2018-12-11
Payer: COMMERCIAL

## 2018-12-11 DIAGNOSIS — R05.9 COUGH: ICD-10-CM

## 2018-12-11 PROCEDURE — 71046 X-RAY EXAM CHEST 2 VIEWS: CPT

## 2018-12-11 NOTE — TELEPHONE ENCOUNTER
Prescription called to pharmacy--percocet [address] [employer] [place] [address] [facility] (Pharmacy) [phone]

## 2018-12-13 DIAGNOSIS — R10.84 GENERALIZED ABDOMINAL PAIN: ICD-10-CM

## 2018-12-13 DIAGNOSIS — M54.16 LUMBAR RADICULOPATHY: ICD-10-CM

## 2018-12-13 RX ORDER — OXYCODONE AND ACETAMINOPHEN 10; 325 MG/1; MG/1
1 TABLET ORAL
Qty: 120 TAB | Refills: 0 | Status: CANCELLED | OUTPATIENT
Start: 2018-12-13

## 2018-12-13 NOTE — TELEPHONE ENCOUNTER
Requested Prescriptions     Pending Prescriptions Disp Refills    oxyCODONE-acetaminophen (PERCOCET 10)  mg per tablet 120 Tab 0     Sig: Take 1 Tab by mouth every six (6) hours as needed for Pain. Max Daily Amount: 4 Tabs.

## 2018-12-13 NOTE — PROGRESS NOTES
The patient presents to the office today with the chief complaint of chest congestion    HPI    The patient complains of 4 days of chest congestion with cough. The cough is minimally productive of yellow phlegm. The patient no fever. The patient denies dyspnea      Review of Systems   Respiratory: Positive for cough. Negative for shortness of breath. Cardiovascular: Negative for chest pain and leg swelling. Allergies   Allergen Reactions    Pcn [Penicillins] Anaphylaxis    Tetracycline Anaphylaxis    Sulfabenzamide Hives       Current Outpatient Medications   Medication Sig Dispense Refill    oxyCODONE-acetaminophen (PERCOCET 10)  mg per tablet Take 1 Tab by mouth every six (6) hours as needed for Pain. Max Daily Amount: 4 Tabs. 120 Tab 0    predniSONE (DELTASONE) 20 mg tablet 4 tab daily x 2 days, 3 tab daily x 2 days, 2 tab daily x 4 days, 1 tab daily x 4 days 22 Tab 0    albuterol (PROVENTIL VENTOLIN) 2.5 mg /3 mL (0.083 %) nebulizer solution Use with nebulizer three times per day 100 Each 2    clarithromycin (BIAXIN) 500 mg tablet 1 tab twice per day with food 20 Tab 0    guaiFENesin-codeine (CHERATUSSIN AC) 100-10 mg/5 mL solution 2 teaspoons every six hours as needed for cough 240 mL 1    raNITIdine (ZANTAC) 150 mg tablet Take 150 mg by mouth two (2) times a day.  pantoprazole (PROTONIX) 40 mg tablet Take 40 mg by mouth daily.       montelukast (SINGULAIR) 10 mg tablet TAKE ONE TABLET BY MOUTH DAILY 30 Tab 2    DALIRESP tab tablet TAKE ONE TABLET BY MOUTH DAILY 30 Tab 2    levoFLOXacin (LEVAQUIN) 500 mg tablet Take one tab by mouth daily for seven days 7 Tab 0    atorvastatin (LIPITOR) 40 mg tablet TAKE ONE TABLET BY MOUTH DAILY 30 Tab 0    senna (SENOKOT) 8.6 mg tablet 8.6 mg.      promethazine (PHENERGAN) 25 mg tablet       metoprolol succinate (TOPROL-XL) 25 mg XL tablet 1/2 tab twice per day (Stop carvedilol) 60 Tab 3    Lancets (ONETOUCH ULTRASOFT LANCETS) misc To use with blood for glucose monitoring 1 Each 11    glucose blood VI test strips (ONETOUCH ULTRA BLUE TEST STRIP) strip To check the blood sugars daily 100 Strip 2    lancets (ONE TOUCH DELICA) 33 gauge misc To use with blood for glucose monitoring 1 Package 3    carvedilol (COREG) 25 mg tablet TAKE ONE TABLET BY MOUTH TWICE A DAY WITH MEALS 60 Tab 5    clopidogrel (PLAVIX) 75 mg tab TAKE 1 TABLET DAILY  Indications: Resume on 7/19/2018 90 Tab 2    Ondansetron (ZUPLENZ) 4 mg film Take  by mouth.  docusate sodium (COLACE) 100 mg capsule Take 1 Cap by mouth two (2) times a day. (Patient taking differently: Take 100 mg by mouth daily.) 60 Cap 5    esomeprazole (NEXIUM) 20 mg capsule Take 1 Cap by mouth daily. STOP PREVACID  Indications: HEARTBURN 30 Cap 1    mirtazapine (REMERON) 15 mg tablet TAKE ONE TABLET BY MOUTH EVERY EVENING 30 Tab 0       Past Medical History:   Diagnosis Date    Aorto-iliac duplex 09/17/2015    Patent abdom aorta endovascular graft w/o leak. Mod stenosis suggested in right limb.  Calculus of kidney 2014    stent/kidney     Carotid duplex 09/17/2015    Mild < 50% bilateral plaquing.  Cataract, left     Chronic kidney disease     one functioning kidney    Chronic obstructive pulmonary disease ([address]     Echocardiogram 11/04/2014    EF 56%. No WMA. Mild LAE. Echo is within normal limits.  Esophageal reflux     Generalized osteoarthrosis, involving multiple sites     GERD (gastroesophageal reflux disease)     Gout     History of renal stent     right side    Hypercholesteremia     Hypertension     resolved    Lower extremity arterial testing 03/27/2015    Normal perfusion at rest and w/treadmill bilaterally.   R KORI 1.05.  L KORI 1.03.    Murmur     patient reports that she is aware- has been told in the past    Nausea & vomiting     Renal artery stenosis (HCC)     S/P AAA (abdominal aortic aneurysm) repair 9/20/2018    Subclinical hyperthyroidism 8/2015  Unspecified sleep apnea     resolved       Past Surgical History:   Procedure Laterality Date    ADJUSTMENT GASTRIC BAND N/A 11/10/2016    MAKAYLA Cullen    FULL ESOPHAGEAL MANOMETRY  2018         HX APPENDECTOMY      HX BLADDER SUSPENSION      HX CHOLECYSTECTOMY      HX GI      lap band     HX GI  2018    lap band removal    HX HEENT Right 1990s, 2016    Ear sx    HX HYSTERECTOMY      HX OTHER SURGICAL  10/27/14     Bilateral open femoral exposures, Placement of catheter and sheath in the aorta Endo repair of aortic aneurysm with modular bifurcated device  Interpretation of angiography, intravascular ultrasound (IVUS) catheter    HX OTHER SURGICAL  10/27/2014    Endurant II abdominal stent graft implant    HX UROLOGICAL      stent    VASCULAR SURGERY PROCEDURE UNLIST      surgery for abdominal aneurysm       Social History     Socioeconomic History    Marital status:      Spouse name: Not on file    Number of children: Not on file    Years of education: Not on file    Highest education level: Not on file   Social Needs    Financial resource strain: Not on file    Food insecurity - worry: Not on file    Food insecurity - inability: Not on file   [place] needs - medical: Not on file   [place] needs - non-medical: Not on file   Occupational History    Not on file   Tobacco Use    Smoking status: Former Smoker     Packs/day: 0.00     Years: 0.00     Pack years: 0.00     Types: Cigarettes     Last attempt to quit: 2016     Years since quittin.8    Smokeless tobacco: Never Used   Substance and Sexual Activity    Alcohol use: No    Drug use: No    Sexual activity: Yes     Birth control/protection: Surgical   Other Topics Concern    Not on file   Social History Narrative    Not on file       Patient does not have an advanced directive on file    Visit Vitals  /80 (BP 1 Location: Right arm, BP Patient Position: Sitting)   Pulse (!) 101 Temp 98.2 °F (36.8 °C) (Tympanic)   Ht 5' 1\" (1.549 m)   Wt 152 lb (68.9 kg)   SpO2 99%   BMI 28.72 kg/m²       Physical Exam   No Cervical Lymphadenopathy  No Supraclavicular Lymphadenopathy  Thyroid is Normal  Lungs are normal to percussion. Scattered rhonchi to auscultation   Heart:  S1 S2 are normal, No gallops, No murmurs  No Carotid Bruits  Abdomen:  Normal Bowel Sounds. No tenderness. No masses. No Hepatomegaly or Splenomegaly  LE:  Strong Pedal Pulses.   No Edema      BMI:  OK    Office Visit on 12/04/2018   Component Date Value Ref Range Status    Color (UA POC) 12/04/2018 Yellow   Final    Clarity (UA POC) 12/04/2018 Clear   Final    Glucose (UA POC) 12/04/2018 Negative  Negative Final    Bilirubin (UA POC) 12/04/2018 Negative  Negative Final    Ketones (UA POC) 12/04/2018 Negative  Negative Final    Specific gravity (UA POC) 12/04/2018 1.005  1.001 - 1.035 Final    Blood (UA POC) 12/04/2018 Trace  Negative Final    pH (UA POC) 12/04/2018 6.0  4.6 - 8.0 Final    Protein (UA POC) 12/04/2018 Negative  Negative Final    Urobilinogen (UA POC) 12/04/2018 0.2 mg/dL  0.2 - 1 Final    Nitrites (UA POC) 12/04/2018 Negative  Negative Final    Leukocyte esterase (UA POC) 12/04/2018 Negative  Negative Final   Ancillary Procedure on 10/25/2018   Component Date Value Ref Range Status    Prox aortic AP 10/25/2018 2.30  cm Final    Prox aortic trans 10/25/2018 2.10  cm Final    Mid aortic AP 10/25/2018 1.90  cm Final    Mid aortic trans 10/25/2018 2.20  cm Final    Right renal origin sys 10/25/2018 29.0  cm/s Final    Right renal origin rar 10/25/2018 0.52   Final    Left renal origin sys 10/25/2018 99.0  cm/s Final    Left renal origin rar 10/25/2018 1.77   Final    Abdominal prox aorta damien 10/25/2018 56.0  cm/s Final    Abdominal mid aorta damien 10/25/2018 66.0  cm/s Final    Celiac PSV 10/25/2018 146.0  cm/s Final    Celiac EDV 10/25/2018 38.0  cm/s Final    Prox SMA PSV 10/25/2018 201.0 cm/s Final    Prox SMA EDV 10/25/2018 27.0  cm/s Final    Mid SMA PSV 10/25/2018 102.0  cm/s Final    Mid SMA EDV 10/25/2018 19.0  cm/s Final    Dist SMA PSV 10/25/2018 164.0  cm/s Final    Dist SMA EDV 10/25/2018 26.0  cm/s Final    Origin SMA EDV 10/25/2018 33.0  cm/s Final    Origin SMA PSV 10/25/2018 183.0  cm/s Final   Admission on 10/08/2018, Discharged on 10/13/2018   No results displayed because visit has over 200 results. Hospital Outpatient Visit on 09/26/2018   Component Date Value Ref Range Status    [facility] SPE[facility]IMEN COL 09/26/2018 Specimens collected/sent to [facility]    Final   Office Visit on 09/13/2018   Component Date Value Ref Range Status    Glucose (UA POC) 09/13/2018 Negative  Negative Final    Bilirubin (UA POC) 09/13/2018 Negative  Negative Final    Ketones (UA POC) 09/13/2018 Negative  Negative Final    Specific gravity (UA POC) 09/13/2018 1.010  1.001 - 1.035 Final    Blood (UA POC) 09/13/2018 Trace  Negative Final    pH (UA POC) 09/13/2018 6.5  4.6 - 8.0 Final    Protein (UA POC) 09/13/2018 Negative  Negative Final    Urobilinogen (UA POC) 09/13/2018 0.2 mg/dL  0.2 - 1 Final    Nitrites (UA POC) 09/13/2018 Negative  Negative Final    Leukocyte esterase (UA POC) 09/13/2018 Negative  Negative Final    RESULT 09/13/2018 Normal   Final    Comment: Updated: 14Sep18 1948  LAB ACC#: [id number]  SOURCE: Clean Catch Urine  --FINAL REPORT--  No Growth         . No results found for any visits on 12/10/18. Assessment / Plan      ICD-10-CM ICD-9-CM    1. Cough R05 786.2 guaiFENesin-codeine (CHERATUSSIN AC) 100-10 mg/5 mL solution      XR CHEST PA LAT       Biaxin  Prednisone  Cheratussin  she was advised to continue her maintenance medications  she is to call if symptoms persist over five days      Follow-up Disposition:  Return in about 3 months (around 3/10/2019). I asked Brianna Turner Felisa if she has any questions and I answered the questions. Margery Salina Severa Stalker states that she understands the treatment plan and agrees with the treatment plan

## 2018-12-22 RX ORDER — AMLODIPINE BESYLATE 10 MG/1
TABLET ORAL
Qty: 30 TAB | Refills: 0 | Status: SHIPPED | OUTPATIENT
Start: 2018-12-22 | End: 2019-01-08 | Stop reason: ALTCHOICE

## 2018-12-30 DIAGNOSIS — G47.00 INSOMNIA, UNSPECIFIED TYPE: ICD-10-CM

## 2018-12-30 RX ORDER — MIRTAZAPINE 15 MG/1
TABLET, FILM COATED ORAL
Qty: 30 TAB | Refills: 0 | Status: SHIPPED | OUTPATIENT
Start: 2018-12-30 | End: 2019-05-26 | Stop reason: SDUPTHER

## 2018-12-31 RX ORDER — TRIAMTERENE AND HYDROCHLOROTHIAZIDE 37.5; 25 MG/1; MG/1
CAPSULE ORAL
Qty: 30 CAP | Refills: 0 | Status: SHIPPED | OUTPATIENT
Start: 2018-12-31 | End: 2019-01-08 | Stop reason: ALTCHOICE

## 2019-01-02 RX ORDER — ATORVASTATIN CALCIUM 40 MG/1
TABLET, FILM COATED ORAL
Qty: 30 TAB | Refills: 0 | Status: SHIPPED | OUTPATIENT
Start: 2019-01-02 | End: 2019-01-08 | Stop reason: ALTCHOICE

## 2019-01-04 ENCOUNTER — OFFICE VISIT (OUTPATIENT)
Dept: INTERNAL MEDICINE CLINIC | Age: 64
End: 2019-01-04

## 2019-01-04 VITALS
HEART RATE: 70 BPM | DIASTOLIC BLOOD PRESSURE: 78 MMHG | TEMPERATURE: 97.5 F | WEIGHT: 167 LBS | BODY MASS INDEX: 31.53 KG/M2 | OXYGEN SATURATION: 97 % | HEIGHT: 61 IN | SYSTOLIC BLOOD PRESSURE: 128 MMHG

## 2019-01-04 DIAGNOSIS — J41.0 SIMPLE CHRONIC BRONCHITIS (HCC): Primary | ICD-10-CM

## 2019-01-04 DIAGNOSIS — M46.1 SACROILIITIS (HCC): ICD-10-CM

## 2019-01-04 DIAGNOSIS — R06.83 SNORING: ICD-10-CM

## 2019-01-04 RX ORDER — PREDNISONE 10 MG/1
10 TABLET ORAL
Qty: 20 TAB | Refills: 0 | Status: SHIPPED | OUTPATIENT
Start: 2019-01-04 | End: 2019-03-22

## 2019-01-07 ENCOUNTER — HOSPITAL ENCOUNTER (OUTPATIENT)
Dept: LAB | Age: 64
Discharge: HOME | End: 2019-01-07

## 2019-01-07 PROCEDURE — 99001 SPECIMEN HANDLING PT-LAB: CPT

## 2019-01-09 NOTE — PROGRESS NOTES
The patient presents to the office today with the chief complaint of low back pain    HPI    The patient remains on Albuterol, Singulair, and Daliresp  for COPD.  she is tolerating the medications well. The patient has persistent dyspnea on exertion. This is stable. The patient has frequent snoring. She is due for a sleep study. The patient has continued complaints of chronic low back pain. The patient has chronic abdominal pain but this is doing better after surgery. Review of Systems   Respiratory: Positive for shortness of breath. Cardiovascular: Negative for chest pain and leg swelling. Musculoskeletal: Positive for back pain. Allergies   Allergen Reactions    Pcn [Penicillins] Anaphylaxis    Tetracycline Anaphylaxis    Sulfabenzamide Hives       Current Outpatient Medications   Medication Sig Dispense Refill    predniSONE (DELTASONE) 10 mg tablet Take 10 mg by mouth daily (with breakfast). 20 Tab 0    mirtazapine (REMERON) 15 mg tablet TAKE ONE TABLET BY MOUTH EVERY EVENING 30 Tab 0    oxyCODONE-acetaminophen (PERCOCET 10)  mg per tablet Take 1 Tab by mouth every six (6) hours as needed for Pain. Max Daily Amount: 4 Tabs. 120 Tab 0    albuterol (PROVENTIL VENTOLIN) 2.5 mg /3 mL (0.083 %) nebulizer solution Use with nebulizer three times per day 100 Each 2    raNITIdine (ZANTAC) 150 mg tablet Take 150 mg by mouth two (2) times a day.       montelukast (SINGULAIR) 10 mg tablet TAKE ONE TABLET BY MOUTH DAILY 30 Tab 2    DALIRESP tab tablet TAKE ONE TABLET BY MOUTH DAILY 30 Tab 2    senna (SENOKOT) 8.6 mg tablet 8.6 mg.      metoprolol succinate (TOPROL-XL) 25 mg XL tablet 1/2 tab twice per day (Stop carvedilol) 60 Tab 3    Lancets (ONETOUCH ULTRASOFT LANCETS) misc To use with blood for glucose monitoring 1 Each 11    glucose blood VI test strips (ONETOUCH ULTRA BLUE TEST STRIP) strip To check the blood sugars daily 100 Strip 2    lancets (ONE TOUCH DELICA) 33 gauge misc To use with blood for glucose monitoring 1 Package 3    clopidogrel (PLAVIX) 75 mg tab TAKE 1 TABLET DAILY  Indications: Resume on 7/19/2018 90 Tab 2    docusate sodium (COLACE) 100 mg capsule Take 1 Cap by mouth two (2) times a day. (Patient taking differently: Take 100 mg by mouth daily.) 60 Cap 5    esomeprazole (NEXIUM) 20 mg capsule Take 1 Cap by mouth daily. STOP PREVACID  Indications: HEARTBURN 30 Cap 1       Past Medical History:   Diagnosis Date    Aorto-iliac duplex 09/17/2015    Patent abdom aorta endovascular graft w/o leak. Mod stenosis suggested in right limb.  Calculus of kidney 2014    stent/kidney     Carotid duplex 09/17/2015    Mild < 50% bilateral plaquing.  Cataract, left     Chronic kidney disease     one functioning kidney    Chronic obstructive pulmonary disease ([address]     Echocardiogram 11/04/2014    EF 56%. No WMA. Mild LAE. Echo is within normal limits.  Esophageal reflux     Generalized osteoarthrosis, involving multiple sites     GERD (gastroesophageal reflux disease)     Gout     History of renal stent     right side    Hypercholesteremia     Hypertension     resolved    Lower extremity arterial testing 03/27/2015    Normal perfusion at rest and w/treadmill bilaterally.   R KORI 1.05.  L KORI 1.03.    Murmur     patient reports that she is aware- has been told in the past    Nausea & vomiting     Renal artery stenosis (HCC)     S/P AAA (abdominal aortic aneurysm) repair 9/20/2018    Subclinical hyperthyroidism 8/2015    Unspecified sleep apnea     resolved       Past Surgical History:   Procedure Laterality Date    ADJUSTMENT GASTRIC BAND N/A 11/10/2016    MAKAYLA Cullen    FULL ESOPHAGEAL MANOMETRY  5/21/2018         HX APPENDECTOMY      HX BLADDER SUSPENSION      HX CHOLECYSTECTOMY      HX GI      lap band 2012    HX GI  07/2018    lap band removal    HX HEENT Right 1990s, 2016    Ear sx    HX HYSTERECTOMY      HX OTHER SURGICAL  10/27/14 Bilateral open femoral exposures, Placement of catheter and sheath in the aorta Endo repair of aortic aneurysm with modular bifurcated device  Interpretation of angiography, intravascular ultrasound (IVUS) catheter    HX OTHER SURGICAL  10/27/2014    Endurant II abdominal stent graft implant    HX UROLOGICAL      stent    VASCULAR SURGERY PROCEDURE UNLIST      surgery for abdominal aneurysm       Social History     Socioeconomic History    Marital status:      Spouse name: Not on file    Number of children: Not on file    Years of education: Not on file    Highest education level: Not on file   Social Needs    Financial resource strain: Not on file    Food insecurity - worry: Not on file    Food insecurity - inability: Not on file   [employer] needs - medical: Not on file   [employer] needs - non-medical: Not on file   Occupational History    Not on file   Tobacco Use    Smoking status: Former Smoker     Packs/day: 0.00     Years: 0.00     Pack years: 0.00     Types: Cigarettes     Last attempt to quit: 2016     Years since quittin.9    Smokeless tobacco: Never Used   Substance and Sexual Activity    Alcohol use: No    Drug use: No    Sexual activity: Yes     Birth control/protection: Surgical   Other Topics Concern    Not on file   Social History Narrative    Not on file       Patient does not have an advanced directive on file    Visit Vitals  /78 (BP 1 Location: Right arm, BP Patient Position: Sitting)   Pulse 70   Temp 97.5 °F (36.4 °C) (Tympanic)   Ht 5' 1\" (1.549 m)   Wt 167 lb (75.8 kg)   SpO2 97%   BMI 31.55 kg/m²       Physical Exam   Neck: Carotid bruit is not present. Cardiovascular: Normal rate and regular rhythm. Exam reveals no gallop. No murmur heard. Pulmonary/Chest: She has no wheezes. She has no rales. Abdominal: Soft. She exhibits no distension. There is no tenderness. Musculoskeletal: She exhibits no edema.        BMI:  BMI is high but it was not addressed during this visit due to chronic medical problems      Office Visit on 12/04/2018   Component Date Value Ref Range Status    Color (UA POC) 12/04/2018 Yellow   Final    Clarity (UA POC) 12/04/2018 Clear   Final    Glucose (UA POC) 12/04/2018 Negative  Negative Final    Bilirubin (UA POC) 12/04/2018 Negative  Negative Final    Ketones (UA POC) 12/04/2018 Negative  Negative Final    Specific gravity (UA POC) 12/04/2018 1.005  1.001 - 1.035 Final    Blood (UA POC) 12/04/2018 Trace  Negative Final    pH (UA POC) 12/04/2018 6.0  4.6 - 8.0 Final    Protein (UA POC) 12/04/2018 Negative  Negative Final    Urobilinogen (UA POC) 12/04/2018 0.2 mg/dL  0.2 - 1 Final    Nitrites (UA POC) 12/04/2018 Negative  Negative Final    Leukocyte esterase (UA POC) 12/04/2018 Negative  Negative Final   Ancillary Procedure on 10/25/2018   Component Date Value Ref Range Status    Prox aortic AP 10/25/2018 2.30  cm Final    Prox aortic trans 10/25/2018 2.10  cm Final    Mid aortic AP 10/25/2018 1.90  cm Final    Mid aortic trans 10/25/2018 2.20  cm Final    Right renal origin sys 10/25/2018 29.0  cm/s Final    Right renal origin rar 10/25/2018 0.52   Final    Left renal origin sys 10/25/2018 99.0  cm/s Final    Left renal origin rar 10/25/2018 1.77   Final    Abdominal prox aorta damien 10/25/2018 56.0  cm/s Final    Abdominal mid aorta damien 10/25/2018 66.0  cm/s Final    Celiac PSV 10/25/2018 146.0  cm/s Final    Celiac EDV 10/25/2018 38.0  cm/s Final    Prox SMA PSV 10/25/2018 201.0  cm/s Final    Prox SMA EDV 10/25/2018 27.0  cm/s Final    Mid SMA PSV 10/25/2018 102.0  cm/s Final    Mid SMA EDV 10/25/2018 19.0  cm/s Final    Dist SMA PSV 10/25/2018 164.0  cm/s Final    Dist SMA EDV 10/25/2018 26.0  cm/s Final    Origin SMA EDV 10/25/2018 33.0  cm/s Final    Origin SMA PSV 10/25/2018 183.0  cm/s Final   Admission on 10/08/2018, Discharged on 10/13/2018   No results displayed because visit has over 200 results. .No results found for any visits on 01/04/19. Assessment / Plan      ICD-10-CM ICD-9-CM    1. Simple chronic bronchitis (HCC) J41.0 491.0    2. Snoring R06.83 786.09 REFERRAL TO SLEEP STUDIES   3. Sac[facility]iliitis ([facility] M46.1 720.2        Refer to sleep study  she was advised to continue her maintenance medications      Follow-up Disposition:  Return in about 3 months (around 4/4/2019). I asked Aishwarya Roberto if she has any questions and I answered the questions. Aishwarya Koehler states that she understands the treatment plan and agrees with the treatment plan

## 2019-01-10 LAB — SENTARA SPECIMEN COL,SENBCF: NORMAL

## 2019-01-14 DIAGNOSIS — R10.84 GENERALIZED ABDOMINAL PAIN: ICD-10-CM

## 2019-01-14 DIAGNOSIS — M54.16 LUMBAR RADICULOPATHY: ICD-10-CM

## 2019-01-14 RX ORDER — OXYCODONE AND ACETAMINOPHEN 10; 325 MG/1; MG/1
1 TABLET ORAL
Qty: 120 TAB | Refills: 0 | Status: SHIPPED | OUTPATIENT
Start: 2019-01-14 | End: 2019-02-15 | Stop reason: SDUPTHER

## 2019-01-22 DIAGNOSIS — Z98.84 LAP-BAND SURGERY STATUS: ICD-10-CM

## 2019-01-22 RX ORDER — CLOPIDOGREL BISULFATE 75 MG/1
TABLET ORAL
Qty: 30 TAB | Refills: 0 | Status: SHIPPED | OUTPATIENT
Start: 2019-01-22 | End: 2019-02-20 | Stop reason: SDUPTHER

## 2019-01-25 ENCOUNTER — OFFICE VISIT (OUTPATIENT)
Dept: INTERNAL MEDICINE CLINIC | Age: 64
End: 2019-01-25

## 2019-01-25 VITALS
DIASTOLIC BLOOD PRESSURE: 78 MMHG | HEIGHT: 61 IN | SYSTOLIC BLOOD PRESSURE: 128 MMHG | HEART RATE: 102 BPM | RESPIRATION RATE: 18 BRPM | BODY MASS INDEX: 32.28 KG/M2 | OXYGEN SATURATION: 99 % | WEIGHT: 171 LBS | TEMPERATURE: 98 F

## 2019-01-25 DIAGNOSIS — R21 GENERALIZED RASH: Primary | ICD-10-CM

## 2019-01-25 DIAGNOSIS — L29.9 SKIN PRURITUS: ICD-10-CM

## 2019-01-25 RX ORDER — PREDNISONE 10 MG/1
TABLET ORAL
Qty: 20 TAB | Refills: 0 | Status: SHIPPED | OUTPATIENT
Start: 2019-01-25 | End: 2019-03-22

## 2019-01-25 RX ORDER — HYDROXYZINE PAMOATE 25 MG/1
25 CAPSULE ORAL
Qty: 30 CAP | Refills: 0 | Status: SHIPPED | OUTPATIENT
Start: 2019-01-25 | End: 2019-02-08

## 2019-01-25 RX ORDER — CARVEDILOL 25 MG/1
25 TABLET ORAL 2 TIMES DAILY WITH MEALS
COMMUNITY
End: 2019-01-25 | Stop reason: CLARIF

## 2019-01-25 NOTE — PROGRESS NOTES
Sheldon Wheatley is a 66 y.o. female presenting today for Rash  . HPI:  Sheldon Wheatley presents to the office today for generalized rash. Patient reports a 2-day history of generalized rash. She denies running any new medications or changing any detergents, soaps or lotions. Patient reports the rash is pruritic in nature. She is negative for any fever or dyspnea. Review of Systems   Constitutional: Negative for fever. Respiratory: Negative for cough and shortness of breath. Cardiovascular: Negative for chest pain and palpitations. Skin: Positive for itching and rash. Allergies   Allergen Reactions    Pcn [Penicillins] Anaphylaxis    Tetracycline Anaphylaxis    Sulfabenzamide Hives       Current Outpatient Medications   Medication Sig Dispense Refill    predniSONE (DELTASONE) 10 mg tablet 3 tabs daily x 3 days, 2 tabs daily x 3 days, 1 tab daily x 3 days, 1/2 tab daily x 3 days 20 Tab 0    hydrOXYzine pamoate (VISTARIL) 25 mg capsule Take 1 Cap by mouth three (3) times daily as needed for Itching for up to 14 days. 30 Cap 0    clopidogrel (PLAVIX) 75 mg tab TAKE ONE TABLET BY MOUTH DAILY 30 Tab 0    oxyCODONE-acetaminophen (PERCOCET 10)  mg per tablet Take 1 Tab by mouth every six (6) hours as needed for Pain. Max Daily Amount: 4 Tabs. 120 Tab 0    mirtazapine (REMERON) 15 mg tablet TAKE ONE TABLET BY MOUTH EVERY EVENING 30 Tab 0    albuterol (PROVENTIL VENTOLIN) 2.5 mg /3 mL (0.083 %) nebulizer solution Use with nebulizer three times per day 100 Each 2    raNITIdine (ZANTAC) 150 mg tablet Take 150 mg by mouth two (2) times a day.       montelukast (SINGULAIR) 10 mg tablet TAKE ONE TABLET BY MOUTH DAILY 30 Tab 2    DALIRESP tab tablet TAKE ONE TABLET BY MOUTH DAILY 30 Tab 2    senna (SENOKOT) 8.6 mg tablet 8.6 mg.      metoprolol succinate (TOPROL-XL) 25 mg XL tablet 1/2 tab twice per day (Stop carvedilol) 60 Tab 3    Lancets (ONETOUCH ULTRASOFT LANCETS) misc To use with blood for glucose monitoring 1 Each 11    glucose blood VI test strips (ONETOUCH ULTRA BLUE TEST STRIP) strip To check the blood sugars daily 100 Strip 2    lancets (ONE TOUCH DELICA) 33 gauge misc To use with blood for glucose monitoring 1 Package 3    docusate sodium (COLACE) 100 mg capsule Take 1 Cap by mouth two (2) times a day. (Patient taking differently: Take 100 mg by mouth daily.) 60 Cap 5    predniSONE (DELTASONE) 10 mg tablet Take 10 mg by mouth daily (with breakfast). 20 Tab 0    esomeprazole (NEXIUM) 20 mg capsule Take 1 Cap by mouth daily. STOP PREVACID  Indications: HEARTBURN 30 Cap 1       Past Medical History:   Diagnosis Date    Aorto-iliac duplex 09/17/2015    Patent abdom aorta endovascular graft w/o leak. Mod stenosis suggested in right limb.  Calculus of kidney 2014    stent/kidney     Carotid duplex 09/17/2015    Mild < 50% bilateral plaquing.  Cataract, left     Chronic kidney disease     one functioning kidney    Chronic obstructive pulmonary disease ([address]     Echocardiogram 11/04/2014    EF 56%. No WMA. Mild LAE. Echo is within normal limits.  Esophageal reflux     Generalized osteoarthrosis, involving multiple sites     GERD (gastroesophageal reflux disease)     Gout     History of renal stent     right side    Hypercholesteremia     Hypertension     resolved    Lower extremity arterial testing 03/27/2015    Normal perfusion at rest and w/treadmill bilaterally.   R KORI 1.05.  L KORI 1.03.    Murmur     patient reports that she is aware- has been told in the past    Nausea & vomiting     Renal artery stenosis (HCC)     S/P AAA (abdominal aortic aneurysm) repair 9/20/2018    Subclinical hyperthyroidism 8/2015    Unspecified sleep apnea     resolved       Past Surgical History:   Procedure Laterality Date    ADJUSTMENT GASTRIC BAND N/A 11/10/2016    MAKAYLA Cullen    FULL ESOPHAGEAL MANOMETRY  5/21/2018         HX APPENDECTOMY      HX BLADDER SUSPENSION      HX CHOLECYSTECTOMY      HX GI      lap band 2012    HX GI  07/2018    lap band removal    HX HEENT Right 1990s, 2016    Ear sx    HX HYSTERECTOMY      HX OTHER SURGICAL  10/27/14     Bilateral open femoral exposures, Placement of catheter and sheath in the aorta Endo repair of aortic aneurysm with modular bifurcated device  Interpretation of angiography, intravascular ultrasound (IVUS) catheter    HX OTHER SURGICAL  10/27/2014    Endurant II abdominal stent graft implant    HX UROLOGICAL      stent    VASCULAR SURGERY PROCEDURE UNLIST      surgery for abdominal aneurysm       Social History     Socioeconomic History    Marital status:      Spouse name: Not on file    Number of children: Not on file    Years of education: Not on file    Highest education level: Not on file   Social Needs    Financial resource strain: Not on file    Food insecurity - worry: Not on file    Food insecurity - inability: Not on file   [employer] needs - medical: Not on file   [employer] needs - non-medical: Not on file   Occupational History    Not on file   Tobacco Use    Smoking status: Former Smoker     Packs/day: 0.00     Years: 0.00     Pack years: 0.00     Types: Cigarettes     Last attempt to quit: 1/25/2016     Years since quitting: 3.0    Smokeless tobacco: Never Used   Substance and Sexual Activity    Alcohol use: No    Drug use: No    Sexual activity: Yes     Birth control/protection: Surgical   Other Topics Concern    Not on file   Social History Narrative    Not on file       Patient does not have an advanced directive on file    Vitals:    01/25/19 1307   BP: 128/78   Pulse: (!) 102   Resp: 18   Temp: 98 °F (36.7 °C)   TempSrc: Tympanic   SpO2: 99%   Weight: 171 lb (77.6 kg)   Height: 5' 1\" (1.549 m)   PainSc:   0 - No pain       Physical Exam   Cardiovascular: Normal rate, regular rhythm and normal heart sounds.    Pulmonary/Chest: Effort normal and breath sounds normal.   Neurological: She is alert. Skin: Rash noted. There is erythema. Generalized, erythematous, pruritic, macular papular rash   Nursing note and vitals reviewed. Hospital Outpatient Visit on 01/07/2019   Component Date Value Ref Range Status    [facility] SPECIMEN COL 01/07/2019 Specimens collected/sent to [facility]    Final   Office Visit on 12/04/2018   Component Date Value Ref Range Status    Color (UA POC) 12/04/2018 Yellow   Final    Clarity (UA POC) 12/04/2018 Clear   Final    Glucose (UA POC) 12/04/2018 Negative  Negative Final    Bilirubin (UA POC) 12/04/2018 Negative  Negative Final    Ketones (UA POC) 12/04/2018 Negative  Negative Final    Specific gravity (UA POC) 12/04/2018 1.005  1.001 - 1.035 Final    Blood (UA POC) 12/04/2018 Trace  Negative Final    pH (UA POC) 12/04/2018 6.0  4.6 - 8.0 Final    Protein (UA POC) 12/04/2018 Negative  Negative Final    Urobilinogen (UA POC) 12/04/2018 0.2 mg/dL  0.2 - 1 Final    Nitrites (UA POC) 12/04/2018 Negative  Negative Final    Leukocyte esterase (UA POC) 12/04/2018 Negative  Negative Final       .No results found for any visits on 01/25/19. Assessment / Plan:      ICD-10-CM ICD-9-CM    1. Generalized rash R21 782. 1 predniSONE (DELTASONE) 10 mg tablet      hydrOXYzine pamoate (VISTARIL) 25 mg capsule   2. Skin pruritus L29.9 698.9 predniSONE (DELTASONE) 10 mg tablet      hydrOXYzine pamoate (VISTARIL) 25 mg capsule     Erythematous, maculopapular rash-patient given prednisone taper dose  Pruritus-patient given Vistaril 25 mg capsules as needed  Follow-up no improvement in rash      Follow-up Disposition:  Return if symptoms worsen or fail to improve. I asked the patient if she  had any questions and answered her  questions.   The patient stated that she understands the treatment plan and agrees with the treatment plan

## 2019-01-29 DIAGNOSIS — G47.00 INSOMNIA, UNSPECIFIED TYPE: ICD-10-CM

## 2019-01-31 ENCOUNTER — TELEPHONE (OUTPATIENT)
Dept: INTERNAL MEDICINE CLINIC | Age: 64
End: 2019-01-31

## 2019-01-31 RX ORDER — MIRTAZAPINE 15 MG/1
TABLET, FILM COATED ORAL
Qty: 30 TAB | Refills: 0 | Status: SHIPPED | OUTPATIENT
Start: 2019-01-31 | End: 2019-02-15 | Stop reason: SDUPTHER

## 2019-01-31 NOTE — TELEPHONE ENCOUNTER
Patient called to let [facility] know that the itching is getting under control. The medication is working and she is almost finished with it.

## 2019-02-15 ENCOUNTER — OFFICE VISIT (OUTPATIENT)
Dept: INTERNAL MEDICINE CLINIC | Age: 64
End: 2019-02-15

## 2019-02-15 DIAGNOSIS — R10.84 GENERALIZED ABDOMINAL PAIN: ICD-10-CM

## 2019-02-15 DIAGNOSIS — M54.50 CHRONIC RIGHT-SIDED LOW BACK PAIN WITHOUT SCIATICA: ICD-10-CM

## 2019-02-15 DIAGNOSIS — L29.9 SKIN PRURITUS: Primary | ICD-10-CM

## 2019-02-15 DIAGNOSIS — M54.16 LUMBAR RADICULOPATHY: ICD-10-CM

## 2019-02-15 DIAGNOSIS — I73.9 PERIPHERAL VASCULAR DISEASE (HCC): ICD-10-CM

## 2019-02-15 DIAGNOSIS — G89.29 CHRONIC RIGHT-SIDED LOW BACK PAIN WITHOUT SCIATICA: ICD-10-CM

## 2019-02-15 RX ORDER — OXYCODONE AND ACETAMINOPHEN 10; 325 MG/1; MG/1
1 TABLET ORAL
Qty: 120 TAB | Refills: 0 | Status: SHIPPED | OUTPATIENT
Start: 2019-02-15 | End: 2019-03-13 | Stop reason: SDUPTHER

## 2019-02-15 NOTE — TELEPHONE ENCOUNTER
Requested Prescriptions     Pending Prescriptions Disp Refills    oxyCODONE-acetaminophen (PERCOCET 10)  mg per tablet 120 Tab 0     Sig: Take 1 Tab by mouth every six (6) hours as needed for Pain. Max Daily Amount: 4 Tabs.       last filled 01/17/2019

## 2019-02-15 NOTE — PROGRESS NOTES
The patient presents to the office today with the chief complaint of back pain    HPI    The patient is doing better overall. The previous abdominal pain is improved. The patient persists with right lower back pain. This is in fair control with Oxycodone. There is no pattern of abuse. The patient has peripheral vascular disease. This is stable. The patient denies claudication      Review of Systems   Respiratory: Negative for shortness of breath. Cardiovascular: Negative for chest pain and leg swelling. Gastrointestinal: Positive for abdominal pain. Musculoskeletal: Positive for back pain. Allergies   Allergen Reactions    Pcn [Penicillins] Anaphylaxis    Tetracycline Anaphylaxis    Sulfabenzamide Hives       Current Outpatient Medications   Medication Sig Dispense Refill    clopidogrel (PLAVIX) 75 mg tab TAKE ONE TABLET BY MOUTH DAILY 30 Tab 0    oxyCODONE-acetaminophen (PERCOCET 10)  mg per tablet Take 1 Tab by mouth every six (6) hours as needed for Pain. Max Daily Amount: 4 Tabs. 120 Tab 0    predniSONE (DELTASONE) 10 mg tablet 3 tabs daily x 3 days, 2 tabs daily x 3 days, 1 tab daily x 3 days, 1/2 tab daily x 3 days 20 Tab 0    predniSONE (DELTASONE) 10 mg tablet Take 10 mg by mouth daily (with breakfast). 20 Tab 0    mirtazapine (REMERON) 15 mg tablet TAKE ONE TABLET BY MOUTH EVERY EVENING 30 Tab 0    albuterol (PROVENTIL VENTOLIN) 2.5 mg /3 mL (0.083 %) nebulizer solution Use with nebulizer three times per day 100 Each 2    raNITIdine (ZANTAC) 150 mg tablet Take 150 mg by mouth two (2) times a day.       montelukast (SINGULAIR) 10 mg tablet TAKE ONE TABLET BY MOUTH DAILY 30 Tab 2    DALIRESP tab tablet TAKE ONE TABLET BY MOUTH DAILY 30 Tab 2    senna (SENOKOT) 8.6 mg tablet 8.6 mg.      metoprolol succinate (TOPROL-XL) 25 mg XL tablet 1/2 tab twice per day (Stop carvedilol) 60 Tab 3    Lancets (ONETOUCH ULTRASOFT LANCETS) misc To use with blood for glucose monitoring 1 Each 11    glucose blood VI test strips (ONETOUCH ULTRA BLUE TEST STRIP) strip To check the blood sugars daily 100 Strip 2    lancets (ONE TOUCH DELICA) 33 gauge misc To use with blood for glucose monitoring 1 Package 3    docusate sodium (COLACE) 100 mg capsule Take 1 Cap by mouth two (2) times a day. (Patient taking differently: Take 100 mg by mouth daily.) 60 Cap 5    esomeprazole (NEXIUM) 20 mg capsule Take 1 Cap by mouth daily. STOP PREVACID  Indications: HEARTBURN 30 Cap 1       Past Medical History:   Diagnosis Date    Aorto-iliac duplex 09/17/2015    Patent abdom aorta endovascular graft w/o leak. Mod stenosis suggested in right limb.  Calculus of kidney 2014    stent/kidney     Carotid duplex 09/17/2015    Mild < 50% bilateral plaquing.  Cataract, left     Chronic kidney disease     one functioning kidney    Chronic obstructive pulmonary disease ([address]     Echocardiogram 11/04/2014    EF 56%. No WMA. Mild LAE. Echo is within normal limits.  Esophageal reflux     Generalized osteoarthrosis, involving multiple sites     GERD (gastroesophageal reflux disease)     Gout     History of renal stent     right side    Hypercholesteremia     Hypertension     resolved    Lower extremity arterial testing 03/27/2015    Normal perfusion at rest and w/treadmill bilaterally.   R KORI 1.05.  L KORI 1.03.    Murmur     patient reports that she is aware- has been told in the past    Nausea & vomiting     Renal artery stenosis (HCC)     S/P AAA (abdominal aortic aneurysm) repair 9/20/2018    Subclinical hyperthyroidism 8/2015    Unspecified sleep apnea     resolved       Past Surgical History:   Procedure Laterality Date    ADJUSTMENT GASTRIC BAND N/A 11/10/2016    MAKAYLA Cullen    FULL ESOPHAGEAL MANOMETRY  5/21/2018         HX APPENDECTOMY      HX BLADDER SUSPENSION      HX CHOLECYSTECTOMY      HX GI      lap band 2012    HX GI  07/2018    lap band removal    HX HEENT Right 1990s, 2016    Ear sx    HX HYSTERECTOMY      HX OTHER SURGICAL  10/27/14     Bilateral open femoral exposures, Placement of catheter and sheath in the aorta Endo repair of aortic aneurysm with modular bifurcated device  Interpretation of angiography, intravascular ultrasound (IVUS) catheter    HX OTHER SURGICAL  10/27/2014    Endurant II abdominal stent graft implant    HX UROLOGICAL      stent    VASCULAR SURGERY PROCEDURE UNLIST      surgery for abdominal aneurysm       Social History     Socioeconomic History    Marital status:      Spouse name: Not on file    Number of children: Not on file    Years of education: Not on file    Highest education level: Not on file   Social Needs    Financial resource strain: Not on file    Food insecurity - worry: Not on file    Food insecurity - inability: Not on file   [employer] needs - medical: Not on file   [employer] needs - non-medical: Not on file   Occupational History    Not on file   Tobacco Use    Smoking status: Former Smoker     Packs/day: 0.00     Years: 0.00     Pack years: 0.00     Types: Cigarettes     Last attempt to quit: 1/25/2016     Years since quitting: 3.0    Smokeless tobacco: Never Used   Substance and Sexual Activity    Alcohol use: No    Drug use: No    Sexual activity: Yes     Birth control/protection: Surgical   Other Topics Concern    Not on file   Social History Narrative    Not on file       Patient does not have an advanced directive on file    Visit Vitals  /86 (BP 1 Location: Right arm, BP Patient Position: Sitting)   Pulse (!) 103   Temp 98.8 °F (37.1 °C) (Tympanic)   Resp 16   Ht 5' 1\" (1.549 m)   Wt 172 lb (78 kg)   SpO2 97%   BMI 32.50 kg/m²       Physical Exam    BMI:  BMI is high but it was not addressed during this visit due to history of abdominal problems      Hospital Outpatient Visit on 01/07/2019   Component Date Value Ref Range Status    [facility] SPECIM[facility] COL 01/07/2019 Specimens collected/sent to [facility]    Final   Office Visit on 12/04/2018   Component Date Value Ref Range Status    Color (UA POC) 12/04/2018 Yellow   Final    Clarity (UA POC) 12/04/2018 Clear   Final    Glucose (UA POC) 12/04/2018 Negative  Negative Final    Bilirubin (UA POC) 12/04/2018 Negative  Negative Final    Ketones (UA POC) 12/04/2018 Negative  Negative Final    Specific gravity (UA POC) 12/04/2018 1.005  1.001 - 1.035 Final    Blood (UA POC) 12/04/2018 Trace  Negative Final    pH (UA POC) 12/04/2018 6.0  4.6 - 8.0 Final    Protein (UA POC) 12/04/2018 Negative  Negative Final    Urobilinogen (UA POC) 12/04/2018 0.2 mg/dL  0.2 - 1 Final    Nitrites (UA POC) 12/04/2018 Negative  Negative Final    Leukocyte esterase (UA POC) 12/04/2018 Negative  Negative Final       .No results found for any visits on 02/15/19. Assessment / Plan      ICD-10-CM ICD-9-CM    1. Skin pruritus L29.9 698.9    2. Peripheral vascular disease (HCC) I73.9 443.9    3. Chronic right-sided low back pain without sciatica M54.5 724.2     G89.29 338.29        she was advised to continue her maintenance medications  The Prescription Monitoring Program registry was checked prior to the issuing of this prescription for a controlled substance. Follow-up Disposition:  Return in about 3 months (around 5/15/2019). I asked Marian Roberto if she has any questions and I answered the questions. Marian Dixon states that she understands the treatment plan and agrees with the treatment plan

## 2019-02-20 DIAGNOSIS — Z98.84 LAP-BAND SURGERY STATUS: ICD-10-CM

## 2019-02-20 RX ORDER — CLOPIDOGREL BISULFATE 75 MG/1
TABLET ORAL
Qty: 30 TAB | Refills: 0 | Status: SHIPPED | OUTPATIENT
Start: 2019-02-20 | End: 2019-03-19 | Stop reason: SDUPTHER

## 2019-02-24 VITALS
OXYGEN SATURATION: 97 % | SYSTOLIC BLOOD PRESSURE: 148 MMHG | TEMPERATURE: 98.8 F | BODY MASS INDEX: 32.47 KG/M2 | DIASTOLIC BLOOD PRESSURE: 86 MMHG | WEIGHT: 172 LBS | RESPIRATION RATE: 16 BRPM | HEART RATE: 103 BPM | HEIGHT: 61 IN

## 2019-02-27 DIAGNOSIS — G47.00 INSOMNIA, UNSPECIFIED TYPE: ICD-10-CM

## 2019-02-28 DIAGNOSIS — J30.9 ALLERGIC RHINITIS: ICD-10-CM

## 2019-02-28 RX ORDER — MIRTAZAPINE 15 MG/1
TABLET, FILM COATED ORAL
Qty: 30 TAB | Refills: 0 | Status: SHIPPED | OUTPATIENT
Start: 2019-02-28 | End: 2019-03-28 | Stop reason: SDUPTHER

## 2019-02-28 RX ORDER — ROFLUMILAST 500 UG/1
TABLET ORAL
Qty: 30 TAB | Refills: 1 | Status: SHIPPED | OUTPATIENT
Start: 2019-02-28 | End: 2019-07-23 | Stop reason: SDUPTHER

## 2019-02-28 RX ORDER — MONTELUKAST SODIUM 10 MG/1
TABLET ORAL
Qty: 30 TAB | Refills: 1 | Status: SHIPPED | OUTPATIENT
Start: 2019-02-28 | End: 2019-04-27 | Stop reason: SDUPTHER

## 2019-03-13 DIAGNOSIS — M54.16 LUMBAR RADICULOPATHY: ICD-10-CM

## 2019-03-13 DIAGNOSIS — R10.84 GENERALIZED ABDOMINAL PAIN: ICD-10-CM

## 2019-03-15 ENCOUNTER — TELEPHONE (OUTPATIENT)
Dept: FAMILY MEDICINE CLINIC | Facility: CLINIC | Age: 64
End: 2019-03-15

## 2019-03-15 RX ORDER — OXYCODONE AND ACETAMINOPHEN 10; 325 MG/1; MG/1
1 TABLET ORAL
Qty: 60 TAB | Refills: 0 | Status: SHIPPED | OUTPATIENT
Start: 2019-03-15 | End: 2019-03-19 | Stop reason: ALTCHOICE

## 2019-03-15 NOTE — TELEPHONE ENCOUNTER
Pt called stating her Rx for Percocet is not correct . It is suppose to be every 4hrs hours. Please advise.

## 2019-03-19 DIAGNOSIS — Z98.84 LAP-BAND SURGERY STATUS: ICD-10-CM

## 2019-03-19 DIAGNOSIS — I73.9 PERIPHERAL VASCULAR DISEASE (HCC): Primary | ICD-10-CM

## 2019-03-19 DIAGNOSIS — M54.16 LUMBAR RADICULOPATHY: ICD-10-CM

## 2019-03-19 RX ORDER — OXYCODONE AND ACETAMINOPHEN 7.5; 325 MG/1; MG/1
1 TABLET ORAL
Qty: 28 TAB | Refills: 0 | Status: SHIPPED | OUTPATIENT
Start: 2019-03-19 | End: 2019-03-26

## 2019-03-19 RX ORDER — CLOPIDOGREL BISULFATE 75 MG/1
TABLET ORAL
Qty: 30 TAB | Refills: 0 | Status: SHIPPED | OUTPATIENT
Start: 2019-03-19 | End: 2019-04-17 | Stop reason: SDUPTHER

## 2019-03-22 ENCOUNTER — OFFICE VISIT (OUTPATIENT)
Dept: CARDIOLOGY CLINIC | Age: 64
End: 2019-03-22

## 2019-03-22 VITALS
HEART RATE: 93 BPM | BODY MASS INDEX: 32.93 KG/M2 | HEIGHT: 61 IN | SYSTOLIC BLOOD PRESSURE: 134 MMHG | DIASTOLIC BLOOD PRESSURE: 84 MMHG | WEIGHT: 174.4 LBS

## 2019-03-22 DIAGNOSIS — Z86.79 S/P AAA (ABDOMINAL AORTIC ANEURYSM) REPAIR: ICD-10-CM

## 2019-03-22 DIAGNOSIS — E78.2 MIXED HYPERLIPIDEMIA: ICD-10-CM

## 2019-03-22 DIAGNOSIS — I10 ESSENTIAL HYPERTENSION: Primary | ICD-10-CM

## 2019-03-22 DIAGNOSIS — Z98.890 S/P AAA (ABDOMINAL AORTIC ANEURYSM) REPAIR: ICD-10-CM

## 2019-03-22 RX ORDER — ATORVASTATIN CALCIUM 20 MG/1
20 TABLET, FILM COATED ORAL DAILY
Qty: 90 TAB | Refills: 3 | Status: SHIPPED | OUTPATIENT
Start: 2019-03-22 | End: 2019-07-23 | Stop reason: SDUPTHER

## 2019-03-22 RX ORDER — DICLOFENAC SODIUM 10 MG/G
GEL TOPICAL 4 TIMES DAILY
COMMUNITY
End: 2019-05-17 | Stop reason: SDUPTHER

## 2019-03-22 NOTE — PROGRESS NOTES
HISTORY OF PRESENT ILLNESS  Slime Tello is a 60 y.o. female. Hypertension   The history is provided by the patient. This is a chronic problem. The problem occurs constantly. The problem has been gradually worsening. Pertinent negatives include no chest pain, no abdominal pain, no headaches and no shortness of breath. Nothing aggravates the symptoms. Shortness of Breath   The history is provided by the patient. This is a recurrent problem. The problem occurs frequently. The problem has been gradually worsening. Pertinent negatives include no fever, no headaches, no cough, no sputum production, no hemoptysis, no wheezing, no PND, no orthopnea, no chest pain, no vomiting, no abdominal pain, no rash, no leg swelling and no claudication. Precipitated by: exertion. Palpitations    The history is provided by the patient. This is a new problem. The current episode started more than 1 week ago. The problem has not changed since onset. The problem occurs daily. Pertinent negatives include no fever, no chest pain, no claudication, no orthopnea, no PND, no abdominal pain, no nausea, no vomiting, no headaches, no dizziness, no weakness, no cough, no hemoptysis, no shortness of breath and no sputum production. Her past medical history is significant for hypertension. Review of Systems   Constitutional: Negative for chills and fever. HENT: Negative for nosebleeds. Eyes: Negative for blurred vision and double vision. Respiratory: Negative for cough, hemoptysis, sputum production, shortness of breath and wheezing. Cardiovascular: Positive for palpitations. Negative for chest pain, orthopnea, claudication, leg swelling and PND. Gastrointestinal: Negative for abdominal pain, heartburn, nausea and vomiting. Musculoskeletal: Negative for myalgias. Skin: Negative for rash. Neurological: Negative for dizziness, weakness and headaches. Endo/Heme/Allergies: Does not bruise/bleed easily.      Family History Problem Relation Age of Onset    Cancer Mother     Diabetes Father     Alcohol abuse Father     Heart Disease Maternal Grandmother     Alzheimer Maternal Grandmother     Diabetes Sister        Past Medical History:   Diagnosis Date    Aorto-iliac duplex 09/17/2015    Patent abdom aorta endovascular graft w/o leak. Mod stenosis suggested in right limb.  Calculus of kidney 2014    stent/kidney     Carotid duplex 09/17/2015    Mild < 50% bilateral plaquing.  Cataract, left     Chronic kidney disease     one functioning kidney    Chronic obstructive pulmonary disease ([address]     Echocardiogram 11/04/2014    EF 56%. No WMA. Mild LAE. Echo is within normal limits.  Esophageal reflux     Generalized osteoarthrosis, involving multiple sites     GERD (gastroesophageal reflux disease)     Gout     History of renal stent     right side    Hypercholesteremia     Hypertension     resolved    Lower extremity arterial testing 03/27/2015    Normal perfusion at rest and w/treadmill bilaterally.   R KORI 1.05.  L KORI 1.03.    Murmur     patient reports that she is aware- has been told in the past    Nausea & vomiting     Renal artery stenosis (HCC)     S/P AAA (abdominal aortic aneurysm) repair 9/20/2018    Subclinical hyperthyroidism 8/2015    Unspecified sleep apnea     resolved       Past Surgical History:   Procedure Laterality Date    ADJUSTMENT GASTRIC BAND N/A 11/10/2016    MAKAYLA Cullen    FULL ESOPHAGEAL MANOMETRY  5/21/2018         HX APPENDECTOMY      HX BLADDER SUSPENSION      HX CHOLECYSTECTOMY      HX GI      lap band 2012    HX GI  07/2018    lap band removal    HX HEENT Right 1990s, 2016    Ear sx    HX HYSTERECTOMY      HX OTHER SURGICAL  10/27/14     Bilateral open femoral exposures, Placement of catheter and sheath in the aorta Endo repair of aortic aneurysm with modular bifurcated device  Interpretation of angiography, intravascular ultrasound (IVUS) catheter  HX OTHER SURGICAL  10/27/2014    Endurant II abdominal stent graft implant    HX UROLOGICAL      stent    VASCULAR SURGERY PROCEDURE UNLIST      surgery for abdominal aneurysm       Social History     Tobacco Use    Smoking status: Former Smoker     Packs/day: 0.00     Years: 0.00     Pack years: 0.00     Types: Cigarettes     Last attempt to quit: 1/25/2016     Years since quitting: 3.1    Smokeless tobacco: Never Used   Substance Use Topics    Alcohol use: No       Allergies   Allergen Reactions    Pcn [Penicillins] Anaphylaxis    Tetracycline Anaphylaxis    Sulfabenzamide Hives       Prior to Admission medications    Medication Sig Start Date End Date Taking? Authorizing Provider   diclofenac (VOLTAREN) 1 % gel Apply  to affected area four (4) times daily. Yes Provider, Historical   atorvastatin (LIPITOR) 20 mg tablet Take 1 Tab by mouth daily. 3/22/19  Yes Suhail Reid MD   clopidogrel (PLAVIX) 75 mg tab TAKE ONE TABLET BY MOUTH DAILY 3/19/19  Yes Benny Self MD   oxyCODONE-acetaminophen (PERCOCET 7.5) 7.5-325 mg per tablet Take 1 Tab by mouth every six (6) hours as needed for Pain for up to 7 days. Max Daily Amount: 4 Tabs. 3/19/19 3/26/19 Yes Benny Self MD   mirtazapine (REMERON) 15 mg tablet TAKE ONE TABLET BY MOUTH EVERY EVENING 2/28/19  Yes Benny Self MD   montelukast (SINGULAIR) 10 mg tablet TAKE ONE TABLET BY MOUTH DAILY 2/28/19  Yes Benny Self MD   DALIRESP 500 mcg tab tablet TAKE ONE TABLET BY MOUTH DAILY 2/28/19  Yes Benny Self MD   mirtazapine (REMERON) 15 mg tablet TAKE ONE TABLET BY MOUTH EVERY EVENING 12/30/18  Yes Benny Self MD   albuterol (PROVENTIL VENTOLIN) 2.5 mg /3 mL (0.083 %) nebulizer solution Use with nebulizer three times per day 12/10/18  Yes Benny Self MD   raNITIdine (ZANTAC) 150 mg tablet Take 150 mg by mouth two (2) times a day.    Yes Provider, Historical   metoprolol succinate (TOPROL-XL) 25 mg XL tablet 1/2 tab twice per day (Stop carvedilol) 11/26/18  Yes Amanda Rivera MD   Lancets [facility] ULTRASOFT LANCETS) misc To use with blood for glucose monitoring 10/2/18  Yes Amanda Rivera MD   glucose blood VI test strips (ONETOUCH ULTRA BLUE TEST STRIP) strip To check the blood sugars daily 10/2/18  Yes Amanda Rivera MD   lancets ([facility]) 33 gauge misc To use with blood for glucose monitoring 10/2/18  Yes Amanda Rivera MD   docusate sodium (COLACE) 100 mg capsule Take 1 Cap by mouth two (2) times a day. Patient taking differently: Take 100 mg by mouth daily. 10/5/17  Yes Amanda Rivera MD   esomeprazole (NEXIUM) 20 mg capsule Take 1 Cap by mouth daily. STOP PREVACID  Indications: HEARTBURN 9/13/17  Yes Cody Gordon NP         Visit Vitals  /84   Pulse 93   Ht 5' 1\" (1.549 m)   Wt 79.1 kg (174 lb 6.4 oz)   BMI 32.95 kg/m²         Physical Exam   Constitutional: She is oriented to person, place, and time. She appears well-developed and well-nourished. HENT:   Head: Normocephalic and atraumatic. Eyes: Conjunctivae are normal.   Neck: Neck supple. No JVD present. No tracheal deviation present. No thyromegaly present. Cardiovascular: Normal rate and regular rhythm. PMI is not displaced. Exam reveals no gallop, no S3 and no decreased pulses. No murmur heard. Pulmonary/Chest: No respiratory distress. She has no wheezes. She has no rales. She exhibits no tenderness. Abdominal: Soft. There is no tenderness. Musculoskeletal: She exhibits no edema. Neurological: She is alert and oriented to person, place, and time. Skin: Skin is warm. Psychiatric: She has a normal mood and affect. Ms. Jonny Moreau has a reminder for a \"due or due soon\" health maintenance. I have asked that she contact her primary care provider for follow-up on this health maintenance. I have personally reviewed patient's records available from hospital and other providers and incorporated findings in patient care.   I Have personally reviewed recent relevant labs available and discussed with patient  SUMMARY:2014  Left ventricle: Size was normal. Systolic function was normal by EF  (biplane method of disks). Ejection fraction was estimated to be 56 %. No  obvious wall motion abnormalities identified in the views obtained. Right ventricle: The size was normal. The tricuspid jet envelope  definition was inadequate for estimation of RV systolic pressure. There  are no indirect findings (abnormal RV volume or geometry, altered  pulmonary flow velocity profile, or leftward septal displacement) which  would suggest moderate or severe pulmonary hypertension. Left atrium: The atrium was mildly dilated. 3/2017  Impression:   1. Low risk pharmacological cardiac nuclear stress test.  2. Normal left ventricular systolic function calculated at 66%. No  regional wall motion abnormalities. 3. No evidence of ischemia or previous infarct based on perfusion  imaging. 4. Normal EKG portion of stress test.    SUMMARY:echo-3/2016  Left ventricle: Systolic function was normal. Ejection fraction was  estimated in the range of 55 % to 60 %. There were no regional wall motion  abnormalities. Doppler parameters were consistent with abnormal left  ventricular relaxation (grade 1 diastolic dysfunction). Mitral valve: There was mild annular calcification. NUCLEAR IMAGING:3/2016     Findings:   1. Stress images reveal normal Myoview distrubution in all the LV segments in short axis, vertical and horizontal long axis views. 2. Resting images have a normal uptake. 3. Gated images reveal normal wall motion and the ejection fraction is calculated to be 57%. Conclusion:   1. Normal perfusion scan. 2. Normal wall motion and ejection fraction is calculated at 57%. 3. No evidence of significant fixed or reversible defect suggesting ischemia or myocardial infarction noted from this nuclear study. 4. Low risk scan.     10/2018  Operative Procedures: s/p right open radical nephrectomy on 10/30/18  A) RIGHT KIDNEY, NEPHRECTOMY:     - END STAGE KIDNEY     - CLINICALLY, RENAL ARTERY STENOSIS     - NEGATIVE FOR MALIGNANCY    I have personally reviewed patient's records available from hospital and other providers and incorporated findings in patient care. I Have personally reviewed recent relevant labs available and discussed with patient          Assessment         ICD-10-CM ICD-9-CM    1. Essential hypertension I10 401.9     stable  better after nephrectomy   2. S/P AAA (abdominal aortic aneurysm) repair Z98.890 V45.89     Z86.79      stable   3. Mixed hyperlipidemia E78.2 272.2 LIPID PANEL      HEPATIC FUNCTION PANEL    elevated ldl 142,retstart statin   4. History of renal stent Z98.890 V45.89     now post nephrectomy   9/2018  Patient is labile blood pressure with. Of palpitation and headache. I will get plasma metanephrine and aldosterone   3/2019  Blood pressure much better controlled now post nephrectomy for atrophic kidney done in 10/2019. Currently off statin  in view of history of AAA repair will restart with Lipitor 20 mg a day. Follow-up labs in 1 month    Medications Discontinued During This Encounter   Medication Reason    predniSONE (DELTASONE) 10 mg tablet Not A Current Medication    predniSONE (DELTASONE) 10 mg tablet Not A Current Medication    senna (SENOKOT) 8.6 mg tablet Not A Current Medication       Orders Placed This Encounter    LIPID PANEL     Standing Status:   Future     Standing Expiration Date:   9/20/2019    HEPATIC FUNCTION PANEL     Standing Status:   Future     Standing Expiration Date:   9/20/2019    atorvastatin (LIPITOR) 20 mg tablet     Sig: Take 1 Tab by mouth daily. Dispense:  90 Tab     Refill:  3       Follow-up and Dispositions    · Return in about 6 months (around 9/22/2019).

## 2019-03-22 NOTE — PROGRESS NOTES
Patient brought medications list.    1. Have you been to the ER, urgent care clinic since your last visit? Hospitalized since your last visit? No    2. Have you seen or consulted any other health care providers outside of the [address] [place] [address] since your last visit? Include any pap smears or colon screening.  No

## 2019-03-25 ENCOUNTER — OFFICE VISIT (OUTPATIENT)
Dept: FAMILY MEDICINE CLINIC | Facility: CLINIC | Age: 64
End: 2019-03-25

## 2019-03-25 VITALS
OXYGEN SATURATION: 94 % | TEMPERATURE: 97 F | HEIGHT: 61 IN | SYSTOLIC BLOOD PRESSURE: 138 MMHG | HEART RATE: 77 BPM | WEIGHT: 174.4 LBS | BODY MASS INDEX: 32.93 KG/M2 | DIASTOLIC BLOOD PRESSURE: 80 MMHG

## 2019-03-25 DIAGNOSIS — Z98.890 H/O MAJOR ABDOMINAL SURGERY: ICD-10-CM

## 2019-03-25 DIAGNOSIS — E16.2 HYPOGLYCEMIA: Primary | ICD-10-CM

## 2019-03-25 DIAGNOSIS — M47.816 SPONDYLOSIS OF LUMBAR REGION WITHOUT MYELOPATHY OR RADICULOPATHY: ICD-10-CM

## 2019-03-25 NOTE — PROGRESS NOTES
Dilan Benitez presents today for Chief Complaint Patient presents with  Well Woman  Low Blood Sugar Dilan Benitez preferred language for health care discussion is english. Is someone accompanying this pt? no 
 
Is the patient using any DME equipment during [address]? no 
 
Depression Screening: 
3 most recent PHQ Screens 3/25/2019 PHQ Not Done - Little interest or pleasure in doing things Not at all Feeling down, depressed, irritable, or hopeless Not at all Total Score PHQ 2 0 Learning Assessment: 
Learning Assessment 6/26/2018 PRIMARY LEARNER Patient HIGHEST LEVEL OF EDUCATION - PRIMARY LEARNER  -  
BARRIERS PRIMARY LEARNER -  
CO-LEARNER CAREGIVER -  
PRIMARY LANGUAGE ENGLISH  
LEARNER PREFERENCE PRIMARY DEMONSTRATION  
ANSWERED BY patient RELATIONSHIP SELF Abuse Screening: 
Abuse Screening Questionnaire 3/22/2017 Do you ever feel afraid of your partner? Silva Smith Are you in a relationship with someone who physically or mentally threatens you? Silva Smith Is it safe for you to go home? Di Moreno Health Maintenance reviewed and discussed and ordered per Provider. Health Maintenance Due Topic Date Due  Shingrix Vaccine Age 50> (1 of 2) 02/11/2007  Bone Densitometry  09/24/2017  BREAST CANCER SCRN MAMMOGRAM  06/01/2018 Audrey Benitez is updated on all  Pt currently taking Antiplatelet therapy? no 
 
Coordination of Care: 1. Have you been to the ER, urgent care clinic since your last visit? no  Hospitalized since your last visit? no 
 
2. Have you seen or consulted any other health care providers outside of the [address] [place] [address] since your last visit? no Include any pap smears or colon screening.  no

## 2019-03-27 NOTE — PROGRESS NOTES
The patient presents to the office today with the chief complaint of hypoglycemia HPI The patient has recurrent hypoglycemia - 2 to 3 times per week. The sugars are reaching 53. The patient is not missing meals - she is eating 4 meals per day. Her weight is drifting up. The patient is status post a take down of her previous gastric sleeve. The patient persists with chronic abdominal pain. . The patient also complains of moderately severe low back pain. This is in fair control with Percocet. The Percocet dose is being decreased to a degree. Review of Systems Respiratory: Negative for shortness of breath. Cardiovascular: Negative for chest pain and leg swelling. Gastrointestinal: Positive for abdominal pain. Musculoskeletal: Positive for back pain. Allergies Allergen Reactions  Pcn [Penicillins] Anaphylaxis  Tetracycline Anaphylaxis  Sulfabenzamide Hives Current Outpatient Medications Medication Sig Dispense Refill  diclofenac (VOLTAREN) 1 % gel Apply  to affected area four (4) times daily.  atorvastatin (LIPITOR) 20 mg tablet Take 1 Tab by mouth daily. 90 Tab 3  clopidogrel (PLAVIX) 75 mg tab TAKE ONE TABLET BY MOUTH DAILY 30 Tab 0  
 oxyCODONE-acetaminophen (PERCOCET 7.5) 7.5-325 mg per tablet Take 1 Tab by mouth every six (6) hours as needed for Pain for up to 7 days. Max Daily Amount: 4 Tabs. 28 Tab 0  
 mirtazapine (REMERON) 15 mg tablet TAKE ONE TABLET BY MOUTH EVERY EVENING 30 Tab 0  
 montelukast (SINGULAIR) 10 mg tablet TAKE ONE TABLET BY MOUTH DAILY 30 Tab 1  
 DALIRESP 500 mcg tab tablet TAKE ONE TABLET BY MOUTH DAILY 30 Tab 1  
 mirtazapine (REMERON) 15 mg tablet TAKE ONE TABLET BY MOUTH EVERY EVENING 30 Tab 0  
 albuterol (PROVENTIL VENTOLIN) 2.5 mg /3 mL (0.083 %) nebulizer solution Use with nebulizer three times per day 100 Each 2  
 raNITIdine (ZANTAC) 150 mg tablet Take 150 mg by mouth two (2) times a day.  metoprolol succinate (TOPROL-XL) 25 mg XL tablet 1/2 tab twice per day (Stop carvedilol) 60 Tab 3  Lancets (ONETOUCH ULTRASOFT LANCETS) misc To use with blood for glucose monitoring 1 Each 11  
 glucose blood VI test strips (ONETOUCH ULTRA BLUE TEST STRIP) strip To check the blood sugars daily 100 Strip 2  
 lancets (ONE TOUCH DELICA) 33 gauge misc To use with blood for glucose monitoring 1 Package 3  
 docusate sodium (COLACE) 100 mg capsule Take 1 Cap by mouth two (2) times a day. (Patient taking differently: Take 100 mg by mouth daily.) 60 Cap 5  esomeprazole (NEXIUM) 20 mg capsule Take 1 Cap by mouth daily. STOP PREVACID  Indications: HEARTBURN 30 Cap 1 Past Medical History:  
Diagnosis Date  Aorto-iliac duplex 09/17/2015 Patent abdom aorta endovascular graft w/o leak. Mod stenosis suggested in right limb.  Calculus of kidney 2014  
 stent/kidney  Carotid duplex 09/17/2015 Mild < 50% bilateral plaquing.  Cataract, left  Chronic kidney disease   
 one functioning kidney  Chronic obstructive pulmonary disease ([address]  Echocardiogram 11/04/2014 EF 56%. No WMA. Mild LAE. Echo is within normal limits.  Esophageal reflux  Generalized osteoarthrosis, involving multiple sites  GERD (gastroesophageal reflux disease)  Gout  History of renal stent   
 right side  Hypercholesteremia  Hypertension   
 resolved  Lower extremity arterial testing 03/27/2015 Normal perfusion at rest and w/treadmill bilaterally. R KORI 1.05.  L KORI 1.03.  
 Murmur   
 patient reports that she is aware- has been told in the past  
 Nausea & vomiting  Renal artery stenosis (HCC)  S/P AAA (abdominal aortic aneurysm) repair 9/20/2018  Subclinical hyperthyroidism 8/2015  Unspecified sleep apnea   
 resolved Past Surgical History:  
Procedure Laterality Date  ADJUSTMENT GASTRIC BAND N/A 11/10/2016  MAKAYLA Umanzor  
  FULL ESOPHAGEAL MANOMETRY  5/21/2018  HX APPENDECTOMY  HX BLADDER SUSPENSION    
 HX CHOLECYSTECTOMY  HX GI    
 lap band 2012  HX GI  07/2018  
 lap band removal  
 HX HEENT Right 1990s, 2016 Ear sx  HX HYSTERECTOMY  HX OTHER SURGICAL  10/27/14 Bilateral open femoral exposures, Placement of catheter and sheath in the aorta Endo repair of aortic aneurysm with modular bifurcated device  Interpretation of angiography, intravascular ultrasound (IVUS) catheter  HX OTHER SURGICAL  10/27/2014 Endurant II abdominal stent graft implant  HX UROLOGICAL    
 stent  VASCULAR SURGERY PROCEDURE UNLIST    
 surgery for abdominal aneurysm Social History Socioeconomic History  Marital status:  Spouse name: Not on file  Number of children: Not on file  Years of education: Not on file  Highest education level: Not on file Occupational History  Not on file Social Needs  Financial resource strain: Not on file  Food insecurity:  
  Worry: Not on file Inability: Not on file  Transportation needs:  
  Medical: Not on file Non-medical: Not on file Tobacco Use  Smoking status: Former Smoker Packs/day: 0.00 Years: 0.00 Pack years: 0.00 Types: Cigarettes Last attempt to quit: 1/25/2016 Years since quitting: 3.1  Smokeless tobacco: Never Used Substance and Sexual Activity  Alcohol use: No  
 Drug use: No  
 Sexual activity: Yes Birth control/protection: Surgical  
Lifestyle  Physical activity:  
  Days per week: Not on file Minutes per session: Not on file  Stress: Not on file Relationships  Social connections:  
  Talks on phone: Not on file Gets together: Not on file Attends Restoration service: Not on file Active member of club or organization: Not on file Attends meetings of clubs or organizations: Not on file Relationship status: Not on file  Intimate partner violence:  
  Fear of current or ex partner: Not on file Emotionally abused: Not on file Physically abused: Not on file Forced sexual activity: Not on file Other Topics Concern  Not on file Social History Narrative  Not on file Patient does not have an advanced directive on file Visit Vitals /80 (BP 1 Location: Left arm, BP Patient Position: Sitting) Pulse 77 Temp 97 °F (36.1 °C) (Tympanic) Ht 5' 1\" (1.549 m) Wt 174 lb 6.4 oz (79.1 kg) SpO2 94% BMI 32.95 kg/m² Physical Exam 
 
BMI:  BMI is high but it was not addressed during this visit due to multiple medical problems Hospital Outpatient Visit on 01/07/2019 Component Date Value Ref Range Status  [facility] SPECIMEN COL 01/07/2019 Specimens collected/sent to [facility]    Final  
 
 
.No results found for any visits on 03/25/19. Assessment / Plan ICD-10-CM ICD-9-CM 1. Hypoglycemia E16.2 251.2 2. Spondylosis of lumbar region without myelopathy or radiculopathy M47.816 721.3 3. H/O major abdominal surgery Z98.890 V15.29   
 
 
she was advised to continue her maintenance medications Follow-up and Dispositions · Return in about 3 months (around 6/25/2019). I asked Sharad Roberto if she has any questions and I answered the questions. Sharad Land states that she understands the treatment plan and agrees with the treatment plan

## 2019-03-28 DIAGNOSIS — G47.00 INSOMNIA, UNSPECIFIED TYPE: ICD-10-CM

## 2019-03-28 RX ORDER — MIRTAZAPINE 15 MG/1
TABLET, FILM COATED ORAL
Qty: 30 TAB | Refills: 0 | Status: SHIPPED | OUTPATIENT
Start: 2019-03-28 | End: 2019-04-26 | Stop reason: SDUPTHER

## 2019-04-08 ENCOUNTER — TELEPHONE (OUTPATIENT)
Dept: FAMILY MEDICINE CLINIC | Facility: CLINIC | Age: 64
End: 2019-04-08

## 2019-04-17 DIAGNOSIS — Z98.84 LAP-BAND SURGERY STATUS: ICD-10-CM

## 2019-04-17 RX ORDER — CLOPIDOGREL BISULFATE 75 MG/1
TABLET ORAL
Qty: 30 TAB | Refills: 0 | Status: SHIPPED | OUTPATIENT
Start: 2019-04-17 | End: 2019-05-15 | Stop reason: SDUPTHER

## 2019-04-17 NOTE — TELEPHONE ENCOUNTER
Requested Prescriptions     Pending Prescriptions Disp Refills    clopidogrel (PLAVIX) 75 mg tab [Pharmacy Med Name: CLOPIDOGREL 75 MG TABLET, UU] 30 Tab 0     Sig: TAKE ONE TABLET BY MOUTH DAILY     PATIENT ALSO WOULD LIKE TO HAVE HER PAIN MEDICATION

## 2019-04-22 ENCOUNTER — TELEPHONE (OUTPATIENT)
Dept: FAMILY MEDICINE CLINIC | Facility: CLINIC | Age: 64
End: 2019-04-22

## 2019-04-22 DIAGNOSIS — E78.2 MIXED HYPERLIPIDEMIA: ICD-10-CM

## 2019-04-23 DIAGNOSIS — Z98.890 H/O MAJOR ABDOMINAL SURGERY: Primary | ICD-10-CM

## 2019-04-23 RX ORDER — OXYCODONE AND ACETAMINOPHEN 7.5; 325 MG/1; MG/1
1 TABLET ORAL
Qty: 90 TAB | Refills: 0 | Status: SHIPPED | OUTPATIENT
Start: 2019-04-23 | End: 2019-05-22 | Stop reason: SDUPTHER

## 2019-04-23 NOTE — TELEPHONE ENCOUNTER
Patient has requested her Percocet refill several times. She says she has been out for almost 2 weeks. Please advise.

## 2019-04-25 ENCOUNTER — HOSPITAL ENCOUNTER (OUTPATIENT)
Dept: GENERAL RADIOLOGY | Age: 64
Discharge: HOME | End: 2019-04-25
Payer: MEDICAID

## 2019-04-25 ENCOUNTER — OFFICE VISIT (OUTPATIENT)
Dept: FAMILY MEDICINE CLINIC | Facility: CLINIC | Age: 64
End: 2019-04-25

## 2019-04-25 VITALS
RESPIRATION RATE: 18 BRPM | HEART RATE: 70 BPM | DIASTOLIC BLOOD PRESSURE: 80 MMHG | OXYGEN SATURATION: 97 % | SYSTOLIC BLOOD PRESSURE: 128 MMHG | TEMPERATURE: 98.7 F | HEIGHT: 61 IN | WEIGHT: 172 LBS | BODY MASS INDEX: 32.47 KG/M2

## 2019-04-25 DIAGNOSIS — M47.816 SPONDYLOSIS OF LUMBAR REGION WITHOUT MYELOPATHY OR RADICULOPATHY: ICD-10-CM

## 2019-04-25 DIAGNOSIS — M79.641 PAIN OF RIGHT HAND: ICD-10-CM

## 2019-04-25 DIAGNOSIS — M25.531 RIGHT WRIST PAIN: ICD-10-CM

## 2019-04-25 DIAGNOSIS — M79.641 PAIN OF RIGHT HAND: Primary | ICD-10-CM

## 2019-04-25 DIAGNOSIS — E16.2 HYPOGLYCEMIA: ICD-10-CM

## 2019-04-25 PROCEDURE — 73130 X-RAY EXAM OF HAND: CPT

## 2019-04-25 PROCEDURE — 73110 X-RAY EXAM OF WRIST: CPT

## 2019-04-25 NOTE — PROGRESS NOTES
The patient presents to the office today with the chief complaint of right hand pain HPI The patient fell several days ago. She injured her right hand and right wrist.  Her hand and wrist is bruised and swollen. She also bruised her left knee. The patient persists with low back pain. The patient remains on Percocet with fair control of her chronic pain. There is no history of abuse. The patient has recurrent drops in blood sugar. This has improved some but it still occurs. Review of Systems Respiratory: Negative for shortness of breath. Cardiovascular: Negative for chest pain and leg swelling. Musculoskeletal: Positive for back pain and joint pain. Allergies Allergen Reactions  Pcn [Penicillins] Anaphylaxis  Tetracycline Anaphylaxis  Sulfabenzamide Hives Current Outpatient Medications Medication Sig Dispense Refill  oxyCODONE-acetaminophen (PERCOCET 7.5) 7.5-325 mg per tablet Take 1 Tab by mouth every eight (8) hours as needed for Pain for up to 30 days. Max Daily Amount: 3 Tabs. 90 Tab 0  clopidogrel (PLAVIX) 75 mg tab TAKE ONE TABLET BY MOUTH DAILY 30 Tab 0  
 mirtazapine (REMERON) 15 mg tablet TAKE ONE TABLET BY MOUTH EVERY EVENING 30 Tab 0  
 diclofenac (VOLTAREN) 1 % gel Apply  to affected area four (4) times daily.  atorvastatin (LIPITOR) 20 mg tablet Take 1 Tab by mouth daily. 90 Tab 3  
 montelukast (SINGULAIR) 10 mg tablet TAKE ONE TABLET BY MOUTH DAILY 30 Tab 1  
 DALIRESP 500 mcg tab tablet TAKE ONE TABLET BY MOUTH DAILY 30 Tab 1  
 mirtazapine (REMERON) 15 mg tablet TAKE ONE TABLET BY MOUTH EVERY EVENING 30 Tab 0  
 albuterol (PROVENTIL VENTOLIN) 2.5 mg /3 mL (0.083 %) nebulizer solution Use with nebulizer three times per day 100 Each 2  
 raNITIdine (ZANTAC) 150 mg tablet Take 150 mg by mouth two (2) times a day.     
 metoprolol succinate (TOPROL-XL) 25 mg XL tablet 1/2 tab twice per day (Stop carvedilol) 60 Tab 3  
  Lancets (ONETOUCH ULTRASOFT LANCETS) misc To use with blood for glucose monitoring 1 Each 11  
 glucose blood VI test strips (ONETOUCH ULTRA BLUE TEST STRIP) strip To check the blood sugars daily 100 Strip 2  
 lancets (ONE TOUCH DELICA) 33 gauge misc To use with blood for glucose monitoring 1 Package 3  
 docusate sodium (COLACE) 100 mg capsule Take 1 Cap by mouth two (2) times a day. (Patient taking differently: Take 100 mg by mouth daily.) 60 Cap 5  esomeprazole (NEXIUM) 20 mg capsule Take 1 Cap by mouth daily. STOP PREVACID  Indications: HEARTBURN 30 Cap 1 Past Medical History:  
Diagnosis Date  Aorto-iliac duplex 09/17/2015 Patent abdom aorta endovascular graft w/o leak. Mod stenosis suggested in right limb.  Calculus of kidney 2014  
 stent/kidney  Carotid duplex 09/17/2015 Mild < 50% bilateral plaquing.  Cataract, left  Chronic kidney disease   
 one functioning kidney  Chronic obstructive pulmonary disease ([facility] [address]  Echocardiogram 11/04/2014 EF 56%. No WMA. Mild LAE. Echo is within normal limits.  Esophageal reflux  Generalized osteoarthrosis, involving multiple sites  GERD (gastroesophageal reflux disease)  Gout  History of renal stent   
 right side  Hypercholesteremia  Hypertension   
 resolved  Lower extremity arterial testing 03/27/2015 Normal perfusion at rest and w/treadmill bilaterally. R KORI 1.05.  L KORI 1.03.  
 Murmur   
 patient reports that she is aware- has been told in the past  
 Nausea & vomiting  Renal artery stenosis (HCC)  S/P AAA (abdominal aortic aneurysm) repair 9/20/2018  Subclinical hyperthyroidism 8/2015  Unspecified sleep apnea   
 resolved Past Surgical History:  
Procedure Laterality Date  ADJUSTMENT GASTRIC BAND N/A 11/10/2016 [place], [place]  
 FULL ESOPHAGEAL MANOMETRY  5/21/2018  HX APPENDECTOMY  HX BLADDER SUSPENSION    
 HX CHOLECYSTECTOMY  HX GI    
 lap band 2012  HX GI  07/2018  
 lap band removal  
 HX HEENT Right 1990s, 2016 Ear sx  HX HYSTERECTOMY  HX OTHER SURGICAL  10/27/14 Bilateral open femoral exposures, Placement of catheter and sheath in the aorta Endo repair of aortic aneurysm with modular bifurcated device  Interpretation of angiography, intravascular ultrasound (IVUS) catheter  HX OTHER SURGICAL  10/27/2014 Endurant II abdominal stent graft implant  HX UROLOGICAL    
 stent  VASCULAR SURGERY PROCEDURE UNLIST    
 surgery for abdominal aneurysm Social History Socioeconomic History  Marital status:  Spouse name: Not on file  Number of children: Not on file  Years of education: Not on file  Highest education level: Not on file Occupational History  Not on file Social Needs  Financial resource strain: Not on file  Food insecurity:  
  Worry: Not on file Inability: Not on file  Transportation needs:  
  Medical: Not on file Non-medical: Not on file Tobacco Use  Smoking status: Former Smoker Packs/day: 0.00 Years: 0.00 Pack years: 0.00 Types: Cigarettes Last attempt to quit: 1/25/2016 Years since quitting: 3.2  Smokeless tobacco: Never Used Substance and Sexual Activity  Alcohol use: No  
 Drug use: No  
 Sexual activity: Yes Birth control/protection: Surgical  
Lifestyle  Physical activity:  
  Days per week: Not on file Minutes per session: Not on file  Stress: Not on file Relationships  Social connections:  
  Talks on phone: Not on file Gets together: Not on file Attends Baptism service: Not on file Active member of club or organization: Not on file Attends meetings of clubs or organizations: Not on file Relationship status: Not on file  Intimate partner violence:  
  Fear of current or ex partner: Not on file Emotionally abused: Not on file Physically abused: Not on file Forced sexual activity: Not on file Other Topics Concern  Not on file Social History Narrative  Not on file Patient does not have an advanced directive on file Visit Vitals /80 Pulse 70 Temp 98.7 °F (37.1 °C) (Tympanic) Resp 18 Ht 5' 1\" (1.549 m) Wt 172 lb (78 kg) SpO2 97% BMI 32.50 kg/m² Physical Exam  
Musculoskeletal:  
     Arms: 
     Legs: BMI:  BMI is high but it was not addressed during this visit due to drops in blood sugar No visits with results within 3 Month(s) from this visit. Latest known visit with results is:  
Hospital Outpatient Visit on 01/07/2019 Component Date Value Ref Range Status  [facility] SPECIMEN COL 01/07/2019 Specimens collected/sent to [facility]    Final  
 
 
.No results found for any visits on 04/25/19. Assessment / Plan ICD-10-CM ICD-9-CM 1. Pain of right hand M79.641 729.5 XR HAND RT MIN 3 V  
2. Right wrist pain M25.531 719.43 XR WRIST RT AP/LAT/OBL MIN 3V 3. Hypoglycemia E16.2 251.2 4. Spondylosis of lumbar region without myelopathy or radiculopathy M47.816 721.3 Xray right hand and right wrist 
Refill Percocet The Prescription Monitoring Program registry was checked prior to the issuing of this prescription for a controlled substance. she was advised to continue her maintenance medications Further plans will be based on results of the ordered tests Follow-up and Dispositions · Return in about 2 months (around 6/25/2019). I asked Valentino Pick Isaacs if she has any questions and I answered the questions. Valentino Pick Tamea Brew states that she understands the treatment plan and agrees with the treatment plan THIS NOTE WAS CREATED WITH A VOICE ACTIVATED DICTATION SYSTEM.   IT MAY CONTAIN TRANSCRIBING ERRORS

## 2019-04-26 DIAGNOSIS — G47.00 INSOMNIA, UNSPECIFIED TYPE: ICD-10-CM

## 2019-04-27 DIAGNOSIS — J30.9 ALLERGIC RHINITIS: ICD-10-CM

## 2019-04-28 RX ORDER — MIRTAZAPINE 15 MG/1
TABLET, FILM COATED ORAL
Qty: 30 TAB | Refills: 0 | Status: SHIPPED | OUTPATIENT
Start: 2019-04-28 | End: 2019-05-17 | Stop reason: ALTCHOICE

## 2019-04-28 RX ORDER — MONTELUKAST SODIUM 10 MG/1
TABLET ORAL
Qty: 30 TAB | Refills: 0 | Status: SHIPPED | OUTPATIENT
Start: 2019-04-28 | End: 2019-05-26 | Stop reason: SDUPTHER

## 2019-04-29 DIAGNOSIS — K21.9 GASTROESOPHAGEAL REFLUX DISEASE, ESOPHAGITIS PRESENCE NOT SPECIFIED: ICD-10-CM

## 2019-04-30 RX ORDER — RANITIDINE 150 MG/1
TABLET, FILM COATED ORAL
Qty: 60 TAB | Refills: 2 | Status: SHIPPED | OUTPATIENT
Start: 2019-04-30 | End: 2019-07-23 | Stop reason: SDUPTHER

## 2019-05-15 DIAGNOSIS — Z98.84 LAP-BAND SURGERY STATUS: ICD-10-CM

## 2019-05-15 RX ORDER — CLOPIDOGREL BISULFATE 75 MG/1
TABLET ORAL
Qty: 30 TAB | Refills: 0 | Status: SHIPPED | OUTPATIENT
Start: 2019-05-15 | End: 2019-06-12 | Stop reason: SDUPTHER

## 2019-05-17 ENCOUNTER — OFFICE VISIT (OUTPATIENT)
Dept: FAMILY MEDICINE CLINIC | Facility: CLINIC | Age: 64
End: 2019-05-17

## 2019-05-17 VITALS
TEMPERATURE: 97.9 F | OXYGEN SATURATION: 98 % | HEART RATE: 88 BPM | WEIGHT: 171 LBS | HEIGHT: 61 IN | SYSTOLIC BLOOD PRESSURE: 123 MMHG | DIASTOLIC BLOOD PRESSURE: 80 MMHG | RESPIRATION RATE: 16 BRPM | BODY MASS INDEX: 32.28 KG/M2

## 2019-05-17 DIAGNOSIS — E16.2 HYPOGLYCEMIA: ICD-10-CM

## 2019-05-17 DIAGNOSIS — Z98.890 H/O MAJOR ABDOMINAL SURGERY: ICD-10-CM

## 2019-05-17 DIAGNOSIS — M15.9 GENERALIZED OSTEOARTHROSIS, INVOLVING MULTIPLE SITES: ICD-10-CM

## 2019-05-17 DIAGNOSIS — M79.641 PAIN OF RIGHT HAND: Primary | ICD-10-CM

## 2019-05-17 DIAGNOSIS — M25.531 RIGHT WRIST PAIN: ICD-10-CM

## 2019-05-17 RX ORDER — LANCETS 33 GAUGE
EACH MISCELLANEOUS
Qty: 1 PACKAGE | Refills: 3 | Status: CANCELLED | OUTPATIENT
Start: 2019-05-17

## 2019-05-17 RX ORDER — INSULIN PUMP SYRINGE, 3 ML
EACH MISCELLANEOUS
Qty: 1 KIT | Refills: 0 | Status: SHIPPED | OUTPATIENT
Start: 2019-05-17

## 2019-05-17 RX ORDER — DICLOFENAC SODIUM 10 MG/G
GEL TOPICAL 4 TIMES DAILY
Qty: 100 G | Refills: 2 | Status: SHIPPED | OUTPATIENT
Start: 2019-05-17

## 2019-05-17 NOTE — PROGRESS NOTES
The patient presents to the office today with the chief complaint of right wrist pain HPI The patient persist with right wrist pain. The patient describes this as a burning pain that radiates from her distal wrist into her right hand. She then describes no pain with movement but several hours of tenderness the right wrist.  Previous x-rays revealed osteoarthritis but were negative otherwise. The patient no specific trauma to the wrist but she had fallen injuring that side. The patient continues with chronic low back pain and pain in her hips and knees. Again the right leg pain is somewhat worse after the fall. The patient is status post a lap band procedure. There was constant pain after this procedure. The patient is now status post reversal the procedure. The abdominal pain is decreasing. The patient however has episodic drops in her blood sugar. She is seen more frequently. Because of this her problem is less severe but still occurs. ROS Positive for joint pain and abdominal pain. Positive for episodic drops in sugar. Negative for chest pain or dyspnea Allergies Allergen Reactions  Pcn [Penicillins] Anaphylaxis  Tetracycline Anaphylaxis  Sulfabenzamide Hives Current Outpatient Medications Medication Sig Dispense Refill  diclofenac (VOLTAREN) 1 % gel Apply  to affected area four (4) times daily. 100 g 2  clopidogrel (PLAVIX) 75 mg tab TAKE ONE TABLET BY MOUTH DAILY 30 Tab 0  
 raNITIdine (ZANTAC) 150 mg tablet TAKE ONE TABLET BY MOUTH TWICE A DAY 60 Tab 2  
 montelukast (SINGULAIR) 10 mg tablet TAKE ONE TABLET BY MOUTH DAILY 30 Tab 0  
 oxyCODONE-acetaminophen (PERCOCET 7.5) 7.5-325 mg per tablet Take 1 Tab by mouth every eight (8) hours as needed for Pain for up to 30 days. Max Daily Amount: 3 Tabs. 90 Tab 0  
 atorvastatin (LIPITOR) 20 mg tablet Take 1 Tab by mouth daily.  90 Tab 3  
 DALIRESP 500 mcg tab tablet TAKE ONE TABLET BY MOUTH DAILY 30 Tab 1  
  mirtazapine (REMERON) 15 mg tablet TAKE ONE TABLET BY MOUTH EVERY EVENING 30 Tab 0  
 albuterol (PROVENTIL VENTOLIN) 2.5 mg /3 mL (0.083 %) nebulizer solution Use with nebulizer three times per day 100 Each 2  
 metoprolol succinate (TOPROL-XL) 25 mg XL tablet 1/2 tab twice per day (Stop carvedilol) 60 Tab 3  Lancets (ONETOUCH ULTRASOFT LANCETS) misc To use with blood for glucose monitoring 1 Each 11  
 glucose blood VI test strips (ONETOUCH ULTRA BLUE TEST STRIP) strip To check the blood sugars daily 100 Strip 2  
 lancets (ONE TOUCH DELICA) 33 gauge misc To use with blood for glucose monitoring 1 Package 3  
 docusate sodium (COLACE) 100 mg capsule Take 1 Cap by mouth two (2) times a day. (Patient taking differently: Take 100 mg by mouth daily.) 60 Cap 5  esomeprazole (NEXIUM) 20 mg capsule Take 1 Cap by mouth daily. STOP PREVACID  Indications: HEARTBURN 30 Cap 1 Past Medical History:  
Diagnosis Date  Aorto-iliac duplex 09/17/2015 Patent abdom aorta endovascular graft w/o leak. Mod stenosis suggested in right limb.  Calculus of kidney 2014  
 stent/kidney  Carotid duplex 09/17/2015 Mild < 50% bilateral plaquing.  Cataract, left  Chronic kidney disease   
 one functioning kidney  Chronic obstructive pulmonary disease ([address]  Echocardiogram 11/04/2014 EF 56%. No WMA. Mild LAE. Echo is within normal limits.  Esophageal reflux  Generalized osteoarthrosis, involving multiple sites  GERD (gastroesophageal reflux disease)  Gout  History of renal stent   
 right side  Hypercholesteremia  Hypertension   
 resolved  Lower extremity arterial testing 03/27/2015 Normal perfusion at rest and w/treadmill bilaterally. R KORI 1.05.  L KORI 1.03.  
 Murmur   
 patient reports that she is aware- has been told in the past  
 Nausea & vomiting  Renal artery stenosis (HCC)  S/P AAA (abdominal aortic aneurysm) repair 9/20/2018  Subclinical hyperthyroidism 8/2015  Unspecified sleep apnea   
 resolved Past Surgical History:  
Procedure Laterality Date  ADJUSTMENT GASTRIC BAND N/A 11/10/2016 [facility] [place]  
 FULL ESOPHAGEAL MANOMETRY  5/21/2018  HX APPENDECTOMY  HX BLADDER SUSPENSION    
 HX CHOLECYSTECTOMY  HX GI    
 lap band 2012  HX GI  07/2018  
 lap band removal  
 HX HEENT Right 1990s, 2016 Ear sx  HX HYSTERECTOMY  HX OTHER SURGICAL  10/27/14 Bilateral open femoral exposures, Placement of catheter and sheath in the aorta Endo repair of aortic aneurysm with modular bifurcated device  Interpretation of angiography, intravascular ultrasound (IVUS) catheter  HX OTHER SURGICAL  10/27/2014 Endurant II abdominal stent graft implant  HX UROLOGICAL    
 stent  VASCULAR SURGERY PROCEDURE UNLIST    
 surgery for abdominal aneurysm Social History Socioeconomic History  Marital status:  Spouse name: Not on file  Number of children: Not on file  Years of education: Not on file  Highest education level: Not on file Occupational History  Not on file Social Needs  Financial resource strain: Not on file  Food insecurity:  
  Worry: Not on file Inability: Not on file  Transportation needs:  
  Medical: Not on file Non-medical: Not on file Tobacco Use  Smoking status: Former Smoker Packs/day: 0.00 Years: 0.00 Pack years: 0.00 Types: Cigarettes Last attempt to quit: 1/25/2016 Years since quitting: 3.3  Smokeless tobacco: Never Used Substance and Sexual Activity  Alcohol use: No  
 Drug use: No  
 Sexual activity: Yes Birth control/protection: Surgical  
Lifestyle  Physical activity:  
  Days per week: Not on file Minutes per session: Not on file  Stress: Not on file Relationships  Social connections:  
  Talks on phone: Not on file Gets together: Not on file Attends Mandaen service: Not on file Active member of club or organization: Not on file Attends meetings of clubs or organizations: Not on file Relationship status: Not on file  Intimate partner violence:  
  Fear of current or ex partner: Not on file Emotionally abused: Not on file Physically abused: Not on file Forced sexual activity: Not on file Other Topics Concern  Not on file Social History Narrative  Not on file Patient does not have an advanced directive on file Visit Vitals /80 Pulse 88 Temp 97.9 °F (36.6 °C) (Oral) Resp 16 Ht 5' 1\" (1.549 m) Wt 171 lb (77.6 kg) SpO2 98% BMI 32.31 kg/m² Physical Exam 
No Cervical Lymphadenopathy No Supraclavicular Lymphadenopathy Thyroid is Normal 
Lungs are normal to percussion. Clear to auscultation Heart:  S1 S2 are normal, No gallops, No murmurs No Carotid Bruits Abdomen:  Normal Bowel Sounds. Mild generalized abdominal tenderness without guarding or rebound. No masses. No Hepatomegaly or Splenomegaly Areas of tenderness in the right wrist.  Full range of motion without pain. LE:  Strong Pedal Pulses. No Edema BMI: The BMI is high. This was not addressed during this visit secondary to multiple ongoing medical problems No visits with results within 3 Month(s) from this visit. Latest known visit with results is:  
Hospital Outpatient Visit on 01/07/2019 Component Date Value Ref Range Status  [facility] SPECIMEN COL 01/07/2019 Specimens collected/sent to [facility]    Final  
 
 
.No results found for any visits on 05/17/19. Assessment / Plan ICD-10-CM ICD-9-CM 1. Pain of right hand M79.641 729.5 REFERRAL TO ORTHOPEDICS 2. Right wrist pain M25.531 719.43 REFERRAL TO ORTHOPEDICS 3. H/O major abdominal surgery Z98.890 V15.29   
4.  Generalized osteoarthrosis, involving multiple sites M15.9 715.09   
 5. Hypoglycemia E16.2 251.2 glucose blood VI test strips (ONETOUCH ULTRA BLUE TEST STRIP) strip Blood-Glucose Meter monitoring kit Symptoms are most suggestive of a tendinitis. I will continue with him mobilization and topical Voltaren gel. Orthopedic referral placed I encouraged patient continue to with small frequent meals. I also ordered a new glucometer kit 
she was advised to continue her maintenance medications Follow-up and Dispositions · Return in about 3 months (around 8/17/2019). I asked Rhona Roberto if she has any questions and I answered the questions. Rhona Beck states that she understands the treatment plan and agrees with the treatment plan THIS DOCUMENT WAS CREATED WITH A VOICE ACTIVATED DICTATION SYSTEM.   IT MAY CONTAIN TRANSCRIPTION ERRORS

## 2019-05-22 DIAGNOSIS — Z98.890 H/O MAJOR ABDOMINAL SURGERY: ICD-10-CM

## 2019-05-22 NOTE — TELEPHONE ENCOUNTER
Requested Prescriptions     Pending Prescriptions Disp Refills    oxyCODONE-acetaminophen (PERCOCET 7.5) 7.5-325 mg per tablet 90 Tab 0     Sig: Take 1 Tab by mouth every eight (8) hours as needed for Pain for up to 30 days. Max Daily Amount: 3 Tabs.

## 2019-05-23 RX ORDER — OXYCODONE AND ACETAMINOPHEN 7.5; 325 MG/1; MG/1
1 TABLET ORAL
Qty: 90 TAB | Refills: 0 | Status: SHIPPED | OUTPATIENT
Start: 2019-05-23 | End: 2019-05-24 | Stop reason: SDUPTHER

## 2019-05-23 NOTE — TELEPHONE ENCOUNTER
Last       4/24/19    Last   OV      5/17/19    Pain Contract  6/8/19                Patient Needs a Drug Urine test

## 2019-05-24 DIAGNOSIS — Z98.890 H/O MAJOR ABDOMINAL SURGERY: ICD-10-CM

## 2019-05-24 RX ORDER — OXYCODONE AND ACETAMINOPHEN 7.5; 325 MG/1; MG/1
1 TABLET ORAL
Qty: 90 TAB | Refills: 0 | Status: SHIPPED | OUTPATIENT
Start: 2019-05-24 | End: 2019-06-12 | Stop reason: SDUPTHER

## 2019-05-26 DIAGNOSIS — J30.9 ALLERGIC RHINITIS: ICD-10-CM

## 2019-05-26 DIAGNOSIS — G47.00 INSOMNIA, UNSPECIFIED TYPE: ICD-10-CM

## 2019-05-26 RX ORDER — MONTELUKAST SODIUM 10 MG/1
TABLET ORAL
Qty: 30 TAB | Refills: 0 | Status: SHIPPED | OUTPATIENT
Start: 2019-05-26 | End: 2019-06-22 | Stop reason: SDUPTHER

## 2019-05-26 RX ORDER — MIRTAZAPINE 15 MG/1
TABLET, FILM COATED ORAL
Qty: 30 TAB | Refills: 0 | Status: SHIPPED | OUTPATIENT
Start: 2019-05-26 | End: 2019-06-22 | Stop reason: SDUPTHER

## 2019-05-29 ENCOUNTER — HOSPITAL ENCOUNTER (OUTPATIENT)
Dept: LAB | Age: 64
Discharge: HOME | End: 2019-05-29

## 2019-05-29 ENCOUNTER — OFFICE VISIT (OUTPATIENT)
Dept: ORTHOPEDIC SURGERY | Facility: CLINIC | Age: 64
End: 2019-05-29

## 2019-05-29 VITALS
OXYGEN SATURATION: 99 % | WEIGHT: 172 LBS | HEIGHT: 61 IN | HEART RATE: 83 BPM | BODY MASS INDEX: 32.47 KG/M2 | RESPIRATION RATE: 16 BRPM | DIASTOLIC BLOOD PRESSURE: 83 MMHG | SYSTOLIC BLOOD PRESSURE: 157 MMHG | TEMPERATURE: 97.4 F

## 2019-05-29 DIAGNOSIS — M19.90 INFLAMMATORY ARTHROPATHY: ICD-10-CM

## 2019-05-29 DIAGNOSIS — M19.031 ARTHRITIS OF WRIST, RIGHT: Primary | ICD-10-CM

## 2019-05-29 DIAGNOSIS — M18.11 LOCALIZED PRIMARY OSTEOARTHRITIS OF CARPOMETACARPAL JOINT OF RIGHT THUMB: ICD-10-CM

## 2019-05-29 LAB — SENTARA SPECIMEN COL,SENBCF: NORMAL

## 2019-05-29 PROCEDURE — 99001 SPECIMEN HANDLING PT-LAB: CPT

## 2019-05-29 NOTE — PROGRESS NOTES
Maria Koehler is a 60 y.o. female right handed retiree. Worker's Compensation and legal considerations: not known. Vitals:    05/29/19 0801   BP: 157/83   Pulse: 83   Resp: 16   Temp: 97.4 °F (36.3 °C)   TempSrc: Oral   SpO2: 99%   Weight: 172 lb (78 kg)   Height: 5' 1\" (1.549 m)   PainSc:   6   PainLoc: Hand           Chief Complaint   Patient presents with    Hand Pain     right hand pain; pt states fell on hand about 6 weeks ago         HPI: Patient comes in today with a 6 to 8-week history of right wrist and hand pain. This occurred after falling onto her right side and hand. She had x-rays recently done that were negative except for osteoarthritis. She has recently worn a brace without help. She is also put Voltaren gel and that has not helped. Of note she has a history of carpal tunnel release 15 years prior on the same side. The patient reports pain and stiffness in the morning. She has never been diagnosed with rheumatoid arthritis but she does have a mother with a history of rheumatoid arthritis. Date of onset: April 2019    Injury: Yes: Comment: Fall    Prior Treatment:  Yes: Comment: Brace wear as well as Voltaren gel    Numbness/ Tingling: No    ROS: Review of Systems - General ROS: negative  Respiratory ROS: no cough, shortness of breath, or wheezing  Cardiovascular ROS: no chest pain or dyspnea on exertion  Musculoskeletal ROS: positive for - pain in hand - right and wrist - right  Neurological ROS: negative  Dermatological ROS: negative    Past Medical History:   Diagnosis Date    Aorto-iliac duplex 09/17/2015    Patent abdom aorta endovascular graft w/o leak. Mod stenosis suggested in right limb.  Calculus of kidney 2014    stent/kidney     Carotid duplex 09/17/2015    Mild < 50% bilateral plaquing.  Cataract, left     Chronic kidney disease     one functioning kidney    Chronic obstructive pulmonary disease ([facility] [address]     Echocardiogram 11/04/2014    EF 56%. No WMA.   Mild LAE.  Echo is within normal limits.  Esophageal reflux     Generalized osteoarthrosis, involving multiple sites     GERD (gastroesophageal reflux disease)     Gout     History of renal stent     right side    Hypercholesteremia     Hypertension     resolved    Lower extremity arterial testing 03/27/2015    Normal perfusion at rest and w/treadmill bilaterally. R KORI 1.05.  L KORI 1.03.    Murmur     patient reports that she is aware- has been told in the past    Nausea & vomiting     Renal artery stenosis (HCC)     S/P AAA (abdominal aortic aneurysm) repair 9/20/2018    Subclinical hyperthyroidism 8/2015    Unspecified sleep apnea     resolved       Past Surgical History:   Procedure Laterality Date    ADJUSTMENT GASTRIC BAND N/A 11/10/2016    MAKAYLA Cullen    FULL ESOPHAGEAL MANOMETRY  5/21/2018         HX APPENDECTOMY      HX BLADDER SUSPENSION      HX CHOLECYSTECTOMY      HX GI      lap band 2012    HX GI  07/2018    lap band removal    HX HEENT Right 1990s, 2016    Ear sx    HX HYSTERECTOMY      HX OTHER SURGICAL  10/27/14     Bilateral open femoral exposures, Placement of catheter and sheath in the aorta Endo repair of aortic aneurysm with modular bifurcated device  Interpretation of angiography, intravascular ultrasound (IVUS) catheter    HX OTHER SURGICAL  10/27/2014    Endurant II abdominal stent graft implant    HX UROLOGICAL      stent    VASCULAR SURGERY PROCEDURE UNLIST      surgery for abdominal aneurysm       Current Outpatient Medications   Medication Sig Dispense Refill    triamcinolone acetonide (KENALOG) 10 mg/mL injection 1 mL by Intra artICUlar route once for 1 dose. 1 Vial 0    triamcinolone acetonide (KENALOG) 10 mg/mL injection 1 mL by Intra artICUlar route once for 1 dose.  1 Vial 0    mirtazapine (REMERON) 15 mg tablet TAKE ONE TABLET BY MOUTH EVERY EVENING 30 Tab 0    montelukast (SINGULAIR) 10 mg tablet TAKE ONE TABLET BY MOUTH DAILY 30 Tab 0    oxyCODONE-acetaminophen (PERCOCET 7.5) 7.5-325 mg per tablet Take 1 Tab by mouth every eight (8) hours as needed for Pain for up to 30 days. Max Daily Amount: 3 Tabs. 90 Tab 0    diclofenac (VOLTAREN) 1 % gel Apply  to affected area four (4) times daily. 100 g 2    glucose blood VI test strips (ONETOUCH ULTRA BLUE TEST STRIP) strip To check the blood sugars three times per day 100 Strip 2    Blood-Glucose Meter monitoring kit One touch ultra. Check sugar blood sugar three times epr day 1 Kit 0    clopidogrel (PLAVIX) 75 mg tab TAKE ONE TABLET BY MOUTH DAILY 30 Tab 0    raNITIdine (ZANTAC) 150 mg tablet TAKE ONE TABLET BY MOUTH TWICE A DAY 60 Tab 2    atorvastatin (LIPITOR) 20 mg tablet Take 1 Tab by mouth daily. 90 Tab 3    DALIRESP 500 mcg tab tablet TAKE ONE TABLET BY MOUTH DAILY 30 Tab 1    albuterol (PROVENTIL VENTOLIN) 2.5 mg /3 mL (0.083 %) nebulizer solution Use with nebulizer three times per day 100 Each 2    metoprolol succinate (TOPROL-XL) 25 mg XL tablet 1/2 tab twice per day (Stop carvedilol) 60 Tab 3    Lancets (ONETOUCH ULTRASOFT LANCETS) misc To use with blood for glucose monitoring 1 Each 11    lancets (ONE TOUCH DELICA) 33 gauge misc To use with blood for glucose monitoring 1 Package 3    docusate sodium (COLACE) 100 mg capsule Take 1 Cap by mouth two (2) times a day. (Patient taking differently: Take 100 mg by mouth daily.) 60 Cap 5    esomeprazole (NEXIUM) 20 mg capsule Take 1 Cap by mouth daily. STOP PREVACID  Indications: HEARTBURN 30 Cap 1       Allergies   Allergen Reactions    Pcn [Penicillins] Anaphylaxis    Tetracycline Anaphylaxis    Sulfabenzamide Hives         PE:     Wrist: There is tenderness to palpation localized over the dorsal radiocarpal joint as well as the thumb carpometacarpal joint. There is pain with range of motion at extremes of flexion and extension of the wrist as well as with CMC grind.     Tenderness L R Test L R   1st Ext Comp - - Finkelstein's - - Snuff Box - - Mayda Kasey - -   2nd Ext Comp - - S-L Shear - -   S-L Joint - - L-T Shear - -   L-T Joint - - DRUJ Sup - -   6th Ext Comp - - DRUJ Pro - -   Ulnar Snuff - - DRUJ Grind - -   Fovea - - TFCC - -   STT Joint - - Mid-Carp Inst - -   FCR - - P-T Grind - -   Intersection - - ECU Sublux. - -      Dorsal Ganglion: -   Volar Ganglion: -      ROM: Full      Imaging:     Plain films from April 2019 of the wrist and hand shows mild to moderate degenerative changes throughout. There is no evidence of acute fracture or dislocation. ICD-10-CM ICD-9-CM    1. Arthritis of wrist, right M19.031 716.93 TRIAMCINOLONE ACETONIDE INJ      triamcinolone acetonide (KENALOG) 10 mg/mL injection      DRAIN/INJECT SMALL JOINT/BURSA      AMB SUPPLY ORDER   2. Localized primary osteoarthritis of carpometacarpal joint of right thumb M18.11 715.14 TRIAMCINOLONE ACETONIDE INJ      triamcinolone acetonide (KENALOG) 10 mg/mL injection      DRAIN/INJECT SMALL JOINT/BURSA   3. Inflammatory arthropathy M19.90 714.9 C REACTIVE PROTEIN, QT      CYCLIC CITRUL PEPTIDE AB, IGG      RHEUMATOID FACTOR, QT      SED RATE (ESR)      ANTINUCLEAR ANTIBODIES, IFA       Plan:     Right wrist and thumb CMC joint injections    Right wrist brace    Rheumatoid panel    Follow-up in 2 weeks for reevaluation and lab review.     Plan was reviewed with patient, who verbalized agreement and understanding of the plan    [address] NOTE        Chart reviewed for the following:   Harry GARCÍA DO, have reviewed the History, Physical and updated the Allergic reactions for [address] performed immediately prior to start of procedure:   Harry AGRCÍA DO, have performed the following reviews on Niall Carter prior to the start of the procedure:            * Patient was identified by name and date of birth   * Agreement on procedure being performed was verified  * Risks and Benefits explained to the patient  * Procedure site verified and marked as necessary  * Patient was positioned for comfort  * Consent was signed and verified     Time: 08:23 AM      Date of procedure: 5/29/2019    Procedure performed by: Yaima Newell DO    Provider assisted by: [place] LPN    Patient assisted by: self    How tolerated by patient: tolerated the procedure well with no complications    Post Procedural Pain Scale: 0 - No Hurt    Comments: none    Procedure:  After consent was obtained, using sterile technique the joint was prepped. Local anesthetic used: 1% lidocaine. Kenalog 5 mg and was then injected and the needle withdrawn. The procedure was well tolerated. The patient is asked to continue to rest the area for a few more days before resuming regular activities. It may be more painful for the first 1-2 days. Watch for fever, or increased swelling or persistent pain in the joint. Call or return to clinic prn if such symptoms occur or there is failure to improve as anticipated.

## 2019-06-07 DIAGNOSIS — M19.90 INFLAMMATORY ARTHROPATHY: ICD-10-CM

## 2019-06-12 ENCOUNTER — OFFICE VISIT (OUTPATIENT)
Dept: ORTHOPEDIC SURGERY | Facility: CLINIC | Age: 64
End: 2019-06-12

## 2019-06-12 ENCOUNTER — OFFICE VISIT (OUTPATIENT)
Dept: FAMILY MEDICINE CLINIC | Facility: CLINIC | Age: 64
End: 2019-06-12

## 2019-06-12 VITALS
BODY MASS INDEX: 33.57 KG/M2 | RESPIRATION RATE: 16 BRPM | TEMPERATURE: 97.6 F | DIASTOLIC BLOOD PRESSURE: 90 MMHG | OXYGEN SATURATION: 90 % | HEART RATE: 84 BPM | SYSTOLIC BLOOD PRESSURE: 120 MMHG | WEIGHT: 171 LBS | HEIGHT: 60 IN

## 2019-06-12 VITALS
SYSTOLIC BLOOD PRESSURE: 139 MMHG | TEMPERATURE: 97.7 F | BODY MASS INDEX: 33.65 KG/M2 | DIASTOLIC BLOOD PRESSURE: 79 MMHG | WEIGHT: 171.4 LBS | HEIGHT: 60 IN | OXYGEN SATURATION: 100 % | HEART RATE: 77 BPM | RESPIRATION RATE: 16 BRPM

## 2019-06-12 DIAGNOSIS — M25.531 RIGHT WRIST PAIN: Primary | ICD-10-CM

## 2019-06-12 DIAGNOSIS — Z98.890 H/O MAJOR ABDOMINAL SURGERY: ICD-10-CM

## 2019-06-12 DIAGNOSIS — M47.816 SPONDYLOSIS OF LUMBAR REGION WITHOUT MYELOPATHY OR RADICULOPATHY: ICD-10-CM

## 2019-06-12 DIAGNOSIS — Z98.84 LAP-BAND SURGERY STATUS: ICD-10-CM

## 2019-06-12 DIAGNOSIS — M18.11 LOCALIZED PRIMARY OSTEOARTHRITIS OF CARPOMETACARPAL JOINT OF RIGHT THUMB: Primary | ICD-10-CM

## 2019-06-12 DIAGNOSIS — E16.2 MULTIPLE EPISODES OF HYPOGLYCEMIA: ICD-10-CM

## 2019-06-12 DIAGNOSIS — M19.90 INFLAMMATORY ARTHROPATHY: ICD-10-CM

## 2019-06-12 DIAGNOSIS — M19.031 ARTHRITIS OF WRIST, RIGHT: ICD-10-CM

## 2019-06-12 RX ORDER — CLOPIDOGREL BISULFATE 75 MG/1
TABLET ORAL
Qty: 30 TAB | Refills: 0 | Status: SHIPPED | OUTPATIENT
Start: 2019-06-12 | End: 2019-07-08 | Stop reason: SDUPTHER

## 2019-06-12 RX ORDER — OXYCODONE AND ACETAMINOPHEN 7.5; 325 MG/1; MG/1
1 TABLET ORAL
Qty: 90 TAB | Refills: 0 | Status: SHIPPED | OUTPATIENT
Start: 2019-06-23 | End: 2019-07-17 | Stop reason: SDUPTHER

## 2019-06-12 NOTE — PROGRESS NOTES
1. Have you been to the ER, urgent care clinic since your last visit? Hospitalized since your last visit? No    2. Have you seen or consulted any other health care providers outside of the [address] [place] [address] since your last visit? Include any pap smears or colon screening.  No        Chief Complaint   Patient presents with    Wrist Pain     Right

## 2019-06-12 NOTE — PROGRESS NOTES
Sonia Starr is a 60 y.o. female right handed retiree. Worker's Compensation and legal considerations: not known. Vitals:    06/12/19 0815   BP: 139/79   Pulse: 77   Resp: 16   Temp: 97.7 °F (36.5 °C)   TempSrc: Oral   SpO2: 100%   Weight: 171 lb 6.4 oz (77.7 kg)   Height: 5' (1.524 m)   PainSc:   8   PainLoc: Hand           Chief Complaint   Patient presents with    Hand Pain     right    Follow-up       HPI: Patient comes in today 2 weeks after receiving a right thumb CMC joint injection and a cool comfort brace. We also sent her for rheumatoid labs. All of these labs are showing up in our system except for rheumatoid factor at this time. So far all labs are negative. We also gave her a dorsal wrist joint injection. Initial HPI: Patient comes in today with a 6 to 8-week history of right wrist and hand pain. This occurred after falling onto her right side and hand. She had x-rays recently done that were negative except for osteoarthritis. She has recently worn a brace without help. She is also put Voltaren gel and that has not helped. Of note she has a history of carpal tunnel release 15 years prior on the same side. The patient reports pain and stiffness in the morning. She has never been diagnosed with rheumatoid arthritis but she does have a mother with a history of rheumatoid arthritis. Date of onset: April 2019    Injury: Yes: Comment: Fall    Prior Treatment:  Yes: Comment: Brace wear as well as Voltaren gel    Numbness/ Tingling: No    ROS: Review of Systems - General ROS: negative  Respiratory ROS: no cough, shortness of breath, or wheezing  Cardiovascular ROS: no chest pain or dyspnea on exertion  Musculoskeletal ROS: positive for - pain in hand - right and wrist - right  Neurological ROS: negative  Dermatological ROS: negative    Past Medical History:   Diagnosis Date    Aorto-iliac duplex 09/17/2015    Patent abdom aorta endovascular graft w/o leak.   Mod stenosis suggested in right limb.  Calculus of kidney 2014    stent/kidney     Carotid duplex 09/17/2015    Mild < 50% bilateral plaquing.  Cataract, left     Chronic kidney disease     one functioning kidney    Chronic obstructive pulmonary disease ([address]     Echocardiogram 11/04/2014    EF 56%. No WMA. Mild LAE. Echo is within normal limits.  Esophageal reflux     Generalized osteoarthrosis, involving multiple sites     GERD (gastroesophageal reflux disease)     Gout     History of renal stent     right side    Hypercholesteremia     Hypertension     resolved    Lower extremity arterial testing 03/27/2015    Normal perfusion at rest and w/treadmill bilaterally.   R KORI 1.05.  L KORI 1.03.    Murmur     patient reports that she is aware- has been told in the past    Nausea & vomiting     Renal artery stenosis (HCC)     S/P AAA (abdominal aortic aneurysm) repair 9/20/2018    Subclinical hyperthyroidism 8/2015    Unspecified sleep apnea     resolved       Past Surgical History:   Procedure Laterality Date    ADJUSTMENT GASTRIC BAND N/A 11/10/2016    MAKAYLA Cullen    FULL ESOPHAGEAL MANOMETRY  5/21/2018         HX APPENDECTOMY      HX BLADDER SUSPENSION      HX CHOLECYSTECTOMY      HX GI      lap band 2012    HX GI  07/2018    lap band removal    HX HEENT Right 1990s, 2016    Ear sx    HX HYSTERECTOMY      HX OTHER SURGICAL  10/27/14     Bilateral open femoral exposures, Placement of catheter and sheath in the aorta Endo repair of aortic aneurysm with modular bifurcated device  Interpretation of angiography, intravascular ultrasound (IVUS) catheter    HX OTHER SURGICAL  10/27/2014    Endurant II abdominal stent graft implant    HX UROLOGICAL      stent    VASCULAR SURGERY PROCEDURE UNLIST      surgery for abdominal aneurysm       Current Outpatient Medications   Medication Sig Dispense Refill    clopidogrel (PLAVIX) 75 mg tab TAKE ONE TABLET BY MOUTH DAILY 30 Tab 0    mirtazapine ([place]) 15 mg tablet TAKE ONE TABLET BY MOUTH EVERY EVENING 30 Tab 0    montelukast (SINGULAIR) 10 mg tablet TAKE ONE TABLET BY MOUTH DAILY 30 Tab 0    oxyCODONE-acetaminophen (PERCOCET 7.5) 7.5-325 mg per tablet Take 1 Tab by mouth every eight (8) hours as needed for Pain for up to 30 days. Max Daily Amount: 3 Tabs. 90 Tab 0    diclofenac (VOLTAREN) 1 % gel Apply  to affected area four (4) times daily. 100 g 2    glucose blood VI test strips (ONETOUCH ULTRA BLUE TEST STRIP) strip To check the blood sugars three times per day 100 Strip 2    Blood-Glucose Meter monitoring kit One touch ultra. Check sugar blood sugar three times epr day 1 Kit 0    raNITIdine (ZANTAC) 150 mg tablet TAKE ONE TABLET BY MOUTH TWICE A DAY 60 Tab 2    atorvastatin (LIPITOR) 20 mg tablet Take 1 Tab by mouth daily. 90 Tab 3    DALIRESP 500 mcg tab tablet TAKE ONE TABLET BY MOUTH DAILY 30 Tab 1    albuterol (PROVENTIL VENTOLIN) 2.5 mg /3 mL (0.083 %) nebulizer solution Use with nebulizer three times per day 100 Each 2    metoprolol succinate (TOPROL-XL) 25 mg XL tablet 1/2 tab twice per day (Stop carvedilol) 60 Tab 3    Lancets (ONETOUCH ULTRASOFT LANCETS) misc To use with blood for glucose monitoring 1 Each 11    lancets (ONE TOUCH DELICA) 33 gauge misc To use with blood for glucose monitoring 1 Package 3    docusate sodium (COLACE) 100 mg capsule Take 1 Cap by mouth two (2) times a day. (Patient taking differently: Take 100 mg by mouth daily.) 60 Cap 5    esomeprazole (NEXIUM) 20 mg capsule Take 1 Cap by mouth daily. STOP PREVACID  Indications: HEARTBURN 30 Cap 1       Allergies   Allergen Reactions    Pcn [Penicillins] Anaphylaxis    Tetracycline Anaphylaxis    Sulfabenzamide Hives         PE:     Right Wrist: There is minimal tenderness to palpation about the wrist and the thumb CMC joint. She has near full range of motion with minimal difficulty. LUCI CCP CRP and sed rate are all within normal limits.   Rheumatoid factor is still pending. Imaging:     Previous Right Plain films from April 2019 of the wrist and hand shows mild to moderate degenerative changes throughout. There is no evidence of acute fracture or dislocation. ICD-10-CM ICD-9-CM    1. Localized primary osteoarthritis of carpometacarpal joint of right thumb M18.11 715.14    2. Arthritis of wrist, right M19.031 716.93        Plan: At this point I instructed the patient to wear her cool comfort brace as much as possible especially when she is more active.     Follow up PRN    Will reorder RF lab as it was not done    Plan was reviewed with patient, who verbalized agreement and understanding of the plan

## 2019-06-15 NOTE — PROGRESS NOTES
The patient presents to the office today with the chief complaint of right wrist pain    HPI    The patient complains of 6 weeks of right wrist pain. Apparently patient had fallen several days before the onset of pain. .  There is mild pain at rest.  The pain is worse however with any movement. The area to base of right thumb is been injected. The problem has improved some but persists. The patient also has chronic low back pain secondary to lumbar spondylosis. The pain is in fair control on Percocet. There is been no pattern of abuse. The patient has recurrent drops in sugar. This is apparently related to previous gastric banding -which is been reversed. She is eating more frequently. The problem is doing some better      ROS  Wrist pain, back pain, intermittent hypoglycemia is present. Negative chest pain or dyspnea    Allergies   Allergen Reactions    Pcn [Penicillins] Anaphylaxis    Tetracycline Anaphylaxis    Sulfabenzamide Hives       Current Outpatient Medications   Medication Sig Dispense Refill    clopidogrel (PLAVIX) 75 mg tab TAKE ONE TABLET BY MOUTH DAILY 30 Tab 0    [START ON 6/23/2019] oxyCODONE-acetaminophen (PERCOCET 7.5) 7.5-325 mg per tablet Take 1 Tab by mouth every eight (8) hours as needed for Pain for up to 30 days. Max Daily Amount: 3 Tabs. 90 Tab 0    mirtazapine (REMERON) 15 mg tablet TAKE ONE TABLET BY MOUTH EVERY EVENING 30 Tab 0    montelukast (SINGULAIR) 10 mg tablet TAKE ONE TABLET BY MOUTH DAILY 30 Tab 0    diclofenac (VOLTAREN) 1 % gel Apply  to affected area four (4) times daily. 100 g 2    glucose blood VI test strips (ONETOUCH ULTRA BLUE TEST STRIP) strip To check the blood sugars three times per day 100 Strip 2    Blood-Glucose Meter monitoring kit One touch ultra.   Check sugar blood sugar three times epr day 1 Kit 0    raNITIdine (ZANTAC) 150 mg tablet TAKE ONE TABLET BY MOUTH TWICE A DAY 60 Tab 2    atorvastatin (LIPITOR) 20 mg tablet Take 1 Tab by mouth daily. 90 Tab 3    DALIRESP 500 mcg tab tablet TAKE ONE TABLET BY MOUTH DAILY 30 Tab 1    albuterol (PROVENTIL VENTOLIN) 2.5 mg /3 mL (0.083 %) nebulizer solution Use with nebulizer three times per day 100 Each 2    metoprolol succinate (TOPROL-XL) 25 mg XL tablet 1/2 tab twice per day (Stop carvedilol) 60 Tab 3    Lancets (ONETOUCH ULTRASOFT LANCETS) misc To use with blood for glucose monitoring 1 Each 11    lancets (ONE TOUCH DELICA) 33 gauge misc To use with blood for glucose monitoring 1 Package 3    docusate sodium (COLACE) 100 mg capsule Take 1 Cap by mouth two (2) times a day. (Patient taking differently: Take 100 mg by mouth daily.) 60 Cap 5    esomeprazole (NEXIUM) 20 mg capsule Take 1 Cap by mouth daily. STOP PREVACID  Indications: HEARTBURN 30 Cap 1       Past Medical History:   Diagnosis Date    Aorto-iliac duplex 09/17/2015    Patent abdom aorta endovascular graft w/o leak. Mod stenosis suggested in right limb.  Calculus of kidney 2014    stent/kidney     Carotid duplex 09/17/2015    Mild < 50% bilateral plaquing.  Cataract, left     Chronic kidney disease     one functioning kidney    Chronic obstructive pulmonary disease ([facility] [address]     Echocardiogram 11/04/2014    EF 56%. No WMA. Mild LAE. Echo is within normal limits.  Esophageal reflux     Generalized osteoarthrosis, involving multiple sites     GERD (gastroesophageal reflux disease)     Gout     History of renal stent     right side    Hypercholesteremia     Hypertension     resolved    Lower extremity arterial testing 03/27/2015    Normal perfusion at rest and w/treadmill bilaterally.   R KORI 1.05.  L KORI 1.03.    Murmur     patient reports that she is aware- has been told in the past    Nausea & vomiting     Renal artery stenosis (HCC)     S/P AAA (abdominal aortic aneurysm) repair 9/20/2018    Subclinical hyperthyroidism 8/2015    Unspecified sleep apnea     resolved       Past Surgical History:   Procedure Laterality Date    ADJUSTMENT GASTRIC BAND N/A 11/10/2016    MAKAYLA Cullen    FULL ESOPHAGEAL MANOMETRY  5/21/2018         HX APPENDECTOMY      HX BLADDER SUSPENSION      HX CHOLECYSTECTOMY      HX GI      lap band 2012    HX GI  07/2018    lap band removal    HX HEENT Right 1990s, 2016    Ear sx    HX HYSTERECTOMY      HX OTHER SURGICAL  10/27/14     Bilateral open femoral exposures, Placement of catheter and sheath in the aorta Endo repair of aortic aneurysm with modular bifurcated device  Interpretation of angiography, intravascular ultrasound (IVUS) catheter    HX OTHER SURGICAL  10/27/2014    Endurant II abdominal stent graft implant    HX UROLOGICAL      stent    VASCULAR SURGERY PROCEDURE UNLIST      surgery for abdominal aneurysm       Social History     Socioeconomic History    Marital status:      Spouse name: Not on file    Number of children: Not on file    Years of education: Not on file    Highest education level: Not on file   Occupational History    Not on file   Social Needs    Financial resource strain: Not on file    Food insecurity:     Worry: Not on file     Inability: Not on file    Transportation needs:     Medical: Not on file     Non-medical: Not on file   Tobacco Use    Smoking status: Former Smoker     Packs/day: 0.00     Years: 0.00     Pack years: 0.00     Types: Cigarettes     Last attempt to quit: 1/25/2016     Years since quitting: 3.3    Smokeless tobacco: Never Used   Substance and Sexual Activity    Alcohol use: No    Drug use: No    Sexual activity: Yes     Birth control/protection: Surgical   Lifestyle    Physical activity:     Days per week: Not on file     Minutes per session: Not on file    Stress: Not on file   Relationships    Social connections:     Talks on phone: Not on file     Gets together: Not on file     Attends [employer] service: Not on file     Active member of club or organization: Not on file     Attends meetings of clubs or organizations: Not on file     Relationship status: Not on file    Intimate partner violence:     Fear of current or ex partner: Not on file     Emotionally abused: Not on file     Physically abused: Not on file     Forced sexual activity: Not on file   Other Topics Concern    Not on file   Social History Narrative    Not on file       Patient does not have an advanced directive on file    Visit Vitals  /90 (BP 1 Location: Right arm, BP Patient Position: Sitting)   Pulse 84   Temp 97.6 °F (36.4 °C) (Tympanic)   Resp 16   Ht 5' (1.524 m)   Wt 171 lb (77.6 kg)   SpO2 90%   BMI 33.40 kg/m²       Physical Exam  No Cervical Lymphadenopathy  No Supraclavicular Lymphadenopathy  Thyroid is Normal  Lungs are normal to percussion. Clear to auscultation   Heart:  S1 S2 are normal, No gallops, No murmurs  No Carotid Bruits  Abdomen:  Normal Bowel Sounds. No tenderness. No masses. No Hepatomegaly or Splenomegaly  Right wrist with advised consistent with tendinitis  LE:  Strong Pedal Pulses. No Edema      BMI: BMI is high. This is not addressed during his visit secondary to patient's recurrent drops in blood pressure. Hospital Outpatient Visit on 05/29/2019   Component Date Value Ref Range Status    [facility] SPECIMEN COL 05/29/2019 Specimens collected/sent to [facility]    Final       .No results found for any visits on 06/12/19. Assessment / Plan      ICD-10-CM ICD-9-CM    1. Right wrist pain M25.531 719.43    2. H/O major abdominal surgery Z98.890 V15.29 oxyCODONE-acetaminophen (PERCOCET 7.5) 7.5-325 mg per tablet   3. Spondylosis of lumbar region without myelopathy or radiculopathy M47.816 721.3    4. Multiple episodes of hypoglycemia E16.2 251.2        I advised the patient to immobilize the wrist at least at night. And to do this for the next several weeks  she was advised to continue her maintenance medications      Follow-up and Dispositions    · Return in about 3 months (around 9/12/2019).          I asked Zaina Roberto if she has any questions and I answered the questions. Valentino Pick Tamea Brew states that she understands the treatment plan and agrees with the treatment plan          THIS DOCUMENT WAS CREATED WITH A VOICE ACTIVATED DICTATION SYSTEM.   IT MAY CONTAIN TRANSCRIPTION ERRORS

## 2019-06-22 DIAGNOSIS — G47.00 INSOMNIA, UNSPECIFIED TYPE: ICD-10-CM

## 2019-06-22 DIAGNOSIS — J30.9 ALLERGIC RHINITIS: ICD-10-CM

## 2019-06-23 RX ORDER — MIRTAZAPINE 15 MG/1
TABLET, FILM COATED ORAL
Qty: 30 TAB | Refills: 0 | Status: SHIPPED | OUTPATIENT
Start: 2019-06-23 | End: 2019-07-18 | Stop reason: SDUPTHER

## 2019-06-23 RX ORDER — MONTELUKAST SODIUM 10 MG/1
TABLET ORAL
Qty: 30 TAB | Refills: 0 | Status: SHIPPED | OUTPATIENT
Start: 2019-06-23 | End: 2019-07-18 | Stop reason: SDUPTHER

## 2019-06-29 DIAGNOSIS — M19.90 INFLAMMATORY ARTHROPATHY: ICD-10-CM

## 2019-07-02 ENCOUNTER — TELEPHONE (OUTPATIENT)
Dept: VASCULAR SURGERY | Age: 64
End: 2019-07-02

## 2019-07-02 NOTE — TELEPHONE ENCOUNTER
Patient called and want to schedule an appointment with her doctor. She will be moving at the end of the month. Please give her a call. Vicky Prasad

## 2019-07-03 ENCOUNTER — TELEPHONE (OUTPATIENT)
Dept: FAMILY MEDICINE CLINIC | Facility: CLINIC | Age: 64
End: 2019-07-03

## 2019-07-03 ENCOUNTER — DOCUMENTATION ONLY (OUTPATIENT)
Dept: FAMILY MEDICINE CLINIC | Facility: CLINIC | Age: 64
End: 2019-07-03

## 2019-07-03 NOTE — TELEPHONE ENCOUNTER
Sent message to Dr. Gaetano Hilliard that patient called and is moving from the area and wanted to know if she should have her studies moved up before she leaves so she knows everything is good until she is able to establish with another Vascular surgeon. Per Dr. Everett De La Rosa ok to move up studies and see patient . Called patient and let her know we would call her to move up everything , and that after speaking with docs, they would recommend patient to follow up with [address] in [facility]. Advised she can sign medical release for records when she comes for appt. Pt verbalized understanding. Sent message to scheduling to call patient to schedule.

## 2019-07-08 DIAGNOSIS — Z98.84 LAP-BAND SURGERY STATUS: ICD-10-CM

## 2019-07-08 RX ORDER — CLOPIDOGREL BISULFATE 75 MG/1
TABLET ORAL
Qty: 30 TAB | Refills: 0 | Status: SHIPPED | OUTPATIENT
Start: 2019-07-08 | End: 2019-07-23 | Stop reason: SDUPTHER

## 2019-07-11 ENCOUNTER — DOCUMENTATION ONLY (OUTPATIENT)
Dept: FAMILY MEDICINE CLINIC | Facility: CLINIC | Age: 64
End: 2019-07-11

## 2019-07-12 DIAGNOSIS — M19.031 ARTHRITIS OF WRIST, RIGHT: ICD-10-CM

## 2019-07-17 ENCOUNTER — OFFICE VISIT (OUTPATIENT)
Dept: FAMILY MEDICINE CLINIC | Facility: CLINIC | Age: 64
End: 2019-07-17

## 2019-07-17 VITALS
BODY MASS INDEX: 32.85 KG/M2 | HEIGHT: 61 IN | DIASTOLIC BLOOD PRESSURE: 88 MMHG | TEMPERATURE: 98.5 F | SYSTOLIC BLOOD PRESSURE: 130 MMHG | OXYGEN SATURATION: 98 % | WEIGHT: 174 LBS | HEART RATE: 94 BPM

## 2019-07-17 DIAGNOSIS — Z98.890 H/O MAJOR ABDOMINAL SURGERY: ICD-10-CM

## 2019-07-17 DIAGNOSIS — M77.9 TENDONITIS: ICD-10-CM

## 2019-07-17 DIAGNOSIS — M47.816 SPONDYLOSIS OF LUMBAR REGION WITHOUT MYELOPATHY OR RADICULOPATHY: ICD-10-CM

## 2019-07-17 DIAGNOSIS — E16.2 HYPOGLYCEMIA: Primary | ICD-10-CM

## 2019-07-17 RX ORDER — OXYCODONE AND ACETAMINOPHEN 7.5; 325 MG/1; MG/1
1 TABLET ORAL
Qty: 90 TAB | Refills: 0 | Status: SHIPPED | OUTPATIENT
Start: 2019-07-23 | End: 2019-07-17 | Stop reason: SDUPTHER

## 2019-07-17 RX ORDER — OXYCODONE AND ACETAMINOPHEN 7.5; 325 MG/1; MG/1
1 TABLET ORAL
Qty: 90 TAB | Refills: 0 | Status: SHIPPED | OUTPATIENT
Start: 2019-08-22 | End: 2019-09-21

## 2019-07-17 RX ORDER — CLINDAMYCIN HYDROCHLORIDE 300 MG/1
CAPSULE ORAL
Qty: 25 CAP | Refills: 0 | Status: SHIPPED | OUTPATIENT
Start: 2019-07-17 | End: 2019-07-23 | Stop reason: SDUPTHER

## 2019-07-17 NOTE — PROGRESS NOTES
Sara Peters presents today for   Chief Complaint   Patient presents with    Well Woman       Sara Peters preferred language for health care discussion is english. Is someone accompanying this pt? no    Is the patient using any DME equipment during [address]? no    Depression Screening:  3 most recent PHQ Screens 6/12/2019   PHQ Not Done -   Little interest or pleasure in doing things Not at all   Feeling down, depressed, irritable, or hopeless Not at all   Total Score PHQ 2 0       Learning Assessment:  Learning Assessment 6/26/2018   PRIMARY LEARNER Patient   HIGHEST LEVEL OF EDUCATION - PRIMARY LEARNER  -   BARRIERS PRIMARY LEARNER -   CO-LEARNER CAREGIVER -   PRIMARY LANGUAGE ENGLISH   LEARNER PREFERENCE PRIMARY DEMONSTRATION   ANSWERED BY patient   RELATIONSHIP SELF       Abuse Screening:  Abuse Screening Questionnaire 3/22/2017   Do you ever feel afraid of your partner? N   Are you in a relationship with someone who physically or mentally threatens you? N   Is it safe for you to go home? Y       Health Maintenance reviewed and discussed and ordered per Provider. Health Maintenance Due   Topic Date Due    Shingrix Vaccine Age 49> (1 of 2) 02/11/2007    Bone Densitometry  09/24/2017    BREAST CANCER SCRN MAMMOGRAM  06/01/2018    PAP AKA CERVICAL CYTOLOGY  08/12/2019   . Sara Peters is updated on all     Pt currently taking Antiplatelet therapy? no    Coordination of Care:  1. Have you been to the ER, urgent care clinic since your last visit? no  Hospitalized since your last visit? no    2. Have you seen or consulted any other health care providers outside of the [address] [place] [address] since your last visit? no Include any pap smears or colon screening.  no

## 2019-07-17 NOTE — PROGRESS NOTES
The patient presents to the office today with the chief complaint of hypoglycemia    HPI    The patient is status post a gastric sleeve procedure then a take down of this procedure. She has had recurrent drops in sugar since then. The problem is gradually improving - both from the passage of time and the patient eating more frequently. The patient had pain in her right wrist.  This improved with an injection. The patient persists with bothersome low back pain and stiffness. The patient also has chronic abdominal pain - it has improved since that the take down of the gastric band procedure. ROS  Positive for chronic low back pain and chronic abdominal pain. Negative for chest pain or dyspnea    Allergies   Allergen Reactions    Pcn [Penicillins] Anaphylaxis    Tetracycline Anaphylaxis    Sulfabenzamide Hives       Current Outpatient Medications   Medication Sig Dispense Refill    mirtazapine (REMERON) 15 mg tablet TAKE ONE TABLET BY MOUTH EVERY EVENING 30 Tab 0    montelukast (SINGULAIR) 10 mg tablet TAKE ONE TABLET BY MOUTH DAILY 30 Tab 0    metoprolol succinate (TOPROL-XL) 25 mg XL tablet TAKE ONE-HALF TABLET BY MOUTH TWICE A DAY - STOP CARVEDILOL 30 Tab 2    [START ON 8/22/2019] oxyCODONE-acetaminophen (PERCOCET 7.5) 7.5-325 mg per tablet Take 1 Tab by mouth every eight (8) hours as needed for Pain for up to 30 days. Max Daily Amount: 3 Tabs. 90 Tab 0    clindamycin (CLEOCIN) 300 mg capsule 1 cap three times per day 25 Cap 0    clopidogrel (PLAVIX) 75 mg tab TAKE ONE TABLET BY MOUTH DAILY 30 Tab 0    diclofenac (VOLTAREN) 1 % gel Apply  to affected area four (4) times daily. 100 g 2    glucose blood VI test strips (ONETOUCH ULTRA BLUE TEST STRIP) strip To check the blood sugars three times per day 100 Strip 2    Blood-Glucose Meter monitoring kit One touch ultra.   Check sugar blood sugar three times epr day 1 Kit 0    raNITIdine (ZANTAC) 150 mg tablet TAKE ONE TABLET BY MOUTH TWICE A DAY 60 Tab 2    atorvastatin (LIPITOR) 20 mg tablet Take 1 Tab by mouth daily. 90 Tab 3    DALIRESP 500 mcg tab tablet TAKE ONE TABLET BY MOUTH DAILY 30 Tab 1    albuterol (PROVENTIL VENTOLIN) 2.5 mg /3 mL (0.083 %) nebulizer solution Use with nebulizer three times per day 100 Each 2    Lancets (ONETOUCH ULTRASOFT LANCETS) misc To use with blood for glucose monitoring 1 Each 11    lancets (ONE TOUCH DELICA) 33 gauge misc To use with blood for glucose monitoring 1 Package 3    docusate sodium (COLACE) 100 mg capsule Take 1 Cap by mouth two (2) times a day. (Patient taking differently: Take 100 mg by mouth daily.) 60 Cap 5    esomeprazole (NEXIUM) 20 mg capsule Take 1 Cap by mouth daily. STOP PREVACID  Indications: HEARTBURN 30 Cap 1       Past Medical History:   Diagnosis Date    Aorto-iliac duplex 09/17/2015    Patent abdom aorta endovascular graft w/o leak. Mod stenosis suggested in right limb.  Calculus of kidney 2014    stent/kidney     Carotid duplex 09/17/2015    Mild < 50% bilateral plaquing.  Cataract, left     Chronic kidney disease     one functioning kidney    Chronic obstructive pulmonary disease ([address]     Echocardiogram 11/04/2014    EF 56%. No WMA. Mild LAE. Echo is within normal limits.  Esophageal reflux     Generalized osteoarthrosis, involving multiple sites     GERD (gastroesophageal reflux disease)     Gout     History of renal stent     right side    Hypercholesteremia     Hypertension     resolved    Lower extremity arterial testing 03/27/2015    Normal perfusion at rest and w/treadmill bilaterally.   R KORI 1.05.  L KORI 1.03.    Murmur     patient reports that she is aware- has been told in the past    Nausea & vomiting     Renal artery stenosis (HCC)     S/P AAA (abdominal aortic aneurysm) repair 9/20/2018    Subclinical hyperthyroidism 8/2015    Unspecified sleep apnea     resolved       Past Surgical History:   Procedure Laterality Date    ADJUSTMENT GASTRIC BAND N/A 11/10/2016    MAKAYLA Cullen    FULL ESOPHAGEAL MANOMETRY  5/21/2018         HX APPENDECTOMY      HX BLADDER SUSPENSION      HX CHOLECYSTECTOMY      HX GI      lap band 2012    HX GI  07/2018    lap band removal    HX HEENT Right 1990s, 2016    Ear sx    HX HYSTERECTOMY      HX OTHER SURGICAL  10/27/14     Bilateral open femoral exposures, Placement of catheter and sheath in the aorta Endo repair of aortic aneurysm with modular bifurcated device  Interpretation of angiography, intravascular ultrasound (IVUS) catheter    HX OTHER SURGICAL  10/27/2014    Endurant II abdominal stent graft implant    HX UROLOGICAL      stent    VASCULAR SURGERY PROCEDURE UNLIST      surgery for abdominal aneurysm       Social History     Socioeconomic History    Marital status:      Spouse name: Not on file    Number of children: Not on file    Years of education: Not on file    Highest education level: Not on file   Occupational History    Not on file   Social Needs    Financial resource strain: Not on file    Food insecurity:     Worry: Not on file     Inability: Not on file    Transportation needs:     Medical: Not on file     Non-medical: Not on file   Tobacco Use    Smoking status: Former Smoker     Packs/day: 0.00     Years: 0.00     Pack years: 0.00     Types: Cigarettes     Last attempt to quit: 1/25/2016     Years since quitting: 3.4    Smokeless tobacco: Never Used   Substance and Sexual Activity    Alcohol use: No    Drug use: No    Sexual activity: Yes     Birth control/protection: Surgical   Lifestyle    Physical activity:     Days per week: Not on file     Minutes per session: Not on file    Stress: Not on file   Relationships    Social connections:     Talks on phone: Not on file     Gets together: Not on file     Attends Confucianism service: Not on file     Active member of club or organization: Not on file     Attends meetings of clubs or organizations: Not on file Relationship status: Not on file    Intimate partner violence:     Fear of current or ex partner: Not on file     Emotionally abused: Not on file     Physically abused: Not on file     Forced sexual activity: Not on file   Other Topics Concern    Not on file   Social History Narrative    Not on file       Patient does not have an advanced directive on file    Visit Vitals  /88 (BP 1 Location: Left arm, BP Patient Position: Sitting)   Pulse 94   Temp 98.5 °F (36.9 °C) (Tympanic)   Ht 5' 1\" (1.549 m)   Wt 174 lb (78.9 kg)   SpO2 98%   BMI 32.88 kg/m²       Physical Exam  Lungs are clear to auscultation percussion  Cardiac exam: S1 and S2 normal, no gallops, no murmurs  No carotid bruits  Abdomen: Normal bowel sounds, mild generalized tenderness, no masses, no hepatosplenomegaly      Ancillary Procedure on 07/11/2019   Component Date Value Ref Range Status    Celiac PSV 07/11/2019 206.7  cm/s Final    Left renal prox sys 07/11/2019 74.2  cm/s Final    Abdominal prox aorta maurilio 07/11/2019 57.6  cm/s Final    EVAR Sup Attach PSV 07/11/2019 45.0  cm/s Final    EVAR Graft Body PSV 07/11/2019 37.4  cm/s Final    Right external iliac distal veloci* 07/11/2019 102.1  cm/s Final    Right external iliac mid velodity 07/11/2019 129.3  cm/s Final    Right external iliac proximal velo* 07/11/2019 101.9  cm/s Final    Left external iliac mid velodity 07/11/2019 70.8  cm/s Final    Left external iliac proximal veloc* 07/11/2019 118.5  cm/s Final    Evar Right Dist Attach Psv 07/11/2019 106.1  cm/s Final    Evar Left Dist Attach Psv 07/11/2019 103.9  cm/s Final    Evar Right Limb Mid Psv 07/11/2019 80.3  cm/s Final    Evar Right Limb Prox Psv 07/11/2019 57.1  cm/s Final    Evar Left Limb Mid Psv 07/11/2019 122.2  cm/s Final    Evar Left Limb Prox Psv 07/11/2019 68.7  cm/s Final    Left external iliac distal velocity 07/11/2019 78.8  cm/s Final    Right Ext iliac Mid Maurilio Ratio 07/11/2019 1.3   Final    Right Ext Iliac Dist Muarilio Ratio 07/11/2019 0.8   Final    Left Ext iliac Mid Maurilio Ratio 07/11/2019 0.60   Final    Left Ext Iliac Dist Mauirlio Ratio 07/11/2019 1.11   Final    Left renal prox rar 07/11/2019 1.29   Final   Office Visit on 07/05/2019   Component Date Value Ref Range Status    Color (UA POC) 07/05/2019 Yellow   Final    Clarity (UA POC) 07/05/2019 Clear   Final    Glucose (UA POC) 07/05/2019 Negative  Negative Final    Bilirubin (UA POC) 07/05/2019 Negative  Negative Final    Ketones (UA POC) 07/05/2019 Negative  Negative Final    Specific gravity (UA POC) 07/05/2019 1.005  1.001 - 1.035 Final    Blood (UA POC) 07/05/2019 Trace  Negative Final    pH (UA POC) 07/05/2019 6.0  4.6 - 8.0 Final    Protein (UA POC) 07/05/2019 Negative  Negative Final    Urobilinogen (UA POC) 07/05/2019 0.2 mg/dL  0.2 - 1 Final    Nitrites (UA POC) 07/05/2019 Negative  Negative Final    Leukocyte esterase (UA POC) 07/05/2019 Negative  Negative Final   Hospital Outpatient Visit on 05/29/2019   Component Date Value Ref Range Status    [facility] SPE[facility]IM[facility] COL 05/29/2019 Specimens collected/[facility] to [facility]    Final       .No results found for any visits on 07/17/19. Assessment / Plan      ICD-10-CM ICD-9-CM    1. Hypoglycemia E16.2 251.2    2. H/O major abdominal surgery Z98.890 V15.29 oxyCODONE-acetaminophen (PERCOCET 7.5) 7.5-325 mg per tablet      DISCONTINUED: oxyCODONE-acetaminophen (PERCOCET 7.5) 7.5-325 mg per tablet   3. Spondylosis of lumbar region without myelopathy or radiculopathy M47.816 721.3    4. Tendonitis M77.9 726.90        she was advised to continue her maintenance medications      Follow-up and Dispositions    · Return in about 3 months (around 10/17/2019). I asked Carlie Roberto if she has any questions and I answered the questions. Ragena Burrows Robert Kawasaki states that she understands the treatment plan and agrees with the treatment plan          THIS DOCUMENT WAS CREATED WITH A VOICE ACTIVATED DICTATION SYSTEM.   IT MAY CONTAIN TRANSCRIPTION ERRORS

## 2019-07-18 DIAGNOSIS — G47.00 INSOMNIA, UNSPECIFIED TYPE: ICD-10-CM

## 2019-07-18 DIAGNOSIS — J30.9 ALLERGIC RHINITIS: ICD-10-CM

## 2019-07-18 RX ORDER — MONTELUKAST SODIUM 10 MG/1
TABLET ORAL
Qty: 30 TAB | Refills: 0 | Status: SHIPPED | OUTPATIENT
Start: 2019-07-18 | End: 2019-07-23 | Stop reason: SDUPTHER

## 2019-07-18 RX ORDER — METOPROLOL SUCCINATE 25 MG/1
TABLET, EXTENDED RELEASE ORAL
Qty: 30 TAB | Refills: 2 | Status: SHIPPED | OUTPATIENT
Start: 2019-07-18 | End: 2019-07-23 | Stop reason: SDUPTHER

## 2019-07-18 RX ORDER — MIRTAZAPINE 15 MG/1
TABLET, FILM COATED ORAL
Qty: 30 TAB | Refills: 0 | Status: SHIPPED | OUTPATIENT
Start: 2019-07-18 | End: 2019-07-23 | Stop reason: SDUPTHER

## 2019-07-23 DIAGNOSIS — K21.9 GASTROESOPHAGEAL REFLUX DISEASE, ESOPHAGITIS PRESENCE NOT SPECIFIED: ICD-10-CM

## 2019-07-23 DIAGNOSIS — Z98.890 H/O MAJOR ABDOMINAL SURGERY: ICD-10-CM

## 2019-07-23 DIAGNOSIS — G47.00 INSOMNIA, UNSPECIFIED TYPE: ICD-10-CM

## 2019-07-23 DIAGNOSIS — Z98.84 LAP-BAND SURGERY STATUS: ICD-10-CM

## 2019-07-23 DIAGNOSIS — J30.9 ALLERGIC RHINITIS: ICD-10-CM

## 2019-07-24 RX ORDER — DOCUSATE SODIUM 100 MG/1
100 CAPSULE, LIQUID FILLED ORAL DAILY
Qty: 30 CAP | Refills: 3 | Status: SHIPPED | OUTPATIENT
Start: 2019-07-24

## 2019-07-24 RX ORDER — METOPROLOL SUCCINATE 25 MG/1
TABLET, EXTENDED RELEASE ORAL
Qty: 30 TAB | Refills: 2 | Status: SHIPPED | OUTPATIENT
Start: 2019-07-24

## 2019-07-24 RX ORDER — ATORVASTATIN CALCIUM 20 MG/1
20 TABLET, FILM COATED ORAL DAILY
Qty: 90 TAB | Refills: 3 | Status: SHIPPED | OUTPATIENT
Start: 2019-07-24

## 2019-07-24 RX ORDER — MIRTAZAPINE 15 MG/1
TABLET, FILM COATED ORAL
Qty: 30 TAB | Refills: 0 | Status: SHIPPED | OUTPATIENT
Start: 2019-07-24 | End: 2019-11-18 | Stop reason: SDUPTHER

## 2019-07-24 RX ORDER — RANITIDINE 150 MG/1
TABLET, FILM COATED ORAL
Qty: 60 TAB | Refills: 2 | Status: SHIPPED | OUTPATIENT
Start: 2019-07-24

## 2019-07-24 RX ORDER — CLOPIDOGREL BISULFATE 75 MG/1
TABLET ORAL
Qty: 30 TAB | Refills: 0 | Status: SHIPPED | OUTPATIENT
Start: 2019-07-24

## 2019-07-24 RX ORDER — CLINDAMYCIN HYDROCHLORIDE 300 MG/1
CAPSULE ORAL
Qty: 25 CAP | Refills: 0 | Status: SHIPPED | OUTPATIENT
Start: 2019-07-24

## 2019-07-24 RX ORDER — MONTELUKAST SODIUM 10 MG/1
TABLET ORAL
Qty: 30 TAB | Refills: 0 | Status: SHIPPED | OUTPATIENT
Start: 2019-07-24

## 2019-08-03 DIAGNOSIS — Z98.84 LAP-BAND SURGERY STATUS: ICD-10-CM

## 2019-08-04 RX ORDER — CLOPIDOGREL BISULFATE 75 MG/1
TABLET ORAL
Qty: 30 TAB | Refills: 0 | Status: SHIPPED | OUTPATIENT
Start: 2019-08-04 | End: 2019-10-02 | Stop reason: SDUPTHER

## 2019-09-11 NOTE — PROGRESS NOTES
1. Have you been to the ER, urgent care clinic since your last visit? Hospitalized since your last visit? No    2. Have you seen or consulted any other health care providers outside of the [address] [place] [address] since your last visit? Include any pap smears or colon screening. No     Pt ID confirmed    Pt ID confirmed    Weight Loss Metrics 8/16/2018 8/16/2018 8/7/2018 7/17/2018 7/13/2018 6/26/2018 6/22/2018   Pre op / Initial Wt 161 - - - - - -   Today's Wt - 161 lb 162 lb 166 lb 166 lb 166 lb 166 lb   BMI - 31.44 kg/m2 31.64 kg/m2 32.42 kg/m2 32.42 kg/m2 32.42 kg/m2 32.42 kg/m2   Ideal Body Wt 119 - - - - - -   Excess Body Wt 42 - - - - - -   Goal Wt 128 - - - - - -   Wt loss to date 0 - - - - - -   % Wt Loss 0 - - - - - -   80% EBW 33.6 - - - - - -       Body mass index is 31.44 kg/(m^2). JOVANI VIVEROS ; 11/12/2019 ; [facility] JOVANI VIVEROS ; 11/12/2019 ; [facility] JOVANI VIVEROS ; 11/12/2019 ; [facility]

## 2019-09-24 DIAGNOSIS — Z98.890 H/O MAJOR ABDOMINAL SURGERY: ICD-10-CM

## 2019-09-24 RX ORDER — OXYCODONE AND ACETAMINOPHEN 7.5; 325 MG/1; MG/1
1 TABLET ORAL
Qty: 90 TAB | Refills: 0 | Status: CANCELLED | OUTPATIENT
Start: 2019-09-24 | End: 2019-10-24

## 2019-09-24 NOTE — TELEPHONE ENCOUNTER
Last seen 07/17/19  Next appt  10/18/19    Requested Prescriptions     Pending Prescriptions Disp Refills    oxyCODONE-acetaminophen (PERCOCET 7.5) 7.5-325 mg per tablet 90 Tab 0     Sig: Take 1 Tab by mouth every eight (8) hours as needed for Pain for up to 30 days. Max Daily Amount: 3 Tabs.

## 2019-10-02 DIAGNOSIS — M47.816 SPONDYLOSIS OF LUMBAR REGION WITHOUT MYELOPATHY OR RADICULOPATHY: Primary | ICD-10-CM

## 2019-10-02 DIAGNOSIS — Z98.84 LAP-BAND SURGERY STATUS: ICD-10-CM

## 2019-10-02 RX ORDER — CLOPIDOGREL BISULFATE 75 MG/1
TABLET ORAL
Qty: 30 TAB | Refills: 0 | Status: SHIPPED | OUTPATIENT
Start: 2019-10-02

## 2019-10-15 NOTE — TELEPHONE ENCOUNTER
Requested Prescriptions     Pending Prescriptions Disp Refills    roflumilast (DALIRESP) 500 mcg tab tablet 30 Tab 1

## 2019-11-18 DIAGNOSIS — G47.00 INSOMNIA, UNSPECIFIED TYPE: ICD-10-CM

## 2019-11-18 RX ORDER — MIRTAZAPINE 15 MG/1
TABLET, FILM COATED ORAL
Qty: 30 TAB | Refills: 0 | Status: SHIPPED | OUTPATIENT
Start: 2019-11-18

## 2019-11-22 ENCOUNTER — DOCUMENTATION ONLY (OUTPATIENT)
Dept: FAMILY MEDICINE CLINIC | Facility: CLINIC | Age: 64
End: 2019-11-22

## 2021-08-03 PROBLEM — K21.9 GASTROESOPHAGEAL REFLUX DISEASE: Status: RESOLVED | Noted: 2021-08-03 | Resolved: 2021-08-03

## 2022-12-08 NOTE — PROCEDURES
[email or web address]  [phone]    HIGH RESOLUTION ESOPHAGEAL MANOMETRY REPORT      Indication:     1. 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 Esophageal dysphagia R 13.19    Date of procedure: 05/08/2018    Procedure:     After confirmation of potential allergies, a topical analgesic was used to numb the nares followed by trans-nasal insertion of a High Resolution Manometry Catheter. Pressure bands of both UES and LES were observed on the color contour. Patient instructed to take deep breath to verify placement of catheter; diaphragmatic pinch noted on inspiration. Patient was assisted to supine position and catheter was stabilized by taping at the nares. Patient wasencouraged to relax while acclimating to catheter for approximately 5 minutes. A 30 second baseline pressure was obtained to identify the UES and LES followed by a series of wet swallows using 5 ccs room temperature normal saline to assess esophageal motility and bolus transit. At the end of the study, a multiple rapid swallow sequence was performed. At the conclusion of the procedure; the catheter was removed. Findings:    1. Esophago-Gastric junction: There is a high baseline IRP along with an incomplete swallow induced relaxation of the Esophago-gastric junction. The mean IRP is 18.6 (normal is less than 15). 2. Esophageal body: All swallows show abnormal contraction patterns-The swallows are premature with reduced distal latency (DL of <4.5 sec) in most, if not all the swallows. The peristalsis is preserved in all the swallows    3. Impedance: There is complete bolus clearance on 8/10    Impression:    1. Disorder with EG junction outflow obstruction, with high IRP and spastic, premature contractions in all swallows. There is preserved peristalsis in  The swallows. Her history of lap band is noted.  It is possible that the motility abnormality seen could be related to the lap band, with an obstructive physiology causing the decreased latency in the body. Rarely, primary motility abnormalities such as evolving achalasia can also present like this, though the relatively preserved peristalsis would be unusual.      2.  Given the motility abnormality, there is reasonably preserved bolus clearance. Recommend:    1. Consider removal of lap band. If symptoms persist, repeat manometry. Reference: (Interpretation is based on [place] classification, version 3.0). The [place] Classification of esophageal motility disorders, v3.0. Js Jama et.al. International High Resolution Manometry Working Group. Neurogastroenterol Motil. 2015;27(2):160-74. Never

## 2023-01-04 NOTE — CONSULTS
[facility]    Prashanth Quiñones  MR#: [id number]  : 1955  ACCOUNT #: [de-identified]   DATE OF SERVICE: 10/09/2018    REASON FOR CONSULTATION:  Abdominal pain with nausea and vomiting. HISTORY OF PRESENT ILLNESS:  The patient is a 73-year-old female with history of what she describes as recurrent bouts of gastritis requiring hospitalization (last when she believes was in April or May of this year), history of kidney stones, history of laparoscopic gastric band for obesity and subsequent removal of same, currently admitted to the hospitalist service for a 4-day history of abdominal pain with intractable nausea, vomiting. She says the pain began last Thursday and Friday as mostly periumbilical and slightly lower abdominal pain radiating to both flanks, but more so on the left side. She described the pain as coming in waves and very sharp. Shortly after the pain began, she began to have episodes of nausea and subsequent developed intractable vomiting over the last several days. Vomiting initially started out as mostly green and mucousy in nature, but has progressed over the last day to be more brown (patient unsure whether coffee grounds or not, but denies any bright red blood). She has also developed with recurrent bouts of retching and dry heaving, worsening flank and back pain. She has been passing her gas throughout, her bowel movements are chronically regular and her last bowel movement which she describes as normal was at least 2 or 3 days ago. Following her upper endoscopy this morning she says that she is feeling better with the pain improved. She does not have any nausea at this time and the NG tube has been removed. She is resting comfortably in bed. Denies any chest pain, shortness of breath or other symptoms at this time.     PAST MEDICAL HISTORY:  Chronic kidney disease (right one nonfunctional, actually scheduled for removal at some point in the near future), COPD, gastroesophageal reflux, gout, hypertension, hypercholesterolemia, gastritis, sleep apnea. PAST SURGICAL HISTORY:  Laparoscopic gastric band at a facility in another area, she believes in 2012, cholecystectomy performed at the same time of that operation, appendectomy, laparoscopic removal of gastric band in July of this year, endovascular stenting of abdominal aortic aneurysm. The patient believes it was at least 4 or 5 years ago, hysterectomy, bladder suspension. MEDICATIONS:  Please see med rec sheet. In review I do see that she is on Plavix 75 mg daily, but no other blood thinners or significant antiplatelet regimens. ALLERGIES:  PENICILLIN, TETRACYCLINE AND SULFABENZAMIDE. SOCIAL HISTORY:  She quit smoking 2 years ago, prior to which she smoked anywhere from a half pack of cigarettes to a pack of cigarettes per day. Denies any significant alcohol use. Does not use any illicit drugs. FAMILY HISTORY:  Significant for diabetes, heart disease. REVIEW OF SYSTEMS:  Twelve-point review of system was reviewed with the patient, negative apart from that listed in the HPI. PHYSICAL EXAMINATION:  GENERAL:  She is well-developed, well-nourished, slightly obese, no acute distress. VITAL SIGNS:  She has been afebrile since arrival, currently 98.9. Last pulse is 99, but during the course of today, she has been tachycardic from 102 up to a listed heart rate of 152. Blood pressure has also been elevated at times during this admission, but currently 134/74, respirations 12-22, satting 94-98% on room air. HEENT:  Normocephalic, atraumatic. Pupils equal, round, react to light and accommodation. Extraocular movements intact. Sclerae are anicteric bilaterally. NECK:  Supple. No JVD. CARDIOVASCULAR:  Regular rate and rhythm. LUNGS:  Clear to auscultation. ABDOMEN:  Soft, nondistended. Good bowel sounds in all 4 quadrants.   Minimal diffuse tenderness, slightly more prominent periumbilical and [FreeTextEntry2] : Dr. Schmid [FreeTextEntry3] : Tommy Pham MD\par Attending Surgeon\par Cardiovascular & Thoracic Surgery across the lower abdomen. No peritoneal signs. All surgical incisions well healed without obvious hernia or breakdown. RECTAL:  Deferred. EXTREMITIES:  No clubbing, cyanosis. Good capillary refill. No calf tenderness. ANCILLARY STUDIES:  White count was normal at 10.2, but did become elevated on repeat early this morning at 17.3, H and H today is 15.3 and 41.6, platelets 988. On admission, she did have a slight left shift with 86 neutrophils. Lipase was normal on admission of 129. Last BMP essentially normal apart from chloride slightly low at 87, bicarbonate high at 38. Glucose minimally elevated 109. Lactic acid was elevated today and I do not see a repeat on when that was drawn on admission, but currently is 2.8. LFTs as of this morning were all normal.    RADIOLOGY STUDIES:  CT of the abdomen and pelvis with contrast shows gastric distention with fluid. No small-bowel obstruction. Appendix not visualized. Terminal ileum with some possible mild thickening. No diverticulitis or colitis. No mesenteric adenopathy or fat stranding, stable appearing cystic pancreatic neck lesion essentially unchanged from 2014 CT; slight increase in size of the aortic aneurysm. ASSESSMENT:  A 62-year-old white female with recurrent gastritis, likely leading to some degree of gastroparesis, subsequent nausea, vomiting, less likely a gastric outlet obstruction. PLAN:  Gastroenterology upper endoscopy results reviewed showing gastritis without discrete ulceration or active bleeding. Duodenum and jejunum noted to be normal, so likely no mechanical obstruction, likely representing some kind of functional dysmotility or gastroparesis. Agree with starting the patient slowly on clear liquids would avoid carbonated beverages and broth for the time being and advancing her slowly as tolerated after that.   Defer to gastroenterology for additional recommendations on changing PPI regimen and/or starting prokinetic agents. No surgical intervention necessary at this time. We will follow. Thank you for the consultation.       Cassia Hong MD       RHP / HN  D: 10/09/2018 15:19     T: 10/09/2018 16:08  JOB #: [id number]  CC: Joya Medellin MD  CC: Darion York MD  CC: Trang Montoya MD

## 2024-04-12 NOTE — TELEPHONE ENCOUNTER
Requested Prescriptions     Pending Prescriptions Disp Refills    HYDROcodone-acetaminophen (NORCO) 7.5-325 mg per tablet 100 Tab 0     Si tablet every four hours as needed for pain AHRF 2/2 ILD flair vs PNA

## 2025-06-17 NOTE — TELEPHONE ENCOUNTER
06/17/25 1544   Rounds   Attendance Provider;   Discharge Plan A Home with family;Other   Why the patient remains in the hospital Requires continued medical care   Transition of Care Barriers None     Discharge Plan A and Plan B have been determined by review of patient's clinical status, future medical and therapeutic needs, and coverage/benefits for post-acute care in coordination with multidisciplinary team members.    Alice Duron MSW, LCSW  [facility]  Case Management Dept.       Pt called in requesting the results to her labs that she had taken because her next appt is not until 08/31/17.  Please advise pt at [phone]

## (undated) DEVICE — SYRINGE MED 3ML NDL 22GA L1IN PLAS N CTRL LUERLOCK TIP REG

## (undated) DEVICE — GLOVE SURG SZ 7.5 L11.73IN FNGR THK7.5MIL STRW LTX POLYMER

## (undated) DEVICE — SYR 50ML SLIP TIP NSAF LF STRL --

## (undated) DEVICE — LIGHT HANDLE: Brand: DEVON

## (undated) DEVICE — MEDI-VAC SUCTION HIGH CAPACITY: Brand: CARDINAL HEALTH

## (undated) DEVICE — AIRLIFE™ NASAL OXYGEN CANNULA CURVED, FLARED TIP WITH 14 FOOT (4.3 M) CRUSH-RESISTANT TUBING, OVER-THE-EAR STYLE: Brand: AIRLIFE™

## (undated) DEVICE — SYR LR LCK 1ML GRAD NSAF 30ML --

## (undated) DEVICE — CATHETER SUCT TR FL TIP 14FR W/ O CTRL

## (undated) DEVICE — KIT CATH OD16FR 5ML BLLN SIL URIN INDWL STR TIP INF CTRL

## (undated) DEVICE — ENDOSCOPY PUMP TUBING/ CAP SET: Brand: ERBE

## (undated) DEVICE — FLUFF AND POLYMER UNDERPAD,EXTRA HEAVY: Brand: WINGS

## (undated) DEVICE — SUTURE ETHBND EXCEL SZ 0 L30IN NONABSORBABLE GRN L26MM CT-2 X412H

## (undated) DEVICE — BITE BLOCK ENDOSCP UNIV AD 6 TO 9.4 MM

## (undated) DEVICE — SUTURE VCRL SZ 3-0 L27IN ABSRB VLT L26MM SH 1/2 CIR J316H

## (undated) DEVICE — KENDALL 500 SERIES DIAPHORETIC FOAM MONITORING ELECTRODE - TEAR DROP SHAPE ( 30/PK): Brand: KENDALL

## (undated) DEVICE — SYRINGE MED 25GA 3ML L5/8IN SUBQ PLAS W/ DETACH NDL SFTY

## (undated) DEVICE — SOLUTION IRRIG 1000ML LAC RINGER PLAS POUR BTL

## (undated) DEVICE — (D)GLOVE EXAM LG NITRL NS -- DISC BY MFR NO SUB

## (undated) DEVICE — SUTURE VCRL SZ 2-0 L27IN ABSRB UD L26MM SH 1/2 CIR J417H

## (undated) DEVICE — BASIN EMESIS 500CC ROSE 250/CS 60/PLT: Brand: MEDEGEN MEDICAL PRODUCTS, LLC

## (undated) DEVICE — MEDI-VAC NON-CONDUCTIVE SUCTION TUBING: Brand: CARDINAL HEALTH

## (undated) DEVICE — SHEARS: Brand: ENDO SHEARS

## (undated) DEVICE — GAUZE SPONGES,16 PLY: Brand: CURITY

## (undated) DEVICE — Device

## (undated) DEVICE — BITE BLK ENDOSCP AD 54FR GRN POLYETH ENDOSCP W STRP SLD

## (undated) DEVICE — CONTAINER PREFIL FRMLN 15ML --

## (undated) DEVICE — VISUALIZATION SYSTEM: Brand: CLEARIFY

## (undated) DEVICE — PACK PROCEDURE SURG LAPAROSCOPY 17X7 MM BRTRC PRIMUS

## (undated) DEVICE — MEDIA CONTRAST 10ML 200MG/ML 41%

## (undated) DEVICE — UNIVERSAL FIXATION CANNULA: Brand: VERSAONE

## (undated) DEVICE — UNIVERSAL FIXATION CANNULA: Brand: VERSAPORT

## (undated) DEVICE — SHEAR HARMONIC ACET 5MMX36CM -- ACE PLUS

## (undated) DEVICE — SYR 50ML LR LCK 1ML GRAD NSAF --

## (undated) DEVICE — KENDALL SCD EXPRESS SLEEVES, KNEE LENGTH, MEDIUM: Brand: KENDALL SCD

## (undated) DEVICE — FLEX ADVANTAGE 3000CC: Brand: FLEX ADVANTAGE

## (undated) DEVICE — SUT PROL 2-0 30IN CT2 BLU --

## (undated) DEVICE — SOLUTION IRRIG 1000ML H2O STRL BLT

## (undated) DEVICE — (D)CANN ENDOSCP BLDELSS 12MM -- DISC BY MFR USE ITEM 332253

## (undated) DEVICE — SUTURE MCRYL SZ 4-0 L27IN ABSRB UD L24MM PS-1 3/8 CIR PRIM Y935H

## (undated) DEVICE — KIT COLON W/ 1.1OZ LUB AND 2 END

## (undated) DEVICE — TOWEL SURG W16XL26IN BLU NONFENESTRATED DLX ST 2 PER PK

## (undated) DEVICE — BLADELESS OPTICAL TROCAR WITH FIXATION CANNULA: Brand: VERSAPORT

## (undated) DEVICE — TROCAR ENDOSCP L100MM DIA12MM STBL SL BLDELSS ENDOPATH XCEL

## (undated) DEVICE — SLEEVE TRCR L100MM DIA5MM UNIV STBL FOR BLDELSS DIL TIP

## (undated) DEVICE — SLEEVE COMPR STD 12 IN FOR 165IN CALF COMFORT VENODYNE SYS

## (undated) DEVICE — INTENDED FOR TISSUE SEPARATION, AND OTHER PROCEDURES THAT REQUIRE A SHARP SURGICAL BLADE TO PUNCTURE OR CUT.: Brand: BARD-PARKER ® CARBON RIB-BACK BLADES

## (undated) DEVICE — (D)BNDG ADHESIVE FABRIC 3/4X3 -- DISC BY MFR USE ITEM 357960

## (undated) DEVICE — FCPS RAD JAW 4LC 240CM W/NDL -- BX/20 RADIAL JAW 4

## (undated) DEVICE — (D)SYR 10ML 1/5ML GRAD NSAF -- PKGING CHANGE USE ITEM 338027

## (undated) DEVICE — STERILE POLYISOPRENE POWDER-FREE SURGICAL GLOVES: Brand: PROTEXIS

## (undated) DEVICE — TRAY MYEL SFTY +

## (undated) DEVICE — FORCEPS BX L240CM JAW DIA2.8MM L CAP W/ NDL MIC MESH TOOTH

## (undated) DEVICE — FLEX ADVANTAGE 1500CC: Brand: FLEX ADVANTAGE

## (undated) DEVICE — GAUZE,SPONGE,4"X4",16PLY,STRL,LF,10/TRAY: Brand: MEDLINE

## (undated) DEVICE — ALL PURPOSE SPONGES,NON-WOVEN, 4 PLY: Brand: CURITY

## (undated) DEVICE — BLADELESS OPTICAL TROCAR WITH FIXATION CANNULA: Brand: VERSAONE

## (undated) DEVICE — SOLUTION SCRB 4OZ 4% CHG CLN BASE FOR PT SKIN ANTISEPSIS

## (undated) DEVICE — (D)NDL SPNE 22GX15CM -- DISC BY MFR W/NO SUB

## (undated) DEVICE — TROCAR LAP L100MM DIA5MM BLDELSS W/ STBL SL ENDOPATH XCEL

## (undated) DEVICE — NDL SPNE MANAN 22GX6IN --

## (undated) DEVICE — DISPOSABLE SUCTION/IRRIGATOR TUBE SET WITH TIP: Brand: AHTO